# Patient Record
Sex: FEMALE | Race: WHITE | NOT HISPANIC OR LATINO | Employment: FULL TIME | URBAN - METROPOLITAN AREA
[De-identification: names, ages, dates, MRNs, and addresses within clinical notes are randomized per-mention and may not be internally consistent; named-entity substitution may affect disease eponyms.]

---

## 2017-01-06 ENCOUNTER — APPOINTMENT (OUTPATIENT)
Dept: PHYSICAL THERAPY | Facility: REHABILITATION | Age: 32
End: 2017-01-06
Payer: COMMERCIAL

## 2017-01-06 PROCEDURE — 97110 THERAPEUTIC EXERCISES: CPT

## 2017-01-20 ENCOUNTER — APPOINTMENT (OUTPATIENT)
Dept: PHYSICAL THERAPY | Facility: REHABILITATION | Age: 32
End: 2017-01-20
Payer: COMMERCIAL

## 2017-01-20 ENCOUNTER — GENERIC CONVERSION - ENCOUNTER (OUTPATIENT)
Dept: OTHER | Facility: OTHER | Age: 32
End: 2017-01-20

## 2017-02-03 ENCOUNTER — APPOINTMENT (OUTPATIENT)
Dept: PHYSICAL THERAPY | Facility: REHABILITATION | Age: 32
End: 2017-02-03
Payer: COMMERCIAL

## 2017-02-03 PROCEDURE — 97140 MANUAL THERAPY 1/> REGIONS: CPT

## 2017-02-03 PROCEDURE — 97110 THERAPEUTIC EXERCISES: CPT

## 2017-02-09 ENCOUNTER — APPOINTMENT (EMERGENCY)
Dept: RADIOLOGY | Facility: HOSPITAL | Age: 32
End: 2017-02-09
Payer: COMMERCIAL

## 2017-02-09 ENCOUNTER — HOSPITAL ENCOUNTER (EMERGENCY)
Facility: HOSPITAL | Age: 32
Discharge: HOME | End: 2017-02-09
Admitting: EMERGENCY MEDICINE
Payer: COMMERCIAL

## 2017-02-09 VITALS
RESPIRATION RATE: 16 BRPM | SYSTOLIC BLOOD PRESSURE: 114 MMHG | WEIGHT: 204 LBS | DIASTOLIC BLOOD PRESSURE: 72 MMHG | TEMPERATURE: 98 F | OXYGEN SATURATION: 100 % | HEART RATE: 68 BPM

## 2017-02-09 DIAGNOSIS — R07.81 RIB PAIN ON RIGHT SIDE: Primary | ICD-10-CM

## 2017-02-09 PROCEDURE — 71101 X-RAY EXAM UNILAT RIBS/CHEST: CPT

## 2017-02-09 PROCEDURE — 99283 EMERGENCY DEPT VISIT LOW MDM: CPT

## 2017-02-09 RX ORDER — NAPROXEN 500 MG/1
500 TABLET ORAL 2 TIMES DAILY WITH MEALS
Qty: 20 TABLET | Refills: 0 | Status: SHIPPED | OUTPATIENT
Start: 2017-02-09 | End: 2018-08-09 | Stop reason: ALTCHOICE

## 2017-02-09 RX ORDER — CLONAZEPAM 1 MG/1
1 TABLET ORAL DAILY
COMMUNITY
End: 2018-03-15 | Stop reason: SDUPTHER

## 2017-02-09 RX ORDER — GABAPENTIN 100 MG/1
100 CAPSULE ORAL 3 TIMES DAILY
COMMUNITY
End: 2017-04-16

## 2017-02-17 ENCOUNTER — APPOINTMENT (OUTPATIENT)
Dept: PHYSICAL THERAPY | Facility: REHABILITATION | Age: 32
End: 2017-02-17
Payer: COMMERCIAL

## 2017-03-30 ENCOUNTER — ALLSCRIPTS OFFICE VISIT (OUTPATIENT)
Dept: OTHER | Facility: OTHER | Age: 32
End: 2017-03-30

## 2017-03-30 DIAGNOSIS — F40.10 SOCIAL PHOBIA: ICD-10-CM

## 2017-03-30 DIAGNOSIS — E03.9 HYPOTHYROIDISM: ICD-10-CM

## 2017-04-03 ENCOUNTER — APPOINTMENT (OUTPATIENT)
Dept: LAB | Facility: HOSPITAL | Age: 32
End: 2017-04-03
Attending: PSYCHIATRY & NEUROLOGY
Payer: COMMERCIAL

## 2017-04-03 ENCOUNTER — TRANSCRIBE ORDERS (OUTPATIENT)
Dept: LAB | Facility: HOSPITAL | Age: 32
End: 2017-04-03

## 2017-04-03 DIAGNOSIS — F40.10 SOCIAL PHOBIA: ICD-10-CM

## 2017-04-03 DIAGNOSIS — E03.9 HYPOTHYROIDISM: ICD-10-CM

## 2017-04-03 LAB — TSH SERPL DL<=0.05 MIU/L-ACNC: 1.85 UIU/ML (ref 0.36–3.74)

## 2017-04-03 PROCEDURE — 84443 ASSAY THYROID STIM HORMONE: CPT

## 2017-04-03 PROCEDURE — 36415 COLL VENOUS BLD VENIPUNCTURE: CPT

## 2017-04-16 ENCOUNTER — HOSPITAL ENCOUNTER (EMERGENCY)
Facility: HOSPITAL | Age: 32
Discharge: HOME | End: 2017-04-16
Attending: EMERGENCY MEDICINE | Admitting: EMERGENCY MEDICINE
Payer: COMMERCIAL

## 2017-04-16 VITALS
TEMPERATURE: 97.9 F | HEART RATE: 61 BPM | OXYGEN SATURATION: 98 % | RESPIRATION RATE: 16 BRPM | SYSTOLIC BLOOD PRESSURE: 119 MMHG | DIASTOLIC BLOOD PRESSURE: 57 MMHG | WEIGHT: 204 LBS | HEIGHT: 67 IN | BODY MASS INDEX: 32.02 KG/M2

## 2017-04-16 DIAGNOSIS — S05.00XA CORNEAL ABRASION: Primary | ICD-10-CM

## 2017-04-16 PROCEDURE — 99282 EMERGENCY DEPT VISIT SF MDM: CPT

## 2017-04-16 RX ORDER — PROPARACAINE HYDROCHLORIDE 5 MG/ML
1 SOLUTION/ DROPS OPHTHALMIC ONCE
Status: COMPLETED | OUTPATIENT
Start: 2017-04-16 | End: 2017-04-16

## 2017-04-16 RX ORDER — FLUOXETINE 20 MG/1
60 TABLET, FILM COATED ORAL DAILY
COMMUNITY
End: 2018-01-30

## 2017-04-16 RX ORDER — OFLOXACIN 3 MG/ML
2 SOLUTION/ DROPS OPHTHALMIC 4 TIMES DAILY
Qty: 2.8 ML | Refills: 0 | Status: SHIPPED | OUTPATIENT
Start: 2017-04-16 | End: 2017-04-23

## 2017-04-16 RX ORDER — HYDROXYZINE HYDROCHLORIDE 25 MG/1
25 TABLET, FILM COATED ORAL 3 TIMES DAILY PRN
COMMUNITY
End: 2018-03-15 | Stop reason: SDUPTHER

## 2017-04-16 RX ADMIN — PROPARACAINE HYDROCHLORIDE 1 DROP: 5 SOLUTION/ DROPS OPHTHALMIC at 12:07

## 2017-04-16 RX ADMIN — FLUORESCEIN SODIUM 1 STRIP: 1 STRIP OPHTHALMIC at 12:07

## 2017-05-08 ENCOUNTER — ALLSCRIPTS OFFICE VISIT (OUTPATIENT)
Dept: OTHER | Facility: OTHER | Age: 32
End: 2017-05-08

## 2017-06-05 ENCOUNTER — ALLSCRIPTS OFFICE VISIT (OUTPATIENT)
Dept: OTHER | Facility: OTHER | Age: 32
End: 2017-06-05

## 2017-06-08 ENCOUNTER — ALLSCRIPTS OFFICE VISIT (OUTPATIENT)
Dept: OTHER | Facility: OTHER | Age: 32
End: 2017-06-08

## 2017-06-08 DIAGNOSIS — E55.9 VITAMIN D DEFICIENCY: ICD-10-CM

## 2017-06-08 DIAGNOSIS — Z13.6 ENCOUNTER FOR SCREENING FOR CARDIOVASCULAR DISORDERS: ICD-10-CM

## 2017-06-08 DIAGNOSIS — E03.9 HYPOTHYROIDISM: ICD-10-CM

## 2017-06-08 DIAGNOSIS — Z13.1 ENCOUNTER FOR SCREENING FOR DIABETES MELLITUS: ICD-10-CM

## 2017-06-12 ENCOUNTER — GENERIC CONVERSION - ENCOUNTER (OUTPATIENT)
Dept: OTHER | Facility: OTHER | Age: 32
End: 2017-06-12

## 2017-06-12 ENCOUNTER — APPOINTMENT (OUTPATIENT)
Dept: LAB | Facility: HOSPITAL | Age: 32
End: 2017-06-12
Payer: COMMERCIAL

## 2017-06-12 DIAGNOSIS — E03.9 HYPOTHYROIDISM: ICD-10-CM

## 2017-06-12 DIAGNOSIS — Z13.1 ENCOUNTER FOR SCREENING FOR DIABETES MELLITUS: ICD-10-CM

## 2017-06-12 DIAGNOSIS — Z13.6 ENCOUNTER FOR SCREENING FOR CARDIOVASCULAR DISORDERS: ICD-10-CM

## 2017-06-12 DIAGNOSIS — E55.9 VITAMIN D DEFICIENCY: ICD-10-CM

## 2017-06-12 LAB
25(OH)D3 SERPL-MCNC: 34.9 NG/ML (ref 30–100)
ALBUMIN SERPL BCP-MCNC: 3.8 G/DL (ref 3.5–5)
ALP SERPL-CCNC: 60 U/L (ref 46–116)
ALT SERPL W P-5'-P-CCNC: 24 U/L (ref 12–78)
ANION GAP SERPL CALCULATED.3IONS-SCNC: 5 MMOL/L (ref 4–13)
AST SERPL W P-5'-P-CCNC: 17 U/L (ref 5–45)
BASOPHILS # BLD AUTO: 0.01 THOUSANDS/ΜL (ref 0–0.1)
BASOPHILS NFR BLD AUTO: 0 % (ref 0–1)
BILIRUB SERPL-MCNC: 0.67 MG/DL (ref 0.2–1)
BUN SERPL-MCNC: 10 MG/DL (ref 5–25)
CALCIUM SERPL-MCNC: 8.6 MG/DL (ref 8.3–10.1)
CHLORIDE SERPL-SCNC: 102 MMOL/L (ref 100–108)
CHOLEST SERPL-MCNC: 159 MG/DL (ref 50–200)
CO2 SERPL-SCNC: 30 MMOL/L (ref 21–32)
CREAT SERPL-MCNC: 0.66 MG/DL (ref 0.6–1.3)
EOSINOPHIL # BLD AUTO: 0.16 THOUSAND/ΜL (ref 0–0.61)
EOSINOPHIL NFR BLD AUTO: 2 % (ref 0–6)
ERYTHROCYTE [DISTWIDTH] IN BLOOD BY AUTOMATED COUNT: 13.4 % (ref 11.6–15.1)
EST. AVERAGE GLUCOSE BLD GHB EST-MCNC: 85 MG/DL
GFR SERPL CREATININE-BSD FRML MDRD: >60 ML/MIN/1.73SQ M
GLUCOSE P FAST SERPL-MCNC: 88 MG/DL (ref 65–99)
HBA1C MFR BLD: 4.6 % (ref 4.2–6.3)
HCT VFR BLD AUTO: 41.8 % (ref 34.8–46.1)
HDLC SERPL-MCNC: 33 MG/DL (ref 40–60)
HGB BLD-MCNC: 14.1 G/DL (ref 11.5–15.4)
LDLC SERPL CALC-MCNC: 86 MG/DL (ref 0–100)
LYMPHOCYTES # BLD AUTO: 1.45 THOUSANDS/ΜL (ref 0.6–4.47)
LYMPHOCYTES NFR BLD AUTO: 18 % (ref 14–44)
MCH RBC QN AUTO: 30.5 PG (ref 26.8–34.3)
MCHC RBC AUTO-ENTMCNC: 33.7 G/DL (ref 31.4–37.4)
MCV RBC AUTO: 90 FL (ref 82–98)
MONOCYTES # BLD AUTO: 0.43 THOUSAND/ΜL (ref 0.17–1.22)
MONOCYTES NFR BLD AUTO: 5 % (ref 4–12)
NEUTROPHILS # BLD AUTO: 5.95 THOUSANDS/ΜL (ref 1.85–7.62)
NEUTS SEG NFR BLD AUTO: 75 % (ref 43–75)
NRBC BLD AUTO-RTO: 0 /100 WBCS
PLATELET # BLD AUTO: 217 THOUSANDS/UL (ref 149–390)
PMV BLD AUTO: 10 FL (ref 8.9–12.7)
POTASSIUM SERPL-SCNC: 4.4 MMOL/L (ref 3.5–5.3)
PROT SERPL-MCNC: 7.4 G/DL (ref 6.4–8.2)
RBC # BLD AUTO: 4.63 MILLION/UL (ref 3.81–5.12)
SODIUM SERPL-SCNC: 137 MMOL/L (ref 136–145)
T4 FREE SERPL-MCNC: 0.84 NG/DL (ref 0.76–1.46)
TRIGL SERPL-MCNC: 201 MG/DL
TSH SERPL DL<=0.05 MIU/L-ACNC: 2.79 UIU/ML (ref 0.36–3.74)
WBC # BLD AUTO: 8 THOUSAND/UL (ref 4.31–10.16)

## 2017-06-12 PROCEDURE — 84439 ASSAY OF FREE THYROXINE: CPT

## 2017-06-12 PROCEDURE — 80053 COMPREHEN METABOLIC PANEL: CPT

## 2017-06-12 PROCEDURE — 83036 HEMOGLOBIN GLYCOSYLATED A1C: CPT

## 2017-06-12 PROCEDURE — 82306 VITAMIN D 25 HYDROXY: CPT

## 2017-06-12 PROCEDURE — 85025 COMPLETE CBC W/AUTO DIFF WBC: CPT

## 2017-06-12 PROCEDURE — 36415 COLL VENOUS BLD VENIPUNCTURE: CPT

## 2017-06-12 PROCEDURE — 84443 ASSAY THYROID STIM HORMONE: CPT

## 2017-06-12 PROCEDURE — 80061 LIPID PANEL: CPT

## 2017-09-01 ENCOUNTER — ALLSCRIPTS OFFICE VISIT (OUTPATIENT)
Dept: OTHER | Facility: OTHER | Age: 32
End: 2017-09-01

## 2017-09-01 PROCEDURE — 87624 HPV HI-RISK TYP POOLED RSLT: CPT | Performed by: OBSTETRICS & GYNECOLOGY

## 2017-09-01 PROCEDURE — G0145 SCR C/V CYTO,THINLAYER,RESCR: HCPCS | Performed by: OBSTETRICS & GYNECOLOGY

## 2017-09-02 ENCOUNTER — LAB REQUISITION (OUTPATIENT)
Dept: LAB | Facility: HOSPITAL | Age: 32
End: 2017-09-02
Payer: COMMERCIAL

## 2017-09-02 DIAGNOSIS — Z01.419 ENCOUNTER FOR GYNECOLOGICAL EXAMINATION WITHOUT ABNORMAL FINDING: ICD-10-CM

## 2017-09-06 LAB — HPV RRNA GENITAL QL NAA+PROBE: NORMAL

## 2017-09-11 LAB
LAB AP GYN PRIMARY INTERPRETATION: NORMAL
LAB AP LMP: NORMAL
Lab: NORMAL

## 2017-09-12 ENCOUNTER — ALLSCRIPTS OFFICE VISIT (OUTPATIENT)
Dept: OTHER | Facility: OTHER | Age: 32
End: 2017-09-12

## 2017-09-20 ENCOUNTER — HOSPITAL ENCOUNTER (EMERGENCY)
Facility: HOSPITAL | Age: 32
Discharge: HOME | End: 2017-09-20
Payer: COMMERCIAL

## 2017-09-20 VITALS
TEMPERATURE: 97 F | SYSTOLIC BLOOD PRESSURE: 123 MMHG | OXYGEN SATURATION: 98 % | WEIGHT: 210 LBS | RESPIRATION RATE: 18 BRPM | BODY MASS INDEX: 33.39 KG/M2 | DIASTOLIC BLOOD PRESSURE: 77 MMHG | HEART RATE: 85 BPM

## 2017-09-20 DIAGNOSIS — S05.02XA CORNEAL ABRASION, LEFT, INITIAL ENCOUNTER: Primary | ICD-10-CM

## 2017-09-20 PROCEDURE — 99283 EMERGENCY DEPT VISIT LOW MDM: CPT

## 2017-09-20 RX ORDER — OFLOXACIN 3 MG/ML
SOLUTION/ DROPS OPHTHALMIC
Qty: 5 ML | Refills: 0 | Status: SHIPPED | OUTPATIENT
Start: 2017-09-20 | End: 2018-07-31 | Stop reason: ALTCHOICE

## 2017-09-20 RX ORDER — PROPARACAINE HYDROCHLORIDE 5 MG/ML
1 SOLUTION/ DROPS OPHTHALMIC ONCE
Status: COMPLETED | OUTPATIENT
Start: 2017-09-20 | End: 2017-09-20

## 2017-09-20 RX ADMIN — PROPARACAINE HYDROCHLORIDE 1 DROP: 5 SOLUTION/ DROPS OPHTHALMIC at 11:52

## 2017-09-20 RX ADMIN — FLUORESCEIN SODIUM 1 STRIP: 1 STRIP OPHTHALMIC at 11:52

## 2017-09-29 ENCOUNTER — ALLSCRIPTS OFFICE VISIT (OUTPATIENT)
Dept: OTHER | Facility: OTHER | Age: 32
End: 2017-09-29

## 2017-09-29 DIAGNOSIS — M54.41 LOW BACK PAIN WITH RIGHT-SIDED SCIATICA: ICD-10-CM

## 2017-09-29 LAB — HCG, QUALITATIVE (HISTORICAL): NEGATIVE

## 2017-10-06 ENCOUNTER — TRANSCRIBE ORDERS (OUTPATIENT)
Dept: ADMINISTRATIVE | Facility: HOSPITAL | Age: 32
End: 2017-10-06

## 2017-10-06 ENCOUNTER — APPOINTMENT (OUTPATIENT)
Dept: LAB | Facility: HOSPITAL | Age: 32
End: 2017-10-06
Payer: COMMERCIAL

## 2017-10-06 DIAGNOSIS — Z72.51 PROBLEMS RELATED TO HIGH-RISK SEXUAL BEHAVIOR: Primary | ICD-10-CM

## 2017-10-06 DIAGNOSIS — N91.2 AMENORRHEA: ICD-10-CM

## 2017-10-06 DIAGNOSIS — Z72.51 PROBLEMS RELATED TO HIGH-RISK SEXUAL BEHAVIOR: ICD-10-CM

## 2017-10-06 LAB
B-HCG SERPL-ACNC: <2 MIU/ML
FSH SERPL-ACNC: 7.3 MIU/ML
LH SERPL-ACNC: 32.9 MIU/ML
PROLACTIN SERPL-MCNC: 22.4 NG/ML
TSH SERPL DL<=0.05 MIU/L-ACNC: 3.28 UIU/ML (ref 0.36–3.74)

## 2017-10-06 PROCEDURE — 84702 CHORIONIC GONADOTROPIN TEST: CPT

## 2017-10-06 PROCEDURE — 87340 HEPATITIS B SURFACE AG IA: CPT

## 2017-10-06 PROCEDURE — 84443 ASSAY THYROID STIM HORMONE: CPT

## 2017-10-06 PROCEDURE — 83001 ASSAY OF GONADOTROPIN (FSH): CPT

## 2017-10-06 PROCEDURE — 87389 HIV-1 AG W/HIV-1&-2 AB AG IA: CPT

## 2017-10-06 PROCEDURE — 36415 COLL VENOUS BLD VENIPUNCTURE: CPT

## 2017-10-06 PROCEDURE — 84146 ASSAY OF PROLACTIN: CPT

## 2017-10-06 PROCEDURE — 83002 ASSAY OF GONADOTROPIN (LH): CPT

## 2017-10-07 LAB — HBV SURFACE AG SER QL: NORMAL

## 2017-10-09 LAB — HIV 1+2 AB+HIV1 P24 AG SERPL QL IA: NORMAL

## 2017-11-13 ENCOUNTER — ALLSCRIPTS OFFICE VISIT (OUTPATIENT)
Dept: OTHER | Facility: OTHER | Age: 32
End: 2017-11-13

## 2017-11-17 ENCOUNTER — LAB REQUISITION (OUTPATIENT)
Dept: LAB | Facility: HOSPITAL | Age: 32
End: 2017-11-17
Payer: COMMERCIAL

## 2017-11-17 ENCOUNTER — ALLSCRIPTS OFFICE VISIT (OUTPATIENT)
Dept: OTHER | Facility: OTHER | Age: 32
End: 2017-11-17

## 2017-11-17 DIAGNOSIS — N39.0 URINARY TRACT INFECTION: ICD-10-CM

## 2017-11-17 LAB
BILIRUB UR QL STRIP: NORMAL
CLARITY UR: NORMAL
COLOR UR: YELLOW
GLUCOSE (HISTORICAL): NORMAL
HGB UR QL STRIP.AUTO: NORMAL
KETONES UR STRIP-MCNC: NORMAL MG/DL
LEUKOCYTE ESTERASE UR QL STRIP: NORMAL
NITRITE UR QL STRIP: NORMAL
PH UR STRIP.AUTO: 7.5 [PH]
PROT UR STRIP-MCNC: NORMAL MG/DL
SP GR UR STRIP.AUTO: 1
UROBILINOGEN UR QL STRIP.AUTO: 0.2

## 2017-11-17 PROCEDURE — 87086 URINE CULTURE/COLONY COUNT: CPT | Performed by: FAMILY MEDICINE

## 2017-11-18 LAB — BACTERIA UR CULT: NORMAL

## 2017-11-18 NOTE — PROGRESS NOTES
Assessment    1  Acute UTI (599 0) (N39 0)    Plan  Acute UTI    · Ciprofloxacin HCl - 250 MG Oral Tablet; Take 1 every 12 hours  Unlinked    · (1) URINE CULTURE; Source:Urine, Clean Catch; Status:Active; Requestedfor:17Nov2017 10:22AM;    · Urine Dip Automated- POC; Status:Active - Perform Order; Requested for:44Uld897942:22AM;   Take medication as directed  Urine sent for culture  Hygiene tips were discussed  Chief Complaint    1  Urinary Frequency  urine frequency,pressure x 2 wks  History of Present Illness  HPI: Patient is a 80-year-old female complaining of urinary frequency, urgency, incomplete emptying, nocturia, and dysuria for approximately 2 weeks  Urinary Frequency: Heath Gonsalez presents with complaints of urinary frequency  Associated symptoms include urinary urgency,-- urinary incontinence,-- dysuria,-- nocturia-- and-- cloudy urine, but-- no fever,-- no chills,-- no suprapubic pain,-- no low back pain,-- no flank pain,-- no nausea,-- no diarrhea,-- no constipation,-- no abdominal distention,-- no peripheral edema,-- no menstrual change,-- no vaginal discharge,-- no vaginal itching,-- no weight loss-- and-- no anxiety  Review of Systems   Constitutional: feeling tired  ENT: no ear ache, no loss of hearing, no nosebleeds or nasal discharge, no sore throat or hoarseness  Cardiovascular: no complaints of slow or fast heart rate, no chest pain, no palpitations, no leg claudication or lower extremity edema  Respiratory: no complaints of shortness of breath, no wheezing, no dyspnea on exertion, no orthopnea or PND  Genitourinary: as noted in HPI  Musculoskeletal: no arthralgias-- and-- no myalgias  Integumentary: no rashes  Neurological: no complaints of headache, no confusion, no numbness or tingling, no dizziness or fainting  Active Problems  1  Acne (706 1) (L70 9)   2  Adult hypothyroidism (244 9) (E03 9)   3  Allergic rhinitis (477 9) (J30 9)   4   Arthralgia of multiple joints (719 49) (M25 50)   5  Depression with anxiety (300 4) (F41 8)   6  JG (generalized anxiety disorder) (300 02) (F41 1)   7  Headache, unspecified headache type (784 0) (R51)   8  Major depressive disorder, recurrent, severe w/o psychotic behavior (296 33) (F33 2)   9  Right-sided low back pain with right-sided sciatica (724 3) (M54 41)   10  Screening for cardiovascular condition (V81 2) (Z13 6)   11  Seborrhea (706 3) (L21 9)   12  Social phobia (300 23) (F40 10)   13  Vitamin D deficiency (268 9) (E55 9)   14  Weight gain (783 1) (R63 5)    Past Medical History    1  Acute serous otitis media, unspecified laterality   2  History of Acute URI (465 9) (J06 9)   3  History of Bilateral carpal tunnel syndrome (354 0) (G56 03)   4  History of Fatigue (780 79) (R53 83)   5  Denied: History of concussion   6  History of hypothyroidism (V12 29) (Z86 39)   7  History of low back pain (V13 59) (Z87 39)   8  History of Screening for diabetes mellitus (DM) (V77 1) (Z13 1)   9  History of Secondary amenorrhea (626 0) (N91 1)   10  Denied: History of Seizures   11  History of Surgical wound infection (998 59) (T81 4XXA)    Family History  Father    1  Family history of alcohol abuse (V61 41) (Z81 1)   2  Denied: Family history of suicide  Family History    3  Family history of Drug addiction   4  Family history of alcohol abuse (V61 41) (Z81 1)   5  Denied: Family history of bipolar disorder   6  Denied: Family history of paranoid schizophrenia   7  Denied: Family history of suicide   8  Family history of No Significant Family History    Social History   · Caffeine Use   · Denied: History of Drug use   · Denied: History of Drug Use   · Never A Smoker   · Social drinker (V49 89) (Z78 9)   · Uses Safety Equipment - Seatbelts  The social history was reviewed and updated today  The social history was reviewed and is unchanged  Surgical History    1  History of Tubal Ligation    Current Meds   1   Benzoyl Peroxide Wash 10 % External Liquid; 8 Jacquie Casas Labidi AFFECTED AREA WITH CLEANSER TWICE DAILY; Therapy: 69SYB2189 to (Last Rx:08Jun2017)  Requested for: 26GAF0549 Ordered   2  Butalbital-APAP-Caffeine -40 MG Oral Capsule; TAKE 1 CAPSULE EVERY 6 HOURS AS NEEDED FOR HEADACHES; Therapy: 83FLG3946 to (Evaluate:22Sep2017)  Requested for: 85Hrq0279; Last Rx:12Sep2017 Ordered   3  ClonazePAM 0 5 MG Oral Tablet; Take 1/2 tab (0 25mg) daily as needed for anxiety and take 2 tabs (1mg) nightly as needed  (Maximum 1 25mg per day); Therapy: 05UUR2562 to (Last Rx:13Nov2017) Ordered   4  FLUoxetine HCl - 60 MG Oral Tablet; 1 Tab daily; Therapy: 82Git1552 to (Last Rx:13Nov2017)  Requested for: 79EJW7428 Ordered   5  HydrOXYzine HCl - 50 MG Oral Tablet; TAKE 1-2 TABLET 4 times daily PRN anxiety; Therapy: 01Sep2017 to (Evaluate:52Tth4686)  Requested for: 91UME8470; Last Rx:13Nov2017 Ordered   6  Isometheptene-Dichloral-APAP -325 MG Oral Capsule; TAKE 2 CAPSULES AT ONSET OF HEADACHE THEN 1 CAPSULE EVERY HOUR UP TO 6 IN 24 HOURS; Therapy: 48AWU8446 to ([phone]); Last Rx:13Oct2017 Ordered   7  Meloxicam 15 MG Oral Tablet; Take 1 tablet daily; Therapy: 51Xag5531 to (Evaluate:28Mar2018)  Requested for: 01NMO1346; Last Rx:29Sep2017 Ordered   8  Topiramate 25 MG Oral Tablet; 1 tab nightly  After 1 week increase to 2 tabs nightly; Therapy: 35CCW0589 to (Last Rx:13Nov2017) Ordered   9  Vitamin D3 38691 UNIT Oral Capsule; TAKE 1 CAPSULE Weekly; Therapy: 74BWI9612 to (Last Rx:01Sep2017)  Requested for: 01Sep2017 Ordered    The medication list was reviewed and updated today  Allergies  1  Penicillins    Vitals   Recorded: 04HDY1795 10:10AM   Temperature 67 6 F   Systolic 970   Diastolic 78   Height 5 ft 6 in   Weight 217 lb    BMI Calculated 35 02   BSA Calculated 2 07       Physical Exam   Constitutional  General appearance: Abnormal   uncomfortable  Eyes  Conjunctiva and lids: No swelling, erythema or discharge     Pupils and irises: Equal, round and reactive to light  Ears, Nose, Mouth, and Throat  External inspection of ears and nose: Normal    Otoscopic examination: Tympanic membranes translucent with normal light reflex  Canals patent without erythema  Nasal mucosa, septum, and turbinates: Normal without edema or erythema  Oropharynx: Normal with no erythema, edema, exudate or lesions  Pulmonary  Respiratory effort: No increased work of breathing or signs of respiratory distress  Auscultation of lungs: Clear to auscultation  Cardiovascular  Palpation of heart: Normal PMI, no thrills  Auscultation of heart: Normal rate and rhythm, normal S1 and S2, without murmurs  Examination of extremities for edema and/or varicosities: Normal    Carotid pulses: Normal    Abdomen  Abdomen: Non-tender, no masses  Liver and spleen: No hepatomegaly or splenomegaly  Lymphatic  Palpation of lymph nodes in neck: No lymphadenopathy  Musculoskeletal  Gait and station: Normal    Digits and nails: Normal without clubbing or cyanosis  Inspection/palpation of joints, bones, and muscles: Abnormal  -- Tenderness of the right lumbar spine L 2-5, straight leg raising on the right  Decreased range of motion with side bending and rotation to the right  Skin  Skin and subcutaneous tissue: Normal without rashes or lesions     Psychiatric  Orientation to person, place, and time: Normal    Mood and affect: Normal          Signatures   Electronically signed by : Bernarda Salgado DO; Nov 17 2017 10:25AM EST                       (Author)

## 2017-11-19 ENCOUNTER — GENERIC CONVERSION - ENCOUNTER (OUTPATIENT)
Dept: OTHER | Facility: OTHER | Age: 32
End: 2017-11-19

## 2018-01-11 NOTE — PSYCH
Behavioral Health Outpatient Intake    Referred By: PT IS EMPLOYEE  Intake Questions: there are no developmental disabilities  the patient does not have a hearing impairment  the patient does not have an ICM or CTT  patient is not taking injectable psychiatric medications  Employment: The patient is employed full time at [employer]  Emergency Contact Information:   Emergency Contact: Beni Bonilla   Relationship to Patient: MARY JO   Phone Number: [phone]   Previous Psychiatric Treatment: She has previously been seen by a psychiatrist  Greg Wise 2017  She has previously been seen by a therapist  Greg Wise 2017   History: no  service  She has not had combat service  Insurance Subscriber: [employer]   Employer: [place] [employer]   Primary Insurance: [employer]   ID number: [id number]         Presenting Problem (in patient's words): DEPRESSION, ANXIETY  Substance Abuse: NONE  Previous Treatment: The patient has not been seen here in the past    A member of this patient's family has previously seen for therapy  Accepted as Patient   DR Demi Yoon 3/30/17 2PM     Primary Care Physician: DR Laine Mack   Electronically signed by : Elisha Ricardo, ; Jan 20 2017 11:35AM EST                       (Author)

## 2018-01-13 VITALS
SYSTOLIC BLOOD PRESSURE: 120 MMHG | DIASTOLIC BLOOD PRESSURE: 78 MMHG | BODY MASS INDEX: 33.97 KG/M2 | HEIGHT: 66 IN | WEIGHT: 211.38 LBS

## 2018-01-13 VITALS — WEIGHT: 220 LBS | BODY MASS INDEX: 34.98 KG/M2

## 2018-01-13 NOTE — RESULT NOTES
Discussion/Summary   Labwork was pretty good, vitamin D is much better  Cholesterol is okay except the good cholesterol is little low  She can get that up with regular exercise  Sugar, kidneys, liver, thyroid, blood count are all normal      Verified Results  (1) HEMOGLOBIN A1C 12Jun2017 11:20AM Joana Gagnon Order Number: [id number]     Test Name Result Flag Reference   HEMOGLOBIN A1C 4 6 %  4 2-6 3   EST  AVG  GLUCOSE 85 mg/dl       (1) LIPID PANEL, FASTING 12Jun2017 11:20AM Joana Gagnon Order Number: [id number]     Test Name Result Flag Reference   CHOLESTEROL 159 mg/dL     HDL,DIRECT 33 mg/dL L 40-60   Specimen collection should occur prior to Metamizole administration due to the potential for falsely depressed results  LDL CHOLESTEROL CALCULATED 86 mg/dL  0-100   Triglyceride:         Normal              <150 mg/dl       Borderline High    150-199 mg/dl       High               200-499 mg/dl       Very High          >499 mg/dl  Cholesterol:         Desirable        <200 mg/dl      Borderline High  200-239 mg/dl      High             >239 mg/dl  HDL Cholesterol:        High    >59 mg/dL      Low     <41 mg/dL  LDL CALCULATED:    This screening LDL is a calculated result  It does not have the accuracy of the Direct Measured LDL in the monitoring of patients with hyperlipidemia and/or statin therapy  Direct Measure LDL (OJZ172) must be ordered separately in these patients  TRIGLYCERIDES 201 mg/dL H <=150   Specimen collection should occur prior to N-Acetylcysteine or Metamizole administration due to the potential for falsely depressed results       (1) VITAMIN D 25-HYDROXY 12Jun2017 11:20AM Joana Gagnon Order Number: [id number]     Test Name Result Flag Reference   VIT D 25-HYDROX 34 9 ng/mL  30 0-100 0   This assay is a certified procedure of the CDC Vitamin D Standardization Certification Program (VDSCP)     Deficiency <20ng/ml   Insufficiency 20-30ng/ml   Sufficient  ng/ml     *Patients undergoing fluorescein dye angiography may retain small amounts of fluorescein in the body for 48-72 hours post procedure  Samples containing fluorescein can produce falsely elevated Vitamin D values  If the patient had this procedure, a specimen should be resubmitted post fluorescein clearance  (1) TSH 12Jun2017 11:20AM [employer] [facility] Order Number: [id number]     Test Name Result Flag Reference   TSH 2 786 uIU/mL  0 358-3 740   Patients undergoing fluorescein dye angiography may retain small amounts of fluorescein in the body for 48-72 hours post procedure  Samples containing fluorescein can produce falsely depressed TSH values  If the patient had this procedure,a specimen should be resubmitted post fluorescein clearance            The recommended reference ranges for TSH during pregnancy are as follows:  First trimester 0 1 to 2 5 uIU/mL  Second trimester  0 2 to 3 0 uIU/mL  Third trimester 0 3 to 3 0 uIU/m     (1) T4, FREE 12Jun2017 11:20AM [employer] [facility] Order Number: [id number]     Test Name Result Flag Reference   T4,FREE 0 84 ng/dL  0 76-1 46     (1) COMPREHENSIVE METABOLIC PANEL 32OPM4955 88:66MI [facility] Order Number: [id number]     Test Name Result Flag Reference   SODIUM 137 mmol/L  136-145   POTASSIUM 4 4 mmol/L  3 5-5 3   CHLORIDE 102 mmol/L  100-108   CARBON DIOXIDE 30 mmol/L  21-32   ANION GAP (CALC) 5 mmol/L  4-13   BLOOD UREA NITROGEN 10 mg/dL  5-25   CREATININE 0 66 mg/dL  0 60-1 30   Standardized to IDMS reference method   CALCIUM 8 6 mg/dL  8 3-10 1   BILI, TOTAL 0 67 mg/dL  0 20-1 00   ALK PHOSPHATAS 60 U/L     ALT (SGPT) 24 U/L  12-78   AST(SGOT) 17 U/L  5-45   ALBUMIN 3 8 g/dL  3 5-5 0   TOTAL PROTEIN 7 4 g/dL  6 4-8 2   eGFR Non-African American      >60 0 ml/min/1 73sq [facility] Disease Education Program recommendations are as follows:  GFR calculation is accurate only with a steady state creatinine  Chronic Kidney disease less than 60 ml/min/1 73 sq  meters  Kidney failure less than 15 ml/min/1 73 sq  meters  GLUCOSE FASTING 88 mg/dL  65-99     (1) CBC/PLT/DIFF 12Jun2017 11:20AM Raymond Halsted Order Number: [id number]     Test Name Result Flag Reference   WBC COUNT 8 00 Thousand/uL  4 31-10 16   RBC COUNT 4 63 Million/uL  3 81-5 12   HEMOGLOBIN 14 1 g/dL  11 5-15 4   HEMATOCRIT 41 8 %  34 8-46  1   MCV 90 fL  82-98   MCH 30 5 pg  26 8-34 3   MCHC 33 7 g/dL  31 4-37 4   RDW 13 4 %  11 6-15 1   MPV 10 0 fL  8 9-12 7   PLATELET COUNT 840 Thousands/uL  149-390   nRBC AUTOMATED 0 /100 WBCs     NEUTROPHILS RELATIVE PERCENT 75 %  43-75   LYMPHOCYTES RELATIVE PERCENT 18 %  14-44   MONOCYTES RELATIVE PERCENT 5 %  4-12   EOSINOPHILS RELATIVE PERCENT 2 %  0-6   BASOPHILS RELATIVE PERCENT 0 %  0-1   NEUTROPHILS ABSOLUTE COUNT 5 95 Thousands/? ??L  1 85-7 62   LYMPHOCYTES ABSOLUTE COUNT 1 45 Thousands/? ??L  0 60-4 47   MONOCYTES ABSOLUTE COUNT 0 43 Thousand/? ??L  0 17-1 22   EOSINOPHILS ABSOLUTE COUNT 0 16 Thousand/? ??L  0 00-0 61   BASOPHILS ABSOLUTE COUNT 0 01 Thousands/? ??L  0 00-0 10
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
## 2018-01-13 NOTE — PROGRESS NOTES
Assessment    1  Encounter for preventive health examination (V70 0) (Z00 00)   2  Vitamin D deficiency (268 9) (E55 9)   3  Arthralgia of multiple joints (719 49) (M25 50)   4  Adult hypothyroidism (244 9) (E03 9)   5  Acne (706 1) (L70 9)    Plan  Acne    · Benzoyl Peroxide Wash 10 % External Liquid; WASH AFFECTED AREA WITH  CLEANSER TWICE DAILY  Adult hypothyroidism    · (1) CBC/PLT/DIFF; Status:Active; Requested ESR:[id number];    · (1) COMPREHENSIVE METABOLIC PANEL; Status:Active; Requested QPJ:[id number];    · (1) T4, FREE; Status:Active; Requested YZM:[id number];    · (1) TSH; Status:Active; Requested VFU:[id number]; Health Maintenance    · Call [phone] if: You find a new or different kind of lump in your breast ;  Status:Complete;   Done: 87BDM3999 06:28PM   · Call [phone] if: You have any warning signs of skin cancer ; Status:Complete;    Done: 72FAI8300 06:28PM   · Call 911 if: You have symptoms of toxic shock ; Status:Complete;   Done: 47DMJ7738  06:28PM   · Always use a seat belt and shoulder strap when riding or driving a motor vehicle ;  Status:Complete;   Done: 21AIH9299 06:28PM   · Begin a limited exercise program ; Status:Complete;   Done: 88HBS6064 06:28PM   · Begin or continue regular aerobic exercise   Gradually work up to at least 3 sessions of 30  minutes of exercise a week ; Status:Complete;   Done: 49BQZ8712 06:28PM   · Brush your teeth 3 times a day and floss at least once a day ; Status:Complete;   Done:  84EMI1864 06:28PM   · Eat foods that are high in calcium ; Status:Complete;   Done: 95JVL8535 06:28PM   · Limit your use of alcohol to 2 drinks or cans of beer a day ; Status:Complete;   Done:  71AEW3205 06:28PM   · Some eating tips that can help you lose weight ; Status:Complete;   Done: 52HDK2391  06:28PM   · Stretch and warm up your muscles during the first 10 minutes , then cool down your  muscles for the last 10 minutes of exercise ; Status:Complete;   Done: 78SJY0521  06:28PM   · We encourage all of our patients to exercise regularly  30 minutes of exercise or physical  activity five or more days a week is recommended for children and adults ;  Status:Complete;   Done: 02BFZ9036 06:28PM   · We recommend routine visits to a dentist ; Status:Complete;   Done: 10CKQ0879 06:28PM   · We recommend that you bring your body mass index down to 26 ; Status:Complete;    Done: 46OAO1124 06:28PM   · We recommend you modify your diet to achieve and maintain a healthy weight  Being  underweight may increase your risk of developing health problems from vitamin and  mineral deficiencies  We recommend a balanced diet rich in fruits and vegetables  You  may also consider increasing your calorie intake by eating more frequently or adding  nuts, avocados, and low-fat cheese or milk to your meals  Please let us know  if you would like to learn more about your nutrition and calorie needs, and additional  options to help you achieve your weight goals ; Status:Complete;   Done: 07PCX9232  06:28PM  Screening for cardiovascular condition    · (1) LIPID PANEL, FASTING; Status:Active; Requested UUY:[id number];   Screening for diabetes mellitus (DM)    · (1) HEMOGLOBIN A1C; Status:Active; Requested IIN:[id number];   Vitamin D deficiency    · (1) VITAMIN D 25-HYDROXY; Status:Active; Requested RCX:[id number];     Staff will contact her once we have the lab results  She is to call his any problems  Otherwise I will see her in 6 months  Chief Complaint  wellness physical      History of Present Illness  HM, Adult Female: The patient is being seen for a health maintenance and She is 28years old evaluation  The last health maintenance visit was 2 year(s) ago  General Health: The patient's health since the last visit is described as good     Screening:      Review of Systems    Constitutional: recent 20 lb weight gain, but No fever, no chills, feels well, no tiredness, no recent weight gain or weight loss  Eyes: No complaints of eye pain, no red eyes, no eyesight problems, no discharge, no dry eyes, no itching of eyes  ENT: no complaints of earache, no loss of hearing, no nose bleeds, no nasal discharge, no sore throat, no hoarseness  Cardiovascular: No complaints of slow heart rate, no fast heart rate, no chest pain, no palpitations, no leg claudication, no lower extremity edema  Respiratory: No complaints of shortness of breath, no wheezing, no cough, no SOB on exertion, no orthopnea, no PND  Gastrointestinal: No complaints of abdominal pain, no constipation, no nausea or vomiting, no diarrhea, no bloody stools  Genitourinary: She has an appointment to see her gynecologist in the next several months , but No complaints of dysuria, no incontinence, no pelvic pain, no dysmenorrhea, no vaginal discharge or bleeding  Musculoskeletal: No complaints of arthralgias, no myalgias, no joint swelling or stiffness, no limb pain or swelling  Integumentary: Me on her chin and cheeks has gotten worse lately, rarely washes her face with soap and then only uses Dove  Neurological: No complaints of headache, no confusion, no convulsions, no numbness, no dizziness or fainting, no tingling, no limb weakness, no difficulty walking  Psychiatric: anxiety, depression and sees psychiatry  Endocrine: No complaints of proptosis, no hot flashes, no muscle weakness, no deepening of the voice, no feelings of weakness  Active Problems    1  Acne (706 1) (L70 9)   2  Adult hypothyroidism (244 9) (E03 9)   3  Allergic rhinitis (477 9) (J30 9)   4  Arthralgia of multiple joints (719 49) (M25 50)   5  Depression with anxiety (300 4) (F41 8)   6  JG (generalized anxiety disorder) (300 02) (F41 1)   7  Major depressive disorder, recurrent, severe w/o psychotic behavior (296 33) (F33 2)   8  Seborrhea (706 3) (L21 9)   9  Social phobia (300 23) (F40 10)   10  Vitamin D deficiency (268 9) (E55 9)   11   Weight gain (783  1) (R63 5)    Past Medical History    · Acute serous otitis media, unspecified laterality   · History of Acute URI (465 9) (J06 9)   · History of Bilateral carpal tunnel syndrome (354 0) (G56 03)   · History of Fatigue (780 79) (R53 83)   · Denied: History of concussion   · History of headache (V13 89) (P80 206)   · History of hypothyroidism (V12 29) (Z86 39)   · History of low back pain (V13 59) (Z87 39)   · Denied: History of Seizures   · History of Surgical wound infection (998 59) (T81 4XXA)    Surgical History    · History of Tubal Ligation    Family History  Father    · Family history of alcohol abuse (V61 41) (Z81 1)   · Denied: Family history of suicide  Family History    · Family history of Drug addiction   · Family history of alcohol abuse (V61 41) (Z81 1)   · Denied: Family history of bipolar disorder   · Denied: Family history of paranoid schizophrenia   · Denied: Family history of suicide   · Family history of No Significant Family History    Social History    · Caffeine Use   · Denied: History of Drug use   · Denied: History of Drug Use   · Never A Smoker   · Social drinker (V49 89) (Z78 9)   · Uses Safety Equipment - Seatbelts    Current Meds   1  ClonazePAM 0 5 MG Oral Tablet; Take 1/2 tab (0 25mg) daily as needed for anxiety and   take 2 tabs (1mg) nightly as needed  (Maximum 1 25mg per day); Therapy: 85IOL4456 to (Last Rx:05Jun2017) Ordered   2  FLUoxetine HCl - 40 MG Oral Capsule; 1 CAPSULE DAILY; Therapy: 89EXP5760 to (Last Rx:05Jun2017)  Requested for: 16JXL8256 Ordered   3  Vitamin D3 80331 UNIT Oral Capsule; TAKE 1 CAPSULE Weekly; Therapy: 92ZMQ1433 to (Last Rx:20Mar2017)  Requested for: 20Mar2017 Ordered    Allergies    1  Penicillins    Vitals   Recorded: 77WWB7861 67:07KO   Systolic 240   Diastolic 72   Height 5 ft 6 in   Weight 206 lb 6 oz   BMI Calculated 33 31   BSA Calculated 2 03     Physical Exam    Constitutional   General appearance: Abnormal   obese     Eyes   Conjunctiva and lids: No swelling, erythema or discharge  Pupils and irises: Equal, round, reactive to light  Ears, Nose, Mouth, and Throat   External inspection of ears and nose: Normal     Otoscopic examination: Tympanic membranes translucent with normal light reflex  Canals patent without erythema  Hearing: Normal     Nasal mucosa, septum, and turbinates: Normal without edema or erythema  Lips, teeth, and gums: Normal, good dentition  Oropharynx: Normal with no erythema, edema, exudate or lesions  Neck   Neck: Supple, symmetric, trachea midline, no masses  Thyroid: Normal, no thyromegaly  Pulmonary   Respiratory effort: No increased work of breathing or signs of respiratory distress  Percussion of chest: Normal     Palpation of chest: Normal     Auscultation of lungs: Clear to auscultation  Cardiovascular   Palpation of heart: Normal PMI, no thrills  Auscultation of heart: Normal rate and rhythm, normal S1 and S2, no murmurs  Carotid pulses: 2+ bilaterally  Abdominal aorta: Normal     Pedal pulses: 2+ bilaterally  Examination of extremities for edema and/or varicosities: Normal     Chest   Breasts: Normal, no dimpling or skin changes appreciated  Palpation of breasts and axillae: Normal, no masses palpated  Abdomen   Abdomen: Non-tender, no masses  Liver and spleen: No hepatomegaly or splenomegaly  Genitourinary   External genitalia and vagina: Normal, no lesions appreciated  Lymphatic   Palpation of lymph nodes in neck: No lymphadenopathy  Musculoskeletal   Gait and station: Normal     Digits and nails: Normal without clubbing or cyanosis  Joints, bones, and muscles: Normal     Range of motion: Normal     Stability: Normal     Muscle strength/tone: Normal     Skin   Skin and subcutaneous tissue: Normal without rashes or lesions  Moderate acne on the chin and cheeks with some whiteheads  Palpation of skin and subcutaneous tissue: Normal turgor      Neurologic Cranial nerves: Cranial nerves II-XII intact  Reflexes: 2+ and symmetric  Sensation: No sensory loss  Psychiatric   Judgment and insight: Normal     Orientation to person, place, and time: Normal     Recent and remote memory: Intact      Mood and affect: Normal        Future Appointments    Date/Time Provider Specialty Site   09/01/2017 11:30 AM Vance Lyon DO Psychiatry  [facility]     Signatures   Electronically signed by : Svetlana Cordova DO; Jun 8 2017  6:30PM EST                       (Author)

## 2018-01-14 VITALS
WEIGHT: 210 LBS | DIASTOLIC BLOOD PRESSURE: 72 MMHG | SYSTOLIC BLOOD PRESSURE: 110 MMHG | HEIGHT: 66 IN | BODY MASS INDEX: 33.75 KG/M2

## 2018-01-14 VITALS
SYSTOLIC BLOOD PRESSURE: 118 MMHG | HEIGHT: 66 IN | BODY MASS INDEX: 34.87 KG/M2 | TEMPERATURE: 98.4 F | WEIGHT: 217 LBS | DIASTOLIC BLOOD PRESSURE: 78 MMHG
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
## 2018-01-15 NOTE — RESULT NOTES
Verified Results  (1) TSH [id number] 09:18AM Frankie Calvert    Order Number: [id number]  TW Order Number: [id number] Order Number: [id number] Order Number: [id number]     Test Name Result Flag Reference   TSH 2 384 uIU/mL  0 358-3 740   The recommended reference ranges for TSH during pregnancy are as follows:  First trimester 0 1 to 2 5 uIU/mL  Second trimester  0 2 to 3 0 uIU/mL  Third trimester 0 3 to 3 0 uIU/m     (1) COMPREHENSIVE METABOLIC PANEL [id number] 49:56AS Ella Welsh Order Number: [id number]  TW Order Number: [id number] Order Number: [id number] Order Number: [id number]     Test Name Result Flag Reference   GLUCOSE,RANDM 84 mg/dL     If the patient is fasting, the ADA then defines impaired fasting glucose as > 100 mg/dL and diabetes as > or equal to 123 mg/dL  SODIUM 139 mmol/L  136-145   POTASSIUM 4 1 mmol/L  3 5-5 3   CHLORIDE 104 mmol/L  100-108   CARBON DIOXIDE 29 mmol/L  21-32   ANION GAP (CALC) 6 mmol/L  4-13   BLOOD UREA NITROGEN 13 mg/dL  5-25   CREATININE 0 80 mg/dL  0 60-1 30   Standardized to IDMS reference method   CALCIUM 8 6 mg/dL  8 3-10 1   BILI, TOTAL 0 72 mg/dL  0 20-1 00   ALK PHOSPHATAS 56 U/L     ALT (SGPT) 24 U/L  12-78   AST(SGOT) 22 U/L  5-45   ALBUMIN 3 9 g/dL  3 5-5 0   TOTAL PROTEIN 7 4 g/dL  6 4-8 2   eGFR Non-African American      >60 0 ml/min/1 73sq m   [facility] Disease Education Program recommendations are as follows:  GFR calculation is accurate only with a steady state creatinine  Chronic Kidney disease less than 60 ml/min/1 73 sq  meters  Kidney failure less than 15 ml/min/1 73 sq  meters  (1) CBC/PLT/DIFF [id number] 09:18AM [facility] Order Number: [id number]  TW Order Number: [id number]     Test Name Result Flag Reference   WBC COUNT 5 65 Thousand/uL  4 31-10 16   RBC COUNT 4 58 Million/uL  3 81-5 12   HEMOGLOBIN 14 1 g/dL  11 5-15 4   HEMATOCRIT 42 9 %  34 8-46  1   MCV 94 fL  82-98   MCH 30 8 pg  26 8-34 3 MCHC 32 9 g/dL  31 4-37 4   RDW 13 8 %  11 6-15 1   MPV 10 0 fL  8 9-12 7   PLATELET COUNT 600 Thousands/uL  149-390   nRBC AUTOMATED 0 /100 WBCs     NEUTROPHILS RELATIVE PERCENT 69 %  43-75   LYMPHOCYTES RELATIVE PERCENT 23 %  14-44   MONOCYTES RELATIVE PERCENT 5 %  4-12   EOSINOPHILS RELATIVE PERCENT 3 %  0-6   BASOPHILS RELATIVE PERCENT 0 %  0-1   NEUTROPHILS ABSOLUTE COUNT 3 95 Thousands/?L  1 85-7 62   LYMPHOCYTES ABSOLUTE COUNT 1 28 Thousands/?L  0 60-4 47   MONOCYTES ABSOLUTE COUNT 0 27 Thousand/?L  0 17-1 22   EOSINOPHILS ABSOLUTE COUNT 0 14 Thousand/?L  0 00-0 61   BASOPHILS ABSOLUTE COUNT 0 01 Thousands/?L  0 00-0 10     (1) LIPID PANEL, FASTING [id number] 09:18AM Theadore Kocher Order Number: [id number]  TW Order Number: [id number] Order Number: [id number] Order Number: [id number]     Test Name Result Flag Reference   CHOLESTEROL 171 mg/dL     HDL,DIRECT 44 mg/dL  40-60   Specimen collection should occur prior to Metamizole administration due to the potential for falsely depressed results  LDL CHOLESTEROL CALCULATED 98 mg/dL  0-100   Triglyceride:         Normal              <150 mg/dl       Borderline High    150-199 mg/dl       High               200-499 mg/dl       Very High          >499 mg/dl  Cholesterol:         Desirable        <200 mg/dl      Borderline High  200-239 mg/dl      High             >239 mg/dl  HDL Cholesterol:        High    >59 mg/dL      Low     <41 mg/dL  LDL CALCULATED:    This screening LDL is a calculated result  It does not have the accuracy of the Direct Measured LDL in the monitoring of patients with hyperlipidemia and/or statin therapy  Direct Measure LDL (DNL939) must be ordered separately in these patients  TRIGLYCERIDES 146 mg/dL  <=150   Specimen collection should occur prior to N-Acetylcysteine or Metamizole administration due to the potential for falsely depressed results       (1) T4, FREE [id number] 09:18AM Suzzette Castleman   TW Order Number: [id number]   Order Number: [id number]     Test Name Result Flag Reference   T4,FREE 0 93 ng/dL  0 76-1 46     (1) VITAMIN D 25-HYDROXY [id number] 09:18AM Arch Maw Order Number: [id number]   Order Number: [id number]     Test Name Result Flag Reference   VIT D 25-HYDROX 12 7 ng/mL L 30 0-100 0       Discussion/Summary   Lab work was all okay except for the vitamin D, which still quite low  Find out if she is taking a supplement  If she is, I will probably have to send in a prescription strength vitamin D, if not she should be taking at least 1000 units per day  Let me know

## 2018-01-15 NOTE — RESULT NOTES
Discussion/Summary   Urine culture did not show an infection  Let me know how she is doing    She can stop the antibiotic     Verified Results  (1) URINE CULTURE 99PFZ4938 10:34AM Cain Level Order Number: [id number]     Test Name Result Flag Reference   CLINICAL REPORT (Report)     Test:        Urine culture  Specimen Type:   Urine  Specimen Date:   11/17/2017 10:34 AM  Result Date:    11/18/2017 7:26 AM  Result Status:   Final result  Resulting Lab:   [place] [address]            Tel: [phone]      CULTURE                                       ------------------                                   <10,000 cfu/ml      *** Mixed Contaminants X2 ***
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
## 2018-01-16 NOTE — RESULT NOTES
Verified Results  (1) VITAMIN D 25-HYDROXY 01MVA7103 08:11AM Dean Palacios Order Number: [id number]     Test Name Result Flag Reference   VIT D 25-HYDROX 21 6 ng/mL L 30 0-100 0   This assay is a certified procedure of the CDC Vitamin D Standardization Certification Program (VDSCP)     Deficiency <20ng/ml   Insufficiency 20-30ng/ml   Sufficient  ng/ml     *Patients undergoing fluorescein dye angiography may retain small amounts of fluorescein in the body for 48-72 hours post procedure  Samples containing fluorescein can produce falsely elevated Vitamin D values  If the patient had this procedure, a specimen should be resubmitted post fluorescein clearance  (1) C-REACTIVE PROTEIN 12HAV7412 08:11AM Dean Palacios Order Number: [id number]     Test Name Result Flag Reference   C-REACT PROTEIN <3 0 mg/L  <3 0     (1) CBC/PLT/DIFF 14BYI0381 08:11AM Dean Palacios Order Number: [id number]     Test Name Result Flag Reference   WBC COUNT 6 65 Thousand/uL  4 31-10 16   RBC COUNT 4 61 Million/uL  3 81-5 12   HEMOGLOBIN 14 0 g/dL  11 5-15 4   HEMATOCRIT 42 3 %  34 8-46  1   MCV 92 fL  82-98   MCH 30 4 pg  26 8-34 3   MCHC 33 1 g/dL  31 4-37 4   RDW 13 4 %  11 6-15 1   MPV 10 2 fL  8 9-12 7   PLATELET COUNT 331 Thousands/uL  149-390   nRBC AUTOMATED 0 /100 WBCs     NEUTROPHILS RELATIVE PERCENT 69 %  43-75   LYMPHOCYTES RELATIVE PERCENT 22 %  14-44   MONOCYTES RELATIVE PERCENT 6 %  4-12   EOSINOPHILS RELATIVE PERCENT 3 %  0-6   BASOPHILS RELATIVE PERCENT 0 %  0-1   NEUTROPHILS ABSOLUTE COUNT 4 59 Thousands/?L  1 85-7 62   LYMPHOCYTES ABSOLUTE COUNT 1 47 Thousands/?L  0 60-4 47   MONOCYTES ABSOLUTE COUNT 0 39 Thousand/?L  0 17-1 22   EOSINOPHILS ABSOLUTE COUNT 0 18 Thousand/?L  0 00-0 61   BASOPHILS ABSOLUTE COUNT 0 01 Thousands/?L  0 00-0 10   - Patient Instructions: This bloodwork is non-fasting  Please drink two glasses of water morning of bloodwork      - Patient Instructions: This bloodwork is non-fasting  Please drink two glasses of water morning of bloodwork  (1) COMPREHENSIVE METABOLIC PANEL 23LHX4430 79:10EC Lolis Jocelyne Order Number: [id number]     Test Name Result Flag Reference   GLUCOSE,RANDM 89 mg/dL     If the patient is fasting, the ADA then defines impaired fasting glucose as > 100 mg/dL and diabetes as > or equal to 123 mg/dL  SODIUM 140 mmol/L  136-145   POTASSIUM 3 9 mmol/L  3 5-5 3   CHLORIDE 106 mmol/L  100-108   CARBON DIOXIDE 31 mmol/L  21-32   ANION GAP (CALC) 3 mmol/L L 4-13   BLOOD UREA NITROGEN 12 mg/dL  5-25   CREATININE 0 68 mg/dL  0 60-1 30   Standardized to IDMS reference method   CALCIUM 8 8 mg/dL  8 3-10 1   BILI, TOTAL 0 71 mg/dL  0 20-1 00   ALK PHOSPHATAS 53 U/L     ALT (SGPT) 26 U/L  12-78   AST(SGOT) 9 U/L  5-45   ALBUMIN 3 8 g/dL  3 5-5 0   TOTAL PROTEIN 7 4 g/dL  6 4-8 2   eGFR Non-African American      >60 0 ml/min/1 73sq m   [facility] Disease Education Program recommendations are as follows:  GFR calculation is accurate only with a steady state creatinine  Chronic Kidney disease less than 60 ml/min/1 73 sq  meters  Kidney failure less than 15 ml/min/1 73 sq  meters  (1) SED RATE 59RDC4496 08:11AM Lolis Casanova Order Number: [id number]     Test Name Result Flag Reference   SED RATE 7 mm/hour  0-20     (1) URIC ACID 40BTD1193 08:11AM Lolis Casanova Order Number: [id number]     Test Name Result Flag Reference   URIC ACID 4 4 mg/dL  2 0-6 8   Specimen collection should occur prior to Metamizole administration due to the potential for falsely depressed results  Discussion/Summary   Vitamin D is still low  Find out how much she is taking  Other lab work is okay
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
## 2018-01-22 VITALS
DIASTOLIC BLOOD PRESSURE: 71 MMHG | BODY MASS INDEX: 32.93 KG/M2 | WEIGHT: 204 LBS | SYSTOLIC BLOOD PRESSURE: 116 MMHG | HEART RATE: 69 BPM

## 2018-01-22 VITALS — BODY MASS INDEX: 33.07 KG/M2 | WEIGHT: 208 LBS

## 2018-01-22 VITALS — WEIGHT: 209 LBS | RESPIRATION RATE: 16 BRPM | BODY MASS INDEX: 33.23 KG/M2

## 2018-01-22 VITALS
SYSTOLIC BLOOD PRESSURE: 110 MMHG | WEIGHT: 206.38 LBS | BODY MASS INDEX: 33.17 KG/M2 | HEIGHT: 66 IN | DIASTOLIC BLOOD PRESSURE: 72 MMHG

## 2018-01-30 DIAGNOSIS — F33.2 MDD (MAJOR DEPRESSIVE DISORDER), RECURRENT SEVERE, WITHOUT PSYCHOSIS (HCC): Primary | ICD-10-CM

## 2018-01-30 RX ORDER — FLUOXETINE HYDROCHLORIDE 60 MG/1
60 TABLET, FILM COATED ORAL; ORAL DAILY
Qty: 30 TABLET | Refills: 1 | Status: SHIPPED | OUTPATIENT
Start: 2018-01-30 | End: 2018-03-15 | Stop reason: SDUPTHER

## 2018-03-13 NOTE — TELEPHONE ENCOUNTER
She notes she has not been on meds since ~February  Worse off meds  Doing ok when on meds  She can come in Th 33pm  OK without med Rx until we meet

## 2018-03-15 ENCOUNTER — OFFICE VISIT (OUTPATIENT)
Dept: PSYCHIATRY | Facility: CLINIC | Age: 33
End: 2018-03-15
Payer: COMMERCIAL

## 2018-03-15 VITALS — WEIGHT: 220 LBS | BODY MASS INDEX: 35.51 KG/M2

## 2018-03-15 DIAGNOSIS — F40.10 SOCIAL PHOBIA: ICD-10-CM

## 2018-03-15 DIAGNOSIS — F33.2 MDD (MAJOR DEPRESSIVE DISORDER), RECURRENT SEVERE, WITHOUT PSYCHOSIS (HCC): ICD-10-CM

## 2018-03-15 DIAGNOSIS — F33.2 MAJOR DEPRESSIVE DISORDER, RECURRENT, SEVERE W/O PSYCHOTIC BEHAVIOR (HCC): ICD-10-CM

## 2018-03-15 DIAGNOSIS — F41.1 GAD (GENERALIZED ANXIETY DISORDER): Primary | ICD-10-CM

## 2018-03-15 PROCEDURE — 99213 OFFICE O/P EST LOW 20 MIN: CPT | Performed by: PSYCHIATRY & NEUROLOGY

## 2018-03-15 RX ORDER — FLUOXETINE HYDROCHLORIDE 60 MG/1
1 TABLET, FILM COATED ORAL; ORAL DAILY
COMMUNITY
Start: 2017-09-01 | End: 2018-03-15

## 2018-03-15 RX ORDER — TOPIRAMATE 25 MG/1
TABLET ORAL
COMMUNITY
Start: 2017-11-13 | End: 2018-03-15

## 2018-03-15 RX ORDER — CLONAZEPAM 0.5 MG/1
TABLET ORAL
Qty: 60 TABLET | Refills: 2 | Status: SHIPPED | OUTPATIENT
Start: 2018-03-15 | End: 2018-04-17 | Stop reason: SDUPTHER

## 2018-03-15 RX ORDER — HYDROXYZINE 50 MG/1
50 TABLET, FILM COATED ORAL EVERY 6 HOURS PRN
Qty: 90 TABLET | Refills: 2 | Status: SHIPPED | OUTPATIENT
Start: 2018-03-15 | End: 2018-03-15 | Stop reason: SDUPTHER

## 2018-03-15 RX ORDER — CLONAZEPAM 0.5 MG/1
TABLET ORAL
COMMUNITY
Start: 2017-03-30 | End: 2018-03-15

## 2018-03-15 RX ORDER — HYDROXYZINE 50 MG/1
50-100 TABLET, FILM COATED ORAL EVERY 6 HOURS PRN
Qty: 180 TABLET | Refills: 1 | Status: SHIPPED | OUTPATIENT
Start: 2018-03-15 | End: 2018-04-17

## 2018-03-15 RX ORDER — FLUOXETINE 20 MG/1
TABLET, FILM COATED ORAL
Qty: 90 TABLET | Refills: 2 | Status: SHIPPED | OUTPATIENT
Start: 2018-03-15 | End: 2018-03-22

## 2018-03-15 RX ORDER — HYDROXYZINE 50 MG/1
TABLET, FILM COATED ORAL
COMMUNITY
Start: 2017-09-01 | End: 2018-03-15

## 2018-03-15 RX ORDER — BUTALBITAL, ACETAMINOPHEN AND CAFFEINE 50; 325; 40 MG/1; MG/1; MG/1
CAPSULE ORAL
COMMUNITY
Start: 2014-03-06 | End: 2018-08-09
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
## 2018-03-19 ENCOUNTER — TELEPHONE (OUTPATIENT)
Dept: BEHAVIORAL/MENTAL HEALTH CLINIC | Facility: CLINIC | Age: 33
End: 2018-03-19

## 2018-03-19 NOTE — TELEPHONE ENCOUNTER
Pt  called for pre authorization of  Fluoxetine 20 mg to be sent to [facility] pharmacy on [address] in [place] [facility] [facility] [place]

## 2018-03-19 NOTE — TELEPHONE ENCOUNTER
I spoke with pt and pharmacist  She has the following insurance:  [employer]; [place] [id number]; Evelyn Stevenson [id number]; [employer]; phone [phone]; [place] [id number]

## 2018-03-20 NOTE — TELEPHONE ENCOUNTER
Prior authorization request submitted via [address] [place], [place] [address] [facility]

## 2018-03-20 NOTE — TELEPHONE ENCOUNTER
Spoke to pharmacy tech, [employer] is requiring a prior auth, [employer] (primary) is covering

## 2018-03-22 DIAGNOSIS — F41.1 GAD (GENERALIZED ANXIETY DISORDER): ICD-10-CM

## 2018-03-22 DIAGNOSIS — F33.2 MAJOR DEPRESSIVE DISORDER, RECURRENT, SEVERE W/O PSYCHOTIC BEHAVIOR (HCC): Primary | ICD-10-CM

## 2018-03-22 DIAGNOSIS — F40.10 SOCIAL PHOBIA: ICD-10-CM

## 2018-03-22 RX ORDER — FLUOXETINE HYDROCHLORIDE 20 MG/1
CAPSULE ORAL
Qty: 90 CAPSULE | Refills: 3 | Status: SHIPPED | OUTPATIENT
Start: 2018-03-22 | End: 2018-04-17 | Stop reason: SDUPTHER

## 2018-03-22 NOTE — TELEPHONE ENCOUNTER
Patient made aware that new script for Prozac capsules was sent to [facility] by Dr Camille Sánchez

## 2018-03-22 NOTE — TELEPHONE ENCOUNTER
Prior Auth for fluoxetine HCL 20 mg tablet denied  The prior auth states covered alternatives on formulary such as fluoxetine capsules are covered  Sent message to Dr Naresh Stone for review
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
## 2018-04-17 ENCOUNTER — OFFICE VISIT (OUTPATIENT)
Dept: PSYCHIATRY | Facility: CLINIC | Age: 33
End: 2018-04-17
Payer: COMMERCIAL

## 2018-04-17 VITALS — WEIGHT: 225 LBS | BODY MASS INDEX: 36.32 KG/M2

## 2018-04-17 DIAGNOSIS — F40.10 SOCIAL PHOBIA: ICD-10-CM

## 2018-04-17 DIAGNOSIS — F41.1 GAD (GENERALIZED ANXIETY DISORDER): ICD-10-CM

## 2018-04-17 DIAGNOSIS — F33.2 MAJOR DEPRESSIVE DISORDER, RECURRENT, SEVERE W/O PSYCHOTIC BEHAVIOR (HCC): Primary | ICD-10-CM

## 2018-04-17 PROCEDURE — 99214 OFFICE O/P EST MOD 30 MIN: CPT | Performed by: PSYCHIATRY & NEUROLOGY

## 2018-04-17 RX ORDER — CLONAZEPAM 0.5 MG/1
TABLET ORAL
Qty: 60 TABLET | Refills: 3 | Status: SHIPPED | OUTPATIENT
Start: 2018-04-17 | End: 2018-07-31 | Stop reason: SDUPTHER

## 2018-04-17 RX ORDER — TOPIRAMATE 25 MG/1
TABLET ORAL
Refills: 3 | COMMUNITY
Start: 2018-04-08 | End: 2018-04-17 | Stop reason: SDUPTHER

## 2018-04-17 RX ORDER — FLUOXETINE HYDROCHLORIDE 20 MG/1
CAPSULE ORAL
Qty: 90 CAPSULE | Refills: 3 | Status: SHIPPED | OUTPATIENT
Start: 2018-04-17 | End: 2018-07-31 | Stop reason: SDUPTHER

## 2018-04-17 RX ORDER — TOPIRAMATE 25 MG/1
TABLET ORAL
Qty: 60 TABLET | Refills: 3 | Status: SHIPPED | OUTPATIENT
Start: 2018-04-17 | End: 2018-05-07 | Stop reason: SDUPTHER

## 2018-04-18 ENCOUNTER — TELEPHONE (OUTPATIENT)
Dept: PSYCHIATRY | Facility: CLINIC | Age: 33
End: 2018-04-18

## 2018-04-18 NOTE — TELEPHONE ENCOUNTER
Received a message from Dr Rohini Dunham to contact pt regarding GeneSight testing  LM and my number to return call

## 2018-05-07 DIAGNOSIS — F33.2 MAJOR DEPRESSIVE DISORDER, RECURRENT, SEVERE W/O PSYCHOTIC BEHAVIOR (HCC): ICD-10-CM

## 2018-05-07 DIAGNOSIS — F40.10 SOCIAL PHOBIA: ICD-10-CM

## 2018-05-07 DIAGNOSIS — F41.1 GAD (GENERALIZED ANXIETY DISORDER): ICD-10-CM

## 2018-05-07 RX ORDER — TOPIRAMATE 25 MG/1
TABLET ORAL
Qty: 60 TABLET | Refills: 3 | Status: SHIPPED | OUTPATIENT
Start: 2018-05-07 | End: 2018-07-31 | Stop reason: SDUPTHER

## 2018-05-23 ENCOUNTER — DOCUMENTATION (OUTPATIENT)
Dept: PSYCHIATRY | Facility: CLINIC | Age: 33
End: 2018-05-23

## 2018-07-05 ENCOUNTER — TELEPHONE (OUTPATIENT)
Dept: PSYCHIATRY | Facility: CLINIC | Age: 33
End: 2018-07-05

## 2018-07-05 NOTE — TELEPHONE ENCOUNTER
Patient contacted me asking about a letter for her dog as a therapy pet with her landlord  I will acommodated, but also reminded her of appropriate means for contacting me (not e-mail) in the future and she agreed  P: Therapy/emotional support pet letter

## 2018-07-05 NOTE — TELEPHONE ENCOUNTER
Patient called to say that she would come to office with signed Release to  letter  Can it be done today so she can have it in the morning for tomorrow   She can come in sometime after 3:30 pm

## 2018-07-20 ENCOUNTER — TRANSCRIBE ORDERS (OUTPATIENT)
Dept: LAB | Facility: HOSPITAL | Age: 33
End: 2018-07-20

## 2018-07-20 ENCOUNTER — APPOINTMENT (OUTPATIENT)
Dept: LAB | Facility: HOSPITAL | Age: 33
End: 2018-07-20

## 2018-07-20 DIAGNOSIS — Z00.8 HEALTH EXAMINATION IN POPULATION SURVEY: ICD-10-CM

## 2018-07-20 DIAGNOSIS — Z00.8 HEALTH EXAMINATION IN POPULATION SURVEY: Primary | ICD-10-CM

## 2018-07-20 LAB
CHOLEST SERPL-MCNC: 154 MG/DL (ref 50–200)
EST. AVERAGE GLUCOSE BLD GHB EST-MCNC: 82 MG/DL
HBA1C MFR BLD: 4.5 % (ref 4.2–6.3)
HDLC SERPL-MCNC: 29 MG/DL (ref 40–60)
LDLC SERPL CALC-MCNC: 53 MG/DL (ref 0–100)
NONHDLC SERPL-MCNC: 125 MG/DL
TRIGL SERPL-MCNC: 360 MG/DL

## 2018-07-20 PROCEDURE — 83036 HEMOGLOBIN GLYCOSYLATED A1C: CPT

## 2018-07-20 PROCEDURE — 80061 LIPID PANEL: CPT

## 2018-07-20 PROCEDURE — 36415 COLL VENOUS BLD VENIPUNCTURE: CPT

## 2018-07-31 ENCOUNTER — TELEPHONE (OUTPATIENT)
Dept: FAMILY MEDICINE CLINIC | Facility: CLINIC | Age: 33
End: 2018-07-31

## 2018-07-31 DIAGNOSIS — F33.2 MAJOR DEPRESSIVE DISORDER, RECURRENT, SEVERE W/O PSYCHOTIC BEHAVIOR (HCC): ICD-10-CM

## 2018-07-31 DIAGNOSIS — F41.1 GAD (GENERALIZED ANXIETY DISORDER): ICD-10-CM

## 2018-07-31 DIAGNOSIS — F40.10 SOCIAL PHOBIA: ICD-10-CM

## 2018-07-31 RX ORDER — FLUOXETINE HYDROCHLORIDE 20 MG/1
CAPSULE ORAL
Qty: 90 CAPSULE | Refills: 0 | Status: CANCELLED | OUTPATIENT
Start: 2018-07-31

## 2018-07-31 RX ORDER — CLONAZEPAM 0.5 MG/1
TABLET ORAL
Qty: 60 TABLET | Refills: 1 | Status: SHIPPED | OUTPATIENT
Start: 2018-07-31 | End: 2018-09-12 | Stop reason: SDUPTHER

## 2018-07-31 RX ORDER — TOPIRAMATE 25 MG/1
TABLET ORAL
Qty: 60 TABLET | Refills: 1 | Status: SHIPPED | OUTPATIENT
Start: 2018-07-31 | End: 2018-09-12 | Stop reason: SDUPTHER

## 2018-07-31 RX ORDER — CLONAZEPAM 0.5 MG/1
TABLET ORAL
Qty: 60 TABLET | Refills: 0 | Status: CANCELLED | OUTPATIENT
Start: 2018-07-31

## 2018-07-31 RX ORDER — TOPIRAMATE 25 MG/1
TABLET ORAL
Qty: 60 TABLET | Refills: 0 | Status: CANCELLED | OUTPATIENT
Start: 2018-07-31

## 2018-07-31 RX ORDER — FLUOXETINE HYDROCHLORIDE 20 MG/1
CAPSULE ORAL
Qty: 90 CAPSULE | Refills: 1 | Status: SHIPPED | OUTPATIENT
Start: 2018-07-31 | End: 2018-09-12

## 2018-08-01 ENCOUNTER — TELEPHONE (OUTPATIENT)
Dept: FAMILY MEDICINE CLINIC | Facility: CLINIC | Age: 33
End: 2018-08-01

## 2018-08-01 NOTE — TELEPHONE ENCOUNTER
She just had blood work done for her psychiatrist, really does not need anything else checked at this time

## 2018-08-01 NOTE — TELEPHONE ENCOUNTER
She does not have any chronic medications or medical conditions that will necessitate additional blood work at this time

## 2018-08-09 ENCOUNTER — HOSPITAL ENCOUNTER (EMERGENCY)
Facility: HOSPITAL | Age: 33
Discharge: HOME | End: 2018-08-09
Attending: EMERGENCY MEDICINE
Payer: COMMERCIAL

## 2018-08-09 ENCOUNTER — APPOINTMENT (EMERGENCY)
Dept: ULTRASOUND IMAGING | Facility: HOSPITAL | Age: 33
End: 2018-08-09
Payer: COMMERCIAL

## 2018-08-09 VITALS
OXYGEN SATURATION: 99 % | DIASTOLIC BLOOD PRESSURE: 55 MMHG | WEIGHT: 216 LBS | SYSTOLIC BLOOD PRESSURE: 109 MMHG | BODY MASS INDEX: 34.86 KG/M2 | RESPIRATION RATE: 16 BRPM | TEMPERATURE: 98 F | HEART RATE: 52 BPM

## 2018-08-09 DIAGNOSIS — K80.20 CHOLELITHIASIS: Primary | ICD-10-CM

## 2018-08-09 LAB
ALBUMIN SERPL BCP-MCNC: 3.7 G/DL (ref 3.5–5)
ALP SERPL-CCNC: 54 U/L (ref 46–116)
ALT SERPL W P-5'-P-CCNC: 26 U/L (ref 12–78)
ANION GAP SERPL CALCULATED.3IONS-SCNC: 6 MMOL/L (ref 4–13)
AST SERPL W P-5'-P-CCNC: 13 U/L (ref 5–45)
BASOPHILS # BLD AUTO: 0.02 THOUSANDS/ΜL (ref 0–0.1)
BASOPHILS NFR BLD AUTO: 0 % (ref 0–1)
BILIRUB SERPL-MCNC: 0.37 MG/DL (ref 0.2–1)
BILIRUB UR QL STRIP: NEGATIVE
BUN SERPL-MCNC: 12 MG/DL (ref 5–25)
CALCIUM SERPL-MCNC: 8.8 MG/DL (ref 8.3–10.1)
CHLORIDE SERPL-SCNC: 105 MMOL/L (ref 100–108)
CLARITY UR: NORMAL
CO2 SERPL-SCNC: 26 MMOL/L (ref 21–32)
COLOR UR: YELLOW
COLOR, POC: YELLOW
CREAT SERPL-MCNC: 0.85 MG/DL (ref 0.6–1.3)
EOSINOPHIL # BLD AUTO: 0.14 THOUSAND/ΜL (ref 0–0.61)
EOSINOPHIL NFR BLD AUTO: 2 % (ref 0–6)
ERYTHROCYTE [DISTWIDTH] IN BLOOD BY AUTOMATED COUNT: 13.4 % (ref 11.6–15.1)
EXT PREG TEST URINE: NEGATIVE
GFR SERPL CREATININE-BSD FRML MDRD: 90 ML/MIN/1.73SQ M
GLUCOSE SERPL-MCNC: 90 MG/DL (ref 65–140)
GLUCOSE UR STRIP-MCNC: NEGATIVE MG/DL
HCT VFR BLD AUTO: 38.2 % (ref 34.8–46.1)
HGB BLD-MCNC: 12.8 G/DL (ref 11.5–15.4)
HGB UR QL STRIP.AUTO: NEGATIVE
KETONES UR STRIP-MCNC: NEGATIVE MG/DL
LEUKOCYTE ESTERASE UR QL STRIP: NEGATIVE
LIPASE SERPL-CCNC: 151 U/L (ref 73–393)
LYMPHOCYTES # BLD AUTO: 2.34 THOUSANDS/ΜL (ref 0.6–4.47)
LYMPHOCYTES NFR BLD AUTO: 32 % (ref 14–44)
MCH RBC QN AUTO: 30.9 PG (ref 26.8–34.3)
MCHC RBC AUTO-ENTMCNC: 33.5 G/DL (ref 31.4–37.4)
MCV RBC AUTO: 92 FL (ref 82–98)
MONOCYTES # BLD AUTO: 0.43 THOUSAND/ΜL (ref 0.17–1.22)
MONOCYTES NFR BLD AUTO: 6 % (ref 4–12)
NEUTROPHILS # BLD AUTO: 4.5 THOUSANDS/ΜL (ref 1.85–7.62)
NEUTS SEG NFR BLD AUTO: 61 % (ref 43–75)
NITRITE UR QL STRIP: NEGATIVE
NRBC BLD AUTO-RTO: 0 /100 WBCS
PH UR STRIP.AUTO: 5.5 [PH] (ref 4.5–8)
PLATELET # BLD AUTO: 224 THOUSANDS/UL (ref 149–390)
PMV BLD AUTO: 10 FL (ref 8.9–12.7)
POTASSIUM SERPL-SCNC: 3.4 MMOL/L (ref 3.5–5.3)
PROT SERPL-MCNC: 7.1 G/DL (ref 6.4–8.2)
PROT UR STRIP-MCNC: NEGATIVE MG/DL
RBC # BLD AUTO: 4.14 MILLION/UL (ref 3.81–5.12)
SODIUM SERPL-SCNC: 137 MMOL/L (ref 136–145)
SP GR UR STRIP.AUTO: >=1.03 (ref 1–1.03)
UROBILINOGEN UR QL STRIP.AUTO: 0.2 E.U./DL
WBC # BLD AUTO: 7.43 THOUSAND/UL (ref 4.31–10.16)

## 2018-08-09 PROCEDURE — 85025 COMPLETE CBC W/AUTO DIFF WBC: CPT | Performed by: EMERGENCY MEDICINE

## 2018-08-09 PROCEDURE — 81003 URINALYSIS AUTO W/O SCOPE: CPT

## 2018-08-09 PROCEDURE — 96375 TX/PRO/DX INJ NEW DRUG ADDON: CPT

## 2018-08-09 PROCEDURE — 80053 COMPREHEN METABOLIC PANEL: CPT | Performed by: EMERGENCY MEDICINE

## 2018-08-09 PROCEDURE — 76705 ECHO EXAM OF ABDOMEN: CPT

## 2018-08-09 PROCEDURE — 81025 URINE PREGNANCY TEST: CPT | Performed by: EMERGENCY MEDICINE

## 2018-08-09 PROCEDURE — 99284 EMERGENCY DEPT VISIT MOD MDM: CPT

## 2018-08-09 PROCEDURE — 83690 ASSAY OF LIPASE: CPT | Performed by: EMERGENCY MEDICINE

## 2018-08-09 PROCEDURE — 36415 COLL VENOUS BLD VENIPUNCTURE: CPT | Performed by: EMERGENCY MEDICINE

## 2018-08-09 PROCEDURE — 96374 THER/PROPH/DIAG INJ IV PUSH: CPT

## 2018-08-09 RX ORDER — ONDANSETRON 2 MG/ML
4 INJECTION INTRAMUSCULAR; INTRAVENOUS ONCE
Status: COMPLETED | OUTPATIENT
Start: 2018-08-09 | End: 2018-08-09

## 2018-08-09 RX ORDER — KETOROLAC TROMETHAMINE 30 MG/ML
15 INJECTION, SOLUTION INTRAMUSCULAR; INTRAVENOUS ONCE
Status: COMPLETED | OUTPATIENT
Start: 2018-08-09 | End: 2018-08-09

## 2018-08-09 RX ADMIN — ONDANSETRON 4 MG: 2 INJECTION INTRAMUSCULAR; INTRAVENOUS at 21:13

## 2018-08-09 RX ADMIN — KETOROLAC TROMETHAMINE 15 MG: 30 INJECTION, SOLUTION INTRAMUSCULAR at 21:13

## 2018-08-10 NOTE — ED ATTENDING ATTESTATION
Irene Ashton MD, saw and evaluated the patient  I have discussed the patient with the resident/non-physician practitioner and agree with the resident's/non-physician practitioner's findings, Plan of Care, and MDM as documented in the resident's/non-physician practitioner's note, except where noted  All available labs and Radiology studies were reviewed  At this point I agree with the current assessment done in the Emergency Department  I have conducted an independent evaluation of this patient a history and physical is as follows:      49-year-old female presents for evaluation upper abdominal pain which radiates towards her right lower quadrant over the past 2-3 days  Pain started gradually, is constant, without modifying factors  Associated with nausea, diarrhea  No vomiting, blood in stool, urinary complaints, vaginal bleeding or discharge  Ten systems reviewed otherwise negative  On exam acute distress, lungs normal cardiac normal, abdomen tender palpation right upper quadrant without rebound or guarding, no CVA tenderness   Medical decision making;-upper abdominal pain,-will check abdominal labs, urine dip, urine pregnancy, ultrasound rule out gallbladder pathology, p r n  pain meds    Critical Care Time  CritCare Time    Procedures

## 2018-08-10 NOTE — ED PROVIDER NOTES
History  Chief Complaint   Patient presents with    Abdominal Pain     pt c/o generalized Abd  pain whicch radiated to RLQ for the past 24 hrs  pt states she had diarrhea yesterday and has had nausea but denies vomiting     This is a 27-year-old female with history of anxiety, depression, who presents with abdominal pain  The patient states that her pain started approximately 2-3 days ago, starting with epigastric pain, radiating to the right upper and right lower quadrants  She describes the pain as sharp, constant, and reports no aggravating or alleviating factors, and also describes no waxing waning of symptoms  She states the pain has steadily gotten worse since onset  It is associated with nausea, but the patient has not had any vomiting, or diarrhea  Patient has had no fevers, chills, chest pain, shortness of breath, urinary symptoms, hematochezia, hematemesis  She has no recent travel  She has no recent travel, recent antibiotic use, recent hospitalizations  She has no sick contacts  The patient has no history of abdominal surgeries in the past             Prior to Admission Medications   Prescriptions Last Dose Informant Patient Reported? Taking? FLUoxetine (PROzac) 20 mg capsule   No No   Sig: Take 20mg daily  After 2 weeks, increase to 40mg daily  After 2 more weeks, increase to 60mg daily  Patient taking differently: 60 mg Take 20mg daily  After 2 weeks, increase to 40mg daily  After 2 more weeks, increase to 60mg daily  clonazePAM (KlonoPIN) 0 5 mg tablet   No No   Sig: Take 1/2 to 1 tab twice daily as needed for anxiety   Patient taking differently: 1 25 mg Take 1/2 to 1 tab twice daily as needed for anxiety    topiramate (TOPAMAX) 25 mg tablet   No No   Sig: TAKE 1 TABLET NIGHTLY  AFTER 1 WEEK INCREASE TO 2 TABS NIGHTLY  Patient taking differently: 50 mg TAKE 1 TABLET NIGHTLY  AFTER 1 WEEK INCREASE TO 2 TABS NIGHTLY         Facility-Administered Medications: None       Past Medical History:   Diagnosis Date    Anxiety        Past Surgical History:   Procedure Laterality Date    TUBAL LIGATION         History reviewed  No pertinent family history  I have reviewed and agree with the history as documented  Social History   Substance Use Topics    Smoking status: Never Smoker    Smokeless tobacco: Never Used    Alcohol use No        Review of Systems   Constitutional: Negative for chills and fever  HENT: Negative for congestion and sinus pain  Eyes: Negative for photophobia and visual disturbance  Respiratory: Negative for cough and shortness of breath  Cardiovascular: Negative for chest pain and palpitations  Gastrointestinal: Positive for abdominal pain and nausea  Negative for diarrhea and vomiting  Endocrine: Negative for polyphagia and polyuria  Genitourinary: Negative for dysuria and flank pain  Musculoskeletal: Negative for neck pain and neck stiffness  Skin: Negative for pallor and rash  Neurological: Negative for light-headedness and headaches  Physical Exam  ED Triage Vitals [08/09/18 1944]   Temperature Pulse Respirations Blood Pressure SpO2   98 °F (36 7 °C) 76 18 126/58 98 %      Temp Source Heart Rate Source Patient Position - Orthostatic VS BP Location FiO2 (%)   Oral Monitor Sitting Right arm --      Pain Score       8           Orthostatic Vital Signs  Vitals:    08/09/18 1944 08/09/18 2206   BP: 126/58 109/55   Pulse: 76 (!) 52   Patient Position - Orthostatic VS: Sitting Lying       Physical Exam   Constitutional: She is oriented to person, place, and time  She appears well-developed  No distress  HENT:   Head: Normocephalic  Mouth/Throat: Oropharynx is clear and moist    Eyes: Pupils are equal, round, and reactive to light  No scleral icterus  Neck: Normal range of motion  No JVD present  Cardiovascular: Normal rate, regular rhythm and normal heart sounds  No murmur heard  Pulmonary/Chest: Effort normal  No respiratory distress  She has no wheezes  She has no rales  Abdominal:   Abdomen is soft  There is moderate tenderness to palpation in the right upper and right lower quadrants, with voluntary guarding  No rebound, rigidity, distention appreciated  Musculoskeletal: Normal range of motion  She exhibits no edema  Neurological: She is alert and oriented to person, place, and time  She exhibits normal muscle tone  Skin: Skin is warm and dry  No rash noted  No pallor  ED Medications  Medications   ketorolac (TORADOL) injection 15 mg (15 mg Intravenous Given 8/9/18 2113)   ondansetron (ZOFRAN) injection 4 mg (4 mg Intravenous Given 8/9/18 2113)       Diagnostic Studies  Results Reviewed     Procedure Component Value Units Date/Time    Comprehensive metabolic panel [[id number]]  (Abnormal) Collected:  08/09/18 2111    Lab Status:  Final result Specimen:  Blood from Arm, Right Updated:  08/09/18 2138     Sodium 137 mmol/L      Potassium 3 4 (L) mmol/L      Chloride 105 mmol/L      CO2 26 mmol/L      Anion Gap 6 mmol/L      BUN 12 mg/dL      Creatinine 0 85 mg/dL      Glucose 90 mg/dL      Calcium 8 8 mg/dL      AST 13 U/L      ALT 26 U/L      Alkaline Phosphatase 54 U/L      Total Protein 7 1 g/dL      Albumin 3 7 g/dL      Total Bilirubin 0 37 mg/dL      eGFR 90 ml/min/1 73sq m     Narrative:         National Kidney Disease Education Program recommendations are as follows:  GFR calculation is accurate only with a steady state creatinine  Chronic Kidney disease less than 60 ml/min/1 73 sq  meters  Kidney failure less than 15 ml/min/1 73 sq  meters      Lipase [[id number]]  (Normal) Collected:  08/09/18 2111    Lab Status:  Final result Specimen:  Blood from Arm, Right Updated:  08/09/18 2138     Lipase 151 u/L     CBC and differential [[id number]] Collected:  08/09/18 2111    Lab Status:  Final result Specimen:  Blood from Arm, Right Updated:  08/09/18 2124     WBC 7 43 Thousand/uL      RBC 4 14 Million/uL      Hemoglobin 12 8 g/dL Hematocrit 38 2 %      MCV 92 fL      MCH 30 9 pg      MCHC 33 5 g/dL      RDW 13 4 %      MPV 10 0 fL      Platelets 326 Thousands/uL      nRBC 0 /100 WBCs      Neutrophils Relative 61 %      Lymphocytes Relative 32 %      Monocytes Relative 6 %      Eosinophils Relative 2 %      Basophils Relative 0 %      Neutrophils Absolute 4 50 Thousands/µL      Lymphocytes Absolute 2 34 Thousands/µL      Monocytes Absolute 0 43 Thousand/µL      Eosinophils Absolute 0 14 Thousand/µL      Basophils Absolute 0 02 Thousands/µL     POCT pregnancy, urine [[id number]]  (Normal) Resulted:  08/09/18 1959    Lab Status:  Final result Updated:  08/09/18 1959     EXT PREG TEST UR (Ref: Negative) Negative    POCT urinalysis dipstick [[id number]]  (Normal) Resulted:  08/09/18 1959    Lab Status:  Final result Specimen:  Urine from Urine, Other Updated:  08/09/18 1959     Color, UA Yellow    ED Urine Macroscopic [[id number]] Collected:  08/09/18 2005    Lab Status:  Final result Specimen:  Urine Updated:  08/09/18 1958     Color, UA Yellow     Clarity, UA Slightly Cloudy     pH, UA 5 5     Leukocytes, UA Negative     Nitrite, UA Negative     Protein, UA Negative mg/dl      Glucose, UA Negative mg/dl      Ketones, UA Negative mg/dl      Urobilinogen, UA 0 2 E U /dl      Bilirubin, UA Negative     Blood, UA Negative     Specific Gravity, UA >=1 030    Narrative:       CLINITEK RESULT                 US right upper quadrant   Final Result by Irene Richardson DO (08/09 2228)      Cholelithiasis without sonographic evidence of acute cholecystitis  Workstation performed: [id number]               Procedures  Procedures      Phone Consults  ED Phone Contact    ED Course                               MDM  Number of Diagnoses or Management Options  Cholelithiasis:   Diagnosis management comments: This is a 28-year-old female, otherwise healthy, who presents with several days of epigastric and right-sided abdominal pain   She is currently hemodynamically stable, well-appearing, does not appear to be in acute distress  On exam, she has tenderness as noted above to the right upper, right lower abdomen, epigastrium, without signs of peritonitis  Initial lab work unremarkable, including normal LFTs and lipase  Bedside ultrasound appeared to show cholelithiasis, so formal ultrasound was ordered, which did show cholelithiasis, but without findings concerning for cholecystitis  The patient received IV Toradol and Zofran for pain and nausea respectively  On re-evaluation, the patient's physical exam is improved, and her pain is also improved  Unclear etiology of her abdominal pain at this time, however biliary colic versus gastroenteritis appear most likely  The patient will be discharged home  Strict return precautions were provided  I provided the patient with follow up information for the general surgery in the event that her abdominal pain does not improve  The patient endorses understanding of all this information  CritCare Time    Disposition  Final diagnoses:   Cholelithiasis     Time reflects when diagnosis was documented in both MDM as applicable and the Disposition within this note     Time User Action Codes Description Comment    8/9/2018 11:08 PM Deshaun Maharaj Add [K80 20] Cholelithiasis       ED Disposition     ED Disposition Condition Comment    Discharge  Matheus Yeager discharge to home/self care      Condition at discharge: Good        Follow-up Information     Follow up With Specialties Details Why [address]      La Wade MD General Surgery Call in 1 day  [address]  [phone]            Discharge Medication List as of 8/9/2018 11:11 PM      CONTINUE these medications which have NOT CHANGED    Details   clonazePAM (KlonoPIN) 0 5 mg tablet Take 1/2 to 1 tab twice daily as needed for anxiety, Normal      FLUoxetine (PROzac) 20 mg capsule Take 20mg daily  After 2 weeks, increase to 40mg daily  After 2 more weeks, increase to 60mg daily  , Normal      topiramate (TOPAMAX) 25 mg tablet TAKE 1 TABLET NIGHTLY  AFTER 1 WEEK INCREASE TO 2 TABS NIGHTLY , Normal           No discharge procedures on file  ED Provider  Attending physically available and evaluated Beti Barrientos I managed the patient along with the ED Attending      Electronically Signed by         Tyrell Hewitt MD  08/10/18 9509

## 2018-08-10 NOTE — DISCHARGE INSTRUCTIONS
Gallstones   WHAT YOU NEED TO KNOW:   What are gallstones? Gallstones, also called cholelithiasis, are hard substances that form in your gallbladder or bile duct  Your gallbladder and bile duct are located on the right side of your abdomen, near your liver  Your gallbladder stores bile, which helps break down the fat that you eat  Your gallbladder also helps remove certain chemicals from your body  What causes gallstones? Gallstones develop when your gallbladder does not empty correctly  Stones can form from different bile materials  The following may increase your risk:  · Obesity    · Pregnancy    · Having a family member with gallstones    · Diabetes or previous surgery of the intestines    · Rapid weight loss    · Certain medicines, such as estrogen, antibiotics, and cholesterol-lowering medicines  What are the signs and symptoms of gallstones? The most common symptom is severe, constant pain in the right upper abdomen  It is usually just below the ribcage  The pain may also be felt in the right shoulder and between the shoulder blades  You may also have any of the following:  · Nausea and vomiting    · Feeling bloated    · Pale bowel movements    · Dark urine  How are gallstones diagnosed? · Blood tests  may show signs of infection or inflammation  · An abdominal ultrasound  uses sound waves to show pictures of your abdomen on a monitor  · A liver and gallbladder scan  may also be called a HIDA scan  You are given a small amount of radioactive dye in your IV and pictures are taken by a scanner  Your healthcare provider looks at the pictures to see if your liver and gallbladder are working normally  · An ERCP  is also called an endoscopic retrograde cholangiopancreatography  This test is done during an endoscopy to find stones, tumors, or other problems  Dye is put into the endoscopy tube  The dye helps your pancreas and bile ducts show up better on x-rays   Tell the healthcare provider if you have ever had an allergic reaction to contrast dye  If you have stones, they may be removed during ERCP  · Oral cholecystography  is a test to look at your gallbladder and its ducts (passages)  You will take pills that have a special dye in them  Then x-rays are taken over time  The dye makes your gallbladder and its ducts show up on the x-rays  This may make it easier for your healthcare provider to see any stones or swelling in your gallbladder  Tell the healthcare provider if you have ever had an allergic reaction to contrast dye  It is also very important to tell your healthcare provider if there is any chance you could be pregnant  Your healthcare provider will tell you what you can and cannot eat before the test  It is important to follow your healthcare provider's instructions or the test may not work  How are gallstones treated? · Antinausea medicine  may be given to calm your stomach and to help prevent vomiting  · Prescription pain medicine  may be given  Ask your healthcare provider how to take this medicine safely  · A cholecystectomy  is surgery to remove your gallbladder  When should I contact my healthcare provider? · You have nausea and are vomiting  · Your urine is dark  · You have jalen-colored bowel movements  · You have questions or concerns about your condition or care  When should I seek immediate care or call 911? · You have a fever and chills  · Your eyes or skin turn yellow  · You have severe pain in your upper abdomen, just below the right ribcage  CARE AGREEMENT:   You have the right to help plan your care  Learn about your health condition and how it may be treated  Discuss treatment options with your caregivers to decide what care you want to receive  You always have the right to refuse treatment  The above information is an  only  It is not intended as medical advice for individual conditions or treatments   Talk to your doctor, nurse or pharmacist before following any medical regimen to see if it is safe and effective for you  © 2017 [employer] [employer] Information is for End User's use only and may not be sold, redistributed or otherwise used for commercial purposes  All illustrations and images included in CareNotes® are the copyrighted property of A D A M , Inc  or Omari Santo  Biliary Colic   WHAT YOU NEED TO KNOW:   What is biliary colic? Biliary colic is severe pain in your upper abdomen caused by a gallbladder problem  Your gallbladder stores bile, which helps break down the fats that you eat  What causes biliary colic? Biliary colic happens when something blocks the duct that moves bile out of the gallbladder  Any of the following can cause blockage:  · Gallstones    · Swelling of the gallbladder    · Narrow bile duct    · Injury    · Pancreatitis (inflammation of pancreas)    · Duodenitis (inflammation of small intestine)    · Spasms in the esophagus  What increases my risk for biliary colic? · Family history of biliary colic    · Certain medicines, such as birth control    · Older age    · Pregnancy    · Obesity    · Medical conditions, such as liver cirrhosis or hemolytic disease  What are the signs and symptoms of biliary colic? · Pain in the middle of your upper abdomen, just below your breast bone    · Pain that is worse on your right side, just below your ribs    · Pain in your back, just under your shoulder blade    · Nausea and vomiting    · Pain after you eat a meal  How is biliary colic diagnosed? · Blood tests  check for what is causing your biliary colic  · Urine tests  check gallbladder function  · An x-ray  of your abdomen may help determine what is causing your biliary colic  · An ultrasound  uses sound waves to show pictures on a monitor  An ultrasound may be done to show blockages or other causes of your pain      · A CT scan , or CAT scan, is a type of x-ray that uses a computer takes pictures of your abdomen  The pictures may show a blockage or other causes of your pain  You may be given a dye before the pictures are taken to help healthcare providers see the pictures better  Tell the healthcare provider if you have ever had an allergic reaction to contrast dye  · An MRI  is a scan that uses powerful magnets and a computer to take pictures of your biliary system  An MRI may show blockages or growths  You may be given dye to help the pictures show up better  Tell the healthcare provider if you have ever had an allergic reaction to contrast dye  Do not enter the MRI room with anything metal  Metal can cause serious injury  Tell the healthcare provider if you have any metal in or on your body  How is biliary colic treated? · Medicines  can help decrease pain and muscle spasms  You may also need medicine to calm your stomach and stop vomiting  · Procedures  may be needed to remove blockages or widen your bile duct  Ask your healthcare provider for more information about any procedures you may need  How can I manage my symptoms? · Avoid alcohol  Alcohol can damage your gallbladder and make your symptoms worse  · Maintain a healthy weight  Ask your healthcare provider how much you should weigh  Ask him to help you create a weight loss plan if you are overweight  · Eat a variety of healthy foods  Healthy foods include fruits, vegetables, whole-grain breads, low-fat dairy products, beans, lean meats, and fish  Foods that are high in fiber and low in fat and cholesterol may decrease your symptoms  Ask if you need to be on a special diet  · Exercise  Ask your healthcare provider about the best exercise plan for you  Exercise may help improve your symptoms  When should I contact my healthcare provider? · You have a fever  · Your pain gets worse, even after you take medicine  · You have nausea or are vomiting  · Your skin or eyes are yellow      · You have questions or concerns about your condition or care  When should I seek immediate care or call 911? · You have severe pain  · You feel like you are going to faint  · You are short of breath  CARE AGREEMENT:   You have the right to help plan your care  Learn about your health condition and how it may be treated  Discuss treatment options with your caregivers to decide what care you want to receive  You always have the right to refuse treatment  The above information is an  only  It is not intended as medical advice for individual conditions or treatments  Talk to your doctor, nurse or pharmacist before following any medical regimen to see if it is safe and effective for you  © 2017 [facility] [facility] Information is for End User's use only and may not be sold, redistributed or otherwise used for commercial purposes  All illustrations and images included in CareNotes® are the copyrighted property of A D A M , Inc  or Omari Santo

## 2018-08-21 ENCOUNTER — OFFICE VISIT (OUTPATIENT)
Dept: BEHAVIORAL/MENTAL HEALTH CLINIC | Facility: CLINIC | Age: 33
End: 2018-08-21
Payer: COMMERCIAL

## 2018-08-21 DIAGNOSIS — F41.1 GAD (GENERALIZED ANXIETY DISORDER): ICD-10-CM

## 2018-08-21 DIAGNOSIS — F33.2 MAJOR DEPRESSIVE DISORDER, RECURRENT, SEVERE W/O PSYCHOTIC BEHAVIOR (HCC): Primary | ICD-10-CM

## 2018-08-21 PROCEDURE — 90791 PSYCH DIAGNOSTIC EVALUATION: CPT | Performed by: SOCIAL WORKER

## 2018-08-21 NOTE — PSYCH
Assessment/Plan:      There are no diagnoses linked to this encounter  Subjective:      Patient ID: Tricai Hand is a 35 y o  female  HPI:     Pre-morbid level of function and History of Present Illness: Depressed and anxious, relationship and communication problem, health problems, mental and financial stress  Previous Psychiatric/psychological treatment/year: NA  Current Psychiatrist/Therapist: Dr Surinder Palma and/or Partial and Other Community Resources Used (CTT, ICM, VNA): NA      Problem Assessment:     SOCIAL/VOCATION:  Family Constellation (include parents, relationship with each and pertinent Psych/Medical History):     No family history on file  Mother: Aggie  Spouse: NA   Father: Bryce Graves: Lottie Allen, Nicho Chacho   Sibling: Brother from mother's side, but does not talk to him   Sibling: Father has children, pt has no contact with them   Children: NA   Other: Misael Pantoja relates best to nobody, "I keep everything to myself  I have problems communicating  she lives with boyfriend  she does not live alone  Domestic Violence: No past history of domestic violence and There is no history of child abuse    Additional Comments related to family/relationships/peer support: Parents were never   Relationship has not been going so good lately  Good relationship with children  Has 50/50 custody with father  Boyfriend has 4 children  With 2 of them he has 50/50 custody  Pt does not talk to father, barely talks to mother  School or Work History (strengths/limitations/needs): 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[employer]  Job can be stressful at times  Her highest grade level achieved was Finished prerequisites for nursing  Trying to get into [id number] history includes NA    Financial status includes stressor, has a lot of debt from prior relationship    LEISURE ASSESSMENT "I don't know how to relax  My mind keeps on going  My mind just wanders   I can't study because my mind wanders "  her primary language is [place]  Preferred language is [place]  Ethnic considerations are [place], [place], [place]  Religions affiliations and level of involvement [place]  Does not go to Baptism   Does spirituality help you cope? No    FUNCTIONAL STATUS: There has been a recent change in Lynette jones ability to do the following: NA    Level of Assistance Needed/By Whom?: Barrington Arias learns best by  Hands on    SUBSTANCE ABUSE ASSESSMENT: no substance abuse    Substance/Route/Age/Amount/Frequency/Last Use: NA    DETOX HISTORY: NA    Previous detox/rehab treatment: NA    HEALTH ASSESSMENT: has had decreased appetite for 5 days or more and no nausea    LEGAL: No Mental Health Advance Directive or Power of  on file and NA    Prenatal History: N/A    Delivery History: N/A    Developmental Milestones: N/A  Temperament as an infant was N/A  Temperament as a toddler was N/A  Temperament at school age was N/A  Temperament as a teenager was N/A  Risk Assessment:   The following ratings are based on my interview(s) with patient    Risk of Harm to Self:   Demographic risk factors include   Historical Risk Factors include chronic psychiatric problems and history of impulsive behaviors  Recent Specific Risk Factors include passive death wishes, feelings of guilt or self blame, recent losses NA, worries about finances or work, recent rejection/lack of support and diagnosis of depression   Additional Factors for a Child or Adolescent NA    Risk of Harm to Others:   Demographic Risk Factors include NA  Historical Risk Factors include NA  Recent Specific Risk Factors include NA    Access to Weapons:   Lynette jones has access to the following weapons: NA   The following steps have been taken to ensure weapons are properly secured: NA    Based on the above information, the client presents the following risk of harm to self or others:  low    The following interventions are recommended:   no intervention changes    Notes regarding this Risk Assessment: NA        Review Of Systems:     Mood Anxiety, Depression and Emotional Lability   Behavior Compulsive Behavior, Impulsive Behavior and "I have a bad attitude   I think my attitude is the way my mood is "   Thought Content Disturbing Thoughts, Feelings and Crying, "I don't know why I feel the way I feel>"   General Relationship Problems, Emotional Problems, Sleep Disturbances and Always sleeping, "I am always tired "   Personality Change in Personality and Character Deficiency   Other Psych Symptoms anhedonia, difficulty making decisions, poor self esteem, very self conscious, mind goes blank, tense, on edge, keyed up, depersonalizaiton, derealization, shaking, can't focus, can't absorb what she reads or hears, spaces out, hot, sweating,   Constitutional Feeling Poorly and Feeling Tired   ENT NA   Cardiovascular NA   Respiratory NA   Gastrointestinal Nausea   Genitourinary Irregular periods, has cyst on ovaries, hurts during intercourse   Musculoskeletal Muscle pain all the time   Integumentary "Chicken skin"   Neurological Headache and Tingling   Endocrine s/p thyroid problem         Mental status:  Appearance calm and cooperative , adequate hygiene and grooming, restless and fidgety and good eye contact    Mood depressed, irritable, anxious, apathetic, uncertain and angry   Affect affect was flat   Speech speech soft   Thought Processes disorganized   Hallucinations no hallucinations present    Thought Content no delusions   Abnormal Thoughts angry hostile feelings with no homicidal plan, no suicidal thoughts  and no homicidal thoughts    Orientation  oriented to person and oriented to place   Remote Memory short term memory impaired and long term memory impaired   Attention Span concentration impaired   Intellect Appears to be of [place] of Knowledge difficulty with vocabulary   Insight Limited insight   Judgement judgment was limited   Muscle Strength Muscle strength and tone were normal   Language no difficulty naming common objects, no difficulty repeating a phrase , no difficulty writing a sentence  and NA   Pain moderate to severe and Back injury   Pain Scale 8

## 2018-08-23 ENCOUNTER — TELEPHONE (OUTPATIENT)
Dept: PSYCHIATRY | Facility: CLINIC | Age: 33
End: 2018-08-23

## 2018-08-29 ENCOUNTER — OFFICE VISIT (OUTPATIENT)
Dept: SURGERY | Facility: MEDICAL CENTER | Age: 33
End: 2018-08-29
Payer: COMMERCIAL

## 2018-08-29 VITALS
HEIGHT: 66 IN | DIASTOLIC BLOOD PRESSURE: 70 MMHG | WEIGHT: 217 LBS | BODY MASS INDEX: 34.87 KG/M2 | SYSTOLIC BLOOD PRESSURE: 100 MMHG | TEMPERATURE: 99.2 F | RESPIRATION RATE: 16 BRPM | HEART RATE: 64 BPM

## 2018-08-29 DIAGNOSIS — K80.20 CALCULUS OF GALLBLADDER WITHOUT CHOLECYSTITIS WITHOUT OBSTRUCTION: Primary | ICD-10-CM

## 2018-08-29 DIAGNOSIS — K80.20 SYMPTOMATIC CHOLELITHIASIS: ICD-10-CM

## 2018-08-29 PROCEDURE — 99203 OFFICE O/P NEW LOW 30 MIN: CPT | Performed by: SURGERY

## 2018-08-29 RX ORDER — SODIUM CHLORIDE, SODIUM LACTATE, POTASSIUM CHLORIDE, CALCIUM CHLORIDE 600; 310; 30; 20 MG/100ML; MG/100ML; MG/100ML; MG/100ML
125 INJECTION, SOLUTION INTRAVENOUS CONTINUOUS
Status: CANCELLED | OUTPATIENT
Start: 2018-09-06

## 2018-08-29 NOTE — PROGRESS NOTES
Assessment/Plan:     symptomatic cholelithiasis   obesity   anxiety       Desmond Delgado is a 35 y  o female who is here for   ER follow-up after evaluation for upper abdominal pain with findings of cholelithiasis  Upon evaluation today patient has   Appears to have symptoms related to gallbladder disease and cholelithiasis  At this point we have recommended a low-fat diet  We will proceed with laparoscopic cholecystectomy in the near future  If patient develops severe symptoms prior to this scheduled procedure she will call the office or return to the emergency department  Plan:   Robotic assisted laparoscopic cholecystectomy to be done in the next few weeks  Procedure discussed in detail as well as possible risks and complications with the patient  She has given her consent written form  All questions were answered  Patient does understand that she will have postop restrictions including limited activity and lifting  She does not require pre-admission testing or preoperative clearance  Pre-admission testing  Labs are available from her recent ED evaluation  Obesity:   Recommended weight loss program    Anxiety:  Appears well controlled on current medication regimen      HPI:  Desmond Delgado is a 35 y  o female who was referred for evaluation of  Cholecystitis and right upper quadrant abdominal pain  Patient was recently seen in the emergency department for episode of severe right upper quadrant pain  Ultrasound revealed cholelithiasis at that time without findings of cholecystitis  However patient continues to have intermittent episodes of right upper quadrant pain  She admits that she has had this pain in the past  But it appears to be more frequent and worsening over time  The pain is associated with eating  The patient admits to eating pork ribs prior to her episode of abdominal pain prompting her emergency department evaluation      Patient admits to associated nausea with these pain episodes  She does not have vomiting  The patient has not noticed any change in her bowel movements  She denies any diarrhea,  constipation, bloody stools  She denies any recent weight changes  She denies any family history of gallbladder disease  Her only prior surgical history was a tubal ligation  She is not on any anticoagulation therapy  She  Is allergic to penicillin which caused a rash as a child  She denies any tobacco, alcohol, or illicit drug use  She does have a medical history of anxiety for which she takes multiple medications  She denies any other significant medical history  Discussed proceeding with laparoscopic cholecystectomy as symptoms are consistent with gallbladder disease  Procedure discussed in detail   As well as possible risks and complicationsand patient wishes to proceed  Imaging:RUQ U/S 8/9/18  IMPRESSION:     Cholelithiasis without sonographic evidence of acute cholecystitis  ROS:  General ROS: negative for - chills, fatigue, fever or night sweats, weight loss  Respiratory ROS: no cough, shortness of breath, or wheezing  Cardiovascular ROS: no chest pain or dyspnea on exertion  Genito-Urinary ROS: no dysuria, trouble voiding, or hematuria  Musculoskeletal ROS: negative for - gait disturbance, joint pain or muscle pain  Neurological ROS: no TIA or stroke symptoms  Gastrointestinal ROS: See HPI   Skin:  No rashes, lesions, open wounds    ALLERGIES  Penicillins    Current Outpatient Prescriptions:     clonazePAM (KlonoPIN) 0 5 mg tablet, Take 1/2 to 1 tab twice daily as needed for anxiety (Patient taking differently: 1 25 mg Take 1/2 to 1 tab twice daily as needed for anxiety ), Disp: 60 tablet, Rfl: 1    FLUoxetine (PROzac) 20 mg capsule, Take 20mg daily  After 2 weeks, increase to 40mg daily  After 2 more weeks, increase to 60mg daily  (Patient taking differently: 60 mg Take 20mg daily  After 2 weeks, increase to 40mg daily   After 2 more weeks, increase to 60mg daily  ), Disp: 90 capsule, Rfl: 1    topiramate (TOPAMAX) 25 mg tablet, TAKE 1 TABLET NIGHTLY  AFTER 1 WEEK INCREASE TO 2 TABS NIGHTLY  (Patient taking differently: 50 mg TAKE 1 TABLET NIGHTLY  AFTER 1 WEEK INCREASE TO 2 TABS NIGHTLY  ), Disp: 60 tablet, Rfl: 1  Past Medical History:   Diagnosis Date    Anxiety      Past Surgical History:   Procedure Laterality Date    TUBAL LIGATION       No family history on file  reports that she has never smoked  She has never used smokeless tobacco  She reports that she does not drink alcohol or use drugs  Exam:   Vitals:    08/29/18 1423   BP: 100/70   Pulse: 64   Resp: 16   Temp: 99 2 °F (37 3 °C)       PHYSICAL EXAM  General Appearance:    Alert, cooperative, no distress, obese   Head:    Normocephalic without obvious abnormality   Eyes:    Conjunctiva/corneas clear, EOM's intact        Neck:   Supple, no adenopathy, no JVD   Back:     Symmetric, no spinal or CVA tenderness   Lungs:     Clear to auscultation bilaterally, no wheezing or rhonchi   Heart:    Regular rate and rhythm, S1 and S2 normal, no murmur   Abdomen:     Obese, Active bowel soundss, soft, non-distended,  Mild tenderness over right upper quadrant, no rebound or guarding  No Pickard sign   Extremities:   Extremities normal  No clubbing, cyanosis or edema   Psych:   Normal Affect, AOx3  Neurologic:  Skin:   CNII-XII intact   Strength symmetric, speech intact    Warm, dry, intact, no visible rashes or lesions  VS reviewed         Physical Exam

## 2018-08-29 NOTE — PATIENT INSTRUCTIONS
Patient appears to have symptoms related to gallbladder disease and cholelithiasis  She has been scheduled for a robotic assisted laparoscopic cholecystectomy  This will be scheduled in the near future  Patient will call the office or present to the emergency department if she develops recurrent severesymptoms prior to this procedure

## 2018-09-04 NOTE — TELEPHONE ENCOUNTER
Follow up call made  Saadia Easton had received my messages, hadn't been able to return my call   Will be coming for testing today at 1:30 PM

## 2018-09-05 ENCOUNTER — ANESTHESIA EVENT (OUTPATIENT)
Dept: PERIOP | Facility: HOSPITAL | Age: 33
End: 2018-09-05
Payer: COMMERCIAL

## 2018-09-06 ENCOUNTER — HOSPITAL ENCOUNTER (OUTPATIENT)
Facility: HOSPITAL | Age: 33
Setting detail: OUTPATIENT SURGERY
Discharge: HOME | End: 2018-09-06
Attending: SURGERY | Admitting: SURGERY
Payer: COMMERCIAL

## 2018-09-06 ENCOUNTER — ANESTHESIA (OUTPATIENT)
Dept: PERIOP | Facility: HOSPITAL | Age: 33
End: 2018-09-06
Payer: COMMERCIAL

## 2018-09-06 VITALS
SYSTOLIC BLOOD PRESSURE: 97 MMHG | TEMPERATURE: 98.2 F | OXYGEN SATURATION: 96 % | HEART RATE: 65 BPM | DIASTOLIC BLOOD PRESSURE: 51 MMHG | RESPIRATION RATE: 16 BRPM

## 2018-09-06 DIAGNOSIS — K80.20 CALCULUS OF GALLBLADDER WITHOUT CHOLECYSTITIS WITHOUT OBSTRUCTION: ICD-10-CM

## 2018-09-06 DIAGNOSIS — F41.1 GAD (GENERALIZED ANXIETY DISORDER): Primary | ICD-10-CM

## 2018-09-06 LAB
EXT PREGNANCY TEST URINE: NEGATIVE
EXT PREGNANCY TEST URINE: NEGATIVE

## 2018-09-06 PROCEDURE — 81025 URINE PREGNANCY TEST: CPT | Performed by: ANESTHESIOLOGY

## 2018-09-06 PROCEDURE — 47562 LAPAROSCOPIC CHOLECYSTECTOMY: CPT | Performed by: SURGERY

## 2018-09-06 PROCEDURE — 88304 TISSUE EXAM BY PATHOLOGIST: CPT | Performed by: PATHOLOGY

## 2018-09-06 PROCEDURE — 47562 LAPAROSCOPIC CHOLECYSTECTOMY: CPT | Performed by: PHYSICIAN ASSISTANT

## 2018-09-06 RX ORDER — KETOROLAC TROMETHAMINE 30 MG/ML
INJECTION, SOLUTION INTRAMUSCULAR; INTRAVENOUS AS NEEDED
Status: DISCONTINUED | OUTPATIENT
Start: 2018-09-06 | End: 2018-09-06 | Stop reason: SURG

## 2018-09-06 RX ORDER — MORPHINE SULFATE 10 MG/ML
2 INJECTION, SOLUTION INTRAMUSCULAR; INTRAVENOUS
Status: DISCONTINUED | OUTPATIENT
Start: 2018-09-06 | End: 2018-09-06 | Stop reason: HOSPADM

## 2018-09-06 RX ORDER — GLYCOPYRROLATE 0.2 MG/ML
INJECTION INTRAMUSCULAR; INTRAVENOUS AS NEEDED
Status: DISCONTINUED | OUTPATIENT
Start: 2018-09-06 | End: 2018-09-06 | Stop reason: SURG

## 2018-09-06 RX ORDER — DEXAMETHASONE SODIUM PHOSPHATE 4 MG/ML
INJECTION, SOLUTION INTRA-ARTICULAR; INTRALESIONAL; INTRAMUSCULAR; INTRAVENOUS; SOFT TISSUE AS NEEDED
Status: DISCONTINUED | OUTPATIENT
Start: 2018-09-06 | End: 2018-09-06 | Stop reason: SURG

## 2018-09-06 RX ORDER — BUPIVACAINE HYDROCHLORIDE AND EPINEPHRINE 5; 5 MG/ML; UG/ML
INJECTION, SOLUTION EPIDURAL; INTRACAUDAL; PERINEURAL AS NEEDED
Status: DISCONTINUED | OUTPATIENT
Start: 2018-09-06 | End: 2018-09-06 | Stop reason: HOSPADM

## 2018-09-06 RX ORDER — ROCURONIUM BROMIDE 10 MG/ML
INJECTION, SOLUTION INTRAVENOUS AS NEEDED
Status: DISCONTINUED | OUTPATIENT
Start: 2018-09-06 | End: 2018-09-06 | Stop reason: SURG

## 2018-09-06 RX ORDER — FENTANYL CITRATE/PF 50 MCG/ML
50 SYRINGE (ML) INJECTION
Status: DISCONTINUED | OUTPATIENT
Start: 2018-09-06 | End: 2018-09-06 | Stop reason: HOSPADM

## 2018-09-06 RX ORDER — HYDROCODONE BITARTRATE AND ACETAMINOPHEN 5; 325 MG/1; MG/1
1-2 TABLET ORAL EVERY 6 HOURS PRN
Qty: 30 TABLET | Refills: 0 | Status: SHIPPED | OUTPATIENT
Start: 2018-09-06 | End: 2018-09-12

## 2018-09-06 RX ORDER — SODIUM CHLORIDE 9 MG/ML
125 INJECTION, SOLUTION INTRAVENOUS CONTINUOUS
Status: DISCONTINUED | OUTPATIENT
Start: 2018-09-06 | End: 2018-09-06 | Stop reason: HOSPADM

## 2018-09-06 RX ORDER — HYDROCODONE BITARTRATE AND ACETAMINOPHEN 5; 325 MG/1; MG/1
2 TABLET ORAL EVERY 6 HOURS PRN
Status: DISCONTINUED | OUTPATIENT
Start: 2018-09-06 | End: 2018-09-06 | Stop reason: HOSPADM

## 2018-09-06 RX ORDER — MEPERIDINE HYDROCHLORIDE 50 MG/ML
12.5 INJECTION INTRAMUSCULAR; INTRAVENOUS; SUBCUTANEOUS ONCE AS NEEDED
Status: DISCONTINUED | OUTPATIENT
Start: 2018-09-06 | End: 2018-09-06 | Stop reason: HOSPADM

## 2018-09-06 RX ORDER — HYDROCODONE BITARTRATE AND ACETAMINOPHEN 5; 325 MG/1; MG/1
1 TABLET ORAL EVERY 4 HOURS PRN
Status: DISCONTINUED | OUTPATIENT
Start: 2018-09-06 | End: 2018-09-06 | Stop reason: HOSPADM

## 2018-09-06 RX ORDER — FENTANYL CITRATE 50 UG/ML
INJECTION, SOLUTION INTRAMUSCULAR; INTRAVENOUS AS NEEDED
Status: DISCONTINUED | OUTPATIENT
Start: 2018-09-06 | End: 2018-09-06 | Stop reason: SURG

## 2018-09-06 RX ORDER — ONDANSETRON 2 MG/ML
4 INJECTION INTRAMUSCULAR; INTRAVENOUS ONCE AS NEEDED
Status: DISCONTINUED | OUTPATIENT
Start: 2018-09-06 | End: 2018-09-06 | Stop reason: HOSPADM

## 2018-09-06 RX ORDER — PROPOFOL 10 MG/ML
INJECTION, EMULSION INTRAVENOUS AS NEEDED
Status: DISCONTINUED | OUTPATIENT
Start: 2018-09-06 | End: 2018-09-06 | Stop reason: SURG

## 2018-09-06 RX ORDER — SODIUM CHLORIDE, SODIUM LACTATE, POTASSIUM CHLORIDE, CALCIUM CHLORIDE 600; 310; 30; 20 MG/100ML; MG/100ML; MG/100ML; MG/100ML
125 INJECTION, SOLUTION INTRAVENOUS CONTINUOUS
Status: DISCONTINUED | OUTPATIENT
Start: 2018-09-06 | End: 2018-09-06 | Stop reason: HOSPADM

## 2018-09-06 RX ORDER — EPHEDRINE SULFATE 50 MG/ML
INJECTION, SOLUTION INTRAVENOUS AS NEEDED
Status: DISCONTINUED | OUTPATIENT
Start: 2018-09-06 | End: 2018-09-06 | Stop reason: SURG

## 2018-09-06 RX ORDER — ONDANSETRON 4 MG/1
4 TABLET, FILM COATED ORAL EVERY 8 HOURS PRN
Qty: 10 TABLET | Refills: 1 | Status: SHIPPED | OUTPATIENT
Start: 2018-09-06 | End: 2018-11-29 | Stop reason: ALTCHOICE

## 2018-09-06 RX ORDER — MIDAZOLAM HYDROCHLORIDE 1 MG/ML
INJECTION INTRAMUSCULAR; INTRAVENOUS AS NEEDED
Status: DISCONTINUED | OUTPATIENT
Start: 2018-09-06 | End: 2018-09-06 | Stop reason: SURG

## 2018-09-06 RX ORDER — ONDANSETRON 2 MG/ML
INJECTION INTRAMUSCULAR; INTRAVENOUS AS NEEDED
Status: DISCONTINUED | OUTPATIENT
Start: 2018-09-06 | End: 2018-09-06 | Stop reason: SURG

## 2018-09-06 RX ADMIN — ROCURONIUM BROMIDE 50 MG: 10 INJECTION INTRAVENOUS at 13:26

## 2018-09-06 RX ADMIN — NEOSTIGMINE METHYLSULFATE 3 MG: 1 INJECTION, SOLUTION INTRAMUSCULAR; INTRAVENOUS; SUBCUTANEOUS at 14:50

## 2018-09-06 RX ADMIN — METRONIDAZOLE 500 MG: 500 INJECTION, SOLUTION INTRAVENOUS at 13:35

## 2018-09-06 RX ADMIN — SODIUM CHLORIDE: 0.9 INJECTION, SOLUTION INTRAVENOUS at 13:46

## 2018-09-06 RX ADMIN — FENTANYL CITRATE 50 MCG: 50 INJECTION INTRAMUSCULAR; INTRAVENOUS at 15:28

## 2018-09-06 RX ADMIN — GLYCOPYRROLATE 0.6 MG: 0.2 INJECTION, SOLUTION INTRAMUSCULAR; INTRAVENOUS at 14:50

## 2018-09-06 RX ADMIN — SODIUM CHLORIDE: 0.9 INJECTION, SOLUTION INTRAVENOUS at 15:03

## 2018-09-06 RX ADMIN — ROCURONIUM BROMIDE 15 MG: 10 INJECTION INTRAVENOUS at 14:11

## 2018-09-06 RX ADMIN — CEFAZOLIN SODIUM 2000 MG: 2 SOLUTION INTRAVENOUS at 13:30

## 2018-09-06 RX ADMIN — KETOROLAC TROMETHAMINE 30 MG: 30 INJECTION, SOLUTION INTRAMUSCULAR at 14:33

## 2018-09-06 RX ADMIN — FENTANYL CITRATE 50 MCG: 50 INJECTION INTRAMUSCULAR; INTRAVENOUS at 15:16

## 2018-09-06 RX ADMIN — ONDANSETRON HYDROCHLORIDE 4 MG: 2 INJECTION, SOLUTION INTRAVENOUS at 13:20

## 2018-09-06 RX ADMIN — GLYCOPYRROLATE 0.4 MG: 0.2 INJECTION, SOLUTION INTRAMUSCULAR; INTRAVENOUS at 13:59

## 2018-09-06 RX ADMIN — PROPOFOL 200 MG: 10 INJECTION, EMULSION INTRAVENOUS at 13:26

## 2018-09-06 RX ADMIN — DEXAMETHASONE SODIUM PHOSPHATE 4 MG: 4 INJECTION, SOLUTION INTRAMUSCULAR; INTRAVENOUS at 13:47

## 2018-09-06 RX ADMIN — FENTANYL CITRATE 50 MCG: 50 INJECTION, SOLUTION INTRAMUSCULAR; INTRAVENOUS at 14:10

## 2018-09-06 RX ADMIN — FENTANYL CITRATE 100 MCG: 50 INJECTION, SOLUTION INTRAMUSCULAR; INTRAVENOUS at 13:26

## 2018-09-06 RX ADMIN — LIDOCAINE HYDROCHLORIDE 80 MG: 20 INJECTION, SOLUTION INTRAVENOUS at 13:26

## 2018-09-06 RX ADMIN — SODIUM CHLORIDE 125 ML/HR: 0.9 INJECTION, SOLUTION INTRAVENOUS at 10:35

## 2018-09-06 RX ADMIN — MIDAZOLAM 2 MG: 1 INJECTION INTRAMUSCULAR; INTRAVENOUS at 13:20

## 2018-09-06 RX ADMIN — FENTANYL CITRATE 50 MCG: 50 INJECTION INTRAMUSCULAR; INTRAVENOUS at 15:36

## 2018-09-06 RX ADMIN — HYDROCODONE BITARTRATE AND ACETAMINOPHEN 1 TABLET: 5; 325 TABLET ORAL at 17:09

## 2018-09-06 RX ADMIN — FENTANYL CITRATE 50 MCG: 50 INJECTION, SOLUTION INTRAMUSCULAR; INTRAVENOUS at 14:12

## 2018-09-06 RX ADMIN — EPHEDRINE SULFATE 5 MG: 50 INJECTION, SOLUTION INTRAMUSCULAR; INTRAVENOUS; SUBCUTANEOUS at 13:45

## 2018-09-06 NOTE — ANESTHESIA PREPROCEDURE EVALUATION
Review of Systems/Medical History  Patient summary reviewed  Chart reviewed  No history of anesthetic complications     Cardiovascular  Negative cardio ROS    Pulmonary  Negative pulmonary ROS        GI/Hepatic  Negative GI/hepatic ROS          Negative  ROS Kidney stones,   Comment: Past hx of renal calculii     Endo/Other    Obesity    GYN  Negative gynecology ROS          Hematology  Negative hematology ROS      Musculoskeletal  Negative musculoskeletal ROS        Neurology  Negative neurology ROS      Psychology   Anxiety, Depression , depressed and being treated for depression,              Physical Exam    Airway    Mallampati score: II  TM Distance: >3 FB  Neck ROM: full     Dental   No notable dental hx     Cardiovascular  Comment: Negative ROS, Rhythm: regular, Rate: normal, Cardiovascular exam normal    Pulmonary  Pulmonary exam normal Breath sounds clear to auscultation,     Other Findings        Anesthesia Plan  ASA Score- 2     Anesthesia Type- general with ASA Monitors  Additional Monitors:   Airway Plan: ETT  Plan Factors-Patient not instructed to abstain from smoking on day of procedure  Patient did not smoke on day of surgery  Induction- intravenous  Postoperative Plan- Plan for postoperative opioid use  Informed Consent- Anesthetic plan and risks discussed with patient

## 2018-09-06 NOTE — H&P (VIEW-ONLY)
Assessment/Plan:     symptomatic cholelithiasis   obesity   anxiety       Roberto Campos is a 35 y  o female who is here for   ER follow-up after evaluation for upper abdominal pain with findings of cholelithiasis  Upon evaluation today patient has   Appears to have symptoms related to gallbladder disease and cholelithiasis  At this point we have recommended a low-fat diet  We will proceed with laparoscopic cholecystectomy in the near future  If patient develops severe symptoms prior to this scheduled procedure she will call the office or return to the emergency department  Plan:   Robotic assisted laparoscopic cholecystectomy to be done in the next few weeks  Procedure discussed in detail as well as possible risks and complications with the patient  She has given her consent written form  All questions were answered  Patient does understand that she will have postop restrictions including limited activity and lifting  She does not require pre-admission testing or preoperative clearance  Pre-admission testing  Labs are available from her recent ED evaluation  Obesity:   Recommended weight loss program    Anxiety:  Appears well controlled on current medication regimen      HPI:  Roberto Campos is a 35 y  o female who was referred for evaluation of  Cholecystitis and right upper quadrant abdominal pain  Patient was recently seen in the emergency department for episode of severe right upper quadrant pain  Ultrasound revealed cholelithiasis at that time without findings of cholecystitis  However patient continues to have intermittent episodes of right upper quadrant pain  She admits that she has had this pain in the past  But it appears to be more frequent and worsening over time  The pain is associated with eating  The patient admits to eating pork ribs prior to her episode of abdominal pain prompting her emergency department evaluation      Patient admits to associated nausea with these pain episodes  She does not have vomiting  The patient has not noticed any change in her bowel movements  She denies any diarrhea,  constipation, bloody stools  She denies any recent weight changes  She denies any family history of gallbladder disease  Her only prior surgical history was a tubal ligation  She is not on any anticoagulation therapy  She  Is allergic to penicillin which caused a rash as a child  She denies any tobacco, alcohol, or illicit drug use  She does have a medical history of anxiety for which she takes multiple medications  She denies any other significant medical history  Discussed proceeding with laparoscopic cholecystectomy as symptoms are consistent with gallbladder disease  Procedure discussed in detail   As well as possible risks and complicationsand patient wishes to proceed  Imaging:RUQ U/S 8/9/18  IMPRESSION:     Cholelithiasis without sonographic evidence of acute cholecystitis  ROS:  General ROS: negative for - chills, fatigue, fever or night sweats, weight loss  Respiratory ROS: no cough, shortness of breath, or wheezing  Cardiovascular ROS: no chest pain or dyspnea on exertion  Genito-Urinary ROS: no dysuria, trouble voiding, or hematuria  Musculoskeletal ROS: negative for - gait disturbance, joint pain or muscle pain  Neurological ROS: no TIA or stroke symptoms  Gastrointestinal ROS: See HPI   Skin:  No rashes, lesions, open wounds    ALLERGIES  Penicillins    Current Outpatient Prescriptions:     clonazePAM (KlonoPIN) 0 5 mg tablet, Take 1/2 to 1 tab twice daily as needed for anxiety (Patient taking differently: 1 25 mg Take 1/2 to 1 tab twice daily as needed for anxiety ), Disp: 60 tablet, Rfl: 1    FLUoxetine (PROzac) 20 mg capsule, Take 20mg daily  After 2 weeks, increase to 40mg daily  After 2 more weeks, increase to 60mg daily  (Patient taking differently: 60 mg Take 20mg daily  After 2 weeks, increase to 40mg daily   After 2 more weeks, increase to 60mg daily  ), Disp: 90 capsule, Rfl: 1    topiramate (TOPAMAX) 25 mg tablet, TAKE 1 TABLET NIGHTLY  AFTER 1 WEEK INCREASE TO 2 TABS NIGHTLY  (Patient taking differently: 50 mg TAKE 1 TABLET NIGHTLY  AFTER 1 WEEK INCREASE TO 2 TABS NIGHTLY  ), Disp: 60 tablet, Rfl: 1  Past Medical History:   Diagnosis Date    Anxiety      Past Surgical History:   Procedure Laterality Date    TUBAL LIGATION       No family history on file  reports that she has never smoked  She has never used smokeless tobacco  She reports that she does not drink alcohol or use drugs  Exam:   Vitals:    08/29/18 1423   BP: 100/70   Pulse: 64   Resp: 16   Temp: 99 2 °F (37 3 °C)       PHYSICAL EXAM  General Appearance:    Alert, cooperative, no distress, obese   Head:    Normocephalic without obvious abnormality   Eyes:    Conjunctiva/corneas clear, EOM's intact        Neck:   Supple, no adenopathy, no JVD   Back:     Symmetric, no spinal or CVA tenderness   Lungs:     Clear to auscultation bilaterally, no wheezing or rhonchi   Heart:    Regular rate and rhythm, S1 and S2 normal, no murmur   Abdomen:     Obese, Active bowel soundss, soft, non-distended,  Mild tenderness over right upper quadrant, no rebound or guarding  No Pickard sign   Extremities:   Extremities normal  No clubbing, cyanosis or edema   Psych:   Normal Affect, AOx3  Neurologic:  Skin:   CNII-XII intact   Strength symmetric, speech intact    Warm, dry, intact, no visible rashes or lesions  VS reviewed         Physical Exam

## 2018-09-06 NOTE — ANESTHESIA POSTPROCEDURE EVALUATION
Post-Op Assessment Note      CV Status:  Stable    Mental Status:  Alert and awake    Hydration Status:  Euvolemic    PONV Controlled:  Controlled    Airway Patency:  Patent  Airway: intubated    Post Op Vitals Reviewed: Yes          Staff: Anesthesiologist           /53 (09/06/18 1608)    Temp 98 2 °F (36 8 °C) (09/06/18 1558)    Pulse 62 (09/06/18 1608)   Resp 20 (09/06/18 1608)    SpO2 94 % (09/06/18 1608)

## 2018-09-06 NOTE — DISCHARGE INSTRUCTIONS
[facility] Surgical  Post-Operative Care Instructions  Dr La Wade MD, Nicolas Rand  [phone]    1  General: You will feel pulling sensations around the wound or funny aches and pains around the incisions  This is normal  Even minor surgery is a change in your body and this is your bodys way of reaction to it  If you have had abdominal surgery, it may help to support the incision with a small pillow or blanket for comfort when moving or coughing  2  Wound care:  Okay to shower  The glue will fall off over the next week or 2     3  Water: You may shower over the wound, unless there are drain tubes left in place  Do not bathe or use a pool or hot tub until cleared by the physician  4  Activity: You may go up and down stairs, walk as much as you are comfortable, but walk at least 3 times each day  If you have had abdominal surgery, do not lift anything heavier than 20 pounds for at least 4 weeks, unless cleared by the doctor  5  Diet: You may resume a regular diet  If you had a same-day surgery or overnight stay surgery, he may wish to eat lightly for a few days: soups, crackers, and sandwiches  You may resume a regular diet when ready  6  Medications: Resume all of your previous medications, unless told otherwise by the doctor  Avoid aspirin or ibuprofen (Advil, Motrin, etc ) products for 2-3 days after the date of surgery  You may, at that time, began to take them again  Tylenol is always fine, unless you are taking any narcotic pain medication containing Tylenol (such as Percocet, Darvocet, Vicodin, or anything containing acetaminophen)  Do not take Tylenol if you're taking these medications  You do not need to take the narcotic pain medications unless you are having significant pain and discomfort  7  Driving: You will need someone to drive you home on the day of surgery   Do not drive or make any important decisions while on narcotic pain medication or 24 hours and after anesthesia or sedation for surgery  Generally, you may drive when your off all narcotic pain medications  8  Upset Stomach: You may take Maalox, Tums, or similar items for an upset stomach  If your narcotic pain medication causes an upset stomach, do not take it on an empty stomach  Try taking it with at least some crackers or toast      9  Constipation: Patients often experienced constipation after surgery  You may take over-the-counter medication for this, such as Metamucil, Senokot, Dulcolax, milk of magnesia, etc  You may take a suppository unless you have had anorectal surgery such as a procedure on your hemorrhoids  If you experience significant nausea or vomiting after abdominal surgery, call the office before trying any of these medications  10  Call the office: If you are experiencing any of the following, fevers above 101 5°, significant nausea or vomiting, if the wound develops drainage and/or is excessive redness around the wound, or if you have significant diarrhea or other worsening symptoms  11  Pain: You may be given a prescription for pain  This will be given to the hospital, the day of surgery  12  Sexual Activity: You may resume sexual activity when you feel ready and comfortable and your incision is sealed and healed without apparent infection risk

## 2018-09-06 NOTE — OP NOTE
OPERATIVE REPORT  PATIENT NAME: Dwain aWyne    :  1985  MRN: [id number]  Pt Location: [facility] OR ROOM 06    SURGERY DATE: 2018    Surgeon(s) and Role:     * Toshia Begum MD - Primary     Ripjonathon Herrera PA-C - Assisting    Preop Diagnosis:  Calculus of gallbladder without cholecystitis without obstruction [K80 20]    Post-Op Diagnosis Codes:     * CCC (chronic calculous cholecystitis) [K80 10]    Procedure(s) (LRB):  CHOLECYSTECTOMY LAPAROSCOPIC W/ ROBOTICS (N/A)    Specimen(s):  ID Type Source Tests Collected by Time Destination   1 :  Tissue Gallbladder TISSUE EXAM Toshia Begum MD 2018 1432        Estimated Blood Loss:   Minimal    Drains:  [REMOVED] NG/OG/Enteral Tube Orogastric 18 Fr Center mouth (Removed)   Number of days: 0       Anesthesia Type:   General    Operative Indications:  Calculus of gallbladder without cholecystitis without obstruction [K80 20]      Operative Findings:  Chronic calculus cholecystitis    Complications:   None    Procedure and Technique:  The patient was seen again in the Holding Room  The risks, benefits, complications, treatment options, and expected outcomes were discussed with the patient  The possibilities of reaction to medication, pulmonary aspiration, perforation of viscus, bleeding, recurrent infection, finding a normal gallbladder, the need for additional procedures, failure to diagnose a condition, the possible need to convert to an open procedure, and creating a complication requiring transfusion or operation were discussed with the patient  The patient and/or family concurred with the proposed plan, giving informed consent  The site of surgery properly noted/marked  The patient was taken to Operating Room, identified as Dwain Wayne  and the procedure verified as Laparoscopic Cholecystectomy with possible Intraoperative Cholangiogram  A Time Out was held after prepping and draping in sterile fashion    The above information was confirmed  Prior to the induction of general anesthesia, antibiotic prophylaxis was administered  General endotracheal anesthesia was then administered and tolerated well  After the induction, the abdomen was prepped in the usual sterile fashion  The patient was positioned in the supine position, along with some reverse Trendelenburg  Local anesthetic agent was injected into the skin near the umbilicus and an incision made  The midline fascia was incised and the open technique was used to introduce a  port under direct vision  Pneumoperitoneum was then created with CO2 and was tolerated well without any adverse changes in the patient's vital signs  Additional trocars were introduced under direct vision  All skin incisions were infiltrated with a local anesthetic agent before making the incision and placing the trocars  The patient was placed in reverse Trendelenburg position  The two  8 mm robotic trocars were placed at least 10 cm from the camera port  A 5 mm system port was placed on the lateral right side  The gallbladder was identified, the fundus grasped and retracted cephalad  Adhesions were lysed bluntly and with the electrocautery where indicated, taking care not to injure any adjacent organs or viscus  The infundibulum was grasped and retracted laterally, exposing the peritoneum overlying the triangle of Calot  This was then dissected anteriorily and posteriorly and exposed in a blunt fashion or using cautery where appropriate  The cystic duct was clearly identified and  dissected circumferentially, as was the cystic artery, as the only two tubular structures leading into the gallbladder   The critical view of the [address] was identified  The posterior aspect of the gallbladder was lifted off the cystic plate, to insure that there were no posterior structres behind the gallbladder        Once this was all clearly identified, the cystic duct was then doubly ligated with clips on the patient's side and 1 on the gallbladder side  The cystic artery was then also clipped and transected  The gallbladder was dissected from the liver bed in retrograde fashion with the electrocautery  The robot was then undocked and a 5 mm camera was placed back in  The gallbladder was placed into an endocatch bag and secured  The liver bed was irrigated and inspected  Hemostasis was achieved with the electrocautery  Copious irrigation was utilized and was repeatedly aspirated until clear  The camera was then switched to the lateral port and directed back to the midline  The specimen was removed under direct visualization from the midline incision  The fascia was then closed using the renfac suture passer and a figure of eight 0 vicryl suture  Pneumoperitoneum was completely reduced after viewing removal of the trocars under direct vision  The wound was thoroughly irrigated  The skin was then closed with 4-0 monocryl and histacryl  Instrument, sponge, and needle counts were correct at closure and at the conclusion of the case  PA is medically necessary for the surgical safety of the case, including suturing, retracting, and hemostasis       I was present for the entire procedure and A qualified resident physician was not available    Patient Disposition:  PACU     SIGNATURE: Bob Alejandre MD  DATE: September 6, 2018  TIME: 2:45 PM

## 2018-09-10 ENCOUNTER — DOCUMENTATION (OUTPATIENT)
Dept: PSYCHIATRY | Facility: CLINIC | Age: 33
End: 2018-09-10

## 2018-09-10 NOTE — PROGRESS NOTES
[facility]  Process and financial obligation reviewed at that time  See signed consent  Ordering process completed and specimen for  today

## 2018-09-12 ENCOUNTER — OFFICE VISIT (OUTPATIENT)
Dept: PSYCHIATRY | Facility: CLINIC | Age: 33
End: 2018-09-12
Payer: COMMERCIAL

## 2018-09-12 ENCOUNTER — TELEPHONE (OUTPATIENT)
Dept: PSYCHIATRY | Facility: CLINIC | Age: 33
End: 2018-09-12

## 2018-09-12 ENCOUNTER — TELEPHONE (OUTPATIENT)
Dept: BEHAVIORAL/MENTAL HEALTH CLINIC | Facility: CLINIC | Age: 33
End: 2018-09-12

## 2018-09-12 DIAGNOSIS — F33.2 MAJOR DEPRESSIVE DISORDER, RECURRENT, SEVERE W/O PSYCHOTIC BEHAVIOR (HCC): ICD-10-CM

## 2018-09-12 DIAGNOSIS — R53.83 FATIGUE, UNSPECIFIED TYPE: ICD-10-CM

## 2018-09-12 DIAGNOSIS — F41.1 GAD (GENERALIZED ANXIETY DISORDER): ICD-10-CM

## 2018-09-12 DIAGNOSIS — E55.9 VITAMIN D DEFICIENCY: Primary | ICD-10-CM

## 2018-09-12 DIAGNOSIS — E03.9 HYPOTHYROIDISM, UNSPECIFIED TYPE: ICD-10-CM

## 2018-09-12 DIAGNOSIS — F40.10 SOCIAL PHOBIA: ICD-10-CM

## 2018-09-12 PROCEDURE — 99214 OFFICE O/P EST MOD 30 MIN: CPT | Performed by: PSYCHIATRY & NEUROLOGY

## 2018-09-12 RX ORDER — CLONAZEPAM 0.5 MG/1
TABLET ORAL
Qty: 60 TABLET | Refills: 2 | Status: SHIPPED | OUTPATIENT
Start: 2018-09-12 | End: 2018-12-03 | Stop reason: SDUPTHER

## 2018-09-12 RX ORDER — FLUOXETINE 20 MG/1
80 TABLET, FILM COATED ORAL DAILY
Qty: 120 TABLET | Refills: 2 | Status: SHIPPED | OUTPATIENT
Start: 2018-09-12 | End: 2018-09-25 | Stop reason: SDUPTHER

## 2018-09-12 RX ORDER — TOPIRAMATE 25 MG/1
TABLET ORAL
Qty: 120 TABLET | Refills: 2 | Status: SHIPPED | OUTPATIENT
Start: 2018-09-12 | End: 2018-12-03 | Stop reason: SDUPTHER

## 2018-09-12 RX ORDER — HYDROCODONE BITARTRATE AND ACETAMINOPHEN 5; 325 MG/1; MG/1
1-2 TABLET ORAL EVERY 6 HOURS PRN
COMMUNITY
End: 2018-09-19 | Stop reason: ALTCHOICE
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
## 2018-09-12 NOTE — TELEPHONE ENCOUNTER
In working with prior authorizations, I would expect insurance wants to cover a different dose, ie:  40mg, 2 tabs or 60mg and 20mg tabs to equal the total dose of 80mg  If you have a rationale for needing Prozac to be 20mg, 4 tabs, can submit a prior auth  I did verify with pharmacist - quantity is what is rejecting - will not cover more than 3 tabs

## 2018-09-12 NOTE — TELEPHONE ENCOUNTER
Patient called to say she was missing her Clonazepam 0 5 mg when she went to the pharmacy can you send it?     Should go to [facility] @ [address]

## 2018-09-12 NOTE — TELEPHONE ENCOUNTER
Labs printed  You can let her know we can mail them  As for medication, I will see if we can work with her insurance on the prozac   Rosie Sour will work on it

## 2018-09-12 NOTE — TELEPHONE ENCOUNTER
Dr Christelle Messina uped the mg for her Fluoxatine to 80mg and changed it from capsuls to tablet's  The Pharmacist said they will only pay for 3 tablet's not 4 tablet's won't pay for 80mg the way he wrote it  [employer] said she had him send it to [address] [place] [address] [address] [facility]    Has [facility]

## 2018-09-12 NOTE — TELEPHONE ENCOUNTER
So we may need to do an exception  She cannot swallow capsules, needs tablets for prozac  Would you follow up with pharmacy/insurance please?

## 2018-09-12 NOTE — TELEPHONE ENCOUNTER
Dr Lorrie Dale said you told her you wanted her to get lab work done and did not give her a paper for it  I looked and did not see any lab's to mail to her that you wrote up

## 2018-09-13 ENCOUNTER — DOCUMENTATION (OUTPATIENT)
Dept: PSYCHIATRY | Facility: CLINIC | Age: 33
End: 2018-09-13

## 2018-09-13 NOTE — PROGRESS NOTES
Prior authorization sent to [facility] via [employer] for request of (4) Fluoxetine  20 mg tablets for total daily dose of 80 mg  Will await response from insurance

## 2018-09-17 ENCOUNTER — TELEPHONE (OUTPATIENT)
Dept: PSYCHIATRY | Facility: CLINIC | Age: 33
End: 2018-09-17

## 2018-09-17 DIAGNOSIS — F33.2 MAJOR DEPRESSIVE DISORDER, RECURRENT, SEVERE W/O PSYCHOTIC BEHAVIOR (HCC): ICD-10-CM

## 2018-09-17 DIAGNOSIS — F40.10 SOCIAL PHOBIA: ICD-10-CM

## 2018-09-17 DIAGNOSIS — F41.1 GAD (GENERALIZED ANXIETY DISORDER): ICD-10-CM

## 2018-09-17 NOTE — TELEPHONE ENCOUNTER
I received a faxed denial from [facility], but it was denied due to [place] having alternate pharmacy benefits  I will call her to clarify insurance information

## 2018-09-17 NOTE — PROGRESS NOTES
Received faxed denial from [facility] - patient has alternate pharmacy benefits  See Telephone Encounter from 09/17/18 for submission to [employer] [facility]

## 2018-09-17 NOTE — TELEPHONE ENCOUNTER
Prior auth request for fluoxetine 20mg tabs, 4 tabs daily submitted to [employer] via [place]

## 2018-09-17 NOTE — TELEPHONE ENCOUNTER
The only active insurance I was able to see is [employer]  I spoke with Danika Alfaro  She has primary insurance with [address], [place] #[id number] ([place] [id number], [facility], phone for pharmacy help is #[phone]  Will submit to primary insurance

## 2018-09-17 NOTE — TELEPHONE ENCOUNTER
Patient called requesting a new prescription for Fluoxetine 80 mg  She said her insurance company denied previous prescription as written  She said she is willing to take the capsule form of this medication   New prescription to be sent to [facility] [address] [facility] [address] [facility]

## 2018-09-17 NOTE — TELEPHONE ENCOUNTER
I checked with front staff - the only insurance listed in EHR/Epic is [employer]  I reviewed same with Shadi Dagoberto and she will bring her cards to the office to be scanned into Epic

## 2018-09-18 NOTE — TELEPHONE ENCOUNTER
Received a letter of denial from [facility] for fluoxetine - scanned to [employer], dated 09/17/18  For Dr Elaine Hwang' review

## 2018-09-18 NOTE — TELEPHONE ENCOUNTER
So are we at a dead end? Unless we can get a 60mg tab + 20mg tab (but I know that 60mg typically has quite high copay)  If we have no other options, please let the patient know that we can only do 40mg x2 capsules  Let me know if she is ok going that route      ThanksRudy

## 2018-09-19 ENCOUNTER — OFFICE VISIT (OUTPATIENT)
Dept: SURGERY | Facility: MEDICAL CENTER | Age: 33
End: 2018-09-19

## 2018-09-19 VITALS — HEIGHT: 67 IN | BODY MASS INDEX: 33.27 KG/M2 | WEIGHT: 212 LBS | TEMPERATURE: 97.9 F

## 2018-09-19 DIAGNOSIS — Z90.49 STATUS POST LAPAROSCOPIC CHOLECYSTECTOMY: Primary | ICD-10-CM

## 2018-09-19 PROCEDURE — 99024 POSTOP FOLLOW-UP VISIT: CPT | Performed by: SURGERY

## 2018-09-19 NOTE — PROGRESS NOTES
Assessment/Plan: Rosie Conde is a 35year old female who presents today in post-operative state status post robotic laparoscopic cholecystectomy performed on 9/6/18  Physical exam revealed the incisions are healing well  Pathology showed chronic inflammation with cholelithiasis  Lifting restrictions of no greater than 20 pounds for 2 more weeks  She knows to call the office if any questions or concerns arise  No problem-specific Assessment & Plan notes found for this encounter  Diagnoses and all orders for this visit:    Status post laparoscopic cholecystectomy          Subjective:      Patient ID: Beti Barrientos is a 35 y o  female  Rosie Conde is a 35year old female who presents today in post-operative state status post robotic laparoscopic cholecystectomy performed on 9/6/18  She reports she is doing well, but is a little tender  She reports the pain medication caused her to be slightly constipated so she took a stool softener to alleviate the issues  The following portions of the patient's history were reviewed and updated as appropriate: allergies, current medications, past family history, past medical history, past social history, past surgical history and problem list     Review of Systems   Constitutional: Negative  HENT: Negative  Eyes: Negative  Respiratory: Negative  Cardiovascular: Negative  Gastrointestinal: Positive for constipation  Endocrine: Negative  Genitourinary: Negative  Musculoskeletal: Negative  Skin: Negative  Allergic/Immunologic: Negative  Neurological: Negative  Hematological: Negative  Psychiatric/Behavioral: Negative  All other systems reviewed and are negative  Objective:      Temp 97 9 °F (36 6 °C) (Tympanic)   Ht 5' 6 5" (1 689 m)   Wt 96 2 kg (212 lb)   BMI 33 71 kg/m²          Physical Exam   Abdominal:   Incisions are healing well           By signing my name below, I, James King, attest that this documentation has been prepared under the direction and in the presence of Myron Jerome MD  Electronically Signed: Rafiq Smith 9/19/2018  Rhett Mayer, personally performed the services described in this documentation  All medical record entries made by the scribe were at my direction and in my presence  I have reviewed the chart and discharge instructions (if applicable) and agree that the record reflects my personal performance and is accurate and complete  Myron Jerome MD  9/19/2018

## 2018-09-19 NOTE — LETTER
September 21, 1013     Patient: Janis Galindo   YOB: 1985   Date of Visit: 9/19/2018       To Whom it May Concern:    Peter Oakley is under my professional care  She was seen in my office on 9/19/2018  She may return to work on 10/7/18 with no restrictions       If you have any questions or concerns, please don't hesitate to call           Sincerely,          Mikie Sesay MD        CC: No Recipients

## 2018-09-19 NOTE — LETTER
September 21, 2018     Bernarda Salgado DO  [address] [facility] [address]    Patient: Ana Nath   YOB: 1985   Date of Visit: 9/19/2018       Dear Dr Barak Monte: Thank you for referring Elbert Wesley to me for evaluation  Below are my notes for this consultation  If you have questions, please do not hesitate to call me  I look forward to following your patient along with you           Sincerely,        Leeanna Khan MD        CC: No Recipients

## 2018-09-21 NOTE — TELEPHONE ENCOUNTER
I spoke with Vira Palm again after a message form Dr Chance Velez  [facility] notes fuoxetine tabs 20mg as generic nonprefered  Vira Munguialeticia would like to have 20mg tab, 3 daily  (60mg total) sent to [facility] for processing through insurance  She can take a printed prescription (or it can be called in) for 20mg cap daily to [facility] with the out of pocket cost listed as $4 00 (to make up a total dose of 80mg a day)  Tabs may still require a prior auth only because they're nonprefered, but I would expect that would be less likely to be denied

## 2018-09-24 RX ORDER — FLUOXETINE 20 MG/1
60 TABLET, FILM COATED ORAL DAILY
Qty: 90 TABLET | Refills: 1 | Status: SHIPPED | OUTPATIENT
Start: 2018-09-24 | End: 2018-09-25

## 2018-09-24 RX ORDER — FLUOXETINE 20 MG/1
20 TABLET, FILM COATED ORAL DAILY
Qty: 30 TABLET | Refills: 1 | Status: SHIPPED | OUTPATIENT
Start: 2018-09-24 | End: 2018-09-25

## 2018-09-24 NOTE — TELEPHONE ENCOUNTER
I sent e-script 60mg (20mg x3) to [facility], I am not in office  I put additional 20mg script in, but can you have another doc reorder/print it?     Thanks,  Azra Jose

## 2018-09-24 NOTE — TELEPHONE ENCOUNTER
True, but to afford the additional 20mg, she said she wanted the caps just for the extra 1 pill a day

## 2018-09-24 NOTE — TELEPHONE ENCOUNTER
I spoke with Jesus Manuel and reviewed Prescriptions for fluoxetine 20mg tab, 3 daily and additional 20mg for pay out of pocket  Jesus Manuel will pay for the additional dose (the cost for caps #30 is $4 00) and it was called to the pharmacist at [facility] on [address]

## 2018-09-25 RX ORDER — FLUOXETINE HYDROCHLORIDE 40 MG/1
80 CAPSULE ORAL DAILY
Qty: 60 CAPSULE | Refills: 1 | Status: SHIPPED | OUTPATIENT
Start: 2018-09-25 | End: 2018-12-01 | Stop reason: SDUPTHER

## 2018-09-25 NOTE — TELEPHONE ENCOUNTER
I spoke with Lynda Ramirez  Will call primary insurance to see if a tier exception is possible  If not, Lynda Ramirez would like to get the medication as caps

## 2018-09-25 NOTE — TELEPHONE ENCOUNTER
Edith Mcclain called today regarding the Fluoxetine  When she went to [facility] to  medication she siad it cost $96 00  She states she can not afford this and can she have the capsule form because they are cheaper

## 2018-09-25 NOTE — TELEPHONE ENCOUNTER
I spoke with a [facility] representative  No tier exception is available for this medication (non-preferred generic)  Please sned prescription for capsule  Thanks

## 2018-11-09 ENCOUNTER — DOCUMENTATION (OUTPATIENT)
Dept: PSYCHIATRY | Facility: CLINIC | Age: 33
End: 2018-11-09

## 2018-11-09 ENCOUNTER — OFFICE VISIT (OUTPATIENT)
Dept: BEHAVIORAL/MENTAL HEALTH CLINIC | Facility: CLINIC | Age: 33
End: 2018-11-09
Payer: COMMERCIAL

## 2018-11-09 DIAGNOSIS — F41.1 GAD (GENERALIZED ANXIETY DISORDER): ICD-10-CM

## 2018-11-09 DIAGNOSIS — F33.2 MAJOR DEPRESSIVE DISORDER, RECURRENT, SEVERE W/O PSYCHOTIC BEHAVIOR (HCC): Primary | ICD-10-CM

## 2018-11-09 PROCEDURE — 90834 PSYTX W PT 45 MINUTES: CPT | Performed by: SOCIAL WORKER

## 2018-11-09 NOTE — PROGRESS NOTES
Psychotherapy Provided: Individual Psychotherapy 45 minutes     Length of time in session: 45 minutes    Goals addressed in session: Goal 1, Goal 2 and Goal 3      Pain:      none    0    Current suicide risk : Low       Behavioral Health Treatment Plan [facility]: Diagnosis and Treatment Plan explained to Tess Mast relates understanding diagnosis and is agreeable to Treatment Plan  Yes       D: Kathleen Velásquez discussed the goals and objectives for her treatment plan  She stated that she doesn't know where to start and has a hard time talking about herself  She stated that her thoughts race and at times when she talks she jumps from one thing to another  She also stated that she did not put on the treatment plan what she should have put on because she did not address communication  A: Pt 's speech WNL but she appears to have, at times, loose association or racing thoughts  She also appears to have poor self esteem     P: Pt  Will work on all the objectives for all the goals       Intervention techniques, treatment planning, questioning, observation, affective listening and exploration    RTO: 11/30 at 9:00am

## 2018-11-09 NOTE — PSYCH
Phillip Hortensia Whyterajiv  [birth year]       Date of Initial Treatment Plan: 11/9/18   Date of Current Treatment Plan: 11/09/18    Treatment Plan Number 1      Strengths/Personal Resources for Self Care:  I love to work  I am a fun person  I can be outgoing when I am in a good mood  I like to learn  I am a good mom  Diagnosis:   1  Major depressive disorder, recurrent, severe w/o psychotic behavior ([facility] [address] )     2  JG (generalized anxiety disorder)         Area of Needs: 1  My confidence  2  My mood swings 3  To be more positive then negative    Long Term Goal 1: AI want to feel better about myself     Target Date: 2/19  Completion Date:          Short Term Objectives for Goal 1: Monica more positive Target date: 2/19 and Arnol Kitchen how to cope with things when I am going through an emotional time Target Date 2/19    Long Term Goal 2: Learn how to cope with mood swings    Target Date: 2/19  Completion Date: N/A    Short Term Objectives for Goal 2: AI will not sleep my problems away  Target date 2/19         Long Term Goal # 3: To have a clearer mind     Target Date: 2/19  Completion Date: N/A    Short Term Objectives for Goal 3: AI will learn relaxation  Target date 2/19 and BI will think differently  Target date 2/19    GOAL 1: Modality: Individual 4x per month   Completion Date N/A and The person(s) responsible for carrying out the plan is  Desire and Kartik    GOAL 2: Modality: Individual 4x per month   Completion Date N/A and The person(s) responsible for carrying out the plan is  Desire and Kartik     GOAL 3: Modality: Individual 4x per month   Completion Date N/A and The person(s) responsible for carrying out the plan is  [place] and [address]: Diagnosis and Treatment Plan explained to Oli Bridges relates understanding diagnosis and is agreeable to Treatment Plan         Client Comments : Please share your thoughts, feelings, need and/or experiences regarding your treatment plan: No Comments              Patient signature,  _______________________________11/9/18 at 15:37           Physician alba signature, Date, Time: ________________________________           Therapist signature: Jasper Jackson, EMELYN  11/9/18 at 15:37

## 2018-11-29 ENCOUNTER — HOSPITAL ENCOUNTER (EMERGENCY)
Facility: HOSPITAL | Age: 33
Discharge: HOME | End: 2018-11-29
Attending: EMERGENCY MEDICINE
Payer: COMMERCIAL

## 2018-11-29 ENCOUNTER — APPOINTMENT (EMERGENCY)
Dept: CT IMAGING | Facility: HOSPITAL | Age: 33
End: 2018-11-29
Payer: COMMERCIAL

## 2018-11-29 VITALS
RESPIRATION RATE: 20 BRPM | BODY MASS INDEX: 33.27 KG/M2 | OXYGEN SATURATION: 99 % | HEIGHT: 67 IN | DIASTOLIC BLOOD PRESSURE: 78 MMHG | TEMPERATURE: 98 F | WEIGHT: 212 LBS | HEART RATE: 70 BPM | SYSTOLIC BLOOD PRESSURE: 110 MMHG

## 2018-11-29 DIAGNOSIS — N23 URETERIC COLIC: Primary | ICD-10-CM

## 2018-11-29 DIAGNOSIS — N20.0 KIDNEY STONE: ICD-10-CM

## 2018-11-29 LAB
BACTERIA UR QL AUTO: ABNORMAL /HPF
BILIRUB UR QL STRIP: NEGATIVE
CLARITY UR: CLEAR
COLOR UR: YELLOW
EXT PREG TEST URINE: NEGATIVE
GLUCOSE UR STRIP-MCNC: NEGATIVE MG/DL
HGB UR QL STRIP.AUTO: ABNORMAL
KETONES UR STRIP-MCNC: ABNORMAL MG/DL
LEUKOCYTE ESTERASE UR QL STRIP: NEGATIVE
NITRITE UR QL STRIP: NEGATIVE
NON-SQ EPI CELLS URNS QL MICRO: ABNORMAL /HPF
PH UR STRIP.AUTO: 6 [PH] (ref 4.5–8)
PROT UR STRIP-MCNC: NEGATIVE MG/DL
RBC #/AREA URNS AUTO: ABNORMAL /HPF
SP GR UR STRIP.AUTO: 1.02 (ref 1–1.03)
UROBILINOGEN UR QL STRIP.AUTO: 1 E.U./DL
WBC #/AREA URNS AUTO: ABNORMAL /HPF

## 2018-11-29 PROCEDURE — 74176 CT ABD & PELVIS W/O CONTRAST: CPT

## 2018-11-29 PROCEDURE — 96372 THER/PROPH/DIAG INJ SC/IM: CPT

## 2018-11-29 PROCEDURE — 81001 URINALYSIS AUTO W/SCOPE: CPT

## 2018-11-29 PROCEDURE — 81025 URINE PREGNANCY TEST: CPT | Performed by: EMERGENCY MEDICINE

## 2018-11-29 PROCEDURE — 99284 EMERGENCY DEPT VISIT MOD MDM: CPT

## 2018-11-29 RX ORDER — TAMSULOSIN HYDROCHLORIDE 0.4 MG/1
0.4 CAPSULE ORAL
Qty: 5 CAPSULE | Refills: 0 | Status: SHIPPED | OUTPATIENT
Start: 2018-11-29 | End: 2018-12-03

## 2018-11-29 RX ORDER — ONDANSETRON 4 MG/1
4 TABLET, FILM COATED ORAL EVERY 6 HOURS
Qty: 7 TABLET | Refills: 0 | Status: SHIPPED | OUTPATIENT
Start: 2018-11-29 | End: 2018-12-03

## 2018-11-29 RX ORDER — KETOROLAC TROMETHAMINE 30 MG/ML
15 INJECTION, SOLUTION INTRAMUSCULAR; INTRAVENOUS ONCE
Status: COMPLETED | OUTPATIENT
Start: 2018-11-29 | End: 2018-11-29

## 2018-11-29 RX ORDER — ONDANSETRON 4 MG/1
4 TABLET, ORALLY DISINTEGRATING ORAL ONCE
Status: COMPLETED | OUTPATIENT
Start: 2018-11-29 | End: 2018-11-29

## 2018-11-29 RX ORDER — KETOROLAC TROMETHAMINE 10 MG/1
10 TABLET, FILM COATED ORAL EVERY 6 HOURS PRN
Qty: 20 TABLET | Refills: 0 | Status: SHIPPED | OUTPATIENT
Start: 2018-11-29 | End: 2018-12-03

## 2018-11-29 RX ADMIN — ONDANSETRON 4 MG: 4 TABLET, ORALLY DISINTEGRATING ORAL at 21:00

## 2018-11-29 RX ADMIN — KETOROLAC TROMETHAMINE 15 MG: 30 INJECTION, SOLUTION INTRAMUSCULAR at 21:00

## 2018-11-30 ENCOUNTER — DOCUMENTATION (OUTPATIENT)
Dept: PSYCHIATRY | Facility: CLINIC | Age: 33
End: 2018-11-30

## 2018-11-30 NOTE — ED PROVIDER NOTES
History  Chief Complaint   Patient presents with    Flank Pain     pt states for the last 2 weeks, her bladder feels full, she voids, and then her bladder still feels full, and today, pt started with severe right flank pain  History provided by:  Patient  Flank Pain   Pain location:  R flank  Pain quality: stabbing    Pain radiates to:  RLQ  Pain severity:  Moderate  Onset quality:  Gradual  Duration:  1 day  Timing:  Constant  Progression:  Worsening  Chronicity:  New  Context comment:  2 weeks or urinary urgency  Relieved by:  Nothing  Worsened by:  Nothing  Associated symptoms: nausea (when pain is severe)    Associated symptoms: no anorexia, no belching, no chest pain, no chills, no constipation, no cough, no diarrhea, no dysuria, no fatigue, no fever, no flatus, no hematuria, no melena, no shortness of breath, no vaginal bleeding, no vaginal discharge and no vomiting    Associated symptoms comment:  No saddle anesthesia, constipation, diarrhea, f/c,      Prior to Admission Medications   Prescriptions Last Dose Informant Patient Reported? Taking? FLUoxetine (PROzac) 40 MG capsule Unknown at Unknown time  No No   Sig: Take 2 capsules (80 mg total) by mouth daily   Levonorgest-Eth Estrad-Fe Bisg (BALCOLTRA) 0 1-20 MG-MCG(21) TABS   Yes No   Sig: Daily   clonazePAM (KlonoPIN) 0 5 mg tablet Unknown at Unknown time  No No   Sig: Take 1/2 to 1 tab twice daily as needed for anxiety   topiramate (TOPAMAX) 25 mg tablet Unknown at Unknown time  No No   Sig: TAKE 3 TABLETs NIGHTLY  AFTER 1 WEEK INCREASE TO 4 TABS NIGHTLY  Facility-Administered Medications: None       Past Medical History:   Diagnosis Date    Anxiety        Past Surgical History:   Procedure Laterality Date    RI LAP,CHOLECYSTECTOMY N/A 9/6/2018    Procedure: CHOLECYSTECTOMY LAPAROSCOPIC W/ ROBOTICS;  Surgeon: Shreya Noble MD;  Location: [facility];  Service: General    TUBAL LIGATION         History reviewed   No pertinent family history  I have reviewed and agree with the history as documented  Social History   Substance Use Topics    Smoking status: Never Smoker    Smokeless tobacco: Never Used    Alcohol use Yes      Comment: occasional         Review of Systems   Constitutional: Negative for appetite change, chills, diaphoresis, fatigue and fever  HENT: Negative for congestion, dental problem and rhinorrhea  Eyes: Negative for pain  Respiratory: Negative for cough, chest tightness and shortness of breath  Cardiovascular: Negative for chest pain and leg swelling  Gastrointestinal: Positive for nausea (when pain is severe)  Negative for abdominal pain, anorexia, blood in stool, constipation, diarrhea, flatus, melena and vomiting  Endocrine: Negative for polydipsia, polyphagia and polyuria  Genitourinary: Positive for flank pain and urgency  Negative for difficulty urinating, dyspareunia, dysuria, enuresis, frequency, hematuria, pelvic pain, vaginal bleeding and vaginal discharge  Musculoskeletal: Negative for arthralgias, back pain and myalgias  Skin: Negative for color change and rash  Neurological: Negative for dizziness, weakness, light-headedness and headaches  Psychiatric/Behavioral: Negative for hallucinations and suicidal ideas  Physical Exam  Physical Exam   Constitutional: She is oriented to person, place, and time  She appears well-developed and well-nourished  HENT:   Mouth/Throat: No oropharyngeal exudate  TMs normal bilaterally no pharyngeal erythema no rhinorrhea nontender palpation of sinuses, normal looking turbinates   Eyes: Conjunctivae and EOM are normal    Neck: Normal range of motion  Neck supple  No meningeal signs   Cardiovascular: Normal rate, regular rhythm, normal heart sounds and intact distal pulses  Pulmonary/Chest: Effort normal and breath sounds normal  No respiratory distress  She has no wheezes  She has no rales  She exhibits no tenderness  Abdominal: Soft  Bowel sounds are normal  She exhibits no distension and no mass  There is no tenderness  No hernia  Under palpation over thoracic and lumbar spines  Tenderness palpation over the right CVA and right paravertebral musculature in the lumbar region without hypertonicity, neurovascular intact bilateral lower extremities   Musculoskeletal: Normal range of motion  She exhibits no edema  Lymphadenopathy:     She has no cervical adenopathy  Neurological: She is alert and oriented to person, place, and time  No cranial nerve deficit  Skin: No rash noted  No erythema  No edema   Psychiatric: She has a normal mood and affect  Her behavior is normal    Nursing note and vitals reviewed        Vital Signs  ED Triage Vitals [11/29/18 2006]   Temperature Pulse Respirations Blood Pressure SpO2   98 °F (36 7 °C) 66 16 105/54 99 %      Temp Source Heart Rate Source Patient Position - Orthostatic VS BP Location FiO2 (%)   Oral Monitor Sitting Right arm --      Pain Score       Worst Possible Pain           Vitals:    11/29/18 2006 11/29/18 2221   BP: 105/54 110/78   Pulse: 66 70   Patient Position - Orthostatic VS: Sitting Sitting       Visual Acuity      ED Medications  Medications   ketorolac (TORADOL) injection 15 mg (15 mg Intramuscular Given 11/29/18 2100)   ondansetron (ZOFRAN-ODT) dispersible tablet 4 mg (4 mg Oral Given 11/29/18 2100)       Diagnostic Studies  Results Reviewed     Procedure Component Value Units Date/Time    Urine Microscopic [[id number]]  (Abnormal) Collected:  11/29/18 2036    Lab Status:  Final result Specimen:  Urine from Urine, Clean Catch Updated:  11/29/18 2126     RBC, UA 10-20 (A) /hpf      WBC, UA None Seen /hpf      Epithelial Cells Occasional /hpf      Bacteria, UA Occasional /hpf     POCT pregnancy, urine [[id number]]  (Normal) Resulted:  11/29/18 2028    Lab Status:  Final result Updated:  11/29/18 2028     EXT PREG TEST UR (Ref: Negative) negative    ED Urine Macroscopic [[id number]] (Abnormal) Collected:  11/29/18 2036    Lab Status:  Final result Specimen:  Urine Updated:  11/29/18 2022     Color, UA Yellow     Clarity, UA Clear     pH, UA 6 0     Leukocytes, UA Negative     Nitrite, UA Negative     Protein, UA Negative mg/dl      Glucose, UA Negative mg/dl      Ketones, UA Trace (A) mg/dl      Urobilinogen, UA 1 0 E U /dl      Bilirubin, UA Negative     Blood, UA Moderate (A)     Specific [place], UA 1 025    Narrative:       CLINITEK RESULT                 CT renal stone study abdomen pelvis without contrast   ED Interpretation by Claudia Walker MD (11/29 2215)   1   5 mm right-sided collecting system stone is either at the right UVJ or within the bladder   Right renal pelvis and ureter are slightly more prominent than the left, though there is no bryan hydronephrosis  2   Single residual punctate nephrolith demonstrated at the right mid kidney   No left-sided urolithiasis demonstrated  Final Result by Jm Zabala MD (11/29 2140)      1   5 mm right-sided collecting system stone is either at the right UVJ or within the bladder  Right renal pelvis and ureter are slightly more prominent than the left, though there is no bryan hydronephrosis  2   Single residual punctate nephrolith demonstrated at the right mid kidney  No left-sided urolithiasis demonstrated  Workstation performed: [id number]                    Procedures  Procedures       Phone Contacts  ED Phone Contact    ED Course  ED Course as of Nov 29 2229   Thu Nov 29, 2018 2215 Patient with ureteric colic secondary to renal stone  Will put patient on oral Toradol, Zofran, Flomax, Urology follow-up  Return precautions given and understood                                  MDM  Number of Diagnoses or Management Options  Diagnosis management comments: R flank pain and urinary complaints- will do urine dip, ct stone study    CritCare Time    Disposition  Final diagnoses:   Ureteric colic   Kidney stone Time reflects when diagnosis was documented in both MDM as applicable and the Disposition within this note     Time User Action Codes Description Comment    11/29/2018 10:20 PM Aydeeche Malone Add [L88] Ureteric colic     10/33/3928 24:18 PM AparnaImeldatobi Add [N20 0] Kidney stone       ED Disposition     ED Disposition Condition Comment    Discharge  Jl Yanez discharge to home/self care  Condition at discharge: Good        Follow-up Information     Follow up With Specialties Details Why [address] Urology [facility] Schedule an appointment as soon as possible for a visit in 2 days  [place] [phone]  701  [facility], [address] [place] [address], [phone]          Patient's Medications   Discharge Prescriptions    KETOROLAC (TORADOL) 10 MG TABLET    Take 1 tablet (10 mg total) by mouth every 6 (six) hours as needed for moderate pain       Start Date: 11/29/2018End Date: --       Order Dose: 10 mg       Quantity: 20 tablet    Refills: 0    ONDANSETRON (ZOFRAN) 4 MG TABLET    Take 1 tablet (4 mg total) by mouth every 6 (six) hours for 7 doses       Start Date: 11/29/2018End Date: 12/1/2018       Order Dose: 4 mg       Quantity: 7 tablet    Refills: 0    TAMSULOSIN (FLOMAX) 0 4 MG    Take 1 capsule (0 4 mg total) by mouth daily with dinner       Start Date: 11/29/2018End Date: --       Order Dose: 0 4 mg       Quantity: 5 capsule    Refills: 0     No discharge procedures on file      ED Provider  Electronically Signed by           Betsy Moore MD  11/29/18 4166

## 2018-11-30 NOTE — DISCHARGE INSTRUCTIONS
Kidney Stones   WHAT YOU NEED TO KNOW:   Kidney stones form in the urinary system when the water and waste in your urine are out of balance  When this happens, certain types of waste crystals separate from the urine  The crystals build up and form kidney stones  You may have 1 or more kidney stones  DISCHARGE INSTRUCTIONS:   Return to the emergency department if:   · You have vomiting that is not relieved by medicine  Contact your healthcare provider if:   · You have a fever  · You have trouble passing urine  · You see blood in your urine  · You have severe pain  · You have any questions or concerns about your condition or care  Medicines:   · NSAIDs , such as ibuprofen, help decrease swelling, pain, and fever  This medicine is available with or without a doctor's order  NSAIDs can cause stomach bleeding or kidney problems in certain people  If you take blood thinner medicine, always ask your healthcare provider if NSAIDs are safe for you  Always read the medicine label and follow directions  · Prescription medicine  may be given  Ask how to take this medicine safely  · Medicines  to balance your electrolytes may be needed  · Take your medicine as directed  Contact your healthcare provider if you think your medicine is not helping or if you have side effects  Tell him or her if you are allergic to any medicine  Keep a list of the medicines, vitamins, and herbs you take  Include the amounts, and when and why you take them  Bring the list or the pill bottles to follow-up visits  Carry your medicine list with you in case of an emergency  Follow up with your healthcare provider as directed: You may need to return for more tests  Write down your questions so you remember to ask them during your visits  Self-care:   · Drink plenty of liquids  Your healthcare provider may tell you to drink at least 8 to 12 (eight-ounce) cups of liquids each day   This helps flush out the kidney stones when you urinate  Water is the best liquid to drink  · Strain your urine every time you go to the bathroom  Urinate through a strainer or a piece of thin cloth to catch the stones  Take the stones to your healthcare provider so they can be sent to the lab for tests  This will help your healthcare providers plan the best treatment for you  · Eat a variety of healthy foods  Healthy foods include fruits, vegetables, whole-grain breads, low-fat dairy products, beans, and fish  You may need to limit how much sodium (salt) or protein you eat  Ask for information about the best foods for you  · Stay active  Your stones may pass more easily by if you stay active  Ask about the best activities for you  After you pass your kidney stones:  Once you have passed your kidney stones, your healthcare provider may  order a 24-hour urine test  Results from a 24-hour urine test will help your healthcare provider plan ways to prevent more stones from forming  If you are told to do a 24-hour test, your healthcare provider will give you more instructions  © 2017 [facility] Information is for End User's use only and may not be sold, redistributed or otherwise used for commercial purposes  All illustrations and images included in CareNotes® are the copyrighted property of A D A M , Inc  or [employer] Hans[employer]  The above information is an  only  It is not intended as medical advice for individual conditions or treatments  Talk to your doctor, nurse or pharmacist before following any medical regimen to see if it is safe and effective for you  Renal Colic   WHAT YOU NEED TO KNOW:   Renal colic is severe pain in your lower back or sides  The pain is usually on one side, but may be on both sides of your lower back  Renal colic may start quickly, come and go, and become worse over time  Renal colic is caused by a blockage in your urinary tract   The most common cause of a blockage is a kidney stone  Blood clots, ureter spasms, and dead tissue may also block your urinary tract  DISCHARGE INSTRUCTIONS:   Return to the emergency department if:   · You cannot stop vomiting  · You see new or increased bleeding when you urinate  · You are urinating less than usual, or not at all  · Your pain is not getting better even after treatment  Contact your healthcare provider if:   · You have fever  · You need to urinate more often than usual, or right away  · You see a stone in your urine strainer after you urinate  · You have questions or concerns about your condition or care  Medicines:   · Medicines  may help decrease pain and muscle spasms  You may also need medicine to calm your stomach and stop vomiting  · Take your medicine as directed  Contact your healthcare provider if you think your medicine is not helping or if you have side effects  Tell him of her if you are allergic to any medicine  Keep a list of the medicines, vitamins, and herbs you take  Include the amounts, and when and why you take them  Bring the list or the pill bottles to follow-up visits  Carry your medicine list with you in case of an emergency  Manage your symptoms:   · Drink liquids as directed  to help decrease pain and flush blockages from your urinary tract  Ask how much liquid to drink each day and which liquids are best for you  You may need to drink about 3 liters (12 glasses) of liquids each day  Half of your total daily liquids should be water  Limit coffee, tea, and soda to 2 cups daily  Your urine should be pale and clear  · Strain your urine every time you urinate  Urinate into a strainer (funnel with a fine mesh on the bottom) or glass jar to collect kidney stones  Give the kidney stones to your healthcare provider at your next visit  · Eat a variety of healthy foods    Healthy foods include fruits, vegetables, whole-grain breads, low-fat dairy products, beans, lean meats, and fish  You may need to increase the amount of citrus fruit you eat, such as oranges  Ask your healthcare provider how much salt, calcium, and protein you should eat  · Avoid activity in hot temperatures  Heat may cause you to become dehydrated and urinate less  Follow up with your healthcare provider as directed: You may need to return for tests to check if your blockage has cleared  Write down your questions so you remember to ask them during your visits  © 2017 [address] [facility] Information is for End User's use only and may not be sold, redistributed or otherwise used for commercial purposes  All illustrations and images included in CareNotes® are the copyrighted property of A D A M , Inc  or Omari Santo  The above information is an  only  It is not intended as medical advice for individual conditions or treatments  Talk to your doctor, nurse or pharmacist before following any medical regimen to see if it is safe and effective for you

## 2018-12-01 DIAGNOSIS — F41.1 GAD (GENERALIZED ANXIETY DISORDER): ICD-10-CM

## 2018-12-01 DIAGNOSIS — F40.10 SOCIAL PHOBIA: ICD-10-CM

## 2018-12-01 DIAGNOSIS — F33.2 MAJOR DEPRESSIVE DISORDER, RECURRENT, SEVERE W/O PSYCHOTIC BEHAVIOR (HCC): ICD-10-CM

## 2018-12-01 RX ORDER — FLUOXETINE HYDROCHLORIDE 40 MG/1
80 CAPSULE ORAL DAILY
Qty: 60 CAPSULE | Refills: 1 | Status: SHIPPED | OUTPATIENT
Start: 2018-12-01 | End: 2018-12-03 | Stop reason: SDUPTHER

## 2018-12-03 ENCOUNTER — APPOINTMENT (OUTPATIENT)
Dept: LAB | Facility: HOSPITAL | Age: 33
End: 2018-12-03
Attending: PSYCHIATRY & NEUROLOGY
Payer: COMMERCIAL

## 2018-12-03 ENCOUNTER — OFFICE VISIT (OUTPATIENT)
Dept: PSYCHIATRY | Facility: CLINIC | Age: 33
End: 2018-12-03
Payer: COMMERCIAL

## 2018-12-03 VITALS — BODY MASS INDEX: 34.02 KG/M2 | WEIGHT: 214 LBS

## 2018-12-03 DIAGNOSIS — F33.2 MAJOR DEPRESSIVE DISORDER, RECURRENT, SEVERE W/O PSYCHOTIC BEHAVIOR (HCC): ICD-10-CM

## 2018-12-03 DIAGNOSIS — E55.9 VITAMIN D DEFICIENCY: ICD-10-CM

## 2018-12-03 DIAGNOSIS — F33.2 MAJOR DEPRESSIVE DISORDER, RECURRENT, SEVERE W/O PSYCHOTIC BEHAVIOR (HCC): Primary | ICD-10-CM

## 2018-12-03 DIAGNOSIS — F41.1 GAD (GENERALIZED ANXIETY DISORDER): ICD-10-CM

## 2018-12-03 DIAGNOSIS — F40.10 SOCIAL PHOBIA: ICD-10-CM

## 2018-12-03 DIAGNOSIS — E03.9 HYPOTHYROIDISM, UNSPECIFIED TYPE: ICD-10-CM

## 2018-12-03 DIAGNOSIS — R53.83 FATIGUE, UNSPECIFIED TYPE: ICD-10-CM

## 2018-12-03 LAB
25(OH)D3 SERPL-MCNC: 11.8 NG/ML (ref 30–100)
T4 FREE SERPL-MCNC: 0.82 NG/DL (ref 0.76–1.46)
TSH SERPL DL<=0.05 MIU/L-ACNC: 3.96 UIU/ML (ref 0.36–3.74)

## 2018-12-03 PROCEDURE — 84443 ASSAY THYROID STIM HORMONE: CPT

## 2018-12-03 PROCEDURE — 82306 VITAMIN D 25 HYDROXY: CPT

## 2018-12-03 PROCEDURE — 36415 COLL VENOUS BLD VENIPUNCTURE: CPT

## 2018-12-03 PROCEDURE — 99214 OFFICE O/P EST MOD 30 MIN: CPT | Performed by: PSYCHIATRY & NEUROLOGY

## 2018-12-03 PROCEDURE — 84439 ASSAY OF FREE THYROXINE: CPT

## 2018-12-03 RX ORDER — TOPIRAMATE 100 MG/1
100 TABLET, FILM COATED ORAL
Qty: 30 TABLET | Refills: 1 | Status: SHIPPED | OUTPATIENT
Start: 2018-12-03 | End: 2019-01-22 | Stop reason: SDUPTHER

## 2018-12-03 RX ORDER — FLUOXETINE HYDROCHLORIDE 40 MG/1
80 CAPSULE ORAL DAILY
Qty: 60 CAPSULE | Refills: 1 | Status: SHIPPED | OUTPATIENT
Start: 2018-12-03 | End: 2019-02-05

## 2018-12-03 RX ORDER — CLONAZEPAM 0.5 MG/1
TABLET ORAL
Qty: 60 TABLET | Refills: 1 | Status: SHIPPED | OUTPATIENT
Start: 2018-12-03 | End: 2019-02-05 | Stop reason: SDUPTHER

## 2018-12-03 RX ORDER — NORTRIPTYLINE HYDROCHLORIDE 25 MG/1
CAPSULE ORAL
Qty: 60 CAPSULE | Refills: 2 | Status: SHIPPED | OUTPATIENT
Start: 2018-12-03 | End: 2019-02-05 | Stop reason: SDUPTHER
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
## 2018-12-04 ENCOUNTER — TELEPHONE (OUTPATIENT)
Dept: PSYCHIATRY | Facility: CLINIC | Age: 33
End: 2018-12-04

## 2018-12-04 DIAGNOSIS — E55.9 VITAMIN D DEFICIENCY: Primary | ICD-10-CM

## 2018-12-04 RX ORDER — ERGOCALCIFEROL 1.25 MG/1
50000 CAPSULE ORAL WEEKLY
Qty: 4 CAPSULE | Refills: 1 | Status: SHIPPED | OUTPATIENT
Start: 2018-12-04 | End: 2019-02-01 | Stop reason: SDUPTHER

## 2018-12-04 NOTE — TELEPHONE ENCOUNTER
Claudetta Morales asking if Dr Shweta Rashid could prescribe Vitamin D supplement through insurance and she can  at pharmacy  She stated that she has taken Vit  D in past but it was always by prescription

## 2018-12-04 NOTE — TELEPHONE ENCOUNTER
Called Desire and waiting for return call to discuss Vitamin D level lab results and intervention per Dr Karen Hines

## 2018-12-05 NOTE — TELEPHONE ENCOUNTER
Called Desire and left VM informing RX sent to pharmacy for Vitamin D and instructions if her insurance does not cover

## 2018-12-07 ENCOUNTER — TELEPHONE (OUTPATIENT)
Dept: PSYCHIATRY | Facility: CLINIC | Age: 33
End: 2018-12-07

## 2018-12-07 ENCOUNTER — OFFICE VISIT (OUTPATIENT)
Dept: BEHAVIORAL/MENTAL HEALTH CLINIC | Facility: CLINIC | Age: 33
End: 2018-12-07
Payer: COMMERCIAL

## 2018-12-07 DIAGNOSIS — F41.1 GAD (GENERALIZED ANXIETY DISORDER): ICD-10-CM

## 2018-12-07 DIAGNOSIS — F33.2 MAJOR DEPRESSIVE DISORDER, RECURRENT, SEVERE W/O PSYCHOTIC BEHAVIOR (HCC): Primary | ICD-10-CM

## 2018-12-07 DIAGNOSIS — F40.10 SOCIAL PHOBIA: ICD-10-CM

## 2018-12-07 PROCEDURE — 90834 PSYTX W PT 45 MINUTES: CPT | Performed by: SOCIAL WORKER

## 2018-12-07 NOTE — PSYCH
Psychotherapy Provided: Individual Psychotherapy 45 minutes     Length of time in session: 45 minutes    Goals addressed in session: Goal 1, Goal 2 and Goal 3      Pain:      none    0    Current suicide risk : Low         Behavioral Health Treatment Plan [facility]: Diagnosis and Treatment Plan explained to Chris Brown relates understanding diagnosis and is agreeable to Treatment Plan  Yes     D: Lynette jones spoke about things that she is carrying around with her and that she doesn't like to create drama  She shared that she does not want her mother interfering with her children and needs to set some boundaries with her  Lynetet jones was tearful at times as she indicated that she doesn't feel like her mother respects her boundaries as a mother and interferes with her children  It appears that the attention and love that her mother gives to her children is something that she herself didn't get  She also disclosed about her current relationship and how she accomodates her partner and her ex- because she doesn't want conflict  A: Pt appeared, angry, hurt, and frustrated  She was tearful and able to open up to things she has been holding onto  P: Lynette jones will think about setting boundaries with her mother  Intervention techniques: reflective listening, demonstration, observation, exploration and feedback       RTO: 1/21 at 3:00Pm

## 2019-01-16 ENCOUNTER — HOSPITAL ENCOUNTER (EMERGENCY)
Facility: HOSPITAL | Age: 34
Discharge: HOME | End: 2019-01-16
Attending: EMERGENCY MEDICINE | Admitting: EMERGENCY MEDICINE
Payer: COMMERCIAL

## 2019-01-16 VITALS
HEART RATE: 105 BPM | DIASTOLIC BLOOD PRESSURE: 82 MMHG | TEMPERATURE: 98 F | OXYGEN SATURATION: 100 % | BODY MASS INDEX: 33.64 KG/M2 | SYSTOLIC BLOOD PRESSURE: 140 MMHG | RESPIRATION RATE: 20 BRPM | WEIGHT: 211.6 LBS

## 2019-01-16 DIAGNOSIS — F33.2 MAJOR DEPRESSIVE DISORDER, RECURRENT, SEVERE W/O PSYCHOTIC BEHAVIOR (HCC): ICD-10-CM

## 2019-01-16 DIAGNOSIS — F32.A DEPRESSION: Primary | ICD-10-CM

## 2019-01-16 DIAGNOSIS — F41.1 GAD (GENERALIZED ANXIETY DISORDER): ICD-10-CM

## 2019-01-16 LAB
AMPHETAMINES SERPL QL SCN: NEGATIVE
BACTERIA UR QL AUTO: ABNORMAL /HPF
BARBITURATES UR QL: NEGATIVE
BENZODIAZ UR QL: NEGATIVE
BILIRUB UR QL STRIP: NEGATIVE
CLARITY UR: CLEAR
COCAINE UR QL: NEGATIVE
COLOR UR: YELLOW
ETHANOL EXG-MCNC: 0 MG/DL
EXT PREG TEST URINE: NEGATIVE
GLUCOSE UR STRIP-MCNC: NEGATIVE MG/DL
HGB UR QL STRIP.AUTO: NEGATIVE
KETONES UR STRIP-MCNC: NEGATIVE MG/DL
LEUKOCYTE ESTERASE UR QL STRIP: ABNORMAL
METHADONE UR QL: NEGATIVE
NITRITE UR QL STRIP: NEGATIVE
NON-SQ EPI CELLS URNS QL MICRO: ABNORMAL /HPF
OPIATES UR QL SCN: NEGATIVE
PCP UR QL: NEGATIVE
PH UR STRIP.AUTO: 5.5 [PH] (ref 4.5–8)
PROT UR STRIP-MCNC: NEGATIVE MG/DL
RBC #/AREA URNS AUTO: ABNORMAL /HPF
SP GR UR STRIP.AUTO: 1.01 (ref 1–1.03)
THC UR QL: NEGATIVE
UROBILINOGEN UR QL STRIP.AUTO: 0.2 E.U./DL
WBC #/AREA URNS AUTO: ABNORMAL /HPF

## 2019-01-16 PROCEDURE — 80307 DRUG TEST PRSMV CHEM ANLYZR: CPT | Performed by: EMERGENCY MEDICINE

## 2019-01-16 PROCEDURE — 82075 ASSAY OF BREATH ETHANOL: CPT | Performed by: EMERGENCY MEDICINE

## 2019-01-16 PROCEDURE — 81001 URINALYSIS AUTO W/SCOPE: CPT

## 2019-01-16 PROCEDURE — 81025 URINE PREGNANCY TEST: CPT | Performed by: EMERGENCY MEDICINE

## 2019-01-16 PROCEDURE — 99284 EMERGENCY DEPT VISIT MOD MDM: CPT

## 2019-01-16 NOTE — DISCHARGE INSTRUCTIONS
Depression   WHAT YOU NEED TO KNOW:   Depression is a medical condition that causes feelings of sadness or hopelessness that do not go away  Depression may cause you to lose interest in things you used to enjoy  These feelings may interfere with your daily life  DISCHARGE INSTRUCTIONS:   Call 911 for any of the following:   · You think about harming yourself or someone else  Contact your healthcare provider if:   · Your symptoms do not improve  · You cannot make it to your next appointment  · You have new symptoms  · You have questions or concerns about your condition or care  Medicines:   · Antidepressants  may be given to improve or balance your mood  You may need to take this medicine for several weeks before you begin to feel better  · Take your medicine as directed  Contact your healthcare provider if you think your medicine is not helping or if you have side effects  Tell him of her if you are allergic to any medicine  Keep a list of the medicines, vitamins, and herbs you take  Include the amounts, and when and why you take them  Bring the list or the pill bottles to follow-up visits  Carry your medicine list with you in case of an emergency  Therapy  may be used to treat your depression  A therapist will help you learn to cope with your thoughts and feelings  This can be done alone or in a group  It may also be done with family members or a significant other  Self-care:   · Get regular physical activity  Try to exercise for 30 minutes, 3 to 5 days a week  Work with your healthcare provider to develop an exercise plan that you enjoy  Physical activity may improve your symptoms  · Get enough sleep  Create a routine to help you relax before bed  You can listen to music, read, or do yoga  Try to go to bed and wake up at the same time every day  Sleep is important for emotional health  · Eat a variety of healthy foods from all of the food groups    A healthy meal plan is low in fat, salt, and added sugar  Ask your healthcare provider for more information about a meal plan that is right for you  · Do not drink alcohol or use drugs  Alcohol and drugs can make your symptoms worse  Follow up with your healthcare provider as directed: Your healthcare provider will monitor your progress at follow-up visits  He or she will also monitor your medicine if you take antidepressants  Your healthcare provider will ask if the medicine is helping  Tell him or her about any side effects or problems you may have with your medicine  The type or amount of medicine may need to be changed  Write down your questions so you remember to ask them during your visits  © 2017 [address] [employer] Information is for End User's use only and may not be sold, redistributed or otherwise used for commercial purposes  All illustrations and images included in CareNotes® are the copyrighted property of [employer]  or Omari Santo  The above information is an  only  It is not intended as medical advice for individual conditions or treatments  Talk to your doctor, nurse or pharmacist before following any medical regimen to see if it is safe and effective for you
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
## 2019-01-16 NOTE — ED PROVIDER NOTES
History  Chief Complaint   Patient presents with    Anxiety     Pt reports "high anxiety" states history of same "I'm gonna have a nervous breakdown today", superficial cuts to L inner wrist, dnies SI, but states "I don't wanna be here, but intentionally killing myself, no", denies HI/AH/VH  27-year-old female presenting to the emergency department for evaluation of anxiety and depression worsening over the last several days  She reports that 3 days ago she started cutting herself for the 1st time  She says she was doing this to Elly how he feels and , but not with any intent to kill herself  When asked about suicide she states I do not want to be here anymore, but I do not want to kill myself"  She reports that she has been sleeping more than normal, she has decreased appetite, and is experiencing anhedonia  She denies any confusion, difficulty concentrating, auditory or visual hallucinations suicidality, or homicidality  She reports that her symptoms are not as bad when she is work and focused on what she does at work but when she goes home she has been struggling  She says that anxiety and depression have recently worsened by arguments she has been having with her boyfriend  She is established with psychiatric care as an outpatient takes multiple antianxiety and antidepressant medications  She was recently started on nortriptyline has been tolerating it well  Her next appointment with her psychiatrist presented 5 days  She says today that she needs to take her therapy as step further  No history of suicide attempts or psychiatric hospitalization in the past   No history of lauryn  Unknown family history of psychiatric disorders she is not close with any of her family  No access to guns at home  Prior to Admission Medications   Prescriptions Last Dose Informant Patient Reported? Taking?    FLUoxetine (PROzac) 40 MG capsule   No Yes   Sig: Take 2 capsules (80 mg total) by mouth daily clonazePAM (KlonoPIN) 0 5 mg tablet   No Yes   Sig: Take 1/2 to 1 tab twice daily as needed for anxiety   ergocalciferol (VITAMIN D2) 50,000 units   No No   Sig: Take 1 capsule (50,000 Units total) by mouth once a week   nortriptyline (PAMELOR) 25 mg capsule   No Yes   Sig: Take 1 pill nightly  After 1 week may increase to 2 pills nightly  topiramate (TOPAMAX) 100 mg tablet   No Yes   Sig: Take 1 tablet (100 mg total) by mouth daily at bedtime      Facility-Administered Medications: None       Past Medical History:   Diagnosis Date    Anxiety     Psychiatric disorder     depression       Past Surgical History:   Procedure Laterality Date    NH LAP,CHOLECYSTECTOMY N/A 9/6/2018    Procedure: CHOLECYSTECTOMY LAPAROSCOPIC W/ ROBOTICS;  Surgeon: Momo Chacon MD;  Location: [facility];  Service: General    TUBAL LIGATION         History reviewed  No pertinent family history  I have reviewed and agree with the history as documented  Social History   Substance Use Topics    Smoking status: Never Smoker    Smokeless tobacco: Never Used    Alcohol use Yes      Comment: occasional         Review of Systems   Constitutional: Positive for activity change and appetite change  Psychiatric/Behavioral: Positive for dysphoric mood, self-injury and sleep disturbance  Negative for confusion, decreased concentration, hallucinations and suicidal ideas  The patient is nervous/anxious  The patient is not hyperactive  All other systems reviewed and are negative        Physical Exam  ED Triage Vitals [01/16/19 1559]   Temperature Pulse Respirations Blood Pressure SpO2   98 °F (36 7 °C) 105 20 140/82 100 %      Temp Source Heart Rate Source Patient Position - Orthostatic VS BP Location FiO2 (%)   Oral Monitor Sitting Right arm --      Pain Score       No Pain           Orthostatic Vital Signs  Vitals:    01/16/19 1559   BP: 140/82   Pulse: 105   Patient Position - Orthostatic VS: Sitting       Physical Exam Constitutional: She is oriented to person, place, and time  She appears well-developed and well-nourished  No distress  HENT:   Head: Normocephalic and atraumatic  Eyes: Pupils are equal, round, and reactive to light  EOM are normal    Neck: Normal range of motion  Neck supple  Cardiovascular: Normal rate, regular rhythm, normal heart sounds and intact distal pulses  Pulmonary/Chest: Effort normal  No respiratory distress  Abdominal: Soft  There is no tenderness  Musculoskeletal: Normal range of motion  She exhibits no edema  Neurological: She is alert and oriented to person, place, and time  No cranial nerve deficit  Coordination normal    Skin: Skin is warm and dry  Capillary refill takes less than 2 seconds  Psychiatric: Her speech is normal  Judgment normal  Her affect is blunt  She is slowed and withdrawn  She is not agitated, not aggressive and not actively hallucinating  Cognition and memory are normal  She exhibits a depressed mood  She expresses no homicidal ideation  She expresses no suicidal plans and no homicidal plans  She is attentive  Nursing note and vitals reviewed  ED Medications  Medications - No data to display    Diagnostic Studies  Results Reviewed     Procedure Component Value Units Date/Time    Urine Microscopic [[id number]]  (Abnormal) Collected:  01/16/19 1649    Lab Status:  Final result Specimen:  Urine Updated:  01/16/19 1735     RBC, UA None Seen /hpf      WBC, UA 2-4 (A) /hpf      Epithelial Cells Occasional /hpf      Bacteria, UA None Seen /hpf     Rapid drug screen, urine [[id number]]  (Normal) Collected:  01/16/19 1714    Lab Status:  Final result Specimen:  Urine Updated:  01/16/19 1729     Amph/Meth UR Negative     Barbiturate Ur Negative     Benzodiazepine Urine Negative     Cocaine Urine Negative     Methadone Urine Negative     Opiate Urine Negative     PCP Ur Negative     THC Urine Negative    Narrative:         FOR MEDICAL PURPOSES ONLY     IF CONFIRMATION NEEDED PLEASE CONTACT THE LAB WITHIN 5 DAYS  Drug Screen Cutoff Levels:  AMPHETAMINE/METHAMPHETAMINES  1000 ng/mL  BARBITURATES     200 ng/mL  BENZODIAZEPINES     200 ng/mL  COCAINE      300 ng/mL  METHADONE      300 ng/mL  OPIATES      300 ng/mL  PHENCYCLIDINE     25 ng/mL  THC       50 ng/mL    POCT pregnancy, urine [[id number]]  (Normal) Resulted:  01/16/19 1635    Lab Status:  Final result Updated:  01/16/19 1635     EXT PREG TEST UR (Ref: Negative) Negative    ED Urine Macroscopic [[id number]]  (Abnormal) Collected:  01/16/19 1649    Lab Status:  Final result Specimen:  Urine Updated:  01/16/19 1632     Color, UA Yellow     Clarity, UA Clear     pH, UA 5 5     Leukocytes, UA Small (A)     Nitrite, UA Negative     Protein, UA Negative mg/dl      Glucose, UA Negative mg/dl      Ketones, UA Negative mg/dl      Urobilinogen, UA 0 2 E U /dl      Bilirubin, UA Negative     Blood, UA Negative     Specific Gravity, UA 1 010    Narrative:       CLINITEK RESULT    POCT alcohol breath test [[id number]]  (Normal) Resulted:  01/16/19 1622    Lab Status:  Final result Updated:  01/16/19 1623     EXTBreath Alcohol 0 00                 No orders to display         Procedures  Procedures      Phone Consults  ED Phone Contact    ED Course  ED Course as of Jan 17 0139 Wed Jan 16, 2019   1630 Impression/plan:  Acute worsening of chronic anxiety and depression  Patient is receiving significant medical therapy already  She would likely benefit from intensive outpatient program verses inpatient psych  Will consult crisis for patient to discuss her options  No grounds for 302 at this time  1654 Patient seen in evaluated by the crisis worker  They discussed options for her and she told him she did not want inpatient psychiatry  She would prefer to do partial program which she understands she can set up through her psychiatrist's office  She plans to call him tomorrow morning  MDM  CritCare Time    Disposition  Final diagnoses:   Depression   Major depressive disorder, recurrent, severe w/o psychotic behavior ([facility] [address] )   JG (generalized anxiety disorder)     Time reflects when diagnosis was documented in both MDM as applicable and the Disposition within this note     Time User Action Codes Description Comment    1/16/2019  4:51 PM Baljinder Stafford [F32 9] Depression     1/16/2019  4:51 PM Abhi, [address] [F33 2] Major depressive disorder, recurrent, severe w/o psychotic behavior ([facility] [address] )     1/16/2019  4:51 PM Abhi, Alexander Mahajan Add [F41 1] JG (generalized anxiety disorder)       ED Disposition     ED Disposition Condition Comment    Discharge  Matthew Castaneda discharge to home/self care  Condition at discharge: Good        MD Documentation      Most Recent Value   Sending MD Dr Riley Reason up With Specialties Details Why Contact Info Additional Sharda 95 III, DO Psychiatry, [address] Call in 1 day  [address] [phone]       formerly [facility] Emergency Department Emergency Medicine  If symptoms worsen [address] [facility]  [phone] [place] ED, [address], [place], [address]          Discharge Medication List as of 1/16/2019  4:54 PM      CONTINUE these medications which have NOT CHANGED    Details   clonazePAM (KlonoPIN) 0 5 mg tablet Take 1/2 to 1 tab twice daily as needed for anxiety, Normal      FLUoxetine (PROzac) 40 MG capsule Take 2 capsules (80 mg total) by mouth daily, Starting Mon 12/3/2018, Normal      nortriptyline (PAMELOR) 25 mg capsule Take 1 pill nightly   After 1 week may increase to 2 pills nightly , Normal      topiramate (TOPAMAX) 100 mg tablet Take 1 tablet (100 mg total) by mouth daily at bedtime, Starting Mon 12/3/2018, Normal      ergocalciferol (VITAMIN D2) 50,000 units Take 1 capsule (50,000 Units total) by mouth once a week, Starting Tue 12/4/2018, Normal           No discharge procedures on file  ED Provider  Attending physically available and evaluated Kain Jerome I managed the patient along with the ED Attending      Electronically Signed by         Kirk Cool MD  01/17/19 2091

## 2019-01-16 NOTE — ED ATTENDING ATTESTATION
Dia Ortiz MD, saw and evaluated the patient  I have discussed the patient with the resident/non-physician practitioner and agree with the resident's/non-physician practitioner's findings, Plan of Care, and MDM as documented in the resident's/non-physician practitioner's note, except where noted  All available labs and Radiology studies were reviewed  At this point I agree with the current assessment done in the Emergency Department  I have conducted an independent evaluation of this patient a history and physical is as follows:  34 yo female with h/o anxiety/depression, c/o increasing anxiety, associated with cutting behaviors which she admits is distressing and a new behavior for her  She cites relationship issue as the main trigger  She has not previously required psychiatric admission  She denies SI/HI, although some suggestion of passive morbid thoughts  She would like to consider voluntary admission  VS notable for mild tachycardia  No focal findings on physical exam   Crisis consult, for placement based on availability          Critical Care Time  CritCare Time    Procedures

## 2019-01-18 ENCOUNTER — DOCUMENTATION (OUTPATIENT)
Dept: BEHAVIORAL/MENTAL HEALTH CLINIC | Facility: CLINIC | Age: 34
End: 2019-01-18

## 2019-01-18 ENCOUNTER — TELEPHONE (OUTPATIENT)
Dept: BEHAVIORAL/MENTAL HEALTH CLINIC | Facility: CLINIC | Age: 34
End: 2019-01-18

## 2019-01-18 NOTE — PROGRESS NOTES
Patient Calls     Gianna Sport routed conversation to You; Sparkle Lacey 25 minutes ago (94:67 AM)      Rice Jeans [phone]  [employer] 26 minutes ago (10:16 AM)      Gianna Sport 26 minutes ago (10:16 AM)         Patient could not get off work on 1/21 to make appointment         Documentation         RTO: Monday, January 28, 2019 at 4 pm

## 2019-01-22 DIAGNOSIS — F33.2 MAJOR DEPRESSIVE DISORDER, RECURRENT, SEVERE W/O PSYCHOTIC BEHAVIOR (HCC): ICD-10-CM

## 2019-01-22 DIAGNOSIS — F40.10 SOCIAL PHOBIA: ICD-10-CM

## 2019-01-22 DIAGNOSIS — F41.1 GAD (GENERALIZED ANXIETY DISORDER): ICD-10-CM

## 2019-01-22 RX ORDER — TOPIRAMATE 100 MG/1
100 TABLET, FILM COATED ORAL
Qty: 30 TABLET | Refills: 1 | Status: SHIPPED | OUTPATIENT
Start: 2019-01-22 | End: 2019-02-05

## 2019-01-25 ENCOUNTER — DOCUMENTATION (OUTPATIENT)
Dept: PSYCHIATRY | Facility: CLINIC | Age: 34
End: 2019-01-25

## 2019-01-28 ENCOUNTER — OFFICE VISIT (OUTPATIENT)
Dept: BEHAVIORAL/MENTAL HEALTH CLINIC | Facility: CLINIC | Age: 34
End: 2019-01-28
Payer: COMMERCIAL

## 2019-01-28 DIAGNOSIS — F40.10 SOCIAL PHOBIA: ICD-10-CM

## 2019-01-28 DIAGNOSIS — F33.2 MAJOR DEPRESSIVE DISORDER, RECURRENT, SEVERE W/O PSYCHOTIC BEHAVIOR (HCC): Primary | ICD-10-CM

## 2019-01-28 DIAGNOSIS — F41.1 GAD (GENERALIZED ANXIETY DISORDER): ICD-10-CM

## 2019-01-28 PROCEDURE — 90834 PSYTX W PT 45 MINUTES: CPT | Performed by: SOCIAL WORKER

## 2019-01-28 NOTE — PSYCH
Psychotherapy Provided: Individual Psychotherapy 45 minutes     Length of time in session: 45 minutes    Goals addressed in session: Goal 1, Goal 2 and Goal 3      Pain:      none    0    Current suicide risk : Low         Behavioral Health Treatment Plan [facility]: Diagnosis and Treatment Plan explained to Clovis Singer relates understanding diagnosis and is agreeable to Treatment Plan  Yes     D: Pt celebrated the holidays with her children and boyfriend and his son  Pt stated the holidays are not the same  She liked to decorate with her children for Xmas, Thanksgiving, and Halloween  However, they are older and are not interested in doing those things  She would also take her children to their sporting events  When she was growing up she was not allowed out of the apt  She spent the time in her room and had no friends  Pt likes to play with glitter because her mother did not let her play with glitter  She also does not want to be like her mother  When her children are not with her she spends time in bed  She wants give her children things, but she cannot always afford them  She feels bad when they feel bad  A: Pt appears to live the childhood she did not have via her children  She is trying not to model her mother's behavior  She questions if she does the right thing as a mother  She tends to feel guilty if her kids feel bad, but she does not let the feeling linger  P: Pt will work on recognizing behaviors she wants to change  She will do things she did not do as a child      Intervention techniques; questioning, conscious use of self, exploration, explanation, normalization, and encouragement    RTO: Monday, February 4, 2019 at 4 pm

## 2019-01-31 ENCOUNTER — DOCUMENTATION (OUTPATIENT)
Dept: PSYCHIATRY | Facility: CLINIC | Age: 34
End: 2019-01-31

## 2019-02-01 DIAGNOSIS — E55.9 VITAMIN D DEFICIENCY: ICD-10-CM

## 2019-02-01 RX ORDER — ERGOCALCIFEROL 1.25 MG/1
CAPSULE ORAL
Qty: 4 CAPSULE | Refills: 1 | Status: SHIPPED | OUTPATIENT
Start: 2019-02-01 | End: 2019-04-18 | Stop reason: SDUPTHER

## 2019-02-04 ENCOUNTER — OFFICE VISIT (OUTPATIENT)
Dept: BEHAVIORAL/MENTAL HEALTH CLINIC | Facility: CLINIC | Age: 34
End: 2019-02-04
Payer: COMMERCIAL

## 2019-02-04 DIAGNOSIS — F33.2 MAJOR DEPRESSIVE DISORDER, RECURRENT, SEVERE W/O PSYCHOTIC BEHAVIOR (HCC): Primary | ICD-10-CM

## 2019-02-04 DIAGNOSIS — F40.10 SOCIAL PHOBIA: ICD-10-CM

## 2019-02-04 DIAGNOSIS — F41.1 GAD (GENERALIZED ANXIETY DISORDER): ICD-10-CM

## 2019-02-04 PROCEDURE — 90834 PSYTX W PT 45 MINUTES: CPT | Performed by: SOCIAL WORKER
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
## 2019-02-05 ENCOUNTER — OFFICE VISIT (OUTPATIENT)
Dept: PSYCHIATRY | Facility: CLINIC | Age: 34
End: 2019-02-05
Payer: COMMERCIAL

## 2019-02-05 DIAGNOSIS — F41.1 GAD (GENERALIZED ANXIETY DISORDER): ICD-10-CM

## 2019-02-05 DIAGNOSIS — E03.9 HYPOTHYROIDISM, UNSPECIFIED TYPE: Primary | ICD-10-CM

## 2019-02-05 DIAGNOSIS — F33.2 MAJOR DEPRESSIVE DISORDER, RECURRENT, SEVERE W/O PSYCHOTIC BEHAVIOR (HCC): ICD-10-CM

## 2019-02-05 DIAGNOSIS — Z15.89 HETEROZYGOUS FOR MTHFR GENE MUTATION: ICD-10-CM

## 2019-02-05 DIAGNOSIS — F40.10 SOCIAL PHOBIA: ICD-10-CM

## 2019-02-05 DIAGNOSIS — Z79.899 HIGH RISK MEDICATION USE: ICD-10-CM

## 2019-02-05 PROCEDURE — 99214 OFFICE O/P EST MOD 30 MIN: CPT | Performed by: PSYCHIATRY & NEUROLOGY

## 2019-02-05 PROCEDURE — 90833 PSYTX W PT W E/M 30 MIN: CPT | Performed by: PSYCHIATRY & NEUROLOGY

## 2019-02-05 RX ORDER — UREA 10 %
800 LOTION (ML) TOPICAL DAILY
COMMUNITY
End: 2021-10-06

## 2019-02-05 RX ORDER — NORTRIPTYLINE HYDROCHLORIDE 50 MG/1
50 CAPSULE ORAL
Qty: 90 CAPSULE | Refills: 1 | Status: SHIPPED | OUTPATIENT
Start: 2019-02-05 | End: 2019-03-15 | Stop reason: SDUPTHER

## 2019-02-05 RX ORDER — CLONAZEPAM 0.5 MG/1
TABLET ORAL
Qty: 60 TABLET | Refills: 1 | Status: SHIPPED | OUTPATIENT
Start: 2019-02-05 | End: 2019-03-18 | Stop reason: SDUPTHER

## 2019-02-05 RX ORDER — FLUOXETINE HYDROCHLORIDE 20 MG/1
CAPSULE ORAL
Qty: 42 CAPSULE | Refills: 0 | Status: SHIPPED | OUTPATIENT
Start: 2019-02-05 | End: 2019-03-01 | Stop reason: ALTCHOICE
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
## 2019-02-06 ENCOUNTER — DOCUMENTATION (OUTPATIENT)
Dept: BEHAVIORAL/MENTAL HEALTH CLINIC | Facility: CLINIC | Age: 34
End: 2019-02-06

## 2019-02-06 NOTE — PROGRESS NOTES
Yep    ===View-only below this line===    ----- Message -----  From: Inez Perales DO  Sent: 2/5/2019   2:22 PM  To: Nini Verdugo,    Because you are retiring, she is feeling that she should establish with a new therapist here (staying with you until this occurs)  You ok with that?     Thanks,  Mena Awad

## 2019-02-07 ENCOUNTER — TELEPHONE (OUTPATIENT)
Dept: PSYCHIATRY | Facility: CLINIC | Age: 34
End: 2019-02-07

## 2019-02-08 ENCOUNTER — TELEPHONE (OUTPATIENT)
Dept: PSYCHIATRY | Facility: CLINIC | Age: 34
End: 2019-02-08

## 2019-02-08 NOTE — TELEPHONE ENCOUNTER
Received a faxed request from [facility] - prior auth needed for Trintellix  Request generated by pharmacist for [facility] completed and faxed

## 2019-02-12 NOTE — TELEPHONE ENCOUNTER
Received approval from [facility] for Trintellix  Reviewed with pharmacist  Attempted to review with Megha Razo, but her "mailbox is full " [facility] does send texts when a prescription is ready for

## 2019-02-19 ENCOUNTER — HOSPITAL ENCOUNTER (EMERGENCY)
Facility: HOSPITAL | Age: 34
Discharge: HOME | End: 2019-02-19
Attending: EMERGENCY MEDICINE
Payer: COMMERCIAL

## 2019-02-19 VITALS
OXYGEN SATURATION: 100 % | DIASTOLIC BLOOD PRESSURE: 89 MMHG | TEMPERATURE: 98 F | SYSTOLIC BLOOD PRESSURE: 131 MMHG | HEART RATE: 110 BPM | RESPIRATION RATE: 16 BRPM

## 2019-02-19 DIAGNOSIS — F32.A DEPRESSION: Primary | ICD-10-CM

## 2019-02-19 DIAGNOSIS — R45.1 AGITATION: ICD-10-CM

## 2019-02-19 PROCEDURE — 99283 EMERGENCY DEPT VISIT LOW MDM: CPT
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
## 2019-02-20 DIAGNOSIS — F41.1 GAD (GENERALIZED ANXIETY DISORDER): ICD-10-CM

## 2019-02-20 DIAGNOSIS — F33.2 MAJOR DEPRESSIVE DISORDER, RECURRENT, SEVERE W/O PSYCHOTIC BEHAVIOR (HCC): ICD-10-CM

## 2019-02-20 RX ORDER — NORTRIPTYLINE HYDROCHLORIDE 25 MG/1
CAPSULE ORAL
Qty: 60 CAPSULE | Refills: 2 | OUTPATIENT
Start: 2019-02-20

## 2019-02-25 ENCOUNTER — DOCUMENTATION (OUTPATIENT)
Dept: BEHAVIORAL/MENTAL HEALTH CLINIC | Facility: CLINIC | Age: 34
End: 2019-02-25

## 2019-02-25 NOTE — PROGRESS NOTES
Transferring pt   Received: 6 days ago   [address]; [place] [place]             This pt called to get a new therapist because of Kartik's residential  She is scheduled with Jeannie Osorio for March 14, she ask that all appts with Kartik be cancelled  Please cancel all Kartik appts

## 2019-02-25 NOTE — PROGRESS NOTES
cancellations   Received: 5 days ago   [id number]   Cc: Latosha Ascencio             All of the following appointments were cancelled due to [facility] seeing another therapist      4/1 at 1pm   4/8 at 4pm   4/16 at 3pm   4/23 at 5pm   4/30 at 1pm   5/6 at 3pm   5/13 at 4pm   5/21 at 4pm

## 2019-03-01 ENCOUNTER — OFFICE VISIT (OUTPATIENT)
Dept: PSYCHIATRY | Facility: CLINIC | Age: 34
End: 2019-03-01
Payer: COMMERCIAL

## 2019-03-01 ENCOUNTER — OFFICE VISIT (OUTPATIENT)
Dept: PSYCHOLOGY | Facility: CLINIC | Age: 34
End: 2019-03-01
Payer: COMMERCIAL

## 2019-03-01 VITALS
HEART RATE: 87 BPM | BODY MASS INDEX: 33.43 KG/M2 | RESPIRATION RATE: 20 BRPM | SYSTOLIC BLOOD PRESSURE: 113 MMHG | WEIGHT: 208 LBS | DIASTOLIC BLOOD PRESSURE: 77 MMHG | TEMPERATURE: 97.9 F | HEIGHT: 66 IN

## 2019-03-01 DIAGNOSIS — F33.2 MAJOR DEPRESSIVE DISORDER, RECURRENT, SEVERE W/O PSYCHOTIC BEHAVIOR (HCC): Primary | ICD-10-CM

## 2019-03-01 DIAGNOSIS — F41.1 GAD (GENERALIZED ANXIETY DISORDER): ICD-10-CM

## 2019-03-01 PROBLEM — E03.9 ADULT HYPOTHYROIDISM: Status: ACTIVE | Noted: 2017-03-30

## 2019-03-01 PROBLEM — M54.41 RIGHT-SIDED LOW BACK PAIN WITH RIGHT-SIDED SCIATICA: Status: ACTIVE | Noted: 2017-09-29

## 2019-03-01 PROCEDURE — G0176 OPPS/PHP;ACTIVITY THERAPY: HCPCS

## 2019-03-01 PROCEDURE — 90792 PSYCH DIAG EVAL W/MED SRVCS: CPT | Performed by: PSYCHIATRY & NEUROLOGY

## 2019-03-01 PROCEDURE — G0410 GRP PSYCH PARTIAL HOSP 45-50: HCPCS

## 2019-03-01 PROCEDURE — G0177 OPPS/PHP; TRAIN & EDUC SERV: HCPCS

## 2019-03-01 PROCEDURE — 90791 PSYCH DIAGNOSTIC EVALUATION: CPT

## 2019-03-01 NOTE — PSYCH
Subjective:     Patient ID: Rhonda Patel is a 29 y o  female  [facility] Clinical Progress Notes      Specialized Services Documentation  Therapist must complete separate progress note for each specific clinical activity in which the individual participated during the day  Group Psychotherapy     ([id number]) Rhonda Patel participated in group psychotherapy process today  Group began with this writer sharing the benefits of mindfulness  Clients engaged in check in, shared how they were feeling and a pleasant and productive activity they have planned for the weekend  Clients engaged in an open discussion on unhealthy coping mechanisms  Clients identified their own ways of unhealthy coping and explored feelings related to stress  Clients completed worksheets on ways to change coping  Desire shared feeling nervous and excited to learn and expressed desire to do something exciting  She remained quiet yet observant during group  Reece Kelsey made progress towards goals through group participation and is encouraged to continue progressing towards long term goals through program compliance and participation      Tx Plan Objective: 1 1 Therapist:  Cathy Bai LPC
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
## 2019-03-01 NOTE — PSYCH
Subjective:     Patient ID: Edith Gibson is a 29 y o  female  [facility] Clinical Progress Notes      Specialized Services Documentation  Therapist must complete separate progress note for each specific clinical activity in which the individual participated during the day  Allied Therapy  [id number]  Edith Gibson actively participated in [facility] group focused on DBT skill interpersonal effectiveness  Group discussed the differences between being assertive, passive, passive aggressive, and aggressive  Group explored the importance of being assertive for effective communication  Group discussed ways to balance getting what you want, maintaining a relationship, and maintaining self-respect  Dino Carmen actively engaged in drumming exercise to practice assertiveness  She shared that she tends to be very passive when she is depressed  She was told that coming to program itself was assertive  Initial positive progress towards goal noted  Continue AT to encourage self-awareness and daily practice of wellness tools    Tx Plan Objective: 1 1, 1 4, Therapist: Noris Palacio    Education Therapy   Time:  5016-8108  Previous goal met: First Treatment Day  Readiness to Learning: Receptive  Barriers to Learning: None  Learning Assessment  Time: 6369-2298  Education Completed: Illness, Medication and Wellness Tools  Teaching Method: Verbal and Demonstration  Shared Area of Learning: Yes   Goal Set: Spend time with boyfriend this weekend  Tx Plan Objective: 1 4, Therapist: Noris Palacio

## 2019-03-01 NOTE — PSYCH
Subjective:     Patient ID: Felix Britton is a 29 y o  female  [facility] Clinical Progress Notes      Specialized Services Documentation  Therapist must complete separate progress note for each specific clinical activity in which the individual participated during the day  Other 0624 Pre-certification: Spoke with Indiana Brown [phone] - Authorized 4 days from 3/1/19 through 3/06/19  #[id number]  Reviewer [facility] be reached at [facility]    PHQ-9 Depression Screening    PHQ-9:    Frequency of the following problems over the past two weeks:       Little interest or pleasure in doing things:  2 - more than half the days  Feeling down, depressed, or hopeless:  2 - more than half the days  Trouble falling or staying asleep, or sleeping too much:  2 - more than half the days  Feeling tired or having little energy:  1 - several days  Poor appetite or overeatin - more than half the days  Feeling bad about yourself - or that you are a failure or have let yourself or your family down:  3 - nearly every day  Trouble concentrating on things, such as reading the newspaper or watching television:  2 - more than half the days  Moving or speaking so slowly that other people could have noticed  Or the opposite - being so fidgety or restless that you have been moving around a lot more than usual:  0 - not at all  Thoughts that you would be better off dead, or of hurting yourself in some way:  1 - several days  PHQ-2 Score:  4  PHQ-9 Score:  15          Case Management Note    Megha fede [facility]    Current suicide risk : Low     [id number] Met with Felix Britton  Reviewed program and given on call number  she completed releases and OP providers/ PCP notified of admission and health care coordination form completed  Completed initial psycho-social evaluation and initial treatment goals discussed  Medications changes/added/denied?  No    Treatment session number: 1    Individual Case Management Visit provided today?  Yes     [facility] follow up physician's orders: see evaluation

## 2019-03-01 NOTE — PSYCH
Subjective:     Patient ID: Felix Britton is a 29 y o  female  [facility] Clinical Progress Notes      Specialized Services Documentation  Therapist must complete separate progress note for each specific clinical activity in which the individual participated during the day         Group Psychotherapy ([id number])     Felix Britton did not attend wellness group as she was being evaluated by the psychiatrist

## 2019-03-01 NOTE — PSYCH
Assessment/Plan:      Diagnoses and all orders for this visit:    Major depressive disorder, recurrent, severe w/o psychotic behavior ([address] )    JG (generalized anxiety disorder)          Subjective:     Patient ID: Sherrell Helm is a 29 y o  female  [facility] Treatment Plan   AREAS OF NEED: Depression as evidenced by irritability, sleeping to cope, racing thoughts, exhaustion, impulsivity, guilt, anger at self, isolation related to multiple life stressors building up over time (work, family, relationship, pain, financial)  Date Initiated: 03/01/19    Strengths:  Resilience, loves her children, clear vocational goals    LONG TERM GOAL:   Date Initiated: 03/01/19  1 0 I will identify 3 signs that my mood has improved and I am more productive in my day to day life  Target Date: 03/29/19  Completion Date:     SHORT TERM OBJECTIVES:     Date Initiated: 03/01/19  1 1 I will identify 3 mindfulness/distress tolerance skills I am practicing to slow down action urges and racing thoughts - sharing successes and challenges with skills I am learning  Revision Date:   Target Date: 03/12/19  Completion Date:     Date Initiated: 03/01/19  1 2 I will identify 3 things I need to do daily to support my wellness and self-care and begin to make time each day to do so  Revision Date:   Target Date: 03/12/19  Completion Date:     Date Initiated: 03/01/19  1 3 I will take medications as prescribed and share questions and concerns if arise  Revision Date:  Target Date: 03/12/19  Completion Date:      Date Initiated: 03/01/19  1 4 I will identify 3 ways my supports can assist in my recovery and agree to staff/support contact as indicated      Revision Date:  Target Date: 03/12/19  Completion Date:         7 DAY REVISION:    Date Initiated:  Revision Date:   Target Date:   Completion Date:      PSYCHIATRY:  Date Initiated: 03/01/19  Medication Management and Education       Revision Date:   The person(s) responsible for carrying out the plan is Buddy Ruiz MD    NURSING:   Date Initiated: 03/01/19  1 1,1 2,1 3,1 4 This RN will provide daily wellness group five days weekly to educate Levon Galvez on S/S of her diagnoses and medications used in treatment  Revision Date:  The person(s) responsible for carrying out the plan is Radha Baugh RN    PSYCHOLOGY:   Date Initiated:03/01/19       1 1, 1 2, 1 4 Provide psychotherapy group 5 times per week to allow opportunity for Levon Galvez  to explore stressors and ways of coping  Revision Date:   The person(s) responsible for carrying out the plan is Duglas Lerma MA, [facility]    ALLIED THERAPY:   Date Initiated: 03/01/19  3 6,1 0 Engage Levon Galvez in AT group 5 times daily to encourage development and use of wellness tools to decrease symptoms and promote recovery through meaningful activity  Revision Date:   The person(s) responsible for carrying out the plan is AFUA Del Valle     CASE MANAGEMENT:   Date Initiated: 03/01/19      1 0 This  will meet with Levon Galvez  3-4 times weekly to assess treatment progress, discharge planning, connection to community supports and UR as indicated  Revision Date:   The person(s) responsible for carrying out the plan is Delmi CAAL     TREATMENT REVIEW/COMMENTS:     DISCHARGE CRITERIA: Identify 3 signs of progress and complete relapse prevention plan  DISCHARGE PLAN: OP providers  Estimated Length of Stay: 10 treatment days      Diagnosis and Treatment Plan explained to Vera Xena relates understanding diagnosis and is agreeable to Treatment Plan       CLIENT COMMENTS / Please share your thoughts, feelings, need and/or experiences regarding your treatment plan: _____________________________________________________________________________________________________________________________________________________________________________________________________________________________________________________________________________________________________________________ Date/Time: ______________     Patient Signature: _________________________________     Date/Time: ______________      Signature: _________________________________     Date/Time: ______________

## 2019-03-04 ENCOUNTER — OFFICE VISIT (OUTPATIENT)
Dept: PSYCHOLOGY | Facility: CLINIC | Age: 34
End: 2019-03-04
Payer: COMMERCIAL

## 2019-03-04 DIAGNOSIS — F41.1 GAD (GENERALIZED ANXIETY DISORDER): ICD-10-CM

## 2019-03-04 DIAGNOSIS — F33.2 MAJOR DEPRESSIVE DISORDER, RECURRENT, SEVERE W/O PSYCHOTIC BEHAVIOR (HCC): Primary | ICD-10-CM

## 2019-03-04 PROCEDURE — G0176 OPPS/PHP;ACTIVITY THERAPY: HCPCS

## 2019-03-04 PROCEDURE — G0410 GRP PSYCH PARTIAL HOSP 45-50: HCPCS

## 2019-03-04 PROCEDURE — G0177 OPPS/PHP; TRAIN & EDUC SERV: HCPCS

## 2019-03-04 NOTE — PSYCH
Subjective:     Patient ID: Dianne Harrington is a 29 y o  female  [facility] Clinical Progress Notes      Specialized Services Documentation  Therapist must complete separate progress note for each specific clinical activity in which the individual participated during the day  Allied Therapy  [id number]  Dianne Harrington actively participated in [facility] group focused on DBT Mindfulness skill [facility]  Group engaged in relaxation and in listening exercise  Group discussed the differences between emotional mind and reasonable mind, noting that callahan mind was a healthy synthesis of the two  Group was given handouts on ways to practice wise mind meditations  Shelia Escobar actively engaged in task practicing wise mind and mindfulness meditation  She acknowledged the usefulness of wise mind and mindfulness  Initial positive progress towards goal noted  Continue AT to increase self-awareness and use of coping skills with daily practice    Tx Plan Objective: 1 1, 1 2, Therapist: Jarrett Martinez    Education Therapy   Time:  6390-4648  Previous goal met: Yes   Readiness to Learning: Receptive  Barriers to Learning: None  Learning Assessment  Time: 4753-4044  Education Completed: Illness and Wellness Tools  Teaching Method: Verbal and Demonstration  Shared Area of Learning: Yes   Goal Set: Clean up the house  Tx Plan Objective: 1 1, 1 2, Therapist: Jarrett Martinez

## 2019-03-04 NOTE — PSYCH
Subjective:     Patient ID: Bradley Gupta is a 29 y o  female  [facility] Clinical Progress Notes      Specialized Services Documentation  Therapist must complete separate progress note for each specific clinical activity in which the individual participated during the day  Group Psychotherapy     ([id number]) Bradley Gupta participated in group psychotherapy process today  Group began with this writer facilitating a mindfulness exercise to encourage relaxation  Clients engaged in check in, shared how they were feeling and shared a challenge or success they had over the weekend  Clients engaged in a short discussion on current struggles they are facing and utilized one another as a support  Clients discussed ways they cope with anxiety and facing fears  Clients transitioned into an activity focusing on problem solving in crisis situations  Clients worked with a peer to create a list of ways to handle a crisis in a rational and reasonable manner  Clients then shared with the group their responses  Clients successfully met the expectation of the activity and demonstrated flexibility and quick response time in stressful situations  Heron Jara shared feeling "blah" today and explained a disagreement she had over the weekend with her boyfriend  Heron Jara was interactive and engaged in group and worked proactively with her peer  She was open in discussion and overall productive  Heron Jara made progress towards goals through group participation and is encouraged to continue progressing towards long term goals through program compliance and participation      Tx Plan Objective: 1 2 Therapist:  Radhames Balbuena LPC

## 2019-03-04 NOTE — PSYCH
Subjective:     Patient ID: Emilie King is a 29 y o  female  [facility] Clinical Progress Notes      Specialized Services Documentation  Therapist must complete separate progress note for each specific clinical activity in which the individual participated during the day  Group Psychotherapy     [id number] Kayla Mckenzie actively shared in psychotherapy group focused on stages of change and problem solving  Ramydennys Mckenzie was attentive and took notes, however quiet  She engaged when prompted  She was able to identify a change she would like to make and group explored value of skill use to support that goal   Some beginning progress voiced toward goal  Continue psychotherapy groups to explore topics and connection to self to support growth and wellness  Tx Plan Objective: 1 1,1 2, Therapist:  Elijah CAAL      Case Management Note    Elijah CAAL    Current suicide risk : Low     1099-3275 Met with Emilie King  She was anxious over the weekend  She reported that she woke upset on Saturday and then "little things set me off"  She had an argument with her s/o, and instead of going out with him she refused  He then went out without her and instead of ruminating at home, she spent time with a friend  She said they did discuss it on Sunday which was a positive  Treatment plan copied and reviewed  She was concerned about length of stay and this was reviewed  Shared breathing and 4 second rule to slow down impulsive responses  Medications changes/added/denied? No    Treatment session number: 2    Individual Case Management Visit provided today?  Yes     [facility] follow up physician's orders: na

## 2019-03-05 ENCOUNTER — OFFICE VISIT (OUTPATIENT)
Dept: PSYCHOLOGY | Facility: CLINIC | Age: 34
End: 2019-03-05
Payer: COMMERCIAL

## 2019-03-05 DIAGNOSIS — F33.2 MAJOR DEPRESSIVE DISORDER, RECURRENT, SEVERE W/O PSYCHOTIC BEHAVIOR (HCC): Primary | ICD-10-CM

## 2019-03-05 DIAGNOSIS — F41.1 GAD (GENERALIZED ANXIETY DISORDER): ICD-10-CM

## 2019-03-05 PROCEDURE — G0177 OPPS/PHP; TRAIN & EDUC SERV: HCPCS

## 2019-03-05 PROCEDURE — G0176 OPPS/PHP;ACTIVITY THERAPY: HCPCS

## 2019-03-05 PROCEDURE — G0410 GRP PSYCH PARTIAL HOSP 45-50: HCPCS

## 2019-03-05 NOTE — PSYCH
Subjective:     Patient ID: Josesito Stephen is a 29 y o  female  [facility] Clinical Progress Notes      Specialized Services Documentation  Therapist must complete separate progress note for each specific clinical activity in which the individual participated during the day  Group Psychotherapy ([id number]) Anushka Mejia RN     Josesito Stephen attended wellness group today on understanding their mental disorder  Discussion began with : Most major mental illness can be divided into 2 major categories: Disorder of Mood/ Affect: EX Bipolar Disorder, Anxiety Disorders, Depressive disorders or Disorder of Thought/Perception: Ex : Schizophrenia, Schizoaffective disorder  The questions about her diagnosis were: The Symptoms That I Experience (or have experienced) are:   " mood swings, getting angry quickly, sleeping all day, lack of motivation "  The medications used to treat my disorder are:  " Trintellix  Klonopin "   My Mental Illness Is A Disorder of " mood" because my symptoms affect" how I  Feel"  The Name of My Mental Illness Is " Depression and Anxiety"  Questions I Have About Mental Illness Are: " none"   Mali Morris was able to identify the mental illness that diagnosed with and was able to know what signs and symptoms are associated with diagnosis and understand the medications used to treat her diagnosis  Mali Morris will continue to attend wellness group in order to achieve goals and objectives         Tx Plan Objective: 1 3

## 2019-03-05 NOTE — PSYCH
Subjective:     Patient ID: Dea Chiang is a 29 y o  female  [facility] Clinical Progress Notes      Specialized Services Documentation  Therapist must complete separate progress note for each specific clinical activity in which the individual participated during the day  Group Psychotherapy [id number] Reba Brown participated in group psychotherapy today and identified feeling ok and is looking to learn mindfulness  The group participated in a mindfulness activity and  discussed the process of decision making, goals and self care  Reba Brown participated in sharing decision making as a young parent and shared how she focused on caring for others and now needs to focus on herself  She made moderate progress on her goals  Continue to share and reinforce learning and continue psychotherapy   Tx Plan Objective: 1 1,1 2, Therapist:  Raghav Kelly, Intern  Felicita Coffey MT-BC

## 2019-03-05 NOTE — PSYCH
Subjective:     Patient ID: Victor Manuel Singh is a 29 y o  female  [facility] Clinical Progress Notes      Specialized Services Documentation  Therapist must complete separate progress note for each specific clinical activity in which the individual participated during the day  Case Management Note    Ernesta Hamman [facility]    Current suicide risk : Low     8888-5635 Met with Victor Manuel Singh  She identified that she is grateful for treatment opportunity "I am learning that I am not alone"  She did c/o acid reflux and stomach "fullness" feeling - advised to take medications with food (she has not been doing so)  She shared about work stressors and we discussed importance of time limits/schedule for self care when she returns to work and begins school  Ways to increase self-care even if short on time explored  She remains receptive  Medications changes/added/denied? No    Treatment session number: 3    Individual Case Management Visit provided today?  Yes     [facility] follow up physician's orders: na

## 2019-03-05 NOTE — PSYCH
Subjective:     Patient ID: Edith Gibson is a 29 y o  female  [facility] Clinical Progress Notes      Specialized Services Documentation  Therapist must complete separate progress note for each specific clinical activity in which the individual participated during the day  Allied Therapy  [id number]  Edith Gibson actively participated in [facility] group focused on DBT skill Interpersonal Effectiveness  She asked appropriate questions and demonstrated knowledge of the skill  Group discussed difficulties in communication and how to use [facility] skill for effective communication  Group discussed the three goals in an interaction: getting the objective, maintaining the relationship with the other person, and maintaining ones self-respect  Dino Morales engaged in exercise writing a DEAR MAN  She shared about the difficulties of communication and the potential benefits of using DEAR MAN  Positive progress towards goal noted  Continue AT to increase use of wellness strategies and daily practice    Tx Plan Objective: 1 1, 1 2, Therapist: Noris PalacioBC    Education Therapy   Time:  6720-1588  Previous goal met: Yes   Readiness to Learning: Receptive  Barriers to Learning: None  Learning Assessment  Time: 0180-3849  Education Completed: Illness and Wellness Tools  Teaching Method: Verbal and Demonstration  Shared Area of Learning: Yes   Goal Set: Get organized for school  Tx Plan Objective: 1 1, 1 2, Therapist: Noris PalacioBC

## 2019-03-06 ENCOUNTER — OFFICE VISIT (OUTPATIENT)
Dept: PSYCHOLOGY | Facility: CLINIC | Age: 34
End: 2019-03-06
Payer: COMMERCIAL

## 2019-03-06 DIAGNOSIS — F41.1 GAD (GENERALIZED ANXIETY DISORDER): ICD-10-CM

## 2019-03-06 DIAGNOSIS — F33.2 MAJOR DEPRESSIVE DISORDER, RECURRENT, SEVERE W/O PSYCHOTIC BEHAVIOR (HCC): Primary | ICD-10-CM

## 2019-03-06 PROCEDURE — G0176 OPPS/PHP;ACTIVITY THERAPY: HCPCS

## 2019-03-06 PROCEDURE — G0177 OPPS/PHP; TRAIN & EDUC SERV: HCPCS

## 2019-03-06 PROCEDURE — G0410 GRP PSYCH PARTIAL HOSP 45-50: HCPCS
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
## 2019-03-06 NOTE — PSYCH
Subjective:     Patient ID: Bud Cleveland is a 29 y o  female  [facility] Clinical Progress Notes      Specialized Services Documentation  Therapist must complete separate progress note for each specific clinical activity in which the individual participated during the day  Other Utilization Review: 26 Spoke with Moe Crandall at [employer] phone [phone],  6 days authorized from 3/7/19 to 3/14/19  Authorization number [id number]  [facility]aura Dozier [facility]     Case Management Note    Laura Dozier [facility]    Current suicide risk : Low     [address] Met with Bud Cleveland  She feels "drained and off" today  Discussed just taking one day at a time and one change at a time  Reviewed medication concern related to heartburn  She received education from program RN  She also is working on gathering the information for [employer] and short term disability which she admits is "overwhelming"  Offered encouragement and focus on tx objectives  Medications changes/added/denied? Yes - Trintellix increased to 10mg today per titration    Treatment session number: 4    Individual Case Management Visit provided today?  Yes     [facility] follow up physician's orders: na

## 2019-03-06 NOTE — PSYCH
Subjective:     Patient ID: Brian Ni is a 29 y o  female  [facility] Clinical Progress Notes      Specialized Services Documentation  Therapist must complete separate progress note for each specific clinical activity in which the individual participated during the day  Allied Therapy  [id number]  Brian Ni actively participated in [facility] group focused on DBT distress tolerance skill self-soothe  Group reviewed DBT skills STOP, TIP, and ACCEPTS  Group discussed soothing through the five senses to get through crises and was shown an example of a self-soothe kit, a collection of comforting items in a bag to use in times of distress  Group was encouraged to make a self-soothe kit and practice often  Lois Palumbo actively engaged in progressive muscle relaxation  She shared an item that she would put in her self-soothe kit  Moderate progress towards goal noted  Continue AT to increase use of coping skills and daily practice    Tx Plan Objective: 1 1, 1 2, Therapist: Everett Guzman    Education Therapy   Time:  8538-6308  Previous goal met: Yes   Readiness to Learning: Receptive  Barriers to Learning: None  Learning Assessment  Time: 8046-8098  Education Completed: Illness and Wellness Tools  Teaching Method: Verbal and Demonstration  Shared Area of Learning: Yes   Goal Set: Get some wash done  Tx Plan Objective: 1 1, 1 2, Therapist: Everett Guzman

## 2019-03-06 NOTE — PSYCH
Subjective:     Patient ID: Benjie Bolaños is a 29 y o  female  [facility] Clinical Progress Notes      Specialized Services Documentation  Therapist must complete separate progress note for each specific clinical activity in which the individual participated during the day  Group Psychotherapy     ([phone]) Benjie Bolaños participated in group psychotherapy process today  Group began with this writer facilitating a mindfulness exercise focusing on being present  Clients checked in, shared how they were feeling and discussed ways they are taking care of themselves  Clients engaged in a discussion on strengths and participated in an activity that outlined their strengths and how they use strengths  Damien Story shared feeling "blah" and not sure  She said coming to program is how she takes care of herself and treating herself better  Damien Story was quiet but observant during group  She made progress towards goals through group participation and is encouraged to continue progressing towards long term goals through program compliance and participation      Tx Plan Objective: 1 2 Therapist:  Osiel Clements LPC

## 2019-03-07 ENCOUNTER — OFFICE VISIT (OUTPATIENT)
Dept: PSYCHOLOGY | Facility: CLINIC | Age: 34
End: 2019-03-07
Payer: COMMERCIAL

## 2019-03-07 DIAGNOSIS — F33.2 MAJOR DEPRESSIVE DISORDER, RECURRENT, SEVERE W/O PSYCHOTIC BEHAVIOR (HCC): Primary | ICD-10-CM

## 2019-03-07 DIAGNOSIS — F41.1 GAD (GENERALIZED ANXIETY DISORDER): ICD-10-CM

## 2019-03-07 PROCEDURE — G0176 OPPS/PHP;ACTIVITY THERAPY: HCPCS

## 2019-03-07 PROCEDURE — G0410 GRP PSYCH PARTIAL HOSP 45-50: HCPCS

## 2019-03-07 PROCEDURE — G0177 OPPS/PHP; TRAIN & EDUC SERV: HCPCS

## 2019-03-07 NOTE — PSYCH
Subjective:     Patient ID: Michael Rossi is a 29 y o  female  [facility] Clinical Progress Notes      Specialized Services Documentation  Therapist must complete separate progress note for each specific clinical activity in which the individual participated during the day  Group Psychotherapy ([id number]) Willian Macdonald RN     Michael Rossi attended wellness group today on: Living with Bipolar: Educational Video entitled - Harvey Cisneros viewed  Discussion  started off with definition of Bipolar Disorder which can be referred  as  manic-depressive disorder    Video viewed  After  the video discussion  began with  : For people living with Bipolar , the view of life can seem dark and ominous or deceptively beautiful  Bipolar illness can twist and distort the mind of physically healthy individuals, to the point that their jobs their families and even lives can be lost   But with education, therapy and medication management, people can regain what they have lost and perhaps live in a world better than they have known  Information provided on all the resources available to treat this disease  Support groups like ANGELINA ([address] [place] [address] Avenue) is a great tool available for people diagnosed with this illness, families and friends  Vanessa Lovell was able to understand better that with proper diagnosis,  medication and education, this illness is manageable and treatable  Vanessa Lovell will continue to attend wellness groups in order to achieve goals and objectives          Tx Plan Objective: 1 1,1 2,1 4

## 2019-03-07 NOTE — PSYCH
Subjective:     Patient ID: Adonis Sommers is a 29 y o  female  [facility] Clinical Progress Notes      Specialized Services Documentation  Therapist must complete separate progress note for each specific clinical activity in which the individual participated during the day  Case Management Note    Mandy Briseno [facility]    Current suicide risk : Low     7664-5521 Met with Adonis Sommers  She identified that she felt confused today  "My boyfriend thinks the medication isn't working"  She can identify her need to allow the Trintellix to work since it is new and she was tapering off Prozac then started new medication  She shared that he has been almost "pushing her buttons" and we discussed how her irritability with him is probably justified versus medication not working  Identifying that medication is not intended to stop one from feeling  They appear to have patterns of communication difficulties and couples counseling has been encouraged in the past  She will ask him to come in for a family meeting on Monday at [facility] 3 skill  Reviewed UR outcome from yesterday  Medications changes/added/denied? No    Treatment session number: 5    Individual Case Management Visit provided today?  Yes     [facility] follow up physician's orders: na

## 2019-03-07 NOTE — PSYCH
Subjective:     Patient ID: Elsy Valdez is a 29 y o  female  [facility] Clinical Progress Notes      Specialized Services Documentation  Therapist must complete separate progress note for each specific clinical activity in which the individual participated during the day  Allied Therapy  2325-6812  Elsy Valdez actively participated in [facility] group focused on the Wellness and Relapse Prevention Plan and the key facets of the Recovery model  She actively engaged in keren discussion and task  Group explored wellness tools, triggers, and warning signs, as well as qualities about themselves when they are feeling well  Devon Kaba shared about the difficulty of following an action plan when she is in a state of crisis  She was encouraged to review her plan frequently and put it in an accessible place  She openly shared about her triggers and wellness tools  Positive progress noted towards goals  Continue AT to explore self-awareness and encourage positive coping with daily practice    Tx Plan Objective: 1 1, 1 2, Therapist: Laurie Link MT-BC    Education Therapy   Time:  7222-2320  Previous goal met: Yes   Readiness to Learning: Receptive  Barriers to Learning: None  Learning Assessment  Time: 7615-5952  Education Completed: Illness and Wellness Tools  Teaching Method: Verbal and Demonstration  Shared Area of Learning: Yes   Goal Set: Do something with kids  Tx Plan Objective: 1 1, 1 2, 1 4, Therapist: Laurie Link MT-BC

## 2019-03-07 NOTE — PSYCH
Subjective:     Patient ID: Renny Blanc is a 29 y o  female  [facility] Clinical Progress Notes      Specialized Services Documentation  Therapist must complete separate progress note for each specific clinical activity in which the individual participated during the day  Group Psychotherapy     ([id number]) Renny Blanc participated in group psychotherapy process today  Group began with this writer facilitating a mindfulness exercise focusing on taking 10 deep breaths to refocus  Clients checked in, shared how they were feeling and something that is challenging for them  Clients engaged in an open discussion on current stressors and worked on processing what is occurring for them currently  Clients discussed improvements they see in themselves  This writer then facilitated an open discussion on emotional acceptance and targeting flexibility in emotional response  Clients discussed emotions they avoid because they are uncomfortable  [facility] shared feeling better than the morning where she woke up stressed  She was observant during group and briefly participated in group discussion  [facility] made progress towards goals through group participation and is encouraged to continue progressing towards long term goals through program compliance and participation      Tx Plan Objective: 1 1 Therapist:  Sindi Price LPC

## 2019-03-08 ENCOUNTER — OFFICE VISIT (OUTPATIENT)
Dept: PSYCHOLOGY | Facility: CLINIC | Age: 34
End: 2019-03-08
Payer: COMMERCIAL

## 2019-03-08 DIAGNOSIS — F33.2 MAJOR DEPRESSIVE DISORDER, RECURRENT, SEVERE W/O PSYCHOTIC BEHAVIOR (HCC): Primary | ICD-10-CM

## 2019-03-08 DIAGNOSIS — F41.1 GAD (GENERALIZED ANXIETY DISORDER): ICD-10-CM

## 2019-03-08 PROCEDURE — G0176 OPPS/PHP;ACTIVITY THERAPY: HCPCS

## 2019-03-08 PROCEDURE — G0177 OPPS/PHP; TRAIN & EDUC SERV: HCPCS

## 2019-03-08 PROCEDURE — G0410 GRP PSYCH PARTIAL HOSP 45-50: HCPCS

## 2019-03-08 NOTE — PSYCH
Subjective:     Patient ID: Chanell Diaz is a 29 y o  female  [facility] Clinical Progress Notes      Specialized Services Documentation  Therapist must complete separate progress note for each specific clinical activity in which the individual participated during the day  Case Management Note    Patrice Rojas [facility]    Current suicide risk : Low     [id number] Met with Chanell Diaz  She identified weekend plans and supports reviewed  She shared work stressors and ways she is learning skills that can help her at work and home  She has plans with her children this weekend  She reports that she did get OTC Prilosec which is helping with the heartburn  Family meeting scheduled for Monday at 2pm     [address] [place] [address] with Kaitlyn Ann at [place] [phone] to confirm short term disability case #[id number]  Medications changes/added/denied? Yes     Treatment session number: 6    Individual Case Management Visit provided today?  Yes     [facility] follow up physician's orders: na

## 2019-03-08 NOTE — PSYCH
Subjective:     Patient ID: Jl Yanez is a 29 y o  female  [facility] Clinical Progress Notes      Specialized Services Documentation  Therapist must complete separate progress note for each specific clinical activity in which the individual participated during the day  Group Psychotherapy ([id number]) Marily Rodriguez RN    Jl Yanez attended- : weekly  wellness assessment group today  Reviewing weekend supports and plan as we approach the weekend  Medications reviewed with  no concerns or questions at this time  Compliance of medications reviewed and understood  The six (6) dimension of wellness discussed (Physical, Emotional, Cognitive, Vocational, Social and Spiritual)  The format for assessing wellness and measuring progress   reviewed   Questions asked :  (1) I intend to improve in this area by    (2) My first step will be   (3) I will share my plans with Christ Boyer ask for their support by saying    Nilson Boogie was able to say "  I need to learn to count my blessing more and I need to go out and socialize and enjoy my life "   Nilson Boogie is making  progress towards goals and objectives and will continue to attend wellness group          Tx Plan Objective: 1 1,1 2,1 3,1 4

## 2019-03-08 NOTE — PSYCH
Subjective:     Patient ID: Elsy Valdez is a 29 y o  female  [facility] Clinical Progress Notes      Specialized Services Documentation  Therapist must complete separate progress note for each specific clinical activity in which the individual participated during the day  Group Psychotherapy     ([id number]) Elsy Valdez participated in group psychotherapy process today  Group began with this writer facilitating a defusion of thought exercise based on emotional acceptance  Clients checked in, shared how they were feeling, and shared a productive and pleasant activity they are going to engage in over the weekend  Clients discussed wellness and coping mechanisms  Clients focused on an activity enhancing cognitive coping  Clients shared feedback on the activity and engaged in group discussion  Desire shared feeling anxious but good and spending time with her children this weekend  Devon Kaba engaged in supportive discussion with a peer and completed the activity  Malika Sanabria made progress towards goals through group participation and is encouraged to continue progressing towards long term goals through program compliance and participation       Tx Plan Objective: 1 1 Therapist:  Lyudmila Kaur LPC

## 2019-03-08 NOTE — PSYCH
Subjective:     Patient ID: Jeffery Dixon is a 29 y o  female  [facility] Clinical Progress Notes      Specialized Services Documentation  Therapist must complete separate progress note for each specific clinical activity in which the individual participated during the day  Allied Therapy  [id number]  Jeffery Dixon actively participated in [facility] group focused on DBT emotional regulation skill opposite action  Group reviewed the importance of naming and understanding emotions  Group discussed how to determine if emotions to situations were justified or unjustified, if acting on it was effective, and opposite actions that could be used to change the emotion  Mary Jim was encouraged to act opposite to emotions that would be a risk this weekend  She shared that she would practice opposite action this weekend  She demonstrated accurate understanding of the topic  Some positive progress towards goal noted  Continue AT to increase self awareness and encourage daily practice of coping strategies    Tx Plan Objective: 1 1, 1 2, Therapist: Lola WEINBERG,     Education Therapy   Time:  5047-1824  Previous goal met: Yes   Readiness to Learning: Receptive  Barriers to Learning: None  Learning Assessment  Time: 2401-4521  Education Completed: Illness and Wellness Tools  Teaching Method: Verbal and Demonstration  Shared Area of Learning: Yes   Goal Set: Do something fun with kids this weekend  Tx Plan Objective: 1 1, 1 2, 1 4, Therapist: Lola WEINBERG, Domenica Villarreal [facility]

## 2019-03-11 ENCOUNTER — OFFICE VISIT (OUTPATIENT)
Dept: PSYCHOLOGY | Facility: CLINIC | Age: 34
End: 2019-03-11
Payer: COMMERCIAL

## 2019-03-11 ENCOUNTER — OFFICE VISIT (OUTPATIENT)
Dept: FAMILY MEDICINE CLINIC | Facility: CLINIC | Age: 34
End: 2019-03-11
Payer: COMMERCIAL

## 2019-03-11 ENCOUNTER — TELEPHONE (OUTPATIENT)
Dept: FAMILY MEDICINE CLINIC | Facility: CLINIC | Age: 34
End: 2019-03-11

## 2019-03-11 VITALS
SYSTOLIC BLOOD PRESSURE: 118 MMHG | HEIGHT: 66 IN | BODY MASS INDEX: 34.72 KG/M2 | DIASTOLIC BLOOD PRESSURE: 78 MMHG | WEIGHT: 216 LBS

## 2019-03-11 DIAGNOSIS — Z01.84 IMMUNITY STATUS TESTING: ICD-10-CM

## 2019-03-11 DIAGNOSIS — Z23 NEED FOR VACCINATION: ICD-10-CM

## 2019-03-11 DIAGNOSIS — F41.1 GAD (GENERALIZED ANXIETY DISORDER): ICD-10-CM

## 2019-03-11 DIAGNOSIS — Z02.0 SCHOOL PHYSICAL EXAM: Primary | ICD-10-CM

## 2019-03-11 DIAGNOSIS — F33.2 MAJOR DEPRESSIVE DISORDER, RECURRENT, SEVERE W/O PSYCHOTIC BEHAVIOR (HCC): Primary | ICD-10-CM

## 2019-03-11 PROCEDURE — G0177 OPPS/PHP; TRAIN & EDUC SERV: HCPCS

## 2019-03-11 PROCEDURE — G0410 GRP PSYCH PARTIAL HOSP 45-50: HCPCS

## 2019-03-11 PROCEDURE — G0176 OPPS/PHP;ACTIVITY THERAPY: HCPCS

## 2019-03-11 PROCEDURE — 99395 PREV VISIT EST AGE 18-39: CPT | Performed by: FAMILY MEDICINE

## 2019-03-11 NOTE — PSYCH
Subjective:     Patient ID: Marlena Lantigua is a 29 y o  female  [facility] Clinical Progress Notes      Specialized Services Documentation  Therapist must complete separate progress note for each specific clinical activity in which the individual participated during the day  Group Psychotherapy ([id number]) Luis Miguel Zheng RN       Marlena Lantigua attended wellness group which focused on - Getting Things Done during times of illness- Discussion began with the true statement   Depression is Fed in the Bed    When you are depressed it is very easy to neglect daily, necessary tasks  In order to be more prepared  a plan must be established so that your you can still accomplish tasks and keep motivated to go on  With this plan you can cross off things as you get them done and push yourself to accomplish things that you are having a hard time accomplishing   (1) One daily task or activity I am able to do is  What enables or helps me do this  (2) One daily task I feel unable to do Strategies that could help me do this task are  Discussion on the 11 Minute Rule Reviewed and Understood: This rule include:  - If all else fails, then apply 11 minute rule of productivity  Decide today that you will allow yourself 11 minute chunks of time to begin the activity you have been procrastinating on  Simply set the alarm clock for 11 minutes and absorb yourself with that task or activity fully and completely without distracting your mind with other matters  When your alarm clock sounds, simply turn it off and walk away from the task  Take a short break of 11 minutes, and then return to the task for another 11 steps discussed to get motivated and keep motivated in times of illness  Makeda Hare was able to identify " I know how this depression can keep you down    I have not been working out like I used to and I know how exercising has reduced my anxiety in the past   I will set my timer for 11 minutes after program today and begin slowly back into my exercise routine "  Kathleen Velásquez will continue to attend wellness groups toward achieving goals and objectives             Tx Plan Objective: 1 1,1 2

## 2019-03-11 NOTE — PSYCH
Subjective:     Patient ID: Aleyda Sylvester is a 29 y o  female  [facility] Clinical Progress Notes      Specialized Services Documentation  Therapist must complete separate progress note for each specific clinical activity in which the individual participated during the day  Case Management Note    Blanca Posey [facility]    Current suicide risk : Low     5601-3053 Met with Aleyda Sylvester and her s/o Marco Antonio Colemaner  Both were able to identify that their communication is beginning to improve since starting PHP  Ways to support one another and aspects of a healthy relationship reviewed  She was able to share the impact of social media on her trust issues and he was able to express his concerns as well  They were encouraged to consider OP couples counseling  They were able to voice love for one another and their children  Medications changes/added/denied? No    Treatment session number: 7    Individual Case Management Visit provided today?  Yes     [facility] follow up physician's orders: na

## 2019-03-11 NOTE — PROGRESS NOTES
Assessment/Plan:    No problem-specific Assessment & Plan notes found for this encounter  Diagnoses and all orders for this visit:    School physical exam  -     Varicella Zoster Virus Ab (Immunity Scr),ACIF (S); Future  -     Measles/Mumps/Rubella Immunity; Future    Immunity status testing  -     Varicella Zoster Virus Ab (Immunity Scr),ACIF (S); Future  -     Measles/Mumps/Rubella Immunity; Future    Need for vaccination  -     TDAP VACCINE GREATER THAN OR EQUAL TO 8YO IM          Subjective:   Chief Complaint   Patient presents with    Physical Exam          Patient ID: Felix Britton is a 29 y o  female  Patient is a 71-year-old female presenting to the office for school physical   She is going on to a nursing program   She needs to be tested for tuberculosis, but they will be doing QuantiFERON testing on her when she gets drug testing done  Needs to confirm immunization, records from her child immunizations are not in her current records, therefore we will have to confirm immunity by blood testing  Recently had an emergency room visit for severe depression, she saw Psychiatry and her medications were adjusted  The following portions of the patient's history were reviewed and updated as appropriate: allergies, current medications, past family history, past medical history, past social history, past surgical history and problem list     Review of Systems   Constitutional: Positive for unexpected weight change (Not exercising that much )  Negative for activity change, chills, diaphoresis, fatigue and fever  HENT: Negative for congestion, ear pain, hearing loss, nosebleeds, postnasal drip, rhinorrhea, sinus pressure, sore throat and tinnitus  Eyes: Negative for photophobia, pain, discharge, redness and visual disturbance  Respiratory: Negative for cough, chest tightness, shortness of breath and wheezing  Cardiovascular: Negative for chest pain, palpitations and leg swelling  Denies PAZ   Gastrointestinal: Negative for abdominal pain, blood in stool, constipation, diarrhea, nausea and vomiting  Endocrine: Negative  Genitourinary: Negative for difficulty urinating, dysuria, frequency, hematuria and urgency  Denies dysmenorrhea, menstrual difficulties   Musculoskeletal: Negative for arthralgias, joint swelling and myalgias  Skin: Negative for rash and wound  Denies skin lesions, change in birthmarks   Allergic/Immunologic: Negative for environmental allergies, food allergies and immunocompromised state  Neurological: Negative for dizziness, tremors, seizures, syncope, weakness, numbness and headaches  Hematological: Negative  Psychiatric/Behavioral: Positive for decreased concentration and dysphoric mood  Negative for behavioral problems, confusion and sleep disturbance  The patient is nervous/anxious  Objective:      /78   Ht 5' 6" (1 676 m)   Wt 98 kg (216 lb)   LMP 02/18/2019   BMI 34 86 kg/m²          Physical Exam   Constitutional: She is oriented to person, place, and time  She appears well-developed and well-nourished  No distress  Over weight   HENT:   Head: Normocephalic and atraumatic  Right Ear: External ear normal    Left Ear: External ear normal    Nose: Nose normal    Mouth/Throat: Oropharynx is clear and moist    Eyes: Pupils are equal, round, and reactive to light  Conjunctivae and EOM are normal    Neck: Normal range of motion  Neck supple  No JVD present  No tracheal deviation present  No thyromegaly present  Cardiovascular: Normal rate, regular rhythm and normal heart sounds  No murmur heard  Pulmonary/Chest: Effort normal and breath sounds normal  She has no wheezes  She has no rales  Abdominal: Soft  Bowel sounds are normal  She exhibits no mass  There is no tenderness  There is no rebound and no guarding  Musculoskeletal: Normal range of motion  She exhibits no edema or tenderness     Lymphadenopathy:     She has no cervical adenopathy  Neurological: She is alert and oriented to person, place, and time  She has normal reflexes  No cranial nerve deficit  Skin: Skin is warm and dry  No lesion and no rash noted  Psychiatric: She has a normal mood and affect  Judgment normal      BMI Counseling: Body mass index is 34 86 kg/m²  Discussed the patient's BMI with her  The BMI is above average  BMI counseling and education was provided to the patient  Nutrition recommendations include reducing portion sizes, decreasing overall calorie intake, 3-5 servings of fruits/vegetables daily, reducing fast food intake, consuming healthier snacks, decreasing soda and/or juice intake, moderation in carbohydrate intake, increasing intake of lean protein and reducing intake of saturated fat and trans fat  Exercise recommendations include moderate aerobic physical activity for 150 minutes/week and exercising 3-5 times per week

## 2019-03-11 NOTE — PSYCH
Subjective:     Patient ID: Aidan Almonte is a 29 y o  female  [facility] Clinical Progress Notes      Specialized Services Documentation  Therapist must complete separate progress note for each specific clinical activity in which the individual participated during the day  Allied Therapy  [id number]  Aidan Almonte actively participated in [facility] group focused on DBT skill Mindfulness  Group discussed the WHAT skills of mindfulness, observing, describing, and participating in the present moment  Vince Clark engaged in relaxation exercise and mindful listening task  Group discussed ways to be fully present in day to day tasks to reduce focus on the stressors of life  Vince Clark shared that she would try to be mindful tonight  Some positive progress towards goal noted  Continue AT to increase self-awareness and encourage daily practice of wellness strategies    Tx Plan Objective: 1 1, 1 2, Therapist: Mireya Rm MT-BC    Education Therapy   Time:  4022-2071  Previous goal met: Yes   Readiness to Learning: Receptive  Barriers to Learning: None  Learning Assessment  Time: 5565-4708  Education Completed: Illness, Wellness Tools and Other Family Involvement  Teaching Method: Verbal and Demonstration  Shared Area of Learning: Yes   Goal Set: Start to gather things for school  Tx Plan Objective: 1 1, 1 2, Therapist: Mireya Rm MT-BC

## 2019-03-11 NOTE — PSYCH
Subjective:     Patient ID: Sherrell Helm is a 29 y o  female  [facility] Clinical Progress Notes      Specialized Services Documentation  Therapist must complete separate progress note for each specific clinical activity in which the individual participated during the day  Group Psychotherapy     ([id number]) Sherrell Helm participated in group psychotherapy process today  Group began with this writer facilitating a mindfulness exercise on breathing  Clients checked in, shared how they were feeling, and identified a challenge or success they had from the weekend  Clients engaged in an open discussion focusing on the prompt If you really knew me, you would know that  Few clients shared more surface details and others identified core challenges they have been facing which led to their admission to the program   Clients were responsive and reflective towards one another  Joseline Donaldson shared feeling anxious and over the weekend she spent time with her children and stayed calm through breathing techniques  Joseline Donaldson was observant and responsive to peers in group  Joseline Donaldson made progress towards goals through group participation and is encouraged to continue progressing towards long term goals through program compliance and participation      Tx Plan Objective: 1 1 Therapist:  Mariposa Kapoor, TE

## 2019-03-12 ENCOUNTER — DOCUMENTATION (OUTPATIENT)
Dept: PSYCHOLOGY | Facility: CLINIC | Age: 34
End: 2019-03-12

## 2019-03-12 ENCOUNTER — APPOINTMENT (OUTPATIENT)
Dept: LAB | Facility: HOSPITAL | Age: 34
End: 2019-03-12
Attending: PSYCHIATRY & NEUROLOGY
Payer: COMMERCIAL

## 2019-03-12 DIAGNOSIS — Z01.84 IMMUNITY STATUS TESTING: ICD-10-CM

## 2019-03-12 DIAGNOSIS — Z02.0 SCHOOL PHYSICAL EXAM: ICD-10-CM

## 2019-03-12 DIAGNOSIS — Z79.899 HIGH RISK MEDICATION USE: ICD-10-CM

## 2019-03-12 DIAGNOSIS — E03.9 HYPOTHYROIDISM, UNSPECIFIED TYPE: ICD-10-CM

## 2019-03-12 LAB
ANION GAP SERPL CALCULATED.3IONS-SCNC: 7 MMOL/L (ref 4–13)
BUN SERPL-MCNC: 9 MG/DL (ref 5–25)
CALCIUM SERPL-MCNC: 8.4 MG/DL (ref 8.3–10.1)
CHLORIDE SERPL-SCNC: 104 MMOL/L (ref 100–108)
CO2 SERPL-SCNC: 29 MMOL/L (ref 21–32)
CREAT SERPL-MCNC: 0.65 MG/DL (ref 0.6–1.3)
GFR SERPL CREATININE-BSD FRML MDRD: 116 ML/MIN/1.73SQ M
GLUCOSE P FAST SERPL-MCNC: 80 MG/DL (ref 65–99)
POTASSIUM SERPL-SCNC: 4 MMOL/L (ref 3.5–5.3)
RUBV IGG SERPL IA-ACNC: 46.7 IU/ML
SODIUM SERPL-SCNC: 140 MMOL/L (ref 136–145)
T3FREE SERPL-MCNC: 2.53 PG/ML (ref 2.3–4.2)
TSH SERPL DL<=0.05 MIU/L-ACNC: 2.98 UIU/ML (ref 0.36–3.74)

## 2019-03-12 PROCEDURE — 36415 COLL VENOUS BLD VENIPUNCTURE: CPT

## 2019-03-12 PROCEDURE — 86765 RUBEOLA ANTIBODY: CPT

## 2019-03-12 PROCEDURE — 90715 TDAP VACCINE 7 YRS/> IM: CPT | Performed by: FAMILY MEDICINE

## 2019-03-12 PROCEDURE — 86735 MUMPS ANTIBODY: CPT

## 2019-03-12 PROCEDURE — 80048 BASIC METABOLIC PNL TOTAL CA: CPT

## 2019-03-12 PROCEDURE — 86762 RUBELLA ANTIBODY: CPT

## 2019-03-12 PROCEDURE — 84481 FREE ASSAY (FT-3): CPT

## 2019-03-12 PROCEDURE — 86787 VARICELLA-ZOSTER ANTIBODY: CPT

## 2019-03-12 PROCEDURE — 90471 IMMUNIZATION ADMIN: CPT | Performed by: FAMILY MEDICINE

## 2019-03-12 PROCEDURE — 84443 ASSAY THYROID STIM HORMONE: CPT

## 2019-03-12 NOTE — PROGRESS NOTES
Subjective:     Patient ID: Dorinda Montenegro is a 29 y o  female  [facility] Clinical Progress Notes      Specialized Services Documentation  Therapist must complete separate progress note for each specific clinical activity in which the individual participated during the day  Case Management Note    Eddie COTTONBC    Current suicide risk : Low     3839 Dorinda Montenegro called out ill  Spoke with her - she hopes to be back tomorrow  Medications changes/added/denied? No    Treatment session number: na    Individual Case Management Visit provided today?  No    [facility] follow up physician's orders: na

## 2019-03-12 NOTE — PROGRESS NOTES
Assessment/Plan:       Diagnoses and all orders for this visit:     Major depressive disorder, recurrent, severe w/o psychotic behavior ([address] )     JG (generalized anxiety disorder)          Subjective:      Patient ID: Brian Ni is a 29 y o  female      [facility] Treatment Plan   AREAS OF NEED: Depression as evidenced by irritability, sleeping to cope, racing thoughts, exhaustion, impulsivity, guilt, anger at self, isolation related to multiple life stressors building up over time (work, family, relationship, pain, financial)  Date Initiated: 03/01/19     Strengths:   Resilience, loves her children, clear vocational goals     LONG TERM GOAL:   Date Initiated: 03/01/19  1 0 I will identify 3 signs that my mood has improved and I am more productive in my day to day life  Target Date: 03/29/19  Completion Date:      SHORT TERM OBJECTIVES:      Date Initiated: 03/01/19  1 1 I will identify 3 mindfulness/distress tolerance skills I am practicing to slow down action urges and racing thoughts - sharing successes and challenges with skills I am learning  Revision Date: 03/12/19  Target Date: 03/12/19  Completion Date:      Date Initiated: 03/01/19  1 2 I will identify 3 things I need to do daily to support my wellness and self-care and begin to make time each day to do so  Revision Date:   Target Date: 03/12/19  Completion Date: 03/12/19     Date Initiated: 03/01/19  1 3 I will take medications as prescribed and share questions and concerns if arise  Revision Date:03/12/19  Target Date: 03/12/19  Completion Date:       Date Initiated: 03/01/19  1 4 I will identify 3 ways my supports can assist in my recovery and agree to staff/support contact as indicated      Revision Date:  Target Date: 03/12/19  Completion Date:03/12/19          7 DAY REVISION:     Date Initiated:03/12/19  1 5 I will identify 3 skills/strategies I have learned in program that I can utilize at work, school, and home and share successes and challenges in doing so as I prepare for discharge  Revision Date:   Target Date: 03/27/19  Completion Date:        PSYCHIATRY:  Date Initiated: 03/01/19  Medication Management and Education       Revision Date: 03/12/19       Continue medication management  The person(s) responsible for carrying out the plan is Keith Gibson MD    NURSING:   Date Initiated: 03/01/19  1 1,1 2,1 3,1 4 This RN will provide daily wellness group five days weekly to educate Josesito Stephen on S/S of her diagnoses and medications used in treatment  Revision Date:03/12/19  1 1,1 2,1 3,1 4,1 5 Continue to encourage Josesito Stephen to participate in wellness groups daily to learn about symptoms, coping strategies and warning signs to promote relapse prevention  The person(s) responsible for carrying out the plan is Anushka Mejia RN    PSYCHOLOGY:   Date Initiated: 03/01/19     1 1, 1 2, 1 4 Provide psychotherapy group 5 times per week to allow opportunity for Josesito Stephen to explore stressors and ways of coping  Revision Date: 03/12/19  1 1,1 2,1 4,1 5 Continue to provide psychotherapy group daily to Josesito Stephen and encourage sharing of stressors, skills and positive change  The person(s) responsible for carrying out the plan is Marlen De Leon MA, LPC    ALLIED THERAPY:   Date Initiated: 03/01/19  5 4,7 9 Engage Josesito Stephen in AT group 5 times daily to encourage development and use of wellness tools to decrease symptoms and promote recovery through meaningful activity  Revision Date: 03/12/19  1 1,1 2,1 5 Continue to engage Josesito Stephen to participate in AT group to practice wellness tools within program and transfer to home sharing successes and barriers through healthy task involvement     The person(s) responsible for carrying out the plan is AFUA Duggan     CASE MANAGEMENT:   Date Initiated: 03/01/19      1 0 This  will meet with Josesito Stephen 3-4 times weekly to assess treatment progress, discharge planning, connection to community supports and UR as indicated  Revision Date: 03/12/19  1 0 Continue to meet with Mitch Long 3-4 times weekly to assess growth, work toward goals, continued treatment needs, dc planning and use of supports  The person(s) responsible for carrying out the plan is AFUA Colón        TREATMENT REVIEW/COMMENTS:      DISCHARGE CRITERIA: Identify 3 signs of progress and complete relapse prevention plan      DISCHARGE PLAN: OP providers  Estimated Length of Stay: 10 treatment days        Diagnosis and Treatment Plan explained to Nathaly Fallon relates understanding diagnosis and is agreeable to Treatment Plan       CLIENT COMMENTS / Please share your thoughts, feelings, need and/or experiences regarding your treatment plan: _____________________________________________________________________________________________________________________________________________________________________________________________________________________________________________________________________________________________________________________ Date/Time: ______________      Patient Signature: _________________________________      Date/Time: ______________       Signature: _________________________________      Date/Time: ______________

## 2019-03-13 ENCOUNTER — OFFICE VISIT (OUTPATIENT)
Dept: PSYCHOLOGY | Facility: CLINIC | Age: 34
End: 2019-03-13
Payer: COMMERCIAL

## 2019-03-13 DIAGNOSIS — F41.1 GAD (GENERALIZED ANXIETY DISORDER): ICD-10-CM

## 2019-03-13 DIAGNOSIS — F33.2 MAJOR DEPRESSIVE DISORDER, RECURRENT, SEVERE W/O PSYCHOTIC BEHAVIOR (HCC): Primary | ICD-10-CM

## 2019-03-13 LAB — VZV IGG SER IA-ACNC: NORMAL

## 2019-03-13 PROCEDURE — G0176 OPPS/PHP;ACTIVITY THERAPY: HCPCS

## 2019-03-13 PROCEDURE — G0410 GRP PSYCH PARTIAL HOSP 45-50: HCPCS

## 2019-03-13 PROCEDURE — G0177 OPPS/PHP; TRAIN & EDUC SERV: HCPCS

## 2019-03-13 NOTE — PSYCH
Subjective:     Patient ID: Lazarus Lauber is a 29 y o  female  In[facility]ations Clinical Progress Notes      Specialized Services Documentation  Therapist must complete separate progress note for each specific clinical activity in which the individual participated during the day  Case Management Note    Jeison Peterson [facility]    Current suicide risk : Low     [id number] Met with Lazarus Lauber  Reviewed updated treatment plan and follow up from meeting with s/o  She identified that she is beginning to feel calmer and last evening following an upsetting interaction "I went upstairs, wrote it out, and then went to bed because it was time - not to avoid"  Reinforced this progress  She continues to feel she needs the support of PHP/IOP to utilize skills more consistently as she feels fragile and overwhelmed with upcoming school and return to work  Medications changes/added/denied? No    Treatment session number: 8    Individual Case Management Visit provided today?  Yes     Innovations follow up physician's orders: na

## 2019-03-13 NOTE — PSYCH
Subjective:     Patient ID: Edith Gibson is a 29 y o  female  [facility]ations Clinical Progress Notes      Specialized Services Documentation  Therapist must complete separate progress note for each specific clinical activity in which the individual participated during the day  Group Psychotherapy  Antonia Lucero RN ([phone])      Edith Gibson attended wellness/education group today : Marissa  Different areas discussed were:  (1) Myth or fact about mental health  (2) Sign/symptoms   (3) Treatment options and medications used to treat different diagnosis  (4)  Random facts about metal health  (5) Supports groups for the different diagnosis  Dino Morales was able to actively participate in group discussion  after each category and will continue to attend education and wellness group to work towards achieving goals and objectives         Tx Plan Objective: 1 1,1 2,1 3,1 4

## 2019-03-13 NOTE — PSYCH
Subjective:     Patient ID: Mitch Long is a 29 y o  female  [facility] Clinical Progress Notes      Specialized Services Documentation  Therapist must complete separate progress note for each specific clinical activity in which the individual participated during the day  Group Psychotherapy     ([id number]) Mitch Long participated in group psychotherapy process today  Group began with this writer facilitating a mindfulness exercise on breathing techniques  Clients checked in, shared h9ow they were feeling and something that inspires them  Group began with clients sharing current struggles and engaging in processing emotions related to challenges  Clients then participated in narrative therapy exercise to encourage empathy for themselves and create space for their current process  Clients then shared their experience engaging in the exercise  Mazin Bradshaw shared feeling "okay" today and the sunshine inspires her  Mazin Bradshaw was quiet during group but was observed to be fully engaged in the activity  Mazin Bradshaw made progress towards goals through group participation and is encouraged to continue progressing towards long term goals through program compliance and participation      Tx Plan Objective: 1 1 Therapist:  Naomie Kim LPC

## 2019-03-13 NOTE — PSYCH
Subjective:     Patient ID: Levon Galvez is a 29 y o  female  In[facility]ations Clinical Progress Notes      Specialized Services Documentation  Therapist must complete separate progress note for each specific clinical activity in which the individual participated during the day  Allied Therapy  2982-1985  Levon Galvez actively participated in [facility] group focused on DBT skill Interpersonal Effectiveness  Group reviewed the three goals of an interaction: the objective, the relationship with the other person, and maintaining self-respect  Group discussed GIVE skill of maintaining good relationships in interactions and how to validate others  Group also discussed how to set healthy boundaries and the rights to privacy, respect, time, and space  [facility] shared that the GIVE skill is one she needs to work on  She shared her personal boundaries with the group  Positive progress towards goal noted  Continue AT to increase self-awareness and use of coping strategies with daily practice    Tx Plan Objective: 1 1, 1 2, Therapist: Delmi Vaughn    Education Therapy   Time:  2998-4386  Previous goal met: Yes   Readiness to Learning: Receptive  Barriers to Learning: None  Learning Assessment  Time: 6420-9776  Education Completed: Illness and Wellness Tools  Teaching Method: Verbal and Demonstration  Shared Area of Learning: Yes   Goal Set: Organize room  Tx Plan Objective: 1 1, 1 2, Therapist: Delmi Vaughn

## 2019-03-14 ENCOUNTER — OFFICE VISIT (OUTPATIENT)
Dept: PSYCHOLOGY | Facility: CLINIC | Age: 34
End: 2019-03-14
Payer: COMMERCIAL

## 2019-03-14 DIAGNOSIS — F33.2 MAJOR DEPRESSIVE DISORDER, RECURRENT, SEVERE W/O PSYCHOTIC BEHAVIOR (HCC): Primary | ICD-10-CM

## 2019-03-14 DIAGNOSIS — F41.1 GAD (GENERALIZED ANXIETY DISORDER): ICD-10-CM

## 2019-03-14 LAB
MEV IGG SER QL: NORMAL
MUV IGG SER QL: NORMAL

## 2019-03-14 PROCEDURE — G0410 GRP PSYCH PARTIAL HOSP 45-50: HCPCS

## 2019-03-14 PROCEDURE — G0177 OPPS/PHP; TRAIN & EDUC SERV: HCPCS

## 2019-03-14 PROCEDURE — H0035 MH PARTIAL HOSP TX UNDER 24H: HCPCS

## 2019-03-14 NOTE — PSYCH
Subjective:     Patient ID: Chanell Diaz is a 29 y o  female  [facility] Clinical Progress Notes      Specialized Services Documentation  Therapist must complete separate progress note for each specific clinical activity in which the individual participated during the day  Group Psychotherapy     ([phone]) Chanell Diaz participated in group psychotherapy process today  Group began with this writer facilitating a mindfulness exercise on building confidence  Clients checked in, shared how they were feeling and strength they had  Group discussion focused on universality and imparting information on current challenges  Clients shared in altruism which was helpful for one another and developed socializing techniques  Desire shared feeling anxiety and coming to program to better herself is a strength  She was quiet during group but non-verbally responsive  Narencarlos Panfilo made progress towards goals through group participation and is encouraged to continue progressing towards long term goals through program compliance and participation      Tx Plan Objective: 1 2 Therapist:  Esther Eli LPC

## 2019-03-14 NOTE — PSYCH
Subjective:     Patient ID: Brian Ni is a 29 y o  female  [facility]ations Clinical Progress Notes      Specialized Services Documentation  Therapist must complete separate progress note for each specific clinical activity in which the individual participated during the day  Allied Therapy  0162-1093  Brian Ni actively participated in [facility] group focused on managing anger  Group discussed ineffective and effective ways to manage anger  Group discussed the intensity, energy, and power associated with anger and ways to channel these positively  Group engaged in ekren analysis recognizing attitudes about anger  Group also discussed taking care of physical wellness to reduce vulnerability to emotions  Kaitlynshen Opp shared about ways to manage anger, noting that some skills are easier to use in different settings  She shared that the group was helpful and she intended to reflect on the ways she tends to react  Positive progress towards goal noted  Continue AT to encourage self-awareness and coping strategies with daily practice    Tx Plan Objective: 1 1, 1 2, 1 5, Therapist: Everett Guzman    Education Therapy   Time:  2695-2917  Previous goal met: No  Readiness to Learning: Receptive  Barriers to Learning: None  Learning Assessment  Time: 6026-8076  Education Completed: Illness and Wellness Tools  Teaching Method: Verbal and Demonstration  Shared Area of Learning: Yes   Goal Set: Learn how to react to things/reflect on it for ten minutes  Tx Plan Objective: 1 1, 1 2, 1 5, Therapist: Everett Guzman

## 2019-03-14 NOTE — PSYCH
Subjective:     Patient ID: Bud Cleveland is a 29 y o  female  [facility] Clinical Progress Notes      Specialized Services Documentation  Therapist must complete separate progress note for each specific clinical activity in which the individual participated during the day  Other   0902 Utilization Review:  Spoke with Moe Crandall at [facility] phone [phone],  IOP 12 days authorized from 3/15/19 to 4/10/19  Authorization number [id number]  LULA MicheleBC     Case Management Note    Laura COTTONBC    Current suicide risk : Low     7419-1404 Met with Bud Cleveland  She received STD paperwork from [address] [place] [address] - assisted per her request  Reviewed IOP step down and goals  She will start 3/18/19  Overall she continues to report improvement yet feels fragile in gains  Medications changes/added/denied? No    Treatment session number: 9    Individual Case Management Visit provided today?  Yes     Innovations follow up physician's orders: na

## 2019-03-14 NOTE — PSYCH
Subjective:     Patient ID: Lamar Rdz is a 29 y o  female  [facility]ations Clinical Progress Notes      Specialized Services Documentation  Therapist must complete separate progress note for each specific clinical activity in which the individual participated during the day  Group Psychotherapy ([id number]) Marianela Payne, TAMMY     Lamar Rdz attended wellness group today with focus on developing own personal Safety Plan in times of Relapse   Group discussion began with:  Safety Plan If/When Relapse in Mental Illness or Chemical Addiction Occur is a necessary tool     Your Safety plan should include signs and symptoms you should be familiar with ( AKA Your Red Flags)   (1) When I am in relapse I become   ( what symptoms you experiencing - Different for everyone  (2) My Safety word is   And (3) I will share this plan with   [place] Keep this plan readily available in case you are experiencing a crisis and/or a relapse and share it with anyone who can help you out if this is happening  Discussion on sharing your safety word when you feel your are headed for a crisis  By letting them know your crisis word they can get you the appropriate help needed   Chong Hernandez was able to develop this safety plan with all the parts in place and plans to share this plan with her boyfriend  Chong Hernandez did recognize " my greatest red flag is when I feel myself getting anxious for no apparent reason and then it spirals out of control  I need to control my anxiety with deep breathing because that has calmed me down in the past so I know it works "  Chong Shraddhaakil will continue to attend wellness groups to achieve goals and objectives towards mental wellness        Tx Plan Objective: 1 1,1 2,1 4,1 5

## 2019-03-15 ENCOUNTER — OFFICE VISIT (OUTPATIENT)
Dept: PSYCHIATRY | Facility: CLINIC | Age: 34
End: 2019-03-15
Payer: COMMERCIAL

## 2019-03-15 ENCOUNTER — DOCUMENTATION (OUTPATIENT)
Dept: PSYCHOLOGY | Facility: CLINIC | Age: 34
End: 2019-03-15

## 2019-03-15 VITALS — BODY MASS INDEX: 34.86 KG/M2 | WEIGHT: 216 LBS

## 2019-03-15 DIAGNOSIS — F40.10 SOCIAL PHOBIA: Primary | ICD-10-CM

## 2019-03-15 DIAGNOSIS — F33.2 MAJOR DEPRESSIVE DISORDER, RECURRENT, SEVERE W/O PSYCHOTIC BEHAVIOR (HCC): ICD-10-CM

## 2019-03-15 DIAGNOSIS — F41.1 GAD (GENERALIZED ANXIETY DISORDER): ICD-10-CM

## 2019-03-15 PROCEDURE — 99213 OFFICE O/P EST LOW 20 MIN: CPT | Performed by: PSYCHIATRY & NEUROLOGY

## 2019-03-15 PROCEDURE — 90833 PSYTX W PT W E/M 30 MIN: CPT | Performed by: PSYCHIATRY & NEUROLOGY

## 2019-03-15 RX ORDER — NORTRIPTYLINE HYDROCHLORIDE 25 MG/1
CAPSULE ORAL
Qty: 7 CAPSULE | Refills: 0 | Status: SHIPPED | OUTPATIENT
Start: 2019-03-15 | End: 2019-03-28 | Stop reason: SDUPTHER
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
## 2019-03-15 NOTE — PROGRESS NOTES
Subjective:     Patient ID: Lazaro Schumacher is a 29 y o  female  In[facility]ations Clinical Progress Notes      Specialized Services Documentation  Therapist must complete separate progress note for each specific clinical activity in which the individual participated during the day  Case Management Note    Arsh Khan [facility]    Current suicide risk : Low     2480 Excused due to IOP status  Medications changes/added/denied? No    Treatment session number: na    Individual Case Management Visit provided today?  No    Innovations follow up physician's orders: na

## 2019-03-18 ENCOUNTER — OFFICE VISIT (OUTPATIENT)
Dept: PSYCHOLOGY | Facility: CLINIC | Age: 34
End: 2019-03-18
Payer: COMMERCIAL

## 2019-03-18 DIAGNOSIS — F33.2 MAJOR DEPRESSIVE DISORDER, RECURRENT, SEVERE W/O PSYCHOTIC BEHAVIOR (HCC): ICD-10-CM

## 2019-03-18 DIAGNOSIS — F40.10 SOCIAL PHOBIA: ICD-10-CM

## 2019-03-18 DIAGNOSIS — F41.1 GAD (GENERALIZED ANXIETY DISORDER): ICD-10-CM

## 2019-03-18 DIAGNOSIS — F41.1 GAD (GENERALIZED ANXIETY DISORDER): Primary | ICD-10-CM

## 2019-03-18 PROCEDURE — S9480 INTENSIVE OUTPATIENT PSYCHIA: HCPCS

## 2019-03-18 RX ORDER — CLONAZEPAM 0.5 MG/1
TABLET ORAL
Qty: 60 TABLET | Refills: 1 | Status: SHIPPED | OUTPATIENT
Start: 2019-03-18 | End: 2019-05-16 | Stop reason: SDUPTHER

## 2019-03-18 NOTE — PSYCH
Subjective:     Patient ID: Sherrell Helm is a 29 y o  female  Innovations Clinical Progress Notes      Specialized Services Documentation  Therapist must complete separate progress note for each specific clinical activity in which the individual participated during the day  Case Management Note    Melia Chao [facility]    Current suicide risk : Low     1487-1343 Met with Sherrell Helm  She was tearful  "The weekend wasn't good"  She continues to have relationship issues, yet also was aware she did not use skills, became frustrated with herself, and isolated  She was able to voice how she could have used skills earlier in the weekend to stop the downward spiral  She expressed frustration related to "I am good on days I am here, but not when I am not"  Joseline Donaldson utilized crisis text over the weekend  Discussed this being the purpose of IOP  This writer followed up with a pharmacy concern with her OP provider  She also received a notice from  that her FMLA was approved, however an "acting manager" put her on the schedule today, so she was upset that she did not do something correctly (reinforced that she did manage herself in an appropriate way)  Medications changes/added/denied? Yes     Treatment session number: 10 (IOP 1)    Individual Case Management Visit provided today?  No    Innovations follow up physician's orders: see IOP order

## 2019-03-18 NOTE — PSYCH
Subjective:     Patient ID: Dianne Harrington is a 29 y o  female  In[facility]ations Clinical Progress Notes      Specialized Services Documentation  Therapist must complete separate progress note for each specific clinical activity in which the individual participated during the day  Group Psychotherapy ([id number]) Tonia Cook RN     Dianne Harrington attended wellness group today:  - discussion started with the slogan: -  Letting Gratitude Be You Attitude- Gratitude journal handed out   The Gratitude Journal has 4 areas: (1) Morning Gratitude (before you begin your day, list 10 things youre grateful for  (2) People I am grateful for (List 5 people who made your life a happier life today  These could be friends, family or strangers)  (3) What Im learning from my challenges (List 3 obstacles and what you are learning from them ) and (4) The best part of my day (Choose 1 moment of your day that made you happy and focus on it for 5 minutes before bed tonight)  Shelianorman Amadordeepak was able to list things and people in her life that she is grateful for and is making progress towards goals and objectives and will continue to attend wellness group to achieve long term goals and objectives         Tx Plan Objective: 1 1,1 2,1 4,1 5

## 2019-03-18 NOTE — PSYCH
Subjective:     Patient ID: Fifi Condon is a 29 y o  female  [facility] Clinical Progress Notes      Specialized Services Documentation  Therapist must complete separate progress note for each specific clinical activity in which the individual participated during the day  Allied Therapy  [id number]  Fifi Condon actively participated in [facility] group focused on DBT skill Mindfulness  Group began by acknowledging and expressing emotions about an incident which occurred in another session  Group focused on the HOW skills of mindfulness: non-judgmentally, one-mindfully, and effectively  Group reviewed the WHAT skills observe, describe, and participate, as well as [facility]  Group engaged in mindfulness exercise of focusing on a task  Group discussed how to use mindfulness to relieve stress about difficult situations  Callie Taylor shared that she would practice mindfulness tonight  Positive progress towards goal noted  Continue AT to increase self-awareness and use of wellness strategies with daily practice    Tx Plan Objective: 1 1, 1 2, 1 5, Therapist: Bridgett Novoa    Education Therapy   Time:  4842-7776  Previous goal met: Yes   Readiness to Learning: Receptive  Barriers to Learning: None  Learning Assessment  Time: 3446-8711  Education Completed: Illness, Aftercare and Wellness Tools  Teaching Method: Verbal and Demonstration  Shared Area of Learning: Yes   Goal Set: Go to [employer]  Tx Plan Objective: 1 1, 1 2, 1 5, Therapist: Bridgett Novoa

## 2019-03-18 NOTE — PSYCH
[facility] Clinical Progress Notes      Specialized Services Documentation  Therapist must complete separate progress note for each specific clinical activity in which the individual participated during the day         [facility] follow up physician's orders:   DATE 3/18/2019  TIME 10:52 AM   [facility] TODAY  Sasha Thrasher MD

## 2019-03-18 NOTE — PSYCH
Subjective:     Patient ID: Jeffery Dixon is a 29 y o  female  [facility] Clinical Progress Notes      Specialized Services Documentation  Therapist must complete separate progress note for each specific clinical activity in which the individual participated during the day  Group Psychotherapy      ([id number]) Jeffery Dixon participated in group psychotherapy process today  Group began with this writer facilitating a mindfulness exercise on being present and focusing  Clients checked in, shared how they were feeling, and shared how they have accomplished challenging things in their past   Clients engaged in discussion on debriefing from an incident earlier in the day  Clients engaged in discussion on stressors at work and the challenges of advocating for yourself at the workplace  Clients processed struggles together and encouraged one another through discussion and validation  Mary Jim shared feeling anxious and in the past she has coped negatively with sleeping on issues  She shared struggling with returning to work and 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

## 2019-03-19 ENCOUNTER — OFFICE VISIT (OUTPATIENT)
Dept: BEHAVIORAL/MENTAL HEALTH CLINIC | Facility: CLINIC | Age: 34
End: 2019-03-19
Payer: COMMERCIAL

## 2019-03-19 ENCOUNTER — DOCUMENTATION (OUTPATIENT)
Dept: PSYCHOLOGY | Facility: CLINIC | Age: 34
End: 2019-03-19

## 2019-03-19 DIAGNOSIS — F33.2 MAJOR DEPRESSIVE DISORDER, RECURRENT, SEVERE W/O PSYCHOTIC BEHAVIOR (HCC): ICD-10-CM

## 2019-03-19 DIAGNOSIS — F41.1 GAD (GENERALIZED ANXIETY DISORDER): Primary | ICD-10-CM

## 2019-03-19 PROCEDURE — 99213 OFFICE O/P EST LOW 20 MIN: CPT | Performed by: SOCIAL WORKER
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
## 2019-03-19 NOTE — PROGRESS NOTES
Subjective:     Patient ID: Michael Rossi is a 29 y o  female  [facility] Clinical Progress Notes      Specialized Services Documentation  Therapist must complete separate progress note for each specific clinical activity in which the individual participated during the day  Case Management Note    [facility]    Current suicide risk : Low     1008 Excused today due to IOP status  Medications changes/added/denied? No    Treatment session number: na    Individual Case Management Visit provided today?  No    [facility] follow up physician's orders: na

## 2019-03-19 NOTE — PSYCH
Sin العلي  2/96/1957       Date of Initial Treatment Plan: 3/19/19   Date of Current Treatment Plan: 03/19/19    Treatment Plan Number 1     Strengths/Personal Resources for Self Care: motivated, enjoys working, is able to get through the day even though it can be difficult  Diagnosis:   1  JG (generalized anxiety disorder)     2  Major depressive disorder, recurrent, severe w/o psychotic behavior ([facility] [address] )         Area of Needs: jealousy       Long Term Goal 1: AI would like to re-focus negative thoughts    Target Date: 9/1/19  Completion Date:          Short Term Objectives for Goal 1: A I will be able to learn 3 new skills to reduce and correct negative thoughts  and BI will be able to communicate how negative thoughts impact my overall sense of self worth at least 3 times during the week  Long Term Goal 2: Learn to let things go  Target Date: 10/1/19  Completion Date: N/A    Short Term Objectives for Goal 2: A I will learn to share my feelings appropriately to others, rather than hold them in, at least 3 times per week  and BI will be able to demonstrate improved self-esteem, and not allow the opinions of others to impact me negatively, at least 5 times per month  Long Term Goal # 3: N/A     Target Date: N/A  Completion Date: N/A    Short Term Objectives for Goal 3: N/A    GOAL 1: Modality: Individual 2x per month   Completion Date ongoing and Medication Management    GOAL 2: Modality: Individual 2x per month   Completion Date ongoing and Medication Management         Behavioral Health Treatment Plan [facility]: Diagnosis and Treatment Plan explained to Lucas Hoff relates understanding diagnosis and is agreeable to Treatment Plan         Client Comments : Please share your thoughts, feelings, need and/or experiences regarding your treatment plan: __________________________________________________________________    __________________________________________________________________    __________________________________________________________________    __________________________________________________________________    _______________________________________                Patient signature, Date Time: __________________________________________             Physician cosigner signature, Date, Time: ________________________________

## 2019-03-20 ENCOUNTER — OFFICE VISIT (OUTPATIENT)
Dept: PSYCHOLOGY | Facility: CLINIC | Age: 34
End: 2019-03-20
Payer: COMMERCIAL

## 2019-03-20 DIAGNOSIS — F33.2 MAJOR DEPRESSIVE DISORDER, RECURRENT, SEVERE W/O PSYCHOTIC BEHAVIOR (HCC): Primary | ICD-10-CM

## 2019-03-20 DIAGNOSIS — F41.1 GAD (GENERALIZED ANXIETY DISORDER): ICD-10-CM

## 2019-03-20 PROCEDURE — S9480 INTENSIVE OUTPATIENT PSYCHIA: HCPCS

## 2019-03-20 NOTE — PSYCH
Subjective:     Patient ID: Aleyda Sylvester is a 29 y o  female  [facility] Clinical Progress Notes      Specialized Services Documentation  Therapist must complete separate progress note for each specific clinical activity in which the individual participated during the day  Allied Therapy  [id number]  Aleyda Sylvester actively participated in [facility] group focused on DBT skill Interpersonal Effectiveness  Group focused on self-respect effectiveness with the FAST skill  Group reviewed DEAR MAN and GIVE and discussed how to balance self-respect with objectives and the relationship in interpersonal situations  Group discussed sticking to ones values and being true to oneself  Bibi Mains was given a list of personal values and checked off several values that were important to her  She participated in task designing a personal coat of arms based on values  She shared that family is a strong value that she is living and that self-love is a value she needs to work on  Positive progress towards goal noted  Continue AT to increase self-awareness and use of coping skills with daily practice    Tx Plan Objective: 1 1, 1 2, 1 5, Therapist: Blanca Garcia    Education Therapy   Time:  4622-7743  Previous goal met: Yes   Readiness to Learning: Receptive  Barriers to Learning: None  Learning Assessment  Time: 0903-8443  Education Completed: Illness and Wellness Tools  Teaching Method: Verbal and Demonstration  Shared Area of Learning: Yes   Goal Set: Go workout  Tx Plan Objective: 1 1, 1 2, 1 5, Therapist: Blanca Garcia

## 2019-03-20 NOTE — PSYCH
Subjective:     Patient ID: Marlena Lantigua is a 29 y o  female  [facility] Clinical Progress Notes      Specialized Services Documentation  Therapist must complete separate progress note for each specific clinical activity in which the individual participated during the day  Group Psychotherapy ([id number]) Luis Miguel Zheng RN     Marlena Lantigua attended wellness group today on: Living with Bipolar: Educational Video entitled - Abigail Waterman viewed  Discussion  started off with definition of Bipolar Disorder which can be referred  as  manic-depressive disorder    Video viewed  After  the video discussion  began with  : For people living with Bipolar , the view of life can seem dark and ominous or deceptively beautiful  Bipolar illness can twist and distort the mind of physically healthy individuals, to the point that their jobs their families and even lives can be lost   But with education, therapy and medication management, people can regain what they have lost and perhaps live in a world better than they have known  Information provided on all the resources available to treat this disease  Support groups like ANGELINA ([address] [place] [address] Avenue) is a great tool available for people diagnosed with this illness, families and friends  Makeda Hare was able to understand better that with proper diagnosis,  medication and education, this illness is manageable and treatable  Makeda Hare will continue to attend wellness groups in order to achieve goals and objectives          Tx Plan Objective: 1 1,1 2,1 3,1 4

## 2019-03-20 NOTE — PSYCH
Subjective:     Patient ID: Didi Meadows is a 29 y o  female  [facility] Clinical Progress Notes      Specialized Services Documentation  Therapist must complete separate progress note for each specific clinical activity in which the individual participated during the day  Case Management Note    [address] [facility]    Current suicide risk : Low     2905-7768 Met with Didi Meadows  She identified that she had a good first visit with new therapist and she also started going to a gym  "I need to work on getting myself emotionally and physically healthy"  She continues to struggle with communication with s/o  She is aware she reacts emotionally versus taking time to think - reinforced that at least she is growing aware of her patterns and can start to implement skills to think before allowing self to let a conversation ruin her mood/day  She was encouraged to follow up with short term disability (Dr Sterling Pavon submitted form)  Prescription issue resolved  IOP status and days reviewed  Medications changes/added/denied? No    Treatment session number: 11 (IOP 2)    Individual Case Management Visit provided today?  Yes     [facility] follow up physician's orders: na

## 2019-03-21 ENCOUNTER — DOCUMENTATION (OUTPATIENT)
Dept: PSYCHOLOGY | Facility: CLINIC | Age: 34
End: 2019-03-21

## 2019-03-21 NOTE — PROGRESS NOTES
Subjective:     Patient ID: Didi Meadows is a 29 y o  female  [facility] Clinical Progress Notes      Specialized Services Documentation  Therapist must complete separate progress note for each specific clinical activity in which the individual participated during the day  Case Management Note    [address] [facility]    Current suicide risk : Low     4692 Didi Meadows cancelled due to daughter spraining ankle and needing to attend an appointment related to this  She will come in for IOP day tomorrow  This writer spoke with Kathleen Velásquez to confirm  Medications changes/added/denied? No    Treatment session number: na    Individual Case Management Visit provided today?  No    [facility] follow up physician's orders: na

## 2019-03-22 ENCOUNTER — OFFICE VISIT (OUTPATIENT)
Dept: PSYCHOLOGY | Facility: CLINIC | Age: 34
End: 2019-03-22
Payer: COMMERCIAL

## 2019-03-22 DIAGNOSIS — F33.2 MAJOR DEPRESSIVE DISORDER, RECURRENT, SEVERE W/O PSYCHOTIC BEHAVIOR (HCC): Primary | ICD-10-CM

## 2019-03-22 DIAGNOSIS — F41.1 GAD (GENERALIZED ANXIETY DISORDER): ICD-10-CM

## 2019-03-22 PROCEDURE — S9480 INTENSIVE OUTPATIENT PSYCHIA: HCPCS

## 2019-03-22 NOTE — PSYCH
Subjective:     Patient ID: Didi Meadows is a 29 y o  female  [facility] Clinical Progress Notes      Specialized Services Documentation  Therapist must complete separate progress note for each specific clinical activity in which the individual participated during the day  Allied Therapy  [id number]  Didi Meadows actively participated in [facility] group focused on DBT emotional regulation skill Accumulating the One [place]  She was observed to engage in relaxation exercise  Group discussed DBT skill ABC PLEASE to reduce emotional vulnerability by accumulating positive events, building mastery, coping ahead, and taking care of physical needs  Group engaged in musical bingo to engage in pleasant events  Kathleen Velásquez was given a list of 225 pleasant events and checked off several that were enjoyable for her  She shared that she would engage in pleasant events this weekend by taking time to relax  Positive progress towards goal noted  Continue AT to explore coping skills and increase use with daily practice    Tx Plan Objective: 1 1, 1 2, 1 5, Therapist: Blank Casarez    Education Therapy   Time:  1785-3332  Previous goal met: Yes   Readiness to Learning: Receptive  Barriers to Learning: None  Learning Assessment  Time: 6369-5717  Education Completed: Illness and Wellness Tools  Teaching Method: Verbal and Demonstration  Shared Area of Learning: Yes   Goal Set: Try to work out  Tx Plan Objective: 1 1, 1 2, 1 5, Therapist: Blank Casarez

## 2019-03-22 NOTE — PSYCH
Subjective:     Patient ID: Lamar Rdz is a 29 y o  female  [facility] Clinical Progress Notes      Specialized Services Documentation  Therapist must complete separate progress note for each specific clinical activity in which the individual participated during the day  Group Psychotherapy     ([id number]) Lamar Rdz participated in group psychotherapy process today  Group began with this writer facilitating a breathing exercise  Clients checked in, shared how they were feeling and identified a pleasant and productive plan for the weekend  Clients engaged in an open discussion on current self-care activities and how they are taking care of themselves  Clients were given a handout on different recommendations for self-care and completed a self-care plan for themselves  Chong Hernandez shared feeling irritable and wanting to do something with her children this weekend  Chong Hernandez was actively involved in discussion, noting that she is trying to work at being patience regarding her exercise habits and establishing good habits to benefit in the end  She shared her self-care plan with the group  Chong Hernandez made progress towards goals through group participation and is encouraged to continue progressing towards long term goals through program compliance and participation      TX Plan Objective: 1 1 Therapist:  Leticia Leonard LPC

## 2019-03-22 NOTE — PSYCH
Subjective:     Patient ID: Lamar Rdz is a 29 y o  female  [facility] Clinical Progress Notes      Specialized Services Documentation  Therapist must complete separate progress note for each specific clinical activity in which the individual participated during the day  Group Psychotherapy ([id number]) Marianela Payne RN     Lamar Rdz attended- : weekly  wellness assessment group today  Reviewing weekend supports and plan as we approach the weekend  Medications reviewed with  no concerns or questions at this time  Compliance of medications reviewed and understood  The six (6) dimension of wellness discussed (Physical, Emotional, Cognitive, Vocational, Social and Spiritual)  The format for assessing wellness and measuring progress   reviewed   Questions asked :  (1) I intend to improve in this area by    (2) My first step will be   (3) I will share my plans with Corina Blood ask for their support by saying    Chong Hernandez did verbalize " I use the tools, education and resources given to me here and I apply it in everything that I do  I need to remember if I stop applying them I will relapse and this will not be good "   Chong Hernandez is making  progress towards goals and objectives and will continue to attend wellness group           Tx Plan Objective: 1 1,1 2,1 3,1 4,1 5

## 2019-03-22 NOTE — PSYCH
Subjective:     Patient ID: Emilie King is a 29 y o  female  [facility] Clinical Progress Notes      Specialized Services Documentation  Therapist must complete separate progress note for each specific clinical activity in which the individual participated during the day  Case Management Note    [facility]    Current suicide risk : Low     [id number] Met with Emilie King  She was brighter today  She identified that she has plans with her children this weekend  Weekend supports reviewed  With discontinuation of Nortriptilyne she has not had any issues  Continue IOP to monitor for increased stability of mood and overall gains  Medications changes/added/denied? No    Treatment session number: 12 (IOP3)  Individual Case Management Visit provided today?  Yes     [facility] follow up physician's orders: na

## 2019-03-25 ENCOUNTER — OFFICE VISIT (OUTPATIENT)
Dept: PSYCHOLOGY | Facility: CLINIC | Age: 34
End: 2019-03-25
Payer: COMMERCIAL

## 2019-03-25 DIAGNOSIS — F41.1 GAD (GENERALIZED ANXIETY DISORDER): ICD-10-CM

## 2019-03-25 DIAGNOSIS — F33.2 MAJOR DEPRESSIVE DISORDER, RECURRENT, SEVERE W/O PSYCHOTIC BEHAVIOR (HCC): Primary | ICD-10-CM

## 2019-03-25 PROCEDURE — S9480 INTENSIVE OUTPATIENT PSYCHIA: HCPCS

## 2019-03-25 NOTE — PSYCH
Subjective:     Patient ID: Sherrell Helm is a 29 y o  female  [facility] Clinical Progress Notes      Specialized Services Documentation  Therapist must complete separate progress note for each specific clinical activity in which the individual participated during the day  Group Psychotherapy     ([id number]) Sherrell Helm participated in group psychotherapy process today  Group began with this writer facilitating a guided meditation  Clients checked in, shared how they were feeling and identified a challenge or a success they had from the weekend  Clients engaged in an open discussion on current problems affecting their well-being and coping with stress  Clients then transitioned into a discussion on change and the barriers to changing  Clients shared their change statements and discussed their motivation for change as well as successive approximations to assist with the change process  Joseline Donaldson shared feeling thankful today and spent the weekend righting her wrongs  She shared insight to her current struggles and discussed how her mindset is the catalyst for her change, as she has to work on herself and her happiness  Joseline Donaldson made progress towards goals through group participation and is encouraged to continue progressing towards long term goals through program compliance and participation       Tx Plan Objective: 1 1 Therapist:  Mariposa Kapoor [facility]

## 2019-03-25 NOTE — PSYCH
Subjective:     Patient ID: Dea Chiang is a 29 y o  female  [facility] Clinical Progress Notes      Specialized Services Documentation  Therapist must complete separate progress note for each specific clinical activity in which the individual participated during the day  Allied Therapy  [id number]  Dea Chiang actively participated in [facility] group focused on DBT skill Mindfulness  Group discussed the definition of mindfulness, as well as goals of mindfulness and ways to practice it  Group engaged in relaxation exercise  Group discussed how being present in the moment can help reduce stress and improve focus  Group reviewed mantras and how to use them to refocus in the moment  Tristonshey Moyiglesia shared her positive affirmation with the group and shared that she would focus on a task mindfully tonight  Some positive progress towards goal noted  Continue AT to increase self-awareness and encourage daily use of coping strategies    Tx Plan Objective: 1 1, 1 2, 1 5, Therapist: Felicita Castanon    Education Therapy   Time:  2843-2613  Previous goal met: No  Readiness to Learning: Receptive  Barriers to Learning: None  Learning Assessment  Time: 9067-0404  Education Completed: Illness and Wellness Tools  Teaching Method: Verbal and Demonstration  Shared Area of Learning: Yes   Goal Set: Journal  Tx Plan Objective: 1 1, 1 2, 1 5, Therapist: Felicita Castanon

## 2019-03-25 NOTE — PSYCH
Subjective:     Patient ID: Reva Márquez is a 29 y o  female  [facility] Clinical Progress Notes      Specialized Services Documentation  Therapist must complete separate progress note for each specific clinical activity in which the individual participated during the day  Group Psychotherapy ([id number]) Tatiana Salamanca RN    STRESS WELLNESS GROUP   Reva Márquez attended wellness group today  - Are You Under Stress? -  Discussion started with how  do you know if you are stressed out? The question asked was - I know I am under stress when I am felling or acting ---------------  When I see these symptoms, I will ----------- (and be specific)  I will do this alternative activity for ----- minutes until I am back in control of my behavior  Recognizing stress is ongoing, a normal part of life is the first step in dealing with it   Turn stress into a positive force and lets make life more interesting! Handout given on : Different tips that can be used to handle stress effectively  Work off stress (physical activity to blow off steam)  (1) Talk it off  (2) Learn to accept what cannot be changed  (3) Avoid self medicating  (4) Get enough rest and sleep  (5) Balance work and recreation  (6) Get outside of yourself- sometimes when you are distressed, you might concentrate on your own troubles and situation to the exclusion of others  When this happens, try to get your mind off of yourself by helping others  The extra bonus of this technique is that it helps make friends  (7) Take one thing at a time- its defeating to tackle everything at once  Instead, prioritize your tasks and set aside time to work on the most urgent  (8) Give in once in a while- If you find out that the major stress of is other people, try giving in instead of fighting and insisting you are always right  You may find that others may begin to give in too  (9) Stay active instead of sitting alone and being frustrated    Make yourself available, become more involved in community groups and charitable organizations  Tamica Moyer was very vocal in group today  She identified "  I need to learn to give in once in a while and not feel the need to win every chapa  I also need to learn that I do not need to make everything in my life a war or a chpaa "     Tamica Moyer is making progress towards wellness and will continue to attend groups         Tx Plan Objective: 1 1,1 2,1 5

## 2019-03-25 NOTE — PSYCH
Subjective:     Patient ID: Aidan Almonte is a 29 y o  female  [facility] Clinical Progress Notes      Specialized Services Documentation  Therapist must complete separate progress note for each specific clinical activity in which the individual participated during the day  Case Management Note    Mireya Shah [facility]    Current suicide risk : Low     9825-2740 Met with Aidan Almonte  She continues to struggle with negative thoughts - encouraged use of affirmations and mantras to begin to challenge (given resources to practice with)  Reviewed goals for the week and strategies to increase motivation  Barriers to discharge remain limited support, and inconsistent motivation  Medications changes/added/denied? No    Treatment session number: 13 (IOP 4)    Individual Case Management Visit provided today?  Yes     [facility] follow up physician's orders: na

## 2019-03-26 ENCOUNTER — DOCUMENTATION (OUTPATIENT)
Dept: PSYCHOLOGY | Facility: CLINIC | Age: 34
End: 2019-03-26

## 2019-03-26 NOTE — PROGRESS NOTES
Subjective:     Patient ID: Emilie King is a 29 y o  female  [facility] Clinical Progress Notes      Specialized Services Documentation  Therapist must complete separate progress note for each specific clinical activity in which the individual participated during the day  Case Management Note    [facility]    Current suicide risk : Low     1005 Excused due to IOP status  Medications changes/added/denied? No    Treatment session number: na    Individual Case Management Visit provided today?  No    [facility] follow up physician's orders: na

## 2019-03-27 ENCOUNTER — OFFICE VISIT (OUTPATIENT)
Dept: PSYCHOLOGY | Facility: CLINIC | Age: 34
End: 2019-03-27
Payer: COMMERCIAL

## 2019-03-27 ENCOUNTER — DOCUMENTATION (OUTPATIENT)
Dept: BEHAVIORAL/MENTAL HEALTH CLINIC | Facility: CLINIC | Age: 34
End: 2019-03-27

## 2019-03-27 DIAGNOSIS — F33.2 MAJOR DEPRESSIVE DISORDER, RECURRENT, SEVERE W/O PSYCHOTIC BEHAVIOR (HCC): Primary | ICD-10-CM

## 2019-03-27 DIAGNOSIS — F41.1 GAD (GENERALIZED ANXIETY DISORDER): ICD-10-CM

## 2019-03-27 PROCEDURE — S9480 INTENSIVE OUTPATIENT PSYCHIA: HCPCS

## 2019-03-27 NOTE — PSYCH
Subjective:     Patient ID: Dorinda Montenegro is a 29 y o  female  [facility] Clinical Progress Notes      Specialized Services Documentation  Therapist must complete separate progress note for each specific clinical activity in which the individual participated during the day  Allied Therapy  2383-2011  Dorinda Montenegro actively participated in [facility] group focused on DBT skill Emotional Regulation  Group explored the goals of emotional regulation as well as the importance of having emotions  Enricocarolina Nuñez engaged in keren analysis comparing the avoidance of emotions versus acting impulsively on them  Group engaged in songwriting task describing triggers and feelings associated with emotions  Claudetta Morales acknowledged the importance and the difficulty of understanding and naming emotions  She described feelings and triggers associated with the emotion "frustrated " Positive progress towards goal noted  Continue AT to increase self-awareness and use of coping skills with daily practice    Tx Plan Objective: 1 1, 1 2, Therapist: Eddie Garner    Education Therapy   Time:  0587-6300  Previous goal met: Yes   Readiness to Learning: Receptive  Barriers to Learning: None  Learning Assessment  Time: 5760-4472  Education Completed: Illness and Wellness Tools  Teaching Method: Verbal and Demonstration  Shared Area of Learning: Yes   Goal Set: Talk to boyfriend  Tx Plan Objective: 1 1, 1 2, 1 4, 1 5, Therapist: Eddie GarnerBC

## 2019-03-27 NOTE — PSYCH
Subjective:     Patient ID: Elsy Valdez is a 29 y o  female  [facility] Clinical Progress Notes      Specialized Services Documentation  Therapist must complete separate progress note for each specific clinical activity in which the individual participated during the day  Case Management Note    Laurie Patterson [facility]    Current suicide risk : Low     [id number] Elsy Valdez was increasing upset today  She identified that she is having more relationship issues and feels it is her fault  She continues to ruminate and over-think about Brendan's fidelity  He is distancing himself despite her asking for reassurance  Given some relationship handouts and support  She is identifying ways she can work her thinking patterns  Treatment plan updated to reflect this  Family meeting scheduled for next week  She also is sleeping more poorly, has increased anhedonia - will request physician check in with her as Nortriptyline recently discontinued  Medications changes/added/denied? No    Treatment session number: 14 (IOP 5)    Individual Case Management Visit provided today?  Yes     [facility]ations follow up physician's orders: na

## 2019-03-27 NOTE — PROGRESS NOTES
Assessment/Plan:      There are no diagnoses linked to this encounter  Subjective:     Patient ID: Sherrell Helm is a 29 y o  female  Outpatient Discharge Summary:   Admission Date: 9/16/19  Joseline Donaldson was referred by unknown  Discharge Date: 2/26/19    Discharge Diagnosis:    MDD, recurrent, severe w/o psychotic features (F33 2), JG (F41 1), and Social Phobia (F40 10)    Treating Physician: Dr To Bach  Treatment Complications: NA  Presenting Problem: Depressed and anxious, relationship and communication problem, health problems, mental and financial stress  Course of treatment includes:    medication management and individual therapy   Treatment Progress: fair  Criteria for Discharge: Therapist is retring  Aftercare recommendations include F/u with Dr To Bach for meds, f/u with Isaias Huffman LCSW for therapy  Discharge Medications include:  Current Outpatient Medications:     clonazePAM (KlonoPIN) 0 5 mg tablet, Take 1/2 to 1 tab twice daily as needed for anxiety, Disp: 60 tablet, Rfl: 1    ergocalciferol (VITAMIN D2) 50,000 units, TAKE ONE CAPSULE BY MOUTH ONE TIME PER WEEK, Disp: 4 capsule, Rfl: 1    folic acid (FOLVITE) 577 MCG tablet, Take 400 mcg by mouth daily, Disp: , Rfl:     nortriptyline (PAMELOR) 25 mg capsule, After 3-7 days, may stop, Disp: 7 capsule, Rfl: 0    vortioxetine (TRINTELLIX) 10 MG tablet, Once off of prozac, start Trintellix 5mg daily x1wk, then 10mg daily  , Disp: 30 tablet, Rfl: 1    Prognosis: fair

## 2019-03-27 NOTE — PSYCH
Subjective:     Patient ID: Matthew Castaneda is a 29 y o  female  [facility] Clinical Progress Notes      Specialized Services Documentation  Therapist must complete separate progress note for each specific clinical activity in which the individual participated during the day  Group Psychotherapy     ([id number]) Matthew Castaneda participated in group psychotherapy process today  Group began with this writer facilitating a mindfulness exercise on acceptance  Clients checked in, shared how they were feeling and something that was on their minds  The check-in exercise stemmed into the discussions for the group  Each member had an opportunity to share their current struggles  Clients engaged in group cohesiveness and expressed themselves without fear of judgement or repercussions  Tad Ee shared feeling anxious today and struggling with her relationship  And needing to fix herself in order to fix her relationship  Tad Ee asked not to speak any further regarding the issue and instead supported peers  Tad Ee made progress towards goals through group participation and is encouraged to continue progressing towards long term goals through program compliance and participation      Tx Plan Objective: 1 1 Therapist:  Abby Philippe LPC

## 2019-03-27 NOTE — PSYCH
Assessment/Plan:       Diagnoses and all orders for this visit:     Major depressive disorder, recurrent, severe w/o psychotic behavior ([address] )     JG (generalized anxiety disorder)          Subjective:      Patient ID: Desire العلي is a 29 y  o  female      [facility] Treatment Plan   AREAS OF NEED: Depression as evidenced by irritability, sleeping to cope, racing thoughts, exhaustion, impulsivity, guilt, anger at self, isolation related to multiple life stressors building up over time (work, family, relationship, pain, financial)      Date Initiated: 03/01/19     Strengths:   Resilience, loves her children, clear vocational goals     LONG TERM GOAL:   Date Initiated: 03/01/19  1 0 I will identify 3 signs that my mood has improved and I am more productive in my day to day life    Target Date: 03/29/19  Completion Date:      SHORT TERM OBJECTIVES:      Date Initiated: 03/01/19  1 1 I will identify 3 mindfulness/distress tolerance skills I am practicing to slow down action urges and racing thoughts - sharing successes and challenges with skills I am learning    Revision Date: 03/12/19  Target Date: 03/12/19  Completion Date:      Date Initiated: 03/01/19  1 2 I will identify 3 things I need to do daily to support my wellness and self-care and begin to make time each day to do so    Revision Date:   Target Date: 03/12/19  Completion Date: 03/12/19     Date Initiated: 03/01/19  1 3 I will take medications as prescribed and share questions and concerns if arise     Revision Date:03/12/19  Target Date: 03/12/19  Completion Date:       Date Initiated: 03/01/19  1 4 I will identify 3 ways my supports can assist in my recovery and agree to staff/support contact as indicated     Revision Date:  Target Date: 03/12/19  Completion Date:03/12/19          7 DAY REVISION:     Date Initiated:03/12/19  1 5 I will identify 3 skills/strategies I have learned in program that I can utilize at work, school, and home and share successes and challenges in doing so as I prepare for discharge  Revision Date: 03/27/19  Target Date: 03/27/19  Completion Date:     Date Initiated: 03/27/19  1 6 I will read relationship handouts and not assume or accuse s/o daily (and when I feel like I am getting stuck I will review my positive affirmation cards)  Revision Date:   Target Date: 04/10/19  Completion Date:       PSYCHIATRY:  Date Initiated: 03/01/19  Medication Management and Education       Revision Date: 03/12/19; 03/27/19       Continue medication management  The person(s) responsible for carrying out the plan is Alisa Wagner MD     NURSING:   Date Initiated: 03/01/19  1 1,1 2,1 3,1 4 This RN will provide daily wellness group five days weekly to educate Reva Márquez on S/S of her diagnoses and medications used in treatment  Revision Date:03/12/19; 03/27/19  1 1,1 2,1 3,1 4,1 5, 1 6 Continue to encourage Divyaa Yulisa to participate in wellness groups daily to learn about symptoms, coping strategies and warning signs to promote relapse prevention  The person(s) responsible for carrying out the plan is Tatiana Salamanca RN     PSYCHOLOGY:   Date Initiated: 03/01/19     1 1, 1 2, 1 4 Provide psychotherapy group 5 times per week to allow opportunity for Reatha Hunting to explore stressors and ways of coping  Revision Date: 03/12/19; 03/27/19  1 1,1 2,1 4,1 5, 1 6 Continue to provide psychotherapy group daily to Reatha Hunting and encourage sharing of stressors, skills and positive change  The person(s) responsible for carrying out the plan is Masha Wade MA, LPC     ALLIED THERAPY:   Date Initiated: 03/01/19  4 7,7 0 Engage Reatha Hunting in AT group 5 times daily to encourage development and use of wellness tools to decrease symptoms and promote recovery through meaningful activity    Revision Date: 03/12/19; 03/27/19  1 1,1 2,1 5, 1 6 Continue to engage Reatha Hunting to participate in AT group to practice wellness tools within program and transfer to home sharing successes and barriers through healthy task involvement  The person(s) responsible for carrying out the plan is AFUA Rodriguez      CASE MANAGEMENT:   Date Initiated: 03/01/19      1 0 This  will meet with Lazaro Schumacher 3-4 times weekly to assess treatment progress, discharge planning, connection to community supports and UR as indicated  Revision Date: 03/12/19; 03/27/19  1 0 Continue to meet with Lazaro Schumacher 3-4 times weekly to assess growth, work toward goals, continued treatment needs, dc planning and use of supports    The person(s) responsible for carrying out the plan is AFUA Rodriguez        TREATMENT REVIEW/COMMENTS:      DISCHARGE CRITERIA: Identify 3 signs of progress and complete relapse prevention plan     DISCHARGE PLAN: OP providers  Estimated Length of Stay: 10 treatment days        Diagnosis and Treatment Plan explained to Desire Phipps relates understanding diagnosis and is agreeable to Treatment Plan       CLIENT COMMENTS / Please share your thoughts, feelings, need and/or experiences regarding your treatment plan: _____________________________________________________________________________________________________________________________________________________________________________________________________________________________________________________________________________________________________________________ Date/Time: ______________      Patient Signature: _________________________________      Date/Time: ______________       Signature: _________________________________      Date/Time: ______________

## 2019-03-27 NOTE — PSYCH
Subjective:     Patient ID: Lazarus Lauber is a 29 y o  female  [facility] Clinical Progress Notes      Specialized Services Documentation  Therapist must complete separate progress note for each specific clinical activity in which the individual participated during the day  Group Psychotherapy ([id number]) Domi Monteiro RN     Lazarus Lauber attended wellness group today - Sleep Hygiene Protocol - Discussion began :   When You Cannot Sleep, what can be done instead of ruminating  Sleep is necessary for mental, emotional and physical wellness  Developing a routine that works for you is imperative and will differ from individual to individual but certain strategies to manage anxiety during the day and early evening can help reduce the inability to get a peaceful sleep  Hand out given : Ways to increase the likelihood of restful sleep:  (1) Develop and follow a consistent sleep schedule even on weekends  (2) Do not use your bed in the day time  (3) When prepared to sleep, turn off the light and keep the room quiet and the temperature comfortable and relatively cool  (4) Give yourself half an hour to at most an hour to fall sleep  If this does not work evaluate whether you are calm, anxious or ruminating  (5) Do not catastrophize  If You Are Calm But Wide Awake:  (6) Get out of bed, go to another room and read a book  (7) Try a light snack (e g  an apple)  (8) If you are anxious/ ruminating: use meditating, reassure yourself  In order to maintain restful sleep, emotional load has to be eliminated and our brain has to be in a resting mode  Fay Sofia was able to see what  she can do to try to develop a better sleep hygiene   Fay Sofia recognized and stated "  I stay on my phone looking at social media but I did not know that was over stimulating my brain so from tonight I will not be on my phone when I am in my bed  " Fay Sofia is making progress towards wellness and will continue to attend wellness groups in order to achieve goals and objectives       Tx Plan Objective:1 1,1 2,1 5

## 2019-03-28 ENCOUNTER — OFFICE VISIT (OUTPATIENT)
Dept: PSYCHOLOGY | Facility: CLINIC | Age: 34
End: 2019-03-28
Payer: COMMERCIAL

## 2019-03-28 ENCOUNTER — OFFICE VISIT (OUTPATIENT)
Dept: PSYCHIATRY | Facility: CLINIC | Age: 34
End: 2019-03-28
Payer: COMMERCIAL

## 2019-03-28 DIAGNOSIS — F33.2 MAJOR DEPRESSIVE DISORDER, RECURRENT, SEVERE W/O PSYCHOTIC BEHAVIOR (HCC): Primary | ICD-10-CM

## 2019-03-28 DIAGNOSIS — F40.10 SOCIAL PHOBIA: ICD-10-CM

## 2019-03-28 DIAGNOSIS — F41.1 GAD (GENERALIZED ANXIETY DISORDER): ICD-10-CM

## 2019-03-28 PROCEDURE — S9480 INTENSIVE OUTPATIENT PSYCHIA: HCPCS

## 2019-03-28 PROCEDURE — 99213 OFFICE O/P EST LOW 20 MIN: CPT | Performed by: PSYCHIATRY & NEUROLOGY

## 2019-03-28 RX ORDER — NORTRIPTYLINE HYDROCHLORIDE 25 MG/1
CAPSULE ORAL
Qty: 30 CAPSULE | Refills: 0 | Status: SHIPPED | OUTPATIENT
Start: 2019-03-28 | End: 2019-04-16 | Stop reason: SDUPTHER

## 2019-03-28 NOTE — PSYCH
Subjective:     Patient ID: Bud Cleveland is a 29 y o  female  [facility] Clinical Progress Notes      Specialized Services Documentation  Therapist must complete separate progress note for each specific clinical activity in which the individual participated during the day  Case Management Note    Laura Dozier [facility]    Current suicide risk : Low     [id number] Met with Bud Cleveland  She felt calmer after meeting with Dr Amanda Razo and the sessions today  She was anhedonic initally "I just feel blah, like I'm just existing", but was feeling more relieved at the end of the treatment day  Weekend plans and supports reviewed  Medications changes/added/denied? No    Treatment session number: 15 (IOP 6)    Individual Case Management Visit provided today?  Yes     [facility]ations follow up physician's orders: see note

## 2019-03-28 NOTE — PSYCH
Treatment Recommendations- Risks Benefits     Immediate Medical/Psychiatric/Psychotherapy Treatments and Any Precautions:     Restart  Nortriptyline 25 MG PO  HS for 1 week and increased to 50 MG PO HS   Continue other medications as ordered  Risks, Benefits And Possible Side Effects Of Medications:  Risks, benefits, and possible side effects of medications explained to patient and patient verbalizes understanding    Controlled Medication Discussion: Discussed with patient the risks of sedation, respiratory depression, impairment of ability to drive and potential for abuse and addiction related to treatment with benzodiazepine medications  The patient understands risk of treatment with benzodiazepine medications, agrees to not drive if feels impaired and agrees to take medications as prescribed  and The patient has been filling controlled prescriptions on time as prescribed to [facility]  program           Assessment/Plan:       Diagnoses and all orders for this visit:    Major depressive disorder, recurrent, severe w/o psychotic behavior (HCC)  -     nortriptyline (PAMELOR) 25 mg capsule; Start 1 caps po HS FOR 1 WEEK AND INCREASED TO 2 CAPSULES    Social phobia    JG (generalized anxiety disorder)  -     nortriptyline (PAMELOR) 25 mg capsule; Start 1 caps po HS FOR 1 WEEK AND INCREASED TO 2 CAPSULES          Subjective:     Patient ID: Lazaro Schumacher is a 29 y o  female with depression and anxiety who has been in the program for sometime, continue to feels depressed despite she takes her medications as ordered , but started to feels more depressed after the Nortriptyline was discontinue  She denies any new stressors       HPI ROS Appetite Changes and Sleep: normal appetite, decreased energy, no weight change and decrease in number of sleep hours 6    Review Of Systems:     Mood Depression   Behavior Normal    Thought Content Normal   General Emotional Problems and Decreased Functioning   Personality Normal   Other Psych Symptoms Normal   Constitutional Negative   ENT Negative   Cardiovascular Negative   Respiratory Negative   Gastrointestinal Negative   Genitourinary Negative   Musculoskeletal Negative   Integumentary Negative   Neurological Negative   Endocrine Normal    Other Symptoms Normal              Laboratory Results: No results found for this or any previous visit  Substance Abuse History:  Social History     Substance and Sexual Activity   Drug Use No       Family Psychiatric History:   Family History   Problem Relation Age of Onset    No Known Problems Mother     Alcohol abuse Father     Drug abuse Family        The following portions of the patient's history were reviewed and updated as appropriate:   She  has a past medical history of Anxiety, Bilateral carpal tunnel syndrome, Hypothyroidism, Kidney stones, and Psychiatric disorder  She   Patient Active Problem List    Diagnosis Date Noted    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 ([place] [address] ) 02/05/2019    Right-sided low back pain with right-sided sciatica 09/29/2017    JG (generalized anxiety disorder) 03/30/2017    Major depressive disorder, recurrent, severe w/o psychotic behavior ([place] [address] ) 03/30/2017    Social phobia 03/30/2017    Adult hypothyroidism 03/30/2017    Arthralgia of multiple joints 11/17/2016    Vitamin D deficiency 04/18/2016    Acne 01/06/2015    Allergic rhinitis 06/11/2013     She  has a past surgical history that includes Tubal ligation; pr lap,cholecystectomy (N/A, 9/6/2018); and Cholecystectomy  Her family history includes Alcohol abuse in her father; Drug abuse in her family; No Known Problems in her mother  She  reports that she has never smoked  She has never used smokeless tobacco  She reports that she drinks alcohol  She reports that she does not use drugs    Current Outpatient Medications   Medication Sig Dispense Refill    clonazePAM (KlonoPIN) 0 5 mg tablet Take 1/2 to 1 tab twice daily as needed for anxiety 60 tablet 1    ergocalciferol (VITAMIN D2) 50,000 units TAKE ONE CAPSULE BY MOUTH ONE TIME PER WEEK 4 capsule 1    folic acid (FOLVITE) 966 MCG tablet Take 400 mcg by mouth daily      nortriptyline (PAMELOR) 25 mg capsule Start 1 caps po HS FOR 1 WEEK AND INCREASED TO 2 CAPSULES 30 capsule 0    vortioxetine (TRINTELLIX) 10 MG tablet Once off of prozac, start Trintellix 5mg daily x1wk, then 10mg daily  30 tablet 1     No current facility-administered medications for this visit  She is allergic to penicillins         Objective:     Physical Exam        Mental status:  Appearance calm and cooperative , adequate hygiene and grooming and good eye contact    Mood depressed   Affect affect was constricted   Speech a normal rate   Thought Processes coherent/organized and normal thought processes   Hallucinations no hallucinations present    Thought Content no delusions   Abnormal Thoughts no suicidal thoughts  and no homicidal thoughts    Orientation  oriented to person and place and time   Recent & Remote Memory short term memory intact and long term memory intact   Attention & Concentration Span concentration impaired   Intellect Appears to be of Average Intelligence   Fund of Knowledge displays adequate knowledge of current events, adequate fund of knowledge regarding past history and adequate fund of knowledge regarding vocabulary    Insight Insight intact   Judgement judgment was intact   Muscle Strength Muscle strength and tone were normal and Normal gait    Language no difficulty naming common objects, no difficulty repeating a phrase  and no difficulty writing a sentence    Fund of Knowledge displays adequate knowledge of current events, adequate fund of knowledge regarding past history and adequate fund of knowledge regarding vocabulary    Pain none   Pain Scale 0     Berny Martinez MD

## 2019-03-28 NOTE — PSYCH
Subjective:     Patient ID: Jl Yanez is a 29 y o  female  [facility] Clinical Progress Notes      Specialized Services Documentation  Therapist must complete separate progress note for each specific clinical activity in which the individual participated during the day  Group Psychotherapy     ([id number]) Jl Yanez participated in group psychotherapy process today  Group began with this writer facilitating a mindfulness exercise  Clients checked in, shared how they were feeling and identified something that is holding them back  Clients shared their struggles and challenges, engaged in group cohesiveness and expressed themselves without fear of judgement or repercussions  Clients engaged in a discussion on self-reflection and awareness to develop a deeper understanding of their wants and needs  Desire shared feeling mixed emotions and expressing herself holds her back  She was responsive and supportive towards a peer, and she then discussed her current emotional challenges with overcoming her lack of a relationship with her biological father  Nilson Boogie made progress towards goals through group participation and is encouraged to continue progressing towards long term goals through program compliance and participation      TX Plan Objective: 1 1 Therapist:  Adeel Yang LPC

## 2019-03-28 NOTE — PSYCH
Subjective:     Patient ID: Beny Jansen is a 29 y o  female  [facility] Clinical Progress Notes      Specialized Services Documentation  Therapist must complete separate progress note for each specific clinical activity in which the individual participated during the day  Allied Therapy  [id number]  Beny Jansen actively participated in [facility] group focused on DBT skill interpersonal effectiveness  Group discussed the differences between being assertive, passive, passive aggressive, and aggressive  Group explored the importance of being assertive for effective communication  Group discussed ways to balance getting what you want, maintaining a relationship, and maintaining self-respect  [address] [facility] [address] actively engaged in drumming exercise to practice assertiveness  She shared about the importance of knowing which communication style she is in and knowing that she wants to be more assertive  She was reminded to use the STOP skill to stop in the moment to be more assertive  She shared about her own growth in communication  Positive progress towards goal noted  Continue AT to encourage self-awareness and daily practice of wellness tools    Tx Plan Objective: 1 1, 1 2, 1 5, Therapist: Roxanna KennedyBC    Education Therapy   Time:  4154-6521  Previous goal met: No  Readiness to Learning: Receptive  Barriers to Learning: None  Learning Assessment  Time: 5499-2833  Education Completed: Illness, Medication and Wellness Tools  Teaching Method: Verbal and Demonstration  Shared Area of Learning: Yes   Goal Set: Do homework  Tx Plan Objective: 1 1, 1 2, 1 5, Therapist: Roxanna Kennedy MT-BC

## 2019-03-28 NOTE — PSYCH
Subjective:     Patient ID: Victor Manuel Singh is a 29 y o  female  [facility] Clinical Progress Notes      Specialized Services Documentation  Therapist must complete separate progress note for each specific clinical activity in which the individual participated during the day  Group Psychotherapy ([id number]) Sylvia Yee RN       Victor Manuel Singh attended wellness group today on:  Power of Positive Thinking : What is Positive Thinking? Discussion began with: The Power of Positive thinking is in the daily practice of positive thinking  Simply trying to think positive when things go wrong is ineffective and often wont help  Instead, trying to apply positive thinking in desperate attempt to change a negative situation after it has happened will likely backfire and lead to more frustration  The real power of positive thinking comes from its daily repetition  You need to practice and apply positive thinking everyday in order to reap the rewards of the power of positive thinking  This means that you have to change the way you see things, change your words and regularly change what you think  Positive thinking requires that you eliminate negative thinking and this has to be done on a daily basis  Everyday we have thousands and thousands of thoughts  Most of them are meaningless and will have little impact on you  But amid all of those thoughts you have a number of negative thoughts and those are the thoughts that work against you and dont allow you to succeed  Your negative thoughts are tied to negative beliefs and need to change  Thoughts are powerful, they influence your mood, your decisions and even your neurobiology and this can take a toll on your mental health  What steps will you take to start to change negative into positive thinking? Murel Runner stated " I live my life as all or nothing rule and I know this is bad but I still do it    I need to understand this is a negative thinking trap and be more diligent to stop it from ruing my happiness "   Callie Taylor is making progress towards goals and objective and will continue to attend wellness groups to achieve this      Tx Plan Objective: 1 1,1 2,1 5

## 2019-04-01 ENCOUNTER — OFFICE VISIT (OUTPATIENT)
Dept: PSYCHOLOGY | Facility: CLINIC | Age: 34
End: 2019-04-01
Payer: COMMERCIAL

## 2019-04-01 DIAGNOSIS — F33.2 MAJOR DEPRESSIVE DISORDER, RECURRENT, SEVERE W/O PSYCHOTIC BEHAVIOR (HCC): Primary | ICD-10-CM

## 2019-04-01 DIAGNOSIS — F41.1 GAD (GENERALIZED ANXIETY DISORDER): ICD-10-CM

## 2019-04-01 PROCEDURE — S9480 INTENSIVE OUTPATIENT PSYCHIA: HCPCS

## 2019-04-02 ENCOUNTER — DOCUMENTATION (OUTPATIENT)
Dept: PSYCHOLOGY | Facility: CLINIC | Age: 34
End: 2019-04-02

## 2019-04-03 ENCOUNTER — OFFICE VISIT (OUTPATIENT)
Dept: PSYCHOLOGY | Facility: CLINIC | Age: 34
End: 2019-04-03
Payer: COMMERCIAL

## 2019-04-03 DIAGNOSIS — F33.2 MAJOR DEPRESSIVE DISORDER, RECURRENT, SEVERE W/O PSYCHOTIC BEHAVIOR (HCC): Primary | ICD-10-CM

## 2019-04-03 DIAGNOSIS — F41.1 GAD (GENERALIZED ANXIETY DISORDER): ICD-10-CM

## 2019-04-03 PROCEDURE — S9480 INTENSIVE OUTPATIENT PSYCHIA: HCPCS

## 2019-04-05 ENCOUNTER — DOCUMENTATION (OUTPATIENT)
Dept: PSYCHOLOGY | Facility: CLINIC | Age: 34
End: 2019-04-05

## 2019-04-08 ENCOUNTER — OFFICE VISIT (OUTPATIENT)
Dept: PSYCHOLOGY | Facility: CLINIC | Age: 34
End: 2019-04-08
Payer: COMMERCIAL

## 2019-04-08 DIAGNOSIS — F41.1 GAD (GENERALIZED ANXIETY DISORDER): ICD-10-CM

## 2019-04-08 DIAGNOSIS — F33.2 MAJOR DEPRESSIVE DISORDER, RECURRENT, SEVERE W/O PSYCHOTIC BEHAVIOR (HCC): Primary | ICD-10-CM

## 2019-04-08 PROCEDURE — S9480 INTENSIVE OUTPATIENT PSYCHIA: HCPCS

## 2019-04-09 ENCOUNTER — OFFICE VISIT (OUTPATIENT)
Dept: PSYCHOLOGY | Facility: CLINIC | Age: 34
End: 2019-04-09
Payer: COMMERCIAL

## 2019-04-09 DIAGNOSIS — F41.1 GAD (GENERALIZED ANXIETY DISORDER): ICD-10-CM

## 2019-04-09 DIAGNOSIS — F33.2 MAJOR DEPRESSIVE DISORDER, RECURRENT, SEVERE W/O PSYCHOTIC BEHAVIOR (HCC): Primary | ICD-10-CM

## 2019-04-09 PROCEDURE — S9480 INTENSIVE OUTPATIENT PSYCHIA: HCPCS

## 2019-04-10 ENCOUNTER — OFFICE VISIT (OUTPATIENT)
Dept: PSYCHOLOGY | Facility: CLINIC | Age: 34
End: 2019-04-10
Payer: COMMERCIAL

## 2019-04-10 DIAGNOSIS — F33.2 MAJOR DEPRESSIVE DISORDER, RECURRENT, SEVERE W/O PSYCHOTIC BEHAVIOR (HCC): Primary | ICD-10-CM

## 2019-04-10 DIAGNOSIS — F41.1 GAD (GENERALIZED ANXIETY DISORDER): ICD-10-CM

## 2019-04-10 PROCEDURE — S9480 INTENSIVE OUTPATIENT PSYCHIA: HCPCS

## 2019-04-11 ENCOUNTER — DOCUMENTATION (OUTPATIENT)
Dept: PSYCHOLOGY | Facility: CLINIC | Age: 34
End: 2019-04-11

## 2019-04-15 ENCOUNTER — DOCUMENTATION (OUTPATIENT)
Dept: PSYCHOLOGY | Facility: CLINIC | Age: 34
End: 2019-04-15

## 2019-04-16 ENCOUNTER — DOCUMENTATION (OUTPATIENT)
Dept: PSYCHOLOGY | Facility: CLINIC | Age: 34
End: 2019-04-16

## 2019-04-16 ENCOUNTER — DOCUMENTATION (OUTPATIENT)
Dept: PSYCHIATRY | Facility: CLINIC | Age: 34
End: 2019-04-16

## 2019-04-16 ENCOUNTER — LAB REQUISITION (OUTPATIENT)
Dept: LAB | Facility: HOSPITAL | Age: 34
End: 2019-04-16
Payer: COMMERCIAL

## 2019-04-16 ENCOUNTER — OFFICE VISIT (OUTPATIENT)
Dept: PSYCHIATRY | Facility: CLINIC | Age: 34
End: 2019-04-16
Payer: COMMERCIAL

## 2019-04-16 VITALS
RESPIRATION RATE: 16 BRPM | BODY MASS INDEX: 31.58 KG/M2 | HEIGHT: 67 IN | DIASTOLIC BLOOD PRESSURE: 75 MMHG | WEIGHT: 201.2 LBS | SYSTOLIC BLOOD PRESSURE: 126 MMHG | HEART RATE: 72 BPM

## 2019-04-16 DIAGNOSIS — F40.10 SOCIAL PHOBIA: ICD-10-CM

## 2019-04-16 DIAGNOSIS — F41.1 GAD (GENERALIZED ANXIETY DISORDER): Primary | ICD-10-CM

## 2019-04-16 DIAGNOSIS — R30.0 DYSURIA: ICD-10-CM

## 2019-04-16 DIAGNOSIS — Z72.51 HIGH RISK HETEROSEXUAL BEHAVIOR: ICD-10-CM

## 2019-04-16 DIAGNOSIS — F33.2 MAJOR DEPRESSIVE DISORDER, RECURRENT, SEVERE W/O PSYCHOTIC BEHAVIOR (HCC): ICD-10-CM

## 2019-04-16 PROCEDURE — 87186 SC STD MICRODIL/AGAR DIL: CPT | Performed by: OBSTETRICS & GYNECOLOGY

## 2019-04-16 PROCEDURE — 99214 OFFICE O/P EST MOD 30 MIN: CPT | Performed by: NURSE PRACTITIONER

## 2019-04-16 PROCEDURE — 96127 BRIEF EMOTIONAL/BEHAV ASSMT: CPT | Performed by: NURSE PRACTITIONER

## 2019-04-16 PROCEDURE — 87591 N.GONORRHOEAE DNA AMP PROB: CPT | Performed by: OBSTETRICS & GYNECOLOGY

## 2019-04-16 PROCEDURE — 87491 CHLMYD TRACH DNA AMP PROBE: CPT | Performed by: OBSTETRICS & GYNECOLOGY

## 2019-04-16 PROCEDURE — 87077 CULTURE AEROBIC IDENTIFY: CPT | Performed by: OBSTETRICS & GYNECOLOGY

## 2019-04-16 PROCEDURE — 87086 URINE CULTURE/COLONY COUNT: CPT | Performed by: OBSTETRICS & GYNECOLOGY

## 2019-04-16 RX ORDER — NORTRIPTYLINE HYDROCHLORIDE 50 MG/1
CAPSULE ORAL
Qty: 30 CAPSULE | Refills: 1 | Status: SHIPPED | OUTPATIENT
Start: 2019-04-16 | End: 2019-09-10

## 2019-04-17 ENCOUNTER — DOCUMENTATION (OUTPATIENT)
Dept: PSYCHOLOGY | Facility: CLINIC | Age: 34
End: 2019-04-17

## 2019-04-17 LAB
C TRACH DNA SPEC QL NAA+PROBE: NEGATIVE
N GONORRHOEA DNA SPEC QL NAA+PROBE: NEGATIVE

## 2019-04-18 ENCOUNTER — DOCUMENTATION (OUTPATIENT)
Dept: PSYCHOLOGY | Facility: CLINIC | Age: 34
End: 2019-04-18

## 2019-04-18 ENCOUNTER — OFFICE VISIT (OUTPATIENT)
Dept: PSYCHOLOGY | Facility: CLINIC | Age: 34
End: 2019-04-18

## 2019-04-18 DIAGNOSIS — F41.1 GAD (GENERALIZED ANXIETY DISORDER): ICD-10-CM

## 2019-04-18 DIAGNOSIS — F33.2 MAJOR DEPRESSIVE DISORDER, RECURRENT, SEVERE W/O PSYCHOTIC BEHAVIOR (HCC): Primary | ICD-10-CM

## 2019-04-18 DIAGNOSIS — E55.9 VITAMIN D DEFICIENCY: ICD-10-CM

## 2019-04-18 LAB — BACTERIA UR CULT: ABNORMAL

## 2019-04-18 RX ORDER — ERGOCALCIFEROL 1.25 MG/1
CAPSULE ORAL
Qty: 4 CAPSULE | Refills: 1 | Status: SHIPPED | OUTPATIENT
Start: 2019-04-18 | End: 2019-09-10

## 2019-04-19 ENCOUNTER — DOCUMENTATION (OUTPATIENT)
Dept: PSYCHOLOGY | Facility: CLINIC | Age: 34
End: 2019-04-19

## 2019-04-22 ENCOUNTER — APPOINTMENT (OUTPATIENT)
Dept: LAB | Age: 34
End: 2019-04-22
Attending: PREVENTIVE MEDICINE

## 2019-04-22 ENCOUNTER — TRANSCRIBE ORDERS (OUTPATIENT)
Dept: ADMINISTRATIVE | Age: 34
End: 2019-04-22

## 2019-04-22 ENCOUNTER — OFFICE VISIT (OUTPATIENT)
Dept: PSYCHOLOGY | Facility: CLINIC | Age: 34
End: 2019-04-22
Payer: COMMERCIAL

## 2019-04-22 DIAGNOSIS — F41.1 GAD (GENERALIZED ANXIETY DISORDER): ICD-10-CM

## 2019-04-22 DIAGNOSIS — F33.2 MAJOR DEPRESSIVE DISORDER, RECURRENT, SEVERE W/O PSYCHOTIC BEHAVIOR (HCC): Primary | ICD-10-CM

## 2019-04-22 DIAGNOSIS — Z02.0 SCHOOL PHYSICAL EXAM: ICD-10-CM

## 2019-04-22 DIAGNOSIS — Z02.0 SCHOOL PHYSICAL EXAM: Primary | ICD-10-CM

## 2019-04-22 PROCEDURE — S9480 INTENSIVE OUTPATIENT PSYCHIA: HCPCS

## 2019-04-22 PROCEDURE — 36415 COLL VENOUS BLD VENIPUNCTURE: CPT

## 2019-04-23 ENCOUNTER — DOCUMENTATION (OUTPATIENT)
Dept: PSYCHOLOGY | Facility: CLINIC | Age: 34
End: 2019-04-23

## 2019-04-24 ENCOUNTER — APPOINTMENT (OUTPATIENT)
Dept: LAB | Facility: MEDICAL CENTER | Age: 34
End: 2019-04-24

## 2019-04-24 ENCOUNTER — OFFICE VISIT (OUTPATIENT)
Dept: PSYCHOLOGY | Facility: CLINIC | Age: 34
End: 2019-04-24
Payer: COMMERCIAL

## 2019-04-24 DIAGNOSIS — F41.1 GAD (GENERALIZED ANXIETY DISORDER): ICD-10-CM

## 2019-04-24 DIAGNOSIS — F33.2 MAJOR DEPRESSIVE DISORDER, RECURRENT, SEVERE W/O PSYCHOTIC BEHAVIOR (HCC): Primary | ICD-10-CM

## 2019-04-24 PROCEDURE — 86480 TB TEST CELL IMMUN MEASURE: CPT | Performed by: PREVENTIVE MEDICINE

## 2019-04-24 PROCEDURE — S9480 INTENSIVE OUTPATIENT PSYCHIA: HCPCS

## 2019-04-24 PROCEDURE — 36415 COLL VENOUS BLD VENIPUNCTURE: CPT | Performed by: PREVENTIVE MEDICINE

## 2019-04-25 ENCOUNTER — DOCUMENTATION (OUTPATIENT)
Dept: PSYCHOLOGY | Facility: CLINIC | Age: 34
End: 2019-04-25

## 2019-04-26 ENCOUNTER — OFFICE VISIT (OUTPATIENT)
Dept: PSYCHOLOGY | Facility: CLINIC | Age: 34
End: 2019-04-26
Payer: COMMERCIAL

## 2019-04-26 DIAGNOSIS — F41.1 GENERALIZED ANXIETY DISORDER: Primary | ICD-10-CM

## 2019-04-26 LAB
GAMMA INTERFERON BACKGROUND BLD IA-ACNC: 0.02 IU/ML
M TB IFN-G BLD-IMP: NEGATIVE
M TB IFN-G CD4+ BCKGRND COR BLD-ACNC: 0 IU/ML
M TB IFN-G CD4+ BCKGRND COR BLD-ACNC: 0 IU/ML
MITOGEN IGNF BCKGRD COR BLD-ACNC: >10 IU/ML

## 2019-04-26 PROCEDURE — S9480 INTENSIVE OUTPATIENT PSYCHIA: HCPCS

## 2019-05-05 ENCOUNTER — HOSPITAL ENCOUNTER (EMERGENCY)
Facility: HOSPITAL | Age: 34
Discharge: HOME | End: 2019-05-05
Attending: EMERGENCY MEDICINE | Admitting: EMERGENCY MEDICINE
Payer: COMMERCIAL

## 2019-05-05 VITALS
RESPIRATION RATE: 20 BRPM | SYSTOLIC BLOOD PRESSURE: 118 MMHG | TEMPERATURE: 97.3 F | DIASTOLIC BLOOD PRESSURE: 60 MMHG | HEART RATE: 90 BPM | WEIGHT: 197.31 LBS | OXYGEN SATURATION: 98 % | BODY MASS INDEX: 31.37 KG/M2

## 2019-05-05 DIAGNOSIS — J20.9 ACUTE BRONCHITIS: Primary | ICD-10-CM

## 2019-05-05 PROCEDURE — 99283 EMERGENCY DEPT VISIT LOW MDM: CPT

## 2019-05-05 PROCEDURE — 99284 EMERGENCY DEPT VISIT MOD MDM: CPT | Performed by: EMERGENCY MEDICINE

## 2019-05-05 RX ORDER — ALBUTEROL SULFATE 90 UG/1
2 AEROSOL, METERED RESPIRATORY (INHALATION) EVERY 4 HOURS PRN
Qty: 1 INHALER | Refills: 0 | Status: SHIPPED | OUTPATIENT
Start: 2019-05-05 | End: 2019-09-25

## 2019-05-05 RX ORDER — PROMETHAZINE HYDROCHLORIDE AND CODEINE PHOSPHATE 6.25; 1 MG/5ML; MG/5ML
5 SYRUP ORAL EVERY 6 HOURS PRN
Qty: 120 ML | Refills: 0 | Status: SHIPPED | OUTPATIENT
Start: 2019-05-05 | End: 2019-05-15

## 2019-05-05 RX ORDER — FLUTICASONE PROPIONATE 50 MCG
1 SPRAY, SUSPENSION (ML) NASAL DAILY
Qty: 16 G | Refills: 0 | Status: SHIPPED | OUTPATIENT
Start: 2019-05-05 | End: 2019-09-10

## 2019-05-05 RX ADMIN — DEXAMETHASONE SODIUM PHOSPHATE 10 MG: 10 INJECTION, SOLUTION INTRAMUSCULAR; INTRAVENOUS at 21:38

## 2019-05-06 ENCOUNTER — TELEPHONE (OUTPATIENT)
Dept: PSYCHIATRY | Facility: CLINIC | Age: 34
End: 2019-05-06

## 2019-05-07 ENCOUNTER — OFFICE VISIT (OUTPATIENT)
Dept: BEHAVIORAL/MENTAL HEALTH CLINIC | Facility: CLINIC | Age: 34
End: 2019-05-07
Payer: COMMERCIAL

## 2019-05-07 DIAGNOSIS — F41.1 GAD (GENERALIZED ANXIETY DISORDER): ICD-10-CM

## 2019-05-07 DIAGNOSIS — F33.2 MAJOR DEPRESSIVE DISORDER, RECURRENT, SEVERE W/O PSYCHOTIC BEHAVIOR (HCC): Primary | ICD-10-CM

## 2019-05-07 PROCEDURE — 90834 PSYTX W PT 45 MINUTES: CPT | Performed by: SOCIAL WORKER

## 2019-05-16 ENCOUNTER — TELEPHONE (OUTPATIENT)
Dept: PSYCHIATRY | Facility: CLINIC | Age: 34
End: 2019-05-16

## 2019-05-16 ENCOUNTER — DOCUMENTATION (OUTPATIENT)
Dept: PSYCHIATRY | Facility: CLINIC | Age: 34
End: 2019-05-16

## 2019-05-16 DIAGNOSIS — F41.1 GAD (GENERALIZED ANXIETY DISORDER): ICD-10-CM

## 2019-05-16 DIAGNOSIS — F33.2 MAJOR DEPRESSIVE DISORDER, RECURRENT, SEVERE W/O PSYCHOTIC BEHAVIOR (HCC): ICD-10-CM

## 2019-05-16 DIAGNOSIS — F40.10 SOCIAL PHOBIA: ICD-10-CM

## 2019-05-16 RX ORDER — CLONAZEPAM 0.5 MG/1
TABLET ORAL
Qty: 60 TABLET | Refills: 2 | Status: SHIPPED | OUTPATIENT
Start: 2019-05-16 | End: 2019-06-20 | Stop reason: SDUPTHER

## 2019-05-28 ENCOUNTER — TELEPHONE (OUTPATIENT)
Dept: FAMILY MEDICINE CLINIC | Facility: CLINIC | Age: 34
End: 2019-05-28

## 2019-05-28 DIAGNOSIS — L70.0 ACNE VULGARIS: Primary | ICD-10-CM

## 2019-05-28 RX ORDER — CLINDAMYCIN PHOSPHATE 10 MG/G
GEL TOPICAL 2 TIMES DAILY
Qty: 30 G | Refills: 0 | Status: SHIPPED | OUTPATIENT
Start: 2019-05-28 | End: 2019-07-18 | Stop reason: ALTCHOICE

## 2019-05-31 ENCOUNTER — TELEPHONE (OUTPATIENT)
Dept: PSYCHIATRY | Facility: CLINIC | Age: 34
End: 2019-05-31

## 2019-05-31 DIAGNOSIS — F33.2 MAJOR DEPRESSIVE DISORDER, RECURRENT, SEVERE W/O PSYCHOTIC BEHAVIOR (HCC): ICD-10-CM

## 2019-05-31 DIAGNOSIS — F40.10 SOCIAL PHOBIA: ICD-10-CM

## 2019-05-31 DIAGNOSIS — F41.1 GAD (GENERALIZED ANXIETY DISORDER): ICD-10-CM

## 2019-06-20 ENCOUNTER — DOCUMENTATION (OUTPATIENT)
Dept: PSYCHIATRY | Facility: CLINIC | Age: 34
End: 2019-06-20

## 2019-06-20 DIAGNOSIS — F40.10 SOCIAL PHOBIA: ICD-10-CM

## 2019-06-20 DIAGNOSIS — F33.2 MAJOR DEPRESSIVE DISORDER, RECURRENT, SEVERE W/O PSYCHOTIC BEHAVIOR (HCC): ICD-10-CM

## 2019-06-20 DIAGNOSIS — F41.1 GAD (GENERALIZED ANXIETY DISORDER): ICD-10-CM

## 2019-06-20 RX ORDER — CLONAZEPAM 0.5 MG/1
TABLET ORAL
Qty: 60 TABLET | Refills: 2 | Status: SHIPPED | OUTPATIENT
Start: 2019-06-20 | End: 2019-12-23 | Stop reason: SDUPTHER

## 2019-07-08 ENCOUNTER — OFFICE VISIT (OUTPATIENT)
Dept: PSYCHIATRY | Facility: CLINIC | Age: 34
End: 2019-07-08
Payer: COMMERCIAL

## 2019-07-08 VITALS
WEIGHT: 208.6 LBS | DIASTOLIC BLOOD PRESSURE: 66 MMHG | HEIGHT: 67 IN | SYSTOLIC BLOOD PRESSURE: 116 MMHG | BODY MASS INDEX: 32.74 KG/M2 | RESPIRATION RATE: 16 BRPM | HEART RATE: 68 BPM

## 2019-07-08 DIAGNOSIS — F33.2 MAJOR DEPRESSIVE DISORDER, RECURRENT, SEVERE W/O PSYCHOTIC BEHAVIOR (HCC): ICD-10-CM

## 2019-07-08 DIAGNOSIS — F40.10 SOCIAL PHOBIA: ICD-10-CM

## 2019-07-08 DIAGNOSIS — F41.1 GAD (GENERALIZED ANXIETY DISORDER): ICD-10-CM

## 2019-07-08 PROCEDURE — 99214 OFFICE O/P EST MOD 30 MIN: CPT | Performed by: NURSE PRACTITIONER

## 2019-07-08 PROCEDURE — 96127 BRIEF EMOTIONAL/BEHAV ASSMT: CPT | Performed by: NURSE PRACTITIONER

## 2019-07-08 NOTE — PSYCH
TREATMENT PLAN (Medication Management Only)        [facility]    Name/Date of Birth/MRN:  Barkley Cranker 29 y o  1985 MRN: [id number]  Date of Treatment Plan: July 8, 2019  Diagnosis/Diagnoses:   1  Major depressive disorder, recurrent, severe w/o psychotic behavior ([facility] [address] )    2  JG (generalized anxiety disorder)    3  Social phobia      Strengths/Personal Resources for Self-Care: "staying in school", ability to listen, ability to reason  Area/Areas of need (in own words): anxiety symptoms, mood swings  1  Long Term Goal: decrease anxiety and maintain mood stability  Target Date: 3 months - 10/8/2019  Person/Persons responsible for completion of goal: Desire  2  Short Term Objective (s) - How will we reach this goal?:   A  Provider new recommended medication/dosage changes and/or continue medication(s): continue current medications as prescribed (Trintellix and Klonopin)  B   sleep minimum 5 hours straight per day  C   N/A  Target Date: 3 months - 10/8/2019  Person/Persons Responsible for Completion of Goal: Desire   Progress Towards Goals: continuing treatment  Treatment Modality: medication management every 4 weeks  Review due 90 to 120 days from date of this plan: 3 months - 10/8/2019  Expected length of service: ongoing treatment  My Physician/PA/NP and I have developed this plan together and I agree to work on the goals and objectives  I understand the treatment goals that were developed for my treatment    Signature:       Date and time:  Signature of parent/guardian if under age of 15 years: Date and time:  Signature of provider:      Date and time:  Signature of Supervising Physician:    Date and time: 7/8/2019      Sharita Meehan
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
## 2019-07-12 ENCOUNTER — DOCUMENTATION (OUTPATIENT)
Dept: BEHAVIORAL/MENTAL HEALTH CLINIC | Facility: CLINIC | Age: 34
End: 2019-07-12

## 2019-07-12 DIAGNOSIS — F40.10 SOCIAL PHOBIA: ICD-10-CM

## 2019-07-12 DIAGNOSIS — F41.1 GAD (GENERALIZED ANXIETY DISORDER): ICD-10-CM

## 2019-07-12 DIAGNOSIS — F33.2 MAJOR DEPRESSIVE DISORDER, RECURRENT, SEVERE W/O PSYCHOTIC BEHAVIOR (HCC): Primary | ICD-10-CM

## 2019-07-12 NOTE — PROGRESS NOTES
Assessment/Plan: Individual Therapy/Psychiatry  Diagnoses and all orders for this visit:    Major depressive disorder, recurrent, severe w/o psychotic behavior ([address] )    Social phobia    JG (generalized anxiety disorder)          Subjective:     Patient ID: Dolly Haynes is a 29 y o  female  Outpatient Discharge Summary:   Admission Date: 2/23/80  85 Shaq Fisher was referred by [facility]  Discharge Date: 7/12/19    Discharge Diagnosis:    1  Major depressive disorder, recurrent, severe w/o psychotic behavior ([address] )     2  Social phobia     3  JG (generalized anxiety disorder)         Treating Physician: Justin Rodriguez  Treatment Complications: Client has returned to school full time, and is also working a full time job  Attendance is difficult for her  As she is able and it remains necessary, client should return for continued support and treatment  Presenting Problem: Anxiety and depression  History of suicide attempt  Course of treatment includes:    individual therapy   Treatment Progress: fair  Criteria for Discharge: Difficulty attending treatment due to scheduling conflicts  Aftercare recommendations include Continue to attend Psychiatric medication management and return to individual therapy as her schedule allows  Discharge Medications include:  Current Outpatient Medications:     albuterol (PROVENTIL HFA,VENTOLIN HFA) 90 mcg/act inhaler, Inhale 2 puffs every 4 (four) hours as needed for wheezing (or cough) (Patient not taking: Reported on 7/8/2019), Disp: 1 Inhaler, Rfl: 0    Benzoyl Peroxide 10 % LIQD, Wash affected area twice daily  , Disp: 187 g, Rfl: 5    clindamycin (CLINDAGEL) 1 % gel, Apply topically 2 (two) times a day, Disp: 30 g, Rfl: 0    clonazePAM (KlonoPIN) 0 5 mg tablet, Take 1/2 to 1 tab twice daily as needed for anxiety, Disp: 60 tablet, Rfl: 2    ergocalciferol (VITAMIN D2) 50,000 units, TAKE ONE CAPSULE BY MOUTH ONE TIME PER WEEK (Patient not taking: Reported on 7/8/2019), Disp: 4 capsule, Rfl: 1    fluticasone (FLONASE) 50 mcg/act nasal spray, 1 spray into each nostril daily (Patient not taking: Reported on 7/8/2019), Disp: 16 g, Rfl: 0    folic acid (FOLVITE) 395 MCG tablet, Take 400 mcg by mouth daily, Disp: , Rfl:     nortriptyline (PAMELOR) 50 mg capsule, Take 1 cap daily at bedtime (Patient not taking: Reported on 7/8/2019), Disp: 30 capsule, Rfl: 1    Vortioxetine HBr (TRINTELLIX) 20 MG tablet, Take 1 tablet (20 mg total) by mouth daily for 90 days, Disp: 90 tablet, Rfl: 0    Prognosis: fair

## 2019-07-18 ENCOUNTER — OFFICE VISIT (OUTPATIENT)
Dept: DERMATOLOGY | Facility: CLINIC | Age: 34
End: 2019-07-18
Payer: COMMERCIAL

## 2019-07-18 ENCOUNTER — APPOINTMENT (OUTPATIENT)
Dept: LAB | Facility: CLINIC | Age: 34
End: 2019-07-18
Payer: COMMERCIAL

## 2019-07-18 VITALS — WEIGHT: 206 LBS | BODY MASS INDEX: 32.33 KG/M2 | TEMPERATURE: 97.7 F | HEIGHT: 67 IN

## 2019-07-18 DIAGNOSIS — L70.0 ACNE VULGARIS: ICD-10-CM

## 2019-07-18 DIAGNOSIS — L85.8 KERATOSIS PILARIS: ICD-10-CM

## 2019-07-18 DIAGNOSIS — L70.0 ACNE VULGARIS: Primary | ICD-10-CM

## 2019-07-18 LAB
ANION GAP SERPL CALCULATED.3IONS-SCNC: 3 MMOL/L (ref 4–13)
BUN SERPL-MCNC: 11 MG/DL (ref 5–25)
CHLORIDE SERPL-SCNC: 104 MMOL/L (ref 100–108)
CO2 SERPL-SCNC: 28 MMOL/L (ref 21–32)
CREAT SERPL-MCNC: 0.67 MG/DL (ref 0.6–1.3)
GFR SERPL CREATININE-BSD FRML MDRD: 115 ML/MIN/1.73SQ M
POTASSIUM SERPL-SCNC: 4.1 MMOL/L (ref 3.5–5.3)
SODIUM SERPL-SCNC: 135 MMOL/L (ref 136–145)

## 2019-07-18 PROCEDURE — 82565 ASSAY OF CREATININE: CPT

## 2019-07-18 PROCEDURE — 82157 ASSAY OF ANDROSTENEDIONE: CPT

## 2019-07-18 PROCEDURE — 84402 ASSAY OF FREE TESTOSTERONE: CPT

## 2019-07-18 PROCEDURE — 80051 ELECTROLYTE PANEL: CPT

## 2019-07-18 PROCEDURE — 84403 ASSAY OF TOTAL TESTOSTERONE: CPT

## 2019-07-18 PROCEDURE — 82627 DEHYDROEPIANDROSTERONE: CPT

## 2019-07-18 PROCEDURE — 99204 OFFICE O/P NEW MOD 45 MIN: CPT | Performed by: DERMATOLOGY

## 2019-07-18 PROCEDURE — 36415 COLL VENOUS BLD VENIPUNCTURE: CPT

## 2019-07-18 PROCEDURE — 84520 ASSAY OF UREA NITROGEN: CPT

## 2019-07-18 RX ORDER — SPIRONOLACTONE 50 MG/1
TABLET, FILM COATED ORAL
Qty: 30 TABLET | Refills: 3 | Status: SHIPPED | OUTPATIENT
Start: 2019-07-18 | End: 2019-09-25

## 2019-07-18 RX ORDER — ERYTHROMYCIN AND BENZOYL PEROXIDE 30; 50 MG/G; MG/G
GEL TOPICAL
Qty: 23.6 G | Refills: 3 | Status: SHIPPED | OUTPATIENT
Start: 2019-07-18 | End: 2019-09-10

## 2019-07-18 NOTE — PROGRESS NOTES
[address] [facility] Note     Patient Name: Omaira Chin  Encounter Date: 07/18/2019    Today's Chief Concerns:  Mary Concern #1: Acne      Past Medical History:  Have you ever had or currently have any of the following medical conditions or treatments? · HIV/AIDS: No  · Hepatitis B: No  · Hepatitis C: No   · Diabetes: No  · Tuberculosis: No  · Biologic Therapy/Chemotherapy: No  · Organ or Bone Marrow Transplantation: No  · Radiation Treatment: No  · Cancer (If Yes, which types)- No      Have you ever had any of the following skin conditions? · Melanoma? (If Yes, please provide more detail)- No  · Basal Cell Carcinoma: No  · Squamous Cell Carcinoma: No  · Sebaceous Cell Carcinoma: No  · Merkel Cell Carcinoma: No  · Angiosarcoma: No  · Blistering Sunburns: No  · Eczema: No  · Psoriasis: No    Social History:    What is your current Smoking Status? Never smoker     What is/was your primary occupation? [employer]    What are your hobbies/past-times? n/a    Family history:  Do any of your "first degree relatives" (parent, brother, sister, or child) have any of the following conditions? · Melanoma? (If Yes, which relatives?) No  · Eczema: No  · Asthma: No  · Hay Fever/Seasonal Allergies: Yes  · Psoriasis: No  · Arthritis: No  · Thyroid Problems: No  · Lupus/Connective Tissue Disease: No  · Diabetes: No  · Stroke: No  · Blood Clots: No  · IBD/Crohn's/Ulcerative Colitis: No  · Vitiligo: No  · Scarring/Keloids: No  · Severe Acne: No  · Pancreatic Cancer: No  · Other known Skin Condition? If Yes, what condition and which relatives? No    Current Medications:    Current Outpatient Medications:     albuterol (PROVENTIL HFA,VENTOLIN HFA) 90 mcg/act inhaler, Inhale 2 puffs every 4 (four) hours as needed for wheezing (or cough), Disp: 1 Inhaler, Rfl: 0    Benzoyl Peroxide 10 % LIQD, Wash affected area twice daily  , Disp: 187 g, Rfl: 5    clindamycin (CLINDAGEL) 1 % gel, Apply topically 2 (two) times a day, Disp: 30 g, Rfl: 0    clonazePAM (KlonoPIN) 0 5 mg tablet, Take 1/2 to 1 tab twice daily as needed for anxiety, Disp: 60 tablet, Rfl: 2    ergocalciferol (VITAMIN D2) 50,000 units, TAKE ONE CAPSULE BY MOUTH ONE TIME PER WEEK, Disp: 4 capsule, Rfl: 1    fluticasone (FLONASE) 50 mcg/act nasal spray, 1 spray into each nostril daily, Disp: 16 g, Rfl: 0    folic acid (FOLVITE) 949 MCG tablet, Take 400 mcg by mouth daily, Disp: , Rfl:     nortriptyline (PAMELOR) 50 mg capsule, Take 1 cap daily at bedtime, Disp: 30 capsule, Rfl: 1    Vortioxetine HBr (TRINTELLIX) 20 MG tablet, Take 1 tablet (20 mg total) by mouth daily for 90 days, Disp: 90 tablet, Rfl: 0    Specific Alerts:    Are you pregnant or planning to become pregnant? No    Are you currently or planning to be nursing or breast feeding? No    Allergies   Allergen Reactions    Penicillins Hives     At young age       May we call your Preferred Phone number to discuss your specific medical information? Yes    May we leave a detailed message that includes your specific medical information? Yes    Have you traveled outside of the [facility] in the past 3 months? No    Do you currently have a pacemaker or defibrillator? No    Do you have any artificial heart valves, joints, plates, screws, rods, stents, pins, etc? No   - If Yes, were any placed within the last 2 years? Do you require any medications prior to a surgical procedure? No   - If Yes, for which procedure? n/a3   - If Yes, what medications to you require? n/a    Are you taking any medications that cause you to bleed more easily ("blood thinners") No    Have you ever experienced a rapid heartbeat with epinephrine? No    Have you ever been treated with "gold" (gold sodium thiomalate) therapy? No    [facility] can help with wrinkles, "laugh lines," facial volume loss, "double chin," "love handles," age spots, and more   Are you interested in learning today about some of the skin enhancement procedures that we offer? (If Yes, please provide more detail) No    Review of Systems:  Have you recently had or currently have any of the following? · Fever or chills: No  · Night Sweats: No  · Headaches: No  · Weight Gain: No  · Weight Loss: No  · Blurry Vision: No  · Nausea: No  · Vomiting: No  · Diarrhea: No  · Blood in Stool: No  · Abdominal Pain: No  · Itchy Skin: Yes  · Painful Joints: No  · Swollen Joints: No  · Muscle Pain: No  · Irregular Mole: No  · Sun Burn: No  · Dry Skin: Yes  · Skin Color Changes: No  · Scar or Keloid: No  · Cold Sores/Fever Blisters: No  · Bacterial Infections/MRSA: No  · Anxiety: Yes, treated   · Depression: Yes, treated   · Suicidal or Homicidal Thoughts: No      PHYSICAL EXAM:      Was a chaperone (Derm Clinical Assistant) present for the entirety of the Physical Exam? Yes    Did the Dermatology Team specifically ask and  the patient on the importance of a Full Skin Exam to be sure that nothing is missed clinically?  Yes    Did the patient request or accept a Full Skin Exam?  No    Did the patient specifically refuse to have the areas "under-the-bra" examined by the Dermatologist? No    Did the patient specifically refuse to have the areas "under-the-underwear" examined by the Dermatologist? No      CONSTITUTIONAL:   Vitals:    07/18/19 0903   Temp: 97 7 °F (36 5 °C)   TempSrc: Tympanic   Weight: 93 4 kg (206 lb)   Height: 5' 7 2" (1 707 m)     PSYCH: Normal mood and affect  EYES: Normal conjunctiva  ENT: Normal lips and oral mucosa  CARDIOVASCULAR: No edema  RESPIRATORY: Normal respirations  HEME/LYMPH/IMMUNO:  No regional lymphadenopathy except as noted below in ASSESSMENT AND PLAN BY DIAGNOSIS    FULL ORGAN SYSTEM SKIN EXAM (SKIN)  Hair, Scalp, Ears, Face Normal except as noted below in Assessment   Neck, Cervical Chain Nodes Normal except as noted below in Assessment   Right Arm/Hand/Fingers Normal except as noted below in Assessment   Left Arm/Hand/Fingers Normal except as noted below in Assessment   Chest/Breasts/Axillae Viewed areas Normal except as noted below in Assessment   Abdomen, Umbilicus    Back/Spine    Groin/Genitalia/Buttocks    Right Leg, Foot, Toes    Left Leg, Foot, Toes         ASSESSMENT AND PLAN BY DIAGNOSIS:    History of Present Condition:     Duration:  How long has this been an issue for you?    o  Years   Location Affected:  Where on the body is this affecting you?    o  Face, Chest and back    Quality:  Is there any bleeding, pain, itch, burning/irritation, or redness associated with the skin lesion? o  Redness Scarring   Severity:  Describe any bleeding, pain, itch, burning/irritation, or redness on a scale of 1 to 10 (with 10 being the worst)  o  5   Timing:  Does this condition seem to be there pretty constantly or do you notice it more at specific times throughout the day?    o  Constantly   Context:  Have you ever noticed that this condition seems to be associated with specific activities you do?    o  Denies   Modifying Factors:    o Anything that seems to make the condition worse?    -  Denies  o What have you tried to do to make the condition better? -  Denies   Associated Signs and Symptoms:  Does this skin lesion seem to be associated with any of the following:  o  DERM ASSOCIATED SIGNS AND SYMPTOMS: Redness     1  ACNE VULGARIS ("COMMON ACNE")    Physical Exam:   Psychiatric/Mood:   Anatomic Location Affected:   Face chest and Back   Morphological Description:  o Open/Closed Comedones:  - Few ("Mild")  o Inflammatory Papules/Pustules:  - Few ("Mild")  o Nodules:  - No evidence ("Clear")  o Scarring:  - Few ("Mild")  o Excoriations:  - No evidence ("Clear")  o Local Skin Redness/Erythema:  - Few ("Mild")  o Local Skin Dryness/Scaling:  - No evidence ("Clear")  o Local Skin Dyspigmentation:  - Few ("Mild")   Pertinent Positives:   Pertinent Negatives: No lymphadenopathhy    Additional History of Present Condition:  Located on the face chest and back  Present for years  Reports picking, Unaware of any aggrevating factors  Currently on Clindamycin Gel and BPOW wash  Denies any improvement     Assessment and Plan:   I discussed that that she has moderate acne associated with mild hirsutism and hair shedding  We will screen for significant androgenic abnormality  She is status post tubal ligation and not anticipating further  Pregnancy  I noted risk benefit issue with spironolactone  Monitoring of potasium recommended  Will Initiate spironolactone with followup electrolytes    We reviewed the causes of acne, the kinds of acne, and the expected clinical    course   We discussed treatment options ranging from over-the-counter products, topical retinoids, antibiotics, BP, hormonal therapies (OCPs/spironolactone), and isotretinoin (Accutane)   We reviewed specific over-the-counter interventions and medications  Recommended typical hygiene measures including water-based facial products, washing regularly with mild cleanser, and refraining from picking and popping any pimples   Recommended non-comedogenic sunscreen use daily   Expectations of therapy discussed  Side effects, risks and benefits of medications discussed   A comprehensive handout on Acne was provided   The phone number to call in case of questions or concerns (and instructions to stop medications in such a scenario) was provided     After lengthy discussion of etiology and treatment options, we decided to implement the following personalized treatment plan:    Based on a thorough discussion of this condition and the management approach to it (including a comprehensive discussion of the known risks, side effects and potential benefits of treatment), the patient (family) agrees to implement the following specific plan:    --------------------------------------------------------------------------------------  YOUR PERSONALIZED ACNE ACTION PLAN    MORNING ROUTINE    1) SKIN HYGEINE:  In the shower, wash your face, chest and back gently with Cetaphil moisturizing cleanser or Dove Fragrance-free bar  Do not use a luffa or washcloth as these tend to be too irritating to acne-prone skin  2) ANTIMICROBIAL BENZOYL PEROXIDE:   Neutrogena Clear Pore (Benzoyl Peroxide 3% wash): In the shower, apply this medication to your face, chest and back  Leave this wash on your skin for about 5 minutes and then rinse it off completely while in the shower  If you do not rinse it off completely, then it will bleach your towels or clothing  This medication is now available without prescription (over-the-counter) in most drug stores or at [facility] for about $7 a bottle  3) ANTIBIOTICS:       Topical Benzamycin 1% gel after morning face wash   Start Spironolactone 50 mg once daily in the morning       EVENING ROUTINE    1) SKIN HYGEINE:  In the shower, wash your face, chest and back gently with Cetaphil moisturizing cleanser or Dove Fragrance-free bar  Do not use a lufa or washcloth as these tend to be too irritating to acne-prone skin  2) TOPICAL RETINOID:  At 1 hour before bedtime (after washing your face and allowing the skin to completely dry), spread only a single pea-sized amount of this medication evenly over your entire face (avoiding your eyes or mouth):     Tretinoin 0 025% cream 1 hour before bedtime    REMEMBER:  Always take your acne pills with lots of water! A pill stuck in your throat can cause significant burning and irritation  Drink a full glass of water to ensure the pill gets into your stomach  Avoid popping a pill right before bed, and stay upright for at least 1 hour after taking a pill  ACNE:  WHAT ZIT ALL ABOUT? WHY DO I HAVE ACNE/PIMPLES? Your skin is made of layers  To keep the skin from becoming dry and cracked, the skin needs oil   The oil is made in little wells in the deeper layers in the skin   People with acne have glands that make more oil and are more easily plugged, causing the glands to swell  Hormones, bacteria and your inherited tendency to have acne all play a role  The medical term for pimples is acne or acne vulgaris (vulgaris means common)  Most people get some acne  Acne does not come from being dirty  Instead, it is an expected consequence of changes that occur during normal growth and development  Hormones, bacteria, and your family's tendency to have acne may all play a role  Whiteheads or blackheads are openings of the glands (glands are the oil factories) onto the surface of the skin  Blackheads are not caused by dirt blocking the pores; instead, they result from the oxidation reaction of oil and skin in the pores with the air (like a rust reaction)  WHAT ABOUT STRESS? Stress does not cause acne but it can make it worse  Make sure you get enough sleep and daily exercise! WHAT ABOUT FOODS/DIET? Try to eat a balanced, healthy diet  Some people feel that certain foods worsen their acne  While there aren't many studies available on this question, severe dietary changes are unlikely to help your acne and may be harmful to the health of your skin  If you find that a certain food seems to aggravate your acne, you may consider avoiding that food  Discuss this with your physician! WHAT CAUSES MY ACNE? There are four contributors to acne--the body's natural oil (sebum), clogged pores, bacteria (with the scientific name Propionibacterium acnes, or P  acnes, for short), and the body's reaction to the bacteria living in the clogged pores (which causes inflammation)  Here's what happens:     Sebum is produced in the normal oil-making glands in the deeper layers of the skin and reaches the surface through the skin's pores   An increase in certain hormones occurs around the time of puberty, and these hormones trigger the oil glands to produce increased amounts of sebum   Pores with excess oil tend to become clogged more easily   At the same time, P  acnes--one of the many types of bacteria that normally live on everyone's skin--thrives in the excess oil and causes a skin reaction (inflammation)   If a pore is clogged close to the surface, there is little inflammation  However, this results in the formation of whiteheads (closed comedones) or blackheads (open comedones) at the surface of the skin   A plug that extends to, or forms a little deeper in the pore, or one that enlarges or ruptures may cause more inflammation  The result is red bumps (papules) and pus-filled pimples (pustules)   If plugging happens in the deepest skin layer, the inflammation may be even more severe, resulting in the formation of nodules or cysts  When these types of acne heal, they may leave behind discolored areas or true scars  SKIN HYGIENE:  HOW SHOULD I [facility] MY SKIN? Acne does not come from being dirty, however, washing your face is part of taking good care of your skin and will help keep your face clear  Good skin hygiene is, therefore, critical to support any acne treatment plan  Here are several specific suggestions for practicing good skin hygiene and keeping your skin looking its best:     You should wash acne-prone skin TWICE A DAY: Once in the morning and once in the evening  This does include any showers you take that day, so do not overdo it!  Do not scrub the skin with a washcloth or loofah as these can irritate and inflame your acne  Acne does not come from dirt, so it is not necessary to scrub the skin clean  In fact, scrubbing may lead to dryness and irritation that makes the acne even worse and harder for patients to tolerate acne medications   Use a gentle facial moisturizing cleanser (Cetaphil Moisturizing Cleanser or Dove Fragrance-Free bar)    Avoid using soaps like [address], [place] Spring, Lever, or soft/liquid soaps as these products will dry your skin   Do not use any over-the-counter acne washes without your doctor's specific instruction to do so  These products often contain salicylic acid or benzoyl peroxide  These ingredients can be helpful in clearing oil from the skin and reducing bacteria, but they may also be drying and can add to irritation   Do not use exfoliating products with microbeads or brushes as these can cause irritation to the skin   Facials and other treatments to remove, squeeze, or clean out pores are not recommended  Manipulating the skin in this way can make acne worse and can lead to severe infections and/or scarring  It also increases the likelihood that the skin will not be able to tolerate acne medications   Try not to pop pimples or pick at your acne as this can delay healing and may result in scarring or skin color changes (dark spots) that are often more noticeable than the acne itself  Picking/popping acne can also cause a serious skin infection   Wash or change your pillow case once to twice a week, especially if you use products in your hair   Wash the skin as soon as possible after playing sports or other activities that cause a lot of sweating  Also, pay attention to how your sports equipment (shoulder pads, helmet strap, etc ) might be making your acne worse   When you use makeup, moisturizer, or sunscreen make sure that these products are labeled non-comedogenic, or won't clog pores, or won't cause acne         SHOULD I TREAT MY ACNE? There are a number of other skin conditions that can look like acne  If there is any question about the diagnosis, then the person should be evaluated by a board certified pediatric and adolescent dermatologist   A physician should examine any child with acne who is between the ages of 3and 9years of age, as acne in this mid-childhood age group is not normal and may signal an underlying problem     If a preadolescent (9to 6years of age) or adolescent (15to 25years of age) has mild acne and the condition is not bothersome to the individual, proper and regular skin care (what your doctor may call skin hygiene) may be all that is needed at this point  Many people do, however, need specific acne medications to help their skin look and feel its best  Your doctor will tell you if you are one of these people  If so, you may be advised to use an over-the-counter or prescription medication that is applied to the skin (a topical medication) or if the addition of an oral medication (a medication taken by [employer]) is needed  The good news is that the medications work well when used properly! Some specific factors that may influence the choice of acne therapy include:     Severity  The number and type of skin lesions (papules or comedones) and the degree of inflammation (mild, moderate or severe)   Scarring  Scarring is most common when acne is severe, but it can happen even in children with mild acne   Impact  If a child is experiencing emotional complications because of the acne or is experiencing negative comments from other children   Cost of the acne medications  An acne expert can help to keep out of pocket costs to a minimum by utilizing the correct medications and the least expensive options   The patient's skin type (oily versus dry or combination skin, for example)   Potential side effects of the medication   The ease or overall complexity of the treatment plan or medication  WHAT ACNE TREATMENTS ARE AVAILABLE? Medications for acne try to stop the formation of new pimples by reducing or removing the oil, bacteria, and other things (like dead skin cells) that clog the pores  They can also decrease the inflammation or irritation response of the skin to bacteria  It may take from 6 to 8 weeks (about 2 months!) before you see any improvement and know if the medication is effective   It takes the layers of skin this long to regenerate  Remember, these medications do not cure the condition--the acne improves because of the medication  Therefore, treatment must be continued in order to prevent the return of acne lesions  There are many types of acne treatments  Some are applied to the skin (topical medications) and some are taken by mouth (oral medications)  In most cases of mild acne, the doctor will start with a topical medication  There are many different topical medications that are helpful for acne  If acne is more severe and it does not respond adequately to a topical medication, or if it covers large body surface areas such as the back and/or chest, oral antibiotics such as Doxycycline or Minocycline and/or oral hormone therapy such as Oral Contraceptive Pills or Spironolactone may be prescribed  In the most severe cases, isotretinoin (Accutane) may be used  In general, it is usually best to start with acne medications that are least likely to cause side effects but are at the same time capable of addressing the specific causes for the acne  Some patients have a good result with just one medication, but many will need to use a combination of treatments: two or more different topical agents or an oral medication plus a topical medication  Another treatment used for acne may include corticosteroid injections, which are used to help relieve pain, decrease the size, and encourage the healing of large, inflamed acne nodules  Also, dermatologists sometimes perform acne surgery, using a fine needle, a pointed blade, or an instrument known as a comedone extractor to mechanically clean out clogged pores  One must always weigh the risk for inducing a scar with the potential benefits of any procedure  Prior treatment with topical retinoids can loosen whiteheads and blackheads and make it easier to physically remove such lesions       Heat-based devices, and light and laser therapy are being studied to see whether there is any role for such treatments in mild to moderate acne  At this time, there is not enough evidence to make general recommendations about their use  TOPICAL ACNE MEDICATIONS    WHAT KIND OF TOPICALS ARE THERE?  Benzoyl peroxide (BP) helps to fight inflammation and is anti-microbial (kills bacteria, viruses, and other microorganisms) and is believed to help prevent resistance of bacteria to topical antibiotics  A benzoyl peroxide wash may be recommended for use on large areas such as the chest and/or back  Mild irritation and dryness are common when first using benzoyl peroxide-containing products  Be careful because benzoyl peroxide can bleach towels and clothing!  Retinoids (such as adapalene, tretinoin, or tazarotene) unplug the oil glands by helping peel away the layers of skin and other things plugging the opening of the glands  Mild irritation and dryness are common when first using these products  Facial waxing and other skin procedures can lead to excessive irritation and should be avoided during retinoid therapy   Antibiotics fight bacteria and help decrease inflammation  Topical antibiotics commonly used in acne include clindamycin, erythromycin, and combination agents (such as clindamycin/benzoyl peroxide or erythromycin/benzoyl peroxide)  Mild irritation and dryness are common when first using these products  Typically, topical antibiotics should not be used alone as treatment for acne   Other topical agents include salicylic acid, azelaic acid, dapsone, and sulfacetamide  Mild irritation and dryness can also occur when first using these products  USING YOUR TOPICAL TREATMENTS LIKE A PRO   Apply topical medications only to clean, dry skin  Topical medications may lead to significant dryness of the affected areas  To minimize this, wait 15-20 minutes after washing before applying your topical medication   These medications work deep in the skin to prevent new breakouts   Spot treatment of individual pimples does not do much  When applying topical medications to the face, use the 5-dot method  Start by placing a small pea-sized amount of the medication on your finger  Then, place dots in each of five locations of your face: Mid-forehead, each cheek, nose, and chin  Next, rub the medication into the entire area of skin - not just on individual pimples! Try to avoid the delicate skin around your eyes and corners of your mouth   The medications are not magic! They take weeks if not months to work  Be patient and use your medicine on a daily basis or as directed for six weeks before asking if your skin looks better  Try not to miss more than one or two days each week when using your medications   If you are starting a new medication, then try using it every other night or even every third night   Gradually work up to Vinny & Shauna a day    This will give your skin time to adjust    The same medications often come in various forms or formulations: Creams, ointments, lotions, gels, microspheres, or foams  Use the formulation that has been recommended and don't switch to other forms unless instructed  Some forms (such as alcohol based gels) may be more drying and less tolerable for certain skin types   Sometimes individual medications are not as effective as a combination of two or more agents  The doctor may need to try several medications or combinations before finding the one that is best for that patient   Moisturizer, sunscreen, and make-up may be used in conjunction with topical acne medications  In general, acne medications are applied first so they may directly contact the skin  Ask your physician to review specific application instructions!  It is especially important to always use sunscreen when using a topical retinoid or oral antibiotic  These drugs can make your skin more sensitive to the sun  In general, sunscreen gets applied AFTER any acne medications     Don't stop using your acne medications just because your acne got better  Remember, the acne is better because of the medication, and prevention is the suero to treatment  ORAL ACNE MEDICATIONS    ORAL ANTIBIOTICS  Antibiotics include tetracycline-class medicines (which include the most commonly used oral antibiotics for acne, minocycline, and doxycycline), erythromycin, trimethoprim-sulfamethoxazole, and occasionally cephalexin or azithromycin  These drugs may decrease bacteria and inflammation, and they are most effective for moderate-to-severe inflammatory acne  A product containing benzoyl peroxide should be used along with these antibiotics to help decrease the possibility of microbial resistance  Always take your acne pills with lots of water! A pill stuck in your throat can cause significant burning and irritation  Drink a full glass of water to ensure the pill gets into your stomach  Avoid popping a pill right before bed, and stay upright for at least 1 hour after taking a pill  DOXYCYCLINE   This medication is usually taken ONCE or TWICE per day, as instructed by your physician  NOTE: Always take this medication with lots of water! A pill stuck in the throat can cause significant burning and irritation  Avoid popping a pill right before bed & stay upright for at least one hour after taking a pill  WARNING: Doxycycline increases your sensitivity to the sun, so practice excellent sun protection! If you notice any of the following, stop using the medication and notify your health care provider: headaches; blurred vision; dizziness; sun sensitivity; heartburn-stomach pain; irritation of the esophagus; darkening of scars, gums, or teeth (more often with minocycline); nail changes; yellowing of the eyes or skin (indicating possible liver disease); joint pains-and flu-like symptoms  Taking oral antibiotics with food may help with symptoms of upset stomach     COMMON SIDE EFFECTS: Headaches; dizziness; sun sensitivity; irritation of the throat; discoloration of scars, gums, or teeth; nail changes  MINOCYCLINE   This medication is usually taken ONCE or TWICE per day, as instructed by your physician  NOTE: Always take this medication with lots of water! A pill stuck in the throat can cause significant burning and irritation  Avoid popping a pill right before bed & stay upright for at least one hour after taking a pill  WARNING: Though less likely than doxycycline, minocycline may increase your sensitivity to the sun, so practice excellent sun protection! If you notice any of the following, stop using the medication and notify your health care provider: headaches; blurred vision; dizziness; sun sensitivity; heartburn-stomach pain; irritation of the throat; darkening of scars, gums, or teeth; nail changes; yellowing of the eyes or skin (indicating possible liver disease); joint pains-and flu-like symptoms  Taking oral antibiotics with food may help with symptoms of upset stomach  COMMON SIDE EFFECTS: Headaches; dizziness; sun sensitivity; irritation of the throat; discoloration of scars, gums, or teeth (often with minocycline); nail changes  Minocycline can rarely cause liver disease, joint pains, severe skin rashes, and flu-like symptoms  If you should notice yellowing of the eyes or skin, or any of the above, notify your doctor and stop using the medication immediately  HORMONAL THERAPY  Hormonal treatment is used only in females and usually consists of oral contraceptives (birth control pills)  Spironolactone is also sometimes used  ORAL CONTRACEPTIVE PILLS   This medication is also known as the Birth Control Pill    We use it for hormonal regulation of acne  Take this medication as directed on the medication packet  NOTE: Try to find a regular time in your day to take the pill so that you don't forget  The best time is about half an hour after a meal or snack, or at bedtime   If you do forget to take your daily pill at the regular time, take one as soon as you remember and take the next at your regular scheduled time  WARNING: Do not take this medication until discussing it with your physician if you smoke, are pregnant (or trying to become pregnant or could be pregnant), have a personal history of breast cancer, have any artificial hardware or implants, have a condition called Factor 5 Leiden deficiency, have a family history of clotting problems, regularly have migraine headaches (especially with aura or due to flashing lights), or have any vaginal bleeding other than that associated with your menstrual cycle  ORAL ISOTRETINOIN (used to be called the brand name Rhae Copiah)  Isotretinoin, a derivative of vitamin A, is a powerful drug with several significant potential side effects  It is reserved for acne which is severe or when other medications have not worked well enough  It used to be sold under the brand name Accutane but now several versions exist       HAVING PROBLEMS WITH ANY OF YOUR TREATMENTS? You should not be able to see any of the medicines on your face  If you can see a white film on your skin after you apply the medication, there is too much medicine in that area and you need to apply a thinner coat and make sure it is spread evenly on your face  If your skin gets too dry, you can apply a light (non-comedogenic) moisturizer on top of your medicine or you may switch to using the medicine every other day instead of every day  If your skin is still too irritated, you may need to switch to a milder medication  If your skin is red and very itchy, you may be allergic to the medication and you should stop using it  COMMON POSSIBLE SIDE EFFECTS OF MEDICATIONS     Retinoids - dryness, redness, increased sun sensitivity   Benzoyl peroxide - drying, redness, bleaching of clothes, towels and sheets, allergy     Doxycycline - headaches; dizziness; irritation of the throat; nail changes; discoloration of teeth   Sun sensitivity - even if you have dark skin, this medicine can make you burn more easily  Make sure you protect yourself from the sun, either by avoiding being outside between 11 AM and 3 PM, wearing and reapplying sunscreen/sunblock, or wearing sun protective clothing   Nausea/vomiting - if you experience nausea with this medication, take it with food   Minocycline - headaches; dizziness; vision problems,  irritation of the throat; discoloration of scars, gums, or teeth  Can rarely cause liver disease, joint pains, and flu-like symptoms   If you should notice yellowing of the skin or any of the above, notify your doctor and stop using the medication   Birth Control Pills - nausea; headaches; breast tenderness; feeling bloated; mood changes   Spotting between periods may occur for the first three weeks of the medication, but this is not serious  It may last for two or three cycles  Please call us if the bleeding is heavier than a light flow or lasts for more than a few days  WHEN AND WHERE TO CALL WITH CONCERNS  We are here to help! If you experience any unusual symptoms, then stop taking or using the medication and call our office at [phone])  It is better to be safe than to be sorry! 2   KERATOSIS PILARIS    Physical Exam:   Anatomic Location Affected:  Bilateral arms    Morphological Description:  7-8MF shane-follicular pinkish-red papules on bilateral arms     Assessment and Plan:  Based on a thorough discussion of this condition and the management approach to it (including a comprehensive discussion of the known risks, side effects and potential benefits of treatment), the patient (family) agrees to implement the following specific plan:  Use moisturizer like Eucerin,Cerave or Aveeno Cream 3 times a day for the dry skin           Keratosis pilaris is a very common condition that appears as tiny bumps on the skin that may look like goosebumps or small pimples  These bumps are composed of small plugs of dead skin cells and are most commonly found over the upper arms and thighs  Children may also find bumps on their cheeks  Keratosis pilaris is harmless and affects up to half of normal children and up to three quarters of children who have ichthyosis vulgaris (a dry skin condition due to filaggrin gene mutations)  It is also more common in children with atopic eczema  Common symptoms of keratosis pilaris begin before age 3 or during the teenage years   Bumps over the outer aspect of upper arms and thighs symmetrically that feel like sandpaper   Potentially over buttocks, sides of cheeks, forearms, and upper back   Scaly, skin colored or red spots in keratosis pilaris rubra   Non-painful, but potential to be itchy     Keratosis pilaris is caused by abnormal growth of skin cells lining the upper portion of the hair follicles  Instead of naturally sloughing off, scaly skin will pile up and fill the follicle  There is a strong association with genetics, meaning that the condition has a high chance of being inherited if one or both parents are affected  It can also occur as a side effect of cancer therapies such as vemurafenib  Treating dry skin often helps as dry skin can cause the bumps to be more noticeable  Many people notice that the bumps disappear in the summer months when there is more moisture in the air  Sometimes, keratosis pilaris fades as one grows older, but puberty and hormonal therapy can cause flare-ups   If itch, dryness, or appearance bother you, treatment options include:   Using non-soap cleansers to minimize dryness of the skin    Exfoliation in the shower using a pumice stone or exfoliating sponge   Ammonium lactate cream or lotion (12%)    A moisturizing cream or ointment applied at least 2-3x a day and after bathing   o Creams containing urea or lactic acid are especially helpful    Other medicines that remove dead skin cells can also be effective   o Alpha hydroxyl acid  o Glycolic acid  o Retinoids (adapalene, retinol, tazarotene, trentinoin)  o Salicylic acid   Pulse dye laser treatments or intense pulsed light can reduce redness temporarily, but not roughness    Laser assisted hair removal       Scribe Attestation    I,:   Frank Jovel MA am acting as a scribe while in the presence of the attending physician :        I,:   Ren Mendiola MD personally performed the services described in this documentation    as scribed in my presence :

## 2019-07-18 NOTE — PATIENT INSTRUCTIONS
YOUR PERSONALIZED ACNE ACTION PLAN    MORNING ROUTINE    1) SKIN HYGEINE:  In the shower, wash your face, chest and back gently with Cetaphil moisturizing cleanser or Dove Fragrance-free bar  Do not use a luffa or washcloth as these tend to be too irritating to acne-prone skin  2) ANTIMICROBIAL BENZOYL PEROXIDE:   Neutrogena Clear Pore (Benzoyl Peroxide 3% wash): In the shower, apply this medication to your face, chest and back  Leave this wash on your skin for about 5 minutes and then rinse it off completely while in the shower  If you do not rinse it off completely, then it will bleach your towels or clothing  This medication is now available without prescription (over-the-counter) in most drug stores or at [employer] for about $7 a bottle  3) ANTIBIOTICS:       Topical Benzamycin 1% gel after morning face wash   Start Spironolactone 50 mg once daily in the morning       EVENING ROUTINE    1) SKIN HYGEINE:  In the shower, wash your face, chest and back gently with Cetaphil moisturizing cleanser or Dove Fragrance-free bar  Do not use a lufa or washcloth as these tend to be too irritating to acne-prone skin  2) TOPICAL RETINOID:  At 1 hour before bedtime (after washing your face and allowing the skin to completely dry), spread only a single pea-sized amount of this medication evenly over your entire face (avoiding your eyes or mouth):     Tretinoin 0 025% cream 1 hour before bedtime    REMEMBER:  Always take your acne pills with lots of water! A pill stuck in your throat can cause significant burning and irritation  Drink a full glass of water to ensure the pill gets into your stomach  Avoid popping a pill right before bed, and stay upright for at least 1 hour after taking a pill  ACNE:  WHAT ZIT ALL ABOUT? WHY DO I HAVE ACNE/PIMPLES? Your skin is made of layers  To keep the skin from becoming dry and cracked, the skin needs oil   The oil is made in little wells in the deeper layers in the skin  People with acne have glands that make more oil and are more easily plugged, causing the glands to swell  Hormones, bacteria and your inherited tendency to have acne all play a role  The medical term for pimples is acne or acne vulgaris (vulgaris means common)  Most people get some acne  Acne does not come from being dirty  Instead, it is an expected consequence of changes that occur during normal growth and development  Hormones, bacteria, and your family's tendency to have acne may all play a role  Whiteheads or blackheads are openings of the glands (glands are the oil factories) onto the surface of the skin  Blackheads are not caused by dirt blocking the pores; instead, they result from the oxidation reaction of oil and skin in the pores with the air (like a rust reaction)  WHAT ABOUT STRESS? Stress does not cause acne but it can make it worse  Make sure you get enough sleep and daily exercise! WHAT ABOUT FOODS/DIET? Try to eat a balanced, healthy diet  Some people feel that certain foods worsen their acne  While there aren't many studies available on this question, severe dietary changes are unlikely to help your acne and may be harmful to the health of your skin  If you find that a certain food seems to aggravate your acne, you may consider avoiding that food  Discuss this with your physician! WHAT CAUSES MY ACNE? There are four contributors to acne--the body's natural oil (sebum), clogged pores, bacteria (with the scientific name Propionibacterium acnes, or P  acnes, for short), and the body's reaction to the bacteria living in the clogged pores (which causes inflammation)  Here's what happens:     Sebum is produced in the normal oil-making glands in the deeper layers of the skin and reaches the surface through the skin's pores   An increase in certain hormones occurs around the time of puberty, and these hormones trigger the oil glands to produce increased amounts of sebum   Pores with excess oil tend to become clogged more easily   At the same time, P  acnes--one of the many types of bacteria that normally live on everyone's skin--thrives in the excess oil and causes a skin reaction (inflammation)   If a pore is clogged close to the surface, there is little inflammation  However, this results in the formation of whiteheads (closed comedones) or blackheads (open comedones) at the surface of the skin   A plug that extends to, or forms a little deeper in the pore, or one that enlarges or ruptures may cause more inflammation  The result is red bumps (papules) and pus-filled pimples (pustules)   If plugging happens in the deepest skin layer, the inflammation may be even more severe, resulting in the formation of nodules or cysts  When these types of acne heal, they may leave behind discolored areas or true scars  SKIN HYGIENE:  HOW SHOULD I [address] MY SKIN? Acne does not come from being dirty, however, washing your face is part of taking good care of your skin and will help keep your face clear  Good skin hygiene is, therefore, critical to support any acne treatment plan  Here are several specific suggestions for practicing good skin hygiene and keeping your skin looking its best:     You should wash acne-prone skin TWICE A DAY: Once in the morning and once in the evening  This does include any showers you take that day, so do not overdo it!  Do not scrub the skin with a washcloth or loofah as these can irritate and inflame your acne  Acne does not come from dirt, so it is not necessary to scrub the skin clean  In fact, scrubbing may lead to dryness and irritation that makes the acne even worse and harder for patients to tolerate acne medications   Use a gentle facial moisturizing cleanser (Cetaphil Moisturizing Cleanser or Dove Fragrance-Free bar)    Avoid using soaps like Jaylon [address], [place] Spring, Lever, or soft/liquid soaps as these products will dry your skin   Do not use any over-the-counter acne washes without your doctor's specific instruction to do so  These products often contain salicylic acid or benzoyl peroxide  These ingredients can be helpful in clearing oil from the skin and reducing bacteria, but they may also be drying and can add to irritation   Do not use exfoliating products with microbeads or brushes as these can cause irritation to the skin   Facials and other treatments to remove, squeeze, or clean out pores are not recommended  Manipulating the skin in this way can make acne worse and can lead to severe infections and/or scarring  It also increases the likelihood that the skin will not be able to tolerate acne medications   Try not to pop pimples or pick at your acne as this can delay healing and may result in scarring or skin color changes (dark spots) that are often more noticeable than the acne itself  Picking/popping acne can also cause a serious skin infection   Wash or change your pillow case once to twice a week, especially if you use products in your hair   Wash the skin as soon as possible after playing sports or other activities that cause a lot of sweating  Also, pay attention to how your sports equipment (shoulder pads, helmet strap, etc ) might be making your acne worse   When you use makeup, moisturizer, or sunscreen make sure that these products are labeled non-comedogenic, or won't clog pores, or won't cause acne         SHOULD I TREAT MY ACNE? There are a number of other skin conditions that can look like acne  If there is any question about the diagnosis, then the person should be evaluated by a board certified pediatric and adolescent dermatologist   A physician should examine any child with acne who is between the ages of 3and 9years of age, as acne in this mid-childhood age group is not normal and may signal an underlying problem     If a preadolescent (7 to 6years of age) or adolescent (15to 25years of age) has mild acne and the condition is not bothersome to the individual, proper and regular skin care (what your doctor may call skin hygiene) may be all that is needed at this point  Many people do, however, need specific acne medications to help their skin look and feel its best  Your doctor will tell you if you are one of these people  If so, you may be advised to use an over-the-counter or prescription medication that is applied to the skin (a topical medication) or if the addition of an oral medication (a medication taken by [employer]) is needed  The good news is that the medications work well when used properly! Some specific factors that may influence the choice of acne therapy include:     Severity  The number and type of skin lesions (papules or comedones) and the degree of inflammation (mild, moderate or severe)   Scarring  Scarring is most common when acne is severe, but it can happen even in children with mild acne   Impact  If a child is experiencing emotional complications because of the acne or is experiencing negative comments from other children   Cost of the acne medications  An acne expert can help to keep out of pocket costs to a minimum by utilizing the correct medications and the least expensive options   The patient's skin type (oily versus dry or combination skin, for example)   Potential side effects of the medication   The ease or overall complexity of the treatment plan or medication  WHAT ACNE TREATMENTS ARE AVAILABLE? Medications for acne try to stop the formation of new pimples by reducing or removing the oil, bacteria, and other things (like dead skin cells) that clog the pores  They can also decrease the inflammation or irritation response of the skin to bacteria  It may take from 6 to 8 weeks (about 2 months!) before you see any improvement and know if the medication is effective   It takes the layers of skin this long to regenerate  Remember, these medications do not cure the condition--the acne improves because of the medication  Therefore, treatment must be continued in order to prevent the return of acne lesions  There are many types of acne treatments  Some are applied to the skin (topical medications) and some are taken by mouth (oral medications)  In most cases of mild acne, the doctor will start with a topical medication  There are many different topical medications that are helpful for acne  If acne is more severe and it does not respond adequately to a topical medication, or if it covers large body surface areas such as the back and/or chest, oral antibiotics such as Doxycycline or Minocycline and/or oral hormone therapy such as Oral Contraceptive Pills or Spironolactone may be prescribed  In the most severe cases, isotretinoin (Accutane) may be used  In general, it is usually best to start with acne medications that are least likely to cause side effects but are at the same time capable of addressing the specific causes for the acne  Some patients have a good result with just one medication, but many will need to use a combination of treatments: two or more different topical agents or an oral medication plus a topical medication  Another treatment used for acne may include corticosteroid injections, which are used to help relieve pain, decrease the size, and encourage the healing of large, inflamed acne nodules  Also, dermatologists sometimes perform acne surgery, using a fine needle, a pointed blade, or an instrument known as a comedone extractor to mechanically clean out clogged pores  One must always weigh the risk for inducing a scar with the potential benefits of any procedure  Prior treatment with topical retinoids can loosen whiteheads and blackheads and make it easier to physically remove such lesions       Heat-based devices, and light and laser therapy are being studied to see whether there is any role for such treatments in mild to moderate acne  At this time, there is not enough evidence to make general recommendations about their use  TOPICAL ACNE MEDICATIONS    WHAT KIND OF TOPICALS ARE THERE?  Benzoyl peroxide (BP) helps to fight inflammation and is anti-microbial (kills bacteria, viruses, and other microorganisms) and is believed to help prevent resistance of bacteria to topical antibiotics  A benzoyl peroxide wash may be recommended for use on large areas such as the chest and/or back  Mild irritation and dryness are common when first using benzoyl peroxide-containing products  Be careful because benzoyl peroxide can bleach towels and clothing!  Retinoids (such as adapalene, tretinoin, or tazarotene) unplug the oil glands by helping peel away the layers of skin and other things plugging the opening of the glands  Mild irritation and dryness are common when first using these products  Facial waxing and other skin procedures can lead to excessive irritation and should be avoided during retinoid therapy   Antibiotics fight bacteria and help decrease inflammation  Topical antibiotics commonly used in acne include clindamycin, erythromycin, and combination agents (such as clindamycin/benzoyl peroxide or erythromycin/benzoyl peroxide)  Mild irritation and dryness are common when first using these products  Typically, topical antibiotics should not be used alone as treatment for acne   Other topical agents include salicylic acid, azelaic acid, dapsone, and sulfacetamide  Mild irritation and dryness can also occur when first using these products  USING YOUR TOPICAL TREATMENTS LIKE A PRO   Apply topical medications only to clean, dry skin  Topical medications may lead to significant dryness of the affected areas  To minimize this, wait 15-20 minutes after washing before applying your topical medication      These medications work deep in the skin to prevent new breakouts  Spot treatment of individual pimples does not do much  When applying topical medications to the face, use the 5-dot method  Start by placing a small pea-sized amount of the medication on your finger  Then, place dots in each of five locations of your face: Mid-forehead, each cheek, nose, and chin  Next, rub the medication into the entire area of skin - not just on individual pimples! Try to avoid the delicate skin around your eyes and corners of your mouth   The medications are not magic! They take weeks if not months to work  Be patient and use your medicine on a daily basis or as directed for six weeks before asking if your skin looks better  Try not to miss more than one or two days each week when using your medications   If you are starting a new medication, then try using it every other night or even every third night   Gradually work up to Vinny & Shauna a day    This will give your skin time to adjust    The same medications often come in various forms or formulations: Creams, ointments, lotions, gels, microspheres, or foams  Use the formulation that has been recommended and don't switch to other forms unless instructed  Some forms (such as alcohol based gels) may be more drying and less tolerable for certain skin types   Sometimes individual medications are not as effective as a combination of two or more agents  The doctor may need to try several medications or combinations before finding the one that is best for that patient   Moisturizer, sunscreen, and make-up may be used in conjunction with topical acne medications  In general, acne medications are applied first so they may directly contact the skin  Ask your physician to review specific application instructions!  It is especially important to always use sunscreen when using a topical retinoid or oral antibiotic  These drugs can make your skin more sensitive to the sun   In general, sunscreen gets applied AFTER any acne medications   Don't stop using your acne medications just because your acne got better  Remember, the acne is better because of the medication, and prevention is the suero to treatment  ORAL ACNE MEDICATIONS    ORAL ANTIBIOTICS  Antibiotics include tetracycline-class medicines (which include the most commonly used oral antibiotics for acne, minocycline, and doxycycline), erythromycin, trimethoprim-sulfamethoxazole, and occasionally cephalexin or azithromycin  These drugs may decrease bacteria and inflammation, and they are most effective for moderate-to-severe inflammatory acne  A product containing benzoyl peroxide should be used along with these antibiotics to help decrease the possibility of microbial resistance  Always take your acne pills with lots of water! A pill stuck in your throat can cause significant burning and irritation  Drink a full glass of water to ensure the pill gets into your stomach  Avoid popping a pill right before bed, and stay upright for at least 1 hour after taking a pill  DOXYCYCLINE   This medication is usually taken ONCE or TWICE per day, as instructed by your physician  NOTE: Always take this medication with lots of water! A pill stuck in the throat can cause significant burning and irritation  Avoid popping a pill right before bed & stay upright for at least one hour after taking a pill  WARNING: Doxycycline increases your sensitivity to the sun, so practice excellent sun protection! If you notice any of the following, stop using the medication and notify your health care provider: headaches; blurred vision; dizziness; sun sensitivity; heartburn-stomach pain; irritation of the esophagus; darkening of scars, gums, or teeth (more often with minocycline); nail changes; yellowing of the eyes or skin (indicating possible liver disease); joint pains-and flu-like symptoms  Taking oral antibiotics with food may help with symptoms of upset stomach     COMMON SIDE EFFECTS: Headaches; dizziness; sun sensitivity; irritation of the throat; discoloration of scars, gums, or teeth; nail changes  MINOCYCLINE   This medication is usually taken ONCE or TWICE per day, as instructed by your physician  NOTE: Always take this medication with lots of water! A pill stuck in the throat can cause significant burning and irritation  Avoid popping a pill right before bed & stay upright for at least one hour after taking a pill  WARNING: Though less likely than doxycycline, minocycline may increase your sensitivity to the sun, so practice excellent sun protection! If you notice any of the following, stop using the medication and notify your health care provider: headaches; blurred vision; dizziness; sun sensitivity; heartburn-stomach pain; irritation of the throat; darkening of scars, gums, or teeth; nail changes; yellowing of the eyes or skin (indicating possible liver disease); joint pains-and flu-like symptoms  Taking oral antibiotics with food may help with symptoms of upset stomach  COMMON SIDE EFFECTS: Headaches; dizziness; sun sensitivity; irritation of the throat; discoloration of scars, gums, or teeth (often with minocycline); nail changes  Minocycline can rarely cause liver disease, joint pains, severe skin rashes, and flu-like symptoms  If you should notice yellowing of the eyes or skin, or any of the above, notify your doctor and stop using the medication immediately  HORMONAL THERAPY  Hormonal treatment is used only in females and usually consists of oral contraceptives (birth control pills)  Spironolactone is also sometimes used  ORAL CONTRACEPTIVE PILLS   This medication is also known as the Birth Control Pill    We use it for hormonal regulation of acne  Take this medication as directed on the medication packet  NOTE: Try to find a regular time in your day to take the pill so that you don't forget   The best time is about half an hour after a meal or snack, or at bedtime  If you do forget to take your daily pill at the regular time, take one as soon as you remember and take the next at your regular scheduled time  WARNING: Do not take this medication until discussing it with your physician if you smoke, are pregnant (or trying to become pregnant or could be pregnant), have a personal history of breast cancer, have any artificial hardware or implants, have a condition called Factor 5 Leiden deficiency, have a family history of clotting problems, regularly have migraine headaches (especially with aura or due to flashing lights), or have any vaginal bleeding other than that associated with your menstrual cycle  ORAL ISOTRETINOIN (used to be called the brand name Lalit Mateus)  Isotretinoin, a derivative of vitamin A, is a powerful drug with several significant potential side effects  It is reserved for acne which is severe or when other medications have not worked well enough  It used to be sold under the brand name Accutane but now several versions exist       HAVING PROBLEMS WITH ANY OF YOUR TREATMENTS? You should not be able to see any of the medicines on your face  If you can see a white film on your skin after you apply the medication, there is too much medicine in that area and you need to apply a thinner coat and make sure it is spread evenly on your face  If your skin gets too dry, you can apply a light (non-comedogenic) moisturizer on top of your medicine or you may switch to using the medicine every other day instead of every day  If your skin is still too irritated, you may need to switch to a milder medication  If your skin is red and very itchy, you may be allergic to the medication and you should stop using it  COMMON POSSIBLE SIDE EFFECTS OF MEDICATIONS     Retinoids - dryness, redness, increased sun sensitivity   Benzoyl peroxide - drying, redness, bleaching of clothes, towels and sheets, allergy     Doxycycline - headaches; dizziness; irritation of the throat; nail changes; discoloration of teeth   Sun sensitivity - even if you have dark skin, this medicine can make you burn more easily  Make sure you protect yourself from the sun, either by avoiding being outside between 11 AM and 3 PM, wearing and reapplying sunscreen/sunblock, or wearing sun protective clothing   Nausea/vomiting - if you experience nausea with this medication, take it with food   Minocycline - headaches; dizziness; vision problems,  irritation of the throat; discoloration of scars, gums, or teeth  Can rarely cause liver disease, joint pains, and flu-like symptoms   If you should notice yellowing of the skin or any of the above, notify your doctor and stop using the medication   Birth Control Pills - nausea; headaches; breast tenderness; feeling bloated; mood changes   Spotting between periods may occur for the first three weeks of the medication, but this is not serious  It may last for two or three cycles  Please call us if the bleeding is heavier than a light flow or lasts for more than a few days  WHEN AND WHERE TO CALL WITH CONCERNS  We are here to help! If you experience any unusual symptoms, then stop taking or using the medication and call our office at [phone] ([phone])  It is better to be safe than to be sorry! 2  Keratosis pilaris     Is a very common condition that appears as tiny bumps on the skin that may look like goosebumps or small pimples  These bumps are composed of small plugs of dead skin cells and are most commonly found over the upper arms and thighs  Children may also find bumps on their cheeks  Keratosis pilaris is harmless and affects up to half of normal children and up to three quarters of children who have ichthyosis vulgaris (a dry skin condition due to filaggrin gene mutations)  It is also more common in children with atopic eczema       Common symptoms of keratosis pilaris begin before age 3 or during the teenage years   Bumps over the outer aspect of upper arms and thighs symmetrically that feel like sandpaper   Potentially over buttocks, sides of cheeks, forearms, and upper back   Scaly, skin colored or red spots in keratosis pilaris rubra   Non-painful, but potential to be itchy     Keratosis pilaris is caused by abnormal growth of skin cells lining the upper portion of the hair follicles  Instead of naturally sloughing off, scaly skin will pile up and fill the follicle  There is a strong association with genetics, meaning that the condition has a high chance of being inherited if one or both parents are affected  It can also occur as a side effect of cancer therapies such as vemurafenib  Treating dry skin often helps as dry skin can cause the bumps to be more noticeable  Many people notice that the bumps disappear in the summer months when there is more moisture in the air  Sometimes, keratosis pilaris fades as one grows older, but puberty and hormonal therapy can cause flare-ups   If itch, dryness, or appearance bother you, treatment options include:   Using non-soap cleansers to minimize dryness of the skin    Exfoliation in the shower using a pumice stone or exfoliating sponge   Ammonium lactate cream or lotion (12%)    A moisturizing cream or ointment applied at least 2-3x a day and after bathing   o Creams containing urea or lactic acid are especially helpful    Other medicines that remove dead skin cells can also be effective   o Alpha hydroxyl acid  o Glycolic acid  o Retinoids (adapalene, retinol, tazarotene, trentinoin)  o Salicylic acid   Pulse dye laser treatments or intense pulsed light can reduce redness temporarily, but not roughness    Laser assisted hair removal   Use moisturizer like Eucerin,Cerave or Aveeno Cream 3 times a day for the dry skin     

## 2019-07-19 LAB
DHEA-S SERPL-MCNC: 230.3 UG/DL (ref 84.8–378)
TESTOST FREE SERPL-MCNC: 2.6 PG/ML (ref 0–4.2)
TESTOST SERPL-MCNC: 25 NG/DL (ref 8–48)

## 2019-07-20 LAB — ANDROST SERPL-MCNC: 100 NG/DL (ref 41–262)

## 2019-07-22 ENCOUNTER — TELEPHONE (OUTPATIENT)
Dept: DERMATOLOGY | Facility: CLINIC | Age: 34
End: 2019-07-22

## 2019-07-22 DIAGNOSIS — E87.1 HYPONATREMIA SYNDROME: Primary | ICD-10-CM

## 2019-07-22 DIAGNOSIS — L73.0 ACNE SCAR: ICD-10-CM

## 2019-07-22 DIAGNOSIS — L70.0 ACNE VULGARIS: ICD-10-CM

## 2019-07-22 RX ORDER — ADAPALENE 0.1 G/100G
CREAM TOPICAL
Qty: 45 G | Refills: 0 | Status: SHIPPED | OUTPATIENT
Start: 2019-07-22 | End: 2020-01-30

## 2019-07-22 NOTE — TELEPHONE ENCOUNTER
----- Message from Omaira Renner MD sent at 7/19/2019  2:45 PM EDT -----  No answer  Advise testosterone and dheas normal   No  Serious hormone abnormality  Recommend proceed with treatment as planned

## 2019-07-22 NOTE — TELEPHONE ENCOUNTER
Telephone Call to pt  Spoke to pt  Pt aware of lab results and to continue current regimen  Pt  Mention Retin A was not covered  What other topical will you recommend that pt   Will not have to pay out of pocket, also stated that her sodium was low and what you recommend for that as well

## 2019-07-23 NOTE — TELEPHONE ENCOUNTER
Please call and verify with note on epic  CC me that we spoke to her  Advise he that her sodium was only one point below normal  at 135  BUT The klonopin (clonazapam) can be associated with low sodium  In addition the mild diuretic action of spironolactone could rarely make sodium lower  As a precaution I would like to check electrolytes earlier at 2 weeks  Mild low sodium levels do respond to restricting excessive amounts of fluids  I did put in an order for this labs in 2 weeks    I also ordered differin cream

## 2019-07-23 NOTE — TELEPHONE ENCOUNTER
Called patient left message with dr Knutson recommendation and should call our office with any further questions

## 2019-08-09 ENCOUNTER — DOCUMENTATION (OUTPATIENT)
Dept: PSYCHIATRY | Facility: CLINIC | Age: 34
End: 2019-08-09

## 2019-08-16 ENCOUNTER — TELEPHONE (OUTPATIENT)
Dept: DERMATOLOGY | Facility: CLINIC | Age: 34
End: 2019-08-16

## 2019-08-19 ENCOUNTER — LAB (OUTPATIENT)
Dept: LAB | Facility: HOSPITAL | Age: 34
End: 2019-08-19
Attending: DERMATOLOGY
Payer: COMMERCIAL

## 2019-08-19 ENCOUNTER — DOCUMENTATION (OUTPATIENT)
Dept: PSYCHIATRY | Facility: CLINIC | Age: 34
End: 2019-08-19

## 2019-08-19 DIAGNOSIS — E87.1 HYPONATREMIA SYNDROME: ICD-10-CM

## 2019-08-19 LAB
ANION GAP SERPL CALCULATED.3IONS-SCNC: 3 MMOL/L (ref 4–13)
CHLORIDE SERPL-SCNC: 104 MMOL/L (ref 100–108)
CO2 SERPL-SCNC: 29 MMOL/L (ref 21–32)
POTASSIUM SERPL-SCNC: 3.8 MMOL/L (ref 3.5–5.3)
SODIUM SERPL-SCNC: 136 MMOL/L (ref 136–145)

## 2019-08-19 PROCEDURE — 80051 ELECTROLYTE PANEL: CPT

## 2019-08-19 PROCEDURE — 36415 COLL VENOUS BLD VENIPUNCTURE: CPT

## 2019-08-20 ENCOUNTER — TELEPHONE (OUTPATIENT)
Dept: DERMATOLOGY | Facility: CLINIC | Age: 34
End: 2019-08-20

## 2019-08-20 NOTE — TELEPHONE ENCOUNTER
----- Message from Kindra Fountain MD sent at 8/19/2019 10:14 PM EDT -----  Advise electroytes ok  Sodium actually went up slightly to 136  Continue present treatment

## 2019-09-10 ENCOUNTER — OFFICE VISIT (OUTPATIENT)
Dept: FAMILY MEDICINE CLINIC | Facility: CLINIC | Age: 34
End: 2019-09-10
Payer: COMMERCIAL

## 2019-09-10 VITALS
SYSTOLIC BLOOD PRESSURE: 118 MMHG | DIASTOLIC BLOOD PRESSURE: 68 MMHG | WEIGHT: 212 LBS | BODY MASS INDEX: 33.27 KG/M2 | HEIGHT: 67 IN

## 2019-09-10 DIAGNOSIS — L65.9 HAIR LOSS: ICD-10-CM

## 2019-09-10 DIAGNOSIS — M79.10 MYALGIA: Primary | ICD-10-CM

## 2019-09-10 PROCEDURE — 99214 OFFICE O/P EST MOD 30 MIN: CPT | Performed by: FAMILY MEDICINE

## 2019-09-10 RX ORDER — PREDNISONE 10 MG/1
TABLET ORAL
Qty: 30 TABLET | Refills: 0 | Status: SHIPPED | OUTPATIENT
Start: 2019-09-10 | End: 2019-09-20

## 2019-09-10 NOTE — PROGRESS NOTES
Assessment/Plan:         Diagnoses and all orders for this visit:    Myalgia  -     Comprehensive metabolic panel; Future  -     CBC and differential; Future  -     MAYUR Screen w/ Reflex to Titer/Pattern; Future  -     RF Screen w/ Reflex to Titer; Future  -     C-reactive protein; Future  -     Lyme Antibody Profile with reflex to WB; Future  -     TSH, 3rd generation; Future  -     predniSONE 10 mg tablet; 5 x 2 days, 4 x 2 days, 3 x 2 days,2 x 2 days, 1 x 2 days  -     CK (with reflex to MB); Future    Hair loss          Subjective:   Chief Complaint   Patient presents with    Generalized Body Aches    Alopecia          Patient ID: Tricia Hand is a 29 y o  female  Patient is a 79-year-old female presenting to the office complaining of 5 diffuse myalgia for about a week  States that she has had intermittent pain throughout the days mostly in the large muscle groups  Sometimes it is into her joints  Motrin and Tylenol are not helping  She has recently started 2 new medications approximately 2 months ago, 1 is spironolactone for her acne, the other is Trintellix  The following portions of the patient's history were reviewed and updated as appropriate: allergies, current medications, past family history, past medical history, past social history, past surgical history and problem list     Review of Systems   Constitutional: Positive for activity change, fatigue and unexpected weight change  Negative for chills, diaphoresis and fever  HENT: Negative for congestion, ear pain, hearing loss, nosebleeds, postnasal drip, rhinorrhea, sinus pressure, sore throat and tinnitus  Eyes: Negative for photophobia, pain, discharge, redness and visual disturbance  Respiratory: Negative for cough, chest tightness, shortness of breath and wheezing  Cardiovascular: Negative for chest pain, palpitations and leg swelling          Denies PAZ   Gastrointestinal: Negative for abdominal pain, blood in stool, constipation, diarrhea, nausea and vomiting  Endocrine: Negative  Genitourinary: Negative for difficulty urinating, dysuria, frequency, hematuria and urgency  Denies dysmenorrhea, menstrual difficulties   Musculoskeletal: Positive for back pain (  Chronic, sees pain management ) and myalgias  Negative for arthralgias, gait problem and joint swelling  Skin: Negative for rash and wound  Denies skin lesions, change in birthmarks   Allergic/Immunologic: Negative for environmental allergies, food allergies and immunocompromised state  Neurological: Negative for dizziness, tremors, seizures, syncope, weakness, numbness and headaches  Hematological: Negative  Psychiatric/Behavioral: Negative for behavioral problems, confusion, decreased concentration, dysphoric mood ( improved on new medications ) and sleep disturbance  The patient is not nervous/anxious  Objective:      /68   Ht 5' 6 5" (1 689 m)   Wt 96 2 kg (212 lb)   BMI 33 71 kg/m²          Physical Exam   Constitutional: She is oriented to person, place, and time  She appears well-developed and well-nourished  No distress  Obese   HENT:   Head: Normocephalic and atraumatic  Right Ear: External ear normal    Left Ear: External ear normal    Nose: Nose normal    Mouth/Throat: Oropharynx is clear and moist    Eyes: Pupils are equal, round, and reactive to light  Conjunctivae and EOM are normal    Neck: Normal range of motion  Neck supple  No JVD present  No tracheal deviation present  No thyromegaly present  Cardiovascular: Normal rate, regular rhythm and normal heart sounds  No murmur heard  Pulmonary/Chest: Effort normal and breath sounds normal  She has no wheezes  She has no rales  Abdominal: Soft  Bowel sounds are normal  She exhibits no mass  There is no tenderness  There is no rebound and no guarding  Musculoskeletal: Normal range of motion  She exhibits no edema or tenderness     Lymphadenopathy: She has no cervical adenopathy  Neurological: She is alert and oriented to person, place, and time  She has normal reflexes  No cranial nerve deficit  Skin: Skin is warm and dry  No lesion and no rash noted  Psychiatric: She has a normal mood and affect  Judgment normal    Nursing note and vitals reviewed

## 2019-09-12 ENCOUNTER — LAB (OUTPATIENT)
Dept: LAB | Facility: HOSPITAL | Age: 34
End: 2019-09-12
Attending: PSYCHIATRY & NEUROLOGY
Payer: COMMERCIAL

## 2019-09-12 DIAGNOSIS — M79.10 MYALGIA: ICD-10-CM

## 2019-09-12 DIAGNOSIS — E03.9 HYPOTHYROIDISM, UNSPECIFIED TYPE: ICD-10-CM

## 2019-09-12 DIAGNOSIS — L70.0 ACNE VULGARIS: ICD-10-CM

## 2019-09-12 LAB
ALBUMIN SERPL BCP-MCNC: 3.9 G/DL (ref 3.5–5)
ALP SERPL-CCNC: 70 U/L (ref 46–116)
ALT SERPL W P-5'-P-CCNC: 132 U/L (ref 12–78)
ANION GAP SERPL CALCULATED.3IONS-SCNC: 4 MMOL/L (ref 4–13)
AST SERPL W P-5'-P-CCNC: 59 U/L (ref 5–45)
BASOPHILS # BLD MANUAL: 0.05 THOUSAND/UL (ref 0–0.1)
BASOPHILS NFR MAR MANUAL: 1 % (ref 0–1)
BILIRUB SERPL-MCNC: 1.33 MG/DL (ref 0.2–1)
BUN SERPL-MCNC: 16 MG/DL (ref 5–25)
CALCIUM SERPL-MCNC: 8.7 MG/DL (ref 8.3–10.1)
CHLORIDE SERPL-SCNC: 105 MMOL/L (ref 100–108)
CK SERPL-CCNC: 63 U/L (ref 26–192)
CO2 SERPL-SCNC: 26 MMOL/L (ref 21–32)
CREAT SERPL-MCNC: 0.73 MG/DL (ref 0.6–1.3)
CRP SERPL QL: <3 MG/L
EOSINOPHIL # BLD MANUAL: 0.05 THOUSAND/UL (ref 0–0.4)
EOSINOPHIL NFR BLD MANUAL: 1 % (ref 0–6)
ERYTHROCYTE [DISTWIDTH] IN BLOOD BY AUTOMATED COUNT: 14.3 % (ref 11.6–15.1)
GFR SERPL CREATININE-BSD FRML MDRD: 108 ML/MIN/1.73SQ M
GLUCOSE P FAST SERPL-MCNC: 89 MG/DL (ref 65–99)
HCT VFR BLD AUTO: 39.5 % (ref 34.8–46.1)
HGB BLD-MCNC: 13 G/DL (ref 11.5–15.4)
LYMPHOCYTES # BLD AUTO: 1.12 THOUSAND/UL (ref 0.6–4.47)
LYMPHOCYTES # BLD AUTO: 21 % (ref 14–44)
MCH RBC QN AUTO: 30.7 PG (ref 26.8–34.3)
MCHC RBC AUTO-ENTMCNC: 32.9 G/DL (ref 31.4–37.4)
MCV RBC AUTO: 93 FL (ref 82–98)
MONOCYTES # BLD AUTO: 0.53 THOUSAND/UL (ref 0–1.22)
MONOCYTES NFR BLD: 10 % (ref 4–12)
NEUTROPHILS # BLD MANUAL: 3.52 THOUSAND/UL (ref 1.85–7.62)
NEUTS BAND NFR BLD MANUAL: 2 % (ref 0–8)
NEUTS SEG NFR BLD AUTO: 64 % (ref 43–75)
NRBC BLD AUTO-RTO: 0 /100 WBCS
PLATELET # BLD AUTO: 146 THOUSANDS/UL (ref 149–390)
PLATELET BLD QL SMEAR: ABNORMAL
PMV BLD AUTO: 9.3 FL (ref 8.9–12.7)
POTASSIUM SERPL-SCNC: 4 MMOL/L (ref 3.5–5.3)
PROT SERPL-MCNC: 7.8 G/DL (ref 6.4–8.2)
RBC # BLD AUTO: 4.23 MILLION/UL (ref 3.81–5.12)
RHEUMATOID FACT SER QL LA: NEGATIVE
SODIUM SERPL-SCNC: 135 MMOL/L (ref 136–145)
T3FREE SERPL-MCNC: 2.52 PG/ML (ref 2.3–4.2)
T4 FREE SERPL-MCNC: 0.81 NG/DL (ref 0.76–1.46)
TSH SERPL DL<=0.05 MIU/L-ACNC: 4.97 UIU/ML (ref 0.36–3.74)
VARIANT LYMPHS # BLD AUTO: 1 %
WBC # BLD AUTO: 5.34 THOUSAND/UL (ref 4.31–10.16)

## 2019-09-12 PROCEDURE — 82550 ASSAY OF CK (CPK): CPT

## 2019-09-12 PROCEDURE — 80053 COMPREHEN METABOLIC PANEL: CPT

## 2019-09-12 PROCEDURE — 86618 LYME DISEASE ANTIBODY: CPT

## 2019-09-12 PROCEDURE — 86038 ANTINUCLEAR ANTIBODIES: CPT

## 2019-09-12 PROCEDURE — 86140 C-REACTIVE PROTEIN: CPT

## 2019-09-12 PROCEDURE — 86039 ANTINUCLEAR ANTIBODIES (ANA): CPT

## 2019-09-12 PROCEDURE — 86430 RHEUMATOID FACTOR TEST QUAL: CPT

## 2019-09-12 PROCEDURE — 84443 ASSAY THYROID STIM HORMONE: CPT

## 2019-09-12 PROCEDURE — 36415 COLL VENOUS BLD VENIPUNCTURE: CPT

## 2019-09-12 PROCEDURE — 85027 COMPLETE CBC AUTOMATED: CPT

## 2019-09-12 PROCEDURE — 85007 BL SMEAR W/DIFF WBC COUNT: CPT

## 2019-09-12 PROCEDURE — 84439 ASSAY OF FREE THYROXINE: CPT

## 2019-09-12 PROCEDURE — 84481 FREE ASSAY (FT-3): CPT

## 2019-09-13 ENCOUNTER — TELEPHONE (OUTPATIENT)
Dept: FAMILY MEDICINE CLINIC | Facility: CLINIC | Age: 34
End: 2019-09-13

## 2019-09-13 DIAGNOSIS — R76.8 ANA POSITIVE: ICD-10-CM

## 2019-09-13 DIAGNOSIS — E03.9 ACQUIRED HYPOTHYROIDISM: Primary | ICD-10-CM

## 2019-09-13 DIAGNOSIS — F33.2 MAJOR DEPRESSIVE DISORDER, RECURRENT, SEVERE W/O PSYCHOTIC BEHAVIOR (HCC): ICD-10-CM

## 2019-09-13 DIAGNOSIS — M25.50 ARTHRALGIA OF MULTIPLE JOINTS: ICD-10-CM

## 2019-09-13 DIAGNOSIS — F41.1 GAD (GENERALIZED ANXIETY DISORDER): ICD-10-CM

## 2019-09-13 LAB
ANA HOMOGEN SER QL IF: NORMAL
ANA HOMOGEN TITR SER: NORMAL {TITER}
B BURGDOR IGG+IGM SER-ACNC: <0.91 ISR (ref 0–0.9)
RYE IGE QN: POSITIVE

## 2019-09-13 RX ORDER — NORTRIPTYLINE HYDROCHLORIDE 50 MG/1
50 CAPSULE ORAL
Qty: 90 CAPSULE | Refills: 1 | OUTPATIENT
Start: 2019-09-13

## 2019-09-13 RX ORDER — LEVOTHYROXINE SODIUM 0.03 MG/1
25 TABLET ORAL
Qty: 30 TABLET | Refills: 5 | Status: SHIPPED | OUTPATIENT
Start: 2019-09-13 | End: 2020-03-18

## 2019-09-13 NOTE — TELEPHONE ENCOUNTER
----- Message from Alexandria Schmidt DO sent at 9/13/2019  3:22 PM EDT -----  Several things were abnormal on her blood test   First of all her thyroid is under active and she should start medication for that  Secondly her may have a form of inflammatory joint disease and I would recommend that she see a rheumatologist   That referral is in the system  Her Lyme testing was negative, she will need additional blood work in 2 months for her thyroid  Lastly her vitamin-D is quite low she needs to making sure she is taking at least 2000 units per day

## 2019-09-13 NOTE — RESULT ENCOUNTER NOTE
Labs noted with MAYUR specked 1;180, mild increase LFT, arthralgia and fatigue  I noted that spironolactone is not ususually associated with drug induced LE    Nevertherless I asked her to hold spirolactone (androgen suppression for acne) until evaluation is completed

## 2019-09-17 ENCOUNTER — CONSULT (OUTPATIENT)
Dept: RHEUMATOLOGY | Facility: CLINIC | Age: 34
End: 2019-09-17
Payer: COMMERCIAL

## 2019-09-17 ENCOUNTER — APPOINTMENT (OUTPATIENT)
Dept: LAB | Facility: MEDICAL CENTER | Age: 34
End: 2019-09-17
Payer: COMMERCIAL

## 2019-09-17 VITALS — HEIGHT: 67 IN | WEIGHT: 212 LBS | BODY MASS INDEX: 33.27 KG/M2

## 2019-09-17 DIAGNOSIS — R76.8 ANA POSITIVE: Primary | ICD-10-CM

## 2019-09-17 DIAGNOSIS — E55.9 VITAMIN D DEFICIENCY: ICD-10-CM

## 2019-09-17 DIAGNOSIS — R76.8 POSITIVE ANA (ANTINUCLEAR ANTIBODY): Primary | ICD-10-CM

## 2019-09-17 DIAGNOSIS — M25.50 ARTHRALGIA OF MULTIPLE JOINTS: ICD-10-CM

## 2019-09-17 DIAGNOSIS — M54.31 SCIATICA OF RIGHT SIDE: ICD-10-CM

## 2019-09-17 LAB
25(OH)D3 SERPL-MCNC: 15.8 NG/ML (ref 30–100)
ALBUMIN SERPL BCP-MCNC: 3.7 G/DL (ref 3.5–5)
ALP SERPL-CCNC: 96 U/L (ref 46–116)
ALT SERPL W P-5'-P-CCNC: 172 U/L (ref 12–78)
AST SERPL W P-5'-P-CCNC: 88 U/L (ref 5–45)
BACTERIA UR QL AUTO: ABNORMAL /HPF
BILIRUB DIRECT SERPL-MCNC: 0.33 MG/DL (ref 0–0.2)
BILIRUB SERPL-MCNC: 1.22 MG/DL (ref 0.2–1)
BILIRUB UR QL STRIP: NEGATIVE
C3 SERPL-MCNC: 130 MG/DL (ref 90–180)
C4 SERPL-MCNC: 38 MG/DL (ref 10–40)
CLARITY UR: CLEAR
COLOR UR: ABNORMAL
CREAT UR-MCNC: 119 MG/DL
ERYTHROCYTE [SEDIMENTATION RATE] IN BLOOD: 13 MM/HOUR (ref 0–20)
GLUCOSE UR STRIP-MCNC: NEGATIVE MG/DL
HAV IGM SER QL: NORMAL
HBV CORE AB SER QL: NORMAL
HBV CORE IGM SER QL: NORMAL
HBV CORE IGM SER QL: NORMAL
HBV SURFACE AG SER QL: NORMAL
HBV SURFACE AG SER QL: NORMAL
HCV AB SER QL: NORMAL
HCV AB SER QL: NORMAL
HGB UR QL STRIP.AUTO: NEGATIVE
KETONES UR STRIP-MCNC: NEGATIVE MG/DL
LEUKOCYTE ESTERASE UR QL STRIP: ABNORMAL
NITRITE UR QL STRIP: NEGATIVE
NON-SQ EPI CELLS URNS QL MICRO: ABNORMAL /HPF
PH UR STRIP.AUTO: 6 [PH]
PROT SERPL-MCNC: 7.6 G/DL (ref 6.4–8.2)
PROT UR STRIP-MCNC: NEGATIVE MG/DL
PROT UR-MCNC: 12 MG/DL
PROT/CREAT UR: 0.1 MG/G{CREAT} (ref 0–0.1)
RBC #/AREA URNS AUTO: ABNORMAL /HPF
SP GR UR STRIP.AUTO: 1.02 (ref 1–1.03)
UROBILINOGEN UR QL STRIP.AUTO: 0.2 E.U./DL
WBC #/AREA URNS AUTO: ABNORMAL /HPF

## 2019-09-17 PROCEDURE — 84156 ASSAY OF PROTEIN URINE: CPT | Performed by: INTERNAL MEDICINE

## 2019-09-17 PROCEDURE — 85732 THROMBOPLASTIN TIME PARTIAL: CPT | Performed by: INTERNAL MEDICINE

## 2019-09-17 PROCEDURE — 86705 HEP B CORE ANTIBODY IGM: CPT | Performed by: INTERNAL MEDICINE

## 2019-09-17 PROCEDURE — 87340 HEPATITIS B SURFACE AG IA: CPT | Performed by: INTERNAL MEDICINE

## 2019-09-17 PROCEDURE — 86147 CARDIOLIPIN ANTIBODY EA IG: CPT

## 2019-09-17 PROCEDURE — 85670 THROMBIN TIME PLASMA: CPT | Performed by: INTERNAL MEDICINE

## 2019-09-17 PROCEDURE — 86160 COMPLEMENT ANTIGEN: CPT | Performed by: INTERNAL MEDICINE

## 2019-09-17 PROCEDURE — 36415 COLL VENOUS BLD VENIPUNCTURE: CPT | Performed by: INTERNAL MEDICINE

## 2019-09-17 PROCEDURE — 86225 DNA ANTIBODY NATIVE: CPT | Performed by: INTERNAL MEDICINE

## 2019-09-17 PROCEDURE — 99244 OFF/OP CNSLTJ NEW/EST MOD 40: CPT | Performed by: INTERNAL MEDICINE

## 2019-09-17 PROCEDURE — 80074 ACUTE HEPATITIS PANEL: CPT

## 2019-09-17 PROCEDURE — 86803 HEPATITIS C AB TEST: CPT | Performed by: INTERNAL MEDICINE

## 2019-09-17 PROCEDURE — 85705 THROMBOPLASTIN INHIBITION: CPT | Performed by: INTERNAL MEDICINE

## 2019-09-17 PROCEDURE — 85613 RUSSELL VIPER VENOM DILUTED: CPT | Performed by: INTERNAL MEDICINE

## 2019-09-17 PROCEDURE — 80076 HEPATIC FUNCTION PANEL: CPT | Performed by: INTERNAL MEDICINE

## 2019-09-17 PROCEDURE — 86146 BETA-2 GLYCOPROTEIN ANTIBODY: CPT | Performed by: INTERNAL MEDICINE

## 2019-09-17 PROCEDURE — 82306 VITAMIN D 25 HYDROXY: CPT | Performed by: INTERNAL MEDICINE

## 2019-09-17 PROCEDURE — 86235 NUCLEAR ANTIGEN ANTIBODY: CPT | Performed by: INTERNAL MEDICINE

## 2019-09-17 PROCEDURE — 86200 CCP ANTIBODY: CPT | Performed by: INTERNAL MEDICINE

## 2019-09-17 PROCEDURE — 85652 RBC SED RATE AUTOMATED: CPT | Performed by: INTERNAL MEDICINE

## 2019-09-17 PROCEDURE — 82570 ASSAY OF URINE CREATININE: CPT | Performed by: INTERNAL MEDICINE

## 2019-09-17 PROCEDURE — 81001 URINALYSIS AUTO W/SCOPE: CPT | Performed by: INTERNAL MEDICINE

## 2019-09-17 PROCEDURE — 86704 HEP B CORE ANTIBODY TOTAL: CPT | Performed by: INTERNAL MEDICINE

## 2019-09-17 RX ORDER — ERGOCALCIFEROL 1.25 MG/1
50000 CAPSULE ORAL WEEKLY
Qty: 12 CAPSULE | Refills: 0 | Status: SHIPPED | OUTPATIENT
Start: 2019-09-17 | End: 2020-01-30

## 2019-09-17 RX ORDER — MELOXICAM 7.5 MG/1
7.5 TABLET ORAL DAILY
Qty: 60 TABLET | Refills: 3 | Status: SHIPPED | OUTPATIENT
Start: 2019-09-17 | End: 2019-10-18

## 2019-09-17 RX ORDER — GABAPENTIN 100 MG/1
100 CAPSULE ORAL
Qty: 30 CAPSULE | Refills: 3 | Status: SHIPPED | OUTPATIENT
Start: 2019-09-17 | End: 2019-10-18 | Stop reason: ALTCHOICE

## 2019-09-17 NOTE — PATIENT INSTRUCTIONS
Do bloodwork and urine tests  Do abdomen ultrasound  Use hydrocortisone as needed  Take meloxicam one to two times a day  Take gabapentin at bedtime    Return to clinic in 4 weeks    Autoimmune Disease   AMBULATORY CARE:   An autoimmune disease  causes your body's immune system to attack healthy cells in your body by mistake  This causes inflammation in the affected areas  The disease may affect any part of your body, including skin, organs, blood, your digestive system, and connective tissues  There are many autoimmune diseases, such as lupus, celiac disease, and diabetes  Common signs and symptoms:  Signs and symptoms depend on the type of autoimmune disease and the body systems affected  Symptoms may be mild or severe, and may come and go  You may have many of the following if you have an autoimmune disease that affects more than one body system:  · Red, warm, painful, swollen area or joints    · Joint pain, stiffness, or reduced range of motion    · Tiredness, weakness, or muscle pain    · Fever    · Weight gain or loss, or no appetite     · Diarrhea, stomach cramps, or bloating    · Hair loss    · Rash or changes in skin color    · Red, inflamed eyes  Seek care immediately if:   · You have severe pain or swelling  · You have a fever along with stiffness and pain  · You have blood in your urine, bowel movement, or vomit  · You have severe abdominal pain  · You are confused or feel dizzy or faint  Contact your healthcare provider if:   · You have new or worsening symptoms  · You are urinating less than usual     · You are bleeding from your nose or gums  · You bruise easily  · You have questions or concerns about your condition or care  Treatment  will depend on the type of autoimmune disease you have  You may need any of the following:  · Medicines  may be given to replace thyroid hormones, insulin, or other hormones your body is not producing   Medicines may also reduce your immune system's ability to attack healthy cells or decrease inflammation in muscles or joints  You may need to use topical creams or lotions to control a rash or other symptoms that affect your skin  · Prescription pain medicine  may be given  Ask your healthcare provider how to take this medicine safely  · NSAIDs , such as ibuprofen, help decrease swelling, pain, and fever  This medicine is available with or without a doctor's order  NSAIDs can cause stomach bleeding or kidney problems in certain people  If you take blood thinner medicine, always ask your healthcare provider if NSAIDs are safe for you  Always read the medicine label and follow directions  · Acetaminophen  reduces pain and fever  This medicine is available without a doctor's order  Ask how much to take and how often to take it  Follow directions  Acetaminophen can cause liver damage if not taken correctly  · Steroids  help reduce swelling and relieve pain  · A blood transfusion  may be needed if your blood is affected  Manage an autoimmune disease:   · Rest as needed  Talk to your healthcare provider if you are having trouble sleeping because of pain or other symptoms  Rest your joints if they are stiff or painful  Your healthcare provider may suggest support devices such as crutches or splints to help your joints rest      · Eat a variety of healthy foods  Healthy foods include fruits, vegetables, lean meats, fish, and low-fat dairy products  Work with your healthcare provider or dietitian to create healthy meal plans  You may need to stop eating certain foods if your digestive system is affected by your autoimmune disease  · Go to physical or occupational therapy as directed  A physical therapist can help you create an exercise plan  Exercise may help increase your energy  Exercise can also help keep stiff joints flexible and increase range of motion   An occupational therapist can help you learn to do your daily activities when you have pain or swelling during a flare  · Do not smoke  Nicotine can damage blood vessels and make it more difficult to manage your symptoms  Ask your healthcare provider for information if you currently smoke and need help to quit  E-cigarettes or smokeless tobacco still contain nicotine  Talk to your healthcare provider before you use these products  · Talk to your healthcare provider about pregnancy  If you are a woman and want to get pregnant, talk to your healthcare provider  You or your baby might be at risk for complications  You may need to wait until your disease is controlled or your medications are finished before you get pregnant  You may also have trouble getting pregnant because of your disease  Your healthcare provider may be able to suggest ways to improve your ability to become pregnant  · Manage stress  Stress may slow healing and lead to illness  Learn ways to control stress, such as relaxation, deep breathing, or listening to music  Manage flares:  A flare means something triggered your symptoms  Stress, cold weather, and sunlight are examples of triggers  Your healthcare provider can help you create a management plan that includes what to do if you have a flare  Treat flares quickly to help prevent serious illness  · Apply ice or heat as directed  Ice helps reduce pain and swelling, and may help prevent tissue damage  Use a cold compress, or put crushed ice in a bag  Cover it with a towel and apply to the painful area for 15 to 20 minutes every hour, or as directed  Heat helps reduce pain and muscle spasms  Apply a warm compress to the area for 20 minutes every 2 hours, or as directed  · Elevate the area above the level of your heart  Elevation can help reduce swelling and pain, especially in your joints  Elevate the area as often as possible  Follow up with your healthcare provider as directed: You may need ongoing tests or treatment   Write down your questions so you remember to ask them during your visits  © 2017 [employer] Information is for End User's use only and may not be sold, redistributed or otherwise used for commercial purposes  All illustrations and images included in CareNotes® are the copyrighted property of [employer]  or [employer]  The above information is an  only  It is not intended as medical advice for individual conditions or treatments  Talk to your doctor, nurse or pharmacist before following any medical regimen to see if it is safe and effective for you

## 2019-09-17 NOTE — PROGRESS NOTES
Assessment and Plan: Josue Machuca is a 29 y o  female who presents as a Rheumatology consult referred by her PCP Power Pineda DO for evaluation of possible autoimmune disease given positive MAYUR  Patient's clinical signs and symptoms are at least consistent with an undifferentiated connective tissue disease, possibly early lupus given her history of a malar rash and arthralgias  Have ordered lupus activity labs and Sjogren's antibodies; the anti-double-stranded DNA and anti-SSA/SSB are still pending  Have ordered also an antiphospholipid panel given presence of livedoid-appearing rash on her arms, though this has come back unremarkable  Ordering an abdomen ultrasound to work-up patient's transaminitis  Prescribed hydrocortisone to apply to her keratosis on her upper arms as needed  For her arthralgias, have prescribed meloxicam 7 5 mg 1-2 times a day, and for her shooting pain/neuropathic pain, have prescribed gabapentin 100 mg p o  Q h s   Also ordered a vitamin-D level given that low vitamin D can contribute to myalgias/arthralgias; this returned significantly low, so have prescribed ergocalciferol 50,000 units p o  Weekly , which may help her fatigue  Will re-evaluate how patient is doing in 4 weeks, and at that time may consider starting her on hydroxychloroquine for her likely underlying connective tissue disease       Plan:  Diagnoses and all orders for this visit:    MAYUR positive  -     Ambulatory referral to Rheumatology  -     Sedimentation rate, automated  -     Vitamin D 25 hydroxy  -     Acute Hepatitis Panel (Refl)  -     Chronic Hepatitis Panel  -     Hepatic function panel  -     Centromere Antibody  -     Sjogren's Antibodies  -     Beta-2 glycoprotein antibodies  -     Anticardiolipin Ab, IgG/M, Qn  -     Lupus anticoagulant  -     Urinalysis with microscopic  -     Protein / creatinine ratio, urine  -     C3 complement  -     C4 complement  -     Anti-DNA antibody, double-stranded  - Cyclic citrul peptide antibody, IgG  -     hydrocortisone 2 5 % ointment; Apply topically 2 (two) times a day  -     meloxicam (MOBIC) 7 5 mg tablet; Take 1 tablet (7 5 mg total) by mouth daily Take 1-2 tabs daily, with food  -     US abdomen complete; Future    Arthralgia of multiple joints  -     Ambulatory referral to Rheumatology  -     Sedimentation rate, automated  -     Vitamin D 25 hydroxy  -     Acute Hepatitis Panel (Refl)  -     Chronic Hepatitis Panel  -     Hepatic function panel  -     Centromere Antibody  -     Sjogren's Antibodies  -     Beta-2 glycoprotein antibodies  -     Anticardiolipin Ab, IgG/M, Qn  -     Lupus anticoagulant  -     Urinalysis with microscopic  -     Protein / creatinine ratio, urine  -     C3 complement  -     C4 complement  -     Anti-DNA antibody, double-stranded  -     Cyclic citrul peptide antibody, IgG  -     hydrocortisone 2 5 % ointment; Apply topically 2 (two) times a day  -     meloxicam (MOBIC) 7 5 mg tablet; Take 1 tablet (7 5 mg total) by mouth daily Take 1-2 tabs daily, with food  -     US abdomen complete; Future    Sciatica of right side  -     gabapentin (NEURONTIN) 100 mg capsule; Take 1 capsule (100 mg total) by mouth daily at bedtime    Vitamin D deficiency  -     ergocalciferol (VITAMIN D2) 50,000 units; Take 1 capsule (50,000 Units total) by mouth once a week    Follow-up plan: Return to clinic in 4 weeks      Chief Complaint  Chief Complaint   Patient presents with    Joint Pain     Lower Back, All Over    Joint Swelling     Feet, Knees, Legs    Numbness     Fingers, Toes    Rash     Butterfly Rash on Face, Arms    Itching     All Over    Fatigue    Weakness - Generalized    Headache     BLAIRE Juan is a 29 y o   female who presents as a Rheumatology consult referred by Monette Mohs, DO for evaluation of possible autoimmune disease given positive MAYUR  Patient states that she was recently diagnosed with hypothyroidism   She also had hypothyroidism when pregnant with her daughter 11 years ago  She admits to having a butterfly rash for several years now, worse in the sun, also feels like she has sand in her eyes, but denies dry mouth  Also has been having itchiness everywhere for a few days now  Has been diagnosed with keratosis on upper arms  She has gained weight lately  She admits to hair thinning but no hair loss in patches  She denies any blood clot or miscarriage history; sometimes gets a sharp chest pain, no dyspnea, has had indigestion/reflux symptoms, no Taynaud's symptoms  She admits that she has been having joint issues for some time now but mores in the past month  Her knees hurt going up and down stairs and her feet hurt; she admits to diffuse body pain, and morning stiffness for a few minutes  She also has had constant low back pain since a fall several years ago, and has history of kidney stones  She also admits to shooting pain everywhere  Review of Systems  Review of Systems   Constitutional: Positive for chills and fatigue  Negative for fever and unexpected weight change  HENT: Negative for mouth sores and trouble swallowing  Eyes: Positive for pain  Negative for visual disturbance  Dry eyes   Respiratory: Negative for cough and shortness of breath  Cardiovascular: Positive for chest pain and leg swelling  Gastrointestinal: Positive for nausea  Negative for abdominal pain, blood in stool, constipation and diarrhea  Indigestion/heartburn   Endocrine: Positive for cold intolerance and heat intolerance  Genitourinary: Positive for frequency  Negative for hematuria  Musculoskeletal: Positive for arthralgias, back pain, joint swelling, myalgias and neck pain  Skin: Positive for rash  Persistent itching   Allergic/Immunologic: Positive for environmental allergies  Neurological: Positive for weakness, numbness and headaches  Hematological: Negative for adenopathy     Psychiatric/Behavioral: Positive for dysphoric mood  Negative for sleep disturbance         Allergies  Allergies   Allergen Reactions    Penicillins Hives     At young age       Home Medications    Current Outpatient Medications:     adapalene (DIFFERIN) 0 1 % cream, Apply topically daily at bedtime, Disp: 45 g, Rfl: 0    clonazePAM (KlonoPIN) 0 5 mg tablet, Take 1/2 to 1 tab twice daily as needed for anxiety, Disp: 60 tablet, Rfl: 2    folic acid (FOLVITE) 443 MCG tablet, Take 400 mcg by mouth daily, Disp: , Rfl:     levothyroxine 25 mcg tablet, Take 1 tablet (25 mcg total) by mouth daily in the early morning, Disp: 30 tablet, Rfl: 5    Vortioxetine HBr (TRINTELLIX) 20 MG tablet, Take 1 tablet (20 mg total) by mouth daily for 90 days, Disp: 90 tablet, Rfl: 0    ergocalciferol (VITAMIN D2) 50,000 units, Take 1 capsule (50,000 Units total) by mouth once a week, Disp: 12 capsule, Rfl: 0    gabapentin (NEURONTIN) 100 mg capsule, Take 1 capsule (100 mg total) by mouth daily at bedtime, Disp: 30 capsule, Rfl: 3    hydrocortisone 2 5 % ointment, Apply topically 2 (two) times a day, Disp: 20 g, Rfl: 3    meloxicam (MOBIC) 7 5 mg tablet, Take 1 tablet (7 5 mg total) by mouth daily Take 1-2 tabs daily, with food, Disp: 60 tablet, Rfl: 3    nitrofurantoin (MACROBID) 100 mg capsule, Take 1 capsule (100 mg total) by mouth 2 (two) times a day for 5 days, Disp: 10 capsule, Rfl: 0    sulfamethoxazole-trimethoprim (BACTRIM DS) 800-160 mg per tablet, Take 1 tablet by mouth every 12 (twelve) hours for 10 days, Disp: 20 tablet, Rfl: 0    Past Medical History  Past Medical History:   Diagnosis Date    Anxiety     Bilateral carpal tunnel syndrome     last assessed: 11/17/2016    Depression     Hypothyroidism     Kidney stones     Psychiatric disorder     depression       Past Surgical History   Past Surgical History:   Procedure Laterality Date    CHOLECYSTECTOMY      DE LAP,CHOLECYSTECTOMY N/A 9/6/2018    Procedure: CHOLECYSTECTOMY LAPAROSCOPIC W/ ROBOTICS;  Surgeon: La Wade MD;  Location: [facility];  Service: General    TUBAL LIGATION         Family History    Family History   Problem Relation Age of Onset    No Known Problems Mother     Alcohol abuse Father     Drug abuse Family      No known family history of autoimmune or inflammatory diseases  Social History  Occupation: PCA at [employer]  Social History     Substance and Sexual Activity   Alcohol Use Yes    Frequency: Monthly or less    Drinks per session: 1 or 2    Binge frequency: Never    Comment: rarely     Social History     Substance and Sexual Activity   Drug Use No     Social History     Tobacco Use   Smoking Status Never Smoker   Smokeless Tobacco Never Used   drinks alcohol occasionally    Objective:  Vitals:    09/17/19 1517   Weight: 96 2 kg (212 lb)   Height: 5' 6 5" (1 689 m)       Physical Exam   Constitutional: She appears well-developed and well-nourished  No distress  HENT:   Head: Normocephalic and atraumatic  Mouth/Throat: Oropharynx is clear and moist and mucous membranes are normal    Eyes: Conjunctivae and EOM are normal  No scleral icterus  Neck: Neck supple  No spinous process tenderness and no muscular tenderness present  No thyromegaly present  Cardiovascular: Normal rate, regular rhythm, S1 normal and S2 normal  Exam reveals no friction rub  No murmur heard  Pulmonary/Chest: Effort normal and breath sounds normal  No respiratory distress  She has no wheezes  She has no rhonchi  She has no rales  Abdominal: Soft  She exhibits no distension  There is no hepatosplenomegaly  There is no tenderness  Musculoskeletal: She exhibits tenderness  Bilateral ankle swelling, bilateral knee tenderness   Lymphadenopathy:     She has no cervical adenopathy  Neurological: She is alert  She has normal strength  No sensory deficit  Skin: Skin is warm and dry  Rash noted  Nails show no clubbing     Livedoid rash on arms Psychiatric: She has a normal mood and affect  Reviewed labs and imaging  Imaging:   Abdomen Ultrasound 9/20/19  IMPRESSION:  Splenomegaly    Prior cholecystectomy  No evidence of biliary ductal dilatation      Labs:   Appointment on 09/17/2019   Component Date Value Ref Range Status    Hepatitis B Surface Ag 09/17/2019 Non-reactive  Non-reactive, NonReactive - Confirmed Final    Hep A IgM 09/17/2019 Non-reactive  Non-reactive, Equivocal-Suggest Recollect Final    Hepatitis C Ab 09/17/2019 Non-reactive  Non-reactive Final    Hep B C IgM 09/17/2019 Non-reactive  Non-reactive Final    Anticardiolipin IgA 09/17/2019 <9  0 - 11 APL U/mL Final                              Negative:              <12                            Indeterminate:     12 - 20                            Low-Med Positive: >20 - 80                            High Positive:         >80    Anticardiolipin IgG 09/17/2019 <9  0 - 14 GPL U/mL Final                              Negative:              <15                            Indeterminate:     15 - 20                            Low-Med Positive: >20 - 80                            High Positive:         >80    Anticardiolipin IgM 09/17/2019 13* 0 - 12 MPL U/mL Final                              Negative:              <13                            Indeterminate:     13 - 20                            Low-Med Positive: >20 - 80                            High Positive:         >80   Consult on 09/17/2019   Component Date Value Ref Range Status    Sed Rate 09/17/2019 13  0 - 20 mm/hour Final    Vit D, 25-Hydroxy 09/17/2019 15 8* 30 0 - 100 0 ng/mL Final    Hepatitis B Surface Ag 09/17/2019 Non-reactive  Non-reactive, NonReactive - Confirmed Final    Hepatitis C Ab 09/17/2019 Non-reactive  Non-reactive Final    Hep B C IgM 09/17/2019 Non-reactive  Non-reactive Final    Hep B Core Total Ab 09/17/2019 Non-reactive  Non-reactive Final    Total Bilirubin 09/17/2019 1 22* 0 20 - 1 00 mg/dL Final    Bilirubin, Direct 09/17/2019 0 33* 0 00 - 0 20 mg/dL Final    Alkaline Phosphatase 09/17/2019 96  46 - 116 U/L Final    AST 09/17/2019 88* 5 - 45 U/L Final      Specimen collection should occur prior to Sulfasalazine and/or Sulfapyridine administration due to the potential for falsely depressed results   ALT 09/17/2019 172* 12 - 78 U/L Final      Specimen collection should occur prior to Sulfasalazine and/or Sulfapyridine administration due to the potential for falsely depressed results   Total Protein 09/17/2019 7 6  6 4 - 8 2 g/dL Final    Albumin 09/17/2019 3 7  3 5 - 5 0 g/dL Final    Anti-Centromere B Antibodies 09/17/2019 <0 2  0 0 - 0 9 AI Final    Beta-2 Glyco 1 IgG 09/17/2019 <9  0 - 20 GPI IgG units Final    The reference interval reflects a 3SD or 99th percentile interval,  which is thought to represent a potentially clinically significant  result in accordance with the International Consensus Statement on  the classification criteria for definitive antiphospholipid syndrome  (APS)  J Thromb Haem 2006;4:295-306   Beta-2 Glyco 1 IgA 09/17/2019 <9  0 - 25 GPI IgA units Final    The reference interval reflects a 3SD or 99th percentile interval,  which is thought to represent a potentially clinically significant  result in accordance with the International Consensus Statement on  the classification criteria for definitive antiphospholipid syndrome  (APS)  J Thromb Haem 2006;4:295-306   Beta-2 Glyco 1 IgM 09/17/2019 <9  0 - 32 GPI IgM units Final    The reference interval reflects a 3SD or 99th percentile interval,  which is thought to represent a potentially clinically significant  result in accordance with the International Consensus Statement on  the classification criteria for definitive antiphospholipid syndrome  (APS)  J Thromb Haem 2006;4:295-306      PTT Lupus Anticoagulant 09/17/2019 38 1  0 0 - 51 9 sec Final    Dilute Viper Venom Time 09/17/2019 40 2  0 0 - 47 0 sec Final    DILUTE PROTHROMBIN TIME(DPT) 09/17/2019 37 2  0 0 - 55 0 sec Final    THROMBIN TIME (DRVW) 09/17/2019 17 8  0 0 - 23 0 sec Final    DPT CONFIRM RATIO 09/17/2019 1 04  0 00 - 1 40 Ratio Final    LUPUS REFLEX INTERPRETATION 09/17/2019 Comment:   Final    No lupus anticoagulant was detected      Clarity, UA 09/17/2019 Clear   Final    Color, UA 09/17/2019 Dk Yellow   Final    Specific Gravity, UA 09/17/2019 1 016  1 003 - 1 030 Final    pH, UA 09/17/2019 6 0  4 5, 5 0, 5 5, 6 0, 6 5, 7 0, 7 5, 8 0 Final    Glucose, UA 09/17/2019 Negative  Negative mg/dl Final    Ketones, UA 09/17/2019 Negative  Negative mg/dl Final    Blood, UA 09/17/2019 Negative  Negative Final    Protein, UA 09/17/2019 Negative  Negative mg/dl Final    Nitrite, UA 09/17/2019 Negative  Negative Final    Bilirubin, UA 09/17/2019 Negative  Negative Final    Urobilinogen, UA 09/17/2019 0 2  0 2, 1 0 E U /dl E U /dl Final    Leukocytes, UA 09/17/2019 Moderate* Negative Final    WBC, UA 09/17/2019 4-10* None Seen, 0-5, 5-55, 5-65 /hpf Final    RBC, UA 09/17/2019 2-4* None Seen, 0-5 /hpf Final    Bacteria, UA 09/17/2019 Occasional  None Seen, Occasional /hpf Final    Epithelial Cells 09/17/2019 Occasional  None Seen, Occasional /hpf Final    Creatinine, Ur 09/17/2019 119 0  mg/dL Final    Protein Urine Random 09/17/2019 12  mg/dL Final    Prot/Creat Ratio, Ur 09/17/2019 0 10  0 00 - 0 10 Final    C3 Complement 09/17/2019 130 0  90 0 - 180 0 mg/dL Final    C4, COMPLEMENT 09/17/2019 38 0  10 0 - 40 0 mg/dL Final    Cyclic Citrullin Peptide Ab 09/17/2019 8  0 - 19 units Final                              Negative               <20                            Weak positive      20 - 39                            Moderate positive  40 - 59                            Strong positive        >59   Lab on 09/12/2019   Component Date Value Ref Range Status    Free T4 09/12/2019 0 81  0 76 - 1 46 ng/dL Final      Specimen collection should occur prior to Sulfasalazine administration due to the potential for falsely elevated results   T3, Free 09/12/2019 2 52  2 30 - 4 20 pg/mL Final    Sodium 09/12/2019 135* 136 - 145 mmol/L Final    Potassium 09/12/2019 4 0  3 5 - 5 3 mmol/L Final    Chloride 09/12/2019 105  100 - 108 mmol/L Final    CO2 09/12/2019 26  21 - 32 mmol/L Final    ANION GAP 09/12/2019 4  4 - 13 mmol/L Final    BUN 09/12/2019 16  5 - 25 mg/dL Final    Creatinine 09/12/2019 0 73  0 60 - 1 30 mg/dL Final    Standardized to IDMS reference method    Glucose, Fasting 09/12/2019 89  65 - 99 mg/dL Final      Specimen collection should occur prior to Sulfasalazine administration due to the potential for falsely depressed results  Specimen collection should occur prior to Sulfapyridine administration due to the potential for falsely elevated results   Calcium 09/12/2019 8 7  8 3 - 10 1 mg/dL Final    AST 09/12/2019 59* 5 - 45 U/L Final      Specimen collection should occur prior to Sulfasalazine administration due to the potential for falsely depressed results   ALT 09/12/2019 132* 12 - 78 U/L Final      Specimen collection should occur prior to Sulfasalazine and/or Sulfapyridine administration due to the potential for falsely depressed results       Alkaline Phosphatase 09/12/2019 70  46 - 116 U/L Final    Total Protein 09/12/2019 7 8  6 4 - 8 2 g/dL Final    Albumin 09/12/2019 3 9  3 5 - 5 0 g/dL Final    Total Bilirubin 09/12/2019 1 33* 0 20 - 1 00 mg/dL Final    eGFR 09/12/2019 108  ml/min/1 73sq m Final    WBC 09/12/2019 5 34  4 31 - 10 16 Thousand/uL Final    RBC 09/12/2019 4 23  3 81 - 5 12 Million/uL Final    Hemoglobin 09/12/2019 13 0  11 5 - 15 4 g/dL Final    Hematocrit 09/12/2019 39 5  34 8 - 46 1 % Final    MCV 09/12/2019 93  82 - 98 fL Final    MCH 09/12/2019 30 7  26 8 - 34 3 pg Final    MCHC 09/12/2019 32 9  31 4 - 37 4 g/dL Final    RDW 09/12/2019 14 3  11 6 - 15 1 % Final    MPV 09/12/2019 9 3  8 9 - 12 7 fL Final    Platelets 26/41/0416 146* 149 - 390 Thousands/uL Final    nRBC 09/12/2019 0  /100 WBCs Final    MAYUR 09/12/2019 Positive* Negative Final    Rheumatoid Factor 09/12/2019 Negative  Negative Final    CRP 09/12/2019 <3 0  <3 0 mg/L Final    Lyme IgG/IgM Ab 09/12/2019 <0 91  0 00 - 0 90 ISR Final                                    Negative         <0 91                                  Equivocal  0 91 - 1 09                                  Positive         >1 09    TSH 3RD GENERATON 09/12/2019 4 970* 0 358 - 3 740 uIU/mL Final      The recommended reference ranges for TSH during pregnancy are as follows:   First trimester 0 1 to 2 5 uIU/mL   Second trimester  0 2 to 3 0 uIU/mL   Third trimester 0 3 to 3 0 uIU/m    Note: Normal ranges may not apply to patients who are transgender, non-binary, or whose legal sex, sex at birth, and gender identity differ  Using supplements with high doses of biotin 20 to more than 300 times greater than the adequate daily intake for adults of 30 mcg/day as established by the [employer], can cause falsely depress results      Total CK 09/12/2019 63  26 - 192 U/L Final    Segmented % 09/12/2019 64  43 - 75 % Final    Bands % 09/12/2019 2  0 - 8 % Final    Lymphocytes % 09/12/2019 21  14 - 44 % Final    Monocytes % 09/12/2019 10  4 - 12 % Final    Eosinophils, % 09/12/2019 1  0 - 6 % Final    Basophils % 09/12/2019 1  0 - 1 % Final    Atypical Lymphocytes % 09/12/2019 1* <=0 % Final    Absolute Neutrophils 09/12/2019 3 52  1 85 - 7 62 Thousand/uL Final    Lymphocytes Absolute 09/12/2019 1 12  0 60 - 4 47 Thousand/uL Final    Monocytes Absolute 09/12/2019 0 53  0 00 - 1 22 Thousand/uL Final    Eosinophils Absolute 09/12/2019 0 05  0 00 - 0 40 Thousand/uL Final    Basophils Absolute 09/12/2019 0 05  0 00 - 0 10 Thousand/uL Final    Platelet Estimate 89/46/8747 Decreased* Adequate Final    MAYUR Titer 1 09/12/2019 Titer of 160 Final    MAYUR Pattern 1 09/12/2019 Speckled pattern   Final   Lab on 08/19/2019   Component Date Value Ref Range Status    Sodium 08/19/2019 136  136 - 145 mmol/L Final    Potassium 08/19/2019 3 8  3 5 - 5 3 mmol/L Final    Chloride 08/19/2019 104  100 - 108 mmol/L Final    CO2 08/19/2019 29  21 - 32 mmol/L Final    ANION GAP 08/19/2019 3* 4 - 13 mmol/L Final   Appointment on 07/18/2019   Component Date Value Ref Range Status    DHEA-SO4 07/18/2019 230 3  84 8 - 378 0 ug/dL Final    Androstenedione 07/18/2019 100  41 - 262 ng/dL Final    This test was developed and its performance characteristics  determined by [facility]  It has not been cleared or approved  by the Food and Drug Administration   Sodium 07/18/2019 135* 136 - 145 mmol/L Final    Potassium 07/18/2019 4 1  3 5 - 5 3 mmol/L Final    Chloride 07/18/2019 104  100 - 108 mmol/L Final    CO2 07/18/2019 28  21 - 32 mmol/L Final    ANION GAP 07/18/2019 3* 4 - 13 mmol/L Final    BUN 07/18/2019 11  5 - 25 mg/dL Final    Creatinine 07/18/2019 0 67  0 60 - 1 30 mg/dL Final    Standardized to IDMS reference method    eGFR 07/18/2019 115  ml/min/1 73sq m Final    Testosterone, Free 07/18/2019 2 6  0 0 - 4 2 pg/mL Final    TESTOSTERONE TOTAL 07/18/2019 25  8 - 48 ng/dL Final   Transcribe Orders on 04/22/2019   Component Date Value Ref Range Status    QFT Nil 04/24/2019 0 02  0 - 8 0 IU/ml Final    QFT TB1-NIL 04/24/2019 0 00  IU/ml Final    QFT TB2-NIL 04/24/2019 0 00  IU/ml Final    QFT Mitogen-NIL 04/24/2019 >10 00  IU/ml Final    QFT Final Interpretation 04/24/2019 Negative  Negative Final    No Interferon-gamma response to M  tuberculosis antigens detected  Infection with M  tuberculosis is unlikely  A single negative result does not exclude infection with M  tuberculosis   In patients at high risk for M  tuberculosis infection, a second test should be considered in accordance with the 2017 ATS/IDSA/CDC Clinical Practice Guidelines for Diagnosis of Tuberculosis in Adults and Children  False negative results can be a result of incorrect blood sample collection or handling of the specimen affecting lymphocyte function  Lab Requisition on 04/16/2019   Component Date Value Ref Range Status    Urine Culture 04/16/2019 >100,000 cfu/ml Escherichia coli*  Final    N gonorrhoeae, DNA Probe 04/16/2019 Negative  Negative Final    Chlamydia trachomatis, DNA Probe 04/16/2019 Negative  Negative Final   Appointment on 03/12/2019   Component Date Value Ref Range Status    TSH 3RD GENERATON 03/12/2019 2 983  0 358 - 3 740 uIU/mL Final      The recommended reference ranges for TSH during pregnancy are as follows:   First trimester 0 1 to 2 5 uIU/mL   Second trimester  0 2 to 3 0 uIU/mL   Third trimester 0 3 to 3 0 uIU/m    Note: Normal ranges may not apply to patients who are transgender, non-binary, or whose legal sex, sex at birth, and gender identity differ  Using supplements with high doses of biotin 20 to more than 300 times greater than the adequate daily intake for adults of 30 mcg/day as established by the [employer], can cause falsely depress results   T3, Free 03/12/2019 2 53  2 30 - 4 20 pg/mL Final    Sodium 03/12/2019 140  136 - 145 mmol/L Final    Potassium 03/12/2019 4 0  3 5 - 5 3 mmol/L Final    Chloride 03/12/2019 104  100 - 108 mmol/L Final    CO2 03/12/2019 29  21 - 32 mmol/L Final    ANION GAP 03/12/2019 7  4 - 13 mmol/L Final    BUN 03/12/2019 9  5 - 25 mg/dL Final    Creatinine 03/12/2019 0 65  0 60 - 1 30 mg/dL Final    Standardized to IDMS reference method    Glucose, Fasting 03/12/2019 80  65 - 99 mg/dL Final      Specimen collection should occur prior to Sulfasalazine administration due to the potential for falsely depressed results  Specimen collection should occur prior to Sulfapyridine administration due to the potential for falsely elevated results      Calcium 03/12/2019 8 4  8 3 - 10 1 mg/dL Final    eGFR 03/12/2019 116  ml/min/1 73sq m Final    Rubella IgG Quant 03/12/2019 46 7  >9 9 IU/mL Final    Mumps IgG 03/12/2019 IMMUNE  IMMUNE Final    IMMUNE - Presumed immune to mumps infection      Rubeola IgG 03/12/2019 IMMUNE  IMMUNE Final    Varicella IgG 03/12/2019 IMMUNE  IMMUNE Final   Admission on 01/16/2019, Discharged on 01/16/2019   Component Date Value Ref Range Status    EXTBreath Alcohol 01/16/2019 0 00   Final    Amph/Meth UR 01/16/2019 Negative  Negative Final    Barbiturate Ur 01/16/2019 Negative  Negative Final    Benzodiazepine Urine 01/16/2019 Negative  Negative Final    Cocaine Urine 01/16/2019 Negative  Negative Final    Methadone Urine 01/16/2019 Negative  Negative Final    Opiate Urine 01/16/2019 Negative  Negative Final    PCP Ur 01/16/2019 Negative  Negative Final    THC Urine 01/16/2019 Negative  Negative Final    EXT PREG TEST UR (Ref: Negative) 01/16/2019 Negative   Final    Color, UA 01/16/2019 Yellow   Final    Clarity, UA 01/16/2019 Clear   Final    pH, UA 01/16/2019 5 5  4 5 - 8 0 Final    Leukocytes, UA 01/16/2019 Small* Negative Final    Nitrite, UA 01/16/2019 Negative  Negative Final    Protein, UA 01/16/2019 Negative  Negative mg/dl Final    Glucose, UA 01/16/2019 Negative  Negative mg/dl Final    Ketones, UA 01/16/2019 Negative  Negative mg/dl Final    Urobilinogen, UA 01/16/2019 0 2  0 2, 1 0 E U /dl E U /dl Final    Bilirubin, UA 01/16/2019 Negative  Negative Final    Blood, UA 01/16/2019 Negative  Negative Final    Specific Gravity, UA 01/16/2019 1 010  1 003 - 1 030 Final    RBC, UA 01/16/2019 None Seen  None Seen, 0-5 /hpf Final    WBC, UA 01/16/2019 2-4* None Seen, 0-5, 5-55, 5-65 /hpf Final    Epithelial Cells 01/16/2019 Occasional  None Seen, Occasional /hpf Final    Bacteria, UA 01/16/2019 None Seen  None Seen, Occasional /hpf Final   Appointment on 12/03/2018   Component Date Value Ref Range Status    TSH 3RD GENERATON 12/03/2018 3 960* 0 358 - 3 740 uIU/mL Final    Using supplements with high doses of biotin 20 to more than 300 times greater than the adequate daily intake for adults of 30 mcg/day as established by the [employer], can cause falsely depress results   Vit D, 25-Hydroxy 12/03/2018 11 8* 30 0 - 100 0 ng/mL Final    Free T4 12/03/2018 0 82  0 76 - 1 46 ng/dL Final      Specimen collection should occur prior to Sulfasalazine administration due to the potential for falsely elevated results  There may be more visits with results that are not included

## 2019-09-18 LAB
CARDIOLIPIN IGA SER IA-ACNC: <9 APL U/ML (ref 0–11)
CARDIOLIPIN IGG SER IA-ACNC: <9 GPL U/ML (ref 0–14)
CARDIOLIPIN IGM SER IA-ACNC: 13 MPL U/ML (ref 0–12)
CENTROMERE B AB SER-ACNC: <0.2 AI (ref 0–0.9)

## 2019-09-19 LAB
APTT SCREEN TO CONFIRM RATIO: 1.04 RATIO (ref 0–1.4)
B2 GLYCOPROT1 IGA SER-ACNC: <9 GPI IGA UNITS (ref 0–25)
B2 GLYCOPROT1 IGG SER-ACNC: <9 GPI IGG UNITS (ref 0–20)
B2 GLYCOPROT1 IGM SER-ACNC: <9 GPI IGM UNITS (ref 0–32)
CCP IGA+IGG SERPL IA-ACNC: 8 UNITS (ref 0–19)
CONFIRM APTT/NORMAL: 37.2 SEC (ref 0–55)
LA PPP-IMP: NORMAL
SCREEN APTT: 38.1 SEC (ref 0–51.9)
SCREEN DRVVT: 40.2 SEC (ref 0–47)
THROMBIN TIME: 17.8 SEC (ref 0–23)

## 2019-09-20 ENCOUNTER — OFFICE VISIT (OUTPATIENT)
Dept: FAMILY MEDICINE CLINIC | Facility: CLINIC | Age: 34
End: 2019-09-20
Payer: COMMERCIAL

## 2019-09-20 ENCOUNTER — HOSPITAL ENCOUNTER (OUTPATIENT)
Dept: ULTRASOUND IMAGING | Facility: HOSPITAL | Age: 34
Discharge: HOME | End: 2019-09-20
Attending: INTERNAL MEDICINE
Payer: COMMERCIAL

## 2019-09-20 VITALS
BODY MASS INDEX: 33.9 KG/M2 | WEIGHT: 216 LBS | HEIGHT: 67 IN | SYSTOLIC BLOOD PRESSURE: 118 MMHG | TEMPERATURE: 98.1 F | DIASTOLIC BLOOD PRESSURE: 72 MMHG

## 2019-09-20 DIAGNOSIS — N30.90 CYSTITIS: Primary | ICD-10-CM

## 2019-09-20 DIAGNOSIS — M25.50 ARTHRALGIA OF MULTIPLE JOINTS: ICD-10-CM

## 2019-09-20 DIAGNOSIS — R76.8 ANA POSITIVE: ICD-10-CM

## 2019-09-20 PROBLEM — Z30.431 SURVEILLANCE OF PREVIOUSLY PRESCRIBED INTRAUTERINE CONTRACEPTIVE DEVICE: Status: ACTIVE | Noted: 2019-09-20

## 2019-09-20 PROBLEM — Z30.430 ENCOUNTER FOR INSERTION OF INTRAUTERINE CONTRACEPTIVE DEVICE (IUD): Status: ACTIVE | Noted: 2019-09-20

## 2019-09-20 LAB
SL AMB  POCT GLUCOSE, UA: NEGATIVE
SL AMB LEUKOCYTE ESTERASE,UA: 1
SL AMB POCT BILIRUBIN,UA: NEGATIVE
SL AMB POCT BLOOD,UA: NEGATIVE
SL AMB POCT CLARITY,UA: CLEAR
SL AMB POCT COLOR,UA: YELLOW
SL AMB POCT KETONES,UA: NEGATIVE
SL AMB POCT NITRITE,UA: NEGATIVE
SL AMB POCT PH,UA: 5
SL AMB POCT SPECIFIC GRAVITY,UA: 1.01
SL AMB POCT URINE PROTEIN: NEGATIVE
SL AMB POCT UROBILINOGEN: 0.2

## 2019-09-20 PROCEDURE — 81002 URINALYSIS NONAUTO W/O SCOPE: CPT | Performed by: FAMILY MEDICINE

## 2019-09-20 PROCEDURE — 99213 OFFICE O/P EST LOW 20 MIN: CPT | Performed by: FAMILY MEDICINE

## 2019-09-20 PROCEDURE — 87086 URINE CULTURE/COLONY COUNT: CPT | Performed by: FAMILY MEDICINE

## 2019-09-20 PROCEDURE — 76700 US EXAM ABDOM COMPLETE: CPT

## 2019-09-20 RX ORDER — SULFAMETHOXAZOLE AND TRIMETHOPRIM 800; 160 MG/1; MG/1
1 TABLET ORAL EVERY 12 HOURS SCHEDULED
Qty: 20 TABLET | Refills: 0 | Status: SHIPPED | OUTPATIENT
Start: 2019-09-20 | End: 2019-09-30

## 2019-09-20 NOTE — PROGRESS NOTES
Assessment/Plan:         Diagnoses and all orders for this visit:    Cystitis  -     Urine culture  -     sulfamethoxazole-trimethoprim (BACTRIM DS) 800-160 mg per tablet; Take 1 tablet by mouth every 12 (twelve) hours for 10 days  -     POCT urine dip    Other orders  -     levonorgestrel (MIRENA, 52 MG,) 20 MCG/24HR IUD; Lot # [id number]   exp 08/11          Subjective:   Chief Complaint   Patient presents with    Back Pain    Urinary Frequency                Patient ID: Sarbjit Cid is a 29 y o  female  Patient is a 55-year-old female presenting to the office complaining of urinary frequency, discolored urine, strong smelling urine, and increased frequency for the last several days  The following portions of the patient's history were reviewed and updated as appropriate: allergies, current medications, past family history, past medical history, past social history, past surgical history and problem list     Review of Systems   Constitutional: Positive for activity change and fatigue  Negative for chills, fever and unexpected weight change  HENT: Negative for congestion, postnasal drip, rhinorrhea and sore throat  Respiratory: Negative for cough and shortness of breath  Cardiovascular: Negative for leg swelling  Gastrointestinal: Negative for abdominal pain, diarrhea, nausea and vomiting  Endocrine: Negative for cold intolerance and heat intolerance  Genitourinary: Positive for dysuria, frequency and urgency  Negative for pelvic pain  Musculoskeletal: Positive for arthralgias and myalgias  Skin: Negative for rash  Allergic/Immunologic: Negative for immunocompromised state  Neurological: Negative  Psychiatric/Behavioral: Negative for agitation  The patient is nervous/anxious  Objective:      /72   Temp 98 1 °F (36 7 °C)   Ht 5' 6 5" (1 689 m)   Wt 98 kg (216 lb)   BMI 34 34 kg/m²          Physical Exam   Constitutional: She is oriented to person, place, and time  She appears well-developed and well-nourished  No distress  Over weight   HENT:   Head: Normocephalic and atraumatic  Right Ear: External ear normal    Left Ear: External ear normal    Nose: Nose normal    Mouth/Throat: Oropharynx is clear and moist    Eyes: Pupils are equal, round, and reactive to light  Conjunctivae and EOM are normal    Neck: Normal range of motion  Neck supple  No JVD present  No tracheal deviation present  No thyromegaly present  Cardiovascular: Normal rate, regular rhythm and normal heart sounds  No murmur heard  Pulmonary/Chest: Effort normal and breath sounds normal  She has no wheezes  She has no rales  Abdominal: Soft  Bowel sounds are normal  She exhibits no mass  There is no tenderness  There is no rebound and no guarding  Musculoskeletal: Normal range of motion  She exhibits no edema or tenderness  Lymphadenopathy:     She has no cervical adenopathy  Neurological: She is alert and oriented to person, place, and time  She has normal reflexes  No cranial nerve deficit  Skin: Skin is warm and dry  No lesion and no rash noted  Psychiatric: Judgment normal  Her mood appears anxious  Nursing note and vitals reviewed

## 2019-09-21 ENCOUNTER — HOSPITAL ENCOUNTER (EMERGENCY)
Facility: HOSPITAL | Age: 34
Discharge: HOME | End: 2019-09-22
Admitting: EMERGENCY MEDICINE
Payer: COMMERCIAL

## 2019-09-21 ENCOUNTER — APPOINTMENT (EMERGENCY)
Dept: RADIOLOGY | Facility: HOSPITAL | Age: 34
End: 2019-09-21
Payer: COMMERCIAL

## 2019-09-21 DIAGNOSIS — R10.12 LUQ PAIN: Primary | ICD-10-CM

## 2019-09-21 DIAGNOSIS — F41.9 ANXIETY: ICD-10-CM

## 2019-09-21 DIAGNOSIS — R16.1 SPLENOMEGALY: ICD-10-CM

## 2019-09-21 DIAGNOSIS — L29.9 ITCHING: ICD-10-CM

## 2019-09-21 LAB
ALBUMIN SERPL BCP-MCNC: 3.3 G/DL (ref 3.5–5)
ALP SERPL-CCNC: 99 U/L (ref 46–116)
ALT SERPL W P-5'-P-CCNC: 167 U/L (ref 12–78)
ANION GAP SERPL CALCULATED.3IONS-SCNC: 7 MMOL/L (ref 4–13)
ANISOCYTOSIS BLD QL SMEAR: PRESENT
AST SERPL W P-5'-P-CCNC: 81 U/L (ref 5–45)
BACTERIA UR CULT: NORMAL
BACTERIA UR QL AUTO: ABNORMAL /HPF
BASOPHILS # BLD MANUAL: 0 THOUSAND/UL (ref 0–0.1)
BASOPHILS NFR MAR MANUAL: 0 % (ref 0–1)
BILIRUB SERPL-MCNC: 1.29 MG/DL (ref 0.2–1)
BILIRUB UR QL STRIP: NEGATIVE
BUN SERPL-MCNC: 12 MG/DL (ref 5–25)
CALCIUM SERPL-MCNC: 8.8 MG/DL (ref 8.3–10.1)
CHLORIDE SERPL-SCNC: 103 MMOL/L (ref 100–108)
CLARITY UR: CLEAR
CO2 SERPL-SCNC: 30 MMOL/L (ref 21–32)
COLOR UR: YELLOW
CREAT SERPL-MCNC: 0.86 MG/DL (ref 0.6–1.3)
EOSINOPHIL # BLD MANUAL: 0.06 THOUSAND/UL (ref 0–0.4)
EOSINOPHIL NFR BLD MANUAL: 1 % (ref 0–6)
ERYTHROCYTE [DISTWIDTH] IN BLOOD BY AUTOMATED COUNT: 16.5 % (ref 11.6–15.1)
ERYTHROCYTE [SEDIMENTATION RATE] IN BLOOD: 22 MM/HOUR (ref 0–20)
GFR SERPL CREATININE-BSD FRML MDRD: 88 ML/MIN/1.73SQ M
GLUCOSE SERPL-MCNC: 97 MG/DL (ref 65–140)
GLUCOSE UR STRIP-MCNC: NEGATIVE MG/DL
HCT VFR BLD AUTO: 30.2 % (ref 34.8–46.1)
HGB BLD-MCNC: 9.7 G/DL (ref 11.5–15.4)
HGB UR QL STRIP.AUTO: ABNORMAL
KETONES UR STRIP-MCNC: NEGATIVE MG/DL
LACTATE SERPL-SCNC: 1 MMOL/L (ref 0.5–2)
LEUKOCYTE ESTERASE UR QL STRIP: ABNORMAL
LG PLATELETS BLD QL SMEAR: PRESENT
LYMPHOCYTES # BLD AUTO: 1.84 THOUSAND/UL (ref 0.6–4.47)
LYMPHOCYTES # BLD AUTO: 30 % (ref 14–44)
MCH RBC QN AUTO: 31 PG (ref 26.8–34.3)
MCHC RBC AUTO-ENTMCNC: 32.1 G/DL (ref 31.4–37.4)
MCV RBC AUTO: 97 FL (ref 82–98)
MONOCYTES # BLD AUTO: 0.37 THOUSAND/UL (ref 0–1.22)
MONOCYTES NFR BLD: 6 % (ref 4–12)
NEUTROPHILS # BLD MANUAL: 3.3 THOUSAND/UL (ref 1.85–7.62)
NEUTS BAND NFR BLD MANUAL: 7 % (ref 0–8)
NEUTS SEG NFR BLD AUTO: 47 % (ref 43–75)
NITRITE UR QL STRIP: NEGATIVE
NON-SQ EPI CELLS URNS QL MICRO: ABNORMAL /HPF
NRBC BLD AUTO-RTO: 0 /100 WBCS
PH UR STRIP.AUTO: 7 [PH]
PLATELET # BLD AUTO: 142 THOUSANDS/UL (ref 149–390)
PLATELET BLD QL SMEAR: ADEQUATE
PMV BLD AUTO: 9.8 FL (ref 8.9–12.7)
POTASSIUM SERPL-SCNC: 3.7 MMOL/L (ref 3.5–5.3)
PROT SERPL-MCNC: 7.3 G/DL (ref 6.4–8.2)
PROT UR STRIP-MCNC: NEGATIVE MG/DL
RBC # BLD AUTO: 3.13 MILLION/UL (ref 3.81–5.12)
RBC #/AREA URNS AUTO: ABNORMAL /HPF
SODIUM SERPL-SCNC: 140 MMOL/L (ref 136–145)
SP GR UR STRIP.AUTO: 1.01 (ref 1–1.03)
TOTAL CELLS COUNTED SPEC: 100
UROBILINOGEN UR QL STRIP.AUTO: 4 E.U./DL
VARIANT LYMPHS # BLD AUTO: 9 %
WBC # BLD AUTO: 6.12 THOUSAND/UL (ref 4.31–10.16)
WBC #/AREA URNS AUTO: ABNORMAL /HPF

## 2019-09-21 PROCEDURE — 71046 X-RAY EXAM CHEST 2 VIEWS: CPT

## 2019-09-21 PROCEDURE — 99284 EMERGENCY DEPT VISIT MOD MDM: CPT

## 2019-09-21 PROCEDURE — 96374 THER/PROPH/DIAG INJ IV PUSH: CPT

## 2019-09-21 PROCEDURE — 81001 URINALYSIS AUTO W/SCOPE: CPT | Performed by: PHYSICIAN ASSISTANT

## 2019-09-21 PROCEDURE — 85652 RBC SED RATE AUTOMATED: CPT | Performed by: PHYSICIAN ASSISTANT

## 2019-09-21 PROCEDURE — 87040 BLOOD CULTURE FOR BACTERIA: CPT | Performed by: PHYSICIAN ASSISTANT

## 2019-09-21 PROCEDURE — 96361 HYDRATE IV INFUSION ADD-ON: CPT

## 2019-09-21 PROCEDURE — 85027 COMPLETE CBC AUTOMATED: CPT | Performed by: PHYSICIAN ASSISTANT

## 2019-09-21 PROCEDURE — 99285 EMERGENCY DEPT VISIT HI MDM: CPT | Performed by: PHYSICIAN ASSISTANT

## 2019-09-21 PROCEDURE — 80053 COMPREHEN METABOLIC PANEL: CPT | Performed by: PHYSICIAN ASSISTANT

## 2019-09-21 PROCEDURE — 85007 BL SMEAR W/DIFF WBC COUNT: CPT | Performed by: PHYSICIAN ASSISTANT

## 2019-09-21 PROCEDURE — 36415 COLL VENOUS BLD VENIPUNCTURE: CPT | Performed by: PHYSICIAN ASSISTANT

## 2019-09-21 PROCEDURE — 83605 ASSAY OF LACTIC ACID: CPT | Performed by: PHYSICIAN ASSISTANT

## 2019-09-21 RX ORDER — ACETAMINOPHEN 325 MG/1
975 TABLET ORAL ONCE
Status: COMPLETED | OUTPATIENT
Start: 2019-09-21 | End: 2019-09-21

## 2019-09-21 RX ORDER — LORAZEPAM 2 MG/ML
1 INJECTION INTRAMUSCULAR ONCE
Status: COMPLETED | OUTPATIENT
Start: 2019-09-21 | End: 2019-09-21

## 2019-09-21 RX ADMIN — SODIUM CHLORIDE 1000 ML: 0.9 INJECTION, SOLUTION INTRAVENOUS at 19:47

## 2019-09-21 RX ADMIN — LORAZEPAM 1 MG: 2 INJECTION INTRAMUSCULAR; INTRAVENOUS at 22:33

## 2019-09-21 RX ADMIN — ACETAMINOPHEN 975 MG: 325 TABLET ORAL at 19:41

## 2019-09-21 NOTE — ED NOTES
Patient ambulatory to room 10 from waiting room with steady gait at this time       Adán Ruelas, TAMMY  09/21/19 6937

## 2019-09-21 NOTE — ED PROVIDER NOTES
History  Chief Complaint   Patient presents with    Fever - 9 weeks to 74 years     patient reports fevers since yesterday  reports constant HA's   reports body aches  reports LUQ and left rib pain  reports recently diagnosed with lupus  History provided by:  Patient and spouse   used: No    Abdominal Pain   Pain location:  LUQ  Pain quality: fullness and sharp    Pain radiates to:  RUQ and epigastric region  Pain severity:  Moderate  Onset quality:  Gradual  Duration:  1 day  Timing:  Constant  Progression:  Unchanged  Chronicity:  New  Context: not diet changes, not eating and not suspicious food intake    Relieved by:  Nothing  Worsened by:  Nothing  Ineffective treatments:  OTC medications  Associated symptoms: chills, fatigue, fever, hematuria, nausea and vaginal bleeding (Started menses today)    Associated symptoms: no belching, no chest pain, no constipation, no cough, no diarrhea, no dysuria, no shortness of breath, no sore throat, no vaginal discharge and no vomiting    Risk factors: obesity    Risk factors: no aspirin use and not pregnant        Prior to Admission Medications   Prescriptions Last Dose Informant Patient Reported? Taking?    Vortioxetine HBr (TRINTELLIX) 20 MG tablet   No No   Sig: Take 1 tablet (20 mg total) by mouth daily for 90 days   adapalene (DIFFERIN) 0 1 % cream   No No   Sig: Apply topically daily at bedtime   albuterol (PROVENTIL HFA,VENTOLIN HFA) 90 mcg/act inhaler   No No   Sig: Inhale 2 puffs every 4 (four) hours as needed for wheezing (or cough)   clonazePAM (KlonoPIN) 0 5 mg tablet   No No   Sig: Take 1/2 to 1 tab twice daily as needed for anxiety   ergocalciferol (VITAMIN D2) 50,000 units   No No   Sig: Take 1 capsule (50,000 Units total) by mouth once a week   folic acid (FOLVITE) 460 MCG tablet   Yes No   Sig: Take 400 mcg by mouth daily   gabapentin (NEURONTIN) 100 mg capsule   No No   Sig: Take 1 capsule (100 mg total) by mouth daily at bedtime   hydrocortisone 2 5 % ointment   No No   Sig: Apply topically 2 (two) times a day   levonorgestrel (MIRENA, 52 MG,) 20 MCG/24HR IUD   Yes No   Sig: Lot # [id number]   exp 08/11   levothyroxine 25 mcg tablet   No No   Sig: Take 1 tablet (25 mcg total) by mouth daily in the early morning   meloxicam (MOBIC) 7 5 mg tablet   No No   Sig: Take 1 tablet (7 5 mg total) by mouth daily Take 1-2 tabs daily, with food   spironolactone (ALDACTONE) 50 mg tablet   No No   Sig: Take one tab once daily in the morning   sulfamethoxazole-trimethoprim (BACTRIM DS) 800-160 mg per tablet   No No   Sig: Take 1 tablet by mouth every 12 (twelve) hours for 10 days      Facility-Administered Medications: None       Past Medical History:   Diagnosis Date    Anxiety     Bilateral carpal tunnel syndrome     last assessed: 11/17/2016    Depression     Hypothyroidism     Kidney stones     Psychiatric disorder     depression       Past Surgical History:   Procedure Laterality Date    CHOLECYSTECTOMY      OK LAP,CHOLECYSTECTOMY N/A 9/6/2018    Procedure: CHOLECYSTECTOMY LAPAROSCOPIC W/ ROBOTICS;  Surgeon: Jeannie Bauer MD;  [facility];  Service: General    TUBAL LIGATION         Family History   Problem Relation Age of Onset    No Known Problems Mother     Alcohol abuse Father     Drug abuse Family      I have reviewed and agree with the history as documented  Social History     Tobacco Use    Smoking status: Never Smoker    Smokeless tobacco: Never Used   Substance Use Topics    Alcohol use: Yes     Frequency: Monthly or less     Drinks per session: 1 or 2     Binge frequency: Never     Comment: rarely    Drug use: No        Review of Systems   Constitutional: Positive for chills, fatigue and fever  HENT: Negative for congestion, ear pain, facial swelling and sore throat  Eyes: Negative for pain  Respiratory: Negative for cough and shortness of breath      Cardiovascular: Negative for chest pain and leg swelling  Gastrointestinal: Positive for abdominal pain and nausea  Negative for constipation, diarrhea and vomiting  Endocrine: Negative for polydipsia  Genitourinary: Positive for hematuria and vaginal bleeding (Started menses today)  Negative for dysuria, flank pain, urgency and vaginal discharge  Musculoskeletal: Positive for arthralgias  Skin: Negative for pallor and rash  Itching   Allergic/Immunologic: Negative for food allergies  Neurological: Negative for dizziness and light-headedness  Hematological: Negative for adenopathy  Psychiatric/Behavioral: The patient is nervous/anxious  Physical Exam  Physical Exam   Constitutional: She is oriented to person, place, and time  She appears well-developed and well-nourished  She appears distressed (Patient exhibits emotional distress)  HENT:   Head: Normocephalic and atraumatic  Right Ear: External ear normal    Left Ear: External ear normal    Mouth/Throat: Oropharynx is clear and moist    Eyes: Pupils are equal, round, and reactive to light  Conjunctivae and EOM are normal    Neck: Normal range of motion  Neck supple  Cardiovascular: Normal rate, regular rhythm and normal heart sounds  Pulmonary/Chest: Effort normal and breath sounds normal  No stridor  No respiratory distress  She has no wheezes  She has no rales  Abdominal: Soft  Bowel sounds are normal  She exhibits no mass  There is no hepatosplenomegaly or splenomegaly  There is tenderness (LUQ, epigastric, RUQ, LLQ)  There is no rebound and no guarding  Musculoskeletal: Normal range of motion  Neurological: She is alert and oriented to person, place, and time  No cranial nerve deficit  Skin: Skin is warm and dry  Capillary refill takes less than 2 seconds     Psychiatric: Her behavior is normal  Judgment normal    Anxious         Vital Signs  ED Triage Vitals   Temperature Pulse Respirations Blood Pressure SpO2   09/21/19 1922 09/21/19 1922 09/21/19 1922 09/21/19 1922 09/21/19 1922   99 9 °F (37 7 °C) (!) 111 16 123/82 97 %      Temp Source Heart Rate Source Patient Position - Orthostatic VS BP Location FiO2 (%)   09/21/19 1922 09/21/19 2112 09/21/19 1922 09/21/19 1922 --   Oral Monitor Sitting Right arm       Pain Score       09/21/19 2112       6           Vitals:    09/21/19 1922 09/21/19 2112 09/21/19 2248 09/22/19 0202   BP: 123/82 137/55 110/56 99/55   Pulse: (!) 111 87 85 85   Patient Position - Orthostatic VS: Sitting Sitting Lying Lying         Visual Acuity      ED Medications  Medications   sodium chloride 0 9 % bolus 1,000 mL (0 mL Intravenous Stopped 9/21/19 2232)   acetaminophen (TYLENOL) tablet 975 mg (975 mg Oral Given 9/21/19 1941)   LORazepam (ATIVAN) 2 mg/mL injection 1 mg (1 mg Intravenous Given 9/21/19 2233)   clonazePAM (KlonoPIN) tablet 0 5 mg (0 5 mg Oral Given 9/22/19 0227)   hydrOXYzine HCL (ATARAX) tablet 25 mg (25 mg Oral Given 9/22/19 0227)       Diagnostic Studies  Results Reviewed     Procedure Component Value Units Date/Time    Blood culture #1 [[id number]] Collected:  09/21/19 1954    Lab Status:  Preliminary result Specimen:  Blood from Arm, Left Updated:  09/22/19 2201     Blood Culture No Growth at 24 hrs  Blood culture #2 [[id number]] Collected:  09/21/19 1947    Lab Status:  Preliminary result Specimen:  Blood from Arm, Right Updated:  09/22/19 2201     Blood Culture No Growth at 24 hrs      Sedimentation rate, automated [[id number]]  (Abnormal) Collected:  09/21/19 1947    Lab Status:  Final result Specimen:  Blood from Arm, Right Updated:  09/21/19 2103     Sed Rate 22 mm/hour     CBC and differential [[id number]]  (Abnormal) Collected:  09/21/19 1947    Lab Status:  Final result Specimen:  Blood from Arm, Right Updated:  09/21/19 2041     WBC 6 12 Thousand/uL      RBC 3 13 Million/uL      Hemoglobin 9 7 g/dL      Hematocrit 30 2 %      MCV 97 fL      MCH 31 0 pg      MCHC 32 1 g/dL      RDW 16 5 %      MPV 9 8 fL      Platelets 142 Thousands/uL      nRBC 0 /100 WBCs     Narrative: This is an appended report  These results have been appended to a previously verified report  Urine Microscopic [[id number]]  (Abnormal) Collected:  09/21/19 2015    Lab Status:  Final result Specimen:  Urine, Clean Catch Updated:  09/21/19 2035     RBC, UA 10-20 /hpf      WBC, UA 2-4 /hpf      Epithelial Cells Occasional /hpf      Bacteria, UA Occasional /hpf     UA w Reflex to Microscopic [[id number]]  (Abnormal) Collected:  09/21/19 2015    Lab Status:  Final result Specimen:  Urine, Clean Catch Updated:  09/21/19 2023     Color, UA Yellow     Clarity, UA Clear     Specific Gravity, UA 1 010     pH, UA 7 0     Leukocytes, UA Small     Nitrite, UA Negative     Protein, UA Negative mg/dl      Glucose, UA Negative mg/dl      Ketones, UA Negative mg/dl      Urobilinogen, UA 4 0 E U /dl      Bilirubin, UA Negative     Blood, UA Large    Lactic acid, plasma [[id number]]  (Normal) Collected:  09/21/19 1947    Lab Status:  Final result Specimen:  Blood from Arm, Right Updated:  09/21/19 2015     LACTIC ACID 1 0 mmol/L     Narrative:       Result may be elevated if tourniquet was used during collection      Comprehensive metabolic panel [[id number]]  (Abnormal) Collected:  09/21/19 1947    Lab Status:  Final result Specimen:  Blood from Arm, Right Updated:  09/21/19 2013     Sodium 140 mmol/L      Potassium 3 7 mmol/L      Chloride 103 mmol/L      CO2 30 mmol/L      ANION GAP 7 mmol/L      BUN 12 mg/dL      Creatinine 0 86 mg/dL      Glucose 97 mg/dL      Calcium 8 8 mg/dL      AST 81 U/L       U/L      Alkaline Phosphatase 99 U/L      Total Protein 7 3 g/dL      Albumin 3 3 g/dL      Total Bilirubin 1 29 mg/dL      eGFR 88 ml/min/1 73sq m     Narrative:       [place] guidelines for Chronic Kidney Disease (CKD):     Stage 1 with normal or high GFR (GFR > 90 mL/min/1 73 square meters)    Stage 2 Mild CKD (GFR = 60-89 mL/min/1 73 square meters)    Stage 3A Moderate CKD (GFR = 45-59 mL/min/1 73 square meters)    Stage 3B Moderate CKD (GFR = 30-44 mL/min/1 73 square meters)    Stage 4 Severe CKD (GFR = 15-29 mL/min/1 73 square meters)    Stage 5 End Stage CKD (GFR <15 mL/min/1 73 square meters)  Note: GFR calculation is accurate only with a steady state creatinine                 XR chest 2 views   ED Interpretation by Chu Hillman PA-C (09/22 0037)   No acute process appreciated  Final Result by Ousmane Griffin MD (09/22 2195)      No acute cardiopulmonary disease  Workstation performed: [id number]                    Procedures  Procedures       ED Course                               MDM  Number of Diagnoses or Management Options  Anxiety:   Itching:   LUQ pain:   Splenomegaly:   Diagnosis management comments: Patient is a 29year old female with past medical history of Lupus diagnosed this week, hypothyroidism, anxiety, kidney stones, and cholecystectomy presenting to the emergency department with complaint of LUQ pain and fever x1 day  States the pain started yesterday after having a RUQ ultrasound due to elevated LFTs  Fever started yesterday, Tmax of 100 9 today at 6:30 pm  She has been taking Tylenol without relief of pain or fever  Notes hematuria that has been ongoing for the past week but no dysuria or urinary frequency  Patient also complaining of diffuse body itching x1 week worse at night, has been using Benadryl and hydrocortisone cream without relief  Patient recently diagnosed with Lupus this week and was seen by rheumatology on Tuesday  Patient seen by PCP yesterday for hematuria and was treated for a UTI with Bactrim  Abdominal exam benign, pain most likely secondary to trauma from [address] yesterday  Will check CBC, CMP  Will recheck UA to check for infection  Patient currently afebrile with temperature of 98 7 degrees after receiving Tylenol    Patient most recent ultrasound reviewed by Radiology demonstrating splenomegaly destiny  At this time will treat conservatively have patient follow up with her known primary who has been evaluating her as well as to follow-up with rheumatology as discussed by her primary  Patient does admit that she does have some improvement of her symptoms of her anxiety  Patient felt stable for discharge to home  Patient educated on persistent or worsening signs symptoms and to either return to the emergency department follow-up with her primary care or rheumatologist   Patient is understanding agreement was discharged in ambulatory improved condition  Amount and/or Complexity of Data Reviewed  Clinical lab tests: ordered and reviewed  Tests in the radiology section of CPT®: reviewed        Disposition  Final diagnoses:   LUQ pain   Splenomegaly   Anxiety   Itching     Time reflects when diagnosis was documented in both MDM as applicable and the Disposition within this note     Time User Action Codes Description Comment    9/22/2019  2:53 AM Stephen Wolff Add [R10 12] LUQ pain     9/22/2019  2:53 AM [place] Wolff Add [R16 1] Splenomegaly     9/22/2019  2:53 AM Stephen Wolff Add [F41 9] Anxiety     9/22/2019  2:53 AM [place] Wolff Add [L29 9] Itching       ED Disposition     ED Disposition Condition Date/Time Comment    Discharge Stable Sun Sep 22, 0488  0:67 AM Dawood Tom discharge to home/self care              Follow-up Information    None         Discharge Medication List as of 9/22/2019  2:55 AM      CONTINUE these medications which have NOT CHANGED    Details   adapalene (DIFFERIN) 0 1 % cream Apply topically daily at bedtime, Starting Mon 7/22/2019, Normal      albuterol (PROVENTIL HFA,VENTOLIN HFA) 90 mcg/act inhaler Inhale 2 puffs every 4 (four) hours as needed for wheezing (or cough), Starting Sun 5/5/2019, Print      clonazePAM (KlonoPIN) 0 5 mg tablet Take 1/2 to 1 tab twice daily as needed for anxiety, Normal      ergocalciferol (VITAMIN D2) 50,000 units Take 1 capsule (50,000 Units total) by mouth once a week, Starting Tue 2/962/96/5990, Normal      folic acid (FOLVITE) 819 MCG tablet Take 400 mcg by mouth daily, Historical Med      gabapentin (NEURONTIN) 100 mg capsule Take 1 capsule (100 mg total) by mouth daily at bedtime, Starting Tue 9/17/2019, Normal      hydrocortisone 2 5 % ointment Apply topically 2 (two) times a day, Starting Tue 9/17/2019, Normal      levonorgestrel (MIRENA, 52 MG,) 20 MCG/24HR IUD Lot # [id number]   exp 08/11, Historical Med      levothyroxine 25 mcg tablet Take 1 tablet (25 mcg total) by mouth daily in the early morning, Starting Fri 9/13/2019, Normal      meloxicam (MOBIC) 7 5 mg tablet Take 1 tablet (7 5 mg total) by mouth daily Take 1-2 tabs daily, with food, Starting Tue 9/17/2019, Normal      spironolactone (ALDACTONE) 50 mg tablet Take one tab once daily in the morning, Normal      sulfamethoxazole-trimethoprim (BACTRIM DS) 800-160 mg per tablet Take 1 tablet by mouth every 12 (twelve) hours for 10 days, Starting Fri 9/20/2019, Until Mon 9/30/2019, Normal      Vortioxetine HBr (TRINTELLIX) 20 MG tablet Take 1 tablet (20 mg total) by mouth daily for 90 days, Starting Mon 7/8/2019, Until Sun 10/6/2019, Normal           No discharge procedures on file      ED Provider  Electronically Signed by           Swetha Hong PA-C  09/23/19 1038

## 2019-09-22 VITALS
RESPIRATION RATE: 16 BRPM | WEIGHT: 214.73 LBS | HEART RATE: 85 BPM | OXYGEN SATURATION: 100 % | BODY MASS INDEX: 34.14 KG/M2 | DIASTOLIC BLOOD PRESSURE: 55 MMHG | TEMPERATURE: 98.7 F | SYSTOLIC BLOOD PRESSURE: 99 MMHG

## 2019-09-22 RX ORDER — HYDROXYZINE HYDROCHLORIDE 25 MG/1
25 TABLET, FILM COATED ORAL ONCE
Status: COMPLETED | OUTPATIENT
Start: 2019-09-22 | End: 2019-09-22

## 2019-09-22 RX ORDER — CLONAZEPAM 0.5 MG/1
0.5 TABLET ORAL ONCE
Status: COMPLETED | OUTPATIENT
Start: 2019-09-22 | End: 2019-09-22

## 2019-09-22 RX ADMIN — CLONAZEPAM 0.5 MG: 0.5 TABLET ORAL at 02:27

## 2019-09-22 RX ADMIN — HYDROXYZINE HYDROCHLORIDE 25 MG: 25 TABLET ORAL at 02:27

## 2019-09-22 NOTE — ED NOTES
Patient states that she has a ride home before administration of Ativan; patient also c/o of itchiness, ED provider made aware       Jackelyn Gracia RN  09/21/19 [id number]

## 2019-09-22 NOTE — ED NOTES
Pt requesting anxiety medication at this time  Provider notified       Sindi Petersen  09/21/19 5067

## 2019-09-22 NOTE — ED NOTES
Patient ambulatory to the bathroom with steady gait at this time       Rocío Santos RN  09/21/19 2014

## 2019-09-22 NOTE — DISCHARGE INSTRUCTIONS
Please follow-up with your primary care as soon as possible  Please follow-up with your referral to Rheumatology

## 2019-09-23 ENCOUNTER — APPOINTMENT (EMERGENCY)
Dept: RADIOLOGY | Facility: HOSPITAL | Age: 34
End: 2019-09-23
Payer: COMMERCIAL

## 2019-09-23 ENCOUNTER — HOSPITAL ENCOUNTER (EMERGENCY)
Facility: HOSPITAL | Age: 34
Discharge: HOME | End: 2019-09-24
Attending: EMERGENCY MEDICINE | Admitting: EMERGENCY MEDICINE
Payer: COMMERCIAL

## 2019-09-23 DIAGNOSIS — R51.9 HEADACHE: ICD-10-CM

## 2019-09-23 DIAGNOSIS — R10.13 EPIGASTRIC PAIN: ICD-10-CM

## 2019-09-23 DIAGNOSIS — N39.0 URINARY TRACT INFECTION: Primary | ICD-10-CM

## 2019-09-23 DIAGNOSIS — R11.0 NAUSEA: ICD-10-CM

## 2019-09-23 DIAGNOSIS — R50.9 FEVER: ICD-10-CM

## 2019-09-23 LAB
ALBUMIN SERPL BCP-MCNC: 3.7 G/DL (ref 3.5–5)
ALP SERPL-CCNC: 104 U/L (ref 46–116)
ALT SERPL W P-5'-P-CCNC: 193 U/L (ref 12–78)
ANION GAP SERPL CALCULATED.3IONS-SCNC: 9 MMOL/L (ref 4–13)
ANISOCYTOSIS BLD QL SMEAR: PRESENT
AST SERPL W P-5'-P-CCNC: 101 U/L (ref 5–45)
BASOPHILS # BLD MANUAL: 0 THOUSAND/UL (ref 0–0.1)
BASOPHILS NFR MAR MANUAL: 0 % (ref 0–1)
BILIRUB SERPL-MCNC: 1.14 MG/DL (ref 0.2–1)
BILIRUB UR QL STRIP: ABNORMAL
BUN SERPL-MCNC: 9 MG/DL (ref 5–25)
CALCIUM SERPL-MCNC: 9 MG/DL (ref 8.3–10.1)
CHLORIDE SERPL-SCNC: 108 MMOL/L (ref 100–108)
CLARITY UR: CLEAR
CO2 SERPL-SCNC: 23 MMOL/L (ref 21–32)
COLOR UR: ABNORMAL
COLOR, POC: NORMAL
CREAT SERPL-MCNC: 0.8 MG/DL (ref 0.6–1.3)
EOSINOPHIL # BLD MANUAL: 0.12 THOUSAND/UL (ref 0–0.4)
EOSINOPHIL NFR BLD MANUAL: 2 % (ref 0–6)
ERYTHROCYTE [DISTWIDTH] IN BLOOD BY AUTOMATED COUNT: 17.1 % (ref 11.6–15.1)
EXT PREG TEST URINE: NEGATIVE
EXT. CONTROL ED NAV: NORMAL
GFR SERPL CREATININE-BSD FRML MDRD: 96 ML/MIN/1.73SQ M
GLUCOSE SERPL-MCNC: 93 MG/DL (ref 65–140)
GLUCOSE UR STRIP-MCNC: NEGATIVE MG/DL
HCT VFR BLD AUTO: 33.1 % (ref 34.8–46.1)
HGB BLD-MCNC: 10.3 G/DL (ref 11.5–15.4)
HGB UR QL STRIP.AUTO: NEGATIVE
KETONES UR STRIP-MCNC: ABNORMAL MG/DL
LEUKOCYTE ESTERASE UR QL STRIP: ABNORMAL
LIPASE SERPL-CCNC: 137 U/L (ref 73–393)
LYMPHOCYTES # BLD AUTO: 1.96 THOUSAND/UL (ref 0.6–4.47)
LYMPHOCYTES # BLD AUTO: 32 % (ref 14–44)
MCH RBC QN AUTO: 30.2 PG (ref 26.8–34.3)
MCHC RBC AUTO-ENTMCNC: 31.1 G/DL (ref 31.4–37.4)
MCV RBC AUTO: 97 FL (ref 82–98)
METAMYELOCYTES NFR BLD MANUAL: 2 % (ref 0–1)
MONOCYTES # BLD AUTO: 0.24 THOUSAND/UL (ref 0–1.22)
MONOCYTES NFR BLD: 4 % (ref 4–12)
MYELOCYTES NFR BLD MANUAL: 2 % (ref 0–1)
NEUTROPHILS # BLD MANUAL: 3.18 THOUSAND/UL (ref 1.85–7.62)
NEUTS BAND NFR BLD MANUAL: 7 % (ref 0–8)
NEUTS SEG NFR BLD AUTO: 45 % (ref 43–75)
NITRITE UR QL STRIP: NEGATIVE
NRBC BLD AUTO-RTO: 0 /100 WBCS
PH UR STRIP.AUTO: 5.5 [PH] (ref 4.5–8)
PLATELET # BLD AUTO: 163 THOUSANDS/UL (ref 149–390)
PLATELET BLD QL SMEAR: ADEQUATE
PMV BLD AUTO: 9.8 FL (ref 8.9–12.7)
POTASSIUM SERPL-SCNC: 4.2 MMOL/L (ref 3.5–5.3)
PROT SERPL-MCNC: 7.9 G/DL (ref 6.4–8.2)
PROT UR STRIP-MCNC: ABNORMAL MG/DL
RBC # BLD AUTO: 3.41 MILLION/UL (ref 3.81–5.12)
RBC MORPH BLD: PRESENT
SODIUM SERPL-SCNC: 140 MMOL/L (ref 136–145)
SP GR UR STRIP.AUTO: >=1.03 (ref 1–1.03)
TROPONIN I SERPL-MCNC: <0.02 NG/ML
UROBILINOGEN UR QL STRIP.AUTO: 0.2 E.U./DL
VARIANT LYMPHS # BLD AUTO: 6 %
WBC # BLD AUTO: 6.12 THOUSAND/UL (ref 4.31–10.16)

## 2019-09-23 PROCEDURE — 96361 HYDRATE IV INFUSION ADD-ON: CPT

## 2019-09-23 PROCEDURE — 93005 ELECTROCARDIOGRAM TRACING: CPT

## 2019-09-23 PROCEDURE — 99285 EMERGENCY DEPT VISIT HI MDM: CPT | Performed by: EMERGENCY MEDICINE

## 2019-09-23 PROCEDURE — 83690 ASSAY OF LIPASE: CPT | Performed by: EMERGENCY MEDICINE

## 2019-09-23 PROCEDURE — 81001 URINALYSIS AUTO W/SCOPE: CPT

## 2019-09-23 PROCEDURE — 85027 COMPLETE CBC AUTOMATED: CPT | Performed by: EMERGENCY MEDICINE

## 2019-09-23 PROCEDURE — 84484 ASSAY OF TROPONIN QUANT: CPT | Performed by: EMERGENCY MEDICINE

## 2019-09-23 PROCEDURE — 81025 URINE PREGNANCY TEST: CPT | Performed by: EMERGENCY MEDICINE

## 2019-09-23 PROCEDURE — 80053 COMPREHEN METABOLIC PANEL: CPT | Performed by: EMERGENCY MEDICINE

## 2019-09-23 PROCEDURE — 99285 EMERGENCY DEPT VISIT HI MDM: CPT

## 2019-09-23 PROCEDURE — 36415 COLL VENOUS BLD VENIPUNCTURE: CPT

## 2019-09-23 PROCEDURE — 96374 THER/PROPH/DIAG INJ IV PUSH: CPT

## 2019-09-23 PROCEDURE — 96375 TX/PRO/DX INJ NEW DRUG ADDON: CPT

## 2019-09-23 PROCEDURE — 85007 BL SMEAR W/DIFF WBC COUNT: CPT | Performed by: EMERGENCY MEDICINE

## 2019-09-23 RX ORDER — KETOROLAC TROMETHAMINE 30 MG/ML
15 INJECTION, SOLUTION INTRAMUSCULAR; INTRAVENOUS ONCE
Status: COMPLETED | OUTPATIENT
Start: 2019-09-23 | End: 2019-09-23

## 2019-09-23 RX ORDER — METOCLOPRAMIDE HYDROCHLORIDE 5 MG/ML
10 INJECTION INTRAMUSCULAR; INTRAVENOUS ONCE
Status: COMPLETED | OUTPATIENT
Start: 2019-09-23 | End: 2019-09-23

## 2019-09-23 RX ADMIN — SODIUM CHLORIDE 1000 ML: 0.9 INJECTION, SOLUTION INTRAVENOUS at 21:44

## 2019-09-23 RX ADMIN — KETOROLAC TROMETHAMINE 15 MG: 30 INJECTION, SOLUTION INTRAMUSCULAR at 21:44

## 2019-09-23 RX ADMIN — METOCLOPRAMIDE 10 MG: 5 INJECTION, SOLUTION INTRAMUSCULAR; INTRAVENOUS at 21:44

## 2019-09-24 ENCOUNTER — APPOINTMENT (EMERGENCY)
Dept: RADIOLOGY | Facility: HOSPITAL | Age: 34
End: 2019-09-24
Payer: COMMERCIAL

## 2019-09-24 VITALS
DIASTOLIC BLOOD PRESSURE: 54 MMHG | HEART RATE: 72 BPM | BODY MASS INDEX: 34.55 KG/M2 | TEMPERATURE: 99.6 F | RESPIRATION RATE: 14 BRPM | SYSTOLIC BLOOD PRESSURE: 110 MMHG | WEIGHT: 215 LBS | HEIGHT: 66 IN | OXYGEN SATURATION: 95 %

## 2019-09-24 LAB
ATRIAL RATE: 86 BPM
BACTERIA UR QL AUTO: ABNORMAL /HPF
MUCOUS THREADS UR QL AUTO: ABNORMAL
NON-SQ EPI CELLS URNS QL MICRO: ABNORMAL /HPF
P AXIS: 19 DEGREES
PR INTERVAL: 166 MS
QRS AXIS: 32 DEGREES
QRSD INTERVAL: 72 MS
QT INTERVAL: 340 MS
QTC INTERVAL: 406 MS
RBC #/AREA URNS AUTO: ABNORMAL /HPF
T WAVE AXIS: 23 DEGREES
VENTRICULAR RATE: 86 BPM
WBC #/AREA URNS AUTO: ABNORMAL /HPF

## 2019-09-24 PROCEDURE — 96361 HYDRATE IV INFUSION ADD-ON: CPT

## 2019-09-24 PROCEDURE — 71046 X-RAY EXAM CHEST 2 VIEWS: CPT

## 2019-09-24 PROCEDURE — 74177 CT ABD & PELVIS W/CONTRAST: CPT

## 2019-09-24 PROCEDURE — 93010 ELECTROCARDIOGRAM REPORT: CPT | Performed by: INTERNAL MEDICINE

## 2019-09-24 RX ORDER — NITROFURANTOIN 25; 75 MG/1; MG/1
100 CAPSULE ORAL ONCE
Status: COMPLETED | OUTPATIENT
Start: 2019-09-24 | End: 2019-09-24

## 2019-09-24 RX ORDER — NITROFURANTOIN 25; 75 MG/1; MG/1
100 CAPSULE ORAL 2 TIMES DAILY
Qty: 10 CAPSULE | Refills: 0 | Status: SHIPPED | OUTPATIENT
Start: 2019-09-24 | End: 2019-09-29

## 2019-09-24 RX ORDER — ACETAMINOPHEN 325 MG/1
975 TABLET ORAL ONCE
Status: COMPLETED | OUTPATIENT
Start: 2019-09-24 | End: 2019-09-24

## 2019-09-24 RX ADMIN — IOHEXOL 100 ML: 350 INJECTION, SOLUTION INTRAVENOUS at 00:24

## 2019-09-24 RX ADMIN — NITROFURANTOIN (MONOHYDRATE/MACROCRYSTALS) 100 MG: 75; 25 CAPSULE ORAL at 01:56

## 2019-09-24 RX ADMIN — ACETAMINOPHEN 975 MG: 325 TABLET ORAL at 01:39

## 2019-09-24 NOTE — DISCHARGE INSTRUCTIONS
Take antibiotics as prescribed  Tylenol for fever as needed  Follow up with your primary care physician if symptoms do not improve  Return to the emergency department for fever unrelieved by tylenol, persistent vomiting, severe worsening of pain, any other new or concerning symptoms

## 2019-09-24 NOTE — ED PROVIDER NOTES
History  Chief Complaint   Patient presents with    Dizziness     Pt reoprts fevers, dizzy, gen body aches since last Friday  Pt says pain the same and hasn't gotten better since Friday  Pt reports taking tylenol/motrin for fevers     HPI  54-year-old female presents to the ED with fever, body aches, lightheadedness, headache, upper abdominal pain  Patient reports that fever started in the evening 3 days ago and it was greater than 101  Two days ago she began to have upper abdominal pain that has been intermittent  She cannot identify any aggravating or alleviating factors  She has never had pain like this before  States some associated nausea but denies vomiting  No chest pain, shortness breath, cough, neck pain, neck stiffness  No dysuria, hematuria, flank pain  Patient also reports frontal headache with associated lightheadedness since yesterday  No dizziness, visual changes, numbness, weakness  States she has been having similar headaches intermittently for several months  No relief with 1000 mg Tylenol at 1800 this evening  Patient was evaluated in the ED 2 days ago for her symptoms and had blood work done at that time with no acute abnormalities and was discharged  Patient also reports that she is currently being worked up for lupus by her rheumatologist and had an abdominal ultrasound 3 days ago that showed an enlarged liver and enlarged spleen  History of cholecystectomy, no other abdominal surgeries  Prior to Admission Medications   Prescriptions Last Dose Informant Patient Reported? Taking?    Vortioxetine HBr (TRINTELLIX) 20 MG tablet   No No   Sig: Take 1 tablet (20 mg total) by mouth daily for 90 days   adapalene (DIFFERIN) 0 1 % cream   No No   Sig: Apply topically daily at bedtime   albuterol (PROVENTIL HFA,VENTOLIN HFA) 90 mcg/act inhaler   No No   Sig: Inhale 2 puffs every 4 (four) hours as needed for wheezing (or cough)   clonazePAM (KlonoPIN) 0 5 mg tablet   No No   Sig: Take 1/2 to 1 tab twice daily as needed for anxiety   ergocalciferol (VITAMIN D2) 50,000 units   No No   Sig: Take 1 capsule (50,000 Units total) by mouth once a week   folic acid (FOLVITE) 353 MCG tablet   Yes No   Sig: Take 400 mcg by mouth daily   gabapentin (NEURONTIN) 100 mg capsule   No No   Sig: Take 1 capsule (100 mg total) by mouth daily at bedtime   hydrocortisone 2 5 % ointment   No No   Sig: Apply topically 2 (two) times a day   levonorgestrel (MIRENA, 52 MG,) 20 MCG/24HR IUD   Yes No   Sig: Lot # [id number]   exp 08/11   levothyroxine 25 mcg tablet   No No   Sig: Take 1 tablet (25 mcg total) by mouth daily in the early morning   meloxicam (MOBIC) 7 5 mg tablet   No No   Sig: Take 1 tablet (7 5 mg total) by mouth daily Take 1-2 tabs daily, with food   spironolactone (ALDACTONE) 50 mg tablet   No No   Sig: Take one tab once daily in the morning   sulfamethoxazole-trimethoprim (BACTRIM DS) 800-160 mg per tablet   No No   Sig: Take 1 tablet by mouth every 12 (twelve) hours for 10 days      Facility-Administered Medications: None       Past Medical History:   Diagnosis Date    Anxiety     Bilateral carpal tunnel syndrome     last assessed: 11/17/2016    Depression     Hypothyroidism     Kidney stones     Psychiatric disorder     depression       Past Surgical History:   Procedure Laterality Date    CHOLECYSTECTOMY      MN LAP,CHOLECYSTECTOMY N/A 9/6/2018    Procedure: CHOLECYSTECTOMY LAPAROSCOPIC W/ ROBOTICS;  Surgeon: Henry Alicea MD;  Location: [facility] OR;  Service: General    TUBAL LIGATION         Family History   Problem Relation Age of Onset    No Known Problems Mother     Alcohol abuse Father     Drug abuse Family      I have reviewed and agree with the history as documented      Social History     Tobacco Use    Smoking status: Never Smoker    Smokeless tobacco: Never Used   Substance Use Topics    Alcohol use: Yes     Frequency: Monthly or less     Drinks per session: 1 or 2     Binge frequency: Never     Comment: rarely    Drug use: No        Review of Systems   Constitutional: Positive for chills and fever  HENT: Negative for congestion, rhinorrhea and sore throat  Eyes: Negative for visual disturbance  Respiratory: Negative for chest tightness and shortness of breath  Cardiovascular: Negative for chest pain  Gastrointestinal: Positive for abdominal pain  Negative for diarrhea, nausea and vomiting  Genitourinary: Negative for dysuria and hematuria  Musculoskeletal: Positive for arthralgias and myalgias  Skin: Negative for rash  Neurological: Positive for light-headedness and headaches  Negative for dizziness, syncope, weakness and numbness  All other systems reviewed and are negative  Physical Exam  ED Triage Vitals   Temperature Pulse Respirations Blood Pressure SpO2   09/23/19 2034 09/23/19 2032 09/23/19 2032 09/23/19 2032 09/23/19 2032   99 6 °F (37 6 °C) 105 19 116/92 98 %      Temp Source Heart Rate Source Patient Position - Orthostatic VS BP Location FiO2 (%)   09/23/19 2034 09/23/19 2032 09/23/19 2032 09/23/19 2032 --   Oral Monitor Sitting Left arm       Pain Score       09/23/19 2032       9             Orthostatic Vital Signs  Vitals:    09/23/19 2200 09/24/19 0001 09/24/19 0015 09/24/19 0130   BP: 118/68 110/54     Pulse: 96 83 82 72   Patient Position - Orthostatic VS: Lying Lying         Physical Exam   Constitutional: She is oriented to person, place, and time  She appears well-developed and well-nourished  No distress  HENT:   Head: Normocephalic and atraumatic  Right Ear: External ear normal    Left Ear: External ear normal    Nose: Nose normal    Eyes: Pupils are equal, round, and reactive to light  Conjunctivae and EOM are normal    Neck: Normal range of motion  Neck supple  Cardiovascular: Normal rate, regular rhythm, normal heart sounds and intact distal pulses  Exam reveals no gallop and no friction rub  No murmur heard    Pulmonary/Chest: Effort normal and breath sounds normal  No respiratory distress  She has no wheezes  She has no rhonchi  She has no rales  Abdominal: Soft  Bowel sounds are normal  She exhibits no distension and no mass  There is tenderness in the epigastric area  There is no rebound and no guarding  Musculoskeletal: Normal range of motion  Lymphadenopathy:     She has no cervical adenopathy  Neurological: She is alert and oriented to person, place, and time  She has normal strength  No cranial nerve deficit or sensory deficit  Skin: Skin is warm and dry  Petechiae (bilateral lower extremities) noted  She is not diaphoretic  Psychiatric: She has a normal mood and affect  Nursing note and vitals reviewed        ED Medications  Medications   nitrofurantoin (MACROBID) extended-release capsule 100 mg (has no administration in time range)   sodium chloride 0 9 % bolus 1,000 mL (0 mL Intravenous Stopped 9/24/19 0141)   ketorolac (TORADOL) injection 15 mg (15 mg Intravenous Given 9/23/19 2144)   metoclopramide (REGLAN) injection 10 mg (10 mg Intravenous Given 9/23/19 2144)   iohexol (OMNIPAQUE) 350 MG/ML injection (MULTI-DOSE) 100 mL (100 mL Intravenous Given 9/24/19 0024)   acetaminophen (TYLENOL) tablet 975 mg (975 mg Oral Given 9/24/19 0139)       Diagnostic Studies  Results Reviewed     Procedure Component Value Units Date/Time    Urine Microscopic [[id number]]  (Abnormal) Collected:  09/23/19 2357    Lab Status:  Final result Specimen:  Urine, Clean Catch Updated:  09/24/19 0029     RBC, UA 4-10 /hpf      WBC, UA 2-4 /hpf      Epithelial Cells Occasional /hpf      Bacteria, UA Moderate /hpf      MUCUS THREADS Innumerable    POCT pregnancy, urine [[id number]]  (Normal) Resulted:  09/23/19 2356    Lab Status:  Edited Result - FINAL Updated:  09/24/19 0001     EXT PREG TEST UR (Ref: Negative) negative     Control valid    POCT urinalysis dipstick [[id number]]  (Normal) Resulted:  09/23/19 2356    Lab Status:  Final result Updated: 09/23/19 2356     Color, UA see chart    ED Urine Macroscopic [[id number]]  (Abnormal) Collected:  09/23/19 2357    Lab Status:  Final result Specimen:  Urine Updated:  09/23/19 2355     Color, UA Deja     Clarity, UA Clear     pH, UA 5 5     Leukocytes, UA Small     Nitrite, UA Negative     Protein, UA 30 (1+) mg/dl      Glucose, UA Negative mg/dl      Ketones, UA Trace mg/dl      Urobilinogen, UA 0 2 E U /dl      Bilirubin, UA Interference- unable to analyze     Blood, UA Negative     Specific Gravity, UA >=1 030    Narrative:       CLINITEK RESULT    Lipase [[id number]]  (Normal) Collected:  09/23/19 2100    Lab Status:  Final result Specimen:  Blood from Arm, Right Updated:  09/23/19 2255     Lipase 137 u/L     Troponin I [[id number]]  (Normal) Collected:  09/23/19 2100    Lab Status:  Final result Specimen:  Blood from Arm, Right Updated:  09/23/19 2140     Troponin I <0 02 ng/mL     CBC and differential [[id number]]  (Abnormal) Collected:  09/23/19 2100    Lab Status:  Final result Specimen:  Blood from Arm, Right Updated:  09/23/19 2135     WBC 6 12 Thousand/uL      RBC 3 41 Million/uL      Hemoglobin 10 3 g/dL      Hematocrit 33 1 %      MCV 97 fL      MCH 30 2 pg      MCHC 31 1 g/dL      RDW 17 1 %      MPV 9 8 fL      Platelets 100 Thousands/uL      nRBC 0 /100 WBCs     Narrative:        manual diff performed within 3 days  This is an appended report  These results have been appended to a previously verified report      Comprehensive metabolic panel [[id number]]  (Abnormal) Collected:  09/23/19 2100    Lab Status:  Final result Specimen:  Blood from Arm, Right Updated:  09/23/19 2130     Sodium 140 mmol/L      Potassium 4 2 mmol/L      Chloride 108 mmol/L      CO2 23 mmol/L      ANION GAP 9 mmol/L      BUN 9 mg/dL      Creatinine 0 80 mg/dL      Glucose 93 mg/dL      Calcium 9 0 mg/dL       U/L       U/L      Alkaline Phosphatase 104 U/L      Total Protein 7 9 g/dL      Albumin 3 7 g/dL Total Bilirubin 1 14 mg/dL      eGFR 96 ml/min/1 73sq m     Narrative:       [place] guidelines for Chronic Kidney Disease (CKD):     Stage 1 with normal or high GFR (GFR > 90 mL/min/1 73 square meters)    Stage 2 Mild CKD (GFR = 60-89 mL/min/1 73 square meters)    Stage 3A Moderate CKD (GFR = 45-59 mL/min/1 73 square meters)    Stage 3B Moderate CKD (GFR = 30-44 mL/min/1 73 square meters)    Stage 4 Severe CKD (GFR = 15-29 mL/min/1 73 square meters)    Stage 5 End Stage CKD (GFR <15 mL/min/1 73 square meters)  Note: GFR calculation is accurate only with a steady state creatinine                 XR chest 2 views   ED Interpretation by Patsy Rothman MD (09/24 0059)   No consolidation, effusion, or ptx      CT abdomen pelvis with contrast   Final Result by Ruchi Green DO (09/24 0136)      Splenomegaly, nonspecific  Partially distended bladder  Moderate circumferential bladder wall thickening probably exaggerated by underdistention  Superimposed cystitis could be considered in the appropriate clinical setting  Correlation with the patient's symptoms, laboratory    values, and urinalysis recommended  Other findings as above        Workstation performed: [id number]               Procedures  ECG 12 Lead Documentation Only  Date/Time: 9/24/2019 12:15 AM  Performed by: Patsy Rothman MD  Authorized by: Patsy Rothman MD     ECG reviewed by me, the ED Provider: yes    Patient location:  ED  Previous ECG:     Previous ECG:  Unavailable  Interpretation:     Interpretation: non-specific    Rate:     ECG rate:  86    ECG rate assessment: normal    Rhythm:     Rhythm: sinus rhythm    Ectopy:     Ectopy: none    QRS:     QRS axis:  Normal    QRS intervals:  Normal  Conduction:     Conduction: normal    ST segments:     ST segments:  Non-specific    Elevation:  V2  T waves:     T waves: flattening      Flattening:  V1, III and V3            ED Course  ED Course as of Sep Raghu 55 Sep 24, 2019   0142 Bladder wall thickening   CT abdomen pelvis with contrast   0144 Leukocytes, UA(!): Small   0144 Bacteria, UA(!): Moderate   0144 Will treat for UTI and discharge  MDM  Number of Diagnoses or Management Options  Epigastric pain:   Fever:   Headache:   Nausea:   Urinary tract infection:   Fever, body aches, abdominal pain, nausea, and headache  This is her second ED visit for these symptoms  Will get CBC, CMP, Lipase, UA, Upreg, CT abdomen to evaluate for infection, electrolyte abnormality, pancreatitis, hepatitis, obstruction, diverticulitis, other intraabdominal abnormality  Will treat with zofran, toradol, IV fluids  If work up negative likely viral syndrome and will discharge with PCP follow up and return precautions  Disposition  Final diagnoses:   Fever   Epigastric pain   Nausea   Headache   Urinary tract infection     Time reflects when diagnosis was documented in both MDM as applicable and the Disposition within this note     Time User Action Codes Description Comment    9/24/2019  1:45 AM Ace Hammed Add [R50 9] Fever     9/24/2019  1:45 AM Ace Hammed Add [R10 13] Epigastric pain     9/24/2019  1:45 AM Ace Hammed Add [R11 0] Nausea     9/24/2019  1:45 AM Ace Hammed Add [R51] Headache     9/24/2019  1:45 AM Ace Hammed Add [N39 0] Urinary tract infection     9/24/2019  1:45 AM Ace Hammed Modify [R50 9] Fever     9/24/2019  1:45 AM Ace Hammed Modify [N39 0] Urinary tract infection       ED Disposition     ED Disposition Condition Date/Time Comment    Discharge Stable Tue Sep 24, 2518  9:91 AM Annika Allen discharge to home/self care              Follow-up Information     Follow up With Specialties Details Why Contact Andrew Sifuentes DO Family Medicine Schedule an appointment as soon as possible for a visit  As needed, If symptoms worsen [address]  [address] [place] [id number]  [phone]            Patient's Medications   Discharge Prescriptions    NITROFURANTOIN (MACROBID) 100 MG CAPSULE    Take 1 capsule (100 mg total) by mouth 2 (two) times a day for 5 days       Start Date: 9/24/2019 End Date: 9/29/2019       Order Dose: 100 mg       Quantity: 10 capsule    Refills: 0     No discharge procedures on file  ED Provider  Attending physically available and evaluated Silvio Salazar I managed the patient along with the ED Attending      Electronically Signed by         Scar Baron MD  09/24/19 9612

## 2019-09-24 NOTE — ED ATTENDING ATTESTATION
9/23/2019  IHeike MD, saw and evaluated the patient  I have discussed the patient with the resident/non-physician practitioner and agree with the resident's/non-physician practitioner's findings, Plan of Care, and MDM as documented in the resident's/non-physician practitioner's note, except where noted  All available labs and Radiology studies were reviewed  I was present for key portions of any procedure(s) performed by the resident/non-physician practitioner and I was immediately available to provide assistance  At this point I agree with the current assessment done in the Emergency Department  I have conducted an independent evaluation of this patient a history and physical is as follows:  Patient with body aches, fevers and chills  Patient states symptoms have been present since last Friday  Patient reports that she has a headache, which is similar to her previous headaches, and also complains of upper abdominal pain  She was seen over the weekend and had labs done which showed elevated liver enzymes, but otherwise no abnormalities  She did not undergo imaging at that time  Patient continues to feel poorly overall  Her appetite is poor, but she is not vomiting  She does not have change in her bowel habits, and has no urinary symptoms  Patient is currently being evaluated for lupus  She states that she has malodor rash, and joint aching  She has had some positive blood work, is currently undergoing full evaluation by rheumatologist   The patient's review of systems otherwise negative in 12 systems reviewed  On exam the patient feels warm  She is slightly tachycardic  She has normal blood pressure  She is not pale  Her pupils are round reactive to light  Her sclerae are anicteric  Oropharynx is clear with moist mucous membranes  Neck is supple and nontender with no adenopathy  Her heart is regular without murmurs, rubs, or gallops    Her lungs are clear bilaterally with good air movement  Her abdomen is soft with some epigastric and bilateral upper quadrant tenderness with no rebound or guarding  There are no appreciable masses  The patient has no lower abdominal tenderness  The patient has some scattered petechiae on her ankles, but not higher  She does not have any other rashes  She is neurologically intact with normal back exam   She has no swollen or erythematous joints  Impression:  Likely viral illness, but patient does also have some abdominal pain, and may have underlying autoimmune disease    Will plan to check labs, CT abdomen  ED Course         Critical Care Time  Procedures

## 2019-09-26 LAB
BACTERIA BLD CULT: NORMAL
BACTERIA BLD CULT: NORMAL

## 2019-10-07 ENCOUNTER — SOCIAL WORK (OUTPATIENT)
Dept: BEHAVIORAL/MENTAL HEALTH CLINIC | Facility: CLINIC | Age: 34
End: 2019-10-07
Payer: COMMERCIAL

## 2019-10-07 DIAGNOSIS — F41.1 GAD (GENERALIZED ANXIETY DISORDER): ICD-10-CM

## 2019-10-07 DIAGNOSIS — F33.2 MAJOR DEPRESSIVE DISORDER, RECURRENT, SEVERE W/O PSYCHOTIC BEHAVIOR (HCC): Primary | ICD-10-CM

## 2019-10-07 DIAGNOSIS — F40.10 SOCIAL PHOBIA: ICD-10-CM

## 2019-10-07 PROCEDURE — 90834 PSYTX W PT 45 MINUTES: CPT | Performed by: SOCIAL WORKER

## 2019-10-07 NOTE — PSYCH
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  Client and therapist discussed how she could change these events and improve life satisfaction  Client and therapist discussed her treatment plan  Therapist maintained the same goals and objectives, as the client was unable to work on goals due to lack of treatment involvement  Client expressed persistent feelings of depression and a lack of desire to take medication, as they did not make her feel much better  TMS was explored  Therapist placed a referral to Dr Zuhair Chua, as well as a referral for a child therapist for her daughter who is expressing symptoms of depression  Behavioral Health Treatment Plan [facility]: Diagnosis and Treatment Plan explained to Joshua Nathan relates understanding diagnosis and is agreeable to Treatment Plan   Yes

## 2019-10-07 NOTE — BH TREATMENT PLAN
Jory Michel  8/24/0206       Date of Initial Treatment Plan: 3/19/19   Date of Current Treatment Plan: 10/07/19    Treatment Plan Number 2     Strengths/Personal Resources for Self Care: good mother, loving, friendly, caring    Diagnosis:   1  Major depressive disorder, recurrent, severe w/o psychotic behavior ([address] )     2  JG (generalized anxiety disorder)     3  Social phobia         Area of Needs: support    Long Term Goal 1: AImprove focus and attention on necessary activities       Target Date: 1/15/20  Completion Date:          Short Term Objectives for Goal 1: AI will be able to identify 3 distractions which cause me to struggle with loss of focus on my goals  and BI will be able to learn to redirect myself to my goals at least 75% of hte time       Long Term Goal 2: Sustain reduced symptoms of depression       Target Date: 1/15/20  Completion Date:       Short Term Objectives for Goal 2: AI will continue to openly share my thoughts and feelings during all scheduled therapy sessions  and BI will continue to comply with all recommendations made by the psychiatirst during all scheduled sessions  GOAL 1: Modality: Individual 2x per month   Completion Date ongoing and Medication Management    GOAL 2: Modality: Individual 2x per month   Completion Date ongoing and Medication Management       Behavioral Health Treatment Plan [facility]: Diagnosis and Treatment Plan explained to Mesha Russell relates understanding diagnosis and is agreeable to Treatment Plan         Client Comments : Please share your thoughts, feelings, need and/or experiences regarding your treatment plan: na

## 2019-10-10 ENCOUNTER — TELEPHONE (OUTPATIENT)
Dept: PSYCHIATRY | Facility: CLINIC | Age: 34
End: 2019-10-10

## 2019-10-10 NOTE — TELEPHONE ENCOUNTER
"Nothing works for me"  Stopped her medication  Trintellix 20mg, she felt even more depressed  History of Seizures: no  History of Head injury-LOC-Concussion: no    Medical Review Of Systems:    Direct questions to [employer] today:    Have you ever had an adverse reaction to [address]? no    Have you ever had a seizure? no    Have you ever had a stroke? no    Have you ever had a head injury or neurosurgery? no    Do you have metal in your head (outside of non-ferromagnetic dental work) such as shrapnel, surgical clips, or fragments from metalwork? no    Do you have any implanted devices such as cardiac pacemakers, medical pumps, or intracardiac lines? no    Do you suffer from frequent or severe headaches? No; occasional, seems stress related    Do you have any other brain related condition? no    Have you had any illness that caused brain injury or damage? no    Are you taking any medications Other than what we have discussed and is documented in the chart we have reviewed? yes stopped gabapentin, trintellix, klonopin    For women of childbearing age: Are you sexually active, and if so are you not using reliable birth control? Tubes Tied    Does anyone in your family have epilepsy? no    She says she can commit to 5d/wk, 6wk +3wk taper      Years of therapy, typically 1x/wk CBT, supportive therapy      PHQ-9:   1-3  2-3  3-3  4-3  5-2  6-3  7-3  8-1  9-0  Total: 21

## 2019-10-17 ENCOUNTER — OFFICE VISIT (OUTPATIENT)
Dept: FAMILY MEDICINE CLINIC | Facility: CLINIC | Age: 34
End: 2019-10-17
Payer: COMMERCIAL

## 2019-10-17 VITALS
HEIGHT: 66 IN | BODY MASS INDEX: 34.55 KG/M2 | WEIGHT: 215 LBS | SYSTOLIC BLOOD PRESSURE: 118 MMHG | DIASTOLIC BLOOD PRESSURE: 78 MMHG

## 2019-10-17 DIAGNOSIS — M54.41 CHRONIC RIGHT-SIDED LOW BACK PAIN WITH RIGHT-SIDED SCIATICA: ICD-10-CM

## 2019-10-17 DIAGNOSIS — E55.9 VITAMIN D DEFICIENCY: ICD-10-CM

## 2019-10-17 DIAGNOSIS — R16.1 SPLENOMEGALY: ICD-10-CM

## 2019-10-17 DIAGNOSIS — M25.50 ARTHRALGIA OF MULTIPLE JOINTS: Primary | ICD-10-CM

## 2019-10-17 DIAGNOSIS — K29.90 GASTRITIS AND DUODENITIS: ICD-10-CM

## 2019-10-17 DIAGNOSIS — E03.9 ADULT HYPOTHYROIDISM: ICD-10-CM

## 2019-10-17 DIAGNOSIS — G89.29 CHRONIC RIGHT-SIDED LOW BACK PAIN WITH RIGHT-SIDED SCIATICA: ICD-10-CM

## 2019-10-17 DIAGNOSIS — R74.01 ELEVATED TRANSAMINASE LEVEL: ICD-10-CM

## 2019-10-17 PROCEDURE — 99214 OFFICE O/P EST MOD 30 MIN: CPT | Performed by: FAMILY MEDICINE

## 2019-10-17 RX ORDER — FAMOTIDINE 40 MG/1
TABLET, FILM COATED ORAL
Qty: 90 TABLET | Refills: 1 | Status: SHIPPED | OUTPATIENT
Start: 2019-10-17 | End: 2020-03-01

## 2019-10-17 NOTE — PROGRESS NOTES
Assessment/Plan:    Arthralgia of multiple joints    Seeing Rheumatology in the a m     Will defer any further therapeutic options until she is seen  Diagnoses and all orders for this visit:    Arthralgia of multiple joints    Chronic right-sided low back pain with right-sided sciatica    Vitamin D deficiency    Adult hypothyroidism    Splenomegaly  -     US abdomen limited; Future    Elevated transaminase level  -     CBC and differential  -     EBV acute panel; Future  -     CMV IgG/IgM Antibodies; Future  -     Comprehensive metabolic panel    Gastritis and duodenitis  -     famotidine (PEPCID) 40 MG tablet; 1 at bedtime  Subjective:   Chief Complaint   Patient presents with    Follow-up     myalgia          Patient ID: Chao Gutierrez is a 29 y o  female  Patient is a 69-year-old female presenting to the office for follow-up on her joint pain and abdominal pain  She has been to the emergency room a couple of times, and has seen Rheumatology since I saw her last   Her liver enzymes were mildly elevated and ultrasound of the abdomen showed that her spleen was mildly enlarged  She only took gabapentin once, it made her lightheaded and fatigued  She does not think the meloxicam is helping with her joint discomfort  Intermittently, she is having upper abdominal pain from the midline into the right upper quadrant  She describes it as a burning sensation  She has already had her gallbladder removed  Ultrasound of the liver failed to reveal any mass or fatty liver disease  CT scan of the abdomen was also benign  The following portions of the patient's history were reviewed and updated as appropriate: allergies, current medications, past family history, past medical history, past social history, past surgical history and problem list     Review of Systems   Constitutional: Positive for activity change and fatigue  Negative for chills, diaphoresis, fever and unexpected weight change  HENT: Negative for congestion, ear pain, hearing loss, nosebleeds, postnasal drip, rhinorrhea, sinus pressure, sore throat and tinnitus  Eyes: Negative for photophobia, pain, discharge, redness and visual disturbance  Respiratory: Negative for cough, chest tightness, shortness of breath and wheezing  Cardiovascular: Negative for chest pain, palpitations and leg swelling  Denies PAZ   Gastrointestinal: Positive for abdominal pain  Negative for blood in stool, constipation, diarrhea, nausea and vomiting  Endocrine: Negative  Genitourinary: Negative for difficulty urinating, dysuria, frequency, hematuria and urgency  Denies dysmenorrhea, menstrual difficulties   Musculoskeletal: Positive for arthralgias, back pain (  Radiating down her right leg  This is chronic and has been present off and on since a fall approximately 6 years ago ), joint swelling and myalgias  Skin: Negative for rash and wound  Denies skin lesions, change in birthmarks   Allergic/Immunologic: Negative for environmental allergies, food allergies and immunocompromised state  Neurological: Negative for dizziness, tremors, seizures, syncope, weakness, numbness and headaches  Hematological: Negative  Psychiatric/Behavioral: Negative for behavioral problems, confusion, decreased concentration and sleep disturbance  The patient is not nervous/anxious  Objective:      /78   Ht 5' 6" (1 676 m)   Wt 97 5 kg (215 lb)   BMI 34 70 kg/m²          Physical Exam   Constitutional: She is oriented to person, place, and time  She appears well-developed and well-nourished  No distress  HENT:   Head: Normocephalic and atraumatic  Right Ear: External ear normal    Left Ear: External ear normal    Nose: Nose normal    Mouth/Throat: Oropharynx is clear and moist    Eyes: Pupils are equal, round, and reactive to light  Conjunctivae and EOM are normal    Neck: Normal range of motion  Neck supple   No JVD present  No tracheal deviation present  No thyromegaly present  Cardiovascular: Normal rate, regular rhythm and normal heart sounds  No murmur heard  Pulmonary/Chest: Effort normal and breath sounds normal  She has no wheezes  She has no rales  Abdominal: Soft  Bowel sounds are normal  She exhibits no shifting dullness, no pulsatile liver, no pulsatile midline mass and no mass  There is splenomegaly  There is tenderness in the right upper quadrant and epigastric area  There is no rebound and no guarding  Musculoskeletal: She exhibits no edema  Lumbar back: She exhibits decreased range of motion and tenderness  Bilateral generalized knee tenderness, bilateral   Ankle tenderness   Lymphadenopathy:     She has no cervical adenopathy  Neurological: She is alert and oriented to person, place, and time  She has normal reflexes  No cranial nerve deficit  Skin: Skin is warm and dry  No lesion and no rash noted  Psychiatric: She has a normal mood and affect  Judgment normal    Nursing note and vitals reviewed

## 2019-10-18 ENCOUNTER — OFFICE VISIT (OUTPATIENT)
Dept: RHEUMATOLOGY | Facility: CLINIC | Age: 34
End: 2019-10-18
Payer: COMMERCIAL

## 2019-10-18 VITALS
BODY MASS INDEX: 33.75 KG/M2 | SYSTOLIC BLOOD PRESSURE: 98 MMHG | DIASTOLIC BLOOD PRESSURE: 58 MMHG | WEIGHT: 210 LBS | HEART RATE: 72 BPM | HEIGHT: 66 IN

## 2019-10-18 DIAGNOSIS — Z79.899 HIGH RISK MEDICATION USE: ICD-10-CM

## 2019-10-18 DIAGNOSIS — R74.01 TRANSAMINITIS: ICD-10-CM

## 2019-10-18 DIAGNOSIS — M32.9 LUPUS (HCC): Primary | ICD-10-CM

## 2019-10-18 DIAGNOSIS — M25.50 ARTHRALGIA OF MULTIPLE JOINTS: ICD-10-CM

## 2019-10-18 DIAGNOSIS — R76.8 ANA POSITIVE: ICD-10-CM

## 2019-10-18 PROCEDURE — 99215 OFFICE O/P EST HI 40 MIN: CPT | Performed by: INTERNAL MEDICINE

## 2019-10-18 RX ORDER — HYDROXYCHLOROQUINE SULFATE 200 MG/1
200 TABLET, FILM COATED ORAL 2 TIMES DAILY
Qty: 60 TABLET | Refills: 3 | Status: SHIPPED | OUTPATIENT
Start: 2019-10-18 | End: 2020-01-14 | Stop reason: SDUPTHER

## 2019-10-18 RX ORDER — DICLOFENAC SODIUM 75 MG/1
75 TABLET, DELAYED RELEASE ORAL 2 TIMES DAILY
Qty: 60 TABLET | Refills: 3 | Status: SHIPPED | OUTPATIENT
Start: 2019-10-18 | End: 2020-01-14

## 2019-10-18 NOTE — PROGRESS NOTES
Assessment and Plan: Ash Thomas is a 29 y o  female who presents today for follow-up of likely early SLE (malar rash, arthralgias, dry eyes, hair thinning, equivocal anti-dsDNA, indeterminate anticardiolipin IgM, +MAYUR at 1:160 speckled pattern)  Patient continues to have arthralgias  Have stopped meloxicam since it wasn't helping patient, and started her on diclofenac 75mg po bid instead to help with her arthralgias  For long-term management of her lupus, have initiated weight-based hydroxychloroquine 200gm po bid  Advised patient to get regular eye exams while on this medication to monitor for plaquenil-related retinal toxicity  Since patient has chronic low back pain and right SI joint tenderness, have ordered a HLA-B27 and SI joint x-rays to evaluate for a concurrent seronegative spondyloarthropathy such as ankylosing spondylitis  Have also ordered a Hepatitis B core Ab to do along with PCP's labs to further work-up her transaminitis  Plan:  Diagnoses and all orders for this visit:    Lupus ([address] )  -     hydroxychloroquine (PLAQUENIL) 200 mg tablet; Take 1 tablet (200 mg total) by mouth 2 (two) times a day    Arthralgia of multiple joints  -     diclofenac (VOLTAREN) 75 mg EC tablet; Take 1 tablet (75 mg total) by mouth 2 (two) times a day  -     XR sacroiliac joints < 3 views; Future  -     HLA-B27 antigen    MAYUR positive    Transaminitis  -     Hepatitis B core antibody, total    High risk medication use - Benefits and risks of hydroxychloroquine, including but not limited to retinal toxicity, corneal deposits, gastrointestinal side effects, and headaches were discussed with the patient  The need for a regular eye exam to monitor for ocular toxicity was also explained to the patient       Follow-up plan: Return to clinic in 3 months       Rheumatic Disease Summary  Ash Thomas is a 29 y o  female who originally presented on 9/17/19 as a Rheumatology consult referred by her PCP Cj High DO for evaluation of possible autoimmune disease given positive MAYUR of 1:160 in a speckled pattern  Patient's clinical signs and symptoms were consistent with early lupus given her history of a malar rash and arthralgias  She also had dry eyes, but no dry mouth, and admitted to hair thinning  She denied blood clot or miscarriage history, or Raynaud's symptoms  Admitted to reflux symptoms  Lupus activity labs were ordered, which returned unremarkable; anti-dsDNA returned equivocal at 6 and anti-SSA/SSB were negative  She also had a livedoid-appearing rash on her arms, so antiphospholipid antibody panel was ordered, with anticardiolipin IgM returning indeterminate at 13  Her Vit  D level was low, so she was prescribed ergocalciferol 50,000 units po weekly  Abdomen ultrasound was ordered to work-up her transaminitis, but it returned significant only for splenomegaly  For her arthralgias, had prescribed meloxicam 7 5 mg 1-2 times a day, and for her shooting/neuropathic pain, had prescribed gabapentin 100 mg p o  Q h s  Chief Complaint  Follow-up    HPI  Lawton Blizzard is a 29 y o   female who presents today for follow-up of likely early SLE (malar rash, arthralgias, dry eyes, hair thinning, equivocal anti-dsDNA, indeterminate anticardiolipin IgM, +MAYUR at 1:160 speckled pattern)  She does not feel much better than her last clinic visit, but the keratosis she had on her upper arms resolved with hydrocortisone ointment that was prescribed last visit  She complains of right lower back pain that lasts all day long  She admits to her maternal grandmother having psoriasis  She complains of pain in her knees, ankles, and feet today  Her pain has not improved with meloxicam 15mg po daily, and she did not like the way gabapentin made her feel, so stopped taking it      The following portions of the patient's history were reviewed and updated as appropriate: allergies, current medications, past family history, past medical history, past social history, past surgical history and problem list     Review of Systems:   Review of Systems   Constitutional: Positive for chills and fatigue  Negative for fever and unexpected weight change  HENT: Negative for mouth sores and trouble swallowing  Eyes: Negative for pain and visual disturbance  Dry eyes   Respiratory: Negative for cough and shortness of breath  Cardiovascular: Negative for chest pain and leg swelling  Gastrointestinal: Positive for abdominal pain and nausea  Negative for blood in stool, constipation and diarrhea  Indigestion/heartburn   Genitourinary: Positive for frequency  Negative for hematuria  Musculoskeletal: Positive for arthralgias, back pain, joint swelling and myalgias  Skin: Negative for rash  Allergic/Immunologic: Positive for environmental allergies  Neurological: Positive for weakness, numbness and headaches  Hematological: Negative for adenopathy  Psychiatric/Behavioral: Positive for dysphoric mood  Negative for sleep disturbance  Home Medications:    Current Outpatient Medications:     adapalene (DIFFERIN) 0 1 % cream, Apply topically daily at bedtime, Disp: 45 g, Rfl: 0    clonazePAM (KlonoPIN) 0 5 mg tablet, Take 1/2 to 1 tab twice daily as needed for anxiety, Disp: 60 tablet, Rfl: 2    ergocalciferol (VITAMIN D2) 50,000 units, Take 1 capsule (50,000 Units total) by mouth once a week, Disp: 12 capsule, Rfl: 0    famotidine (PEPCID) 40 MG tablet, 1 at bedtime  , Disp: 90 tablet, Rfl: 1    folic acid (FOLVITE) 001 MCG tablet, Take 400 mcg by mouth daily, Disp: , Rfl:     levothyroxine 25 mcg tablet, Take 1 tablet (25 mcg total) by mouth daily in the early morning, Disp: 30 tablet, Rfl: 5    diclofenac (VOLTAREN) 75 mg EC tablet, Take 1 tablet (75 mg total) by mouth 2 (two) times a day, Disp: 60 tablet, Rfl: 3    hydrocortisone 2 5 % ointment, Apply topically 2 (two) times a day (Patient not taking: Reported on 10/18/2019), Disp: 20 g, Rfl: 3    hydroxychloroquine (PLAQUENIL) 200 mg tablet, Take 1 tablet (200 mg total) by mouth 2 (two) times a day, Disp: 60 tablet, Rfl: 3    Objective:    Vitals:    10/18/19 1410   BP: 98/58   Pulse: 72   Weight: 95 3 kg (210 lb)   Height: 5' 6" (1 676 m)       Physical Exam   Constitutional: She appears well-developed and well-nourished  She is cooperative  No distress  HENT:   Head: Normocephalic and atraumatic  Mouth/Throat: Oropharynx is clear and moist and mucous membranes are normal    Eyes: Conjunctivae and EOM are normal  No scleral icterus  Neck: Neck supple  No spinous process tenderness and no muscular tenderness present  No thyromegaly present  Cardiovascular: Normal rate, regular rhythm, S1 normal and S2 normal  Exam reveals no friction rub  No murmur heard  Pulmonary/Chest: Breath sounds normal  No respiratory distress  She has no wheezes  She has no rhonchi  She has no rales  Musculoskeletal: She exhibits tenderness  R wrist tenderness, R SI joint tenderness, bilateral ankle tenderness   Lymphadenopathy:     She has no cervical adenopathy  Neurological: She is alert  No sensory deficit  Skin: Skin is warm and dry  Rash noted  Nails show no clubbing  Livedoid-appearing rash on arms   Psychiatric: She has a normal mood and affect  Reviewed labs and imaging  Imaging:   Procedure: Xr Chest 2 Views    Result Date: 9/24/2019  Narrative: CHEST INDICATION:   Chest Pain  COMPARISON:  9/21/2019 EXAM PERFORMED/VIEWS:  XR CHEST PA & LATERAL FINDINGS: Cardiomediastinal silhouette appears unremarkable  The lungs are clear  No pneumothorax or pleural effusion  Osseous structures appear within normal limits for patient age  Impression: No acute cardiopulmonary disease  Workstation performed: [id number]     Procedure: Xr Chest 2 Views    Result Date: 9/22/2019  Narrative: CHEST INDICATION:   fever  COMPARISON:  2/9/2017   EXAM PERFORMED/VIEWS:  XR CHEST PA & LATERAL FINDINGS: Cardiomediastinal silhouette appears unremarkable  The lungs are clear  No pneumothorax or pleural effusion  Osseous structures appear within normal limits for patient age  Impression: No acute cardiopulmonary disease  Workstation performed: [id number]     Procedure: Us Abdomen Complete    Result Date: 9/22/2019  Narrative: ABDOMEN ULTRASOUND, COMPLETE INDICATION:   M25 50: Pain in unspecified joint R76 8: Other specified abnormal immunological findings in serum  Elevated liver function tests, prior cholecystectomy September 6, 2018  COMPARISON: August 9, 2018  TECHNIQUE:   Real-time ultrasound of the abdomen was performed with a curvilinear transducer with both volumetric sweeps and still imaging techniques  FINDINGS: PANCREAS:  Visualized portions of the pancreas are within normal limits  AORTA AND IVC:  Visualized portions are normal for patient age  LIVER: Size:  Within normal range  The liver measures 16 cm in the midclavicular line  Contour:  Surface contour is smooth  Parenchyma:  Echogenicity and echotexture are within normal limits  No evidence of suspicious mass  Limited imaging of the main portal vein shows it to be patent and hepatopetal  BILIARY: Patient has undergone prior cholecystectomy  No intrahepatic biliary dilatation  CBD measures 3 mm  No choledocholithiasis  KIDNEY: Right kidney measures 11 6 x 4 6 cm  Within normal limits  Left kidney measures 10 2 x 3 9 cm  Within normal limits  SPLEEN: Measures 15 6 x 6 6 x 14 4 cm  No focal splenic lesion demonstrated  ASCITES:  None  Impression: Splenomegaly  Prior cholecystectomy  No evidence of biliary ductal dilatation  Workstation performed: [id number]     Procedure: Ct Abdomen Pelvis With Contrast    Result Date: 9/24/2019  Narrative: CT ABDOMEN AND PELVIS WITH IV CONTRAST INDICATION:   Abdominal pain, unspecified   COMPARISON:  CT abdomen and pelvis dated 11/29/2018, abdominal ultrasound dated 9/20/2019 TECHNIQUE:  CT examination of the abdomen and pelvis was performed  Axial, sagittal, and coronal 2D reformatted images were created from the source data and submitted for interpretation  Radiation dose length product (DLP) for this visit:  557 29 mGy-cm   This examination, like all CT scans performed in the [facility], was performed utilizing techniques to minimize radiation dose exposure, including the use of iterative  reconstruction and automated exposure control  IV Contrast:  100 mL of iohexol (OMNIPAQUE) Enteric Contrast:  Enteric contrast was not administered  FINDINGS: ABDOMEN LOWER CHEST:  No clinically significant abnormality identified in the visualized lower chest  LIVER/BILIARY TREE:  Unremarkable  GALLBLADDER:  Gallbladder is surgically absent  SPLEEN:  Mildly enlarged  Splenic volume estimated at 800 mL  Otherwise grossly unremarkable  PANCREAS:  Unremarkable  ADRENAL GLANDS:  Unremarkable  KIDNEYS/URETERS:  Unremarkable  No hydronephrosis  STOMACH AND BOWEL:  Grossly unremarkable APPENDIX:  No findings to suggest appendicitis  ABDOMINOPELVIC CAVITY:  No ascites or free intraperitoneal air  No lymphadenopathy  VESSELS:  Unremarkable for patient's age  PELVIS REPRODUCTIVE ORGANS:  Unremarkable for patient's age  URINARY BLADDER:  Partially distended  Moderate circumferential bladder wall thickening probably exaggerated by underdistention  ABDOMINAL WALL/INGUINAL REGIONS:  Unremarkable  OSSEOUS STRUCTURES:  No acute fracture or destructive osseous lesion  Impression: Splenomegaly, nonspecific  Partially distended bladder  Moderate circumferential bladder wall thickening probably exaggerated by underdistention  Superimposed cystitis could be considered in the appropriate clinical setting  Correlation with the patient's symptoms, laboratory values, and urinalysis recommended  Other findings as above   Workstation performed: [id number]        Labs:   Admission on 09/23/2019, Discharged on 09/24/2019 Component Date Value Ref Range Status    WBC 09/23/2019 6 12  4 31 - 10 16 Thousand/uL Final    RBC 09/23/2019 3 41* 3 81 - 5 12 Million/uL Final    Hemoglobin 09/23/2019 10 3* 11 5 - 15 4 g/dL Final    Hematocrit 09/23/2019 33 1* 34 8 - 46 1 % Final    MCV 09/23/2019 97  82 - 98 fL Final    MCH 09/23/2019 30 2  26 8 - 34 3 pg Final    MCHC 09/23/2019 31 1* 31 4 - 37 4 g/dL Final    RDW 09/23/2019 17 1* 11 6 - 15 1 % Final    MPV 09/23/2019 9 8  8 9 - 12 7 fL Final    Platelets 75/15/8444 163  149 - 390 Thousands/uL Final    nRBC 09/23/2019 0  /100 WBCs Final    This is an appended report  These results have been appended to a previously preliminary verified report   Sodium 09/23/2019 140  136 - 145 mmol/L Final    Potassium 09/23/2019 4 2  3 5 - 5 3 mmol/L Final    Chloride 09/23/2019 108  100 - 108 mmol/L Final    CO2 09/23/2019 23  21 - 32 mmol/L Final    ANION GAP 09/23/2019 9  4 - 13 mmol/L Final    BUN 09/23/2019 9  5 - 25 mg/dL Final    Creatinine 09/23/2019 0 80  0 60 - 1 30 mg/dL Final    Standardized to IDMS reference method    Glucose 09/23/2019 93  65 - 140 mg/dL Final      If the patient is fasting, the ADA then defines impaired fasting glucose as > 100 mg/dL and diabetes as > or equal to 123 mg/dL  Specimen collection should occur prior to Sulfasalazine administration due to the potential for falsely depressed results  Specimen collection should occur prior to Sulfapyridine administration due to the potential for falsely elevated results   Calcium 09/23/2019 9 0  8 3 - 10 1 mg/dL Final    AST 09/23/2019 101* 5 - 45 U/L Final      Specimen collection should occur prior to Sulfasalazine administration due to the potential for falsely depressed results   ALT 09/23/2019 193* 12 - 78 U/L Final      Specimen collection should occur prior to Sulfasalazine and/or Sulfapyridine administration due to the potential for falsely depressed results       Alkaline Phosphatase 09/23/2019 104  46 - 116 U/L Final    Total Protein 09/23/2019 7 9  6 4 - 8 2 g/dL Final    Albumin 09/23/2019 3 7  3 5 - 5 0 g/dL Final    Total Bilirubin 09/23/2019 1 14* 0 20 - 1 00 mg/dL Final    eGFR 09/23/2019 96  ml/min/1 73sq m Final    Troponin I 09/23/2019 <0 02  <=0 04 ng/mL Final      [employer] Chemistry analyzer 99% cutoff is > 0 04 ng/mL in network labs     o cTnI 99% cutoff is useful only when applied to patients in the clinical setting of myocardial ischemia   o cTnI 99% cutoff should be interpreted in the context of clinical history, ECG findings and possibly cardiac imaging to establish correct diagnosis  o cTnI 99% cutoff may be suggestive but clearly not indicative of a coronary event without the clinical setting of myocardial ischemia        Lipase 09/23/2019 137  73 - 393 u/L Final    Color, UA 09/23/2019 see chart   Final    EXT PREG TEST UR (Ref: Negative) 09/23/2019 negative   Final    Control 09/23/2019 valid   Final    Segmented % 09/23/2019 45  43 - 75 % Final    Bands % 09/23/2019 7  0 - 8 % Final    Lymphocytes % 09/23/2019 32  14 - 44 % Final    Monocytes % 09/23/2019 4  4 - 12 % Final    Eosinophils, % 09/23/2019 2  0 - 6 % Final    Basophils % 09/23/2019 0  0 - 1 % Final    Metamyelocytes% 09/23/2019 2* 0 - 1 % Final    Myelocytes % 09/23/2019 2* 0 - 1 % Final    Atypical Lymphocytes % 09/23/2019 6* <=0 % Final    Absolute Neutrophils 09/23/2019 3 18  1 85 - 7 62 Thousand/uL Final    Lymphocytes Absolute 09/23/2019 1 96  0 60 - 4 47 Thousand/uL Final    Monocytes Absolute 09/23/2019 0 24  0 00 - 1 22 Thousand/uL Final    Eosinophils Absolute 09/23/2019 0 12  0 00 - 0 40 Thousand/uL Final    Basophils Absolute 09/23/2019 0 00  0 00 - 0 10 Thousand/uL Final    RBC Morphology 09/23/2019 Present   Final    Anisocytosis 09/23/2019 Present   Final    Platelet Estimate 13/74/8834 Adequate  Adequate Final    Color, UA 09/23/2019 Deja   Final    Clarity, UA 09/23/2019 Clear   Final    pH, UA 09/23/2019 5 5  4 5 - 8 0 Final    Leukocytes, UA 09/23/2019 Small* Negative Final    Nitrite, UA 09/23/2019 Negative  Negative Final    Protein, UA 09/23/2019 30 (1+)* Negative mg/dl Final    Glucose, UA 09/23/2019 Negative  Negative mg/dl Final    Ketones, UA 09/23/2019 Trace* Negative mg/dl Final    Urobilinogen, UA 09/23/2019 0 2  0 2, 1 0 E U /dl E U /dl Final    Bilirubin, UA 09/23/2019 Interference- unable to analyze* Negative Final    The dipstick result may be falsely positive due to interfering substances  We recommend reliance upon serum bilirubin, liver & kidney function tests to guide patient care if clinically indicated      Blood, UA 09/23/2019 Negative  Negative Final    Specific Gravity, UA 09/23/2019 >=1 030  1 003 - 1 030 Final    RBC, UA 09/23/2019 4-10* None Seen, 0-5 /hpf Final    WBC, UA 09/23/2019 2-4* None Seen, 0-5, 5-55, 5-65 /hpf Final    Epithelial Cells 09/23/2019 Occasional  None Seen, Occasional /hpf Final    Bacteria, UA 09/23/2019 Moderate* None Seen, Occasional /hpf Final    MUCUS THREADS 09/23/2019 Innumerable* None Seen Final    Ventricular Rate 09/23/2019 86  BPM Final    Atrial Rate 09/23/2019 86  BPM Final    FL Interval 09/23/2019 166  ms Final    QRSD Interval 09/23/2019 72  ms Final    QT Interval 09/23/2019 340  ms Final    QTC Interval 09/23/2019 406  ms Final    P Axis 09/23/2019 19  degrees Final    QRS Axis 09/23/2019 32  degrees Final    T Wave [place] 09/23/2019 23  degrees Final   Admission on 09/21/2019, Discharged on 09/22/2019   Component Date Value Ref Range Status    WBC 09/21/2019 6 12  4 31 - 10 16 Thousand/uL Final    RBC 09/21/2019 3 13* 3 81 - 5 12 Million/uL Final    Hemoglobin 09/21/2019 9 7* 11 5 - 15 4 g/dL Final    Hematocrit 09/21/2019 30 2* 34 8 - 46 1 % Final    MCV 09/21/2019 97  82 - 98 fL Final    MCH 09/21/2019 31 0  26 8 - 34 3 pg Final    MCHC 09/21/2019 32 1  31 4 - 37 4 g/dL Final    RDW 09/21/2019 16 5* 11 6 - 15 1 % Final    MPV 09/21/2019 9 8  8 9 - 12 7 fL Final    Platelets 51/18/9362 142* 149 - 390 Thousands/uL Final    nRBC 09/21/2019 0  /100 WBCs Final    This is an appended report  These results have been appended to a previously preliminary verified report   Sed Rate 09/21/2019 22* 0 - 20 mm/hour Final    Sodium 09/21/2019 140  136 - 145 mmol/L Final    Potassium 09/21/2019 3 7  3 5 - 5 3 mmol/L Final    Chloride 09/21/2019 103  100 - 108 mmol/L Final    CO2 09/21/2019 30  21 - 32 mmol/L Final    ANION GAP 09/21/2019 7  4 - 13 mmol/L Final    BUN 09/21/2019 12  5 - 25 mg/dL Final    Creatinine 09/21/2019 0 86  0 60 - 1 30 mg/dL Final    Standardized to IDMS reference method    Glucose 09/21/2019 97  65 - 140 mg/dL Final    Specimen Lipemic; Results May be Affected  If the patient is fasting, the ADA then defines impaired fasting glucose as > 100 mg/dL and diabetes as > or equal to 123 mg/dL  Specimen collection should occur prior to Sulfasalazine administration due to the potential for falsely depressed results  Specimen collection should occur prior to Sulfapyridine administration due to the potential for falsely elevated results   Calcium 09/21/2019 8 8  8 3 - 10 1 mg/dL Final    AST 09/21/2019 81* 5 - 45 U/L Final      Specimen collection should occur prior to Sulfasalazine administration due to the potential for falsely depressed results   ALT 09/21/2019 167* 12 - 78 U/L Final      Specimen collection should occur prior to Sulfasalazine administration due to the potential for falsely depressed results       Alkaline Phosphatase 09/21/2019 99  46 - 116 U/L Final    Total Protein 09/21/2019 7 3  6 4 - 8 2 g/dL Final    Albumin 09/21/2019 3 3* 3 5 - 5 0 g/dL Final    Total Bilirubin 09/21/2019 1 29* 0 20 - 1 00 mg/dL Final    eGFR 09/21/2019 88  ml/min/1 73sq m Final    Color, UA 09/21/2019 Yellow   Final    Clarity, UA 09/21/2019 Clear   Final  Specific Gravity, UA 09/21/2019 1 010  1 003 - 1 030 Final    pH, UA 09/21/2019 7 0  4 5, 5 0, 5 5, 6 0, 6 5, 7 0, 7 5, 8 0 Final    Leukocytes, UA 09/21/2019 Small* Negative Final    Nitrite, UA 09/21/2019 Negative  Negative Final    Protein, UA 09/21/2019 Negative  Negative mg/dl Final    Glucose, UA 09/21/2019 Negative  Negative mg/dl Final    Ketones, UA 09/21/2019 Negative  Negative mg/dl Final    Urobilinogen, UA 09/21/2019 4 0* 0 2, 1 0 E U /dl E U /dl Final    Bilirubin, UA 09/21/2019 Negative  Negative Final    Blood, UA 09/21/2019 Large* Negative Final    Blood Culture 09/21/2019 No Growth After 5 Days  Final    Blood Culture 09/21/2019 No Growth After 5 Days     Final    LACTIC ACID 09/21/2019 1 0  0 5 - 2 0 mmol/L Final    RBC, UA 09/21/2019 10-20* None Seen, 0-5 /hpf Final    WBC, UA 09/21/2019 2-4* None Seen, 0-5, 5-55, 5-65 /hpf Final    Epithelial Cells 09/21/2019 Occasional  None Seen, Occasional /hpf Final    Bacteria, UA 09/21/2019 Occasional  None Seen, Occasional /hpf Final    Segmented % 09/21/2019 47  43 - 75 % Final    Bands % 09/21/2019 7  0 - 8 % Final    Lymphocytes % 09/21/2019 30  14 - 44 % Final    Monocytes % 09/21/2019 6  4 - 12 % Final    Eosinophils, % 09/21/2019 1  0 - 6 % Final    Basophils % 09/21/2019 0  0 - 1 % Final    Atypical Lymphocytes % 09/21/2019 9* <=0 % Final    Absolute Neutrophils 09/21/2019 3 30  1 85 - 7 62 Thousand/uL Final    Lymphocytes Absolute 09/21/2019 1 84  0 60 - 4 47 Thousand/uL Final    Monocytes Absolute 09/21/2019 0 37  0 00 - 1 22 Thousand/uL Final    Eosinophils Absolute 09/21/2019 0 06  0 00 - 0 40 Thousand/uL Final    Basophils Absolute 09/21/2019 0 00  0 00 - 0 10 Thousand/uL Final    Total Counted 09/21/2019 100   Final    Anisocytosis 09/21/2019 Present   Final    Platelet Estimate 70/59/3906 Adequate  Adequate Final    Large Platelet 92/81/4083 Present   Final   Office Visit on 09/20/2019   Component Date Value Ref Range Status    Urine Culture 09/20/2019 No Growth <1000 cfu/mL   Final    LEUKOCYTE ESTERASE,UA 09/20/2019 1   Final    NITRITE,UA 09/20/2019 negative   Final    SL AMB POCT UROBILINOGEN 09/20/2019 0 2   Final    POCT URINE PROTEIN 09/20/2019 negative   Final     PH,UA 09/20/2019 5 0   Final    BLOOD,UA 09/20/2019 negative   Final    SPECIFIC GRAVITY,UA 09/20/2019 1 010   Final    KETONES,UA 09/20/2019 negative   Final    BILIRUBIN,UA 09/20/2019 negative   Final    GLUCOSE, UA 09/20/2019 negative   Final     COLOR,UA 09/20/2019 yellow   Final    CLARITY,UA 09/20/2019 clear   Final   Appointment on 09/17/2019   Component Date Value Ref Range Status    Hepatitis B Surface Ag 09/17/2019 Non-reactive  Non-reactive, NonReactive - Confirmed Final    Hep A IgM 09/17/2019 Non-reactive  Non-reactive, Equivocal-Suggest Recollect Final    Hepatitis C Ab 09/17/2019 Non-reactive  Non-reactive Final    Hep B C IgM 09/17/2019 Non-reactive  Non-reactive Final    Anticardiolipin IgA 09/17/2019 <9  0 - 11 APL U/mL Final                              Negative:              <12                            Indeterminate:     12 - 20                            Low-Med Positive: >20 - 80                            High Positive:         >80    Anticardiolipin IgG 09/17/2019 <9  0 - 14 GPL U/mL Final                              Negative:              <15                            Indeterminate:     15 - 20                            Low-Med Positive: >20 - 80                            High Positive:         >80    Anticardiolipin IgM 09/17/2019 13* 0 - 12 MPL U/mL Final                              Negative:              <13                            Indeterminate:     13 - 20                            Low-Med Positive: >20 - 80                            High Positive:         >80   Consult on 09/17/2019   Component Date Value Ref Range Status    Sed Rate 09/17/2019 13  0 - 20 mm/hour Final    Vit D, 25-Hydroxy 09/17/2019 15 8* 30 0 - 100 0 ng/mL Final    Hepatitis B Surface Ag 09/17/2019 Non-reactive  Non-reactive, NonReactive - Confirmed Final    Hepatitis C Ab 09/17/2019 Non-reactive  Non-reactive Final    Hep B C IgM 09/17/2019 Non-reactive  Non-reactive Final    Hep B Core Total Ab 09/17/2019 Non-reactive  Non-reactive Final    Total Bilirubin 09/17/2019 1 22* 0 20 - 1 00 mg/dL Final    Bilirubin, Direct 09/17/2019 0 33* 0 00 - 0 20 mg/dL Final    Alkaline Phosphatase 09/17/2019 96  46 - 116 U/L Final    AST 09/17/2019 88* 5 - 45 U/L Final      Specimen collection should occur prior to Sulfasalazine and/or Sulfapyridine administration due to the potential for falsely depressed results   ALT 09/17/2019 172* 12 - 78 U/L Final      Specimen collection should occur prior to Sulfasalazine and/or Sulfapyridine administration due to the potential for falsely depressed results   Total Protein 09/17/2019 7 6  6 4 - 8 2 g/dL Final    Albumin 09/17/2019 3 7  3 5 - 5 0 g/dL Final    Anti-Centromere B Antibodies 09/17/2019 <0 2  0 0 - 0 9 AI Final    SS-A (RO) Ab 09/17/2019    Final    See written report from [address] [place] [address],  [address] IgG 09/17/2019 <9  0 - 20 GPI IgG units Final    The reference interval reflects a 3SD or 99th percentile interval,  which is thought to represent a potentially clinically significant  result in accordance with the International Consensus Statement on  the classification criteria for definitive antiphospholipid syndrome  (APS)  J Thromb Haem 2006;4:295-306   Beta-2 Glyco 1 IgA 09/17/2019 <9  0 - 25 GPI IgA units Final    The reference interval reflects a 3SD or 99th percentile interval,  which is thought to represent a potentially clinically significant  result in accordance with the International Consensus Statement on  the classification criteria for definitive antiphospholipid syndrome  (APS)  J Thromb Haem 2006;4:295-306      Beta-2 Glyco 1 IgM 09/17/2019 <9  0 - 32 GPI IgM units Final    The reference interval reflects a 3SD or 99th percentile interval,  which is thought to represent a potentially clinically significant  result in accordance with the International Consensus Statement on  the classification criteria for definitive antiphospholipid syndrome  (APS)  J Thromb Haem 2006;4:295-306   PTT Lupus Anticoagulant 09/17/2019 38 1  0 0 - 51 9 sec Final    Dilute Viper Venom Time 09/17/2019 40 2  0 0 - 47 0 sec Final    DILUTE PROTHROMBIN TIME(DPT) 09/17/2019 37 2  0 0 - 55 0 sec Final    THROMBIN TIME (DRVW) 09/17/2019 17 8  0 0 - 23 0 sec Final    DPT CONFIRM RATIO 09/17/2019 1 04  0 00 - 1 40 Ratio Final    LUPUS REFLEX INTERPRETATION 09/17/2019 Comment:   Final    No lupus anticoagulant was detected      Clarity, UA 09/17/2019 Clear   Final    Color, UA 09/17/2019 Dk Yellow   Final    Specific Gravity, UA 09/17/2019 1 016  1 003 - 1 030 Final    pH, UA 09/17/2019 6 0  4 5, 5 0, 5 5, 6 0, 6 5, 7 0, 7 5, 8 0 Final    Glucose, UA 09/17/2019 Negative  Negative mg/dl Final    Ketones, UA 09/17/2019 Negative  Negative mg/dl Final    Blood, UA 09/17/2019 Negative  Negative Final    Protein, UA 09/17/2019 Negative  Negative mg/dl Final    Nitrite, UA 09/17/2019 Negative  Negative Final    Bilirubin, UA 09/17/2019 Negative  Negative Final    Urobilinogen, UA 09/17/2019 0 2  0 2, 1 0 E U /dl E U /dl Final    Leukocytes, UA 09/17/2019 Moderate* Negative Final    WBC, UA 09/17/2019 4-10* None Seen, 0-5, 5-55, 5-65 /hpf Final    RBC, UA 09/17/2019 2-4* None Seen, 0-5 /hpf Final    Bacteria, UA 09/17/2019 Occasional  None Seen, Occasional /hpf Final    Epithelial Cells 09/17/2019 Occasional  None Seen, Occasional /hpf Final    Creatinine, Ur 09/17/2019 119 0  mg/dL Final    Protein Urine Random 09/17/2019 12  mg/dL Final    Prot/Creat Ratio, Ur 09/17/2019 0 10  0 00 - 0 10 Final    C3 Complement 09/17/2019 130 0  90 0 - 180 0 mg/dL Final    C4, COMPLEMENT 09/17/2019 38 0  10 0 - 40 0 mg/dL Final    ds DNA Ab 09/17/2019    Final    See written report    Cyclic Citrullin Peptide Ab 09/17/2019 8  0 - 19 units Final                              Negative               <20                            Weak positive      20 - 39                            Moderate positive  40 - 59                            Strong positive        >59   Lab on 09/12/2019   Component Date Value Ref Range Status    Free T4 09/12/2019 0 81  0 76 - 1 46 ng/dL Final      Specimen collection should occur prior to Sulfasalazine administration due to the potential for falsely elevated results   T3, Free 09/12/2019 2 52  2 30 - 4 20 pg/mL Final    Sodium 09/12/2019 135* 136 - 145 mmol/L Final    Potassium 09/12/2019 4 0  3 5 - 5 3 mmol/L Final    Chloride 09/12/2019 105  100 - 108 mmol/L Final    CO2 09/12/2019 26  21 - 32 mmol/L Final    ANION GAP 09/12/2019 4  4 - 13 mmol/L Final    BUN 09/12/2019 16  5 - 25 mg/dL Final    Creatinine 09/12/2019 0 73  0 60 - 1 30 mg/dL Final    Standardized to IDMS reference method    Glucose, Fasting 09/12/2019 89  65 - 99 mg/dL Final      Specimen collection should occur prior to Sulfasalazine administration due to the potential for falsely depressed results  Specimen collection should occur prior to Sulfapyridine administration due to the potential for falsely elevated results   Calcium 09/12/2019 8 7  8 3 - 10 1 mg/dL Final    AST 09/12/2019 59* 5 - 45 U/L Final      Specimen collection should occur prior to Sulfasalazine administration due to the potential for falsely depressed results   ALT 09/12/2019 132* 12 - 78 U/L Final      Specimen collection should occur prior to Sulfasalazine and/or Sulfapyridine administration due to the potential for falsely depressed results       Alkaline Phosphatase 09/12/2019 70  46 - 116 U/L Final    Total Protein 09/12/2019 7 8  6 4 - 8 2 g/dL Final    Albumin 09/12/2019 3 9  3 5 - 5 0 g/dL Final    Total Bilirubin 09/12/2019 1 33* 0 20 - 1 00 mg/dL Final    eGFR 09/12/2019 108  ml/min/1 73sq m Final    WBC 09/12/2019 5 34  4 31 - 10 16 Thousand/uL Final    RBC 09/12/2019 4 23  3 81 - 5 12 Million/uL Final    Hemoglobin 09/12/2019 13 0  11 5 - 15 4 g/dL Final    Hematocrit 09/12/2019 39 5  34 8 - 46 1 % Final    MCV 09/12/2019 93  82 - 98 fL Final    MCH 09/12/2019 30 7  26 8 - 34 3 pg Final    MCHC 09/12/2019 32 9  31 4 - 37 4 g/dL Final    RDW 09/12/2019 14 3  11 6 - 15 1 % Final    MPV 09/12/2019 9 3  8 9 - 12 7 fL Final    Platelets 13/61/6358 146* 149 - 390 Thousands/uL Final    nRBC 09/12/2019 0  /100 WBCs Final    MAYUR 09/12/2019 Positive* Negative Final    Rheumatoid Factor 09/12/2019 Negative  Negative Final    CRP 09/12/2019 <3 0  <3 0 mg/L Final    Lyme IgG/IgM Ab 09/12/2019 <0 91  0 00 - 0 90 ISR Final                                    Negative         <0 91                                  Equivocal  0 91 - 1 09                                  Positive         >1 09    TSH 3RD GENERATON 09/12/2019 4 970* 0 358 - 3 740 uIU/mL Final      The recommended reference ranges for TSH during pregnancy are as follows:   First trimester 0 1 to 2 5 uIU/mL   Second trimester  0 2 to 3 0 uIU/mL   Third trimester 0 3 to 3 0 uIU/m    Note: Normal ranges may not apply to patients who are transgender, non-binary, or whose legal sex, sex at birth, and gender identity differ  Using supplements with high doses of biotin 20 to more than 300 times greater than the adequate daily intake for adults of 30 mcg/day as established by the [place] [employer], can cause falsely depress results      Total CK 09/12/2019 63  26 - 192 U/L Final    Segmented % 09/12/2019 64  43 - 75 % Final    Bands % 09/12/2019 2  0 - 8 % Final    Lymphocytes % 09/12/2019 21  14 - 44 % Final    Monocytes % 09/12/2019 10  4 - 12 % Final    Eosinophils, % 09/12/2019 1  0 - 6 % Final    Basophils % 09/12/2019 1  0 - 1 % Final    Atypical Lymphocytes % 09/12/2019 1* <=0 % Final    Absolute Neutrophils 09/12/2019 3 52  1 85 - 7 62 Thousand/uL Final    Lymphocytes Absolute 09/12/2019 1 12  0 60 - 4 47 Thousand/uL Final    Monocytes Absolute 09/12/2019 0 53  0 00 - 1 22 Thousand/uL Final    Eosinophils Absolute 09/12/2019 0 05  0 00 - 0 40 Thousand/uL Final    Basophils Absolute 09/12/2019 0 05  0 00 - 0 10 Thousand/uL Final    Platelet Estimate 90/68/2817 Decreased* Adequate Final    MAYUR Titer 1 09/12/2019 Titer of 160   Final    MAYUR Pattern 1 09/12/2019 Speckled pattern   Final   Lab on 08/19/2019   Component Date Value Ref Range Status    Sodium 08/19/2019 136  136 - 145 mmol/L Final    Potassium 08/19/2019 3 8  3 5 - 5 3 mmol/L Final    Chloride 08/19/2019 104  100 - 108 mmol/L Final    CO2 08/19/2019 29  21 - 32 mmol/L Final    ANION GAP 08/19/2019 3* 4 - 13 mmol/L Final   Appointment on 07/18/2019   Component Date Value Ref Range Status    DHEA-SO4 07/18/2019 230 3  84 8 - 378 0 ug/dL Final    Androstenedione 07/18/2019 100  41 - 262 ng/dL Final    This test was developed and its performance characteristics  determined by [facility]  It has not been cleared or approved  by the Food and Drug Administration      Sodium 07/18/2019 135* 136 - 145 mmol/L Final    Potassium 07/18/2019 4 1  3 5 - 5 3 mmol/L Final    Chloride 07/18/2019 104  100 - 108 mmol/L Final    CO2 07/18/2019 28  21 - 32 mmol/L Final    ANION GAP 07/18/2019 3* 4 - 13 mmol/L Final    BUN 07/18/2019 11  5 - 25 mg/dL Final    Creatinine 07/18/2019 0 67  0 60 - 1 30 mg/dL Final    Standardized to IDMS reference method    eGFR 07/18/2019 115  ml/min/1 73sq m Final    Testosterone, Free 07/18/2019 2 6  0 0 - 4 2 pg/mL Final    TESTOSTERONE TOTAL 07/18/2019 25  8 - 48 ng/dL Final   Transcribe Orders on 04/22/2019   Component Date Value Ref Range Status    QFT Nil 04/24/2019 0 02  0 - 8 0 IU/ml Final    QFT TB1-NIL 04/24/2019 0 00  IU/ml Final  QFT TB2-NIL 04/24/2019 0 00  IU/ml Final    QFT Mitogen-NIL 04/24/2019 >10 00  IU/ml Final    QFT Final Interpretation 04/24/2019 Negative  Negative Final    No Interferon-gamma response to M  tuberculosis antigens detected  Infection with M  tuberculosis is unlikely  A single negative result does not exclude infection with M  tuberculosis  In patients at high risk for M  tuberculosis infection, a second test should be considered in accordance with the 2017 ATS/IDSA/CDC Clinical Practice Guidelines for Diagnosis of Tuberculosis in Adults and Children  False negative results can be a result of incorrect blood sample collection or handling of the specimen affecting lymphocyte function  Lab Requisition on 04/16/2019   Component Date Value Ref Range Status    Urine Culture 04/16/2019 >100,000 cfu/ml Escherichia coli*  Final    N gonorrhoeae, DNA Probe 04/16/2019 Negative  Negative Final    Chlamydia trachomatis, DNA Probe 04/16/2019 Negative  Negative Final   There may be more visits with results that are not included

## 2019-10-18 NOTE — PATIENT INSTRUCTIONS
Do bloodwork  Do back x-rays  Start hydroxychloroquine 200mg twice a day  Get regular eye exams  Stop meloxicam, take diclofenac twice a day    Return to clinic in 3 months    Lupus Erythematosus   WHAT YOU NEED TO KNOW:   What is lupus? Lupus is an autoimmune inflammatory disease  This means that your immune system starts to attack your body instead of harmful germs  It is also called systemic lupus erythematosus  Lupus is a lifelong disease that affects all parts of your body  Lupus has active and quiet periods  The active periods, also called flares, are when you have symptoms  The quiet periods, or remission, are when you have few or no symptoms  A remission period may last months or years, or you may not have remission periods at all  What increases my risk for lupus? The cause of lupus is unknown  You are at increased risk if you are female, take hormones, or have a family member with lupus  Certain medicines, such as hydralazine and minocycline, can increase your risk for lupus  Ask your healthcare provider for more information about what increases your risk for lupus  What are the signs and symptoms of lupus? · Fever over 100°F (38°C)    · Tiredness, weight loss, or headache    · Rash shaped like butterfly wings    · Sensitivity to sunlight    · Hair loss    · Mouth or nose sores    · Painful joints    · Chest pain or cough when you take a deep breath    · Abdominal pain, nausea, or vomiting  How is lupus diagnosed? · Blood tests:  Your blood will be tested for infection, inflammation, or anemia (low red blood cells)  · Urine tests:  Your urine will be tested for protein or blood  · X-rays: This is a picture of your joints or chest to check for infection or extra fluid  · Biopsy:  Tissue may be taken from your skin, muscle, or kidney to check for the cause of your lupus  How is lupus treated? There is no cure for lupus   Lupus may be triggered by stress, ultraviolet light, or an infection, such as a cold  It can also be triggered by cigarette smoke or foods you eat  The following will help control your symptoms:  · Antimalarial medicine: This is used to relieve your joint and skin symptoms of lupus, such as rash and joint pain  · Steroids: These decrease inflammation  They may be given as a pill, IV, or ointment  · NSAIDs:  These decrease swelling, pain, and fever  NSAIDs are available without a doctor's order  Ask your healthcare provider which medicine is right for you  Ask how much to take and when to take it  Take as directed  NSAIDs can cause stomach bleeding and kidney problems if not taken correctly  · Immunosuppressive medicine: This is used to slow down your immune system  This will help your immune system not attack your body  · Cytotoxic medicine: This is used to decrease inflammation in muscles or joints  It also slows down your immune system  What are the risks of lupus? · You may be so tired that you cannot work at times  Your risk for a serious infection is increased  You may develop vision loss  You may become depressed or anxious  Your fingers may turn pale or blue when they are cold  This is called Reynaud syndrome  You may become forgetful or have trouble concentrating  You may develop headaches, vision problems, or have a seizure  · You may develop kidney disease or kidney failure  You may have high blood pressure or narrowing of your arteries  This can lead to heart disease or heart failure  You may have bleeding problems, such as anemia  You may get a blood clot in your leg  The clot may travel to your heart or brain and cause life-threatening problems, such as a heart attack or stroke  How can I manage my symptoms? · Rest:  Rest when you feel it is needed  Slowly start to do more each day  Return to your daily activities as directed  · Protect your skin from UV light:  Sunlight can make your lupus symptoms worse   Avoid the sun between 10 am and 4 pm, when the rays are strongest  Apply sunscreen with a SPF of 30 or more every 2 hours when you are outside  Do this even on cloudy days  Wear pants and long sleeves to cover your body  A hat with a wide brim can protect your face, head, and neck  · Heat:  Heat helps decrease joint pain or swelling  Apply heat on the painful joint for 20 to 30 minutes every 2 hours for as many days as directed  · Ice:  Ice helps decrease swelling and pain  Ice may also help prevent tissue damage  Use an ice pack, or put crushed ice in a plastic bag  Cover it with a towel and place it on the painful area for 15 to 20 minutes every hour or as directed  · Avoid others who are sick:  You are at increased risk of a severe infection  · Treat flares quickly: This will help prevent serious illness  How can I help prevent a lupus flare? · Eat healthy foods:  Healthy foods include fruits, vegetables, whole-grain breads, low-fat dairy products, beans, lean meats, and fish  Ask if you need to be on a special diet  · Exercise: This will help decrease your symptoms and prevent depression  Ask your healthcare provider about the best exercise plan for you  · Maintain a healthy weight:  Ask your healthcare provider how much you should weigh  Ask him to help you create a weight loss plan if you are overweight  · Do not smoke: If you smoke, it is never too late to quit  Ask for information about how to stop smoking if you need help  · Manage your stress:  Stress may slow healing and lead to illness  Learn ways to control stress, such as relaxation, deep breathing, and music  Talk to someone about things that upset you  Where can I find support and more information? · [facility] [address] , [place] [address] , [address]  Phone: Gavin Bedoya  Phone: [phone]- [phone]  Web Address: [employer]  org  · [facility]  [address] , [address]  Phone: [phone]  Phone: [phone]  Web Address: [employer] com   [employer] go[employer]  When should I contact my healthcare provider? · You have a flare of your lupus symptoms  · You have a fever or headache  · You feel like you are starting to get sick  · You start to urinate less than usual     · You are bleeding from your nose or gums  · You bruise easily  · You have questions or concerns about your condition or care  When should I seek immediate care or call 911? · You have blood in your urine, bowel movement, or vomit  · You have severe abdominal pain  · You are confused or feel dizzy or faint  · You have numbness or weakness of your face or limbs, or have trouble seeing or speaking  · You have a seizure  · You have new, sudden vision changes  · You have trouble breathing  · You have chest pain, pressure, or discomfort that may spread to your arms, jaw, or back  · Your leg feels warm, tender, and painful  It may look swollen and red  · You suddenly feel lightheaded and short of breath  · You have chest pain when you take a deep breath or cough  · You cough up blood  CARE AGREEMENT:   You have the right to help plan your care  Learn about your health condition and how it may be treated  Discuss treatment options with your caregivers to decide what care you want to receive  You always have the right to refuse treatment  The above information is an  only  It is not intended as medical advice for individual conditions or treatments  Talk to your doctor, nurse or pharmacist before following any medical regimen to see if it is safe and effective for you  © 2017 [employer] Information is for End User's use only and may not be sold, redistributed or otherwise used for commercial purposes   All illustrations and images included in [place] [address] are the copyrighted property of [employer]  or Omari Santo

## 2019-10-21 ENCOUNTER — TELEPHONE (OUTPATIENT)
Dept: RHEUMATOLOGY | Facility: CLINIC | Age: 34
End: 2019-10-21

## 2019-10-23 ENCOUNTER — TELEPHONE (OUTPATIENT)
Dept: OBGYN CLINIC | Facility: HOSPITAL | Age: 34
End: 2019-10-23

## 2019-10-23 ENCOUNTER — OFFICE VISIT (OUTPATIENT)
Dept: PSYCHIATRY | Facility: CLINIC | Age: 34
End: 2019-10-23
Payer: COMMERCIAL

## 2019-10-23 VITALS
HEART RATE: 83 BPM | SYSTOLIC BLOOD PRESSURE: 111 MMHG | BODY MASS INDEX: 33.92 KG/M2 | WEIGHT: 216.1 LBS | RESPIRATION RATE: 16 BRPM | DIASTOLIC BLOOD PRESSURE: 74 MMHG | HEIGHT: 67 IN

## 2019-10-23 DIAGNOSIS — F33.2 MAJOR DEPRESSIVE DISORDER, RECURRENT, SEVERE W/O PSYCHOTIC BEHAVIOR (HCC): Primary | ICD-10-CM

## 2019-10-23 DIAGNOSIS — E55.9 VITAMIN D DEFICIENCY: ICD-10-CM

## 2019-10-23 DIAGNOSIS — F40.10 SOCIAL PHOBIA: ICD-10-CM

## 2019-10-23 DIAGNOSIS — F41.1 GAD (GENERALIZED ANXIETY DISORDER): ICD-10-CM

## 2019-10-23 PROCEDURE — 99214 OFFICE O/P EST MOD 30 MIN: CPT | Performed by: NURSE PRACTITIONER

## 2019-10-23 PROCEDURE — 90833 PSYTX W PT W E/M 30 MIN: CPT | Performed by: NURSE PRACTITIONER
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
## 2019-10-23 NOTE — TELEPHONE ENCOUNTER
Please let patient know that there should not be an interaction between trintellix and diclofenac; also the diclofenac is enteric-coated, so it is even less likely to upset the GI tract and cause bleeding

## 2019-10-23 NOTE — TELEPHONE ENCOUNTER
Patient is calling to let us know that she was put back on trintellix today and she was told that if she continues to take that and diclofenac together it will cause bleeding for her  She is wondering if you could call her and assist her with what to do

## 2019-10-24 NOTE — TELEPHONE ENCOUNTER
I attempted to reach out to the patient  Unfortunately, her voice mail box is full and I was unable to leave a voicemail

## 2019-11-09 ENCOUNTER — APPOINTMENT (OUTPATIENT)
Dept: LAB | Facility: HOSPITAL | Age: 34
End: 2019-11-09
Payer: COMMERCIAL

## 2019-11-09 ENCOUNTER — HOSPITAL ENCOUNTER (OUTPATIENT)
Dept: RADIOLOGY | Facility: HOSPITAL | Age: 34
Discharge: HOME | End: 2019-11-09
Attending: INTERNAL MEDICINE
Payer: COMMERCIAL

## 2019-11-09 DIAGNOSIS — M25.50 ARTHRALGIA OF MULTIPLE JOINTS: ICD-10-CM

## 2019-11-09 DIAGNOSIS — E03.9 ACQUIRED HYPOTHYROIDISM: ICD-10-CM

## 2019-11-09 DIAGNOSIS — R74.01 ELEVATED TRANSAMINASE LEVEL: ICD-10-CM

## 2019-11-09 LAB
ALBUMIN SERPL BCP-MCNC: 4.1 G/DL (ref 3.5–5)
ALP SERPL-CCNC: 50 U/L (ref 46–116)
ALT SERPL W P-5'-P-CCNC: 32 U/L (ref 12–78)
ANION GAP SERPL CALCULATED.3IONS-SCNC: 7 MMOL/L (ref 4–13)
AST SERPL W P-5'-P-CCNC: 19 U/L (ref 5–45)
BASOPHILS # BLD AUTO: 0.02 THOUSANDS/ΜL (ref 0–0.1)
BASOPHILS NFR BLD AUTO: 1 % (ref 0–1)
BILIRUB SERPL-MCNC: 0.78 MG/DL (ref 0.2–1)
BUN SERPL-MCNC: 9 MG/DL (ref 5–25)
CALCIUM SERPL-MCNC: 8.8 MG/DL (ref 8.3–10.1)
CHLORIDE SERPL-SCNC: 104 MMOL/L (ref 100–108)
CO2 SERPL-SCNC: 29 MMOL/L (ref 21–32)
CREAT SERPL-MCNC: 0.67 MG/DL (ref 0.6–1.3)
EOSINOPHIL # BLD AUTO: 0.1 THOUSAND/ΜL (ref 0–0.61)
EOSINOPHIL NFR BLD AUTO: 2 % (ref 0–6)
ERYTHROCYTE [DISTWIDTH] IN BLOOD BY AUTOMATED COUNT: 13.5 % (ref 11.6–15.1)
GFR SERPL CREATININE-BSD FRML MDRD: 115 ML/MIN/1.73SQ M
GLUCOSE P FAST SERPL-MCNC: 86 MG/DL (ref 65–99)
HBV CORE AB SER QL: NORMAL
HCT VFR BLD AUTO: 41.1 % (ref 34.8–46.1)
HGB BLD-MCNC: 13.3 G/DL (ref 11.5–15.4)
IMM GRANULOCYTES # BLD AUTO: 0.01 THOUSAND/UL (ref 0–0.2)
IMM GRANULOCYTES NFR BLD AUTO: 0 % (ref 0–2)
LYMPHOCYTES # BLD AUTO: 1.62 THOUSANDS/ΜL (ref 0.6–4.47)
LYMPHOCYTES NFR BLD AUTO: 37 % (ref 14–44)
MCH RBC QN AUTO: 30.7 PG (ref 26.8–34.3)
MCHC RBC AUTO-ENTMCNC: 32.4 G/DL (ref 31.4–37.4)
MCV RBC AUTO: 95 FL (ref 82–98)
MONOCYTES # BLD AUTO: 0.33 THOUSAND/ΜL (ref 0.17–1.22)
MONOCYTES NFR BLD AUTO: 8 % (ref 4–12)
NEUTROPHILS # BLD AUTO: 2.34 THOUSANDS/ΜL (ref 1.85–7.62)
NEUTS SEG NFR BLD AUTO: 52 % (ref 43–75)
NRBC BLD AUTO-RTO: 0 /100 WBCS
PLATELET # BLD AUTO: 224 THOUSANDS/UL (ref 149–390)
PMV BLD AUTO: 9.6 FL (ref 8.9–12.7)
POTASSIUM SERPL-SCNC: 4.1 MMOL/L (ref 3.5–5.3)
PROT SERPL-MCNC: 7.5 G/DL (ref 6.4–8.2)
RBC # BLD AUTO: 4.33 MILLION/UL (ref 3.81–5.12)
SODIUM SERPL-SCNC: 140 MMOL/L (ref 136–145)
T4 SERPL-MCNC: 8.1 UG/DL (ref 4.7–13.3)
TSH SERPL DL<=0.05 MIU/L-ACNC: 2.58 UIU/ML (ref 0.36–3.74)
WBC # BLD AUTO: 4.42 THOUSAND/UL (ref 4.31–10.16)

## 2019-11-09 PROCEDURE — 86664 EPSTEIN-BARR NUCLEAR ANTIGEN: CPT

## 2019-11-09 PROCEDURE — 80053 COMPREHEN METABOLIC PANEL: CPT | Performed by: FAMILY MEDICINE

## 2019-11-09 PROCEDURE — 36415 COLL VENOUS BLD VENIPUNCTURE: CPT | Performed by: FAMILY MEDICINE

## 2019-11-09 PROCEDURE — 85025 COMPLETE CBC W/AUTO DIFF WBC: CPT | Performed by: FAMILY MEDICINE

## 2019-11-09 PROCEDURE — 86665 EPSTEIN-BARR CAPSID VCA: CPT

## 2019-11-09 PROCEDURE — 86704 HEP B CORE ANTIBODY TOTAL: CPT | Performed by: INTERNAL MEDICINE

## 2019-11-09 PROCEDURE — 84436 ASSAY OF TOTAL THYROXINE: CPT

## 2019-11-09 PROCEDURE — 86644 CMV ANTIBODY: CPT

## 2019-11-09 PROCEDURE — 86663 EPSTEIN-BARR ANTIBODY: CPT

## 2019-11-09 PROCEDURE — 84443 ASSAY THYROID STIM HORMONE: CPT

## 2019-11-09 PROCEDURE — 81374 HLA I TYPING 1 ANTIGEN LR: CPT | Performed by: INTERNAL MEDICINE

## 2019-11-09 PROCEDURE — 86645 CMV ANTIBODY IGM: CPT

## 2019-11-09 PROCEDURE — 72200 X-RAY EXAM SI JOINTS: CPT

## 2019-11-11 LAB
CMV IGG SERPL IA-ACNC: 5.4 U/ML (ref 0–0.59)
CMV IGM SERPL IA-ACNC: 104 AU/ML (ref 0–29.9)
EBV EA IGG SER-ACNC: 40.3 U/ML (ref 0–8.9)
EBV NA IGG SER IA-ACNC: 497 U/ML (ref 0–17.9)
EBV PATRN SPEC IB-IMP: ABNORMAL
EBV VCA IGG SER IA-ACNC: >600 U/ML (ref 0–17.9)
EBV VCA IGM SER IA-ACNC: <36 U/ML (ref 0–35.9)

## 2019-11-14 LAB — HLA-B27 QL NAA+PROBE: NEGATIVE

## 2019-11-16 ENCOUNTER — HOSPITAL ENCOUNTER (OUTPATIENT)
Dept: ULTRASOUND IMAGING | Facility: HOSPITAL | Age: 34
Discharge: HOME | End: 2019-11-16
Payer: COMMERCIAL

## 2019-11-16 DIAGNOSIS — R16.1 SPLENOMEGALY: ICD-10-CM

## 2019-11-16 PROCEDURE — 76705 ECHO EXAM OF ABDOMEN: CPT

## 2019-12-03 ENCOUNTER — OFFICE VISIT (OUTPATIENT)
Dept: PSYCHIATRY | Facility: CLINIC | Age: 34
End: 2019-12-03
Payer: COMMERCIAL

## 2019-12-03 VITALS
HEIGHT: 67 IN | BODY MASS INDEX: 31.83 KG/M2 | HEART RATE: 79 BPM | WEIGHT: 202.8 LBS | RESPIRATION RATE: 16 BRPM | DIASTOLIC BLOOD PRESSURE: 78 MMHG | SYSTOLIC BLOOD PRESSURE: 113 MMHG

## 2019-12-03 DIAGNOSIS — F33.2 MAJOR DEPRESSIVE DISORDER, RECURRENT, SEVERE W/O PSYCHOTIC BEHAVIOR (HCC): Primary | ICD-10-CM

## 2019-12-03 DIAGNOSIS — Z30.430 ENCOUNTER FOR INSERTION OF INTRAUTERINE CONTRACEPTIVE DEVICE (IUD): ICD-10-CM

## 2019-12-03 DIAGNOSIS — E55.9 VITAMIN D DEFICIENCY: ICD-10-CM

## 2019-12-03 DIAGNOSIS — F41.1 GAD (GENERALIZED ANXIETY DISORDER): ICD-10-CM

## 2019-12-03 DIAGNOSIS — F40.10 SOCIAL PHOBIA: ICD-10-CM

## 2019-12-03 PROCEDURE — 99214 OFFICE O/P EST MOD 30 MIN: CPT | Performed by: NURSE PRACTITIONER

## 2019-12-03 PROCEDURE — 90833 PSYTX W PT W E/M 30 MIN: CPT | Performed by: NURSE PRACTITIONER

## 2019-12-03 RX ORDER — VORTIOXETINE 10 MG/1
10 TABLET, FILM COATED ORAL DAILY
Qty: 30 TABLET | Refills: 0 | Status: SHIPPED | OUTPATIENT
Start: 2019-12-03 | End: 2020-01-16 | Stop reason: SDUPTHER

## 2019-12-03 RX ORDER — VORTIOXETINE 10 MG/1
TABLET, FILM COATED ORAL
Refills: 1 | COMMUNITY
Start: 2019-11-25 | End: 2019-12-03 | Stop reason: SDUPTHER
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
## 2019-12-18 ENCOUNTER — SOCIAL WORK (OUTPATIENT)
Dept: BEHAVIORAL/MENTAL HEALTH CLINIC | Facility: CLINIC | Age: 34
End: 2019-12-18
Payer: COMMERCIAL

## 2019-12-18 DIAGNOSIS — F41.1 GAD (GENERALIZED ANXIETY DISORDER): Primary | ICD-10-CM

## 2019-12-18 DIAGNOSIS — F40.10 SOCIAL PHOBIA: ICD-10-CM

## 2019-12-18 DIAGNOSIS — F33.2 MAJOR DEPRESSIVE DISORDER, RECURRENT, SEVERE W/O PSYCHOTIC BEHAVIOR (HCC): ICD-10-CM

## 2019-12-18 PROCEDURE — 90834 PSYTX W PT 45 MINUTES: CPT | Performed by: SOCIAL WORKER

## 2019-12-18 NOTE — PSYCH
Psychotherapy Provided: Individual Psychotherapy 45 minutes     Length of time in session: 3:10 pm to 3:55 pm, follow up in 2 week    Goals addressed in session: Goal 1 and Goal 2     Pain:      moderate    6    Current suicide risk : Low     Therapist met with Jose Del Rosario  Therapist and Jose Del Rosario discussed her relationship  She is able to identify that she is not satisfied with this relationship, but is also struggling with how to improve her life status  She is becoming aware of her life status, and feels that she is making improvements in her ability to be stable with herself  She is continuing to focus on improving herself and making herself a priority  Behavioral Health Treatment Plan [facility]: Diagnosis and Treatment Plan explained to Soledadbelinda Gambino relates understanding diagnosis and is agreeable to Treatment Plan   Yes

## 2019-12-22 DIAGNOSIS — F40.10 SOCIAL PHOBIA: ICD-10-CM

## 2019-12-22 DIAGNOSIS — F41.1 GAD (GENERALIZED ANXIETY DISORDER): ICD-10-CM

## 2019-12-22 RX ORDER — CLONAZEPAM 0.5 MG/1
TABLET ORAL
Qty: 60 TABLET | Refills: 0 | OUTPATIENT
Start: 2019-12-22

## 2019-12-23 RX ORDER — CLONAZEPAM 0.5 MG/1
TABLET ORAL
Qty: 60 TABLET | Refills: 0 | Status: SHIPPED | OUTPATIENT
Start: 2019-12-23 | End: 2020-01-16 | Stop reason: SDUPTHER

## 2019-12-23 NOTE — TELEPHONE ENCOUNTER
Auto refill request for Desire's Clonazepam 0 5 mg tabs was sent by pharmacy to Dr Bianca Schmid in error, instead of to her provider Derrick Dumas, as Dr Bianca Schmid was last provider renewing in Megan's absence  For Megan's review

## 2019-12-24 NOTE — TELEPHONE ENCOUNTER
Patient should have 1 refill left on Clonazepam 0 5 mg refill, called [facility] pharmacy and spoke with the pharmacist   Since original prescription was wrote in  all controlled substances have a 6 month expiration so her last refill is no longer valid as it  3 days ago  A 30 day supply of clonazepam quantity 60 with no refills was sent to the [facility] pharmacy 0 5 mg take half to 1 tablet twice daily as needed for anxiety

## 2019-12-31 ENCOUNTER — SOCIAL WORK (OUTPATIENT)
Dept: BEHAVIORAL/MENTAL HEALTH CLINIC | Facility: CLINIC | Age: 34
End: 2019-12-31
Payer: COMMERCIAL

## 2019-12-31 DIAGNOSIS — F41.1 GAD (GENERALIZED ANXIETY DISORDER): Primary | ICD-10-CM

## 2019-12-31 DIAGNOSIS — F40.10 SOCIAL PHOBIA: ICD-10-CM

## 2019-12-31 DIAGNOSIS — F33.2 MAJOR DEPRESSIVE DISORDER, RECURRENT, SEVERE W/O PSYCHOTIC BEHAVIOR (HCC): ICD-10-CM

## 2019-12-31 PROCEDURE — 90834 PSYTX W PT 45 MINUTES: CPT | Performed by: SOCIAL WORKER

## 2020-01-02 PROCEDURE — 87491 CHLMYD TRACH DNA AMP PROBE: CPT | Performed by: OBSTETRICS & GYNECOLOGY

## 2020-01-02 PROCEDURE — 87591 N.GONORRHOEAE DNA AMP PROB: CPT | Performed by: OBSTETRICS & GYNECOLOGY

## 2020-01-02 NOTE — PSYCH
Psychotherapy Provided: Individual Psychotherapy 45 minutes     Length of time in session: 3:15 pm to 4:00 pm, follow up in 2 week    Goals addressed in session: Goal 1 and Goal 2     Pain:      moderate    5    Current suicide risk : Low     Therapist met with Eual Side  She discussed her relationship with her ex-boyfriend  She continues to feel poorly about this relationship, as he continues to identify her errors in treatment to him  Therapist and Eual Side discussed her ability to set and maintain boundaries  She also discussed her desires to return to school, and how she continues to experience feelings related to failing from her previous school  She appeared healthy and bright during this session  She will continue it implement boundaries as they are necessary for her success  Behavioral Health Treatment Plan [facility]: Diagnosis and Treatment Plan explained to Mesha Russell relates understanding diagnosis and is agreeable to Treatment Plan   Yes

## 2020-01-03 ENCOUNTER — LAB REQUISITION (OUTPATIENT)
Dept: LAB | Facility: HOSPITAL | Age: 35
End: 2020-01-03
Payer: COMMERCIAL

## 2020-01-03 DIAGNOSIS — Z72.51 HIGH RISK HETEROSEXUAL BEHAVIOR: ICD-10-CM

## 2020-01-04 LAB
C TRACH DNA SPEC QL NAA+PROBE: NEGATIVE
N GONORRHOEA DNA SPEC QL NAA+PROBE: NEGATIVE

## 2020-01-08 ENCOUNTER — SOCIAL WORK (OUTPATIENT)
Dept: BEHAVIORAL/MENTAL HEALTH CLINIC | Facility: CLINIC | Age: 35
End: 2020-01-08
Payer: COMMERCIAL

## 2020-01-08 DIAGNOSIS — F40.10 SOCIAL PHOBIA: ICD-10-CM

## 2020-01-08 DIAGNOSIS — F41.1 GAD (GENERALIZED ANXIETY DISORDER): Primary | ICD-10-CM

## 2020-01-08 DIAGNOSIS — F33.2 MAJOR DEPRESSIVE DISORDER, RECURRENT, SEVERE W/O PSYCHOTIC BEHAVIOR (HCC): ICD-10-CM

## 2020-01-08 PROCEDURE — 90834 PSYTX W PT 45 MINUTES: CPT | Performed by: SOCIAL WORKER

## 2020-01-08 NOTE — PSYCH
Psychotherapy Provided: Individual Psychotherapy 50 minutes     Length of time in session: 3:10 pm to 4:05 pm, follow up in 2 week    Goals addressed in session: Goal 1 and Goal 2     Pain:      moderate to severe    6    Current suicide risk : Low     Therapist met with Zak Martinez  She shared that she continues to have difficulty with her boyfriend  She noted that he continues to feel jealous of her interactions and is fearful that she will cheat on him  Therapist and Zak Martinez discussed how these accusations make her feel, and how she desires more supportive relationship partner  She also expressed that she wants to go back to school, but has little motivation  She is tired and in pain a lot of days  Therapist and client explored her ability to focus on herself  Therapist encouraged more supportive interactions with others  She is attempting to travel to visit a friend  Therapist discussed barriers to this task, and how she could overcome them  She will continue to work on making herself a priority for others  Behavioral Health Treatment Plan [facility]: Diagnosis and Treatment Plan explained to Anthony Chand relates understanding diagnosis and is agreeable to Treatment Plan   Yes

## 2020-01-14 ENCOUNTER — OFFICE VISIT (OUTPATIENT)
Dept: RHEUMATOLOGY | Facility: CLINIC | Age: 35
End: 2020-01-14
Payer: COMMERCIAL

## 2020-01-14 VITALS — BODY MASS INDEX: 32.14 KG/M2 | HEART RATE: 74 BPM | HEIGHT: 66 IN | RESPIRATION RATE: 18 BRPM | WEIGHT: 200 LBS

## 2020-01-14 DIAGNOSIS — Z79.899 HIGH RISK MEDICATION USE: ICD-10-CM

## 2020-01-14 DIAGNOSIS — M32.9 LUPUS (HCC): Primary | ICD-10-CM

## 2020-01-14 DIAGNOSIS — M25.50 ARTHRALGIA OF MULTIPLE JOINTS: ICD-10-CM

## 2020-01-14 DIAGNOSIS — M62.830 BACK SPASM: ICD-10-CM

## 2020-01-14 PROCEDURE — 99215 OFFICE O/P EST HI 40 MIN: CPT | Performed by: INTERNAL MEDICINE

## 2020-01-14 PROCEDURE — 1036F TOBACCO NON-USER: CPT | Performed by: INTERNAL MEDICINE

## 2020-01-14 RX ORDER — METHOCARBAMOL 500 MG/1
500 TABLET, FILM COATED ORAL 2 TIMES DAILY
Qty: 60 TABLET | Refills: 3 | Status: SHIPPED | OUTPATIENT
Start: 2020-01-14 | End: 2020-11-23

## 2020-01-14 RX ORDER — CELECOXIB 100 MG/1
100 CAPSULE ORAL 2 TIMES DAILY
Qty: 60 CAPSULE | Refills: 3 | Status: SHIPPED | OUTPATIENT
Start: 2020-01-14 | End: 2020-04-20 | Stop reason: SDUPTHER

## 2020-01-14 RX ORDER — HYDROXYCHLOROQUINE SULFATE 200 MG/1
200 TABLET, FILM COATED ORAL 2 TIMES DAILY
Qty: 60 TABLET | Refills: 3 | Status: SHIPPED | OUTPATIENT
Start: 2020-01-14 | End: 2020-03-01

## 2020-01-14 NOTE — PATIENT INSTRUCTIONS
Do labs  Continue hydroxychloroquine twice a day, do eye exam  Stop diclofenac, start celecoxib twice a day  Can take methocarbamol twice a day as needed for back pain  Physical therapy referral made    Return to clinic in 3 months    Lupus Erythematosus   WHAT YOU NEED TO KNOW:   What is lupus? Lupus is an autoimmune inflammatory disease  This means that your immune system starts to attack your body instead of harmful germs  It is also called systemic lupus erythematosus  Lupus is a lifelong disease that affects all parts of your body  Lupus has active and quiet periods  The active periods, also called flares, are when you have symptoms  The quiet periods, or remission, are when you have few or no symptoms  A remission period may last months or years, or you may not have remission periods at all  What increases my risk for lupus? The cause of lupus is unknown  You are at increased risk if you are female, take hormones, or have a family member with lupus  Certain medicines, such as hydralazine and minocycline, can increase your risk for lupus  Ask your healthcare provider for more information about what increases your risk for lupus  What are the signs and symptoms of lupus? · Fever over 100°F (38°C)    · Tiredness, weight loss, or headache    · Rash shaped like butterfly wings    · Sensitivity to sunlight    · Hair loss    · Mouth or nose sores    · Painful joints    · Chest pain or cough when you take a deep breath    · Abdominal pain, nausea, or vomiting  How is lupus diagnosed? · Blood tests:  Your blood will be tested for infection, inflammation, or anemia (low red blood cells)  · Urine tests:  Your urine will be tested for protein or blood  · X-rays: This is a picture of your joints or chest to check for infection or extra fluid  · Biopsy:  Tissue may be taken from your skin, muscle, or kidney to check for the cause of your lupus  How is lupus treated? There is no cure for lupus   Lupus may be triggered by stress, ultraviolet light, or an infection, such as a cold  It can also be triggered by cigarette smoke or foods you eat  The following will help control your symptoms:  · Antimalarial medicine: This is used to relieve your joint and skin symptoms of lupus, such as rash and joint pain  · Steroids: These decrease inflammation  They may be given as a pill, IV, or ointment  · NSAIDs:  These decrease swelling, pain, and fever  NSAIDs are available without a doctor's order  Ask your healthcare provider which medicine is right for you  Ask how much to take and when to take it  Take as directed  NSAIDs can cause stomach bleeding and kidney problems if not taken correctly  · Immunosuppressive medicine: This is used to slow down your immune system  This will help your immune system not attack your body  · Cytotoxic medicine: This is used to decrease inflammation in muscles or joints  It also slows down your immune system  What are the risks of lupus? · You may be so tired that you cannot work at times  Your risk for a serious infection is increased  You may develop vision loss  You may become depressed or anxious  Your fingers may turn pale or blue when they are cold  This is called Reynaud syndrome  You may become forgetful or have trouble concentrating  You may develop headaches, vision problems, or have a seizure  · You may develop kidney disease or kidney failure  You may have high blood pressure or narrowing of your arteries  This can lead to heart disease or heart failure  You may have bleeding problems, such as anemia  You may get a blood clot in your leg  The clot may travel to your heart or brain and cause life-threatening problems, such as a heart attack or stroke  How can I manage my symptoms? · Rest:  Rest when you feel it is needed  Slowly start to do more each day  Return to your daily activities as directed       · Protect your skin from UV light:  Sunlight can make your lupus symptoms worse  Avoid the sun between 10 am and 4 pm, when the rays are strongest  Apply sunscreen with a SPF of 30 or more every 2 hours when you are outside  Do this even on cloudy days  Wear pants and long sleeves to cover your body  A hat with a wide brim can protect your face, head, and neck  · Heat:  Heat helps decrease joint pain or swelling  Apply heat on the painful joint for 20 to 30 minutes every 2 hours for as many days as directed  · Ice:  Ice helps decrease swelling and pain  Ice may also help prevent tissue damage  Use an ice pack, or put crushed ice in a plastic bag  Cover it with a towel and place it on the painful area for 15 to 20 minutes every hour or as directed  · Avoid others who are sick:  You are at increased risk of a severe infection  · Treat flares quickly: This will help prevent serious illness  How can I help prevent a lupus flare? · Eat healthy foods:  Healthy foods include fruits, vegetables, whole-grain breads, low-fat dairy products, beans, lean meats, and fish  Ask if you need to be on a special diet  · Exercise: This will help decrease your symptoms and prevent depression  Ask your healthcare provider about the best exercise plan for you  · Maintain a healthy weight:  Ask your healthcare provider how much you should weigh  Ask him to help you create a weight loss plan if you are overweight  · Do not smoke: If you smoke, it is never too late to quit  Ask for information about how to stop smoking if you need help  · Manage your stress:  Stress may slow healing and lead to illness  Learn ways to control stress, such as relaxation, deep breathing, and music  Talk to someone about things that upset you  Where can I find support and more information? · [facility] [address] , [place] [address] , [address]  Phone: Gavin Bedoya  Phone: [phone]  Web Address: [place] [address] [facility] and [address] [place] [id number]  Phone: [phone]  [address]  Phone: [phone]  Web Address: [employer] [facility] gov  When should I contact my healthcare provider? · You have a flare of your lupus symptoms  · You have a fever or headache  · You feel like you are starting to get sick  · You start to urinate less than usual     · You are bleeding from your nose or gums  · You bruise easily  · You have questions or concerns about your condition or care  When should I seek immediate care or call 911? · You have blood in your urine, bowel movement, or vomit  · You have severe abdominal pain  · You are confused or feel dizzy or faint  · You have numbness or weakness of your face or limbs, or have trouble seeing or speaking  · You have a seizure  · You have new, sudden vision changes  · You have trouble breathing  · You have chest pain, pressure, or discomfort that may spread to your arms, jaw, or back  · Your leg feels warm, tender, and painful  It may look swollen and red  · You suddenly feel lightheaded and short of breath  · You have chest pain when you take a deep breath or cough  · You cough up blood  CARE AGREEMENT:   You have the right to help plan your care  Learn about your health condition and how it may be treated  Discuss treatment options with your caregivers to decide what care you want to receive  You always have the right to refuse treatment  The above information is an  only  It is not intended as medical advice for individual conditions or treatments  Talk to your doctor, nurse or pharmacist before following any medical regimen to see if it is safe and effective for you    © 2017 [employer] Information is for End User's use only and may not be sold, redistributed or otherwise used for commercial purposes  All illustrations and images included in CareNotes® are the copyrighted property of [employer]  or Omari Santo

## 2020-01-14 NOTE — PROGRESS NOTES
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 09/23/2019 negative   Final    Control 09/23/2019 valid   Final    Segmented % 09/23/2019 45  43 - 75 % Final    Bands % 09/23/2019 7  0 - 8 % Final    Lymphocytes % 09/23/2019 32  14 - 44 % Final    Monocytes % 09/23/2019 4  4 - 12 % Final    Eosinophils, % 09/23/2019 2  0 - 6 % Final    Basophils % 09/23/2019 0  0 - 1 % Final    Metamyelocytes% 09/23/2019 2* 0 - 1 % Final    Myelocytes % 09/23/2019 2* 0 - 1 % Final    Atypical Lymphocytes % 09/23/2019 6* <=0 % Final    Absolute Neutrophils 09/23/2019 3 18  1 85 - 7 62 Thousand/uL Final    Lymphocytes Absolute 09/23/2019 1 96  0 60 - 4 47 Thousand/uL Final    Monocytes Absolute 09/23/2019 0 24  0 00 - 1 22 Thousand/uL Final    Eosinophils Absolute 09/23/2019 0 12  0 00 - 0 40 Thousand/uL Final    Basophils Absolute 09/23/2019 0 00  0 00 - 0 10 Thousand/uL Final    RBC Morphology 09/23/2019 Present   Final    Anisocytosis 09/23/2019 Present   Final    Platelet Estimate 84/32/2193 Adequate  Adequate Final    Color, UA 09/23/2019 Deja   Final    Clarity, UA 09/23/2019 Clear   Final    pH, UA 09/23/2019 5 5  4 5 - 8 0 Final    Leukocytes, UA 09/23/2019 Small* Negative Final    Nitrite, UA 09/23/2019 Negative  Negative Final    Protein, UA 09/23/2019 30 (1+)* Negative mg/dl Final    Glucose, UA 09/23/2019 Negative  Negative mg/dl Final    Ketones, UA 09/23/2019 Trace* Negative mg/dl Final    Urobilinogen, UA 09/23/2019 0 2  0 2, 1 0 E U /dl E U /dl Final    Bilirubin, UA 09/23/2019 Interference- unable to analyze* Negative Final    The dipstick result may be falsely positive due to interfering substances  We recommend reliance upon serum bilirubin, liver & kidney function tests to guide patient care if clinically indicated      Blood, UA 09/23/2019 Negative  Negative Final    Specific Gravity, UA 09/23/2019 >=1 030  1 003 - 1 030 Final    RBC, UA 09/23/2019 4-10* None Seen, 0-5 /hpf Final    WBC, UA 09/23/2019 2-4* None Seen, 0-5, 5-55, 5-65 /hpf Final    Epithelial Cells 09/23/2019 Occasional  None Seen, Occasional /hpf Final    Bacteria, UA 09/23/2019 Moderate* None Seen, Occasional /hpf Final    MUCUS THREADS 09/23/2019 Innumerable* None Seen Final    Ventricular Rate 09/23/2019 86  BPM Final    Atrial Rate 09/23/2019 86  BPM Final    ME Interval 09/23/2019 166  ms Final    QRSD Interval 09/23/2019 72  ms Final    QT Interval 09/23/2019 340  ms Final    QTC Interval 09/23/2019 406  ms Final    P Axis 09/23/2019 19  degrees Final    QRS Axis 09/23/2019 32  degrees Final    T [place] 09/23/2019 23  degrees Final   Admission on 09/21/2019, Discharged on 09/22/2019   Component Date Value Ref Range Status    WBC 09/21/2019 6 12  4 31 - 10 16 Thousand/uL Final    RBC 09/21/2019 3 13* 3 81 - 5 12 Million/uL Final    Hemoglobin 09/21/2019 9 7* 11 5 - 15 4 g/dL Final    Hematocrit 09/21/2019 30 2* 34 8 - 46 1 % Final    MCV 09/21/2019 97  82 - 98 fL Final    MCH 09/21/2019 31 0  26 8 - 34 3 pg Final    MCHC 09/21/2019 32 1  31 4 - 37 4 g/dL Final    RDW 09/21/2019 16 5* 11 6 - 15 1 % Final    MPV 09/21/2019 9 8  8 9 - 12 7 fL Final    Platelets 23/79/7049 142* 149 - 390 Thousands/uL Final    nRBC 09/21/2019 0 /100 WBCs Final    This is an appended report  These results have been appended to a previously preliminary verified report   Sed Rate 09/21/2019 22* 0 - 20 mm/hour Final    Sodium 09/21/2019 140  136 - 145 mmol/L Final    Potassium 09/21/2019 3 7  3 5 - 5 3 mmol/L Final    Chloride 09/21/2019 103  100 - 108 mmol/L Final    CO2 09/21/2019 30  21 - 32 mmol/L Final    ANION GAP 09/21/2019 7  4 - 13 mmol/L Final    BUN 09/21/2019 12  5 - 25 mg/dL Final    Creatinine 09/21/2019 0 86  0 60 - 1 30 mg/dL Final    Standardized to IDMS reference method    Glucose 09/21/2019 97  65 - 140 mg/dL Final    Specimen Lipemic; Results May be Affected  If the patient is fasting, the ADA then defines impaired fasting glucose as > 100 mg/dL and diabetes as > or equal to 123 mg/dL  Specimen collection should occur prior to Sulfasalazine administration due to the potential for falsely depressed results  Specimen collection should occur prior to Sulfapyridine administration due to the potential for falsely elevated results   Calcium 09/21/2019 8 8  8 3 - 10 1 mg/dL Final    AST 09/21/2019 81* 5 - 45 U/L Final      Specimen collection should occur prior to Sulfasalazine administration due to the potential for falsely depressed results   ALT 09/21/2019 167* 12 - 78 U/L Final      Specimen collection should occur prior to Sulfasalazine administration due to the potential for falsely depressed results       Alkaline Phosphatase 09/21/2019 99  46 - 116 U/L Final    Total Protein 09/21/2019 7 3  6 4 - 8 2 g/dL Final    Albumin 09/21/2019 3 3* 3 5 - 5 0 g/dL Final    Total Bilirubin 09/21/2019 1 29* 0 20 - 1 00 mg/dL Final    eGFR 09/21/2019 88  ml/min/1 73sq m Final    Color, UA 09/21/2019 Yellow   Final    Clarity, UA 09/21/2019 Clear   Final    Specific [place], UA 09/21/2019 1 010  1 003 - 1 030 Final    pH, UA 09/21/2019 7 0  4 5, 5 0, 5 5, 6 0, 6 5, 7 0, 7 5, 8 0 Final    Leukocytes, UA 09/21/2019 Small* Negative Final    Nitrite, UA 09/21/2019 Negative  Negative Final    Protein, UA 09/21/2019 Negative  Negative mg/dl Final    Glucose, UA 09/21/2019 Negative  Negative mg/dl Final    Ketones, UA 09/21/2019 Negative  Negative mg/dl Final    Urobilinogen, UA 09/21/2019 4 0* 0 2, 1 0 E U /dl E U /dl Final    Bilirubin, UA 09/21/2019 Negative  Negative Final    Blood, UA 09/21/2019 Large* Negative Final    Blood Culture 09/21/2019 No Growth After 5 Days  Final    Blood Culture 09/21/2019 No Growth After 5 Days     Final    LACTIC ACID 09/21/2019 1 0  0 5 - 2 0 mmol/L Final    RBC, UA 09/21/2019 10-20* None Seen, 0-5 /hpf Final    WBC, UA 09/21/2019 2-4* None Seen, 0-5, 5-55, 5-65 /hpf Final    Epithelial Cells 09/21/2019 Occasional  None Seen, Occasional /hpf Final    Bacteria, UA 09/21/2019 Occasional  None Seen, Occasional /hpf Final    Segmented % 09/21/2019 47  43 - 75 % Final    Bands % 09/21/2019 7  0 - 8 % Final    Lymphocytes % 09/21/2019 30  14 - 44 % Final    Monocytes % 09/21/2019 6  4 - 12 % Final    Eosinophils, % 09/21/2019 1  0 - 6 % Final    Basophils % 09/21/2019 0  0 - 1 % Final    Atypical Lymphocytes % 09/21/2019 9* <=0 % Final    Absolute Neutrophils 09/21/2019 3 30  1 85 - 7 62 Thousand/uL Final    Lymphocytes Absolute 09/21/2019 1 84  0 60 - 4 47 Thousand/uL Final    Monocytes Absolute 09/21/2019 0 37  0 00 - 1 22 Thousand/uL Final    Eosinophils Absolute 09/21/2019 0 06  0 00 - 0 40 Thousand/uL Final    Basophils Absolute 09/21/2019 0 00  0 00 - 0 10 Thousand/uL Final    Total Counted 09/21/2019 100   Final    Anisocytosis 09/21/2019 Present   Final    Platelet Estimate 13/18/9728 Adequate  Adequate Final    Large Platelet 44/24/2847 Present   Final   Office Visit on 09/20/2019   Component Date Value Ref Range Status    Urine Culture 09/20/2019 No Growth <1000 cfu/mL   Final    LEUKOCYTE ESTERASE,UA 09/20/2019 1   Final    NITRITE,UA 09/20/2019 negative   Final  SL AMB POCT UROBILINOGEN 09/20/2019 0 2   Final    POCT URINE PROTEIN 09/20/2019 negative   Final     PH,UA 09/20/2019 5 0   Final    BLOOD,UA 09/20/2019 negative   Final    SPECIFIC GRAVITY,UA 09/20/2019 1 010   Final    KETONES,UA 09/20/2019 negative   Final    BILIRUBIN,UA 09/20/2019 negative   Final    GLUCOSE, UA 09/20/2019 negative   Final     COLOR,UA 09/20/2019 yellow   Final    CLARITY,UA 09/20/2019 clear   Final   Appointment on 09/17/2019   Component Date Value Ref Range Status    Hepatitis B Surface Ag 09/17/2019 Non-reactive  Non-reactive, NonReactive - Confirmed Final    Hep A IgM 09/17/2019 Non-reactive  Non-reactive, Equivocal-Suggest Recollect Final    Hepatitis C Ab 09/17/2019 Non-reactive  Non-reactive Final    Hep B C IgM 09/17/2019 Non-reactive  Non-reactive Final    Anticardiolipin IgA 09/17/2019 <9  0 - 11 APL U/mL Final                              Negative:              <12                            Indeterminate:     12 - 20                            Low-Med Positive: >20 - 80                            High Positive:         >80    Anticardiolipin IgG 09/17/2019 <9  0 - 14 GPL U/mL Final                              Negative:              <15                            Indeterminate:     15 - 20                            Low-Med Positive: >20 - 80                            High Positive:         >80    Anticardiolipin IgM 09/17/2019 13* 0 - 12 MPL U/mL Final                              Negative:              <13                            Indeterminate:     13 - 20                            Low-Med Positive: >20 - 80                            High Positive:         >80   Consult on 09/17/2019   Component Date Value Ref Range Status    Sed Rate 09/17/2019 13  0 - 20 mm/hour Final    Vit D, 25-Hydroxy 09/17/2019 15 8* 30 0 - 100 0 ng/mL Final    Hepatitis B Surface Ag 09/17/2019 Non-reactive  Non-reactive, NonReactive - Confirmed Final    Hepatitis C Ab 09/17/2019 Non-reactive  Non-reactive Final    Hep B C IgM 09/17/2019 Non-reactive  Non-reactive Final    Hep B Core Total Ab 09/17/2019 Non-reactive  Non-reactive Final    Total Bilirubin 09/17/2019 1 22* 0 20 - 1 00 mg/dL Final    Bilirubin, Direct 09/17/2019 0 33* 0 00 - 0 20 mg/dL Final    Alkaline Phosphatase 09/17/2019 96  46 - 116 U/L Final    AST 09/17/2019 88* 5 - 45 U/L Final      Specimen collection should occur prior to Sulfasalazine and/or Sulfapyridine administration due to the potential for falsely depressed results   ALT 09/17/2019 172* 12 - 78 U/L Final      Specimen collection should occur prior to Sulfasalazine and/or Sulfapyridine administration due to the potential for falsely depressed results   Total Protein 09/17/2019 7 6  6 4 - 8 2 g/dL Final    Albumin 09/17/2019 3 7  3 5 - 5 0 g/dL Final    Anti-Centromere B Antibodies 09/17/2019 <0 2  0 0 - 0 9 AI Final    SS-A (RO) Ab 09/17/2019    Final    See written report from [address] [place] [address],  [address] IgG 09/17/2019 <9  0 - 20 GPI IgG units Final    The reference interval reflects a 3SD or 99th percentile interval,  which is thought to represent a potentially clinically significant  result in accordance with the International Consensus Statement on  the classification criteria for definitive antiphospholipid syndrome  (APS)  J Thromb Haem 2006;4:295-306   Beta-2 Glyco 1 IgA 09/17/2019 <9  0 - 25 GPI IgA units Final    The reference interval reflects a 3SD or 99th percentile interval,  which is thought to represent a potentially clinically significant  result in accordance with the International Consensus Statement on  the classification criteria for definitive antiphospholipid syndrome  (APS)  J Thromb Haem 2006;4:295-306      Beta-2 Glyco 1 IgM 09/17/2019 <9  0 - 32 GPI IgM units Final    The reference interval reflects a 3SD or 99th percentile interval,  which is thought to represent a potentially clinically significant  result in accordance with the International Consensus Statement on  the classification criteria for definitive antiphospholipid syndrome  (APS)  J Thromb Haem 2006;4:295-306   PTT Lupus Anticoagulant 09/17/2019 38 1  0 0 - 51 9 sec Final    Dilute Viper Venom Time 09/17/2019 40 2  0 0 - 47 0 sec Final    DILUTE PROTHROMBIN TIME(DPT) 09/17/2019 37 2  0 0 - 55 0 sec Final    THROMBIN TIME (DRVW) 09/17/2019 17 8  0 0 - 23 0 sec Final    DPT CONFIRM RATIO 09/17/2019 1 04  0 00 - 1 40 Ratio Final    LUPUS REFLEX INTERPRETATION 09/17/2019 Comment:   Final    No lupus anticoagulant was detected      Clarity, UA 09/17/2019 Clear   Final    Color, UA 09/17/2019 Dk Yellow   Final    Specific Gravity, UA 09/17/2019 1 016  1 003 - 1 030 Final    pH, UA 09/17/2019 6 0  4 5, 5 0, 5 5, 6 0, 6 5, 7 0, 7 5, 8 0 Final    Glucose, UA 09/17/2019 Negative  Negative mg/dl Final    Ketones, UA 09/17/2019 Negative  Negative mg/dl Final    Blood, UA 09/17/2019 Negative  Negative Final    Protein, UA 09/17/2019 Negative  Negative mg/dl Final    Nitrite, UA 09/17/2019 Negative  Negative Final    Bilirubin, UA 09/17/2019 Negative  Negative Final    Urobilinogen, UA 09/17/2019 0 2  0 2, 1 0 E U /dl E U /dl Final    Leukocytes, UA 09/17/2019 Moderate* Negative Final    WBC, UA 09/17/2019 4-10* None Seen, 0-5, 5-55, 5-65 /hpf Final    RBC, UA 09/17/2019 2-4* None Seen, 0-5 /hpf Final    Bacteria, UA 09/17/2019 Occasional  None Seen, Occasional /hpf Final    Epithelial Cells 09/17/2019 Occasional  None Seen, Occasional /hpf Final    Creatinine, Ur 09/17/2019 119 0  mg/dL Final    Protein Urine Random 09/17/2019 12  mg/dL Final    Prot/Creat Ratio, Ur 09/17/2019 0 10  0 00 - 0 10 Final    C3 Complement 09/17/2019 130 0  90 0 - 180 0 mg/dL Final    C4, COMPLEMENT 09/17/2019 38 0  10 0 - 40 0 mg/dL Final    ds DNA Ab 09/17/2019    Final    See written report    Cyclic Citrullin Peptide Ab 09/17/2019 8  0 - 19 units Final                              Negative               <20                            Weak positive      20 - 39                            Moderate positive  40 - 59                            Strong positive        >59   There may be more visits with results that are not included

## 2020-01-15 ENCOUNTER — APPOINTMENT (OUTPATIENT)
Dept: LAB | Facility: HOSPITAL | Age: 35
End: 2020-01-15
Attending: INTERNAL MEDICINE
Payer: COMMERCIAL

## 2020-01-15 ENCOUNTER — SOCIAL WORK (OUTPATIENT)
Dept: BEHAVIORAL/MENTAL HEALTH CLINIC | Facility: CLINIC | Age: 35
End: 2020-01-15
Payer: COMMERCIAL

## 2020-01-15 DIAGNOSIS — F40.10 SOCIAL PHOBIA: ICD-10-CM

## 2020-01-15 DIAGNOSIS — F33.2 MAJOR DEPRESSIVE DISORDER, RECURRENT, SEVERE W/O PSYCHOTIC BEHAVIOR (HCC): ICD-10-CM

## 2020-01-15 DIAGNOSIS — F41.1 GAD (GENERALIZED ANXIETY DISORDER): Primary | ICD-10-CM

## 2020-01-15 LAB
25(OH)D3 SERPL-MCNC: 35.7 NG/ML (ref 30–100)
ALBUMIN SERPL BCP-MCNC: 4.1 G/DL (ref 3.5–5)
ALP SERPL-CCNC: 38 U/L (ref 46–116)
ALT SERPL W P-5'-P-CCNC: 34 U/L (ref 12–78)
ANION GAP SERPL CALCULATED.3IONS-SCNC: 9 MMOL/L (ref 4–13)
AST SERPL W P-5'-P-CCNC: 18 U/L (ref 5–45)
BACTERIA UR QL AUTO: ABNORMAL /HPF
BASOPHILS # BLD AUTO: 0.02 THOUSANDS/ΜL (ref 0–0.1)
BASOPHILS NFR BLD AUTO: 0 % (ref 0–1)
BILIRUB SERPL-MCNC: 1.21 MG/DL (ref 0.2–1)
BILIRUB UR QL STRIP: ABNORMAL
BUN SERPL-MCNC: 15 MG/DL (ref 5–25)
C3 SERPL-MCNC: 93.4 MG/DL (ref 90–180)
C4 SERPL-MCNC: 24 MG/DL (ref 10–40)
CALCIUM SERPL-MCNC: 8.8 MG/DL (ref 8.3–10.1)
CHLORIDE SERPL-SCNC: 105 MMOL/L (ref 100–108)
CLARITY UR: ABNORMAL
CO2 SERPL-SCNC: 26 MMOL/L (ref 21–32)
COLOR UR: ABNORMAL
CREAT SERPL-MCNC: 0.69 MG/DL (ref 0.6–1.3)
CREAT UR-MCNC: 342.7 MG/DL
CRP SERPL QL: <3 MG/L
EOSINOPHIL # BLD AUTO: 0.07 THOUSAND/ΜL (ref 0–0.61)
EOSINOPHIL NFR BLD AUTO: 1 % (ref 0–6)
ERYTHROCYTE [DISTWIDTH] IN BLOOD BY AUTOMATED COUNT: 14.5 % (ref 11.6–15.1)
ERYTHROCYTE [SEDIMENTATION RATE] IN BLOOD: 2 MM/HOUR (ref 0–20)
GFR SERPL CREATININE-BSD FRML MDRD: 114 ML/MIN/1.73SQ M
GLUCOSE P FAST SERPL-MCNC: 92 MG/DL (ref 65–99)
GLUCOSE UR STRIP-MCNC: NEGATIVE MG/DL
HCT VFR BLD AUTO: 39.2 % (ref 34.8–46.1)
HGB BLD-MCNC: 12.8 G/DL (ref 11.5–15.4)
HGB UR QL STRIP.AUTO: NEGATIVE
IMM GRANULOCYTES # BLD AUTO: 0.02 THOUSAND/UL (ref 0–0.2)
IMM GRANULOCYTES NFR BLD AUTO: 0 % (ref 0–2)
KETONES UR STRIP-MCNC: NEGATIVE MG/DL
LEUKOCYTE ESTERASE UR QL STRIP: ABNORMAL
LYMPHOCYTES # BLD AUTO: 1.61 THOUSANDS/ΜL (ref 0.6–4.47)
LYMPHOCYTES NFR BLD AUTO: 30 % (ref 14–44)
MCH RBC QN AUTO: 31.1 PG (ref 26.8–34.3)
MCHC RBC AUTO-ENTMCNC: 32.7 G/DL (ref 31.4–37.4)
MCV RBC AUTO: 95 FL (ref 82–98)
MONOCYTES # BLD AUTO: 0.32 THOUSAND/ΜL (ref 0.17–1.22)
MONOCYTES NFR BLD AUTO: 6 % (ref 4–12)
MUCOUS THREADS UR QL AUTO: ABNORMAL
NEUTROPHILS # BLD AUTO: 3.29 THOUSANDS/ΜL (ref 1.85–7.62)
NEUTS SEG NFR BLD AUTO: 63 % (ref 43–75)
NITRITE UR QL STRIP: NEGATIVE
NON-SQ EPI CELLS URNS QL MICRO: ABNORMAL /HPF
NRBC BLD AUTO-RTO: 0 /100 WBCS
PH UR STRIP.AUTO: 5.5 [PH]
PLATELET # BLD AUTO: 221 THOUSANDS/UL (ref 149–390)
PMV BLD AUTO: 9.5 FL (ref 8.9–12.7)
POTASSIUM SERPL-SCNC: 4.1 MMOL/L (ref 3.5–5.3)
PROT SERPL-MCNC: 7.6 G/DL (ref 6.4–8.2)
PROT UR STRIP-MCNC: ABNORMAL MG/DL
PROT UR-MCNC: 48 MG/DL
PROT/CREAT UR: 0.14 MG/G{CREAT} (ref 0–0.1)
RBC # BLD AUTO: 4.12 MILLION/UL (ref 3.81–5.12)
RBC #/AREA URNS AUTO: ABNORMAL /HPF
SODIUM SERPL-SCNC: 140 MMOL/L (ref 136–145)
SP GR UR STRIP.AUTO: >=1.03 (ref 1–1.03)
UROBILINOGEN UR QL STRIP.AUTO: 0.2 E.U./DL
WBC # BLD AUTO: 5.33 THOUSAND/UL (ref 4.31–10.16)
WBC #/AREA URNS AUTO: ABNORMAL /HPF

## 2020-01-15 PROCEDURE — 85652 RBC SED RATE AUTOMATED: CPT | Performed by: INTERNAL MEDICINE

## 2020-01-15 PROCEDURE — 82570 ASSAY OF URINE CREATININE: CPT | Performed by: INTERNAL MEDICINE

## 2020-01-15 PROCEDURE — 84156 ASSAY OF PROTEIN URINE: CPT | Performed by: INTERNAL MEDICINE

## 2020-01-15 PROCEDURE — 86225 DNA ANTIBODY NATIVE: CPT | Performed by: INTERNAL MEDICINE

## 2020-01-15 PROCEDURE — 86140 C-REACTIVE PROTEIN: CPT | Performed by: INTERNAL MEDICINE

## 2020-01-15 PROCEDURE — 90834 PSYTX W PT 45 MINUTES: CPT | Performed by: SOCIAL WORKER

## 2020-01-15 PROCEDURE — 80053 COMPREHEN METABOLIC PANEL: CPT | Performed by: INTERNAL MEDICINE

## 2020-01-15 PROCEDURE — 81001 URINALYSIS AUTO W/SCOPE: CPT | Performed by: INTERNAL MEDICINE

## 2020-01-15 PROCEDURE — 86147 CARDIOLIPIN ANTIBODY EA IG: CPT | Performed by: INTERNAL MEDICINE

## 2020-01-15 PROCEDURE — 36415 COLL VENOUS BLD VENIPUNCTURE: CPT | Performed by: INTERNAL MEDICINE

## 2020-01-15 PROCEDURE — 85025 COMPLETE CBC W/AUTO DIFF WBC: CPT | Performed by: INTERNAL MEDICINE

## 2020-01-15 PROCEDURE — 82306 VITAMIN D 25 HYDROXY: CPT | Performed by: INTERNAL MEDICINE

## 2020-01-15 PROCEDURE — 86160 COMPLEMENT ANTIGEN: CPT | Performed by: INTERNAL MEDICINE

## 2020-01-15 NOTE — PSYCH
Psychotherapy Provided: Individual Psychotherapy 48 minutes     Length of time in session: 3:10 pm to 3:58 pm, follow up in 2 week    Goals addressed in session: Goal 1 and Goal 2     Pain:      moderate    5    Current suicide risk : Low     Therapist met with Isidoro Wood  She continues to demonstrate improvements in her ability to be independent  She identified that she would like to focus more on how to focus and be clear with thoughts  Therapist assisted with journaling and using "to do lists"  She agreed to try this  She also shared stressors related to her biological brother and wanting to meet him, however, he does not want to meet her  She will continue to explore these thoughts, as well as completing necessary paper work for school admission  Behavioral Health Treatment Plan [facility]: Diagnosis and Treatment Plan explained to Maya Phillips relates understanding diagnosis and is agreeable to Treatment Plan   Yes

## 2020-01-16 ENCOUNTER — OFFICE VISIT (OUTPATIENT)
Dept: PSYCHIATRY | Facility: CLINIC | Age: 35
End: 2020-01-16
Payer: COMMERCIAL

## 2020-01-16 VITALS
DIASTOLIC BLOOD PRESSURE: 67 MMHG | WEIGHT: 204.7 LBS | SYSTOLIC BLOOD PRESSURE: 102 MMHG | HEIGHT: 65 IN | HEART RATE: 59 BPM | BODY MASS INDEX: 34.1 KG/M2 | RESPIRATION RATE: 16 BRPM

## 2020-01-16 DIAGNOSIS — F33.2 MAJOR DEPRESSIVE DISORDER, RECURRENT, SEVERE W/O PSYCHOTIC BEHAVIOR (HCC): Primary | ICD-10-CM

## 2020-01-16 DIAGNOSIS — F40.10 SOCIAL PHOBIA: ICD-10-CM

## 2020-01-16 DIAGNOSIS — F41.1 GAD (GENERALIZED ANXIETY DISORDER): ICD-10-CM

## 2020-01-16 DIAGNOSIS — F51.13 HYPERSOMNIA DUE TO MENTAL DISORDER: ICD-10-CM

## 2020-01-16 LAB
CARDIOLIPIN IGA SER IA-ACNC: <9 APL U/ML (ref 0–11)
CARDIOLIPIN IGG SER IA-ACNC: <9 GPL U/ML (ref 0–14)
CARDIOLIPIN IGM SER IA-ACNC: <9 MPL U/ML (ref 0–12)
DSDNA AB SER-ACNC: 8 IU/ML (ref 0–9)

## 2020-01-16 PROCEDURE — 90833 PSYTX W PT W E/M 30 MIN: CPT | Performed by: NURSE PRACTITIONER

## 2020-01-16 PROCEDURE — 99214 OFFICE O/P EST MOD 30 MIN: CPT | Performed by: NURSE PRACTITIONER

## 2020-01-16 RX ORDER — CLONAZEPAM 0.5 MG/1
TABLET ORAL
Qty: 60 TABLET | Refills: 0 | Status: SHIPPED | OUTPATIENT
Start: 2020-01-16 | End: 2020-03-03 | Stop reason: SDUPTHER

## 2020-01-16 RX ORDER — MODAFINIL 100 MG/1
100 TABLET ORAL DAILY
Qty: 30 TABLET | Refills: 0 | Status: SHIPPED | OUTPATIENT
Start: 2020-01-16 | End: 2020-02-06 | Stop reason: SDUPTHER

## 2020-01-16 RX ORDER — VORTIOXETINE 10 MG/1
10 TABLET, FILM COATED ORAL DAILY
Qty: 30 TABLET | Refills: 0 | Status: SHIPPED | OUTPATIENT
Start: 2020-01-16 | End: 2020-03-03 | Stop reason: SDUPTHER
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
## 2020-01-19 ENCOUNTER — TELEPHONE (OUTPATIENT)
Dept: PSYCHIATRY | Facility: CLINIC | Age: 35
End: 2020-01-19

## 2020-01-19 NOTE — TELEPHONE ENCOUNTER
Received a faxed notification that a prior authorization for modafinil was generated via [employer]

## 2020-01-23 ENCOUNTER — TELEPHONE (OUTPATIENT)
Dept: PSYCHIATRY | Facility: CLINIC | Age: 35
End: 2020-01-23

## 2020-01-23 ENCOUNTER — TELEPHONE (OUTPATIENT)
Dept: BEHAVIORAL/MENTAL HEALTH CLINIC | Facility: CLINIC | Age: 35
End: 2020-01-23

## 2020-01-23 NOTE — TELEPHONE ENCOUNTER
Sumit Miller called to see if the medication was approved yet   I told her we would let her know when it was ok to   modafinil (PROVIGIL)

## 2020-01-23 NOTE — TELEPHONE ENCOUNTER
Called Desire and MARY that P A has been sent and we are still waiting to hear back from them  Left number for a return call with questions as well as informing her that we would call her when we hear back from them

## 2020-01-23 NOTE — TELEPHONE ENCOUNTER
Representative from [facility] called to report that prior authorizations for Modafinil has been denied  She will be sending over fax with explanation shortly

## 2020-01-24 ENCOUNTER — TELEPHONE (OUTPATIENT)
Dept: PSYCHIATRY | Facility: CLINIC | Age: 35
End: 2020-01-24

## 2020-01-24 NOTE — TELEPHONE ENCOUNTER
Prior auth for Modafinil has been denied per phone call from [facility] phone call  Representative from [employer] had left message that she will be sending over a fax with explanation shortly (this was on 1/23/20) No explanation has been received as yet (1/24/20 )     Nursing called Jose Del Rosario to inform of prior auth denial and there was no answer  VM was left with Nursing phone # to return call  For Megan's review

## 2020-01-24 NOTE — TELEPHONE ENCOUNTER
Eladio Ryan returned Nursing call and stated she also received a call from [facility] her that Modafinil has been denied and asking for a return call  She stated that she feels very uncomfortable calling them back as she "   doesn't know what to tell them  I thought they would get all their information from you "   Eladio Ryan stated that she does need her medication as she is "   falling asleep all day and I really need this medication " Eladio Ryan was informed that Petra June has not sent explanation as to why Modafinil was denied  This information, when received, will be forwarded to [place] for her review  Eladio Ryan stated that she will return call to Petra June and will call Nursing to let [place] know why Petra June will not approve her Modafinil       For Megan's review and decision to possibly proceed with appeal of prior auth denial

## 2020-01-28 NOTE — TELEPHONE ENCOUNTER
Received denial letter via mail  It notes no diagnosis of Obstructive sleep apnea, Narcolepsy, Shift work sleep disorder or Multiple sclerosis  Denial letter scanned to Media in EHR for Anamika Miguel's review  She can get the medication with [facility] and pay as little as $15 95 out of pocket at [facility]  Can call and review with [facility] after review  Reviewed denial with pharmacist at [facility]

## 2020-01-29 ENCOUNTER — TELEPHONE (OUTPATIENT)
Dept: PSYCHIATRY | Facility: CLINIC | Age: 35
End: 2020-01-29

## 2020-01-29 NOTE — TELEPHONE ENCOUNTER
Alma Amanda called and said she spoke to someone that called her about her prescription that she's been trying to get and about her getting an rx card  Can someone give her a call back about this please  She said she been waiting 2 weeks to get her medication

## 2020-01-29 NOTE — TELEPHONE ENCOUNTER
Called Desire to discuss [facility] for her modafinil  Informed her that the medication is only about $21 00 at [facility]  Emma Fink will call me back a little later as she was at the hospital at the moment  Will discuss further when she calls me back

## 2020-01-30 NOTE — TELEPHONE ENCOUNTER
Called Desire back to further discuss [facility] [address]  Eladio Bullion would like the Modafinil sent to [facility]  She will pay the cost through [facility]  Will refer to Amanda Nolan to have script sent to [facility]  Eladio Bullion will call [facility] later to confirm receipt

## 2020-01-30 NOTE — PRE-PROCEDURE INSTRUCTIONS
Pre-Surgery Instructions:   Medication Instructions    celecoxib (CeleBREX) 100 mg capsule Patient was instructed to contact Physician for medication instruction   clonazePAM (KlonoPIN) 0 5 mg tablet Patient was instructed by Physician and understands   famotidine (PEPCID) 40 MG tablet Patient was instructed by Physician and understands   folic acid (FOLVITE) 196 MCG tablet Patient was instructed by Physician and understands   hydrocortisone 2 5 % ointment Patient was instructed by Physician and understands   hydroxychloroquine (PLAQUENIL) 200 mg tablet Patient was instructed by Physician and understands   levothyroxine 25 mcg tablet Patient was instructed by Physician and understands   methocarbamol (ROBAXIN) 500 mg tablet Patient was instructed by Physician and understands   TRINTELLIX 10 MG tablet Patient was instructed by Physician and understands  Pt instructed to take trintellix, levothyroxine, plaquenil with a small sip of water the morning of surgery and klonopin if needed  [facility] preop instructions reviewed with pt  Pt will get dial antibacterial soap

## 2020-01-31 ENCOUNTER — ANESTHESIA EVENT (OUTPATIENT)
Dept: PERIOP | Facility: HOSPITAL | Age: 35
End: 2020-01-31
Payer: COMMERCIAL

## 2020-02-03 ENCOUNTER — HOSPITAL ENCOUNTER (OUTPATIENT)
Facility: HOSPITAL | Age: 35
Setting detail: OUTPATIENT SURGERY
Discharge: HOME | End: 2020-02-03
Attending: OBSTETRICS & GYNECOLOGY | Admitting: OBSTETRICS & GYNECOLOGY
Payer: COMMERCIAL

## 2020-02-03 ENCOUNTER — ANESTHESIA (OUTPATIENT)
Dept: PERIOP | Facility: HOSPITAL | Age: 35
End: 2020-02-03
Payer: COMMERCIAL

## 2020-02-03 VITALS
WEIGHT: 210 LBS | BODY MASS INDEX: 33.75 KG/M2 | TEMPERATURE: 97.9 F | SYSTOLIC BLOOD PRESSURE: 102 MMHG | RESPIRATION RATE: 20 BRPM | HEART RATE: 82 BPM | DIASTOLIC BLOOD PRESSURE: 51 MMHG | OXYGEN SATURATION: 100 % | HEIGHT: 66 IN

## 2020-02-03 DIAGNOSIS — G89.18 POST-OP PAIN: Primary | ICD-10-CM

## 2020-02-03 PROBLEM — N36.41 HYPERMOBILITY OF URETHRA: Status: ACTIVE | Noted: 2020-02-03

## 2020-02-03 PROBLEM — N81.6 RECTOCELE: Status: ACTIVE | Noted: 2020-02-03

## 2020-02-03 PROBLEM — Z96.0 HISTORY OF PUBOVAGINAL SLING: Status: ACTIVE | Noted: 2020-02-03

## 2020-02-03 PROBLEM — N39.3 STRESS INCONTINENCE (FEMALE) (MALE): Status: ACTIVE | Noted: 2020-02-03

## 2020-02-03 LAB
EXT PREGNANCY TEST URINE: NEGATIVE
EXT. CONTROL: NORMAL

## 2020-02-03 PROCEDURE — 51798 US URINE CAPACITY MEASURE: CPT | Performed by: OBSTETRICS & GYNECOLOGY

## 2020-02-03 PROCEDURE — 81025 URINE PREGNANCY TEST: CPT | Performed by: ANESTHESIOLOGY

## 2020-02-03 PROCEDURE — C1771 REP DEV, URINARY, W/SLING: HCPCS | Performed by: OBSTETRICS & GYNECOLOGY

## 2020-02-03 DEVICE — SINGLE INCISION SLING SYSTEM
Type: IMPLANTABLE DEVICE | Site: VAGINA | Status: FUNCTIONAL
Brand: ALTIS

## 2020-02-03 RX ORDER — SODIUM CHLORIDE 9 MG/ML
125 INJECTION, SOLUTION INTRAVENOUS CONTINUOUS
Status: DISCONTINUED | OUTPATIENT
Start: 2020-02-03 | End: 2020-02-03 | Stop reason: HOSPADM

## 2020-02-03 RX ORDER — ONDANSETRON 2 MG/ML
INJECTION INTRAMUSCULAR; INTRAVENOUS AS NEEDED
Status: DISCONTINUED | OUTPATIENT
Start: 2020-02-03 | End: 2020-02-03 | Stop reason: SURG

## 2020-02-03 RX ORDER — IBUPROFEN 600 MG/1
600 TABLET ORAL EVERY 6 HOURS PRN
Qty: 30 TABLET | Refills: 0 | Status: SHIPPED | OUTPATIENT
Start: 2020-02-03 | End: 2020-11-10 | Stop reason: ALTCHOICE

## 2020-02-03 RX ORDER — ACETAMINOPHEN 325 MG/1
650 TABLET ORAL EVERY 6 HOURS PRN
Status: DISCONTINUED | OUTPATIENT
Start: 2020-02-03 | End: 2020-02-03 | Stop reason: HOSPADM

## 2020-02-03 RX ORDER — OXYCODONE HYDROCHLORIDE 5 MG/1
5 TABLET ORAL EVERY 4 HOURS PRN
Status: DISCONTINUED | OUTPATIENT
Start: 2020-02-03 | End: 2020-02-03 | Stop reason: HOSPADM

## 2020-02-03 RX ORDER — CEFAZOLIN SODIUM 2 G/50ML
2000 SOLUTION INTRAVENOUS ONCE
Status: COMPLETED | OUTPATIENT
Start: 2020-02-03 | End: 2020-02-03

## 2020-02-03 RX ORDER — IBUPROFEN 600 MG/1
600 TABLET ORAL EVERY 6 HOURS PRN
Status: DISCONTINUED | OUTPATIENT
Start: 2020-02-03 | End: 2020-02-03 | Stop reason: HOSPADM

## 2020-02-03 RX ORDER — EPHEDRINE SULFATE 50 MG/ML
INJECTION INTRAVENOUS AS NEEDED
Status: DISCONTINUED | OUTPATIENT
Start: 2020-02-03 | End: 2020-02-03 | Stop reason: SURG

## 2020-02-03 RX ORDER — FENTANYL CITRATE/PF 50 MCG/ML
50 SYRINGE (ML) INJECTION
Status: DISCONTINUED | OUTPATIENT
Start: 2020-02-03 | End: 2020-02-03 | Stop reason: HOSPADM

## 2020-02-03 RX ORDER — CLINDAMYCIN PHOSPHATE 900 MG/50ML
900 INJECTION INTRAVENOUS
Status: DISCONTINUED | OUTPATIENT
Start: 2020-02-03 | End: 2020-02-03

## 2020-02-03 RX ORDER — IBUPROFEN 600 MG/1
600 TABLET ORAL EVERY 6 HOURS PRN
Qty: 30 TABLET | Refills: 0
Start: 2020-02-03 | End: 2020-02-03 | Stop reason: SDUPTHER

## 2020-02-03 RX ORDER — DEXAMETHASONE SODIUM PHOSPHATE 4 MG/ML
INJECTION, SOLUTION INTRA-ARTICULAR; INTRALESIONAL; INTRAMUSCULAR; INTRAVENOUS; SOFT TISSUE AS NEEDED
Status: DISCONTINUED | OUTPATIENT
Start: 2020-02-03 | End: 2020-02-03 | Stop reason: SURG

## 2020-02-03 RX ORDER — FENTANYL CITRATE 50 UG/ML
INJECTION, SOLUTION INTRAMUSCULAR; INTRAVENOUS AS NEEDED
Status: DISCONTINUED | OUTPATIENT
Start: 2020-02-03 | End: 2020-02-03 | Stop reason: SURG

## 2020-02-03 RX ORDER — MIDAZOLAM HYDROCHLORIDE 2 MG/2ML
INJECTION, SOLUTION INTRAMUSCULAR; INTRAVENOUS AS NEEDED
Status: DISCONTINUED | OUTPATIENT
Start: 2020-02-03 | End: 2020-02-03 | Stop reason: SURG

## 2020-02-03 RX ORDER — KETOROLAC TROMETHAMINE 30 MG/ML
INJECTION, SOLUTION INTRAMUSCULAR; INTRAVENOUS AS NEEDED
Status: DISCONTINUED | OUTPATIENT
Start: 2020-02-03 | End: 2020-02-03 | Stop reason: SURG

## 2020-02-03 RX ORDER — PROPOFOL 10 MG/ML
INJECTION, EMULSION INTRAVENOUS CONTINUOUS PRN
Status: DISCONTINUED | OUTPATIENT
Start: 2020-02-03 | End: 2020-02-03 | Stop reason: SURG

## 2020-02-03 RX ORDER — GLYCOPYRROLATE 0.2 MG/ML
INJECTION INTRAMUSCULAR; INTRAVENOUS AS NEEDED
Status: DISCONTINUED | OUTPATIENT
Start: 2020-02-03 | End: 2020-02-03 | Stop reason: SURG

## 2020-02-03 RX ORDER — PROPOFOL 10 MG/ML
INJECTION, EMULSION INTRAVENOUS AS NEEDED
Status: DISCONTINUED | OUTPATIENT
Start: 2020-02-03 | End: 2020-02-03 | Stop reason: SURG

## 2020-02-03 RX ORDER — ONDANSETRON 2 MG/ML
4 INJECTION INTRAMUSCULAR; INTRAVENOUS ONCE AS NEEDED
Status: DISCONTINUED | OUTPATIENT
Start: 2020-02-03 | End: 2020-02-03 | Stop reason: HOSPADM

## 2020-02-03 RX ORDER — MAGNESIUM HYDROXIDE 1200 MG/15ML
LIQUID ORAL AS NEEDED
Status: DISCONTINUED | OUTPATIENT
Start: 2020-02-03 | End: 2020-02-03 | Stop reason: HOSPADM

## 2020-02-03 RX ORDER — ACETAMINOPHEN 325 MG/1
650 TABLET ORAL EVERY 6 HOURS PRN
Qty: 30 TABLET | Refills: 0
Start: 2020-02-03 | End: 2022-04-21 | Stop reason: HOSPADM

## 2020-02-03 RX ADMIN — FENTANYL CITRATE 25 MCG: 50 INJECTION, SOLUTION INTRAMUSCULAR; INTRAVENOUS at 07:39

## 2020-02-03 RX ADMIN — FENTANYL CITRATE 25 MCG: 50 INJECTION, SOLUTION INTRAMUSCULAR; INTRAVENOUS at 08:43

## 2020-02-03 RX ADMIN — EPHEDRINE SULFATE 10 MG: 50 INJECTION, SOLUTION INTRAVENOUS at 08:09

## 2020-02-03 RX ADMIN — FENTANYL CITRATE 25 MCG: 50 INJECTION, SOLUTION INTRAMUSCULAR; INTRAVENOUS at 07:53

## 2020-02-03 RX ADMIN — GLYCOPYRROLATE 0.2 MG: 0.2 INJECTION INTRAMUSCULAR; INTRAVENOUS at 07:32

## 2020-02-03 RX ADMIN — DEXAMETHASONE SODIUM PHOSPHATE 4 MG: 4 INJECTION, SOLUTION INTRAMUSCULAR; INTRAVENOUS at 07:42

## 2020-02-03 RX ADMIN — OXYCODONE HYDROCHLORIDE 5 MG: 5 TABLET ORAL at 10:01

## 2020-02-03 RX ADMIN — KETOROLAC TROMETHAMINE 15 MG: 30 INJECTION, SOLUTION INTRAMUSCULAR at 08:42

## 2020-02-03 RX ADMIN — PROPOFOL 30 MG: 10 INJECTION, EMULSION INTRAVENOUS at 08:19

## 2020-02-03 RX ADMIN — IBUPROFEN 600 MG: 600 TABLET ORAL at 10:01

## 2020-02-03 RX ADMIN — MIDAZOLAM 2 MG: 1 INJECTION INTRAMUSCULAR; INTRAVENOUS at 07:32

## 2020-02-03 RX ADMIN — FENTANYL CITRATE 25 MCG: 50 INJECTION, SOLUTION INTRAMUSCULAR; INTRAVENOUS at 08:19

## 2020-02-03 RX ADMIN — ONDANSETRON 4 MG: 2 INJECTION INTRAMUSCULAR; INTRAVENOUS at 07:38

## 2020-02-03 RX ADMIN — LIDOCAINE HYDROCHLORIDE 40 MG: 20 INJECTION, SOLUTION INTRAVENOUS at 07:40

## 2020-02-03 RX ADMIN — FENTANYL CITRATE 50 MCG: 50 INJECTION, SOLUTION INTRAMUSCULAR; INTRAVENOUS at 09:04

## 2020-02-03 RX ADMIN — PROPOFOL 50 MG: 10 INJECTION, EMULSION INTRAVENOUS at 07:40

## 2020-02-03 RX ADMIN — PROPOFOL 100 MCG/KG/MIN: 10 INJECTION, EMULSION INTRAVENOUS at 07:40

## 2020-02-03 RX ADMIN — SODIUM CHLORIDE 125 ML/HR: 0.9 INJECTION, SOLUTION INTRAVENOUS at 05:40

## 2020-02-03 RX ADMIN — CEFAZOLIN SODIUM 2000 MG: 2 SOLUTION INTRAVENOUS at 07:35

## 2020-02-03 NOTE — OP NOTE
OPERATIVE REPORT  PATIENT NAME: Ivana Sexton    :  1985  MRN: [id number]  Pt Location: [facility] OR ROOM 04    SURGERY DATE: 2/3/2020    Surgeon(s) and Role: * Mya Macias MD - Primary     * Masoud Braswell MD - Assisting    Preop Diagnosis:  Rectocele [N81 6]  Stress incontinence (female) (male) [N39 3]  Hypermobility of urethra [N36 41]    Post-Op Diagnosis Codes:     * Rectocele [N81 6]     * Stress incontinence (female) (male) [N39 3]     * Hypermobility of urethra [N36 41]    Procedure(s) (LRB):  POSTERIOR COLPORRHAPHY (N/A)  PUBOVAGINAL SLING (N/A)  CYSTOSCOPY (N/A)    Specimen(s):  * No specimens in log *    Estimated Blood Loss:   5 mL    Drains:  * No LDAs found *    Anesthesia Type:   IV Sedation with Anesthesia    Operative Indications:  Rectocele [N81 6]  Stress incontinence (female) (male) [N39 3]  Hypermobility of urethra [N36 41]    Operative Findings:  Normal appearing external genitalia  Rectocele  Cystoscopy: bilateral ureteral orifices appreciated  No mesh material or bladder injury noted    Complications:   None    Procedure and Technique:  The patient was properly identified in the holding area by the operating room staff and attending physician  She was taken to the operating room where IV sedation was instituted without complications  She was placed in the dorsal lithotomy position with her legs in [address] [place] [address] with care taken to avoid excessive flexion or extension of her lower extremities  She was prepped and draped in the standard sterile fashion  At this time, the patient was receiving prophylactic antibiotics  A time-out was taken, and the patient was again properly identified by the operating room staff and attending physician  At this time, a Hua catheter was placed with sterile conditions  The mid urethral zone was identified in reference to the Hua catheter and urethral meatus   Local anesthetic solution was injected into the anterior vaginal wall at the mid urethral level for hydrodissection and vasoconstriction  Next, 10 mL was injected at the midline  An additional 10 mL were injected to the left and right of midline directing the infiltration laterally towards the cephalad aspect of the inferior pubic ramus bilaterally  Care was taken to ensure that the sulcus was flattened and free of infiltration to minimize chance for buttonholing or tapping too close to the anterolateral sulcus  After completion of hydrodissection, 2 Allis clamps were used to grasp the anterior vaginal wall for traction  A 1 5 cm incision was made to the midline  Two Allis clamps were then placed on each cut edge of the incision for stabilization  Tenotomy scissors were used to create a small vaginal tunnel with sharp and blunt dissection above the anterior vaginal wall directed laterally towards the cephalad aspect of the inferior pubic ramus bilaterally  Dissection was carried out to the edge of the bone itself but without dissection into the obturator internus muscle  Once the dissection was complete, the sling was placed  The Altis system was used  An index finger was placed in the vagina for guidance  The Altis trocar was placed into the pre-dissected tract  The handle was held horizontally in a slight upward angle to avoid buttonholing of the sulcus  Cephalad drift was used to allow passage around the inferior pubic ramus  A thumb was placed on the heel of the introducer to allow a push/pivot maneuver to place a fixed anchor into the obturator internus muscle membrane complex on the patient's left side  Proper handle deviation confirmed proper anchor placement, as well as a gentle tugging on the sling  Once the introducer was removed, it also confirmed proper anchor placement  The exact same sequence of steps was repeated on the patient's right side with the adjustable anchor  Once it was complete, the introducer was removed and gentle tugging again confirmed proper anchor placement   The Hua catheter was then removed and a diagnostic cystoscopy was performed by instilling 300 mL of fluid into the bladder  Both ureters were effluxing normally and there were no lacerations in the lower urinary tract  The patient was then awaken from anesthesia to perform a cough test  The tensioning suture was used to adjust the tape until there was minimal to no leakage with coughing and then with Crede  The patient was then re-sedated  After appropriate tensioning, the tensioning suture was cut  The vaginal incision was closed with 2-0 Vicryl suture in a running locked stitch  Attention was then turned to the posterior compartment where 2 Allis clamps were placed in the posterior fourchette over the mucocutaneous border to reduce the markedly relaxed vaginal outlet  Dilute Marcaine solution was injected into the perineum  A felicia-shaped incision was made extending from the vaginal mucosa onto the perineum  The overlying skin was removed en bloc  We plicated the muscularis layer with a 2-0 Vicryl in a side-side fashion  We then began closure of the posterior colporrhaphy with a 2-0 Vicryl suture in a running locked stitch  We reapproximated the perineal body with a 0-Vicryl interrupted suture  We closed the remainder of the colporrhaphy to the level of the hymen  We closed the perineal skin with 2-0 Vicryl in a subcuticular fashion  Good hemostasis was noted  At the conclusion of the procedure, all needle, sponge, and instrument counts were noted to be correct x2  Patient tolerated the procedure well and was transferred to PACU in stable condition prior to discharge with follow up in 1-2 weeks  Dr Claire Lozano was present and participated in all key portions of the case      Patient Disposition:  PACU  and hemodynamically stable    SIGNATURE: Michele Victoria MD  DATE: February 3, 2020  TIME: 8:49 AM

## 2020-02-03 NOTE — ANESTHESIA PREPROCEDURE EVALUATION
Review of Systems/Medical History  Patient summary reviewed  Chart reviewed  No history of anesthetic complications     Cardiovascular  Negative cardio ROS    Pulmonary  Negative pulmonary ROS        GI/Hepatic    GERD well controlled,        Negative  ROS Kidney stones,   Comment: Past hx of renal calculii     Endo/Other  History of thyroid disease , hypothyroidism,   Obesity    GYN  Negative gynecology ROS          Hematology  Negative hematology ROS      Musculoskeletal  Negative musculoskeletal ROS        Neurology    Headaches,    Psychology   Anxiety, Depression , depressed and being treated for depression,              Physical Exam    Airway    Mallampati score: II  TM Distance: <3 FB  Neck ROM: full     Dental   No notable dental hx     Cardiovascular  Comment: Negative ROS, Rhythm: regular, Rate: normal, Cardiovascular exam normal    Pulmonary  Pulmonary exam normal Breath sounds clear to auscultation,     Other Findings        Anesthesia Plan  ASA Score- 2     Anesthesia Type- IV sedation with anesthesia with ASA Monitors  Additional Monitors:   Airway Plan:         Plan Factors-Patient not instructed to abstain from smoking on day of procedure  Patient did not smoke on day of surgery  Induction- intravenous  Postoperative Plan-     Informed Consent- Anesthetic plan and risks discussed with patient

## 2020-02-03 NOTE — INTERVAL H&P NOTE
H&P reviewed  After examining the patient I find no changes in the patients condition since the H&P had been written      Vitals:    02/03/20 0527   BP: 111/56   Pulse: 69   Resp: 18   Temp: (!) 97 1 °F (36 2 °C)   SpO2: 98%

## 2020-02-03 NOTE — DISCHARGE INSTRUCTIONS
Bladder Sling Procedure   WHAT YOU NEED TO KNOW:   A bladder sling procedure is surgery to treat urinary incontinence in women  The sling acts as a hammock to keep your urethra in place and hold it closed when your bladder is full  You may have vaginal bleeding or discharge for up to a week after your surgery  Use sanitary pads  Do not use tampons  You may have some pelvic discomfort or trouble urinating  DISCHARGE INSTRUCTIONS:   Call 911 for any of the following:   · You have sudden trouble breathing  Seek care immediately if:   · Your bleeding gets worse  · You have yellow or foul smelling discharge from your vagina  · You cannot urinate, or you are urinating less than what is normal for you  · You feel confused  Contact your healthcare provider if:   · You have a fever  · You do not feel like you are able to empty your bladder completely when you urinate  · You feel the need to urinate very suddenly  · You have burning or stinging when you urinate  · You have blood in your urine  · Your skin is itchy, swollen, or you have a rash  · You have questions or concerns about your condition or care  Medicines:   · Prescription pain medicine  may be given  Ask your how to take this medicine safely  · Take your medicine as directed  Contact your healthcare provider if you think your medicine is not helping or if you have side effects  Tell him or her if you are allergic to any medicine  Keep a list of the medicines, vitamins, and herbs you take  Include the amounts, and when and why you take them  Bring the list or the pill bottles to follow-up visits  Carry your medicine list with you in case of an emergency  Self-catheterization:  You may need to put a catheter into your bladder after you urinate to empty any remaining urine  A catheter is a small rubber tube used to drain urine  Healthcare providers will teach you how to put the catheter in safely   This may be needed until you are completely emptying your bladder  Hua catheter: You may have a Hua catheter for a short period of time  The Hua is a tube put into your bladder to drain urine into a bag  Keep the bag below your waist  This will prevent urine from flowing back into your bladder and causing an infection or other problems  Also, keep the tube free of kinks so the urine will drain properly  Do not pull on the catheter  This can cause pain and bleeding, and may cause the catheter to come out  Activity:  Do not lift heavy objects for 6 weeks after your procedure  Do not have intercourse for 4 to 6 weeks  Do not use a tampon for 4 weeks  Ask your healthcare provider when you can return to work or your usual activities  Do pelvic muscle exercises: These are also called Kegel exercises  These exercises help strengthen your pelvic muscles and help prevent urine leakage  Tighten the muscles of your pelvis and hold them tight for 5 seconds, then relax for 5 seconds  Gradually work up to tightening them for 10 seconds and relaxing for 10 seconds  Do this 3 times each day  Keep a record:  Keep a record of when you urinate and if you leak any urine  Write down what you were doing when you leaked urine, such as coughing or sneezing  Bring the log to your follow-up visits  Prevent constipation:  Drink liquids as directed  You may need to drink more water than usual to soften your bowel movements  Eat a variety of healthy foods, especially fruit and foods high in fiber  You may need to use an over-the-counter bowel movement softener  Follow up with your healthcare provider as directed: You may need a test to check how much urine remains in your bladder after you urinate  This will help show how the sling is working  Write down your questions so you remember to ask them during your visits    © 2017 [employer] Information is for End User's use only and may not be sold, redistributed or otherwise used for commercial purposes  All illustrations and images included in CareNotes® are the copyrighted property of A D A M , Inc  or Omari Santo  The above information is an  only  It is not intended as medical advice for individual conditions or treatments  Talk to your doctor, nurse or pharmacist before following any medical regimen to see if it is safe and effective for you  Posterior Vaginal Repair   WHAT YOU NEED TO KNOW:   A posterior vaginal repair is surgery to fix a rectocele or vaginal hernia  DISCHARGE INSTRUCTIONS:   Medicines:   · Pain medicine  will help take away or decrease pain  Do not wait until the pain is severe before you take your medicine  · NSAIDs , such as ibuprofen, help decrease swelling, pain, and fever  This medicine is available with or without a doctor's order  NSAIDs can cause stomach bleeding or kidney problems in certain people  If you take blood thinner medicine, always ask your healthcare provider if NSAIDs are safe for you  Always read the medicine label and follow directions  · Bowel movement softeners  make it easier for you to have a bowel movement  You may need this medicine to treat or prevent constipation  · Take your medicine as directed  Contact your healthcare provider if you think your medicine is not helping or if you have side effects  Tell him or her if you are allergic to any medicine  Keep a list of the medicines, vitamins, and herbs you take  Include the amounts, and when and why you take them  Bring the list or the pill bottles to follow-up visits  Carry your medicine list with you in case of an emergency  Follow up with your gynecologist in 2 weeks: You will need to return to have your incision checked  Write down your questions so you remember to ask them during your visits  Self-care:   · Do not have sex  until your healthcare provider says it is okay  · Do not put anything in your vagina  for 6 weeks after the surgery  This allows time for the wound to heal      · Do not lift more than 10 pounds  for at least 6 weeks  Heavy lifting puts pressure on the surgery area and slows healing  · Avoid heavy exercise  the first few weeks after the surgery  You may try light activity, such as short walks, 3 to 4 weeks after the surgery  · Try not to cough or strain to have a bowel movement  This may cause damage to the surgery area  Ask your healthcare provider about ways to make bowel movements easier so you do not have to strain  · Eat healthy foods and drink liquids as directed  This will help prevent constipation  Healthy foods include fruits, vegetables, whole-grain breads, low-fat dairy products, beans, lean meats, and fish  Contact your healthcare provider or gynecologist if:   · You have vaginal pain that does not go away, even after you take pain medicine  · You have pus or a foul-smelling discharge from your vagina  · You have pain during sex  · You have a fever or chills  · Your wound is red, swollen, or draining pus  · You have questions or concerns about your condition or care  Seek care immediately or call 911 if:   · You soak a sanitary pad with blood every hour for 4 hours  · You feel something is bulging out into your vagina or rectum and not going back in     · You cannot urinate  © 2017 [address] Information is for End User's use only and may not be sold, redistributed or otherwise used for commercial purposes  All illustrations and images included in CareNotes® are the copyrighted property of A D A M , Inc  or Omari Santo  The above information is an  only  It is not intended as medical advice for individual conditions or treatments  Talk to your doctor, nurse or pharmacist before following any medical regimen to see if it is safe and effective for you

## 2020-02-04 NOTE — TELEPHONE ENCOUNTER
Ishaan Petty called again to inquire about the Modafinil being sent to [facility] so that she can pick it up using [address]  Will refer to Sharifa Nair for review

## 2020-02-06 ENCOUNTER — TELEPHONE (OUTPATIENT)
Dept: PSYCHIATRY | Facility: CLINIC | Age: 35
End: 2020-02-06

## 2020-02-06 ENCOUNTER — SOCIAL WORK (OUTPATIENT)
Dept: BEHAVIORAL/MENTAL HEALTH CLINIC | Facility: CLINIC | Age: 35
End: 2020-02-06
Payer: COMMERCIAL

## 2020-02-06 DIAGNOSIS — F33.2 MAJOR DEPRESSIVE DISORDER, RECURRENT, SEVERE W/O PSYCHOTIC BEHAVIOR (HCC): ICD-10-CM

## 2020-02-06 DIAGNOSIS — F51.13 HYPERSOMNIA DUE TO MENTAL DISORDER: ICD-10-CM

## 2020-02-06 DIAGNOSIS — F40.10 SOCIAL PHOBIA: ICD-10-CM

## 2020-02-06 DIAGNOSIS — F41.1 GAD (GENERALIZED ANXIETY DISORDER): Primary | ICD-10-CM

## 2020-02-06 PROCEDURE — 90834 PSYTX W PT 45 MINUTES: CPT | Performed by: SOCIAL WORKER

## 2020-02-06 RX ORDER — MODAFINIL 100 MG/1
100 TABLET ORAL DAILY
Qty: 30 TABLET | Refills: 0 | Status: SHIPPED | OUTPATIENT
Start: 2020-02-06 | End: 2020-02-07 | Stop reason: SDUPTHER

## 2020-02-06 NOTE — PSYCH
Psychotherapy Provided: Individual Psychotherapy 50 minutes     Length of time in session: 3:05 pm to 3:55 pm, follow up in 1 week    Goals addressed in session: Goal 1 and Goal 2     Pain:      moderate to severe    6    Current suicide risk : Low     Therapist met with Lobito Alayna  She shared that she had told her boyfriend that she wanted to go visit her friend in [place]  She noted that he had become very upset with her and gave her an ultimatum  She discussed how this made her feel  Therapist discussed a supportive relationship and the importance of feeling supported by her boyfriend  She also shared how she felt that she had provided support to him when he was wrong to her, and how this hurts her  She discussed wanting to be strong and grow  Therapist encouraged her to continue to explore these feelings and express them as she was able  She agreed to try this  Behavioral Health Treatment Plan [facility]: Diagnosis and Treatment Plan explained to Krish Lyle relates understanding diagnosis and is agreeable to Treatment Plan   Yes

## 2020-02-07 ENCOUNTER — TELEPHONE (OUTPATIENT)
Dept: PSYCHIATRY | Facility: CLINIC | Age: 35
End: 2020-02-07

## 2020-02-07 DIAGNOSIS — F51.13 HYPERSOMNIA DUE TO MENTAL DISORDER: ICD-10-CM

## 2020-02-07 DIAGNOSIS — F33.2 MAJOR DEPRESSIVE DISORDER, RECURRENT, SEVERE W/O PSYCHOTIC BEHAVIOR (HCC): ICD-10-CM

## 2020-02-07 RX ORDER — MODAFINIL 100 MG/1
100 TABLET ORAL DAILY
Qty: 30 TABLET | Refills: 0 | Status: SHIPPED | OUTPATIENT
Start: 2020-02-07 | End: 2020-03-03 | Stop reason: SDUPTHER

## 2020-02-07 NOTE — TELEPHONE ENCOUNTER
Modafinil 100mg / needs script sent to another pharmacy - too expensive at [facility]     / please send  [address] [facility]

## 2020-02-07 NOTE — TELEPHONE ENCOUNTER
Amber Farooq requesting that refill for Modafinil 100 mg be sent to [facility] at [address] as she will pay with [facility] card  Provider Ammy Chao Had sent to [facility] but Amber Farooq stated it is too expensive there  RX at [facility] would need to be cancelled  For Dr Sushant Gutierrez review in Megan's absence

## 2020-02-07 NOTE — TELEPHONE ENCOUNTER
Modafinil sent into [facility] pharmacy and [facility] notified

## 2020-02-20 ENCOUNTER — CLINICAL SUPPORT (OUTPATIENT)
Dept: FAMILY MEDICINE CLINIC | Facility: CLINIC | Age: 35
End: 2020-02-20
Payer: COMMERCIAL

## 2020-02-20 ENCOUNTER — SOCIAL WORK (OUTPATIENT)
Dept: BEHAVIORAL/MENTAL HEALTH CLINIC | Facility: CLINIC | Age: 35
End: 2020-02-20
Payer: COMMERCIAL

## 2020-02-20 DIAGNOSIS — F41.1 GAD (GENERALIZED ANXIETY DISORDER): Primary | ICD-10-CM

## 2020-02-20 DIAGNOSIS — F40.10 SOCIAL PHOBIA: ICD-10-CM

## 2020-02-20 DIAGNOSIS — Z23 NEED FOR HPV VACCINATION: Primary | ICD-10-CM

## 2020-02-20 DIAGNOSIS — F33.2 MAJOR DEPRESSIVE DISORDER, RECURRENT, SEVERE W/O PSYCHOTIC BEHAVIOR (HCC): ICD-10-CM

## 2020-02-20 PROCEDURE — 90834 PSYTX W PT 45 MINUTES: CPT | Performed by: SOCIAL WORKER

## 2020-02-20 PROCEDURE — 90651 9VHPV VACCINE 2/3 DOSE IM: CPT

## 2020-02-20 PROCEDURE — 90471 IMMUNIZATION ADMIN: CPT

## 2020-02-20 NOTE — BH TREATMENT PLAN
Chao Gutierrez  [birth year]       Date of Initial Treatment Plan: 3/19/19   Date of Current Treatment Plan: 02/20/20    Treatment Plan Number 3     Strengths/Personal Resources for Self Care: good mother, loving, friendly, caring    Diagnosis:   1  JG (generalized anxiety disorder)     2  Major depressive disorder, recurrent, severe w/o psychotic behavior ([facility] [address] )     3  Social phobia         Area of Needs: self-esteem, support        Long Term Goal 1: AImprove focus and attention on necessary activities       Target Date: 8/15/20  Completion Date: na         Short Term Objectives for Goal 1: AI will be able to overcome at least 2 obstacles and sustain my focus on at least 3 goals  and BI will be able to learn to redirect myself to my goals at least 75% of the time       Long Term Goal 2: Sustain reduced symptoms of depression       Target Date: 8/15/20  Completion Date:  na     Short Term Objectives for Goal 2: AI will focus on improving my happiness at least 3 times a week, but engaging in activities that I feel improve my mood  and BI will continue to comply with all recommendations made by the psychiatirst during all scheduled sessions       GOAL 1: Modality: Individual 2x per month   Completion Date ongoing, Medication Management and The person(s) responsible for carrying out the plan is  Gómez Martines    GOAL 2: Modality: Individual 2x per month   Completion Date ongoing, Medication Management and The person(s) responsible for carrying out the plan is  Sully Springer, [address] [place] [address]: Diagnosis and Treatment Plan explained to Jesica Balderas relates understanding diagnosis and is agreeable to Treatment Plan         Client Comments : Please share your thoughts, feelings, need and/or experiences regarding your treatment plan: na

## 2020-02-20 NOTE — PSYCH
Psychotherapy Provided: Individual Psychotherapy 50 minutes     Length of time in session: 2:05 pm to 2:55 pm, follow up in 2 week    Goals addressed in session: Goal 1 and Goal 2     Pain:      moderate to severe    7    Current suicide risk : Low     Therapist met with Adelina Loboprachi Winter shared that her boyfriend was diagnosed with cancer  She discussed her frustrations with his compliance with all medical follow ups, and how this could possibly have been avoided  Therapist and Adelina Winter discussed how this impacts their relationship  She shared that she was no longer going to go on her trip, as she was worried that something would happen to him while she was away  Therapist and Adelina Winter explored her ability to engage in communication with him about these feelings and concerns  She continues to struggle with open communicating with him, and he continues to demonstrate anxiety that she was looking to be with someone else  Therapist and Adelina Winter completed a treatment plan update and discussed how she could focus on her own positives and put negativity outside of her focus  Behavioral Health Treatment Plan [facility]: Diagnosis and Treatment Plan explained to Stefan Hernandez relates understanding diagnosis and is agreeable to Treatment Plan   Yes

## 2020-02-23 PROBLEM — M32.9 LUPUS (HCC): Status: ACTIVE | Noted: 2020-02-23

## 2020-02-23 PROBLEM — Z79.899 HIGH RISK MEDICATION USE: Status: ACTIVE | Noted: 2020-02-23

## 2020-02-23 PROBLEM — IMO0002 LUPUS: Status: ACTIVE | Noted: 2020-02-23

## 2020-02-23 PROBLEM — M62.830 BACK SPASM: Status: ACTIVE | Noted: 2020-02-23

## 2020-02-29 DIAGNOSIS — M32.9 LUPUS (HCC): ICD-10-CM

## 2020-02-29 DIAGNOSIS — K29.90 GASTRITIS AND DUODENITIS: ICD-10-CM

## 2020-03-01 DIAGNOSIS — M25.50 ARTHRALGIA OF MULTIPLE JOINTS: ICD-10-CM

## 2020-03-01 RX ORDER — HYDROXYCHLOROQUINE SULFATE 200 MG/1
TABLET, FILM COATED ORAL
Qty: 60 TABLET | Refills: 3 | Status: SHIPPED | OUTPATIENT
Start: 2020-03-01 | End: 2020-04-20 | Stop reason: SDUPTHER

## 2020-03-01 RX ORDER — DICLOFENAC SODIUM 75 MG/1
TABLET, DELAYED RELEASE ORAL
Qty: 60 TABLET | Refills: 3 | OUTPATIENT
Start: 2020-03-01

## 2020-03-01 RX ORDER — FAMOTIDINE 40 MG/1
40 TABLET, FILM COATED ORAL AS NEEDED
Qty: 90 TABLET | Refills: 1 | Status: SHIPPED | OUTPATIENT
Start: 2020-03-01 | End: 2021-01-06 | Stop reason: ALTCHOICE

## 2020-03-03 ENCOUNTER — OFFICE VISIT (OUTPATIENT)
Dept: PSYCHIATRY | Facility: CLINIC | Age: 35
End: 2020-03-03
Payer: COMMERCIAL

## 2020-03-03 VITALS
BODY MASS INDEX: 33.19 KG/M2 | HEART RATE: 57 BPM | DIASTOLIC BLOOD PRESSURE: 70 MMHG | WEIGHT: 206.5 LBS | SYSTOLIC BLOOD PRESSURE: 104 MMHG | HEIGHT: 66 IN | RESPIRATION RATE: 16 BRPM

## 2020-03-03 DIAGNOSIS — F33.2 MAJOR DEPRESSIVE DISORDER, RECURRENT, SEVERE W/O PSYCHOTIC BEHAVIOR (HCC): ICD-10-CM

## 2020-03-03 DIAGNOSIS — E55.9 VITAMIN D DEFICIENCY: ICD-10-CM

## 2020-03-03 DIAGNOSIS — Z96.0 HISTORY OF PUBOVAGINAL SLING: ICD-10-CM

## 2020-03-03 DIAGNOSIS — E03.9 ADULT HYPOTHYROIDISM: ICD-10-CM

## 2020-03-03 DIAGNOSIS — F40.10 SOCIAL PHOBIA: ICD-10-CM

## 2020-03-03 DIAGNOSIS — Z79.899 HIGH RISK MEDICATION USE: ICD-10-CM

## 2020-03-03 DIAGNOSIS — F51.13 HYPERSOMNIA DUE TO MENTAL DISORDER: ICD-10-CM

## 2020-03-03 DIAGNOSIS — M32.9 LUPUS (HCC): ICD-10-CM

## 2020-03-03 DIAGNOSIS — Z15.89 HETEROZYGOUS FOR MTHFR GENE MUTATION: ICD-10-CM

## 2020-03-03 DIAGNOSIS — F41.1 GAD (GENERALIZED ANXIETY DISORDER): Primary | ICD-10-CM

## 2020-03-03 PROCEDURE — 99214 OFFICE O/P EST MOD 30 MIN: CPT | Performed by: NURSE PRACTITIONER

## 2020-03-03 PROCEDURE — 3008F BODY MASS INDEX DOCD: CPT | Performed by: NURSE PRACTITIONER

## 2020-03-03 PROCEDURE — 90833 PSYTX W PT W E/M 30 MIN: CPT | Performed by: NURSE PRACTITIONER

## 2020-03-03 PROCEDURE — 1036F TOBACCO NON-USER: CPT | Performed by: NURSE PRACTITIONER

## 2020-03-03 RX ORDER — MODAFINIL 100 MG/1
100 TABLET ORAL DAILY
Qty: 30 TABLET | Refills: 0 | Status: SHIPPED | OUTPATIENT
Start: 2020-03-03 | End: 2020-04-13 | Stop reason: SDUPTHER

## 2020-03-03 RX ORDER — CLONAZEPAM 0.5 MG/1
TABLET ORAL
Qty: 60 TABLET | Refills: 0 | Status: SHIPPED | OUTPATIENT
Start: 2020-03-03 | End: 2020-06-04 | Stop reason: SDUPTHER

## 2020-03-03 RX ORDER — VORTIOXETINE 10 MG/1
10 TABLET, FILM COATED ORAL DAILY
Qty: 30 TABLET | Refills: 2 | Status: SHIPPED | OUTPATIENT
Start: 2020-03-03 | End: 2020-06-04 | Stop reason: SDUPTHER
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
## 2020-03-11 ENCOUNTER — SOCIAL WORK (OUTPATIENT)
Dept: BEHAVIORAL/MENTAL HEALTH CLINIC | Facility: CLINIC | Age: 35
End: 2020-03-11
Payer: COMMERCIAL

## 2020-03-11 DIAGNOSIS — F41.1 GAD (GENERALIZED ANXIETY DISORDER): Primary | ICD-10-CM

## 2020-03-11 DIAGNOSIS — F33.2 MAJOR DEPRESSIVE DISORDER, RECURRENT, SEVERE W/O PSYCHOTIC BEHAVIOR (HCC): ICD-10-CM

## 2020-03-11 PROCEDURE — 90834 PSYTX W PT 45 MINUTES: CPT | Performed by: SOCIAL WORKER

## 2020-03-11 NOTE — PSYCH
Psychotherapy Provided: Individual Psychotherapy 55 minutes     Length of time in session: 3:00 pm to 3:55 pm, follow up in 2 week    Goals addressed in session: Goal 1 and Goal 2     Pain:      moderate to severe    5    Current suicide risk : Low     Therapist met with Chidi Hawkins  She shared that her boyfriend was diagnosed with Cancer  She shared that he had not followed physician directions following last diagnosis and treatment and now was increased difficulty of procedure following this diagnosis  Therapist an Chidi Hawkins discussed how this intercepts their relationship, and how she feels about their interactions  She also shared that she does not feel good about herself  She discussed lack of feeling awareness, but awareness that she is in charge of making changes within her environment  Chidi Hawkins will continue to follow directives and encouragement made during treatment in order to improve mood and focus on mindfulness  Behavioral Health Treatment Plan [facility]: Diagnosis and Treatment Plan explained to Santino Monaco relates understanding diagnosis and is agreeable to Treatment Plan   Yes

## 2020-03-12 ENCOUNTER — TELEPHONE (OUTPATIENT)
Dept: PSYCHIATRY | Facility: CLINIC | Age: 35
End: 2020-03-12

## 2020-03-12 NOTE — TELEPHONE ENCOUNTER
[facility] made aware of prior authorization for one month for Modafinil  Nursing also spoke to patient and informed her of the one month approval  Patient reports that she has been using good rx but the price varies and the last time the cost was $200 00 at [employer]  She verbalized that she feels like she needs to stay on this medication so "she does not go back to feeling the way she did in the past"  For Joanna Marrero to review

## 2020-03-12 NOTE — TELEPHONE ENCOUNTER
Juventino Mccurdy from [facility] called to say the Prior Author for the Modafinil(Provigil) was approved for a 1 month supply  Any questions call [phone]  She will be faxing over the Approval letter

## 2020-03-12 NOTE — TELEPHONE ENCOUNTER
Dr Jocelyne Chan from [facility] called @ [phone]  She said the request for the Provigil came up for review due to the notes on the patient that it was not really working  She is going to approve it for this month but if it is ordered again it will come up for a review call  Any questions call her

## 2020-03-12 NOTE — TELEPHONE ENCOUNTER
Medications sent to [facility] on [facility] and Qing Zaidi was going to use the [facility] at that location    It looks like the [facility] is still [address] and best choice

## 2020-03-12 NOTE — TELEPHONE ENCOUNTER
Nursing had received a prior authorization notice from pharmacy  Nursing did initiate a prior auth request for Modafanil on navinet and then faxed request along with office notes to [facility]  Patient called and made aware the prior auth was sent  She informed nurse that she has been utilizing the good [facility] card to obtain this medication   Will await outcome of prior auth request

## 2020-03-18 ENCOUNTER — TELEPHONE (OUTPATIENT)
Dept: PSYCHIATRY | Facility: CLINIC | Age: 35
End: 2020-03-18

## 2020-03-18 DIAGNOSIS — E03.9 ACQUIRED HYPOTHYROIDISM: ICD-10-CM

## 2020-03-18 RX ORDER — LEVOTHYROXINE SODIUM 0.03 MG/1
25 TABLET ORAL
Qty: 30 TABLET | Refills: 2 | Status: SHIPPED | OUTPATIENT
Start: 2020-03-18 | End: 2020-05-13

## 2020-03-18 NOTE — TELEPHONE ENCOUNTER
I contacted patient after I discovered that insurance may cover [facility], but that it would require a $25 copay per session  She says she is actually doing better, feels stable enough, does not feel TMS needed presently but that if things change in future she will keep in touch

## 2020-03-19 ENCOUNTER — TELEPHONE (OUTPATIENT)
Dept: PSYCHIATRY | Facility: CLINIC | Age: 35
End: 2020-03-19

## 2020-03-19 NOTE — TELEPHONE ENCOUNTER
Nursing resent a prior authorization to [facility] for Modafinil with last office note and telephone note from 3/12/2020 reflecting patient wanting to continue on this medication  Brittany previously only approved one month of this medication   Will await the outcome of prior auth request

## 2020-03-19 NOTE — TELEPHONE ENCOUNTER
Nursing resubmitted prior authorization request with telephone note encounter 3/12/2020 and last office note  Roberto approved Modafanil effective today and no stop date on the approval letter  Patient made aware and she stated that new scripts for this medication moving forward can be sent to [facility]

## 2020-03-19 NOTE — TELEPHONE ENCOUNTER
Nano Mcginnis from [facility] [phone]   Prior Lacie Sparks was aproved for the Modafinil

## 2020-03-23 NOTE — TELEPHONE ENCOUNTER
Thank you for the feedback  I will continue to monitor and work with Isidoro Wood  If anything changes I will inform you

## 2020-04-13 DIAGNOSIS — F33.2 MAJOR DEPRESSIVE DISORDER, RECURRENT, SEVERE W/O PSYCHOTIC BEHAVIOR (HCC): ICD-10-CM

## 2020-04-13 DIAGNOSIS — F51.13 HYPERSOMNIA DUE TO MENTAL DISORDER: ICD-10-CM

## 2020-04-13 RX ORDER — MODAFINIL 100 MG/1
100 TABLET ORAL DAILY
Qty: 30 TABLET | Refills: 0 | Status: SHIPPED | OUTPATIENT
Start: 2020-04-13 | End: 2020-06-04 | Stop reason: SDUPTHER

## 2020-04-15 ENCOUNTER — TELEMEDICINE (OUTPATIENT)
Dept: BEHAVIORAL/MENTAL HEALTH CLINIC | Facility: CLINIC | Age: 35
End: 2020-04-15
Payer: COMMERCIAL

## 2020-04-15 DIAGNOSIS — F40.10 SOCIAL PHOBIA: ICD-10-CM

## 2020-04-15 DIAGNOSIS — F33.2 MAJOR DEPRESSIVE DISORDER, RECURRENT, SEVERE W/O PSYCHOTIC BEHAVIOR (HCC): ICD-10-CM

## 2020-04-15 DIAGNOSIS — F41.1 GAD (GENERALIZED ANXIETY DISORDER): Primary | ICD-10-CM

## 2020-04-15 PROCEDURE — 90837 PSYTX W PT 60 MINUTES: CPT | Performed by: SOCIAL WORKER

## 2020-04-20 ENCOUNTER — TELEMEDICINE (OUTPATIENT)
Dept: RHEUMATOLOGY | Facility: CLINIC | Age: 35
End: 2020-04-20
Payer: COMMERCIAL

## 2020-04-20 DIAGNOSIS — R76.8 ANA POSITIVE: ICD-10-CM

## 2020-04-20 DIAGNOSIS — M62.830 BACK SPASM: ICD-10-CM

## 2020-04-20 DIAGNOSIS — M32.9 LUPUS (HCC): Primary | ICD-10-CM

## 2020-04-20 DIAGNOSIS — M25.50 ARTHRALGIA OF MULTIPLE JOINTS: ICD-10-CM

## 2020-04-20 DIAGNOSIS — Z79.899 HIGH RISK MEDICATION USE: ICD-10-CM

## 2020-04-20 PROCEDURE — 99442 PR PHYS/QHP TELEPHONE EVALUATION 11-20 MIN: CPT | Performed by: INTERNAL MEDICINE

## 2020-04-20 RX ORDER — CELECOXIB 200 MG/1
200 CAPSULE ORAL 2 TIMES DAILY
Qty: 60 CAPSULE | Refills: 3 | Status: SHIPPED | OUTPATIENT
Start: 2020-04-20 | End: 2021-05-24 | Stop reason: SDUPTHER

## 2020-04-20 RX ORDER — HYDROXYCHLOROQUINE SULFATE 200 MG/1
200 TABLET, FILM COATED ORAL 2 TIMES DAILY
Qty: 60 TABLET | Refills: 3 | Status: SHIPPED | OUTPATIENT
Start: 2020-04-20 | End: 2021-05-24 | Stop reason: SDUPTHER

## 2020-04-20 NOTE — PROGRESS NOTES
Virtual Brief Visit    Assessment/Plan: Sj Skaggs is a 28 y o  female who presents for follow-up of her likely SLE (malar rash, arthralgias, dry eyes, hair thinning, equivocal anti-dsDNA, indeterminate anticardiolipin IgM, +MAYUR at 1:160 speckled pattern)  Patient's arthralgia symptoms have been prominent, some days are better than others  Also has had episodes of hives on arms  Increased her celecoxib to 200mg po bid  She is to continue hydroxychloroquine 200mg twice a day; reminded her to get eye exam  Prescribed hydrocortisone ointment as needed for itchiness/rash  Increased her methocarbamol frequency to 500mg po bid as needed for her back spasms  Recommended patient to make a physical therapy appointment  Problem List Items Addressed This Visit        Other    Arthralgia of multiple joints    Relevant Medications    hydrocortisone 2 5 % ointment    High risk medication use    Lupus (HCC) - Primary    Relevant Medications    celecoxib (CeleBREX) 200 mg capsule    AMYUR positive    Relevant Medications    hydrocortisone 2 5 % ointment    RESOLVED: Back spasm      High risk medication use - Benefits and risks of hydroxychloroquine, including but not limited to retinal toxicity, corneal deposits, gastrointestinal side effects, and headaches were previously discussed with the patient  Patient is aware of the need for a regular eye exam to monitor for ocular toxicity while on this medication  Return to clinic on 7/28/20 at 3pm      Reason for visit is follow-up of SLE    Encounter provider Lupe Fine MD    Provider located at [address]  [address] [facility] [address] [address] [facility] [address] [phone]    Recent Visits  No visits were found meeting these conditions  Showing recent visits within past 7 days and meeting all other requirements     Future Appointments  No visits were found meeting these conditions     Showing future appointments within next 150 days and meeting all other requirements      After connecting through telephone, the patient was identified by name and date of birth  Hu Simpson was informed that this is a telemedicine visit and that the visit is being conducted through telephone  My office door was closed  No one else was in the room  She acknowledged consent and understanding of privacy and security of the video platform  The patient has agreed to participate and understands they can discontinue the visit at any time  Patient is aware this is a billable service  Rheumatic Disease Summary  Adalberto Frias a 28 y  o  female who originally presented on 9/17/19 as a Rheumatology consult referred by her Mary Alice Guerrero DO for evaluation of possible autoimmune disease given positive MAYUR of 1:160 in a speckled pattern   Patient's clinical signs and symptoms were consistent with early lupus given her history of a malar rash and arthralgias  She also had dry eyes, but no dry mouth, and admitted to hair thinning  She denied blood clot or miscarriage history, or Raynaud's symptoms  Admitted to reflux symptoms  Lupus activity labs were ordered, which returned unremarkable; anti-dsDNA returned equivocal at 6 and anti-SSA/SSB were negative  She also had a livedoid-appearing rash on her arms, so antiphospholipid antibody panel was ordered, with anticardiolipin IgM returning indeterminate at 13  Her Vit  D level was low, so she was prescribed ergocalciferol 50,000 units po weekly   Abdomen ultrasound was ordered to work-up her transaminitis, but it returned significant only for splenomegaly  For her arthralgias, had prescribed meloxicam 7 5 mg 1-2 times a day, and for her shooting/neuropathic pain, had prescribed gabapentin 100 mg p o  Q h s      She presented on 10/18/19 for follow-up of likely early SLE (malar rash, arthralgias, dry eyes, hair thinning, equivocal anti-dsDNA, indeterminate anticardiolipin IgM, +MAYUR at 1:160 speckled pattern)  Patient continued to have arthralgias  Had stopped meloxicam since it wasn't helping patient, and started her on diclofenac 75mg po bid instead to help with her arthralgias  For long-term management of her lupus, had initiated weight-based hydroxychloroquine 200mg po bid  Advised patient to get regular eye exams while on this medication to monitor for plaquenil-related retinal toxicity  Since patient has chronic low back pain and right SI joint tenderness, had ordered a HLA-B27 and SI joint x-rays to evaluate for a concurrent seronegative spondyloarthropathy such as ankylosing spondylitis, but these returned unremarkable  Had also ordered a Hepatitis B core Ab to do along with PCP's labs to further work-up her transaminitis, which returned negative      She presented for follow-up on 1/14/20, at which time her main symptoms were arthralgia and fatigue  Lupus activity labs ordered; repeat anticardiolipin Ab ordered to rule-out antiphospholipid syndrome since test was previously indeterminate, patient already is in a hypercoagulable state from being heterozygous for the MTHFR gene mutation  She was to continue hydroxychloroquine 200mg po bid  Stopped oral diclofenac since patient was not finding benefit from it for her joint pain symptoms; instead prescribed celecoxib 100mg po bid  Also prescribed methocarbamol 500mg po bid prn for back muscle spasm  Physical therapy referral also made for low back pain  Subjective/HPI  Sidra Dan is a 28 y o  female who presents for follow-up of her likely SLE (malar rash, arthralgias, dry eyes, hair thinning, equivocal anti-dsDNA, indeterminate anticardiolipin IgM, +MAYUR at 1:160 speckled pattern)  She admits today to being in a lot of pain, some days are better than other, but she is always in pain  She admits to having episodes of hives on her arms twice so far  Pain is the worst in her feet and knees  Her last eye exam was a year ago   Of note, she had a pelvic sling placed in 2/2020      Past Medical History:   Diagnosis Date    Anxiety     Back pain     Bilateral carpal tunnel syndrome     last assessed: 11/17/2016    Bladder prolapse, female, acquired     Contact lens overwear of both eyes     Depression     GERD (gastroesophageal reflux disease)     Headache     Hypothyroidism     hypothyroidism    Kidney stones     Lupus (systemic lupus erythematosus) (HCC)     PONV (postoperative nausea and vomiting)     Psychiatric disorder     depression    Stress incontinence     Wears glasses        Past Surgical History:   Procedure Laterality Date    CHOLECYSTECTOMY      OK CYSTOURETHROSCOPY N/A 2/3/2020    Procedure: CYSTOSCOPY;  Surgeon: Yandel Graf MD;  [facility]: [facility];  Service: UroGynecology           OK LAP,CHOLECYSTECTOMY N/A 9/6/2018    Procedure: CHOLECYSTECTOMY LAPAROSCOPIC W/ ROBOTICS;  Surgeon: Pedro Torres MD;  [facility];  Service: General    OK POST COLPORRHAPHY,RECTUM/VAGINA N/A 2/3/2020    Procedure: POSTERIOR COLPORRHAPHY;  Surgeon: Yandel Graf MD;  [facility];  Service: UroGynecology           OK SLING OPER STRES INCONTINENCE N/A 2/3/2020    Procedure: PUBOVAGINAL SLING;  Surgeon: Yandel Graf MD;  [facility];  Service: UroGynecology           TUBAL LIGATION         Current Outpatient Medications   Medication Sig Dispense Refill    acetaminophen (TYLENOL) 325 mg tablet Take 2 tablets (650 mg total) by mouth every 6 (six) hours as needed for mild pain, moderate pain, headaches or fever 30 tablet 0    celecoxib (CeleBREX) 200 mg capsule Take 1 capsule (200 mg total) by mouth 2 (two) times a day 60 capsule 3    cholecalciferol (VITAMIN D3) 400 units tablet Take 400 Units by mouth daily      clonazePAM (KlonoPIN) 0 5 mg tablet Take 1/2 to 1 tab twice daily as needed for anxiety 60 tablet 0    Cranberry 1000 MG CAPS Take by mouth      famotidine (PEPCID) 40 MG tablet Take 1 tablet (40 mg total) by mouth as needed for heartburn 1 at bedtime  90 tablet 1    folic acid (FOLVITE) 938 MCG tablet Take 800 mcg by mouth daily       hydrocortisone 2 5 % ointment Apply topically 2 (two) times a day 20 g 3    hydroxychloroquine (PLAQUENIL) 200 mg tablet Take 1 tablet (200 mg total) by mouth 2 (two) times a day 60 tablet 3    ibuprofen (MOTRIN) 600 mg tablet Take 1 tablet (600 mg total) by mouth every 6 (six) hours as needed (cramping) 30 tablet 0    ipratropium (ATROVENT) 0 06 % nasal spray 2 sprays into each nostril 4 (four) times a day 15 mL 5    levothyroxine 25 mcg tablet TAKE 1 TABLET (25 MCG TOTAL) BY MOUTH DAILY IN THE EARLY MORNING 90 tablet 0    methocarbamol (ROBAXIN) 500 mg tablet Take 1 tablet (500 mg total) by mouth 2 (two) times a day 60 tablet 3    modafinil (PROVIGIL) 100 mg tablet Take 1 tablet (100 mg total) by mouth daily 30 tablet 0    Vortioxetine HBr (Trintellix) 20 MG tablet Take 1 tablet (20 mg total) by mouth daily 30 tablet 2     No current facility-administered medications for this visit  Allergies   Allergen Reactions    Penicillins Hives     At young age       Review of Systems   Constitutional: Negative for fatigue and unexpected weight change  HENT: Negative for mouth sores  Respiratory: Negative for cough and shortness of breath  Gastrointestinal: Negative for constipation and diarrhea  Musculoskeletal: Positive for arthralgias, back pain and myalgias  Negative for joint swelling  Skin: Positive for rash  Negative for color change  Neurological: Negative for weakness  Psychiatric/Behavioral: Negative for sleep disturbance         I spent 20 minutes directly with the patient during this visit

## 2020-04-20 NOTE — PATIENT INSTRUCTIONS
Increased celecoxib to 200mg twice a day  Continue hydroxychloroquine 200mg twice a day; get eye exam  Use hydrocortisone ointment as needed for itchiness/rash  Can take methocarbamol muscle relaxer for back spasms twice a day as needed  Recommend making physical therapy appointment    Return to clinic on 7/28th at 3pm    Lupus Erythematosus   WHAT YOU NEED TO KNOW:   What is lupus? Lupus is an autoimmune inflammatory disease  This means that your immune system starts to attack your body instead of harmful germs  It is also called systemic lupus erythematosus  Lupus is a lifelong disease that affects all parts of your body  Lupus has active and quiet periods  The active periods, also called flares, are when you have symptoms  The quiet periods, or remission, are when you have few or no symptoms  A remission period may last months or years, or you may not have remission periods at all  What increases my risk for lupus? The cause of lupus is unknown  You are at increased risk if you are female, take hormones, or have a family member with lupus  Certain medicines, such as hydralazine and minocycline, can increase your risk for lupus  Ask your healthcare provider for more information about what increases your risk for lupus  What are the signs and symptoms of lupus? · Fever over 100°F (38°C)    · Tiredness, weight loss, or headache    · Rash shaped like butterfly wings    · Sensitivity to sunlight    · Hair loss    · Mouth or nose sores    · Painful joints    · Chest pain or cough when you take a deep breath    · Abdominal pain, nausea, or vomiting  How is lupus diagnosed? · Blood tests:  Your blood will be tested for infection, inflammation, or anemia (low red blood cells)  · Urine tests:  Your urine will be tested for protein or blood  · X-rays: This is a picture of your joints or chest to check for infection or extra fluid      · Biopsy:  Tissue may be taken from your skin, muscle, or kidney to check for the cause of your lupus  How is lupus treated? There is no cure for lupus  Lupus may be triggered by stress, ultraviolet light, or an infection, such as a cold  It can also be triggered by cigarette smoke or foods you eat  The following will help control your symptoms:  · Antimalarial medicine: This is used to relieve your joint and skin symptoms of lupus, such as rash and joint pain  · Steroids: These decrease inflammation  They may be given as a pill, IV, or ointment  · NSAIDs:  These decrease swelling, pain, and fever  NSAIDs are available without a doctor's order  Ask your healthcare provider which medicine is right for you  Ask how much to take and when to take it  Take as directed  NSAIDs can cause stomach bleeding and kidney problems if not taken correctly  · Immunosuppressive medicine: This is used to slow down your immune system  This will help your immune system not attack your body  · Cytotoxic medicine: This is used to decrease inflammation in muscles or joints  It also slows down your immune system  What are the risks of lupus? · You may be so tired that you cannot work at times  Your risk for a serious infection is increased  You may develop vision loss  You may become depressed or anxious  Your fingers may turn pale or blue when they are cold  This is called Reynaud syndrome  You may become forgetful or have trouble concentrating  You may develop headaches, vision problems, or have a seizure  · You may develop kidney disease or kidney failure  You may have high blood pressure or narrowing of your arteries  This can lead to heart disease or heart failure  You may have bleeding problems, such as anemia  You may get a blood clot in your leg  The clot may travel to your heart or brain and cause life-threatening problems, such as a heart attack or stroke  How can I manage my symptoms? · Rest:  Rest when you feel it is needed  Slowly start to do more each day   Return to your daily activities as directed  · Protect your skin from UV light:  Sunlight can make your lupus symptoms worse  Avoid the sun between 10 am and 4 pm, when the rays are strongest  Apply sunscreen with a SPF of 30 or more every 2 hours when you are outside  Do this even on cloudy days  Wear pants and long sleeves to cover your body  A hat with a wide brim can protect your face, head, and neck  · Heat:  Heat helps decrease joint pain or swelling  Apply heat on the painful joint for 20 to 30 minutes every 2 hours for as many days as directed  · Ice:  Ice helps decrease swelling and pain  Ice may also help prevent tissue damage  Use an ice pack, or put crushed ice in a plastic bag  Cover it with a towel and place it on the painful area for 15 to 20 minutes every hour or as directed  · Avoid others who are sick:  You are at increased risk of a severe infection  · Treat flares quickly: This will help prevent serious illness  How can I help prevent a lupus flare? · Eat healthy foods:  Healthy foods include fruits, vegetables, whole-grain breads, low-fat dairy products, beans, lean meats, and fish  Ask if you need to be on a special diet  · Exercise: This will help decrease your symptoms and prevent depression  Ask your healthcare provider about the best exercise plan for you  · Maintain a healthy weight:  Ask your healthcare provider how much you should weigh  Ask him to help you create a weight loss plan if you are overweight  · Do not smoke: If you smoke, it is never too late to quit  Ask for information about how to stop smoking if you need help  · Manage your stress:  Stress may slow healing and lead to illness  Learn ways to control stress, such as relaxation, deep breathing, and music  Talk to someone about things that upset you  Where can I find support and more information?    · Lup[facility] of [address] , [address] , [address]  Phone: 1- 125 - 182-8476  Phone: 6- 302 - 357-0534  Web Address: [employer]  org  · [employer] [facility] [employer] [facility] [employer] and [address] , [address]  Phone: 1- 637 - 410-7454  Phone: 4- 164 - 375-8420  Web Address: [employer] [facility] gov  When should I contact my healthcare provider? · You have a flare of your lupus symptoms  · You have a fever or headache  · You feel like you are starting to get sick  · You start to urinate less than usual     · You are bleeding from your nose or gums  · You bruise easily  · You have questions or concerns about your condition or care  When should I seek immediate care or call 911? · You have blood in your urine, bowel movement, or vomit  · You have severe abdominal pain  · You are confused or feel dizzy or faint  · You have numbness or weakness of your face or limbs, or have trouble seeing or speaking  · You have a seizure  · You have new, sudden vision changes  · You have trouble breathing  · You have chest pain, pressure, or discomfort that may spread to your arms, jaw, or back  · Your leg feels warm, tender, and painful  It may look swollen and red  · You suddenly feel lightheaded and short of breath  · You have chest pain when you take a deep breath or cough  · You cough up blood  CARE AGREEMENT:   You have the right to help plan your care  Learn about your health condition and how it may be treated  Discuss treatment options with your caregivers to decide what care you want to receive  You always have the right to refuse treatment  The above information is an  only  It is not intended as medical advice for individual conditions or treatments  Talk to your doctor, nurse or pharmacist before following any medical regimen to see if it is safe and effective for you    © 2017 [employer] Information is for End User's use only and may not be sold, redistributed or otherwise used for commercial purposes  All illustrations and images included in CareNotes® are the copyrighted property of A D A M , Inc  or Omari Santo

## 2020-04-22 ENCOUNTER — TELEMEDICINE (OUTPATIENT)
Dept: BEHAVIORAL/MENTAL HEALTH CLINIC | Facility: CLINIC | Age: 35
End: 2020-04-22
Payer: COMMERCIAL

## 2020-04-22 DIAGNOSIS — F40.10 SOCIAL PHOBIA: ICD-10-CM

## 2020-04-22 DIAGNOSIS — F33.2 MAJOR DEPRESSIVE DISORDER, RECURRENT, SEVERE W/O PSYCHOTIC BEHAVIOR (HCC): ICD-10-CM

## 2020-04-22 DIAGNOSIS — F41.1 GAD (GENERALIZED ANXIETY DISORDER): Primary | ICD-10-CM

## 2020-04-22 PROCEDURE — 90834 PSYTX W PT 45 MINUTES: CPT | Performed by: SOCIAL WORKER

## 2020-04-23 ENCOUNTER — OFFICE VISIT (OUTPATIENT)
Dept: URGENT CARE | Age: 35
End: 2020-04-23
Payer: COMMERCIAL

## 2020-04-23 VITALS
OXYGEN SATURATION: 100 % | HEART RATE: 64 BPM | HEIGHT: 66 IN | TEMPERATURE: 98 F | DIASTOLIC BLOOD PRESSURE: 58 MMHG | WEIGHT: 209.4 LBS | RESPIRATION RATE: 18 BRPM | BODY MASS INDEX: 33.65 KG/M2 | SYSTOLIC BLOOD PRESSURE: 124 MMHG

## 2020-04-23 DIAGNOSIS — N39.0 URINARY TRACT INFECTION WITHOUT HEMATURIA, SITE UNSPECIFIED: Primary | ICD-10-CM

## 2020-04-23 DIAGNOSIS — R30.0 BURNING WITH URINATION: ICD-10-CM

## 2020-04-23 LAB
SL AMB  POCT GLUCOSE, UA: NEGATIVE
SL AMB LEUKOCYTE ESTERASE,UA: ABNORMAL
SL AMB POCT BILIRUBIN,UA: NEGATIVE
SL AMB POCT BLOOD,UA: ABNORMAL
SL AMB POCT CLARITY,UA: ABNORMAL
SL AMB POCT COLOR,UA: YELLOW
SL AMB POCT KETONES,UA: NEGATIVE
SL AMB POCT NITRITE,UA: NEGATIVE
SL AMB POCT PH,UA: 7
SL AMB POCT SPECIFIC GRAVITY,UA: 1.01
SL AMB POCT URINE HCG: NORMAL
SL AMB POCT URINE PROTEIN: NEGATIVE
SL AMB POCT UROBILINOGEN: 1

## 2020-04-23 PROCEDURE — 87086 URINE CULTURE/COLONY COUNT: CPT | Performed by: PHYSICIAN ASSISTANT

## 2020-04-23 PROCEDURE — 81002 URINALYSIS NONAUTO W/O SCOPE: CPT | Performed by: PHYSICIAN ASSISTANT

## 2020-04-23 PROCEDURE — G0382 LEV 3 HOSP TYPE B ED VISIT: HCPCS | Performed by: PHYSICIAN ASSISTANT

## 2020-04-23 PROCEDURE — 81025 URINE PREGNANCY TEST: CPT | Performed by: PHYSICIAN ASSISTANT

## 2020-04-23 RX ORDER — NITROFURANTOIN 25; 75 MG/1; MG/1
100 CAPSULE ORAL 2 TIMES DAILY
Qty: 10 CAPSULE | Refills: 0 | Status: SHIPPED | OUTPATIENT
Start: 2020-04-23 | End: 2020-04-28

## 2020-04-24 ENCOUNTER — TELEPHONE (OUTPATIENT)
Dept: URGENT CARE | Facility: MEDICAL CENTER | Age: 35
End: 2020-04-24

## 2020-04-24 LAB — BACTERIA UR CULT: NORMAL

## 2020-04-27 ENCOUNTER — TELEPHONE (OUTPATIENT)
Dept: URGENT CARE | Age: 35
End: 2020-04-27

## 2020-04-29 ENCOUNTER — TELEMEDICINE (OUTPATIENT)
Dept: BEHAVIORAL/MENTAL HEALTH CLINIC | Facility: CLINIC | Age: 35
End: 2020-04-29
Payer: COMMERCIAL

## 2020-04-29 DIAGNOSIS — F33.2 MAJOR DEPRESSIVE DISORDER, RECURRENT, SEVERE W/O PSYCHOTIC BEHAVIOR (HCC): ICD-10-CM

## 2020-04-29 DIAGNOSIS — F41.1 GAD (GENERALIZED ANXIETY DISORDER): Primary | ICD-10-CM

## 2020-04-29 PROCEDURE — 90837 PSYTX W PT 60 MINUTES: CPT | Performed by: SOCIAL WORKER

## 2020-05-07 ENCOUNTER — CLINICAL SUPPORT (OUTPATIENT)
Dept: FAMILY MEDICINE CLINIC | Facility: CLINIC | Age: 35
End: 2020-05-07
Payer: COMMERCIAL

## 2020-05-07 DIAGNOSIS — Z23 NEED FOR VACCINATION: Primary | ICD-10-CM

## 2020-05-07 PROCEDURE — 90471 IMMUNIZATION ADMIN: CPT

## 2020-05-07 PROCEDURE — 90651 9VHPV VACCINE 2/3 DOSE IM: CPT

## 2020-05-13 ENCOUNTER — TELEMEDICINE (OUTPATIENT)
Dept: BEHAVIORAL/MENTAL HEALTH CLINIC | Facility: CLINIC | Age: 35
End: 2020-05-13
Payer: COMMERCIAL

## 2020-05-13 DIAGNOSIS — E03.9 ACQUIRED HYPOTHYROIDISM: ICD-10-CM

## 2020-05-13 DIAGNOSIS — F33.2 MAJOR DEPRESSIVE DISORDER, RECURRENT, SEVERE W/O PSYCHOTIC BEHAVIOR (HCC): ICD-10-CM

## 2020-05-13 DIAGNOSIS — F41.1 GAD (GENERALIZED ANXIETY DISORDER): Primary | ICD-10-CM

## 2020-05-13 PROCEDURE — 90837 PSYTX W PT 60 MINUTES: CPT | Performed by: SOCIAL WORKER

## 2020-05-13 RX ORDER — LEVOTHYROXINE SODIUM 0.03 MG/1
25 TABLET ORAL
Qty: 30 TABLET | Refills: 2 | Status: SHIPPED | OUTPATIENT
Start: 2020-05-13 | End: 2020-08-25

## 2020-05-21 ENCOUNTER — OFFICE VISIT (OUTPATIENT)
Dept: FAMILY MEDICINE CLINIC | Facility: CLINIC | Age: 35
End: 2020-05-21
Payer: COMMERCIAL

## 2020-05-21 VITALS
HEIGHT: 66 IN | BODY MASS INDEX: 34.23 KG/M2 | DIASTOLIC BLOOD PRESSURE: 72 MMHG | WEIGHT: 213 LBS | SYSTOLIC BLOOD PRESSURE: 110 MMHG | TEMPERATURE: 98.6 F

## 2020-05-21 DIAGNOSIS — K29.50 CHRONIC GASTRITIS WITHOUT BLEEDING, UNSPECIFIED GASTRITIS TYPE: ICD-10-CM

## 2020-05-21 DIAGNOSIS — F41.1 GAD (GENERALIZED ANXIETY DISORDER): ICD-10-CM

## 2020-05-21 DIAGNOSIS — R73.9 HYPERGLYCEMIA: ICD-10-CM

## 2020-05-21 DIAGNOSIS — E03.9 ADULT HYPOTHYROIDISM: ICD-10-CM

## 2020-05-21 DIAGNOSIS — M32.9 LUPUS (HCC): Primary | ICD-10-CM

## 2020-05-21 DIAGNOSIS — E55.9 VITAMIN D DEFICIENCY: ICD-10-CM

## 2020-05-21 PROBLEM — Z30.430 ENCOUNTER FOR INSERTION OF INTRAUTERINE CONTRACEPTIVE DEVICE (IUD): Status: RESOLVED | Noted: 2019-09-20 | Resolved: 2020-05-21

## 2020-05-21 PROBLEM — Z30.431 SURVEILLANCE OF PREVIOUSLY PRESCRIBED INTRAUTERINE CONTRACEPTIVE DEVICE: Status: RESOLVED | Noted: 2019-09-20 | Resolved: 2020-05-21

## 2020-05-21 PROBLEM — M62.830 BACK SPASM: Status: RESOLVED | Noted: 2020-02-23 | Resolved: 2020-05-21

## 2020-05-21 PROCEDURE — 3008F BODY MASS INDEX DOCD: CPT | Performed by: FAMILY MEDICINE

## 2020-05-21 PROCEDURE — 99214 OFFICE O/P EST MOD 30 MIN: CPT | Performed by: FAMILY MEDICINE

## 2020-05-21 PROCEDURE — 1036F TOBACCO NON-USER: CPT | Performed by: FAMILY MEDICINE

## 2020-05-22 ENCOUNTER — APPOINTMENT (OUTPATIENT)
Dept: LAB | Facility: HOSPITAL | Age: 35
End: 2020-05-22
Payer: COMMERCIAL

## 2020-05-22 DIAGNOSIS — M32.9 LUPUS (HCC): ICD-10-CM

## 2020-05-22 DIAGNOSIS — R73.9 HYPERGLYCEMIA: ICD-10-CM

## 2020-05-22 DIAGNOSIS — E03.9 ADULT HYPOTHYROIDISM: ICD-10-CM

## 2020-05-22 LAB
ALBUMIN SERPL BCP-MCNC: 3.9 G/DL (ref 3.5–5)
ALP SERPL-CCNC: 41 U/L (ref 46–116)
ALT SERPL W P-5'-P-CCNC: 37 U/L (ref 12–78)
ANION GAP SERPL CALCULATED.3IONS-SCNC: 8 MMOL/L (ref 4–13)
AST SERPL W P-5'-P-CCNC: 28 U/L (ref 5–45)
BASOPHILS # BLD AUTO: 0.02 THOUSANDS/ΜL (ref 0–0.1)
BASOPHILS NFR BLD AUTO: 0 % (ref 0–1)
BILIRUB SERPL-MCNC: 0.59 MG/DL (ref 0.2–1)
BUN SERPL-MCNC: 14 MG/DL (ref 5–25)
CALCIUM SERPL-MCNC: 8.4 MG/DL (ref 8.3–10.1)
CHLORIDE SERPL-SCNC: 106 MMOL/L (ref 100–108)
CHOLEST SERPL-MCNC: 126 MG/DL (ref 50–200)
CO2 SERPL-SCNC: 26 MMOL/L (ref 21–32)
CREAT SERPL-MCNC: 0.67 MG/DL (ref 0.6–1.3)
EOSINOPHIL # BLD AUTO: 0.1 THOUSAND/ΜL (ref 0–0.61)
EOSINOPHIL NFR BLD AUTO: 2 % (ref 0–6)
ERYTHROCYTE [DISTWIDTH] IN BLOOD BY AUTOMATED COUNT: 13.3 % (ref 11.6–15.1)
EST. AVERAGE GLUCOSE BLD GHB EST-MCNC: 80 MG/DL
GFR SERPL CREATININE-BSD FRML MDRD: 114 ML/MIN/1.73SQ M
GLUCOSE P FAST SERPL-MCNC: 88 MG/DL (ref 65–99)
HBA1C MFR BLD: 4.4 %
HCT VFR BLD AUTO: 39.7 % (ref 34.8–46.1)
HDLC SERPL-MCNC: 34 MG/DL
HGB BLD-MCNC: 12.8 G/DL (ref 11.5–15.4)
IMM GRANULOCYTES # BLD AUTO: 0.02 THOUSAND/UL (ref 0–0.2)
IMM GRANULOCYTES NFR BLD AUTO: 0 % (ref 0–2)
LDLC SERPL CALC-MCNC: 77 MG/DL (ref 0–100)
LYMPHOCYTES # BLD AUTO: 1.88 THOUSANDS/ΜL (ref 0.6–4.47)
LYMPHOCYTES NFR BLD AUTO: 29 % (ref 14–44)
MCH RBC QN AUTO: 31 PG (ref 26.8–34.3)
MCHC RBC AUTO-ENTMCNC: 32.2 G/DL (ref 31.4–37.4)
MCV RBC AUTO: 96 FL (ref 82–98)
MONOCYTES # BLD AUTO: 0.4 THOUSAND/ΜL (ref 0.17–1.22)
MONOCYTES NFR BLD AUTO: 6 % (ref 4–12)
NEUTROPHILS # BLD AUTO: 3.98 THOUSANDS/ΜL (ref 1.85–7.62)
NEUTS SEG NFR BLD AUTO: 63 % (ref 43–75)
NONHDLC SERPL-MCNC: 92 MG/DL
NRBC BLD AUTO-RTO: 0 /100 WBCS
PLATELET # BLD AUTO: 218 THOUSANDS/UL (ref 149–390)
PMV BLD AUTO: 10 FL (ref 8.9–12.7)
POTASSIUM SERPL-SCNC: 4.2 MMOL/L (ref 3.5–5.3)
PROT SERPL-MCNC: 7.4 G/DL (ref 6.4–8.2)
RBC # BLD AUTO: 4.13 MILLION/UL (ref 3.81–5.12)
SODIUM SERPL-SCNC: 140 MMOL/L (ref 136–145)
T4 SERPL-MCNC: 7.5 UG/DL (ref 4.7–13.3)
TRIGL SERPL-MCNC: 77 MG/DL
TSH SERPL DL<=0.05 MIU/L-ACNC: 2.4 UIU/ML (ref 0.36–3.74)
WBC # BLD AUTO: 6.4 THOUSAND/UL (ref 4.31–10.16)

## 2020-05-22 PROCEDURE — 84443 ASSAY THYROID STIM HORMONE: CPT

## 2020-05-22 PROCEDURE — 83036 HEMOGLOBIN GLYCOSYLATED A1C: CPT

## 2020-05-22 PROCEDURE — 36415 COLL VENOUS BLD VENIPUNCTURE: CPT

## 2020-05-22 PROCEDURE — 84436 ASSAY OF TOTAL THYROXINE: CPT

## 2020-05-22 PROCEDURE — 85025 COMPLETE CBC W/AUTO DIFF WBC: CPT

## 2020-05-22 PROCEDURE — 80061 LIPID PANEL: CPT

## 2020-05-22 PROCEDURE — 80053 COMPREHEN METABOLIC PANEL: CPT

## 2020-05-27 ENCOUNTER — TELEPHONE (OUTPATIENT)
Dept: PSYCHIATRY | Facility: CLINIC | Age: 35
End: 2020-05-27

## 2020-06-04 ENCOUNTER — OFFICE VISIT (OUTPATIENT)
Dept: PSYCHIATRY | Facility: CLINIC | Age: 35
End: 2020-06-04
Payer: COMMERCIAL

## 2020-06-04 ENCOUNTER — TELEPHONE (OUTPATIENT)
Dept: FAMILY MEDICINE CLINIC | Facility: CLINIC | Age: 35
End: 2020-06-04

## 2020-06-04 DIAGNOSIS — F40.10 SOCIAL PHOBIA: ICD-10-CM

## 2020-06-04 DIAGNOSIS — M79.641 PAIN OF RIGHT HAND: Primary | ICD-10-CM

## 2020-06-04 DIAGNOSIS — F41.1 GAD (GENERALIZED ANXIETY DISORDER): ICD-10-CM

## 2020-06-04 DIAGNOSIS — F51.13 HYPERSOMNIA DUE TO MENTAL DISORDER: ICD-10-CM

## 2020-06-04 DIAGNOSIS — F33.2 MAJOR DEPRESSIVE DISORDER, RECURRENT, SEVERE W/O PSYCHOTIC BEHAVIOR (HCC): Primary | ICD-10-CM

## 2020-06-04 DIAGNOSIS — M25.531 RIGHT WRIST PAIN: ICD-10-CM

## 2020-06-04 PROCEDURE — 99214 OFFICE O/P EST MOD 30 MIN: CPT | Performed by: NURSE PRACTITIONER

## 2020-06-04 PROCEDURE — 1036F TOBACCO NON-USER: CPT | Performed by: NURSE PRACTITIONER

## 2020-06-04 RX ORDER — MODAFINIL 100 MG/1
100 TABLET ORAL DAILY
Qty: 30 TABLET | Refills: 0 | Status: SHIPPED | OUTPATIENT
Start: 2020-06-04 | End: 2020-07-27 | Stop reason: SDUPTHER

## 2020-06-04 RX ORDER — CLONAZEPAM 0.5 MG/1
TABLET ORAL
Qty: 60 TABLET | Refills: 0 | Status: SHIPPED | OUTPATIENT
Start: 2020-06-04 | End: 2020-07-28 | Stop reason: SDUPTHER

## 2020-06-04 RX ORDER — VORTIOXETINE 10 MG/1
20 TABLET, FILM COATED ORAL DAILY
Qty: 60 TABLET | Refills: 2 | Status: SHIPPED | OUTPATIENT
Start: 2020-06-04 | End: 2020-06-05

## 2020-06-05 ENCOUNTER — TELEPHONE (OUTPATIENT)
Dept: FAMILY MEDICINE CLINIC | Facility: CLINIC | Age: 35
End: 2020-06-05

## 2020-06-05 DIAGNOSIS — M25.50 ARTHRALGIA OF MULTIPLE JOINTS: ICD-10-CM

## 2020-06-05 DIAGNOSIS — M32.9 LUPUS (HCC): Primary | ICD-10-CM

## 2020-06-23 ENCOUNTER — TELEPHONE (OUTPATIENT)
Dept: OBGYN CLINIC | Facility: MEDICAL CENTER | Age: 35
End: 2020-06-23

## 2020-06-24 ENCOUNTER — OFFICE VISIT (OUTPATIENT)
Dept: OBGYN CLINIC | Facility: MEDICAL CENTER | Age: 35
End: 2020-06-24
Payer: COMMERCIAL

## 2020-06-24 VITALS
WEIGHT: 214 LBS | HEIGHT: 66 IN | SYSTOLIC BLOOD PRESSURE: 113 MMHG | DIASTOLIC BLOOD PRESSURE: 70 MMHG | HEART RATE: 63 BPM | BODY MASS INDEX: 34.39 KG/M2 | TEMPERATURE: 97.8 F

## 2020-06-24 DIAGNOSIS — M25.531 RIGHT WRIST PAIN: ICD-10-CM

## 2020-06-24 DIAGNOSIS — G56.21 CUBITAL TUNNEL SYNDROME, RIGHT: ICD-10-CM

## 2020-06-24 DIAGNOSIS — M79.641 PAIN OF RIGHT HAND: ICD-10-CM

## 2020-06-24 DIAGNOSIS — G56.01 CARPAL TUNNEL SYNDROME ON RIGHT: Primary | ICD-10-CM

## 2020-06-24 PROCEDURE — 99244 OFF/OP CNSLTJ NEW/EST MOD 40: CPT | Performed by: ORTHOPAEDIC SURGERY

## 2020-06-24 RX ORDER — OMEGA-3S/DHA/EPA/FISH OIL/D3 300MG-1000
400 CAPSULE ORAL DAILY
COMMUNITY
End: 2021-10-06

## 2020-07-07 ENCOUNTER — APPOINTMENT (OUTPATIENT)
Dept: LAB | Facility: HOSPITAL | Age: 35
End: 2020-07-07
Attending: INTERNAL MEDICINE
Payer: COMMERCIAL

## 2020-07-07 ENCOUNTER — TRANSCRIBE ORDERS (OUTPATIENT)
Dept: ADMINISTRATIVE | Facility: HOSPITAL | Age: 35
End: 2020-07-07

## 2020-07-07 DIAGNOSIS — E03.9 MYXEDEMA HEART DISEASE: ICD-10-CM

## 2020-07-07 DIAGNOSIS — M32.9 SYSTEMIC LUPUS ERYTHEMATOSUS, UNSPECIFIED SLE TYPE, UNSPECIFIED ORGAN INVOLVEMENT STATUS (HCC): Primary | ICD-10-CM

## 2020-07-07 DIAGNOSIS — M32.9 SYSTEMIC LUPUS ERYTHEMATOSUS, UNSPECIFIED SLE TYPE, UNSPECIFIED ORGAN INVOLVEMENT STATUS (HCC): ICD-10-CM

## 2020-07-07 DIAGNOSIS — I51.9 MYXEDEMA HEART DISEASE: ICD-10-CM

## 2020-07-07 DIAGNOSIS — L95.0 LIVEDOID VASCULITIS: ICD-10-CM

## 2020-07-07 LAB
ALBUMIN SERPL BCP-MCNC: 3.8 G/DL (ref 3.5–5)
ALP SERPL-CCNC: 41 U/L (ref 46–116)
ALT SERPL W P-5'-P-CCNC: 31 U/L (ref 12–78)
AST SERPL W P-5'-P-CCNC: 18 U/L (ref 5–45)
BACTERIA UR QL AUTO: ABNORMAL /HPF
BILIRUB DIRECT SERPL-MCNC: 0.12 MG/DL (ref 0–0.2)
BILIRUB SERPL-MCNC: 0.52 MG/DL (ref 0.2–1)
BILIRUB UR QL STRIP: NEGATIVE
C3 SERPL-MCNC: 89.9 MG/DL (ref 90–180)
C4 SERPL-MCNC: 22 MG/DL (ref 10–40)
CK SERPL-CCNC: 75 U/L (ref 26–192)
CLARITY UR: ABNORMAL
COLOR UR: YELLOW
CREAT SERPL-MCNC: 0.77 MG/DL (ref 0.6–1.3)
CREAT UR-MCNC: 109 MG/DL
GFR SERPL CREATININE-BSD FRML MDRD: 100 ML/MIN/1.73SQ M
GLUCOSE UR STRIP-MCNC: NEGATIVE MG/DL
HGB UR QL STRIP.AUTO: NEGATIVE
KETONES UR STRIP-MCNC: NEGATIVE MG/DL
LEUKOCYTE ESTERASE UR QL STRIP: NEGATIVE
NITRITE UR QL STRIP: NEGATIVE
NON-SQ EPI CELLS URNS QL MICRO: ABNORMAL /HPF
PH UR STRIP.AUTO: 7 [PH]
PROT SERPL-MCNC: 7.1 G/DL (ref 6.4–8.2)
PROT UR STRIP-MCNC: NEGATIVE MG/DL
PROT UR-MCNC: 16 MG/DL
PROT/CREAT UR: 0.15 MG/G{CREAT} (ref 0–0.1)
RBC #/AREA URNS AUTO: ABNORMAL /HPF
SP GR UR STRIP.AUTO: 1.02 (ref 1–1.03)
UROBILINOGEN UR QL STRIP.AUTO: 0.2 E.U./DL
WBC #/AREA URNS AUTO: ABNORMAL /HPF

## 2020-07-07 PROCEDURE — 36415 COLL VENOUS BLD VENIPUNCTURE: CPT

## 2020-07-07 PROCEDURE — 84165 PROTEIN E-PHORESIS SERUM: CPT

## 2020-07-07 PROCEDURE — 86160 COMPLEMENT ANTIGEN: CPT

## 2020-07-07 PROCEDURE — 82550 ASSAY OF CK (CPK): CPT

## 2020-07-07 PROCEDURE — 82565 ASSAY OF CREATININE: CPT

## 2020-07-07 PROCEDURE — 86157 COLD AGGLUTININ TITER: CPT

## 2020-07-07 PROCEDURE — 82570 ASSAY OF URINE CREATININE: CPT | Performed by: INTERNAL MEDICINE

## 2020-07-07 PROCEDURE — 86162 COMPLEMENT TOTAL (CH50): CPT

## 2020-07-07 PROCEDURE — 86235 NUCLEAR ANTIGEN ANTIBODY: CPT

## 2020-07-07 PROCEDURE — 80076 HEPATIC FUNCTION PANEL: CPT

## 2020-07-07 PROCEDURE — 86039 ANTINUCLEAR ANTIBODIES (ANA): CPT

## 2020-07-07 PROCEDURE — 86255 FLUORESCENT ANTIBODY SCREEN: CPT

## 2020-07-07 PROCEDURE — 86800 THYROGLOBULIN ANTIBODY: CPT

## 2020-07-07 PROCEDURE — 86225 DNA ANTIBODY NATIVE: CPT

## 2020-07-07 PROCEDURE — 84156 ASSAY OF PROTEIN URINE: CPT | Performed by: INTERNAL MEDICINE

## 2020-07-07 PROCEDURE — 86038 ANTINUCLEAR ANTIBODIES: CPT

## 2020-07-07 PROCEDURE — 86376 MICROSOMAL ANTIBODY EACH: CPT

## 2020-07-07 PROCEDURE — 81001 URINALYSIS AUTO W/SCOPE: CPT | Performed by: INTERNAL MEDICINE

## 2020-07-07 PROCEDURE — 84165 PROTEIN E-PHORESIS SERUM: CPT | Performed by: PATHOLOGY

## 2020-07-08 LAB
ALBUMIN SERPL ELPH-MCNC: 4.39 G/DL (ref 3.5–5)
ALBUMIN SERPL ELPH-MCNC: 62.7 % (ref 52–65)
ALPHA1 GLOB SERPL ELPH-MCNC: 0.25 G/DL (ref 0.1–0.4)
ALPHA1 GLOB SERPL ELPH-MCNC: 3.5 % (ref 2.5–5)
ALPHA2 GLOB SERPL ELPH-MCNC: 0.35 G/DL (ref 0.4–1.2)
ALPHA2 GLOB SERPL ELPH-MCNC: 5 % (ref 7–13)
ANA HOMOGEN SER QL IF: NORMAL
ANA HOMOGEN TITR SER: NORMAL {TITER}
BETA GLOB ABNORMAL SERPL ELPH-MCNC: 0.41 G/DL (ref 0.4–0.8)
BETA1 GLOB SERPL ELPH-MCNC: 5.8 % (ref 5–13)
BETA2 GLOB SERPL ELPH-MCNC: 4.6 % (ref 2–8)
BETA2+GAMMA GLOB SERPL ELPH-MCNC: 0.32 G/DL (ref 0.2–0.5)
CA TITR SERPL AGGL: NEGATIVE {TITER}
CH50 SERPL-ACNC: 40 U/ML
DSDNA AB SER-ACNC: 8 IU/ML (ref 0–9)
ENA RNP AB SER-ACNC: <0.2 AI (ref 0–0.9)
ENA SM AB SER-ACNC: <0.2 AI (ref 0–0.9)
ENA SS-A AB SER-ACNC: <0.2 AI (ref 0–0.9)
ENA SS-B AB SER-ACNC: <0.2 AI (ref 0–0.9)
GAMMA GLOB ABNORMAL SERPL ELPH-MCNC: 1.29 G/DL (ref 0.5–1.6)
GAMMA GLOB SERPL ELPH-MCNC: 18.4 % (ref 12–22)
IGG/ALB SER: 1.68 {RATIO} (ref 1.1–1.8)
PROT PATTERN SERPL ELPH-IMP: ABNORMAL
PROT SERPL-MCNC: 7 G/DL (ref 6.4–8.2)
RYE IGE QN: POSITIVE
THYROGLOB AB SERPL-ACNC: <1 IU/ML (ref 0–0.9)
THYROPEROXIDASE AB SERPL-ACNC: <9 IU/ML (ref 0–34)

## 2020-07-09 LAB
C-ANCA TITR SER IF: NORMAL TITER
MYELOPEROXIDASE AB SER IA-ACNC: <9 U/ML (ref 0–9)
P-ANCA ATYPICAL TITR SER IF: NORMAL TITER
P-ANCA TITR SER IF: NORMAL TITER
PROTEINASE3 AB SER IA-ACNC: <3.5 U/ML (ref 0–3.5)

## 2020-07-23 ENCOUNTER — SOCIAL WORK (OUTPATIENT)
Dept: BEHAVIORAL/MENTAL HEALTH CLINIC | Facility: CLINIC | Age: 35
End: 2020-07-23
Payer: COMMERCIAL

## 2020-07-23 DIAGNOSIS — F41.1 GAD (GENERALIZED ANXIETY DISORDER): Primary | ICD-10-CM

## 2020-07-23 DIAGNOSIS — F33.2 MAJOR DEPRESSIVE DISORDER, RECURRENT, SEVERE W/O PSYCHOTIC BEHAVIOR (HCC): ICD-10-CM

## 2020-07-23 PROCEDURE — 90834 PSYTX W PT 45 MINUTES: CPT | Performed by: SOCIAL WORKER

## 2020-07-24 NOTE — PSYCH
Psychotherapy Provided: Individual Psychotherapy 55 minutes     Length of time in session: 6:00 pm to 6:55 pm, follow up in 2 week    Goals addressed in session: Goal 1 and Goal 2     Pain:      moderate to severe    8    Current suicide risk : Low     Therapist met with Devon Kaba  She shared that she was feeling well, except that she had recently learned that her boyfriend was again talking to another woman  She discussed feelings of hurt and sadness related to these interactions  She also shared a lack of interaction between them  She discussed that she had been caring for him during his cancer treatment and feels that he is going to lie to her in order to make her remain in the relationship  She discussed wanting to leave him, but also recently moved in with him and she does not want to change for her kids  Therapist discussed her options and staying true to her own needs and wants  She appeared more capable to stand up for herself and feels that this is not her fault  Therapy should increase to support Desire at this time  Behavioral Health Treatment Plan [facility]: Diagnosis and Treatment Plan explained to Obi Lianres relates understanding diagnosis and is agreeable to Treatment Plan   Yes

## 2020-07-27 ENCOUNTER — TELEPHONE (OUTPATIENT)
Dept: PSYCHIATRY | Facility: CLINIC | Age: 35
End: 2020-07-27

## 2020-07-27 DIAGNOSIS — F51.13 HYPERSOMNIA DUE TO MENTAL DISORDER: ICD-10-CM

## 2020-07-27 DIAGNOSIS — F33.2 MAJOR DEPRESSIVE DISORDER, RECURRENT, SEVERE W/O PSYCHOTIC BEHAVIOR (HCC): ICD-10-CM

## 2020-07-27 RX ORDER — MODAFINIL 100 MG/1
100 TABLET ORAL DAILY
Qty: 30 TABLET | Refills: 0 | Status: SHIPPED | OUTPATIENT
Start: 2020-07-27 | End: 2020-09-08 | Stop reason: SDUPTHER

## 2020-07-27 NOTE — TELEPHONE ENCOUNTER
Received fax from [address] [place] [address] Stephanie Lovett requesting refill of Modafinil 100 mg tablet #30 to take daily  Will [facility]  Next appointment 9/8/2020

## 2020-07-27 NOTE — PROGRESS NOTES
Will provide 30-day refill covering for [facility] PDMP system checked- filling scripts appropriately

## 2020-07-28 DIAGNOSIS — F41.1 GAD (GENERALIZED ANXIETY DISORDER): ICD-10-CM

## 2020-07-28 DIAGNOSIS — F40.10 SOCIAL PHOBIA: ICD-10-CM

## 2020-07-28 RX ORDER — CLONAZEPAM 0.5 MG/1
TABLET ORAL
Qty: 60 TABLET | Refills: 0 | Status: SHIPPED | OUTPATIENT
Start: 2020-07-28 | End: 2020-09-08 | Stop reason: SDUPTHER

## 2020-07-28 NOTE — PROGRESS NOTES
Will provide refill of Klonopin covering for [facility]  [place] system checked- filling scripts appropriately

## 2020-07-31 ENCOUNTER — TELEPHONE (OUTPATIENT)
Dept: OBGYN CLINIC | Facility: MEDICAL CENTER | Age: 35
End: 2020-07-31

## 2020-08-13 ENCOUNTER — OFFICE VISIT (OUTPATIENT)
Dept: OBGYN CLINIC | Facility: MEDICAL CENTER | Age: 35
End: 2020-08-13
Payer: COMMERCIAL

## 2020-08-13 VITALS
WEIGHT: 211 LBS | HEART RATE: 65 BPM | SYSTOLIC BLOOD PRESSURE: 103 MMHG | HEIGHT: 65 IN | DIASTOLIC BLOOD PRESSURE: 67 MMHG | TEMPERATURE: 98.1 F | BODY MASS INDEX: 35.16 KG/M2

## 2020-08-13 DIAGNOSIS — G56.01 CARPAL TUNNEL SYNDROME ON RIGHT: Primary | ICD-10-CM

## 2020-08-13 DIAGNOSIS — G56.21 CUBITAL TUNNEL SYNDROME, RIGHT: ICD-10-CM

## 2020-08-13 PROCEDURE — 1036F TOBACCO NON-USER: CPT | Performed by: ORTHOPAEDIC SURGERY

## 2020-08-13 PROCEDURE — 3008F BODY MASS INDEX DOCD: CPT | Performed by: ORTHOPAEDIC SURGERY

## 2020-08-13 PROCEDURE — 99213 OFFICE O/P EST LOW 20 MIN: CPT | Performed by: ORTHOPAEDIC SURGERY

## 2020-08-13 NOTE — PROGRESS NOTES
Chief Complaint     Right hand numbness      History of Present Illness     Cr Cueva is a 28 y o  female presents for continued evaluation and treatment of right carpal tunnel and cubital tunnel syndromes  At her last visit, she was recommended night splinting for both the elbow and the wrist   Patient states she has been compliant with night splinting but has had very little relief from this  She states that she continues and numbness and tingling primarily in the thumb long and ring fingers  The pain does wake her up from sleep at night  She denies any clumsiness or weakness in the hand  No catching, locking, clicking or popping            Past Medical History:   Diagnosis Date    Anxiety     Back pain     Bilateral carpal tunnel syndrome     last assessed: 11/17/2016    Bladder prolapse, female, acquired     Contact lens overwear of both eyes     Depression     GERD (gastroesophageal reflux disease)     Headache     Hypothyroidism     hypothyroidism    Kidney stones     Lupus (systemic lupus erythematosus) (HCC)     PONV (postoperative nausea and vomiting)     Psychiatric disorder     depression    Stress incontinence     Wears glasses        Past Surgical History:   Procedure Laterality Date    CHOLECYSTECTOMY      AK CYSTOURETHROSCOPY N/A 2/3/2020    Procedure: CYSTOSCOPY;  Surgeon: Arvind Bautista MD;  Location: [facility];  Service: UroGynecology           AK LAP,CHOLECYSTECTOMY N/A 9/6/2018    Procedure: Lazarus Simone W/ ROBOTICS;  Surgeon: Stefania Reed MD;  Location[facility];  Service: General    AK POST COLPORRHAPHY,RECTUM/VAGINA N/A 2/3/2020    Procedure: POSTERIOR COLPORRHAPHY;  Surgeon: Arvind Bautista MD;  Location[facility];  Service: UroGynecology           AK SLING OPER STRES INCONTINENCE N/A 2/3/2020    Procedure: PUBOVAGINAL SLING;  Surgeon: Arvind Bautista MD;  Location: [facility];  Service: UroGynecology           TUBAL LIGATION Allergies   Allergen Reactions    Penicillins Hives     At young age       Current Outpatient Medications on File Prior to Visit   Medication Sig Dispense Refill    acetaminophen (TYLENOL) 325 mg tablet Take 2 tablets (650 mg total) by mouth every 6 (six) hours as needed for mild pain, moderate pain, headaches or fever 30 tablet 0    celecoxib (CeleBREX) 200 mg capsule Take 1 capsule (200 mg total) by mouth 2 (two) times a day 60 capsule 3    clonazePAM (KlonoPIN) 0 5 mg tablet Take 1/2 to 1 tab twice daily as needed for anxiety 60 tablet 0    Cranberry 1000 MG CAPS Take by mouth      hydroxychloroquine (PLAQUENIL) 200 mg tablet Take 1 tablet (200 mg total) by mouth 2 (two) times a day 60 tablet 3    levothyroxine 25 mcg tablet TAKE 1 TABLET (25 MCG TOTAL) BY MOUTH DAILY IN THE EARLY MORNING 30 tablet 2    modafinil (PROVIGIL) 100 mg tablet Take 1 tablet (100 mg total) by mouth daily 30 tablet 0    cholecalciferol (VITAMIN D3) 400 units tablet Take 400 Units by mouth daily      famotidine (PEPCID) 40 MG tablet Take 1 tablet (40 mg total) by mouth as needed for heartburn 1 at bedtime  (Patient not taking: Reported on 5/21/2020) 90 tablet 1    folic acid (FOLVITE) 715 MCG tablet Take 800 mcg by mouth daily       hydrocortisone 2 5 % ointment Apply topically 2 (two) times a day (Patient not taking: Reported on 6/4/2020) 20 g 3    ibuprofen (MOTRIN) 600 mg tablet Take 1 tablet (600 mg total) by mouth every 6 (six) hours as needed (cramping) (Patient not taking: Reported on 5/21/2020) 30 tablet 0    methocarbamol (ROBAXIN) 500 mg tablet Take 1 tablet (500 mg total) by mouth 2 (two) times a day (Patient not taking: Reported on 8/13/2020) 60 tablet 3    Vortioxetine HBr (Trintellix) 20 MG tablet Take 1 tablet (20 mg total) by mouth daily 30 tablet 2     No current facility-administered medications on file prior to visit          Social History     Tobacco Use    Smoking status: Never Smoker    Smokeless tobacco: Never Used   Substance Use Topics    Alcohol use: Yes     Frequency: Monthly or less     Drinks per session: 1 or 2     Binge frequency: Never     Comment: rarely    Drug use: No       Family History   Problem Relation Age of Onset    No Known Problems Mother     Alcohol abuse Father     Drug abuse Family     Psoriasis Maternal Grandmother        Review of Systems     As stated in the HPI  All other systems were reviewed and are negative  Physical Exam     /67   Pulse 65   Temp 98 1 °F (36 7 °C)   Ht 5' 5" (1 651 m)   Wt 95 7 kg (211 lb)   BMI 35 11 kg/m²     GENERAL: This is a well-developed, well-nourished, age-appropriate patient in no acute distress  The patient is alert and oriented x3  Pleasant and cooperative  Eyes: Anicteric sclerae  Extraocular movements appear intact  HENT: Nares are patent with no drainage  Lungs: There is equal chest rise on inspection  Breathing is non-labored with no audible wheezing  Cardiovascular: No cyanosis  No upper extremity lymphadema  Skin: Skin is warm to touch  No obvious skin lesions or rashes other than described below  Neurologic: No ataxia  Psychiatric: Mood and affect are appropriate      Right upper extremity  · Skin intact  · Full range of motion about shoulder and elbow  · Positive Tinel's over the cubital tunnel  · Positive Tinel's over carpal tunnel  · Positive flexion compression test at the elbow  · Positive Durkan compression test  · No wasting of the thenar or hypothenar or intrinsic  · Nerve is very mobile with near full dislocate  · 5/5 motor FDI, APB, FDS, FDP2&5  · SILT m/r/u  · Brisk capillary refill    Data Review     Results Reviewed     None             Imaging:  No radiographic images obtained at this visit    Assessment and Plan      Diagnoses and all orders for this visit:    Carpal tunnel syndrome on right    Cubital tunnel syndrome, right           51-year-old female with right cubital and carpal tunnel syndromes    We discussed the etiology and natural course of cubital tunnel syndrome at the elbow  This is related to compression of the ulnar nerve at or around the medial epicondyle or FCU fascia  Compression typically results in numbness to the hand, pain, and occasionally weakness  We discussed that there are nonoperative and operative modalities for treatment and at the majority of patients benefit from non operative modalities  Given that her symptoms have been refractory to night splinting, she would qualify for operative intervention to address her cubital tunnel syndrome  As she does not exhibit any symptoms of permanent nerve injury, she can take time to consider when operative intervention would be best for her  We welcome her back to the clinic at any time to discuss surgery  We did discuss that given her nerve subluxation that she would likely be a candidate for transposition with either submuscular subcutaneous positioning of the nerve as well as the prolonged course of recovery after    We discussed the etiology and natural course of carpal tunnel syndrome  We discussed that carpal tunnel syndrome is related to increased pressure on the nerve in the carpal canal at the level of the wrist   Increasing pressure can be a result of wrist flexion or extension or changes to the contents of the carpal tunnel  We discussed that progression of this condition from mild to severe can result in numbness and tingling as well as dysfunction of the hand and even atrophy and weakness to the thumb musculature  Given that her symptoms have been refractory to night splinting, she would qualify for operative intervention to addressed her carpal tunnel syndrome   Surgical release is performed with a mini open approach and while it can be performed with local only or local and sedation, there are risks to the procedure that include bleeding, infection, damage to surrounding neurovascular structures, weakness, pillar pain, and persistence of symptoms  I also discussed with the patient that if carpal tunnel persists in both wrists that surgical intervention can be performed simultaneously and that recovery is expedited  As she does not exhibit any symptoms of permanent nerve injury, she can take time to consider when operative intervention would be best for her  We welcome her back to the clinic at any time to discuss surgery  Follow Up:  P r n      To Do Next Visit:  Reexamination and discuss surgical options    PROCEDURES PERFORMED:  Procedures  No Procedures performed today

## 2020-08-25 DIAGNOSIS — E03.9 ACQUIRED HYPOTHYROIDISM: ICD-10-CM

## 2020-08-25 RX ORDER — LEVOTHYROXINE SODIUM 0.03 MG/1
25 TABLET ORAL
Qty: 90 TABLET | Refills: 0 | Status: SHIPPED | OUTPATIENT
Start: 2020-08-25 | End: 2020-12-03 | Stop reason: SDUPTHER

## 2020-08-26 ENCOUNTER — TRANSCRIBE ORDERS (OUTPATIENT)
Dept: ADMINISTRATIVE | Facility: HOSPITAL | Age: 35
End: 2020-08-26

## 2020-08-26 ENCOUNTER — APPOINTMENT (OUTPATIENT)
Dept: LAB | Facility: HOSPITAL | Age: 35
End: 2020-08-26
Attending: INTERNAL MEDICINE
Payer: COMMERCIAL

## 2020-08-26 DIAGNOSIS — M32.9 SYSTEMIC LUPUS ERYTHEMATOSUS, UNSPECIFIED SLE TYPE, UNSPECIFIED ORGAN INVOLVEMENT STATUS (HCC): Primary | ICD-10-CM

## 2020-08-26 DIAGNOSIS — M32.9 SYSTEMIC LUPUS ERYTHEMATOSUS, UNSPECIFIED SLE TYPE, UNSPECIFIED ORGAN INVOLVEMENT STATUS (HCC): ICD-10-CM

## 2020-08-26 LAB
C3 SERPL-MCNC: 100 MG/DL (ref 90–180)
C4 SERPL-MCNC: 27 MG/DL (ref 10–40)
ERYTHROCYTE [DISTWIDTH] IN BLOOD BY AUTOMATED COUNT: 13.2 % (ref 11.6–15.1)
HCT VFR BLD AUTO: 41.6 % (ref 34.8–46.1)
HGB BLD-MCNC: 13.6 G/DL (ref 11.5–15.4)
MCH RBC QN AUTO: 31.3 PG (ref 26.8–34.3)
MCHC RBC AUTO-ENTMCNC: 32.7 G/DL (ref 31.4–37.4)
MCV RBC AUTO: 96 FL (ref 82–98)
PLATELET # BLD AUTO: 213 THOUSANDS/UL (ref 149–390)
PMV BLD AUTO: 9.3 FL (ref 8.9–12.7)
RBC # BLD AUTO: 4.35 MILLION/UL (ref 3.81–5.12)
WBC # BLD AUTO: 5.3 THOUSAND/UL (ref 4.31–10.16)

## 2020-08-26 PROCEDURE — 85732 THROMBOPLASTIN TIME PARTIAL: CPT

## 2020-08-26 PROCEDURE — 86160 COMPLEMENT ANTIGEN: CPT

## 2020-08-26 PROCEDURE — 85613 RUSSELL VIPER VENOM DILUTED: CPT

## 2020-08-26 PROCEDURE — 86162 COMPLEMENT TOTAL (CH50): CPT

## 2020-08-26 PROCEDURE — 86146 BETA-2 GLYCOPROTEIN ANTIBODY: CPT

## 2020-08-26 PROCEDURE — 85670 THROMBIN TIME PLASMA: CPT

## 2020-08-26 PROCEDURE — 85027 COMPLETE CBC AUTOMATED: CPT

## 2020-08-26 PROCEDURE — 86235 NUCLEAR ANTIGEN ANTIBODY: CPT

## 2020-08-26 PROCEDURE — 36415 COLL VENOUS BLD VENIPUNCTURE: CPT

## 2020-08-26 PROCEDURE — 86376 MICROSOMAL ANTIBODY EACH: CPT

## 2020-08-26 PROCEDURE — 85705 THROMBOPLASTIN INHIBITION: CPT

## 2020-08-27 ENCOUNTER — OFFICE VISIT (OUTPATIENT)
Dept: FAMILY MEDICINE CLINIC | Facility: CLINIC | Age: 35
End: 2020-08-27
Payer: COMMERCIAL

## 2020-08-27 VITALS
HEIGHT: 65 IN | TEMPERATURE: 97.4 F | BODY MASS INDEX: 35.32 KG/M2 | WEIGHT: 212 LBS | SYSTOLIC BLOOD PRESSURE: 110 MMHG | DIASTOLIC BLOOD PRESSURE: 60 MMHG

## 2020-08-27 DIAGNOSIS — M32.9 LUPUS (HCC): ICD-10-CM

## 2020-08-27 DIAGNOSIS — J30.1 SEASONAL ALLERGIC RHINITIS DUE TO POLLEN: ICD-10-CM

## 2020-08-27 DIAGNOSIS — E03.9 ADULT HYPOTHYROIDISM: Primary | ICD-10-CM

## 2020-08-27 DIAGNOSIS — R73.9 HYPERGLYCEMIA: ICD-10-CM

## 2020-08-27 DIAGNOSIS — E66.01 CLASS 2 SEVERE OBESITY DUE TO EXCESS CALORIES WITH SERIOUS COMORBIDITY AND BODY MASS INDEX (BMI) OF 35.0 TO 35.9 IN ADULT (HCC): ICD-10-CM

## 2020-08-27 PROBLEM — E66.812 CLASS 2 SEVERE OBESITY DUE TO EXCESS CALORIES WITH SERIOUS COMORBIDITY AND BODY MASS INDEX (BMI) OF 35.0 TO 35.9 IN ADULT (HCC): Status: ACTIVE | Noted: 2020-08-27

## 2020-08-27 LAB
CENTROMERE B AB SER-ACNC: <0.2 AI (ref 0–0.9)
CH50 SERPL-ACNC: 55 U/ML
ENA SCL70 AB SER-ACNC: <0.2 AI (ref 0–0.9)
THYROPEROXIDASE AB SERPL-ACNC: <9 IU/ML (ref 0–34)

## 2020-08-27 PROCEDURE — 3008F BODY MASS INDEX DOCD: CPT | Performed by: FAMILY MEDICINE

## 2020-08-27 PROCEDURE — 1036F TOBACCO NON-USER: CPT | Performed by: FAMILY MEDICINE

## 2020-08-27 PROCEDURE — 99214 OFFICE O/P EST MOD 30 MIN: CPT | Performed by: FAMILY MEDICINE

## 2020-08-27 RX ORDER — IPRATROPIUM BROMIDE 42 UG/1
2 SPRAY, METERED NASAL 4 TIMES DAILY
Qty: 15 ML | Refills: 5 | Status: SHIPPED | OUTPATIENT
Start: 2020-08-27 | End: 2020-12-03

## 2020-08-27 NOTE — ASSESSMENT & PLAN NOTE
Has tried multiple diet and exercise programs in her life time and has never been able to maintain her weight for a sustained period of time  Would be interested in seeking medical care

## 2020-08-27 NOTE — PROGRESS NOTES
Assessment/Plan:    Adult hypothyroidism    Continue levothyroxine at 25 mcg daily  Will be due   For lab work in November  Lupus [facility])    Follow-up with rheumatology as scheduled  Major depressive disorder, recurrent, severe w/o psychotic behavior ([address] )    Follow-up with psychiatry as scheduled  Continue current medications  Class 2 severe obesity due to excess calories with serious comorbidity and body mass index (BMI) of 35 0 to 35 9 in [facility])   Has tried multiple diet and exercise programs in her life time and has never been able to maintain her weight for a sustained period of time  Would be interested in seeking medical care  Diagnoses and all orders for this visit:    Adult hypothyroidism  -     Lipid panel; Future  -     TSH, 3rd generation; Future  -     T4; Future    Seasonal allergic rhinitis due to pollen  -     ipratropium (ATROVENT) 0 06 % nasal spray; 2 sprays into each nostril 4 (four) times a day    Lupus (HCC)    Hyperglycemia  -     Hemoglobin A1C; Future  -     Comprehensive metabolic panel; Future  -     CBC and differential; Future  -     Lipid panel; Future    Class 2 severe obesity due to excess calories with serious comorbidity and body mass index (BMI) of 35 0 to 35 9 in [facility])  -     Ambulatory referral to Weight Management; Future          Subjective:   Chief Complaint   Patient presents with    Follow-up    Hypothyroidism          Patient ID: Leonardo Knutson is a 28 y o  female  She is here for follow-up on her hypothyroidism, dyslipidemia, vitamin-D deficiency, and up  Date me on her psychiatric care and rheumatologic care  She recently got a 2nd opinion on her connective tissue issues  Additional lab work has been ordered and she is to continue her hydrochloric why 9 and Celebrex for now  She seems to be doing well from a psychiatric standpoint, she has follow-up scheduled is taking appropriate medications at this time    She is somewhat frustrated with her inability to lose weight or keep weight off  She has tried a variety of diets and exercise programs over her lifetime and has never been able to maintain any kind of reasonable weight loss  Should be interested in possibly seeing someone about it  She is not interested in surgery, would more like to follow a medical program       The following portions of the patient's history were reviewed and updated as appropriate: allergies, current medications, past family history, past medical history, past social history, past surgical history and problem list     Review of Systems      Objective:      /60   Temp (!) 97 4 °F (36 3 °C)   Ht 5' 5" (1 651 m)   Wt 96 2 kg (212 lb)   BMI 35 28 kg/m²          Physical Exam  Vitals signs and nursing note reviewed  Constitutional:       General: She is not in acute distress  Appearance: She is well-developed  She is obese  HENT:      Head: Normocephalic and atraumatic  Right Ear: External ear normal       Left Ear: External ear normal       Nose: Nose normal    Eyes:      Conjunctiva/sclera: Conjunctivae normal       Pupils: Pupils are equal, round, and reactive to light  Neck:      Musculoskeletal: Normal range of motion and neck supple  Thyroid: No thyromegaly  Vascular: No JVD  Trachea: No tracheal deviation  Cardiovascular:      Rate and Rhythm: Normal rate and regular rhythm  Heart sounds: Normal heart sounds  No murmur  Pulmonary:      Effort: Pulmonary effort is normal       Breath sounds: Normal breath sounds  No wheezing or rales  Abdominal:      General: Bowel sounds are normal       Palpations: Abdomen is soft  There is no mass  Tenderness: There is no abdominal tenderness  There is no guarding or rebound  Musculoskeletal: Normal range of motion  General: No tenderness  Lymphadenopathy:      Cervical: No cervical adenopathy  Skin:     General: Skin is warm and dry        Findings: No lesion or rash    Neurological:      Mental Status: She is alert and oriented to person, place, and time  Cranial Nerves: No cranial nerve deficit  Deep Tendon Reflexes: Reflexes are normal and symmetric  Psychiatric:         Judgment: Judgment normal        BMI Counseling: Body mass index is 35 28 kg/m²  The BMI is above normal  Nutrition recommendations include moderation in carbohydrate intake, increasing intake of lean protein and reducing intake of saturated and trans fat  Exercise recommendations include exercising 3-5 times per week  Patient referred to weight management due to patient being overweight

## 2020-08-28 ENCOUNTER — TELEPHONE (OUTPATIENT)
Dept: BARIATRICS | Facility: CLINIC | Age: 35
End: 2020-08-28

## 2020-08-28 LAB
B2 GLYCOPROT1 IGA SER-ACNC: <9 GPI IGA UNITS (ref 0–25)
B2 GLYCOPROT1 IGG SER-ACNC: <9 GPI IGG UNITS (ref 0–20)
B2 GLYCOPROT1 IGM SER-ACNC: <9 GPI IGM UNITS (ref 0–32)

## 2020-08-29 LAB
APTT SCREEN TO CONFIRM RATIO: 0.96 RATIO (ref 0–1.4)
CONFIRM APTT/NORMAL: 38.6 SEC (ref 0–55)
LA PPP-IMP: NORMAL
SCREEN APTT: 36.3 SEC (ref 0–51.9)
SCREEN DRVVT: 37.1 SEC (ref 0–47)
THROMBIN TIME: 19.6 SEC (ref 0–23)

## 2020-09-06 PROBLEM — R76.8 ANA POSITIVE: Status: ACTIVE | Noted: 2020-09-06

## 2020-09-08 ENCOUNTER — OFFICE VISIT (OUTPATIENT)
Dept: PSYCHIATRY | Facility: CLINIC | Age: 35
End: 2020-09-08
Payer: COMMERCIAL

## 2020-09-08 VITALS — RESPIRATION RATE: 16 BRPM | HEIGHT: 66 IN | BODY MASS INDEX: 35.44 KG/M2 | WEIGHT: 220.5 LBS

## 2020-09-08 DIAGNOSIS — F41.1 GAD (GENERALIZED ANXIETY DISORDER): ICD-10-CM

## 2020-09-08 DIAGNOSIS — F40.10 SOCIAL PHOBIA: ICD-10-CM

## 2020-09-08 DIAGNOSIS — F51.13 HYPERSOMNIA DUE TO MENTAL DISORDER: ICD-10-CM

## 2020-09-08 DIAGNOSIS — F33.2 MAJOR DEPRESSIVE DISORDER, RECURRENT, SEVERE W/O PSYCHOTIC BEHAVIOR (HCC): ICD-10-CM

## 2020-09-08 PROCEDURE — 90833 PSYTX W PT W E/M 30 MIN: CPT | Performed by: NURSE PRACTITIONER

## 2020-09-08 PROCEDURE — 99214 OFFICE O/P EST MOD 30 MIN: CPT | Performed by: NURSE PRACTITIONER

## 2020-09-08 RX ORDER — MODAFINIL 100 MG/1
100 TABLET ORAL DAILY
Qty: 30 TABLET | Refills: 2 | Status: SHIPPED | OUTPATIENT
Start: 2020-09-08 | End: 2020-12-10 | Stop reason: SDUPTHER

## 2020-09-08 RX ORDER — CLONAZEPAM 0.5 MG/1
TABLET ORAL
Qty: 60 TABLET | Refills: 2 | Status: SHIPPED | OUTPATIENT
Start: 2020-09-08 | End: 2020-12-10 | Stop reason: SDUPTHER
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
## 2020-09-10 ENCOUNTER — SOCIAL WORK (OUTPATIENT)
Dept: BEHAVIORAL/MENTAL HEALTH CLINIC | Facility: CLINIC | Age: 35
End: 2020-09-10
Payer: COMMERCIAL

## 2020-09-10 DIAGNOSIS — F33.2 MAJOR DEPRESSIVE DISORDER, RECURRENT, SEVERE W/O PSYCHOTIC BEHAVIOR (HCC): ICD-10-CM

## 2020-09-10 DIAGNOSIS — F41.1 GAD (GENERALIZED ANXIETY DISORDER): Primary | ICD-10-CM

## 2020-09-10 DIAGNOSIS — F40.10 SOCIAL PHOBIA: ICD-10-CM

## 2020-09-10 PROCEDURE — 90834 PSYTX W PT 45 MINUTES: CPT | Performed by: SOCIAL WORKER

## 2020-09-11 NOTE — PSYCH
Psychotherapy Provided: Individual Psychotherapy 45 minutes     Length of time in session: 4:45 pm to 5:30 pm, follow up in 2 week    Goals addressed in session: Goal 1 and Goal 2     Pain:      moderate to severe    6    Current suicide risk : Low     Therapist me with Kerman Bamberger  She shared that she was struggling with her weight and self-esteem  Therapist and Kerman Bamberger explored her anxiety and her options  She discussed weight loss surgery and dieting  She explored struggles of dieting and its impact on her mood  She identified little motivation and often fails diets when beginning  Therapist and Kerman Bamberger discussed her feelings associated with her health and worries that she will be uncomfortable for the rest of her life  Therapist encouraged her to speak with a dietician as she seems to have difficulty with understanding how to create meals, and ends up eating the same meal until she is tired of it and then gives up  She agreed to try meeting with this professional  Treatment plan created following this session  Behavioral Health Treatment Plan [facility]: Diagnosis and Treatment Plan explained to Simeon Lundborg relates understanding diagnosis and is agreeable to Treatment Plan   Yes

## 2020-09-11 NOTE — BH TREATMENT PLAN
Sidra Dec  [birth year]       Date of Initial Treatment Plan: 3/19/19   Date of Current Treatment Plan: 09/11/20    Treatment Plan Number 4     Strengths/Personal Resources for Self Care: kind, loving, friendly, intelligent    Diagnosis:   1  JG (generalized anxiety disorder)     2  Major depressive disorder, recurrent, severe w/o psychotic behavior ([facility] [address] )     3  Social phobia         Area of Needs: motivation and support       Long Term Goal 1: AImprove focus and attention on necessary activities that improve her life satisfaction       Target Date: 3/10/21  Completion Date: na         Short Term Objectives for Goal 1: AI will be able to ask for support from professionals in order to improve my focus and attention to my health needs by at least 50%  and BI will be able to learn to redirect myself to my goals at least 75% of the time       Long Term Goal 2: Sustain reduced symptoms of depression       Target Date: 3/10/21  Completion Date:  na     Short Term Objectives for Goal 2: AI will focus on improving my happiness at least 3 times a week, by following guidelines that I implement for myself by at least 80%  and BI will continue to comply with all recommendations made by the psychiatirst during all scheduled sessions       GOAL 1: Modality: Individual 2x per month   Completion Date ongoing, Medication Management and The person(s) responsible for carrying out the plan is  Martha Westbrook    GOAL 2: Modality: Individual 2x per month   Completion Date ongoing, Medication Management and The person(s) responsible for carrying out the plan is  Nettie Holloway [address] [place] [address] Road: Diagnosis and Treatment Plan explained to Sully Agosto relates understanding diagnosis and is agreeable to Treatment Plan         Client Comments : Please share your thoughts, feelings, need and/or experiences regarding your treatment plan: Therapist completed this treatment plan following the therapy session, during which goals were discussed  Lois Palumbo is in agreement with the therapist signing as proxy, as she was unable to sign consent due to social distancing guidelines

## 2020-09-14 NOTE — PROGRESS NOTES
Assessment/Plan:  Class II Obesity  -Discussed options of [facility]-Intensive Lifestyle Intervention Program, Very Low Calorie Diet-VLCD and Conservative Program and the role of weight loss medications   -Initial weight loss goal of 5-10% weight loss for improved health  -Has interest in bariatric surgery, but jin snot meet criteria  She will see sleep medicine for eval for sleep apnea  -Avoid phentermine: on modofinil  -Caution Topamax: kidney stones (multiple)  -Screening labs: reviewed/UTD  -Patient is interested in pursuing Conservative Program   -Calorie goals, sample menu, portion size guidelines, and food logging reviewed with the patient    -She is interested in weight loss medication  Can consider Saxenda    Chronic gastritis without bleeding  -plans to discuss with PCP as her sx have been worse  -taking famotidine    Follow up in approximately 3-6 weeks with Non-Surgical Physician/Advanced Practitioner  Goals:  Food log (ie ) www myfitnesspal com,sparkpeople  com,loseit com,calorieking  com,etc  baritastic  No sugary beverages  At least 64oz of water daily  Increase physical activity by 10 minutes daily  Gradually increase physical activity to a goal of 5 days per week for 30 minutes of MODERATE intensity PLUS 2 days per week of FULL BODY resistance training  5-10 servings of fruits and vegetables per day and 25-35 grams of dietary fiber per day, gradually increasing  4741-4110 calories    If choosing starch pick whole grain/complex carbs and keep 1/2 cup: brown rice, quinoa, whole wheat pasta, sweet potato, chickpea noodles, red lentil noodles  Consider Saxenda    Diagnoses and all orders for this visit:    Class II obesity  -     Ambulatory referral to Weight Management  -     Ambulatory referral to Sleep Medicine; Future    At risk for sleep apnea  -     Ambulatory referral to Sleep Medicine;  Future    Chronic gastritis without bleeding, unspecified gastritis type    Major depressive disorder, recurrent, severe w/o psychotic behavior ([facility] [address] )    Body mass index 35 0-35 9, adult        Subjective:   Chief Complaint   Patient presents with    Consult     mwm consult     Patient ID: Elvia Medina  is a 28 y o  female with excess weight/obesity here to pursue weight management  Past Medical History:   Diagnosis Date    Anxiety     Back pain     Bilateral carpal tunnel syndrome     last assessed: 2016    Bladder prolapse, female, acquired     Contact lens overwear of both eyes     Depression     GERD (gastroesophageal reflux disease)     Headache     Hypothyroidism     hypothyroidism    Kidney stones     Lupus (systemic lupus erythematosus) (HCC)     PONV (postoperative nausea and vomiting)     Psychiatric disorder     depression    Stress incontinence     Wears glasses      HPI:  Obesity/Excess Weight:  Severity: Severe  Onset:  16 years    Modifiers: Diet and Exercise, Commercial Weight Loss Programs-ie  Weight Watchers, Wilmer Hides, Nutrisystem, etc  and Atkins, Intermittent fasting, keto - loses and regains +  Contributing factors: Pregnancy and hypothyroidism  Associated symptoms: comorbid conditions, fatigue and increased joint pain    Goals: 160 lbs  Hydration: drinks water throughout the day; coffee with cream and sugar  Alcohol: denies  Exercise: no  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    B: banana or skips (starts work at 4 29)  S: none  L: cafeteria; varies- today salad  S: salsa and tortilla  D: protein +/- veg + pasta  S:  none    The following portions of the patient's history were reviewed and updated as appropriate: allergies, current medications, past family history, past medical history, past social history, past surgical history and problem list     Review of Systems   Constitutional: Negative for chills and fever  HENT: Negative for sore throat  Respiratory: Negative for cough and shortness of breath      Cardiovascular: Negative for chest pain and palpitations  Gastrointestinal: Negative for constipation and diarrhea  Genitourinary: Negative for dysuria  Musculoskeletal: Positive for arthralgias  Skin: Negative for rash  Neurological: Positive for headaches (rcommend eval by PCP)  Psychiatric/Behavioral:        Controlled with current tx     Objective:    /70 (BP Location: Left arm, Patient Position: Sitting, Cuff Size: Large)   Pulse 76   Temp (!) 96 8 °F (36 °C)   Ht 5' 5 5" (1 664 m)   Wt 98 3 kg (216 lb 12 8 oz)   BMI 35 53 kg/m²     Physical Exam  Vitals signs and nursing note reviewed  Constitutional   General appearance: Abnormal   well developed and obese  Eyes No conjunctival pallor  Ears, Nose, Mouth, and Throat external ears without erythema, edema, and drainage  Pulmonary   Respiratory effort: No increased work of breathing or signs of respiratory distress  Auscultation of lungs: Clear to auscultation, equal breath sounds bilaterally, no wheezes, no rales, no rhonci  Cardiovascular   Auscultation of heart: Normal rate and rhythm, normal S1 and S2, without murmurs  Examination of extremities for edema and/or varicosities: Normal   no edema  Abdomen   Abdomen: Abnormal   The abdomen was obese  Bowel sounds were normal  The abdomen was soft and nontender     Musculoskeletal   Gait and station: Normal     Psychiatric   Orientation to person, place and time: Normal     Affect: appropriate

## 2020-09-16 ENCOUNTER — CONSULT (OUTPATIENT)
Dept: BARIATRICS | Facility: CLINIC | Age: 35
End: 2020-09-16
Payer: COMMERCIAL

## 2020-09-16 VITALS
BODY MASS INDEX: 34.84 KG/M2 | SYSTOLIC BLOOD PRESSURE: 100 MMHG | DIASTOLIC BLOOD PRESSURE: 70 MMHG | HEART RATE: 76 BPM | HEIGHT: 66 IN | WEIGHT: 216.8 LBS | TEMPERATURE: 96.8 F

## 2020-09-16 DIAGNOSIS — Z91.89 AT RISK FOR SLEEP APNEA: ICD-10-CM

## 2020-09-16 DIAGNOSIS — E66.9 CLASS II OBESITY: Primary | ICD-10-CM

## 2020-09-16 DIAGNOSIS — K29.50 CHRONIC GASTRITIS WITHOUT BLEEDING, UNSPECIFIED GASTRITIS TYPE: ICD-10-CM

## 2020-09-16 DIAGNOSIS — F33.2 MAJOR DEPRESSIVE DISORDER, RECURRENT, SEVERE W/O PSYCHOTIC BEHAVIOR (HCC): ICD-10-CM

## 2020-09-16 PROCEDURE — 99244 OFF/OP CNSLTJ NEW/EST MOD 40: CPT | Performed by: PHYSICIAN ASSISTANT

## 2020-09-16 NOTE — PATIENT INSTRUCTIONS
Goals: Food log (ie ) www myfitnesspal com,sparkpeople  com,loseit com,calorieking  com,etc  baritastic  No sugary beverages  At least 64oz of water daily  Increase physical activity by 10 minutes daily   Gradually increase physical activity to a goal of 5 days per week for 30 minutes of MODERATE intensity PLUS 2 days per week of FULL BODY resistance training  5-10 servings of fruits and vegetables per day and 25-35 grams of dietary fiber per day, gradually increasing  3385-0771 calories    If choosing starch pick whole grain/complex carbs and keep 1/2 cup: brown rice, quinoa, whole wheat pasta, sweet potato, chickpea noodles, red lentil noodles  Consider Saxenda

## 2020-09-17 ENCOUNTER — SOCIAL WORK (OUTPATIENT)
Dept: BEHAVIORAL/MENTAL HEALTH CLINIC | Facility: CLINIC | Age: 35
End: 2020-09-17
Payer: COMMERCIAL

## 2020-09-17 DIAGNOSIS — F33.2 MAJOR DEPRESSIVE DISORDER, RECURRENT, SEVERE W/O PSYCHOTIC BEHAVIOR (HCC): ICD-10-CM

## 2020-09-17 DIAGNOSIS — F41.1 GAD (GENERALIZED ANXIETY DISORDER): Primary | ICD-10-CM

## 2020-09-17 PROCEDURE — 90834 PSYTX W PT 45 MINUTES: CPT | Performed by: SOCIAL WORKER

## 2020-09-17 NOTE — PSYCH
Psychotherapy Provided: Individual Psychotherapy 50 minutes     Length of time in session: 5:55 pm to 6:45 pm, follow up in 1 week    Goals addressed in session: Goal 1 and Goal 2     Pain:      moderate to severe    7    Current suicide risk : Low     Therapist met with Callie Taylor  She shared that she continued to struggle with her diet  She reported that she had met with weight management and was not eager to begin the processes that they had discussed  She felt that she was unable to complete things that were necessary for her health  Therapist explored what keeps her stuck  She shared her relationship is a continued chapa for her  She struggles with anxiety and lack of trust   Therapist encouraged use of CBT skills and listening to body cues for triggers  She discussed how she is fearful that this will not work  Therapist and Callie Taylor will monitor use during upcoming sessions  Behavioral Health Treatment Plan [facility]: Diagnosis and Treatment Plan explained to Oli Bridges relates understanding diagnosis and is agreeable to Treatment Plan   Yes

## 2020-09-18 ENCOUNTER — TELEMEDICINE (OUTPATIENT)
Dept: FAMILY MEDICINE CLINIC | Facility: CLINIC | Age: 35
End: 2020-09-18
Payer: COMMERCIAL

## 2020-09-18 ENCOUNTER — TELEPHONE (OUTPATIENT)
Dept: DERMATOLOGY | Facility: CLINIC | Age: 35
End: 2020-09-18

## 2020-09-18 DIAGNOSIS — Z20.828 EXPOSURE TO SARS-ASSOCIATED CORONAVIRUS: Primary | ICD-10-CM

## 2020-09-18 PROCEDURE — 99213 OFFICE O/P EST LOW 20 MIN: CPT | Performed by: FAMILY MEDICINE

## 2020-09-18 NOTE — PROGRESS NOTES
COVID-19 Virtual Visit     Assessment/Plan:    Problem List Items Addressed This Visit     None        This virtual check-in was done via [employer] and patient was informed that this is not a secure, HIPAA-complaint platform  She agrees to proceed     Disposition:      I referred Chong Hernandez to one of our centralized sites for a COVID-19 swab  COVID-19 Home Care Guidelines    Your healthcare provider and/or public health staff have evaluated you and have determined that you do not need to remain in the hospital at this time  At this time you can be isolated at home where you will be monitored by staff from your local or state health department  You should carefully follow the prevention and isolation steps below until a healthcare provider or local or state health department says that you can return to your normal activities  Stay home except to get medical care    People who are mildly ill with COVID-19 are able to isolate at home during their illness  You should restrict activities outside your home, except for getting medical care  Do not go to work, school, or public areas  Avoid using public transportation, ride-sharing, or taxis  Separate yourself from other people and animals in your home    People: As much as possible, you should stay in a specific room and away from other people in your home  Also, you should use a separate bathroom, if available  Animals: You should restrict contact with pets and other animals while you are sick with COVID-19, just like you would around other people  Although there have not been reports of pets or other animals becoming sick with COVID-19, it is still recommended that people sick with COVID-19 limit contact with animals until more information is known about the virus  When possible, have another member of your household care for your animals while you are sick   If you are sick with COVID-19, avoid contact with your pet, including petting, snuggling, being kissed or licked, and sharing food  If you must care for your pet or be around animals while you are sick, wash your hands before and after you interact with pets and wear a facemask  See COVID-19 and Animals for more information  Call ahead before visiting your doctor    If you have a medical appointment, call the healthcare provider and tell them that you have or may have COVID-19  This will help the healthcare providers office take steps to keep other people from getting infected or exposed  Wear a facemask    You should wear a facemask when you are around other people (e g , sharing a room or vehicle) or pets and before you enter a healthcare providers office  If you are not able to wear a facemask (for example, because it causes trouble breathing), then people who live with you should not stay in the same room with you, or they should wear a facemask if they enter your room  Cover your coughs and sneezes    Cover your mouth and nose with a tissue when you cough or sneeze  Throw used tissues in a lined trash can  Immediately wash your hands with soap and water for at least 20 seconds or, if soap and water are not available, clean your hands with an alcohol-based hand  that contains at least 60% alcohol  Clean your hands often    Wash your hands often with soap and water for at least 20 seconds, especially after blowing your nose, coughing, or sneezing; going to the bathroom; and before eating or preparing food  If soap and water are not readily available, use an alcohol-based hand  with at least 60% alcohol, covering all surfaces of your hands and rubbing them together until they feel dry  Soap and water are the best option if hands are visibly dirty  Avoid touching your eyes, nose, and mouth with unwashed hands  Avoid sharing personal household items    You should not share dishes, drinking glasses, cups, eating utensils, towels, or bedding with other people or pets in your home   After using these items, they should be washed thoroughly with soap and water  Clean all high-touch surfaces everyday    High touch surfaces include counters, tabletops, doorknobs, bathroom fixtures, toilets, phones, keyboards, tablets, and bedside tables  Also, clean any surfaces that may have blood, stool, or body fluids on them  Use a household cleaning spray or wipe, according to the label instructions  Labels contain instructions for safe and effective use of the cleaning product including precautions you should take when applying the product, such as wearing gloves and making sure you have good ventilation during use of the product  Monitor your symptoms    Seek prompt medical attention if your illness is worsening (e g , difficulty breathing)  Before seeking care, call your healthcare provider and tell them that you have, or are being evaluated for, COVID-19  Put on a facemask before you enter the facility  These steps will help the healthcare providers office to keep other people in the office or waiting room from getting infected or exposed  Ask your healthcare provider to call the local or [facility] health department  Persons who are placed under active monitoring or facilitated self-monitoring should follow instructions provided by their local health department or occupational health professionals, as appropriate  If you have a medical emergency and need to call 911, notify the dispatch personnel that you have, or are being evaluated for COVID-19  If possible, put on a facemask before emergency medical services arrive      Discontinuing home isolation    Patients with confirmed COVID-19 should remain under home isolation precautions until the following conditions are met:   - They have had no fever for at least 24 hours (that is one full day of no fever without the use medicine that reduces fevers)  AND  - other symptoms have improved (for example, when their cough or shortness of breath have improved)  AND  - at least 10 days have passed since their symptoms first appeared  Patients with confirmed COVID-19 should also notify close contacts (including their workplace) and ask that they self-quarantine  Currently, close contact is defined as being within 6 feet for 10 minutes or more from the period 48 hours before symptom onset to the time at which the patient went into isolation  Close contacts of patients diagnosed with COVID-19 should be instructed by the patient to self-quarantine for 14 days from the last time of their last contact with the patient  Source: Atul mendoza I spent 12 minutes directly with the patient during this visit    Encounter provider Soo Maria DO    Provider located at [address] [place] [address] Road [id number]    Recent Visits  No visits were found meeting these conditions  Showing recent visits within past 7 days and meeting all other requirements     Future Appointments  No visits were found meeting these conditions  Showing future appointments within next 150 days and meeting all other requirements        Patient agrees to participate in a virtual check in via telephone or video visit instead of presenting to the office to address urgent/immediate medical needs  Patient is aware this is a billable service  After connecting through [employer], the patient was identified by name and date of birth  Soledad Whitley was informed that this was a telemedicine visit and that the exam was being conducted confidentially over secure lines  My office door was closed  No one else was in the room  Soledad Whitley acknowledged consent and understanding of privacy and security of the telemedicine visit  I informed the patient that I have reviewed her record in Epic and presented the opportunity for her to ask any questions regarding the visit today   The patient agreed to participate  Subjective  Dorcas Lennon is a 28 y o  female who is concerned about COVID-19  She reports cough, headache, sore throat, fatigue and myalgias  She has not experienced fever, chills, shortness of breath, anosmia, abdominal pain, diarrhea, nausea and vomiting She has not had contact with a person who is under investigation for or who is positive for COVID-19 within the last 14 days  She has not been hospitalized recently for fever and/or lower respiratory symptoms      Past Medical History:   Diagnosis Date    Anxiety     Back pain     Bilateral carpal tunnel syndrome     last assessed: 11/17/2016    Bladder prolapse, female, acquired     Contact lens overwear of both eyes     Depression     GERD (gastroesophageal reflux disease)     Headache     Hypothyroidism     hypothyroidism    Kidney stones     Lupus (systemic lupus erythematosus) (HCC)     Nausea     PONV (postoperative nausea and vomiting)     Psychiatric disorder     depression    Stress incontinence     Wears glasses        Past Surgical History:   Procedure Laterality Date    CHOLECYSTECTOMY      NE CYSTOURETHROSCOPY N/A 2/3/2020    Procedure: CYSTOSCOPY;  Surgeon: Emily Alvarez MD;  [facility];  Service: UroGynecology           NE LAP,CHOLECYSTECTOMY N/A 9/6/2018    Procedure: CHOLECYSTECTOMY LAPAROSCOPIC W/ ROBOTICS;  Surgeon: Seda Marie MD;  [facility];  Service: General    NE POST COLPORRHAPHY,RECTUM/VAGINA N/A 2/3/2020    Procedure: POSTERIOR COLPORRHAPHY;  Surgeon: Emily Alvarez MD;  [facility];  Service: UroGynecology           NE SLING OPER STRES INCONTINENCE N/A 2/3/2020    Procedure: PUBOVAGINAL SLING;  Surgeon: Emily Alvarez MD;  [facility];  Service: UroGynecology           TUBAL LIGATION         Current Outpatient Medications   Medication Sig Dispense Refill    acetaminophen (TYLENOL) 325 mg tablet Take 2 tablets (650 mg total) by mouth every 6 (six) hours as needed for mild pain, moderate pain, headaches or fever 30 tablet 0    celecoxib (CeleBREX) 200 mg capsule Take 1 capsule (200 mg total) by mouth 2 (two) times a day 60 capsule 3    cholecalciferol (VITAMIN D3) 400 units tablet Take 400 Units by mouth daily      clonazePAM (KlonoPIN) 0 5 mg tablet Take 1/2 to 1 tab twice daily as needed for anxiety 60 tablet 2    Cranberry 1000 MG CAPS Take by mouth      famotidine (PEPCID) 40 MG tablet Take 1 tablet (40 mg total) by mouth as needed for heartburn 1 at bedtime  90 tablet 1    folic acid (FOLVITE) 779 MCG tablet Take 800 mcg by mouth daily       hydrocortisone 2 5 % ointment Apply topically 2 (two) times a day (Patient not taking: Reported on 9/16/2020) 20 g 3    hydroxychloroquine (PLAQUENIL) 200 mg tablet Take 1 tablet (200 mg total) by mouth 2 (two) times a day 60 tablet 3    ibuprofen (MOTRIN) 600 mg tablet Take 1 tablet (600 mg total) by mouth every 6 (six) hours as needed (cramping) 30 tablet 0    ipratropium (ATROVENT) 0 06 % nasal spray 2 sprays into each nostril 4 (four) times a day 15 mL 5    levothyroxine 25 mcg tablet TAKE 1 TABLET (25 MCG TOTAL) BY MOUTH DAILY IN THE EARLY MORNING 90 tablet 0    methocarbamol (ROBAXIN) 500 mg tablet Take 1 tablet (500 mg total) by mouth 2 (two) times a day 60 tablet 3    modafinil (PROVIGIL) 100 mg tablet Take 1 tablet (100 mg total) by mouth daily 30 tablet 2    Vortioxetine HBr (Trintellix) 20 MG tablet Take 1 tablet (20 mg total) by mouth daily 30 tablet 2     No current facility-administered medications for this visit  Allergies   Allergen Reactions    Penicillins Hives     At young age       Review of Systems    Video Exam    There were no vitals filed for this visit  Ely Orts appears mild distress  Physical Exam  Constitutional:       General: She is not in acute distress  Appearance: She is well-developed  HENT:      Head: Normocephalic and atraumatic  Eyes:      Conjunctiva/sclera: Conjunctivae normal    Pulmonary:      Effort: Pulmonary effort is normal    Neurological:      Mental Status: She is alert and oriented to person, place, and time  Psychiatric:         Judgment: Judgment normal           VIRTUAL VISIT DISCLAIMER    Elaina Benz acknowledges that she has consented to an online visit or consultation  She understands that the online visit is based solely on information provided by her, and that, in the absence of a face-to-face physical evaluation by the physician, the diagnosis she receives is both limited and provisional in terms of accuracy and completeness  This is not intended to replace a full medical face-to-face evaluation by the physician  Elainabreanna Benz understands and accepts these terms

## 2020-09-19 DIAGNOSIS — Z20.828 EXPOSURE TO SARS-ASSOCIATED CORONAVIRUS: ICD-10-CM

## 2020-09-19 PROCEDURE — U0003 INFECTIOUS AGENT DETECTION BY NUCLEIC ACID (DNA OR RNA); SEVERE ACUTE RESPIRATORY SYNDROME CORONAVIRUS 2 (SARS-COV-2) (CORONAVIRUS DISEASE [COVID-19]), AMPLIFIED PROBE TECHNIQUE, MAKING USE OF HIGH THROUGHPUT TECHNOLOGIES AS DESCRIBED BY CMS-2020-01-R: HCPCS | Performed by: FAMILY MEDICINE

## 2020-09-21 LAB — SARS-COV-2 RNA SPEC QL NAA+PROBE: NOT DETECTED

## 2020-09-22 ENCOUNTER — OFFICE VISIT (OUTPATIENT)
Dept: FAMILY MEDICINE CLINIC | Facility: CLINIC | Age: 35
End: 2020-09-22
Payer: COMMERCIAL

## 2020-09-22 VITALS
DIASTOLIC BLOOD PRESSURE: 72 MMHG | BODY MASS INDEX: 35.07 KG/M2 | TEMPERATURE: 97.6 F | HEIGHT: 66 IN | SYSTOLIC BLOOD PRESSURE: 118 MMHG | WEIGHT: 218.2 LBS

## 2020-09-22 DIAGNOSIS — J01.10 ACUTE NON-RECURRENT FRONTAL SINUSITIS: Primary | ICD-10-CM

## 2020-09-22 PROBLEM — F41.8 DEPRESSION WITH ANXIETY: Status: ACTIVE | Noted: 2020-09-22

## 2020-09-22 PROCEDURE — 99213 OFFICE O/P EST LOW 20 MIN: CPT | Performed by: FAMILY MEDICINE

## 2020-09-22 RX ORDER — PREDNISONE 10 MG/1
TABLET ORAL
Qty: 15 TABLET | Refills: 0 | Status: SHIPPED | OUTPATIENT
Start: 2020-09-22 | End: 2020-11-10 | Stop reason: ALTCHOICE

## 2020-09-22 RX ORDER — CEFDINIR 300 MG/1
300 CAPSULE ORAL EVERY 12 HOURS SCHEDULED
Qty: 20 CAPSULE | Refills: 0 | Status: SHIPPED | OUTPATIENT
Start: 2020-09-22 | End: 2020-10-02

## 2020-09-22 NOTE — PROGRESS NOTES
Assessment/Plan:         Diagnoses and all orders for this visit:    Acute non-recurrent frontal sinusitis  Comments:   take medication as directed, call the office if symptoms do not improve  Orders:  -     predniSONE 10 mg tablet; 5 x 1 day, 4 x1 day, 3 x 1 day,2 x1 day,  1 x 1 day  -     cefdinir (OMNICEF) 300 mg capsule; Take 1 capsule (300 mg total) by mouth every 12 (twelve) hours for 10 days          Subjective:   Chief Complaint   Patient presents with    Headache     x 1 wk          Patient ID: Elina Duran is a 28 y o  female  She is complaining of frontal headaches that she describes as a pressure, some nausea, sinus congestion, runny nose, and a mild sore throat for over week  Sent for COVID testing this was negative  She continues to have symptoms  She took a Tylenol which helped a little bit  She has had migraines in the past, they are not similar to this  Sleeping does not impro      The following portions of the patient's history were reviewed and updated as appropriate: allergies, current medications, past family history, past medical history, past social history, past surgical history and problem list     Review of Systems   Constitutional: Negative for activity change, chills, diaphoresis, fatigue, fever and unexpected weight change  HENT: Positive for congestion, postnasal drip, rhinorrhea, sinus pressure, sinus pain and sore throat  Negative for ear pain, hearing loss, nosebleeds and tinnitus  Eyes: Negative for photophobia, pain, discharge, redness and visual disturbance  Respiratory: Negative for cough, chest tightness, shortness of breath and wheezing  Cardiovascular: Negative for chest pain, palpitations and leg swelling  Denies PAZ   Gastrointestinal: Positive for nausea  Negative for abdominal pain, blood in stool, constipation, diarrhea and vomiting  Endocrine: Negative      Genitourinary: Negative for difficulty urinating, dysuria, frequency, hematuria and urgency  Musculoskeletal: Negative for arthralgias, joint swelling and myalgias  Skin: Negative for rash and wound  Denies skin lesions, change in birthmarks   Allergic/Immunologic: Negative for environmental allergies, food allergies and immunocompromised state  Neurological: Negative for dizziness, tremors, seizures, syncope, weakness, numbness and headaches  Hematological: Negative  Psychiatric/Behavioral: Negative for behavioral problems, confusion, decreased concentration and sleep disturbance  The patient is not nervous/anxious  Objective:      /72   Temp 97 6 °F (36 4 °C)   Ht 5' 5 5" (1 664 m)   Wt 99 kg (218 lb 3 2 oz)   BMI 35 76 kg/m²          Physical Exam  Vitals signs and nursing note reviewed  Constitutional:       General: She is not in acute distress  Appearance: She is well-developed  She is obese  HENT:      Head: Normocephalic and atraumatic  Right Ear: Tympanic membrane and external ear normal  No middle ear effusion  Left Ear: Tympanic membrane and external ear normal   No middle ear effusion  Nose: Mucosal edema, congestion and rhinorrhea present  No nasal tenderness  Right Sinus: Frontal sinus tenderness present  No maxillary sinus tenderness  Left Sinus: Frontal sinus tenderness present  No maxillary sinus tenderness  Eyes:      Extraocular Movements: Extraocular movements intact  Conjunctiva/sclera: Conjunctivae normal       Pupils: Pupils are equal, round, and reactive to light  Neck:      Musculoskeletal: Normal range of motion and neck supple  Thyroid: No thyromegaly  Vascular: No JVD  Trachea: No tracheal deviation  Cardiovascular:      Rate and Rhythm: Normal rate and regular rhythm  Heart sounds: Normal heart sounds  No murmur  Pulmonary:      Effort: Pulmonary effort is normal       Breath sounds: Normal breath sounds  No wheezing     Abdominal:      General: Bowel sounds are normal  Palpations: Abdomen is soft  Tenderness: There is no abdominal tenderness  Lymphadenopathy:      Cervical: No cervical adenopathy  Skin:     General: Skin is warm and dry  Findings: No rash  Neurological:      Mental Status: She is alert and oriented to person, place, and time  Deep Tendon Reflexes: Reflexes are normal and symmetric     Psychiatric:         Mood and Affect: Mood normal          Judgment: Judgment normal

## 2020-09-23 ENCOUNTER — TELEPHONE (OUTPATIENT)
Dept: FAMILY MEDICINE CLINIC | Facility: CLINIC | Age: 35
End: 2020-09-23

## 2020-09-23 NOTE — TELEPHONE ENCOUNTER
Needs to give the medicine low chance to work  If she wants to, she could take a little Sudafed, that may help as well

## 2020-09-30 DIAGNOSIS — Z91.89 UNSPECIFIED PERSONAL HISTORY PRESENTING HAZARDS TO HEALTH: ICD-10-CM

## 2020-09-30 DIAGNOSIS — E66.9 CLASS II OBESITY: Primary | ICD-10-CM

## 2020-10-01 ENCOUNTER — SOCIAL WORK (OUTPATIENT)
Dept: BEHAVIORAL/MENTAL HEALTH CLINIC | Facility: CLINIC | Age: 35
End: 2020-10-01
Payer: COMMERCIAL

## 2020-10-01 DIAGNOSIS — F40.10 SOCIAL PHOBIA: ICD-10-CM

## 2020-10-01 DIAGNOSIS — F33.2 MAJOR DEPRESSIVE DISORDER, RECURRENT, SEVERE W/O PSYCHOTIC BEHAVIOR (HCC): ICD-10-CM

## 2020-10-01 DIAGNOSIS — F41.1 GAD (GENERALIZED ANXIETY DISORDER): Primary | ICD-10-CM

## 2020-10-01 PROCEDURE — 90834 PSYTX W PT 45 MINUTES: CPT | Performed by: SOCIAL WORKER

## 2020-10-06 DIAGNOSIS — E66.9 CLASS II OBESITY: ICD-10-CM

## 2020-10-06 DIAGNOSIS — Z91.89 AT RISK FOR SLEEP APNEA: Primary | ICD-10-CM

## 2020-10-08 ENCOUNTER — SOCIAL WORK (OUTPATIENT)
Dept: BEHAVIORAL/MENTAL HEALTH CLINIC | Facility: CLINIC | Age: 35
End: 2020-10-08
Payer: COMMERCIAL

## 2020-10-08 DIAGNOSIS — F41.1 GAD (GENERALIZED ANXIETY DISORDER): Primary | ICD-10-CM

## 2020-10-08 DIAGNOSIS — F40.10 SOCIAL PHOBIA: ICD-10-CM

## 2020-10-08 DIAGNOSIS — F33.2 MAJOR DEPRESSIVE DISORDER, RECURRENT, SEVERE W/O PSYCHOTIC BEHAVIOR (HCC): ICD-10-CM

## 2020-10-08 PROCEDURE — 90834 PSYTX W PT 45 MINUTES: CPT | Performed by: SOCIAL WORKER

## 2020-10-09 ENCOUNTER — TELEPHONE (OUTPATIENT)
Dept: FAMILY MEDICINE CLINIC | Facility: CLINIC | Age: 35
End: 2020-10-09

## 2020-10-09 DIAGNOSIS — K29.50 CHRONIC GASTRITIS WITHOUT BLEEDING, UNSPECIFIED GASTRITIS TYPE: Primary | ICD-10-CM

## 2020-10-09 RX ORDER — PANTOPRAZOLE SODIUM 20 MG/1
20 TABLET, DELAYED RELEASE ORAL DAILY
Qty: 90 TABLET | Refills: 1 | Status: SHIPPED | OUTPATIENT
Start: 2020-10-09 | End: 2021-03-22

## 2020-10-14 ENCOUNTER — OFFICE VISIT (OUTPATIENT)
Dept: BARIATRICS | Facility: CLINIC | Age: 35
End: 2020-10-14
Payer: COMMERCIAL

## 2020-10-14 VITALS
DIASTOLIC BLOOD PRESSURE: 64 MMHG | WEIGHT: 217 LBS | TEMPERATURE: 97.8 F | SYSTOLIC BLOOD PRESSURE: 102 MMHG | RESPIRATION RATE: 16 BRPM | HEIGHT: 66 IN | BODY MASS INDEX: 34.87 KG/M2 | HEART RATE: 89 BPM

## 2020-10-14 DIAGNOSIS — E66.9 CLASS II OBESITY: Primary | ICD-10-CM

## 2020-10-14 PROCEDURE — 99214 OFFICE O/P EST MOD 30 MIN: CPT | Performed by: PHYSICIAN ASSISTANT

## 2020-10-22 ENCOUNTER — OFFICE VISIT (OUTPATIENT)
Dept: FAMILY MEDICINE CLINIC | Facility: CLINIC | Age: 35
End: 2020-10-22
Payer: COMMERCIAL

## 2020-10-22 ENCOUNTER — OFFICE VISIT (OUTPATIENT)
Dept: OBGYN CLINIC | Facility: MEDICAL CENTER | Age: 35
End: 2020-10-22
Payer: COMMERCIAL

## 2020-10-22 VITALS
SYSTOLIC BLOOD PRESSURE: 101 MMHG | BODY MASS INDEX: 34.87 KG/M2 | HEART RATE: 77 BPM | TEMPERATURE: 98 F | HEIGHT: 66 IN | WEIGHT: 217 LBS | DIASTOLIC BLOOD PRESSURE: 68 MMHG

## 2020-10-22 VITALS
BODY MASS INDEX: 34.87 KG/M2 | TEMPERATURE: 97.8 F | WEIGHT: 217 LBS | HEIGHT: 66 IN | DIASTOLIC BLOOD PRESSURE: 72 MMHG | SYSTOLIC BLOOD PRESSURE: 118 MMHG

## 2020-10-22 DIAGNOSIS — E66.01 CLASS 2 SEVERE OBESITY DUE TO EXCESS CALORIES WITH SERIOUS COMORBIDITY AND BODY MASS INDEX (BMI) OF 35.0 TO 35.9 IN ADULT (HCC): ICD-10-CM

## 2020-10-22 DIAGNOSIS — G56.21 CUBITAL TUNNEL SYNDROME, RIGHT: ICD-10-CM

## 2020-10-22 DIAGNOSIS — G56.01 CARPAL TUNNEL SYNDROME ON RIGHT: Primary | ICD-10-CM

## 2020-10-22 DIAGNOSIS — L73.9 FOLLICULITIS: Primary | ICD-10-CM

## 2020-10-22 PROBLEM — E66.812 CLASS 2 OBESITY DUE TO EXCESS CALORIES IN ADULT: Status: ACTIVE | Noted: 2020-10-22

## 2020-10-22 PROBLEM — E66.09 CLASS 2 OBESITY DUE TO EXCESS CALORIES IN ADULT: Status: ACTIVE | Noted: 2020-10-22

## 2020-10-22 PROCEDURE — 99214 OFFICE O/P EST MOD 30 MIN: CPT | Performed by: ORTHOPAEDIC SURGERY

## 2020-10-22 PROCEDURE — 99213 OFFICE O/P EST LOW 20 MIN: CPT | Performed by: FAMILY MEDICINE

## 2020-10-22 RX ORDER — CEFAZOLIN SODIUM 2 G/50ML
2000 SOLUTION INTRAVENOUS ONCE
Status: CANCELLED | OUTPATIENT
Start: 2020-11-27 | End: 2020-10-22

## 2020-10-25 ENCOUNTER — HOSPITAL ENCOUNTER (EMERGENCY)
Facility: HOSPITAL | Age: 35
Discharge: HOME | End: 2020-10-25
Attending: EMERGENCY MEDICINE | Admitting: EMERGENCY MEDICINE
Payer: COMMERCIAL

## 2020-10-25 VITALS
SYSTOLIC BLOOD PRESSURE: 109 MMHG | TEMPERATURE: 98.8 F | RESPIRATION RATE: 16 BRPM | OXYGEN SATURATION: 100 % | WEIGHT: 214.07 LBS | DIASTOLIC BLOOD PRESSURE: 69 MMHG | HEART RATE: 66 BPM | BODY MASS INDEX: 35.08 KG/M2

## 2020-10-25 DIAGNOSIS — R52 GENERALIZED BODY ACHES: ICD-10-CM

## 2020-10-25 DIAGNOSIS — R05.9 COUGH: Primary | ICD-10-CM

## 2020-10-25 DIAGNOSIS — G44.209 ACUTE NON INTRACTABLE TENSION-TYPE HEADACHE: ICD-10-CM

## 2020-10-25 DIAGNOSIS — Z20.822 ENCOUNTER FOR LABORATORY TESTING FOR COVID-19 VIRUS: ICD-10-CM

## 2020-10-25 LAB — SARS-COV-2 RNA RESP QL NAA+PROBE: NEGATIVE

## 2020-10-25 PROCEDURE — 99283 EMERGENCY DEPT VISIT LOW MDM: CPT | Performed by: PHYSICIAN ASSISTANT

## 2020-10-25 PROCEDURE — 99283 EMERGENCY DEPT VISIT LOW MDM: CPT

## 2020-10-25 PROCEDURE — 87635 SARS-COV-2 COVID-19 AMP PRB: CPT | Performed by: PHYSICIAN ASSISTANT

## 2020-10-26 ENCOUNTER — PREP FOR PROCEDURE (OUTPATIENT)
Dept: OBGYN CLINIC | Facility: MEDICAL CENTER | Age: 35
End: 2020-10-26

## 2020-10-30 ENCOUNTER — TELEPHONE (OUTPATIENT)
Dept: BARIATRICS | Facility: CLINIC | Age: 35
End: 2020-10-30

## 2020-11-09 ENCOUNTER — LAB (OUTPATIENT)
Dept: LAB | Facility: HOSPITAL | Age: 35
End: 2020-11-09
Payer: COMMERCIAL

## 2020-11-09 DIAGNOSIS — R30.0 DYSURIA: Primary | ICD-10-CM

## 2020-11-09 DIAGNOSIS — R30.0 DYSURIA: ICD-10-CM

## 2020-11-09 LAB
BACTERIA UR QL AUTO: ABNORMAL /HPF
BILIRUB UR QL STRIP: NEGATIVE
CAOX CRY URNS QL MICRO: ABNORMAL /HPF
CLARITY UR: CLEAR
COLOR UR: YELLOW
GLUCOSE UR STRIP-MCNC: NEGATIVE MG/DL
HGB UR QL STRIP.AUTO: ABNORMAL
KETONES UR STRIP-MCNC: NEGATIVE MG/DL
LEUKOCYTE ESTERASE UR QL STRIP: NEGATIVE
NITRITE UR QL STRIP: NEGATIVE
NON-SQ EPI CELLS URNS QL MICRO: ABNORMAL /HPF
PH UR STRIP.AUTO: 6 [PH]
PROT UR STRIP-MCNC: NEGATIVE MG/DL
RBC #/AREA URNS AUTO: ABNORMAL /HPF
SP GR UR STRIP.AUTO: >=1.03 (ref 1–1.03)
UROBILINOGEN UR QL STRIP.AUTO: 0.2 E.U./DL
WBC #/AREA URNS AUTO: ABNORMAL /HPF

## 2020-11-09 PROCEDURE — 81001 URINALYSIS AUTO W/SCOPE: CPT

## 2020-11-10 ENCOUNTER — OFFICE VISIT (OUTPATIENT)
Dept: PODIATRY | Facility: CLINIC | Age: 35
End: 2020-11-10
Payer: COMMERCIAL

## 2020-11-10 VITALS
TEMPERATURE: 97.5 F | WEIGHT: 214 LBS | HEART RATE: 80 BPM | SYSTOLIC BLOOD PRESSURE: 107 MMHG | HEIGHT: 66 IN | BODY MASS INDEX: 34.39 KG/M2 | DIASTOLIC BLOOD PRESSURE: 70 MMHG

## 2020-11-10 DIAGNOSIS — B35.3 TINEA PEDIS OF BOTH FEET: ICD-10-CM

## 2020-11-10 DIAGNOSIS — M79.675 PAIN IN TOES OF BOTH FEET: ICD-10-CM

## 2020-11-10 DIAGNOSIS — M79.674 PAIN IN TOES OF BOTH FEET: ICD-10-CM

## 2020-11-10 DIAGNOSIS — L60.0 INGROWN TOENAIL: Primary | ICD-10-CM

## 2020-11-10 PROCEDURE — 99243 OFF/OP CNSLTJ NEW/EST LOW 30: CPT | Performed by: PODIATRIST

## 2020-11-10 RX ORDER — KETOCONAZOLE 20 MG/G
CREAM TOPICAL 2 TIMES DAILY
Qty: 60 G | Refills: 3 | Status: SHIPPED | OUTPATIENT
Start: 2020-11-10 | End: 2020-12-03

## 2020-11-11 ENCOUNTER — OFFICE VISIT (OUTPATIENT)
Dept: BARIATRICS | Facility: CLINIC | Age: 35
End: 2020-11-11
Payer: COMMERCIAL

## 2020-11-11 VITALS
WEIGHT: 210.3 LBS | TEMPERATURE: 96.3 F | HEIGHT: 66 IN | DIASTOLIC BLOOD PRESSURE: 60 MMHG | HEART RATE: 80 BPM | BODY MASS INDEX: 33.8 KG/M2 | RESPIRATION RATE: 20 BRPM | SYSTOLIC BLOOD PRESSURE: 120 MMHG

## 2020-11-11 DIAGNOSIS — E66.9 CLASS 1 OBESITY: Primary | ICD-10-CM

## 2020-11-11 PROCEDURE — 99214 OFFICE O/P EST MOD 30 MIN: CPT | Performed by: PHYSICIAN ASSISTANT

## 2020-11-12 PROBLEM — E66.811 CLASS 1 OBESITY: Status: ACTIVE | Noted: 2020-08-27

## 2020-11-17 ENCOUNTER — TELEPHONE (OUTPATIENT)
Dept: PSYCHIATRY | Facility: CLINIC | Age: 35
End: 2020-11-17

## 2020-11-18 ENCOUNTER — SOCIAL WORK (OUTPATIENT)
Dept: BEHAVIORAL/MENTAL HEALTH CLINIC | Facility: CLINIC | Age: 35
End: 2020-11-18
Payer: COMMERCIAL

## 2020-11-18 DIAGNOSIS — F40.10 SOCIAL PHOBIA: ICD-10-CM

## 2020-11-18 DIAGNOSIS — F41.1 GAD (GENERALIZED ANXIETY DISORDER): Primary | ICD-10-CM

## 2020-11-18 DIAGNOSIS — F33.2 MAJOR DEPRESSIVE DISORDER, RECURRENT, SEVERE W/O PSYCHOTIC BEHAVIOR (HCC): ICD-10-CM

## 2020-11-18 PROCEDURE — 90834 PSYTX W PT 45 MINUTES: CPT | Performed by: SOCIAL WORKER

## 2020-11-25 ENCOUNTER — ANESTHESIA EVENT (OUTPATIENT)
Dept: PERIOP | Facility: HOSPITAL | Age: 35
End: 2020-11-25
Payer: COMMERCIAL

## 2020-11-27 ENCOUNTER — HOSPITAL ENCOUNTER (OUTPATIENT)
Facility: HOSPITAL | Age: 35
Setting detail: OUTPATIENT SURGERY
Discharge: HOME | End: 2020-11-27
Attending: ORTHOPAEDIC SURGERY | Admitting: ORTHOPAEDIC SURGERY
Payer: COMMERCIAL

## 2020-11-27 ENCOUNTER — APPOINTMENT (OUTPATIENT)
Dept: LAB | Facility: HOSPITAL | Age: 35
End: 2020-11-27
Payer: COMMERCIAL

## 2020-11-27 ENCOUNTER — ANESTHESIA (OUTPATIENT)
Dept: PERIOP | Facility: HOSPITAL | Age: 35
End: 2020-11-27
Payer: COMMERCIAL

## 2020-11-27 VITALS
HEIGHT: 66 IN | RESPIRATION RATE: 14 BRPM | BODY MASS INDEX: 32.14 KG/M2 | DIASTOLIC BLOOD PRESSURE: 55 MMHG | SYSTOLIC BLOOD PRESSURE: 104 MMHG | HEART RATE: 89 BPM | WEIGHT: 200 LBS | OXYGEN SATURATION: 99 % | TEMPERATURE: 97.2 F

## 2020-11-27 VITALS — HEART RATE: 73 BPM

## 2020-11-27 DIAGNOSIS — R73.9 HYPERGLYCEMIA: ICD-10-CM

## 2020-11-27 DIAGNOSIS — G56.21 CUBITAL TUNNEL SYNDROME ON RIGHT: Primary | ICD-10-CM

## 2020-11-27 DIAGNOSIS — G56.01 CARPAL TUNNEL SYNDROME ON RIGHT: ICD-10-CM

## 2020-11-27 DIAGNOSIS — E03.9 ADULT HYPOTHYROIDISM: ICD-10-CM

## 2020-11-27 LAB
ALBUMIN SERPL BCP-MCNC: 4.3 G/DL (ref 3.5–5)
ALP SERPL-CCNC: 40 U/L (ref 46–116)
ALT SERPL W P-5'-P-CCNC: 28 U/L (ref 12–78)
ANION GAP SERPL CALCULATED.3IONS-SCNC: 5 MMOL/L (ref 4–13)
AST SERPL W P-5'-P-CCNC: 14 U/L (ref 5–45)
BASOPHILS # BLD AUTO: 0.02 THOUSANDS/ΜL (ref 0–0.1)
BASOPHILS NFR BLD AUTO: 0 % (ref 0–1)
BILIRUB SERPL-MCNC: 0.62 MG/DL (ref 0.2–1)
BUN SERPL-MCNC: 14 MG/DL (ref 5–25)
CALCIUM SERPL-MCNC: 9 MG/DL (ref 8.3–10.1)
CHLORIDE SERPL-SCNC: 104 MMOL/L (ref 100–108)
CHOLEST SERPL-MCNC: 155 MG/DL (ref 50–200)
CO2 SERPL-SCNC: 28 MMOL/L (ref 21–32)
CREAT SERPL-MCNC: 0.75 MG/DL (ref 0.6–1.3)
EOSINOPHIL # BLD AUTO: 0.11 THOUSAND/ΜL (ref 0–0.61)
EOSINOPHIL NFR BLD AUTO: 2 % (ref 0–6)
ERYTHROCYTE [DISTWIDTH] IN BLOOD BY AUTOMATED COUNT: 13 % (ref 11.6–15.1)
EST. AVERAGE GLUCOSE BLD GHB EST-MCNC: 85 MG/DL
EXT PREGNANCY TEST URINE: NEGATIVE
EXT. CONTROL: NORMAL
GFR SERPL CREATININE-BSD FRML MDRD: 104 ML/MIN/1.73SQ M
GLUCOSE P FAST SERPL-MCNC: 89 MG/DL (ref 65–99)
HBA1C MFR BLD: 4.6 %
HCT VFR BLD AUTO: 43.7 % (ref 34.8–46.1)
HDLC SERPL-MCNC: 36 MG/DL
HGB BLD-MCNC: 14.2 G/DL (ref 11.5–15.4)
IMM GRANULOCYTES # BLD AUTO: 0.03 THOUSAND/UL (ref 0–0.2)
IMM GRANULOCYTES NFR BLD AUTO: 1 % (ref 0–2)
LDLC SERPL CALC-MCNC: 95 MG/DL (ref 0–100)
LYMPHOCYTES # BLD AUTO: 1.97 THOUSANDS/ΜL (ref 0.6–4.47)
LYMPHOCYTES NFR BLD AUTO: 31 % (ref 14–44)
MCH RBC QN AUTO: 30.7 PG (ref 26.8–34.3)
MCHC RBC AUTO-ENTMCNC: 32.5 G/DL (ref 31.4–37.4)
MCV RBC AUTO: 95 FL (ref 82–98)
MONOCYTES # BLD AUTO: 0.39 THOUSAND/ΜL (ref 0.17–1.22)
MONOCYTES NFR BLD AUTO: 6 % (ref 4–12)
NEUTROPHILS # BLD AUTO: 3.77 THOUSANDS/ΜL (ref 1.85–7.62)
NEUTS SEG NFR BLD AUTO: 60 % (ref 43–75)
NONHDLC SERPL-MCNC: 119 MG/DL
NRBC BLD AUTO-RTO: 0 /100 WBCS
PLATELET # BLD AUTO: 267 THOUSANDS/UL (ref 149–390)
PMV BLD AUTO: 9.6 FL (ref 8.9–12.7)
POTASSIUM SERPL-SCNC: 4.4 MMOL/L (ref 3.5–5.3)
PROT SERPL-MCNC: 8.1 G/DL (ref 6.4–8.2)
RBC # BLD AUTO: 4.62 MILLION/UL (ref 3.81–5.12)
SODIUM SERPL-SCNC: 137 MMOL/L (ref 136–145)
T4 SERPL-MCNC: 7.4 UG/DL (ref 4.7–13.3)
TRIGL SERPL-MCNC: 119 MG/DL
TSH SERPL DL<=0.05 MIU/L-ACNC: 1.47 UIU/ML (ref 0.36–3.74)
WBC # BLD AUTO: 6.29 THOUSAND/UL (ref 4.31–10.16)

## 2020-11-27 PROCEDURE — NC001 PR NO CHARGE: Performed by: ORTHOPAEDIC SURGERY

## 2020-11-27 PROCEDURE — 85025 COMPLETE CBC W/AUTO DIFF WBC: CPT

## 2020-11-27 PROCEDURE — 83036 HEMOGLOBIN GLYCOSYLATED A1C: CPT

## 2020-11-27 PROCEDURE — 84443 ASSAY THYROID STIM HORMONE: CPT

## 2020-11-27 PROCEDURE — 84436 ASSAY OF TOTAL THYROXINE: CPT

## 2020-11-27 PROCEDURE — 64721 CARPAL TUNNEL SURGERY: CPT | Performed by: PHYSICIAN ASSISTANT

## 2020-11-27 PROCEDURE — 81025 URINE PREGNANCY TEST: CPT | Performed by: ORTHOPAEDIC SURGERY

## 2020-11-27 PROCEDURE — 80053 COMPREHEN METABOLIC PANEL: CPT

## 2020-11-27 PROCEDURE — 64718 REVISE ULNAR NERVE AT ELBOW: CPT | Performed by: ORTHOPAEDIC SURGERY

## 2020-11-27 PROCEDURE — 64721 CARPAL TUNNEL SURGERY: CPT | Performed by: ORTHOPAEDIC SURGERY

## 2020-11-27 PROCEDURE — 36415 COLL VENOUS BLD VENIPUNCTURE: CPT

## 2020-11-27 PROCEDURE — 80061 LIPID PANEL: CPT

## 2020-11-27 PROCEDURE — 64718 REVISE ULNAR NERVE AT ELBOW: CPT | Performed by: PHYSICIAN ASSISTANT

## 2020-11-27 RX ORDER — OXYCODONE HYDROCHLORIDE 5 MG/1
5 TABLET ORAL EVERY 4 HOURS PRN
Status: DISCONTINUED | OUTPATIENT
Start: 2020-11-27 | End: 2020-11-27 | Stop reason: HOSPADM

## 2020-11-27 RX ORDER — SODIUM CHLORIDE 9 MG/ML
125 INJECTION, SOLUTION INTRAVENOUS CONTINUOUS
Status: DISCONTINUED | OUTPATIENT
Start: 2020-11-27 | End: 2020-11-27 | Stop reason: HOSPADM

## 2020-11-27 RX ORDER — HYDROMORPHONE HCL/PF 1 MG/ML
0.5 SYRINGE (ML) INJECTION
Status: DISCONTINUED | OUTPATIENT
Start: 2020-11-27 | End: 2020-11-27 | Stop reason: HOSPADM

## 2020-11-27 RX ORDER — CEFAZOLIN SODIUM 2 G/50ML
2000 SOLUTION INTRAVENOUS ONCE
Status: COMPLETED | OUTPATIENT
Start: 2020-11-27 | End: 2020-11-27

## 2020-11-27 RX ORDER — FENTANYL CITRATE 50 UG/ML
INJECTION, SOLUTION INTRAMUSCULAR; INTRAVENOUS AS NEEDED
Status: DISCONTINUED | OUTPATIENT
Start: 2020-11-27 | End: 2020-11-27

## 2020-11-27 RX ORDER — MEPERIDINE HYDROCHLORIDE 25 MG/ML
12.5 INJECTION INTRAMUSCULAR; INTRAVENOUS; SUBCUTANEOUS ONCE AS NEEDED
Status: DISCONTINUED | OUTPATIENT
Start: 2020-11-27 | End: 2020-11-27 | Stop reason: HOSPADM

## 2020-11-27 RX ORDER — OXYCODONE HYDROCHLORIDE 5 MG/1
10 TABLET ORAL EVERY 4 HOURS PRN
Status: DISCONTINUED | OUTPATIENT
Start: 2020-11-27 | End: 2020-11-27 | Stop reason: HOSPADM

## 2020-11-27 RX ORDER — ONDANSETRON 2 MG/ML
4 INJECTION INTRAMUSCULAR; INTRAVENOUS ONCE AS NEEDED
Status: DISCONTINUED | OUTPATIENT
Start: 2020-11-27 | End: 2020-11-27 | Stop reason: HOSPADM

## 2020-11-27 RX ORDER — FENTANYL CITRATE/PF 50 MCG/ML
50 SYRINGE (ML) INJECTION
Status: DISCONTINUED | OUTPATIENT
Start: 2020-11-27 | End: 2020-11-27 | Stop reason: HOSPADM

## 2020-11-27 RX ORDER — DEXAMETHASONE SODIUM PHOSPHATE 4 MG/ML
INJECTION, SOLUTION INTRA-ARTICULAR; INTRALESIONAL; INTRAMUSCULAR; INTRAVENOUS; SOFT TISSUE AS NEEDED
Status: DISCONTINUED | OUTPATIENT
Start: 2020-11-27 | End: 2020-11-27

## 2020-11-27 RX ORDER — MAGNESIUM HYDROXIDE 1200 MG/15ML
LIQUID ORAL AS NEEDED
Status: DISCONTINUED | OUTPATIENT
Start: 2020-11-27 | End: 2020-11-27 | Stop reason: HOSPADM

## 2020-11-27 RX ORDER — SCOLOPAMINE TRANSDERMAL SYSTEM 1 MG/1
1 PATCH, EXTENDED RELEASE TRANSDERMAL ONCE AS NEEDED
Status: DISCONTINUED | OUTPATIENT
Start: 2020-11-27 | End: 2020-11-27 | Stop reason: HOSPADM

## 2020-11-27 RX ORDER — OXYCODONE HYDROCHLORIDE 5 MG/1
5 TABLET ORAL EVERY 4 HOURS PRN
Qty: 10 TABLET | Refills: 0 | Status: SHIPPED | OUTPATIENT
Start: 2020-11-27 | End: 2020-12-07

## 2020-11-27 RX ORDER — ONDANSETRON 2 MG/ML
INJECTION INTRAMUSCULAR; INTRAVENOUS AS NEEDED
Status: DISCONTINUED | OUTPATIENT
Start: 2020-11-27 | End: 2020-11-27

## 2020-11-27 RX ORDER — MIDAZOLAM HYDROCHLORIDE 2 MG/2ML
INJECTION, SOLUTION INTRAMUSCULAR; INTRAVENOUS AS NEEDED
Status: DISCONTINUED | OUTPATIENT
Start: 2020-11-27 | End: 2020-11-27

## 2020-11-27 RX ORDER — LIDOCAINE HYDROCHLORIDE 20 MG/ML
INJECTION, SOLUTION EPIDURAL; INFILTRATION; INTRACAUDAL; PERINEURAL AS NEEDED
Status: DISCONTINUED | OUTPATIENT
Start: 2020-11-27 | End: 2020-11-27

## 2020-11-27 RX ORDER — PROPOFOL 10 MG/ML
INJECTION, EMULSION INTRAVENOUS AS NEEDED
Status: DISCONTINUED | OUTPATIENT
Start: 2020-11-27 | End: 2020-11-27

## 2020-11-27 RX ADMIN — DEXAMETHASONE SODIUM PHOSPHATE 4 MG: 4 INJECTION INTRA-ARTICULAR; INTRALESIONAL; INTRAMUSCULAR; INTRAVENOUS; SOFT TISSUE at 10:08

## 2020-11-27 RX ADMIN — FENTANYL CITRATE 50 MCG: 50 INJECTION, SOLUTION INTRAMUSCULAR; INTRAVENOUS at 10:21

## 2020-11-27 RX ADMIN — FENTANYL CITRATE 50 MCG: 50 INJECTION INTRAMUSCULAR; INTRAVENOUS at 11:26

## 2020-11-27 RX ADMIN — FENTANYL CITRATE 50 MCG: 50 INJECTION INTRAMUSCULAR; INTRAVENOUS at 11:19

## 2020-11-27 RX ADMIN — HYDROMORPHONE HYDROCHLORIDE 0.5 MG: 1 INJECTION, SOLUTION INTRAMUSCULAR; INTRAVENOUS; SUBCUTANEOUS at 11:33

## 2020-11-27 RX ADMIN — FENTANYL CITRATE 50 MCG: 50 INJECTION, SOLUTION INTRAMUSCULAR; INTRAVENOUS at 10:30

## 2020-11-27 RX ADMIN — SODIUM CHLORIDE 125 ML/HR: 0.9 INJECTION, SOLUTION INTRAVENOUS at 09:39

## 2020-11-27 RX ADMIN — FENTANYL CITRATE 100 MCG: 50 INJECTION, SOLUTION INTRAMUSCULAR; INTRAVENOUS at 10:05

## 2020-11-27 RX ADMIN — PROPOFOL 200 MG: 10 INJECTION, EMULSION INTRAVENOUS at 10:05

## 2020-11-27 RX ADMIN — ONDANSETRON 4 MG: 2 INJECTION INTRAMUSCULAR; INTRAVENOUS at 10:08

## 2020-11-27 RX ADMIN — CEFAZOLIN SODIUM 2000 MG: 2 SOLUTION INTRAVENOUS at 09:55

## 2020-11-27 RX ADMIN — SCOPALAMINE 1 PATCH: 1 PATCH, EXTENDED RELEASE TRANSDERMAL at 10:00

## 2020-11-27 RX ADMIN — SODIUM CHLORIDE 125 ML/HR: 0.9 INJECTION, SOLUTION INTRAVENOUS at 11:30

## 2020-11-27 RX ADMIN — MIDAZOLAM 4 MG: 1 INJECTION INTRAMUSCULAR; INTRAVENOUS at 10:02

## 2020-11-27 RX ADMIN — SODIUM CHLORIDE: 0.9 INJECTION, SOLUTION INTRAVENOUS at 10:53

## 2020-11-27 RX ADMIN — OXYCODONE HYDROCHLORIDE 5 MG: 5 TABLET ORAL at 12:51

## 2020-11-27 RX ADMIN — LIDOCAINE HYDROCHLORIDE 10 MG: 20 INJECTION, SOLUTION EPIDURAL; INFILTRATION; INTRACAUDAL; PERINEURAL at 10:05

## 2020-11-27 RX ADMIN — HYDROMORPHONE HYDROCHLORIDE 0.5 MG: 1 INJECTION, SOLUTION INTRAMUSCULAR; INTRAVENOUS; SUBCUTANEOUS at 11:45

## 2020-11-28 ENCOUNTER — TELEPHONE (OUTPATIENT)
Dept: OTHER | Facility: OTHER | Age: 35
End: 2020-11-28

## 2020-12-03 ENCOUNTER — OFFICE VISIT (OUTPATIENT)
Dept: FAMILY MEDICINE CLINIC | Facility: CLINIC | Age: 35
End: 2020-12-03
Payer: COMMERCIAL

## 2020-12-03 VITALS
SYSTOLIC BLOOD PRESSURE: 110 MMHG | BODY MASS INDEX: 34.07 KG/M2 | DIASTOLIC BLOOD PRESSURE: 68 MMHG | WEIGHT: 212 LBS | HEIGHT: 66 IN | TEMPERATURE: 97.3 F

## 2020-12-03 DIAGNOSIS — M32.9 LUPUS (HCC): ICD-10-CM

## 2020-12-03 DIAGNOSIS — F41.8 DEPRESSION WITH ANXIETY: ICD-10-CM

## 2020-12-03 DIAGNOSIS — E03.9 ACQUIRED HYPOTHYROIDISM: ICD-10-CM

## 2020-12-03 DIAGNOSIS — E66.01 CLASS 2 SEVERE OBESITY DUE TO EXCESS CALORIES WITH SERIOUS COMORBIDITY AND BODY MASS INDEX (BMI) OF 35.0 TO 35.9 IN ADULT (HCC): ICD-10-CM

## 2020-12-03 DIAGNOSIS — K29.50 CHRONIC GASTRITIS WITHOUT BLEEDING, UNSPECIFIED GASTRITIS TYPE: ICD-10-CM

## 2020-12-03 DIAGNOSIS — E55.9 VITAMIN D DEFICIENCY: ICD-10-CM

## 2020-12-03 DIAGNOSIS — E03.9 ADULT HYPOTHYROIDISM: Primary | ICD-10-CM

## 2020-12-03 DIAGNOSIS — Z96.0 HISTORY OF PUBOVAGINAL SLING: ICD-10-CM

## 2020-12-03 PROCEDURE — 99214 OFFICE O/P EST MOD 30 MIN: CPT | Performed by: FAMILY MEDICINE

## 2020-12-03 RX ORDER — LEVOTHYROXINE SODIUM 0.03 MG/1
25 TABLET ORAL
Qty: 90 TABLET | Refills: 1 | Status: SHIPPED | OUTPATIENT
Start: 2020-12-03 | End: 2021-06-24 | Stop reason: SDUPTHER

## 2020-12-04 ENCOUNTER — OFFICE VISIT (OUTPATIENT)
Dept: PODIATRY | Facility: CLINIC | Age: 35
End: 2020-12-04
Payer: COMMERCIAL

## 2020-12-04 VITALS — HEIGHT: 66 IN | BODY MASS INDEX: 33.75 KG/M2 | WEIGHT: 210 LBS

## 2020-12-04 DIAGNOSIS — L60.0 INGROWN TOENAIL: Primary | ICD-10-CM

## 2020-12-04 DIAGNOSIS — B35.3 TINEA PEDIS OF BOTH FEET: ICD-10-CM

## 2020-12-04 PROCEDURE — 11750 EXCISION NAIL&NAIL MATRIX: CPT | Performed by: PODIATRIST

## 2020-12-04 RX ORDER — LIDOCAINE HYDROCHLORIDE 10 MG/ML
3 INJECTION, SOLUTION EPIDURAL; INFILTRATION; INTRACAUDAL; PERINEURAL ONCE
Status: COMPLETED | OUTPATIENT
Start: 2020-12-04 | End: 2020-12-04

## 2020-12-04 RX ADMIN — LIDOCAINE HYDROCHLORIDE 3 ML: 10 INJECTION, SOLUTION EPIDURAL; INFILTRATION; INTRACAUDAL; PERINEURAL at 11:36

## 2020-12-10 ENCOUNTER — TELEMEDICINE (OUTPATIENT)
Dept: PSYCHIATRY | Facility: CLINIC | Age: 35
End: 2020-12-10
Payer: COMMERCIAL

## 2020-12-10 ENCOUNTER — OFFICE VISIT (OUTPATIENT)
Dept: OBGYN CLINIC | Facility: MEDICAL CENTER | Age: 35
End: 2020-12-10

## 2020-12-10 VITALS
DIASTOLIC BLOOD PRESSURE: 71 MMHG | WEIGHT: 210 LBS | SYSTOLIC BLOOD PRESSURE: 106 MMHG | BODY MASS INDEX: 33.75 KG/M2 | HEART RATE: 82 BPM | TEMPERATURE: 97.2 F | HEIGHT: 66 IN

## 2020-12-10 VITALS — HEIGHT: 66 IN | BODY MASS INDEX: 33.75 KG/M2 | WEIGHT: 210 LBS

## 2020-12-10 DIAGNOSIS — Z47.89 AFTERCARE FOLLOWING SURGERY OF THE MUSCULOSKELETAL SYSTEM: ICD-10-CM

## 2020-12-10 DIAGNOSIS — F40.10 SOCIAL PHOBIA: ICD-10-CM

## 2020-12-10 DIAGNOSIS — F41.8 DEPRESSION WITH ANXIETY: ICD-10-CM

## 2020-12-10 DIAGNOSIS — E66.01 CLASS 2 SEVERE OBESITY DUE TO EXCESS CALORIES WITH SERIOUS COMORBIDITY AND BODY MASS INDEX (BMI) OF 35.0 TO 35.9 IN ADULT (HCC): ICD-10-CM

## 2020-12-10 DIAGNOSIS — F41.1 GAD (GENERALIZED ANXIETY DISORDER): ICD-10-CM

## 2020-12-10 DIAGNOSIS — F51.13 HYPERSOMNIA DUE TO MENTAL DISORDER: ICD-10-CM

## 2020-12-10 DIAGNOSIS — G56.21 CUBITAL TUNNEL SYNDROME, RIGHT: ICD-10-CM

## 2020-12-10 DIAGNOSIS — F33.2 MAJOR DEPRESSIVE DISORDER, RECURRENT, SEVERE W/O PSYCHOTIC BEHAVIOR (HCC): Primary | ICD-10-CM

## 2020-12-10 DIAGNOSIS — G56.01 CARPAL TUNNEL SYNDROME ON RIGHT: Primary | ICD-10-CM

## 2020-12-10 PROCEDURE — 90833 PSYTX W PT W E/M 30 MIN: CPT | Performed by: NURSE PRACTITIONER

## 2020-12-10 PROCEDURE — 99214 OFFICE O/P EST MOD 30 MIN: CPT | Performed by: NURSE PRACTITIONER

## 2020-12-10 PROCEDURE — 99024 POSTOP FOLLOW-UP VISIT: CPT | Performed by: ORTHOPAEDIC SURGERY

## 2020-12-10 RX ORDER — CLONAZEPAM 0.5 MG/1
TABLET ORAL
Qty: 60 TABLET | Refills: 2 | Status: SHIPPED | OUTPATIENT
Start: 2020-12-10 | End: 2021-03-22

## 2020-12-10 RX ORDER — MODAFINIL 200 MG/1
200 TABLET ORAL DAILY
Qty: 30 TABLET | Refills: 2 | Status: SHIPPED | OUTPATIENT
Start: 2020-12-10 | End: 2021-03-22

## 2020-12-18 ENCOUNTER — OFFICE VISIT (OUTPATIENT)
Dept: PODIATRY | Facility: CLINIC | Age: 35
End: 2020-12-18
Payer: COMMERCIAL

## 2020-12-18 VITALS
SYSTOLIC BLOOD PRESSURE: 115 MMHG | HEIGHT: 66 IN | BODY MASS INDEX: 33.75 KG/M2 | HEART RATE: 72 BPM | WEIGHT: 210 LBS | TEMPERATURE: 98.2 F | DIASTOLIC BLOOD PRESSURE: 78 MMHG

## 2020-12-18 DIAGNOSIS — L60.0 INGROWN TOENAIL: Primary | ICD-10-CM

## 2020-12-18 PROCEDURE — 99212 OFFICE O/P EST SF 10 MIN: CPT | Performed by: PODIATRIST

## 2021-01-06 ENCOUNTER — OFFICE VISIT (OUTPATIENT)
Dept: BARIATRICS | Facility: CLINIC | Age: 36
End: 2021-01-06
Payer: COMMERCIAL

## 2021-01-06 VITALS
HEIGHT: 66 IN | SYSTOLIC BLOOD PRESSURE: 110 MMHG | HEART RATE: 82 BPM | WEIGHT: 214.2 LBS | TEMPERATURE: 99.4 F | BODY MASS INDEX: 34.42 KG/M2 | DIASTOLIC BLOOD PRESSURE: 78 MMHG

## 2021-01-06 DIAGNOSIS — E66.01 SEVERE OBESITY (HCC): Primary | ICD-10-CM

## 2021-01-06 DIAGNOSIS — K29.50 CHRONIC GASTRITIS WITHOUT BLEEDING, UNSPECIFIED GASTRITIS TYPE: ICD-10-CM

## 2021-01-06 PROBLEM — E66.811 CLASS 1 OBESITY: Status: RESOLVED | Noted: 2020-08-27 | Resolved: 2021-01-06

## 2021-01-06 PROBLEM — E66.9 CLASS 1 OBESITY: Status: RESOLVED | Noted: 2020-08-27 | Resolved: 2021-01-06

## 2021-01-06 PROCEDURE — 99213 OFFICE O/P EST LOW 20 MIN: CPT | Performed by: PHYSICIAN ASSISTANT

## 2021-01-06 NOTE — PROGRESS NOTES
Assessment/Plan:    Severe obesity ([facility] [address] )  -Patient is pursuing Conservative Program  -Initial weight loss goal of 5-10% weight loss for improved health  -Has interest in bariatric surgery, but does not meet criteria  She was referred to sleep medicine for eval but had decided to hold off on this  -Avoid phentermine: on modofinil  -Caution Topamax: kidney stones (multiple)  -Avoid Wellbutrin- worsened depression   -Screening labs: reviewed/UTD  -Patient denied personal and family history of MEN2 tumors and medullary thyroid/thyroid carcinoma  Patient denies personal history of pancreatitis  Elwanda Sober started 10/14/20 (217)  Has issues with nausea related to Elwanda Sober  She came off of the medication for about 2-3 weeks and has now restarted again and is at 0 6mg  Advised she only titrate the dose once every 4 weeks  Will continue to monitor weight loss  If not meeting required weight loss, will d/c   -reviewed benefits of food logging, interval eating     Initial: 216 8  Current: 214 2  Change: -2 6 lbs  Goal: 160s, STG under 200 lbs    Chronic gastritis without bleeding  -followed by PCP  -now on PPI  -avoid food triggers, large portions      Goals:    Food log (ie ) www myfitnesspal com,sparkpeople  com,loseit com,calorieking  com,etc    No sugary beverages  At least 64oz of water daily  Increase physical activity by 10 minutes daily  Gradually increase physical activity to a goal of 5 days per week for 30 minutes of MODERATE intensity PLUS 2 days per week of FULL BODY resistance training  6580-1035 calories per day  5-10 servings of fruits and vegetables per day  85 grams of protein per  day    Follow up in approximately 6-8 weeks with Non-Surgical Physician/Advanced Practitioner       Diagnoses and all orders for this visit:    [facility]    Chronic gastritis without bleeding, unspecified gastritis type          Subjective:   Chief Complaint   Patient presents with    Follow-up     mwm 2 month follow up Patient ID: Lawton Blizzard  is a 28 y o  female with excess weight/obesity here to pursue weight managment  Patient is pursuing Conservative Program      HPI Patient presents for MW follow up  Reports being off of [facility] [address] [place] [address] for about 2-3 weeks  Has been out of work for past ~6 weeks due to recent surgery for cubital tunnel syndrome  Due to not working, there has been less structure with meals     Food logging: not for the past 3 weeks   Exercise: denies  Hydration: 64 oz of water; large coffee + caramel sweetener and creamer    B: breakfast sandwich from [address] DD  S: skips  L: reports doesn't have a structured meal OR salad if working  S: skips  D:  Skips  S:        The following portions of the patient's history were reviewed and updated as appropriate: allergies, current medications, past family history, past medical history, past social history, past surgical history and problem list     Review of Systems   Respiratory: Negative  Cardiovascular: Negative  Gastrointestinal: Positive for nausea  Negative for vomiting  Objective:    /78 (BP Location: Left arm, Patient Position: Sitting, Cuff Size: Large)   Pulse 82   Temp 99 4 °F (37 4 °C)   Ht 5' 5 5" (1 664 m)   Wt 97 2 kg (214 lb 3 2 oz)   BMI 35 10 kg/m²      Physical Exam  Vitals signs and nursing note reviewed  Constitutional:       General: She is not in acute distress  Appearance: She is not toxic-appearing  HENT:      Head: Normocephalic and atraumatic  Eyes:      General: No scleral icterus  Pulmonary:      Effort: Pulmonary effort is normal  No respiratory distress  Neurological:      General: No focal deficit present  Mental Status: She is alert and oriented to person, place, and time  Psychiatric:         Mood and Affect: Mood normal          Thought Content:  Thought content normal          Judgment: Judgment normal

## 2021-01-06 NOTE — ASSESSMENT & PLAN NOTE
-Patient is pursuing Conservative Program  -Initial weight loss goal of 5-10% weight loss for improved health  -Has interest in bariatric surgery, but does not meet criteria  She was referred to sleep medicine for eval but had decided to hold off on this  -Avoid phentermine: on modofinil  -Caution Topamax: kidney stones (multiple)  -Avoid Wellbutrin- worsened depression   -Screening labs: reviewed/UTD  -Patient denied personal and family history of MEN2 tumors and medullary thyroid/thyroid carcinoma  Patient denies personal history of pancreatitis  Tacey Herter started 10/14/20 (217)  Has issues with nausea related to Tacey Herter  She came off of the medication for about 2-3 weeks and has now restarted again and is at 0 6mg  Advised she only titrate the dose once every 4 weeks  Will continue to monitor weight loss   If not meeting required weight loss, will d/c   -reviewed benefits of food logging, interval eating     Initial: 216 8  Current: 214 2  Change: -2 6 lbs  Goal: 160s, STG under 200 lbs

## 2021-01-06 NOTE — PATIENT INSTRUCTIONS
Goals: Food log (ie ) www myfitnesspal com,sparkpeople  com,loseit com,calorieking  com,etc    No sugary beverages  At least 64oz of water daily  Increase physical activity by 10 minutes daily   Gradually increase physical activity to a goal of 5 days per week for 30 minutes of MODERATE intensity PLUS 2 days per week of FULL BODY resistance training  7343-1111 calories per day  5-10 servings of fruits and vegetables per day  85 grams of protein per  day

## 2021-01-07 ENCOUNTER — OFFICE VISIT (OUTPATIENT)
Dept: OBGYN CLINIC | Facility: MEDICAL CENTER | Age: 36
End: 2021-01-07

## 2021-01-07 VITALS
WEIGHT: 215 LBS | BODY MASS INDEX: 34.55 KG/M2 | HEIGHT: 66 IN | DIASTOLIC BLOOD PRESSURE: 73 MMHG | SYSTOLIC BLOOD PRESSURE: 116 MMHG | HEART RATE: 78 BPM

## 2021-01-07 DIAGNOSIS — G56.01 CARPAL TUNNEL SYNDROME ON RIGHT: Primary | ICD-10-CM

## 2021-01-07 DIAGNOSIS — G56.21 CUBITAL TUNNEL SYNDROME, RIGHT: ICD-10-CM

## 2021-01-07 PROCEDURE — 99024 POSTOP FOLLOW-UP VISIT: CPT | Performed by: ORTHOPAEDIC SURGERY

## 2021-01-07 NOTE — PROGRESS NOTES
History of the Present Illness     Houston Booker is a 28 y o  female presents to the office 6 weeks status post right carpal and cubital tunnel release  Patient states that her pain and numbness has not improved since last visit  Patient notes the greatest amount of numbness is about both incision sites  Patient stated she still experiences some swelling at both areas  Patient noted she is experiences a clicking sensation when rotating her wrist that she did not experience prior to surgery  Physical Exam     /73   Pulse 78   Ht 5' 5 5" (1 664 m)   Wt 97 5 kg (215 lb)   BMI 35 23 kg/m²     Right Upper Extremity:  Well healed surgical incisions  No erythema or ecchymosis   Palmar cutaneous nerve sensation in tact to light touch   5/5 Motor to the APB, FDI, FDP2, FDP5, EDC  Sensation intact to light touch in the median, radial, and ulnar nerve distribution  Brisk capillary refill  Palpable distal radial pulse     Data Review       Imaging:  None today     Assessment and Plan       28 y o  female patient 6 weeks status post right carpal and cubital tunnel releases  I discussed that she begin light duty at work with restrictions  She may only lift up to 20 pounds at this time  I am allowing her to begin  duties but no direct patient handling  We discussed hand therapy to work on edema control  I will see her back in office in one month to reevaluate        Follow Up: 1 month    To Do Next Visit: re-evaluate right wrist and elbow     PROCEDURES PERFORMED:  No Procedures performed today      Scribe Attestation    I,:  Monique Alvarez am acting as a scribe while in the presence of the attending physician :       I,:  Elizabeth Yan MD personally performed the services described in this documentation    as scribed in my presence :

## 2021-01-07 NOTE — LETTER
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

## 2021-01-13 ENCOUNTER — IMMUNIZATIONS (OUTPATIENT)
Dept: FAMILY MEDICINE CLINIC | Facility: HOSPITAL | Age: 36
End: 2021-01-13

## 2021-01-13 DIAGNOSIS — Z23 ENCOUNTER FOR IMMUNIZATION: ICD-10-CM

## 2021-01-13 PROCEDURE — 91301 SARS-COV-2 / COVID-19 MRNA VACCINE (MODERNA) 100 MCG: CPT

## 2021-01-13 PROCEDURE — 0011A SARS-COV-2 / COVID-19 MRNA VACCINE (MODERNA) 100 MCG: CPT

## 2021-01-14 DIAGNOSIS — Z23 ENCOUNTER FOR IMMUNIZATION: ICD-10-CM

## 2021-01-19 ENCOUNTER — EVALUATION (OUTPATIENT)
Dept: PHYSICAL THERAPY | Facility: MEDICAL CENTER | Age: 36
End: 2021-01-19
Payer: COMMERCIAL

## 2021-01-19 DIAGNOSIS — G56.01 CARPAL TUNNEL SYNDROME ON RIGHT: ICD-10-CM

## 2021-01-19 DIAGNOSIS — G56.21 CUBITAL TUNNEL SYNDROME, RIGHT: ICD-10-CM

## 2021-01-19 PROCEDURE — 97161 PT EVAL LOW COMPLEX 20 MIN: CPT | Performed by: PHYSICAL THERAPIST

## 2021-01-19 PROCEDURE — 97110 THERAPEUTIC EXERCISES: CPT | Performed by: PHYSICAL THERAPIST

## 2021-01-19 PROCEDURE — 97140 MANUAL THERAPY 1/> REGIONS: CPT | Performed by: PHYSICAL THERAPIST

## 2021-01-19 NOTE — PROGRESS NOTES
PT Evaluation     Today's date: 2021  Patient name: Racquel Madrid  : [id number]  MRN: [id number]  Referring provider: Keisha Burkett MD  Dx:   Encounter Diagnosis     ICD-10-CM    1  Carpal tunnel syndrome on right  G56 01 Ambulatory referral to PT/OT hand therapy   2  Cubital tunnel syndrome, right  G56 21 Ambulatory referral to PT/OT hand therapy                  Assessment  Assessment details: Ms Cathy Bautista is a 28 y o  female who presents today for physical therapy evaluation s/p R cubital and carpal tunnel releases 2020  No further referral appears necessary at this time based upon examination findings  Patient presents with mild adhesions at incision sites  Patient presents with no loss of mobility, myotomes symmetrical bilaterally, but  strength is diminished compared to non-dominant hand  Patient was provided with tubigrip for compressoin at the medial elbow which provided her with relief  Patient is an excellent candidate for outpatient physical therapy services to address the observed impairments to reduce pain and maximize functional mobility  Prognosis is good given compliance with PT attendance and HEP performance  Discussed ergonomic today for work  Please contact me with any questions  Thank you for the referral    Impairments: abnormal or restricted ROM, activity intolerance, lacks appropriate home exercise program and pain with function  Barriers to therapy: lupus  Understanding of Dx/Px/POC: good   Prognosis: good    Goals  1  Patient will be independent in individualize HEP  2  Patient will report reduction in symptoms to 1/10   3  Patient will be able to perform all ADLs/IADLs at [facility]  4  Patient will achieve score on FOTO by MDIC       Plan  Patient would benefit from: skilled physical therapy  Planned modality interventions: thermotherapy: hydrocollator packs  Planned therapy interventions: patient education, strengthening, stretching, therapeutic activities, therapeutic exercise, home exercise program, functional ROM exercises, activity modification, manual therapy and neuromuscular re-education  Frequency: 1x week  Duration in weeks: 6  Plan of Care beginning date: 2021  Plan of Care expiration date: 2021  Treatment plan discussed with: patient        Subjective Evaluation    History of Present Illness  Date of surgery: 2020  Mechanism of injury: surgery  Mechanism of injury: Ms Era Matamoros is a 28 y o  female who presents today s/p R cubital and carpal tunnel releases 2020  Patient reports a chronic history of right UE paraesthesias and pain  Patient did previously have splinting and physical therapy with no long term results  Patient elected for surgical intervention for chronic symptoms  Patient denies any complications at the time of surgery and was discharged home that same day  Patient reports much improvement in numbness/tingling but does have occasional shooting pain  She reports pain ta her incision sites  Patient states her motion is good but does feel she has loss of strength  Patient states hygiene, hair, and driving are difficult at times secondary to pain and mobility  Patient states she is able to perform all ADLs/IADLs but is concern that her symptoms are going to return to what they were prior to the surgery  Pain  Current pain ratin  At best pain rating: 3  At worst pain ratin  Quality: numb  Alleviating factors: celebrex, tylenol prn  Exacerbated by: hair, driving, hygiene, mouse  Progression: improved    Social Support    Employment status: working (medical assistant)  Hand dominance: right    Treatments  Previous treatment: physical therapy and immobilization  Patient Goals  Patient goals for therapy: decreased pain, increased strength and independence with ADLs/IADLs  Patient's goals regarding treatment: perform ADL/IADLs and hobbies without pain          Objective     Observations     Additional Observation Details  Incision on right medial elbow and volar surface of hand well healed and Incision on volar surface of hand well healed    Tenderness     Additional Tenderness Details  (+) TTP at incision sites    Neurological Testing     Additional Neurological Details  Monofilament testing:  Diminished light through throughout right hand and forearm    Myotomes intact and symmetrical bilateral UE    Active Range of Motion     Right Elbow   Flexion: 135 degrees   Extension: 0 degrees     Right Wrist   Wrist flexion: 50 degrees with pain  Wrist extension: 70 degrees   Radial deviation: WFL  Ulnar deviation: WFL    Passive Range of Motion     Additional Passive Range of Motion Details  extrinsic flexors and extensors WNL    Strength/Myotome Testing     Left Elbow   Normal strength    Right Elbow   Normal strength    Left Wrist/Hand   Normal wrist strength     (2nd hand position)     Trial 1: 90    Right Wrist/Hand   Normal wrist strength     (2nd hand position)     Trial 1: 55    Tests     Right Wrist/Hand   Negative AIN OK sign, extrinsic extensor tightness, extrinsic flexor tightness, Froment's sign and teardrop pinch         Flowsheet Rows      Most Recent Value   PT/OT G-Codes   Current Score  67   Projected Score  79   FOTO information reviewed  Yes   Assessment Type  Evaluation             Precautions: lupus  DOS: 11/27/2020    Manuals 1/19            Scar massage x5'            PNG's x3'                                      Neuro Re-Ed                                                                                                        Ther Ex             Wand chest press x20 OH nv            Ball rolls at [employer] and abd x15 ea            UBE nv            Finer web             Grooved pegs                                       HEP instruction x15' + ergonomics            Ther Activity                                       Gait Training                                       Modalities             MHP R UE pre tx nv

## 2021-01-21 ENCOUNTER — SOCIAL WORK (OUTPATIENT)
Dept: BEHAVIORAL/MENTAL HEALTH CLINIC | Facility: CLINIC | Age: 36
End: 2021-01-21
Payer: COMMERCIAL

## 2021-01-21 DIAGNOSIS — F41.1 GAD (GENERALIZED ANXIETY DISORDER): Primary | ICD-10-CM

## 2021-01-21 DIAGNOSIS — F33.2 MAJOR DEPRESSIVE DISORDER, RECURRENT, SEVERE W/O PSYCHOTIC BEHAVIOR (HCC): ICD-10-CM

## 2021-01-21 PROCEDURE — 90834 PSYTX W PT 45 MINUTES: CPT | Performed by: SOCIAL WORKER

## 2021-01-21 NOTE — PSYCH
Psychotherapy Provided: Individual Psychotherapy 55 minutes     Length of time in session: 6:00 pm to 6:55 pm, follow up in 2 week    Goals addressed in session: Goal 1 and Goal 2     Pain:      moderate    6    Current suicide risk : Low     Therapist met with Jennifer Simmons  She discussed her thoughts and feelings regarding her relationship, and how she continues to feel about her boyfriend  She noted that she has thoughts about her past relationships and changes that she could have made in order to have a different life  She also explored her relationship with her mother and how she desires that to be different  Therapist and Jennifer Simmons will continue to explore relationships and changes that she would like to initiate during upcoming sessions  Behavioral Health Treatment Plan [facility]: Diagnosis and Treatment Plan explained to Eddy Quijano relates understanding diagnosis and is agreeable to Treatment Plan   Yes

## 2021-01-28 ENCOUNTER — OFFICE VISIT (OUTPATIENT)
Dept: PHYSICAL THERAPY | Facility: MEDICAL CENTER | Age: 36
End: 2021-01-28
Payer: COMMERCIAL

## 2021-01-28 DIAGNOSIS — G56.01 CARPAL TUNNEL SYNDROME ON RIGHT: Primary | ICD-10-CM

## 2021-01-28 DIAGNOSIS — G56.21 CUBITAL TUNNEL SYNDROME, RIGHT: ICD-10-CM

## 2021-01-28 PROCEDURE — 97110 THERAPEUTIC EXERCISES: CPT | Performed by: PHYSICAL THERAPIST

## 2021-01-28 PROCEDURE — 97010 HOT OR COLD PACKS THERAPY: CPT | Performed by: PHYSICAL THERAPIST

## 2021-01-28 PROCEDURE — 97140 MANUAL THERAPY 1/> REGIONS: CPT | Performed by: PHYSICAL THERAPIST

## 2021-01-28 NOTE — PROGRESS NOTES
Daily Note     Today's date: 2021  Patient name: Yosef Peterson  :   MRN: [id number]  Referring provider: Macario Ceja MD  Dx:   Encounter Diagnosis     ICD-10-CM    1  Carpal tunnel syndrome on right  G56 01    2  Cubital tunnel syndrome, right  G56 21                   Subjective: Patient states her arm feels "tight" at work  Objective: See treatment diary below      Assessment: Patient tolerated manual interventions well  Followed up with functional nerve mobility exercises  Patient reported mild increase in tenderness at volar incision  Monitor response nv  Plan: Continue per plan of care  Progress treatment as tolerated         Precautions: lupus  DOS: 2020    Manuals            Scar massage x5' x8'           PNG's x3' x5'                                     Neuro Re-Ed                                                                                                        Ther Ex             Wand chest press + OH flexion x20 OH nv x20           Ball rolls at [employer] and abd x15 ea x20           Wrist rolls ball nv x2'           Finger web             Grooved pegs             UBE                          HEP instruction x15' + ergonomics            Ther Activity                                       Gait Training                                       Modalities             MHP R UE pre tx nv x10'

## 2021-02-02 ENCOUNTER — APPOINTMENT (OUTPATIENT)
Dept: PHYSICAL THERAPY | Facility: MEDICAL CENTER | Age: 36
End: 2021-02-02
Payer: COMMERCIAL

## 2021-02-03 ENCOUNTER — OFFICE VISIT (OUTPATIENT)
Dept: PHYSICAL THERAPY | Facility: MEDICAL CENTER | Age: 36
End: 2021-02-03
Payer: COMMERCIAL

## 2021-02-03 DIAGNOSIS — G56.21 CUBITAL TUNNEL SYNDROME, RIGHT: ICD-10-CM

## 2021-02-03 DIAGNOSIS — E66.9 CLASS II OBESITY: ICD-10-CM

## 2021-02-03 DIAGNOSIS — G56.01 CARPAL TUNNEL SYNDROME ON RIGHT: Primary | ICD-10-CM

## 2021-02-03 PROCEDURE — 97110 THERAPEUTIC EXERCISES: CPT | Performed by: PHYSICAL THERAPIST

## 2021-02-03 PROCEDURE — 97010 HOT OR COLD PACKS THERAPY: CPT | Performed by: PHYSICAL THERAPIST

## 2021-02-03 PROCEDURE — 97140 MANUAL THERAPY 1/> REGIONS: CPT | Performed by: PHYSICAL THERAPIST

## 2021-02-03 NOTE — PROGRESS NOTES
Daily Note     Today's date: 2/3/2021  Patient name: Elan Abarca  :   MRN: [id number]  Referring provider: Juma Bear MD  Dx:   Encounter Diagnosis     ICD-10-CM    1  Carpal tunnel syndrome on right  G56 01    2  Cubital tunnel syndrome, right  G56 21                   Subjective: Patient reports sensitivity at her medial elbow incision  Objective: See treatment diary below      Assessment: Patient presents with no passive neural tension or loss of motion  She did have some irritation in the hand with UBE  Provided with new tubigrip for elbow  Monitor response nv  Plan: Continue per plan of care  Progress treatment as tolerated         Precautions: lupus  DOS: 2020    Manuals 1/19 1/28 2/3          Scar massage x5' x8' x8'          PNG's x3' x5' x5'                                    Neuro Re-Ed                                                                                                        Ther Ex             Wand chest press + OH flexion x20 OH nv x20 x20          Ball rolls at [employer] and abd x15 ea x20 x20          Wrist rolls ball nv x2' x2'          Finger web   Red x30          Grooved pegs   x1 board          UBE   x2'/2'                       HEP instruction x15' + ergonomics            Ther Activity                                       Gait Training                                       Modalities             MHP R UE pre tx nv x10' x10'

## 2021-02-04 ENCOUNTER — SOCIAL WORK (OUTPATIENT)
Dept: BEHAVIORAL/MENTAL HEALTH CLINIC | Facility: CLINIC | Age: 36
End: 2021-02-04
Payer: COMMERCIAL

## 2021-02-04 DIAGNOSIS — F33.2 MAJOR DEPRESSIVE DISORDER, RECURRENT, SEVERE W/O PSYCHOTIC BEHAVIOR (HCC): ICD-10-CM

## 2021-02-04 DIAGNOSIS — F41.1 GAD (GENERALIZED ANXIETY DISORDER): Primary | ICD-10-CM

## 2021-02-04 PROCEDURE — 90834 PSYTX W PT 45 MINUTES: CPT | Performed by: SOCIAL WORKER

## 2021-02-05 NOTE — PSYCH
Psychotherapy Provided: Individual Psychotherapy 60 minutes     Length of time in session: 6:05 pm to 7:05 pm, follow up in 2 week    Goals addressed in session: Goal 1 and Goal 2     Pain:      moderate to severe    6    Current suicide risk : Low     Therapist met with Chidi Hawkins  She shared that she is experiencing dissatisfaction with her current life status  She noted that she would like things to be different, but is also aware that this is within her control  Therapist and Chidi Hawkins explored her thoughts regarding her relationship and desire to change this  She also noted that she remains unsure of her desire to return to college  She will continue to explore these thoughts during upcoming sessions  Behavioral Health Treatment Plan [facility]: Diagnosis and Treatment Plan explained to Santino Monaco relates understanding diagnosis and is agreeable to Treatment Plan   Yes

## 2021-02-08 ENCOUNTER — TELEMEDICINE (OUTPATIENT)
Dept: PSYCHIATRY | Facility: CLINIC | Age: 36
End: 2021-02-08
Payer: COMMERCIAL

## 2021-02-08 VITALS — HEIGHT: 66 IN | BODY MASS INDEX: 34.55 KG/M2 | WEIGHT: 215 LBS

## 2021-02-08 DIAGNOSIS — F40.10 SOCIAL PHOBIA: ICD-10-CM

## 2021-02-08 DIAGNOSIS — Z15.89 HETEROZYGOUS FOR MTHFR GENE MUTATION: ICD-10-CM

## 2021-02-08 DIAGNOSIS — F51.13 HYPERSOMNIA DUE TO MENTAL DISORDER: ICD-10-CM

## 2021-02-08 DIAGNOSIS — E55.9 VITAMIN D DEFICIENCY: ICD-10-CM

## 2021-02-08 DIAGNOSIS — E03.9 ADULT HYPOTHYROIDISM: ICD-10-CM

## 2021-02-08 DIAGNOSIS — F41.1 GAD (GENERALIZED ANXIETY DISORDER): ICD-10-CM

## 2021-02-08 DIAGNOSIS — F33.2 MAJOR DEPRESSIVE DISORDER, RECURRENT, SEVERE W/O PSYCHOTIC BEHAVIOR (HCC): Primary | ICD-10-CM

## 2021-02-08 DIAGNOSIS — Z79.899 HIGH RISK MEDICATION USE: ICD-10-CM

## 2021-02-08 DIAGNOSIS — F41.8 DEPRESSION WITH ANXIETY: ICD-10-CM

## 2021-02-08 PROCEDURE — 90833 PSYTX W PT W E/M 30 MIN: CPT | Performed by: NURSE PRACTITIONER

## 2021-02-08 PROCEDURE — 99214 OFFICE O/P EST MOD 30 MIN: CPT | Performed by: NURSE PRACTITIONER
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
## 2021-02-08 NOTE — TELEPHONE ENCOUNTER
OPERATIVE REPORT    PATIENT NAME: Melly Paulino    :  1941  MRN: [id number]  Pt Location: [facility] OR ROOM 01    Surgery Date: 2021    Surgeon(s) and Role:     * Rosa Isela Andrade MD - Primary    Age-related nuclear cataract, left eye [H25 12]    Post-Op Diagnosis Codes:     * Age-related nuclear cataract, left eye [H25 12]    Procedure(s):  EXTRACTION EXTRACAPSULAR CATARACT PHACO INTRAOCULAR LENS (IOL)    Anesthesia Type:   IV Sedation with Anesthesia    Operative Indications:  Age-related nuclear cataract, left eye [H25 12]  Decreased vision to 20/70  With problems reading  Pt requested cataract sx the left eye    Procedure and Technique:    Procedure Details     The patient was brought in the OR in stable condition and placed on the operative table  The left eye was prepped and draped in the usual sterile manner  Attention was directed to the left eye where a lid speculum was placed  A 2 4 mm clear corneal incision was made temperally  1/2 cc of 1% MPF Lidocaine was irrigated into the anterior chamber followed by viscoat  The side port incision was placed superiorly  The capsularrhexis was made and the nucleus was hydrodissected with BSS  The nucleus was then removed with the phaco handpiece followed by removal of the cortical material with the I/A handpiece  The capsular bag was then filled with Provisc  The IOL was folded and placed in to the capsular bag and centered well  The remaining Provisc was removed from the eye with the I/A  The wounds were hydrated with BSS and found to be water tight  The lid speculum was removed and 2 drops of Gatifloxicin were placed over the cornea  A protective eye shield was taped over the eye and the patient went to PACU in stable condition  I will see the patient in the office tomorrow and the expected post op period is a few weeks         Complications: None        Disposition: PACU   Condition: Stable    SIGNATURE: Rosa Isela Andrade MD  DATE: 2021  TIME: Yoly Cespedes left a message that she would like [employer] information  8:47 AM

## 2021-02-09 ENCOUNTER — OFFICE VISIT (OUTPATIENT)
Dept: PHYSICAL THERAPY | Facility: MEDICAL CENTER | Age: 36
End: 2021-02-09
Payer: COMMERCIAL

## 2021-02-09 ENCOUNTER — IMMUNIZATIONS (OUTPATIENT)
Dept: FAMILY MEDICINE CLINIC | Facility: HOSPITAL | Age: 36
End: 2021-02-09

## 2021-02-09 DIAGNOSIS — G56.21 CUBITAL TUNNEL SYNDROME, RIGHT: ICD-10-CM

## 2021-02-09 DIAGNOSIS — Z23 ENCOUNTER FOR IMMUNIZATION: Primary | ICD-10-CM

## 2021-02-09 DIAGNOSIS — G56.01 CARPAL TUNNEL SYNDROME ON RIGHT: Primary | ICD-10-CM

## 2021-02-09 PROCEDURE — 97140 MANUAL THERAPY 1/> REGIONS: CPT | Performed by: PHYSICAL THERAPIST

## 2021-02-09 PROCEDURE — 91301 SARS-COV-2 / COVID-19 MRNA VACCINE (MODERNA) 100 MCG: CPT

## 2021-02-09 PROCEDURE — 97010 HOT OR COLD PACKS THERAPY: CPT | Performed by: PHYSICAL THERAPIST

## 2021-02-09 PROCEDURE — 97110 THERAPEUTIC EXERCISES: CPT | Performed by: PHYSICAL THERAPIST

## 2021-02-09 PROCEDURE — 0012A SARS-COV-2 / COVID-19 MRNA VACCINE (MODERNA) 100 MCG: CPT

## 2021-02-09 NOTE — PROGRESS NOTES
Daily Note     Today's date: 2021  Patient name: Boris Helms  :   MRN: [id number]  Referring provider: Lesley Anderson MD  Dx:   Encounter Diagnosis     ICD-10-CM    1  Carpal tunnel syndrome on right  G56 01    2  Cubital tunnel syndrome, right  G56 21                   Subjective: Patient states her primary limitation is the sensitivity at the medial elbow  She states when her sleeves brush up against the incision it is very uncomfortable  She reports interment "popping" at the elbow  Objective: See treatment diary below      Assessment: Extensive discussion and education regarding desensitization at home  Patient encouraged to continue to use compression for comfort  No ROM restrictions noted  Palmar incision softening with no significant adhesions noted  No significant adhesions at medial elbow incision just sensitive to touch  Plan: Continue per plan of care  Progress treatment as tolerated         Precautions: lupus  DOS: 2020    Manuals 1/19 1/28 2/3 2/9         Scar massage x5' x8' x8' x8' IASTM palmar         PNG's x3' x5' x5' x2'         desensitization    x5'                      Neuro Re-Ed                                                                                                        Ther Ex             Wand chest press + OH flexion x20 OH nv x20 x20 x20         Ball rolls at [employer] and abd x15 ea x20 x20 x20         Wrist rolls ball nv x2' x2' np         Finger web   Red x30 Red x30         Grooved pegs   x1 board x1 board         UBE   x2'/2' x2'/2'         desensitization instruction    x5'         HEP instruction x15' + ergonomics            Ther Activity                                       Gait Training                                       Modalities             MHP R UE pre tx nv x10' x10' x10'

## 2021-02-11 ENCOUNTER — OFFICE VISIT (OUTPATIENT)
Dept: OBGYN CLINIC | Facility: MEDICAL CENTER | Age: 36
End: 2021-02-11

## 2021-02-11 ENCOUNTER — TELEPHONE (OUTPATIENT)
Dept: PSYCHIATRY | Facility: CLINIC | Age: 36
End: 2021-02-11

## 2021-02-11 VITALS
SYSTOLIC BLOOD PRESSURE: 120 MMHG | HEIGHT: 66 IN | HEART RATE: 73 BPM | DIASTOLIC BLOOD PRESSURE: 73 MMHG | WEIGHT: 215 LBS | BODY MASS INDEX: 34.55 KG/M2

## 2021-02-11 DIAGNOSIS — G56.21 CUBITAL TUNNEL SYNDROME, RIGHT: ICD-10-CM

## 2021-02-11 DIAGNOSIS — G56.01 CARPAL TUNNEL SYNDROME ON RIGHT: Primary | ICD-10-CM

## 2021-02-11 DIAGNOSIS — Z47.89 AFTERCARE FOLLOWING SURGERY OF THE MUSCULOSKELETAL SYSTEM: ICD-10-CM

## 2021-02-11 PROCEDURE — 99024 POSTOP FOLLOW-UP VISIT: CPT | Performed by: ORTHOPAEDIC SURGERY

## 2021-02-11 NOTE — PROGRESS NOTES
History of the Present Illness     Светлана Mae is a 28 y o  female who presents today s/p right carpal and cubital tunnel release on 11/27/2020  Patient states her numbness and tingling has improved and she hasn't really had this  She does still have some soreness, primarily along elbow incision  Patient has been going to therapy  Patient states she did feel a pop the other day in the elbow  Physical Exam     /73   Pulse 73   Ht 5' 5 5" (1 664 m)   Wt 97 5 kg (215 lb)   BMI 35 23 kg/m²     Right Upper Extremity:  Incisions are well-healed  There is a palpable click along incision with elbow flexion  Hypersensitivity along cubital tunnel incision  Full elbow and wrist range of motion  5/5 Motor to the APB, FDI, FDP2, FDP5, EDC  Sensation intact to light touch in the median, radial, and ulnar nerve distribution  Data Review       Imaging:  No new imaging  Assessment and Plan     28 y o  female who presents today s/p right carpal and cubital tunnel release on 11/27/2020  There is slight subluxation along patient's right elbow that I believe is likely scar tissue, but explained that I would like to order a dynamic ultrasound for this to make sure her ulnar nerve is not subluxing  Continue with therapy to work on scar tissue and desensitization  Patient is already back to work  Follow Up:  After testing    To Do Next Visit: Go over ultrasound results    PROCEDURES PERFORMED:  Procedures  No Procedures performed today

## 2021-02-16 ENCOUNTER — OFFICE VISIT (OUTPATIENT)
Dept: PHYSICAL THERAPY | Facility: MEDICAL CENTER | Age: 36
End: 2021-02-16
Payer: COMMERCIAL

## 2021-02-16 DIAGNOSIS — G56.21 CUBITAL TUNNEL SYNDROME, RIGHT: ICD-10-CM

## 2021-02-16 DIAGNOSIS — G56.01 CARPAL TUNNEL SYNDROME ON RIGHT: Primary | ICD-10-CM

## 2021-02-16 PROCEDURE — 97010 HOT OR COLD PACKS THERAPY: CPT | Performed by: PHYSICAL THERAPIST

## 2021-02-16 PROCEDURE — 97140 MANUAL THERAPY 1/> REGIONS: CPT | Performed by: PHYSICAL THERAPIST

## 2021-02-16 PROCEDURE — 97110 THERAPEUTIC EXERCISES: CPT | Performed by: PHYSICAL THERAPIST

## 2021-02-16 NOTE — PROGRESS NOTES
Daily Note     Today's date: 2021  Patient name: Светлана Mae  : [birth year]  MRN: [id number]  Referring provider: Charli Myers MD  Dx:   Encounter Diagnosis     ICD-10-CM    1  Carpal tunnel syndrome on right  G56 01    2  Cubital tunnel syndrome, right  G56 21                   Subjective: Patient followed up with physician  She will receive an US for possible ulnar nerve subluxation  Overall she has no other complaints  Objective: See treatment diary below    Outcome measures: FOTO = 84 (improved from 67 at intake)    Assessment: Reviewed desensitization techniques for home  Trialed light IASTM to medial elbow incision which no issues  Also applied KT for medial elbow incision as she is most sensitive in this area  She was educated in any adverse reactions and wear time with verbal understanding  Patient educated in application of tape and provided with additional pieces to use as needed until next visit  Plan: Continue per plan of care  Progress treatment as tolerated         Precautions: lupus  DOS: 2020    Manuals 1/19 1/28 2/3 2/9 2/16        Scar massage x5' x8' x8' x8' IASTM palmar x8' IASTM         PNG's x3' x5' x5' x2' x2'        desensitization    x5' X5'+ KT                     Neuro Re-Ed                                                                                                        Ther Ex             Wand chest press + OH flexion x20 OH nv x20 x20 x20 x20        Ball rolls at plinth flex and abd x15 ea x20 x20 x20 x20        Wrist rolls ball nv x2' x2' np         Finger web   Red x30 Red x30 blue x30        Grooved pegs   x1 board x1 board x1 board        UBE   x2'/2' x2'/2' x2'/2'        desensitization instruction    x5'         HEP instruction x15' + ergonomics            Ther Activity                                       Gait Training                                       Modalities             MHP R UE pre tx nv x10' x10' x10' x10'

## 2021-02-23 ENCOUNTER — APPOINTMENT (OUTPATIENT)
Dept: PHYSICAL THERAPY | Facility: MEDICAL CENTER | Age: 36
End: 2021-02-23
Payer: COMMERCIAL

## 2021-03-02 ENCOUNTER — OFFICE VISIT (OUTPATIENT)
Dept: PHYSICAL THERAPY | Facility: MEDICAL CENTER | Age: 36
End: 2021-03-02
Payer: COMMERCIAL

## 2021-03-02 DIAGNOSIS — G56.01 CARPAL TUNNEL SYNDROME ON RIGHT: Primary | ICD-10-CM

## 2021-03-02 DIAGNOSIS — G56.21 CUBITAL TUNNEL SYNDROME, RIGHT: ICD-10-CM

## 2021-03-02 PROCEDURE — 97010 HOT OR COLD PACKS THERAPY: CPT | Performed by: PHYSICAL THERAPIST

## 2021-03-02 PROCEDURE — 97110 THERAPEUTIC EXERCISES: CPT | Performed by: PHYSICAL THERAPIST

## 2021-03-02 PROCEDURE — 97140 MANUAL THERAPY 1/> REGIONS: CPT | Performed by: PHYSICAL THERAPIST

## 2021-03-02 NOTE — PROGRESS NOTES
Daily Note     Today's date: 3/2/2021  Patient name: Irish Raines  :   MRN: [id number]  Referring provider: Samreen Gallagher MD  Dx:   Encounter Diagnosis     ICD-10-CM    1  Carpal tunnel syndrome on right  G56 01    2  Cubital tunnel syndrome, right  G56 21                   Subjective: Patient reports she bought a compression sleeve which she provides her with some comfort  She states over the weekend she was having radiating pain into her hand  She is unable to identify any changes in her activity that may have contributed to her symptoms  She does report the KT helped  Objective: See treatment diary below    Assessment: Extensive discussion on ergonomics with computer mouse/keyboard  Also discussed body mechanics with cell phone use  Patient instructed in avoiding positions that place in her in sustained periods of elbow flexion or leaning/resting on the elbows/ wrists  Patient provided with extra KT for home application  Reviewed desensitization technique for home  Plan: Continue PT  Transition to HEP as appropriate        Precautions: lupus  DOS: 2020    Manuals 1/19 1/28 2/3 2/9 2/16 3/       Scar massage x5' x8' x8' x8' IASTM palmar x8' IASTM  x8' IASTM       PNG's x3' x5' x5' x2' x2' x2'       desensitization    x5' X5'+ KT x5' + KT                    Neuro Re-Ed                                                                                                        Ther Ex             Wand chest press + OH flexion x20 OH nv x20 x20 x20 x20 x20       Ball rolls at [employer] and abd x15 ea x20 x20 x20 x20 x20       Wrist rolls ball nv x2' x2' np         Finger web   Red x30 Red x30 blue x30 Blue x30       Grooved pegs   x1 board x1 board x1 board x1 board       UBE   x2'/2' x2'/2' x2'/2' x2'/2'       desensitization instruction    x5'         HEP instruction x15' + ergonomics            Ther Activity                                       Gait Training Modalities             MHP R UE pre tx nv x10' x10' x10' x10' x10'

## 2021-03-03 ENCOUNTER — HOSPITAL ENCOUNTER (OUTPATIENT)
Dept: ULTRASOUND IMAGING | Facility: HOSPITAL | Age: 36
Discharge: HOME | End: 2021-03-03
Payer: COMMERCIAL

## 2021-03-03 DIAGNOSIS — G56.21 CUBITAL TUNNEL SYNDROME, RIGHT: ICD-10-CM

## 2021-03-03 DIAGNOSIS — Z47.89 AFTERCARE FOLLOWING SURGERY OF THE MUSCULOSKELETAL SYSTEM: ICD-10-CM

## 2021-03-03 PROCEDURE — 76882 US LMTD JT/FCL EVL NVASC XTR: CPT

## 2021-03-09 ENCOUNTER — APPOINTMENT (OUTPATIENT)
Dept: PHYSICAL THERAPY | Facility: MEDICAL CENTER | Age: 36
End: 2021-03-09
Payer: COMMERCIAL

## 2021-03-16 ENCOUNTER — APPOINTMENT (OUTPATIENT)
Dept: PHYSICAL THERAPY | Facility: MEDICAL CENTER | Age: 36
End: 2021-03-16
Payer: COMMERCIAL

## 2021-03-22 DIAGNOSIS — F40.10 SOCIAL PHOBIA: ICD-10-CM

## 2021-03-22 DIAGNOSIS — K29.50 CHRONIC GASTRITIS WITHOUT BLEEDING, UNSPECIFIED GASTRITIS TYPE: ICD-10-CM

## 2021-03-22 DIAGNOSIS — F41.1 GAD (GENERALIZED ANXIETY DISORDER): ICD-10-CM

## 2021-03-22 DIAGNOSIS — F33.2 MAJOR DEPRESSIVE DISORDER, RECURRENT, SEVERE W/O PSYCHOTIC BEHAVIOR (HCC): ICD-10-CM

## 2021-03-22 DIAGNOSIS — E66.9 CLASS II OBESITY: ICD-10-CM

## 2021-03-22 DIAGNOSIS — F51.13 HYPERSOMNIA DUE TO MENTAL DISORDER: ICD-10-CM

## 2021-03-22 RX ORDER — PANTOPRAZOLE SODIUM 20 MG/1
TABLET, DELAYED RELEASE ORAL
Qty: 90 TABLET | Refills: 1 | Status: SHIPPED | OUTPATIENT
Start: 2021-03-22 | End: 2021-10-06

## 2021-03-22 RX ORDER — MODAFINIL 200 MG/1
TABLET ORAL
Qty: 30 TABLET | Refills: 0 | Status: SHIPPED | OUTPATIENT
Start: 2021-03-22 | End: 2021-04-19 | Stop reason: SDUPTHER

## 2021-03-22 RX ORDER — CLONAZEPAM 0.5 MG/1
TABLET ORAL
Qty: 60 TABLET | Refills: 0 | Status: SHIPPED | OUTPATIENT
Start: 2021-03-22 | End: 2021-04-19 | Stop reason: SDUPTHER

## 2021-03-22 NOTE — TELEPHONE ENCOUNTER
Called patient and had to leave a message to give the office a call to schedule a follow up appointment per Lamont Rosenberg PA-C since refilling previous medication request

## 2021-03-22 NOTE — TELEPHONE ENCOUNTER
Clonazepam/Klonopin 0 5 mg take half to 1 tablet twice daily as needed for anxiety quantity 60 tablets Per PDMP last filled 02/16/2021 refill for same sent to pharmacy 03/22/2021  With 2 refills  modafinil/ Provigil 200 mg p o  daily last filled per PDMP on 02/16/2021 quantity 30  Refill for same sent to pharmacy 03/22/2021 quantity 30 with 2 refills

## 2021-03-23 ENCOUNTER — OFFICE VISIT (OUTPATIENT)
Dept: PHYSICAL THERAPY | Facility: MEDICAL CENTER | Age: 36
End: 2021-03-23
Payer: COMMERCIAL

## 2021-03-23 DIAGNOSIS — G89.29 CHRONIC RIGHT-SIDED LOW BACK PAIN WITHOUT SCIATICA: Primary | ICD-10-CM

## 2021-03-23 DIAGNOSIS — M54.50 CHRONIC RIGHT-SIDED LOW BACK PAIN WITHOUT SCIATICA: Primary | ICD-10-CM

## 2021-03-23 PROCEDURE — 97161 PT EVAL LOW COMPLEX 20 MIN: CPT | Performed by: PHYSICAL THERAPIST

## 2021-03-23 PROCEDURE — 97110 THERAPEUTIC EXERCISES: CPT | Performed by: PHYSICAL THERAPIST

## 2021-03-23 NOTE — PROGRESS NOTES
PT Evaluation     Today's date: 3/23/2021  Patient name: Favio Farfan  :   MRN: [id number]  Referring provider: Asiya Barth PT  Dx:   Encounter Diagnosis     ICD-10-CM    1  Chronic right-sided low back pain without sciatica  M54 5     G89 29                   Assessment  Assessment details: Ms Kiara Philippe is a pleasant 39 y o  who presents today for physical therapy evaluation for chronic low back pain  No further referral appears necessary at this time based upon examination findings  Patient presents with primary movement diagnosis of poor lumbo pelvic movement coordination and poor activation of lumbar multifidus and transversus abdominis resulting in chronic right sided low back pain with referred leg pain decreasing patients tolerance to walking, standing, squatting, and sitting  Patient is an excellent candidate for outpatient physical therapy services to address the observed impairments to reduce pain and improve tolerance to activity  If patient does not respond to physical therapy interventions patient will be referred appropriately  Impairments: abnormal muscle firing, abnormal or restricted ROM, activity intolerance, impaired physical strength, lacks appropriate home exercise program, pain with function and poor body mechanics  Barriers to therapy: Chronic pain, lupus  Understanding of Dx/Px/POC: good   Prognosis: good    Goals  1  Patient will be independent in individualized HEP  2  Patient will improve proximal strength by 1 grade  3  Patient will be able to squat without pain  4  Patient will be able to tolerate standing at work  5  Patient will achieve score on FOTO by MDIC  6  Patient will be able to manage symptoms independently with comprehensive HEP       Plan  Patient would benefit from: skilled physical therapy  Planned modality interventions: thermotherapy: hydrocollator packs  Planned therapy interventions: manual therapy, patient education, postural training, strengthening, stretching, therapeutic activities, therapeutic exercise, abdominal trunk stabilization, home exercise program, functional ROM exercises, graded activity and neuromuscular re-education  Frequency: 1x week  Duration in weeks: 4  Plan of Care beginning date: 3/23/2021  Plan of Care expiration date: 2021  Treatment plan discussed with: patient        Subjective Evaluation    History of Present Illness  Mechanism of injury: Ms Merary Condon presents today with reports of chronic low back pain  Patient reports a fall > 5 years ago landing on her left knee and right hip  Patient did have an MRI in  that was insignificant, no recent imaging  Patient was also seeing a chiropractor 3x/week for roughly 6 months with no change  Patient reports pain has worsened over the past few weeks  Patient denies any numbness/tingling  Patient denies any changes in bowel or bladder function  Denies any recent falls or injuries  Patient takes muscles relaxers and OTC tylenol and celebrex with no relief  Patient states she is able to perform all activities but has pain and less tolerance  Patient reports concern for something being wrong in her back as this has been occurring for several years  Pain  Current pain ratin  At best pain ratin  At worst pain ratin  Pain location: right hip, groin, thigh  Quality: radiating and sharp  Relieving factors: heat, change in position and support  Exacerbated by: walking, standing, squatting, sitting  Social Support    Employment status: working ([address] [place] [address])  Patient Goals  Patient goals for therapy: decreased pain  Patient's goals regarding treatment: exercise  Objective     Concurrent Complaints  Negative for night pain, disturbed sleep, bladder dysfunction, bowel dysfunction and saddle (S4) numbnessTrauma: 2013      Postural Observations    Additional Postural Observation Details  Lumbar hyperlordosis    Tenderness     Right Hip   Tenderness in the PSIS      Neurological Testing     Reflexes   Left   Patellar (L4): normal (2+)  Achilles (S1): normal (2+)    Right   Patellar (L4): normal (2+)  Achilles (S1): normal (2+)    Additional Neurological Details  Dermatomes/myotomes intact and symmetrical B LE    Active Range of Motion     Lumbar   Flexion: Active lumbar flexion: left deviation  with pain  Extension:  with pain    Passive Range of Motion     Additional Passive Range of Motion Details  Hip mobility WNL bilaterally    Joint Play     Hypermobile: L1, L2, L3, L4 and L5     Strength/Myotome Testing     Lumbar   Left   Heel walk: normal  Toe walk: normal    Right   Heel walk: normal  Toe walk: normal    Left Hip   Planes of Motion   Flexion: 4  Extension: 3  Abduction: 3+  External rotation: 4    Right Hip   Planes of Motion   Flexion: 4  Extension: 3  Abduction: 3+  External rotation: 4    Left Knee   Flexion: 5  Extension: 5    Right Knee   Flexion: 5  Extension: 5    Left Ankle/Foot   Dorsiflexion: 5  Plantar flexion: 5    Right Ankle/Foot   Dorsiflexion: 5  Plantar flexion: 5    Tests     Lumbar   Positive prone instability  and sacral spring   Left   Negative crossed SLR, passive SLR and quadrant  Right   Positive passive SLR and quadrant  Negative crossed SLR  Left Pelvic Girdle/Sacrum   Positive: active SLR test      Right Pelvic Girdle/Sacrum   Positive: active SLR test      Left Hip   Positive TONY  Negative FADIR and long sit  Right Hip   Positive TONY  Negative long sit  Functional Assessment      Squat    Pain                Precautions: lupus      Manuals 3/23            TPR                                                    Neuro Re-Ed             TA brace + supine clam Blue nv            Add almita + bridge nv            SL series  - clam  - reverse clam  - SLR nv                                                   HEP instruction and performance x10'            Ther Ex             bike             Foam roller LTR nv Foam roller DKTC nv            Seated pirirformis stretch nv            Standing modified down dog stretch nv                                                                Ther Activity                                       Gait Training                                       Modalities             MHP Low back prn

## 2021-03-30 ENCOUNTER — APPOINTMENT (OUTPATIENT)
Dept: PHYSICAL THERAPY | Facility: MEDICAL CENTER | Age: 36
End: 2021-03-30
Payer: COMMERCIAL

## 2021-04-06 ENCOUNTER — APPOINTMENT (OUTPATIENT)
Dept: PHYSICAL THERAPY | Facility: MEDICAL CENTER | Age: 36
End: 2021-04-06
Payer: COMMERCIAL

## 2021-04-06 ENCOUNTER — SOCIAL WORK (OUTPATIENT)
Dept: BEHAVIORAL/MENTAL HEALTH CLINIC | Facility: CLINIC | Age: 36
End: 2021-04-06
Payer: COMMERCIAL

## 2021-04-06 DIAGNOSIS — F33.2 MAJOR DEPRESSIVE DISORDER, RECURRENT, SEVERE W/O PSYCHOTIC BEHAVIOR (HCC): ICD-10-CM

## 2021-04-06 DIAGNOSIS — F40.10 SOCIAL PHOBIA: ICD-10-CM

## 2021-04-06 DIAGNOSIS — F41.1 GAD (GENERALIZED ANXIETY DISORDER): Primary | ICD-10-CM

## 2021-04-06 PROCEDURE — 90834 PSYTX W PT 45 MINUTES: CPT | Performed by: SOCIAL WORKER

## 2021-04-06 NOTE — PSYCH
Psychotherapy Provided: Individual Psychotherapy 55 minutes     Length of time in session: 3:00 pm to 3:55 pm, follow up in 2 week    Encounter Diagnosis     ICD-10-CM    1  JG (generalized anxiety disorder)  F41 1    2  Major depressive disorder, recurrent, severe w/o psychotic behavior ([facility] [address] )  F33 2    3  Social phobia  F40 10        Goals addressed in session: Goal 1 and Goal 2     Pain:      moderate to severe    7    Current suicide risk : Low     Therapist met with Nilson Boogie  She shared that her boyfriend's cancer has spread to his lungs  Therapist and Nilson Boogie discussed her struggles with this and how she continues to cope  She also shared her feelings of unappreciative and difficulty managing the amount of tasks on her plate  Therapist and Nilson Boogie completed a treatment plan update  Nilson Boogie will continue to explore her feelings in upcoming sessions  Behavioral Health Treatment Plan [facility]: Diagnosis and Treatment Plan explained to Kristen Humaira relates understanding diagnosis and is agreeable to Treatment Plan   Yes

## 2021-04-06 NOTE — BH TREATMENT PLAN
Farhat Mcwilliams  [birth year]       Date of Initial Treatment Plan: 3/19/19   Date of Current Treatment Plan: 4/6/21     Treatment Plan Number 5     Strengths/Personal Resources for Self Care: kind, loving, friendly, intelligent     Diagnosis:   1  JG (generalized anxiety disorder)      2  Major depressive disorder, recurrent, severe w/o psychotic behavior ([facility] [address] )      3   Social phobia            Area of Needs: motivation and support         Long Term Goal 1: AImprove focus and attention on necessary activities that improve her life satisfaction       Target Date: 10/6/21  Completion Date: na         Short Term Objectives for Goal 1: AI will be able to ask for support from professionals in order to improve my focus and attention to my health needs by at least 75%  and BI will continue to be able to redirect myself to my goals at least 75% of the time       Long Term Goal 2: Sustain reduced symptoms of depression       Target Date: 10/6/21  Completion Date:  na     Short Term Objectives for Goal 2: AI will focus on improving my happiness at least 5 times a week, by following guidelines that I implement for myself by at least 80%  and BI will continue to comply with all recommendations made by the psychiatirst during all scheduled sessions        GOAL 1: Modality: Individual 2x per month   Completion Date ongoing, Medication Management and The person(s) responsible for carrying out the plan is  Alon Juárez     GOAL 2: Modality: Individual 2x per month   Completion Date ongoing, Medication Management and The person(s) responsible for carrying out the plan is  Paige Grimes, [address]: Diagnosis and Treatment Plan explained to Clovis Singer relates understanding diagnosis and is agreeable to Treatment Plan         Client Comments : Please share your thoughts, feelings, need and/or experiences regarding your treatment plan: na

## 2021-04-13 ENCOUNTER — OFFICE VISIT (OUTPATIENT)
Dept: PHYSICAL THERAPY | Facility: MEDICAL CENTER | Age: 36
End: 2021-04-13
Payer: COMMERCIAL

## 2021-04-13 DIAGNOSIS — G89.29 CHRONIC RIGHT-SIDED LOW BACK PAIN WITHOUT SCIATICA: Primary | ICD-10-CM

## 2021-04-13 DIAGNOSIS — M54.50 CHRONIC RIGHT-SIDED LOW BACK PAIN WITHOUT SCIATICA: Primary | ICD-10-CM

## 2021-04-13 PROCEDURE — 97110 THERAPEUTIC EXERCISES: CPT | Performed by: PHYSICAL THERAPIST

## 2021-04-13 PROCEDURE — 97112 NEUROMUSCULAR REEDUCATION: CPT | Performed by: PHYSICAL THERAPIST

## 2021-04-13 PROCEDURE — 97140 MANUAL THERAPY 1/> REGIONS: CPT | Performed by: PHYSICAL THERAPIST

## 2021-04-13 NOTE — PROGRESS NOTES
Daily Note     Today's date: 2021  Patient name: Leif Oliva  : [birth year]  MRN: [id number]  Referring provider: Wilfrid Corona PT  Dx:   Encounter Diagnosis     ICD-10-CM    1  Chronic right-sided low back pain without sciatica  M54 5     G89 29                   Subjective: Patient she has been having joint pain flare ups with her history of lupus  She states she also has had some personal things going on in her life and has been unable to come to therapy the last two visits  Objective: See treatment diary below      Assessment: Patient required frequent cueing for maintaining neutral pelvis and TA brace with therapeutic interventions  Reviewed stretches for home that are providing her with relief during the work day  Patient educated in benefit of consistency with home program as she is only coming 1x/week  Plan: Continue per plan of care  Progress treatment as tolerated  Focus on lumbopelvic stabilization        Precautions: lupus      Manuals 3/23 4           TPR  Stick x5'           scaral PA mobs  x3'                                     Neuro Re-Ed             TA brace + supine clam Blue nv blue 2x10           Add almita + bridge nv 2x10           SL series  - clam  - heel-toe tap  - SLR nv x10 ea                                                  HEP instruction and performance x10'            Ther Ex             bike  x5'           1/2 Foam roller LTR nv x10           1/2 Foam roller DKTC nv x10           Seated pirirformis stretch nv 20"x4           Standing modified down dog stretch nv x10           Supine hip flexor stretch R  EOT 20"x4                                                  Ther Activity                                       Gait Training                                       Modalities             MHP Low back prn  x10' post
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
## 2021-04-19 ENCOUNTER — TELEMEDICINE (OUTPATIENT)
Dept: PSYCHIATRY | Facility: CLINIC | Age: 36
End: 2021-04-19
Payer: COMMERCIAL

## 2021-04-19 VITALS — BODY MASS INDEX: 35.36 KG/M2 | HEIGHT: 66 IN | WEIGHT: 220 LBS

## 2021-04-19 DIAGNOSIS — E66.01 SEVERE OBESITY (HCC): ICD-10-CM

## 2021-04-19 DIAGNOSIS — F40.10 SOCIAL PHOBIA: ICD-10-CM

## 2021-04-19 DIAGNOSIS — F51.13 HYPERSOMNIA DUE TO MENTAL DISORDER: ICD-10-CM

## 2021-04-19 DIAGNOSIS — F41.1 GAD (GENERALIZED ANXIETY DISORDER): ICD-10-CM

## 2021-04-19 DIAGNOSIS — F33.2 MAJOR DEPRESSIVE DISORDER, RECURRENT, SEVERE W/O PSYCHOTIC BEHAVIOR (HCC): Primary | ICD-10-CM

## 2021-04-19 DIAGNOSIS — E55.9 VITAMIN D DEFICIENCY: ICD-10-CM

## 2021-04-19 PROCEDURE — 99214 OFFICE O/P EST MOD 30 MIN: CPT | Performed by: NURSE PRACTITIONER

## 2021-04-19 PROCEDURE — 90833 PSYTX W PT W E/M 30 MIN: CPT | Performed by: NURSE PRACTITIONER

## 2021-04-19 RX ORDER — CLONAZEPAM 0.5 MG/1
TABLET ORAL
Qty: 60 TABLET | Refills: 2 | Status: SHIPPED | OUTPATIENT
Start: 2021-04-19 | End: 2021-08-24 | Stop reason: SDUPTHER

## 2021-04-19 RX ORDER — MODAFINIL 200 MG/1
200 TABLET ORAL DAILY
Qty: 30 TABLET | Refills: 2 | Status: SHIPPED | OUTPATIENT
Start: 2021-04-19 | End: 2021-08-24 | Stop reason: SDUPTHER

## 2021-04-19 RX ORDER — NICOTINE POLACRILEX 2 MG
GUM BUCCAL
COMMUNITY
End: 2021-10-06

## 2021-04-20 ENCOUNTER — OFFICE VISIT (OUTPATIENT)
Dept: PHYSICAL THERAPY | Facility: MEDICAL CENTER | Age: 36
End: 2021-04-20
Payer: COMMERCIAL

## 2021-04-20 DIAGNOSIS — G89.29 CHRONIC RIGHT-SIDED LOW BACK PAIN WITHOUT SCIATICA: Primary | ICD-10-CM

## 2021-04-20 DIAGNOSIS — M54.50 CHRONIC RIGHT-SIDED LOW BACK PAIN WITHOUT SCIATICA: Primary | ICD-10-CM

## 2021-04-20 PROCEDURE — 97110 THERAPEUTIC EXERCISES: CPT

## 2021-04-20 PROCEDURE — 97112 NEUROMUSCULAR REEDUCATION: CPT

## 2021-04-20 PROCEDURE — 97140 MANUAL THERAPY 1/> REGIONS: CPT

## 2021-04-20 NOTE — PROGRESS NOTES
Daily Note     Today's date: 2021  Patient name: Adalberto Soto  :   MRN: [id number]  Referring provider: Harika Mayers, PT  Dx:   Encounter Diagnosis     ICD-10-CM    1  Chronic right-sided low back pain without sciatica  M54 5     G89 29                   Subjective: Pt reports continued R LBP, both with rest and activity  Pt states that she has nerve pain down her leg and wraps around her pelvis  Pt states that she has been compliant with her hep  Objective: See treatment diary below      Assessment: Tolerated treatment well  Patient demonstrated fatigue post treatment, exhibited good technique with therapeutic exercises and would benefit from continued PT   Pt experienced some discomfort near the end of treatment and could only perform 7 reps of the downward dog  Plan: Continue per plan of care        Precautions: lupus      Manuals 3/23 4/13 4          TPR  Stick x5' Stick and STM  x10'          scaral PA mobs  x3'                                     Neuro Re-Ed             TA brace + supine clam Blue nv blue 2x10 Blue  2x10          Add almita + bridge nv 2x10 2x10          SL series  - clam  - heel-toe tap  - SLR nv x10 ea x15/ea                                                 HEP instruction and performance x10'            Ther Ex             bike  x5' NP          1/2 Foam roller LTR nv x10 x10          1/2 Foam roller DKTC nv x10 x10          Seated pirirformis stretch nv 20"x4 20"x4          Standing modified down dog stretch nv x10 x7          Supine hip flexor stretch R  EOT 20"x4 EOT  20"x4                                                 Ther Activity                                       Gait Training                                       Modalities             MHP Low back prn  x10' post x10'  Pre tx

## 2021-04-27 ENCOUNTER — OFFICE VISIT (OUTPATIENT)
Dept: PHYSICAL THERAPY | Facility: MEDICAL CENTER | Age: 36
End: 2021-04-27
Payer: COMMERCIAL

## 2021-04-27 DIAGNOSIS — M54.50 CHRONIC RIGHT-SIDED LOW BACK PAIN WITHOUT SCIATICA: Primary | ICD-10-CM

## 2021-04-27 DIAGNOSIS — G89.29 CHRONIC RIGHT-SIDED LOW BACK PAIN WITHOUT SCIATICA: Primary | ICD-10-CM

## 2021-04-27 PROCEDURE — 97110 THERAPEUTIC EXERCISES: CPT | Performed by: PHYSICAL THERAPIST

## 2021-04-27 PROCEDURE — 97112 NEUROMUSCULAR REEDUCATION: CPT | Performed by: PHYSICAL THERAPIST

## 2021-04-27 PROCEDURE — 97140 MANUAL THERAPY 1/> REGIONS: CPT | Performed by: PHYSICAL THERAPIST

## 2021-04-27 NOTE — PROGRESS NOTES
Progress Note     Today's date: 2021  Patient name: Gina Garcia  :   MRN: [id number]  Referring provider: Po Gunn PT  Dx:   Encounter Diagnosis     ICD-10-CM    1  Chronic right-sided low back pain without sciatica  M54 5     G89 29                   Subjective: Patient continues to have primary reports of right lower back pain, worse with standing, walking, and bending  Patient reported feeling better post treatment session  Objective: See treatment diary below      Concurrent Complaints  Negative for night pain, disturbed sleep, bladder dysfunction, bowel dysfunction and saddle (S4) numbnessTrauma: fall   Postural Observations    Additional Postural Observation Details  Lumbar hyperlordosis    Tenderness     Right Hip   Tenderness in the PSIS  Neurological Testing     Reflexes   Left   Patellar (L4): normal (2+)  Achilles (S1): normal (2+)    Right   Patellar (L4): normal (2+)  Achilles (S1): normal (2+)    Additional Neurological Details  Dermatomes/myotomes intact and symmetrical B LE    Active Range of Motion     Lumbar   Flexion: Active lumbar flexion: left deviation  with pain  Extension:  with pain    Passive Range of Motion     Additional Passive Range of Motion Details  Hip mobility WNL bilaterally    Joint Play     Hypermobile: L1, L2, L3, L4 and L5     Strength/Myotome Testing     Lumbar   Left   Heel walk: normal  Toe walk: normal    Right   Heel walk: normal  Toe walk: normal    Left Hip   Planes of Motion   Flexion: 4  Extension: 3  Abduction: 3+  External rotation: 4    Right Hip   Planes of Motion   Flexion: 4  Extension: 3  Abduction: 3+  External rotation: 4    Left Knee   Flexion: 5  Extension: 5    Right Knee   Flexion: 5  Extension: 5    Left Ankle/Foot   Dorsiflexion: 5  Plantar flexion: 5    Right Ankle/Foot   Dorsiflexion: 5  Plantar flexion: 5    Tests     Lumbar   Positive prone instability  and sacral spring        Left   Negative crossed SLR, passive SLR and quadrant  Right   Positive passive SLR and quadrant  Negative crossed SLR  Left Pelvic Girdle/Sacrum   Positive: active SLR test      Right Pelvic Girdle/Sacrum   Positive: active SLR test      Left Hip   Positive TONY  Negative FADIR and long sit  Right Hip   Positive TONY  Negative long sit  Functional Assessment      Squat    Pain  Assessment: Patient presents with primary movement diagnosis of poor lumbopelvic motor control and coordination resulting in chronic right sided low back pain with activity intolerance to walking, standing, bending  No further referral appears necessary at this time based upon examination findings  Patient would benefit from outpatient physical therapy services to address the observed impairments to reduce pain and improve function  Please contact me with any questions  Impairments: abnormal muscle firing, abnormal or restricted ROM, activity intolerance, impaired physical strength, lacks appropriate home exercise program, pain with function and poor body mechanics  Barriers to therapy: Chronic pain, lupus  Understanding of Dx/Px/POC: good   Prognosis: good    Goals  1  Patient will be independent in individualized HEP  2  Patient will improve proximal strength by 1 grade  3  Patient will be able to squat without pain  4  Patient will be able to tolerate standing at work  5  Patient will achieve score on FOTO by MDIC  6  Patient will be able to manage symptoms independently with comprehensive HEP       Plan  Patient would benefit from: skilled physical therapy  Planned modality interventions: thermotherapy: hydrocollator packs  Planned therapy interventions: manual therapy, patient education, postural training, strengthening, stretching, therapeutic activities, therapeutic exercise, abdominal trunk stabilization, home exercise program, functional ROM exercises, graded activity and neuromuscular re-education  Frequency: 1x week  Duration in weeks: 4-5    Treatment plan discussed with: patient       Precautions: lupus      Manuals 3/23 4/13 4/20 4/27         TPR  Stick x5' Stick and STM  x10' x8'+ STM          scaral PA mobs  x3'           Long axis distraction    x2'                      Neuro Re-Ed             TA brace + supine clam Blue nv blue 2x10 Blue  2x10 Blue 3x10         Add almita + bridge nv 2x10 2x10 3x10         SL series  - clam  - heel-toe tap  - SLR nv x10 ea x15 ea x15 ea         pball     5"x20                                   HEP instruction and performance x10'            Ther Ex             bike  x5' NP           LTR nv x10 x10 5" x10          roller DKTC nv x10 x10 5" x10         Seated pirirformis stretch nv 20"x4 20"x4 20"x4         Standing modified down dog stretch nv x10 x7 x10         Supine hip flexor stretch R  EOT 20"x4 EOT  20"x4 EOT 20"x4                                                Ther Activity                                       Gait Training                                       Modalities             MHP Low back prn  x10' post x10'  Pre tx x10' pre tx

## 2021-05-04 ENCOUNTER — TRANSCRIBE ORDERS (OUTPATIENT)
Dept: ADMINISTRATIVE | Facility: HOSPITAL | Age: 36
End: 2021-05-04

## 2021-05-04 ENCOUNTER — APPOINTMENT (OUTPATIENT)
Dept: LAB | Facility: HOSPITAL | Age: 36
End: 2021-05-04

## 2021-05-04 ENCOUNTER — OFFICE VISIT (OUTPATIENT)
Dept: PHYSICAL THERAPY | Facility: MEDICAL CENTER | Age: 36
End: 2021-05-04
Payer: COMMERCIAL

## 2021-05-04 DIAGNOSIS — G89.29 CHRONIC RIGHT-SIDED LOW BACK PAIN WITHOUT SCIATICA: Primary | ICD-10-CM

## 2021-05-04 DIAGNOSIS — Z00.8 OTHER SPECIFIED GENERAL MEDICAL EXAMINATION: Primary | ICD-10-CM

## 2021-05-04 DIAGNOSIS — Z00.8 OTHER SPECIFIED GENERAL MEDICAL EXAMINATION: ICD-10-CM

## 2021-05-04 DIAGNOSIS — M54.50 CHRONIC RIGHT-SIDED LOW BACK PAIN WITHOUT SCIATICA: Primary | ICD-10-CM

## 2021-05-04 LAB
CHOLEST SERPL-MCNC: 145 MG/DL (ref 50–200)
EST. AVERAGE GLUCOSE BLD GHB EST-MCNC: 82 MG/DL
HBA1C MFR BLD: 4.5 %
HDLC SERPL-MCNC: 37 MG/DL
LDLC SERPL CALC-MCNC: 66 MG/DL (ref 0–100)
NONHDLC SERPL-MCNC: 108 MG/DL
TRIGL SERPL-MCNC: 212 MG/DL

## 2021-05-04 PROCEDURE — 80061 LIPID PANEL: CPT

## 2021-05-04 PROCEDURE — 97112 NEUROMUSCULAR REEDUCATION: CPT | Performed by: PHYSICAL THERAPIST

## 2021-05-04 PROCEDURE — 83036 HEMOGLOBIN GLYCOSYLATED A1C: CPT

## 2021-05-04 PROCEDURE — 36415 COLL VENOUS BLD VENIPUNCTURE: CPT

## 2021-05-04 PROCEDURE — 97140 MANUAL THERAPY 1/> REGIONS: CPT | Performed by: PHYSICAL THERAPIST

## 2021-05-04 PROCEDURE — 97110 THERAPEUTIC EXERCISES: CPT | Performed by: PHYSICAL THERAPIST

## 2021-05-04 NOTE — PROGRESS NOTES
Daily Note     Today's date: 2021  Patient name: Sj Skaggs  :   MRN: [id number]  Referring provider: Bao Urias, PT  Dx:   Encounter Diagnosis     ICD-10-CM    1  Chronic right-sided low back pain without sciatica  M54 5     G89 29                   Subjective: Patient reports muscle soreness post previous treatment session  She states she did do yardwork over the weekend resulting in bilateral low back pain  Objective: See treatment diary below      Assessment: Improving in motor control  Impairment list:  1) poor lumbopelvic motor control- addressing with NMRE  2) SIJ dysfunction- addressing with joint mobs and stabilization      Plan: Continue PT POC  Progress treatment as tolerated        Precautions: lupus      Manuals 3/23 4/13 4/20 4/27 5/4        TPR  Stick x5' Stick and STM  x10' x8'+ STM  x5'        scaral PA mobs  x3'   x3'        Long axis distraction    x2'         Prone hip rotation     x5'        Neuro Re-Ed             TA brace + supine clam Blue nv blue 2x10 Blue  2x10 Blue 3x10 Blue 3x10        Add almita + bridge nv 2x10 2x10 3x10 3x10        SL series  - clam  - heel-toe tap  - SLR nv x10 ea x15 ea x15 ea x15 ea        pball     5"x20 5"x20                                  HEP instruction and performance x10'            Ther Ex             bike  x5' NP  x10'         LTR nv x10 x10 5" x10 5" x10         DKTC nv x10 x10 5" x10 5" x10        Seated pirirformis stretch nv 20"x4 20"x4 20"x4 20"x4        Standing modified down dog stretch nv x10 x7 x10 x10        Supine hip flexor stretch R  EOT 20"x4 EOT  20"x4 EOT 20"x4 manual x2'                                               Ther Activity                                       Gait Training                                       Modalities             MHP Low back prn  x10' post x10'  Pre tx x10' pre tx np

## 2021-05-11 ENCOUNTER — OFFICE VISIT (OUTPATIENT)
Dept: PHYSICAL THERAPY | Facility: MEDICAL CENTER | Age: 36
End: 2021-05-11
Payer: COMMERCIAL

## 2021-05-11 DIAGNOSIS — M54.50 CHRONIC RIGHT-SIDED LOW BACK PAIN WITHOUT SCIATICA: Primary | ICD-10-CM

## 2021-05-11 DIAGNOSIS — G89.29 CHRONIC RIGHT-SIDED LOW BACK PAIN WITHOUT SCIATICA: Primary | ICD-10-CM

## 2021-05-11 PROCEDURE — 97140 MANUAL THERAPY 1/> REGIONS: CPT | Performed by: PHYSICAL THERAPIST

## 2021-05-11 PROCEDURE — 97112 NEUROMUSCULAR REEDUCATION: CPT | Performed by: PHYSICAL THERAPIST

## 2021-05-11 PROCEDURE — 97110 THERAPEUTIC EXERCISES: CPT | Performed by: PHYSICAL THERAPIST

## 2021-05-11 PROCEDURE — 97010 HOT OR COLD PACKS THERAPY: CPT | Performed by: PHYSICAL THERAPIST

## 2021-05-11 NOTE — PROGRESS NOTES
Daily Note     Today's date: 2021  Patient name: Millie Calvillo  :   MRN: [id number]  Referring provider: Jose Juarez PT  Dx:   Encounter Diagnosis     ICD-10-CM    1  Chronic right-sided low back pain without sciatica  M54 5     G89 29                   Subjective: Patient reports temporary relief after PT session, no long standing relief noted at this time  Objective: See treatment diary below      Assessment: Added sidelying hip stability series to home program  Patient will benefit from continued PT  Impairment list:  1) poor lumbopelvic motor control- addressing with NMRE  2) SIJ dysfunction- addressing with joint mobs and stabilization    Plan: Continue PT POC  Progress treatment as tolerated        Precautions: lupus      Manuals 3/23 4/13 4/20 4/27 5/4 5/11       TPR  Stick x5' Stick and STM  x10' x8'+ STM  x5' x5'       scaral PA mobs  x3'   x3' x3'       Long axis distraction    x2'         Prone hip rotation     x5' x5'       Neuro Re-Ed             TA brace + supine clam Blue nv blue 2x10 Blue  2x10 Blue 3x10 Blue 3x10 Blue 3x10       Add almita + bridge nv 2x10 2x10 3x10 3x10 3x10       SL series  - clam  - heel-toe tap  - SLR nv x10 ea x15 ea x15 ea x15 ea x15 ea       pball     5"x20 5"x20 5"x20                                 HEP instruction and performance x10'            Ther Ex             bike  x5' NP  x10' x10'        LTR nv x10 x10 5" x10 5" x10 5"x10        DKTC nv x10 x10 5" x10 5" x10 5"x10       Seated pirirformis stretch nv 20"x4 20"x4 20"x4 20"x4 HEP       Standing modified down dog stretch nv x10 x7 x10 x10 HEP       Supine hip flexor stretch R  EOT 20"x4 EOT  20"x4 EOT 20"x4 manual x2' manual x2'       Standing hip abd      x15                                 Ther Activity                                       Gait Training                                       Modalities             MHP Low back prn  x10' post x10'  Pre tx x10' pre tx np x10' post

## 2021-05-18 ENCOUNTER — OFFICE VISIT (OUTPATIENT)
Dept: PHYSICAL THERAPY | Facility: MEDICAL CENTER | Age: 36
End: 2021-05-18
Payer: COMMERCIAL

## 2021-05-18 DIAGNOSIS — M54.50 CHRONIC RIGHT-SIDED LOW BACK PAIN WITHOUT SCIATICA: Primary | ICD-10-CM

## 2021-05-18 DIAGNOSIS — G89.29 CHRONIC RIGHT-SIDED LOW BACK PAIN WITHOUT SCIATICA: Primary | ICD-10-CM

## 2021-05-18 PROCEDURE — 97140 MANUAL THERAPY 1/> REGIONS: CPT | Performed by: PHYSICAL THERAPIST

## 2021-05-18 PROCEDURE — 97112 NEUROMUSCULAR REEDUCATION: CPT | Performed by: PHYSICAL THERAPIST

## 2021-05-18 PROCEDURE — 97110 THERAPEUTIC EXERCISES: CPT | Performed by: PHYSICAL THERAPIST

## 2021-05-18 NOTE — PROGRESS NOTES
Daily Note     Today's date: 2021  Patient name: Kwame Doe  : [id number]  MRN: [id number]  Referring provider: Indira Rosas, PT  Dx:   Encounter Diagnosis     ICD-10-CM    1  Chronic right-sided low back pain without sciatica  M54 5     G89 29                   Subjective: Patient reports no significant changes at this time  Objective: See treatment diary below      Assessment:     Impairment list:  1) poor lumbopelvic motor control- addressing with NMRE  2) SIJ dysfunction- addressing with joint mobs and stabilization    Plan: Continue PT POC  Progress treatment as tolerated        Precautions: lupus      Manuals 3/23 4/13 4/20 4/27 5/4 5/11 5/18      TPR  Stick x5' Stick and STM  x10' x8'+ STM  x5' x5' x5'      scaral PA mobs  x3'   x3' x3' x5'      Long axis distraction    x2'         Prone hip rotation     x5' x5' x3'      Neuro Re-Ed             TA brace + supine clam Blue nv blue 2x10 Blue  2x10 Blue 3x10 Blue 3x10 Blue 3x10 Blue 3x10      Add almita + bridge nv 2x10 2x10 3x10 3x10 3x10 3x10      SL series  - clam  - heel-toe tap  - SLR nv x10 ea x15 ea x15 ea x15 ea x15 ea x15ea      pball     5"x20 5"x20 5"x20 np                                HEP instruction and performance x10'            Ther Ex             bike  x5' NP  x10' x10' x10'       LTR nv x10 x10 5" x10 5" x10 5"x10 5"x10       DKTC nv x10 x10 5" x10 5" x10 5"x10 np      Seated pirirformis stretch nv 20"x4 20"x4 20"x4 20"x4 HEP 20"x4      Standing modified down dog stretch nv x10 x7 x10 x10 HEP       Supine hip flexor stretch R  EOT 20"x4 EOT  20"x4 EOT 20"x4 manual x2' manual x2' manual x2'      Standing hip abd      x15 x15                                Ther Activity                                       Gait Training                                       Modalities             MHP Low back prn  x10' post x10'  Pre tx x10' pre tx np x10' post x10' post

## 2021-05-20 ENCOUNTER — SOCIAL WORK (OUTPATIENT)
Dept: BEHAVIORAL/MENTAL HEALTH CLINIC | Facility: CLINIC | Age: 36
End: 2021-05-20
Payer: COMMERCIAL

## 2021-05-20 DIAGNOSIS — F41.1 GAD (GENERALIZED ANXIETY DISORDER): Primary | ICD-10-CM

## 2021-05-20 DIAGNOSIS — F33.2 MAJOR DEPRESSIVE DISORDER, RECURRENT, SEVERE W/O PSYCHOTIC BEHAVIOR (HCC): ICD-10-CM

## 2021-05-20 PROCEDURE — 90834 PSYTX W PT 45 MINUTES: CPT | Performed by: SOCIAL WORKER

## 2021-05-20 NOTE — PSYCH
Psychotherapy Provided: Individual Psychotherapy 50 minutes     Length of time in session: 4:00 pm to 4:50 pm, follow up in 2 week    Encounter Diagnosis     ICD-10-CM    1  JG (generalized anxiety disorder)  F41 1    2  Major depressive disorder, recurrent, severe w/o psychotic behavior ([facility] [address] )  F33 2        Goals addressed in session: Goal 1 and Goal 2     Pain:      mild    4    Current suicide risk : Low     Therapist met with Bibi Cross  She discussed her feelings regarding her relationship with her boyfriend  She shared that was going on a trip with her boyfriend  Therapist and Bibi Cross discussed feeling as though she was not feeling overly positive about this trip, but that she was also feeling flat about most things  Therapist explored pandemic fatigue and how this has impacted her mental status  Therapist and Bibi Cross explored how she could focus on positives and sustain her mood through this review  Behavioral Health Treatment Plan [facility]: Diagnosis and Treatment Plan explained to Shanell Beavers relates understanding diagnosis and is agreeable to Treatment Plan   Yes

## 2021-05-24 ENCOUNTER — OFFICE VISIT (OUTPATIENT)
Dept: RHEUMATOLOGY | Facility: CLINIC | Age: 36
End: 2021-05-24
Payer: COMMERCIAL

## 2021-05-24 VITALS
SYSTOLIC BLOOD PRESSURE: 116 MMHG | WEIGHT: 225.8 LBS | TEMPERATURE: 98.2 F | HEART RATE: 67 BPM | DIASTOLIC BLOOD PRESSURE: 79 MMHG | BODY MASS INDEX: 36.29 KG/M2 | HEIGHT: 66 IN

## 2021-05-24 DIAGNOSIS — R76.8 ANA POSITIVE: ICD-10-CM

## 2021-05-24 DIAGNOSIS — M32.9 LUPUS (HCC): Primary | ICD-10-CM

## 2021-05-24 DIAGNOSIS — Z79.899 HIGH RISK MEDICATION USE: ICD-10-CM

## 2021-05-24 PROCEDURE — 99214 OFFICE O/P EST MOD 30 MIN: CPT | Performed by: INTERNAL MEDICINE

## 2021-05-24 RX ORDER — HYDROXYCHLOROQUINE SULFATE 200 MG/1
400 TABLET, FILM COATED ORAL DAILY
Qty: 180 TABLET | Refills: 1 | Status: SHIPPED | OUTPATIENT
Start: 2021-05-24 | End: 2021-08-09 | Stop reason: SDUPTHER

## 2021-05-24 RX ORDER — CELECOXIB 200 MG/1
200 CAPSULE ORAL 2 TIMES DAILY
Qty: 60 CAPSULE | Refills: 3 | Status: SHIPPED | OUTPATIENT
Start: 2021-05-24 | End: 2021-08-09 | Stop reason: SDUPTHER

## 2021-05-24 NOTE — PROGRESS NOTES
Assessment/Plan: Millie Calvillo is a 39 y o  female who presents for follow-up of her SLE (malar rash, arthralgias, dry eyes, hair thinning, equivocal anti-dsDNA, indeterminate anticardiolipin IgM, +MAYUR at 1:160 speckled pattern)  She went and got a 2nd opinion from Dr Leonora Ojeda since her last clinic visit with me in April 2020, and he did agree with my diagnosis of lupus and her her current medication management  Has some mild lupus symptoms such as malar erythema, mouth sores, and arthralgia  Lupus activity labs ordered, which all returned normal   Since patient was forgetting to take her 2nd hydroxychloroquine tab in a day, asked her to take both at a time if she is able to tolerate it; and is to continue regular eye exams  Prescribed celecoxib 200 mg p o  b i d  as needed for joint pain  Problem List Items Addressed This Visit        Other    High risk medication use    Lupus ([facility] [address] ) - Primary    Relevant Medications    hydroxychloroquine (PLAQUENIL) 200 mg tablet    celecoxib (CeleBREX) 200 mg capsule    Other Relevant Orders    CBC and differential (Completed)    C4 complement (Completed)    C3 complement (Completed)    Anti-DNA antibody, double-stranded (Completed)    Comprehensive metabolic panel (Completed)    C-reactive protein (Completed)    Protein / creatinine ratio, urine (Completed)    Sedimentation rate, automated (Completed)    Urinalysis with microscopic (Completed)    MAYUR positive      High risk medication use - Benefits and risks of hydroxychloroquine, including but not limited to retinal toxicity, corneal deposits, gastrointestinal side effects, and headaches were previously discussed with the patient  Patient is aware of the need for a regular eye exam to monitor for ocular toxicity while on this medication  Follow-up: RTC in 3 months        Rheumatic Disease Summary  Maximus Jgelkinbreanna a 28 y  o  female who originally presented on 9/17/19 as a Rheumatology consult referred by her Latonya Blood DO for evaluation of possible autoimmune disease given positive MAYUR of 1:160 in a speckled pattern   Patient's clinical signs and symptoms were consistent with early lupus given her history of a malar rash and arthralgias  She also had dry eyes, but no dry mouth, and admitted to hair thinning  She denied blood clot or miscarriage history, or Raynaud's symptoms  Admitted to reflux symptoms  Lupus activity labs were ordered, which returned unremarkable; anti-dsDNA returned equivocal at 6 and anti-SSA/SSB were negative  She also had a livedoid-appearing rash on her arms, so antiphospholipid antibody panel was ordered, with anticardiolipin IgM returning indeterminate at 13  Her Vit  D level was low, so she was prescribed ergocalciferol 50,000 units po weekly  Abdomen ultrasound was ordered to work-up her transaminitis, but it returned significant only for splenomegaly  For her arthralgias, had prescribed meloxicam 7 5 mg 1-2 times a day, and for her shooting/neuropathic pain, had prescribed gabapentin 100 mg p o  Q h s      She presented on 10/18/19 for follow-up of likely early SLE (malar rash, arthralgias, dry eyes, hair thinning, equivocal anti-dsDNA, indeterminate anticardiolipin IgM, +MAYUR at 1:160 speckled pattern)  Patient continued to have arthralgias  Had stopped meloxicam since it wasn't helping patient, and started her on diclofenac 75mg po bid instead to help with her arthralgias  For long-term management of her lupus, had initiated weight-based hydroxychloroquine 200mg po bid  Advised patient to get regular eye exams while on this medication to monitor for plaquenil-related retinal toxicity  Since patient has chronic low back pain and right SI joint tenderness, had ordered a HLA-B27 and SI joint x-rays to evaluate for a concurrent seronegative spondyloarthropathy such as ankylosing spondylitis, but these returned unremarkable   Had also ordered a Hepatitis B core Ab to do along with PCP's labs to further work-up her transaminitis, which returned negative      She presented for follow-up on 1/14/20, at which time her main symptoms were arthralgia and fatigue  Lupus activity labs ordered; repeat anticardiolipin Ab ordered to rule-out antiphospholipid syndrome since test was previously indeterminate, patient already is in a hypercoagulable state from being heterozygous for the MTHFR gene mutation  She was to continue hydroxychloroquine 200mg po bid  Stopped oral diclofenac since patient was not finding benefit from it for her joint pain symptoms; instead prescribed celecoxib 100mg po bid  Also prescribed methocarbamol 500mg po bid prn for back muscle spasm  Physical therapy referral also made for low back pain  4/20/20: Patient's arthralgia symptoms have been prominent, some days are better than others  Also has had episodes of hives on arms  Increased her celecoxib to 200mg po bid  She is to continue hydroxychloroquine 200mg twice a day; reminded her to get eye exam  Prescribed hydrocortisone ointment as needed for itchiness/rash  Increased her methocarbamol frequency to 500mg po bid as needed for her back spasms  Recommended patient to make a physical therapy appointment  Subjective/HPI  Sj Skaggs is a 39 y o  female who presents for follow-up of her likely SLE (malar rash, arthralgias, dry eyes, hair thinning, equivocal anti-dsDNA, indeterminate anticardiolipin IgM, +MAYUR at 1:160 speckled pattern)  Last visit was 4/20/20  Since then, she went for 2nd opinion with Dr Francesca Cm  He agreed with SLE diagnosis  Given plaquenil and celebrex  Still taking plaquenil 200mg twice daily, but does forget second dose  Symptoms mild, had flare few weeks ago, which was the first in a long time that lasted a week, occurs every few months and lasts about 1-2 weeks  Joint pain spiked and emotional status was off during the flare  This year, the sun is affecting her and is less tolerable - photosensitivity  Joint pain seems to be a little better with medication  Admits to sores in her mouth and nose, and malar rash  Her last eye exam was the end of last year  Past Medical History:   Diagnosis Date    Anxiety     Back pain     Bipolar disorder ([address] )     Carpal tunnel syndrome on right     Contact lens overwear of both eyes     Cubital tunnel syndrome on right     Depression     GERD (gastroesophageal reflux disease)     Headache     Hypothyroidism     Kidney stones     Lupus (systemic lupus erythematosus) (HCC)     Nausea     PONV (postoperative nausea and vomiting)     Wears glasses        Past Surgical History:   Procedure Laterality Date    WA CYSTOURETHROSCOPY N/A 2/3/2020    Procedure: CYSTOSCOPY;  Surgeon: Arvind Bautista MD;  [facility];  Service: UroGynecology           WA LAP,CHOLECYSTECTOMY N/A 9/6/2018    Procedure: CHOLECYSTECTOMY LAPAROSCOPIC W/ ROBOTICS;  Surgeon: Stefania Reed MD;  [facility];  Service: General    WA POST COLPORRHAPHY,RECTUM/VAGINA N/A 2/3/2020    Procedure: POSTERIOR COLPORRHAPHY;  Surgeon: Arvind Bautista MD;  [facility];  Service: UroGynecology           WA REVISE MEDIAN N/CARPAL TUNNEL SURG Right 11/27/2020    Procedure: release CARPAL TUNNEL;  Surgeon: Fatmata Thorne MD;  [facility];  Service: Orthopedics    WA REVISE ULNAR NERVE AT ELBOW Right 11/27/2020    Procedure: Sherman Ishmael;  Surgeon:  Fatmata Thorne MD;  [facility];  Service: Orthopedics    WA SLING OPER STRES INCONTINENCE N/A 2/3/2020    Procedure: PUBOVAGINAL SLING;  Surgeon: Arvind Bautista MD;  [facility];  Service: UroGynecology           TUBAL LIGATION         Current Outpatient Medications   Medication Sig Dispense Refill    acetaminophen (TYLENOL) 325 mg tablet Take 2 tablets (650 mg total) by mouth every 6 (six) hours as needed for mild pain, moderate pain, headaches or fever 30 tablet 0    Biotin 1 MG CAPS biotin  celecoxib (CeleBREX) 200 mg capsule Take 1 capsule (200 mg total) by mouth 2 (two) times a day 60 capsule 3    cholecalciferol (VITAMIN D3) 400 units tablet Take 400 Units by mouth daily      clonazePAM (KlonoPIN) 0 5 mg tablet TAKE 1/2 TO 1 TABLET BY MOUTH 2 TIMES A DAY AS NEEDED FOR ANXIETY 60 tablet 2    Cyanocobalamin (B-12) 2500 MCG SUBL Take 1 tablet by mouth daily      folic acid (FOLVITE) 524 MCG tablet Take 800 mcg by mouth daily       hydroxychloroquine (PLAQUENIL) 200 mg tablet Take 2 tablets (400 mg total) by mouth daily 180 tablet 1    levothyroxine 25 mcg tablet Take 1 tablet (25 mcg total) by mouth daily in the early morning 90 tablet 1    modafinil (PROVIGIL) 200 MG tablet Take 1 tablet (200 mg total) by mouth daily 30 tablet 2    Vortioxetine HBr (Trintellix) 20 MG tablet Take 1 tablet (20 mg total) by mouth daily 30 tablet 2    baclofen 20 mg tablet Take 1 tablet (20 mg total) by mouth 3 (three) times a day 30 tablet 0    liraglutide (SAXENDA) injection Inject 0 6 mg subcutaneously once per day for the first week  Increase in weekly intervals by 0 6 mg until a dose of 3 mg is reached 15 mL 5    NovoFine Plus 32G X 4 MM MISC USE AS DIRECTED DAILY 100 each 0    pantoprazole (PROTONIX) 20 mg tablet TAKE ONE TABLET BY MOUTH DAILY 90 tablet 1     No current facility-administered medications for this visit  Allergies   Allergen Reactions    Penicillins Hives     At young age       Review of Systems   Constitutional: Positive for fatigue  Negative for unexpected weight change  HENT: Negative for mouth sores  Dry mouth sometimes   Eyes:        Dry eyes   Respiratory: Negative for cough and shortness of breath  Gastrointestinal: Positive for nausea  Negative for constipation and diarrhea  Endocrine: Positive for cold intolerance and heat intolerance  Musculoskeletal: Positive for arthralgias, back pain, joint swelling and myalgias  Skin: Positive for rash   Negative for color change  Allergic/Immunologic: Positive for environmental allergies  Neurological: Positive for headaches  Negative for weakness  Psychiatric/Behavioral: Negative for sleep disturbance  Physical exam:   Physical Exam  Constitutional:       General: She is not in acute distress  Appearance: She is well-developed  HENT:      Head: Normocephalic and atraumatic  Eyes:      General: Lids are normal  No scleral icterus  Conjunctiva/sclera: Conjunctivae normal    Cardiovascular:      Rate and Rhythm: Normal rate and regular rhythm  Heart sounds: S1 normal and S2 normal  No murmur heard  No friction rub  Pulmonary:      Effort: Pulmonary effort is normal  No tachypnea or respiratory distress  Breath sounds: Normal breath sounds  No wheezing, rhonchi or rales  Musculoskeletal:         General: Tenderness present  Cervical back: Neck supple  No muscular tenderness  Comments: Bilateral wrist tenderness   Skin:     General: Skin is warm and dry  Findings: Erythema present  No rash  Nails: There is no clubbing  Comments: Malar erythema   Neurological:      Mental Status: She is alert  Sensory: No sensory deficit  Psychiatric:         Behavior: Behavior normal  Behavior is cooperative         Imaging:  SI Joint x-rays 11/9/2019 - unremarkable

## 2021-05-24 NOTE — PATIENT INSTRUCTIONS
Do labs today  Take 2 tabs hydroxychloroquine daily; continue regular eye exams  Can take celecoxib twice a day as needed for joint pain    Return to clinic in 3 months    Lupus Erythematosus   WHAT YOU NEED TO KNOW:   What is lupus? Lupus is an autoimmune inflammatory disease  This means that your immune system starts to attack your body instead of harmful germs  It is also called systemic lupus erythematosus  Lupus is a lifelong disease that affects all parts of your body  Lupus has active and quiet periods  The active periods, also called flares, are when you have symptoms  The quiet periods, or remission, are when you have few or no symptoms  A remission period may last months or years, or you may not have remission periods at all  What increases my risk for lupus? The cause of lupus is unknown  You are at increased risk if you are female, take hormones, or have a family member with lupus  Certain medicines, such as hydralazine and minocycline, can increase your risk for lupus  Ask your healthcare provider for more information about what increases your risk for lupus  What are the signs and symptoms of lupus? · Fever over 100°F (38°C)    · Tiredness, weight loss, or headache    · Rash shaped like butterfly wings    · Sensitivity to sunlight    · Hair loss    · Mouth or nose sores    · Painful joints    · Chest pain or cough when you take a deep breath    · Abdominal pain, nausea, or vomiting    How is lupus diagnosed? · Blood tests:  Your blood will be tested for infection, inflammation, or anemia (low red blood cells)  · Urine tests:  Your urine will be tested for protein or blood  · X-rays: This is a picture of your joints or chest to check for infection or extra fluid  · Biopsy:  Tissue may be taken from your skin, muscle, or kidney to check for the cause of your lupus  How is lupus treated? There is no cure for lupus   Lupus may be triggered by stress, ultraviolet light, or an infection, such as a cold  It can also be triggered by cigarette smoke or foods you eat  The following will help control your symptoms:  · Antimalarial medicine: This is used to relieve your joint and skin symptoms of lupus, such as rash and joint pain  · Steroids: These decrease inflammation  They may be given as a pill, IV, or ointment  · NSAIDs:  These decrease swelling, pain, and fever  NSAIDs are available without a doctor's order  Ask your healthcare provider which medicine is right for you  Ask how much to take and when to take it  Take as directed  NSAIDs can cause stomach bleeding and kidney problems if not taken correctly  · Immunosuppressive medicine: This is used to slow down your immune system  This will help your immune system not attack your body  · Cytotoxic medicine: This is used to decrease inflammation in muscles or joints  It also slows down your immune system  What are the risks of lupus? · You may be so tired that you cannot work at times  Your risk for a serious infection is increased  You may develop vision loss  You may become depressed or anxious  Your fingers may turn pale or blue when they are cold  This is called Reynaud syndrome  You may become forgetful or have trouble concentrating  You may develop headaches, vision problems, or have a seizure  · You may develop kidney disease or kidney failure  You may have high blood pressure or narrowing of your arteries  This can lead to heart disease or heart failure  You may have bleeding problems, such as anemia  You may get a blood clot in your leg  The clot may travel to your heart or brain and cause life-threatening problems, such as a heart attack or stroke  How can I manage my symptoms? · Rest:  Rest when you feel it is needed  Slowly start to do more each day  Return to your daily activities as directed  · Protect your skin from UV light:  Sunlight can make your lupus symptoms worse   Avoid the sun between 10 am and 4 pm, when the rays are strongest  Apply sunscreen with a SPF of 30 or more every 2 hours when you are outside  Do this even on cloudy days  Wear pants and long sleeves to cover your body  A hat with a wide brim can protect your face, head, and neck  · Heat:  Heat helps decrease joint pain or swelling  Apply heat on the painful joint for 20 to 30 minutes every 2 hours for as many days as directed  · Ice:  Ice helps decrease swelling and pain  Ice may also help prevent tissue damage  Use an ice pack, or put crushed ice in a plastic bag  Cover it with a towel and place it on the painful area for 15 to 20 minutes every hour or as directed  · Avoid others who are sick:  You are at increased risk of a severe infection  · Treat flares quickly: This will help prevent serious illness  How can I help prevent a lupus flare? · Eat healthy foods:  Healthy foods include fruits, vegetables, whole-grain breads, low-fat dairy products, beans, lean meats, and fish  Ask if you need to be on a special diet  · Exercise: This will help decrease your symptoms and prevent depression  Ask your healthcare provider about the best exercise plan for you  · Maintain a healthy weight:  Ask your healthcare provider how much you should weigh  Ask him to help you create a weight loss plan if you are overweight  · Do not smoke: If you smoke, it is never too late to quit  Ask for information about how to stop smoking if you need help  · Manage your stress:  Stress may slow healing and lead to illness  Learn ways to control stress, such as relaxation, deep breathing, and music  Talk to someone about things that upset you  Where can I find support and more information? · [facility] [address] , [place] [address] , [address]  Phone: Gavin Bedoya  Phone: 9- 442 - [phone]  Web Address: [employer]  org    · [facility]  [address] , [address]  Phone: 5- [phone]  Phone: 3- [phone]  Web Address: [employer] com   [employer] gov  When should I contact my healthcare provider? · You have a flare of your lupus symptoms  · You have a fever or headache  · You feel like you are starting to get sick  · You start to urinate less than usual     · You are bleeding from your nose or gums  · You bruise easily  · You have questions or concerns about your condition or care  When should I seek immediate care or call 911? · You have blood in your urine, bowel movement, or vomit  · You have severe abdominal pain  · You are confused or feel dizzy or faint  · You have numbness or weakness of your face or limbs, or have trouble seeing or speaking  · You have a seizure  · You have new, sudden vision changes  · You have trouble breathing  · You have chest pain, pressure, or discomfort that may spread to your arms, jaw, or back  · Your leg feels warm, tender, and painful  It may look swollen and red  · You suddenly feel lightheaded and short of breath  · You have chest pain when you take a deep breath or cough  · You cough up blood  CARE AGREEMENT:   You have the right to help plan your care  Learn about your health condition and how it may be treated  Discuss treatment options with your healthcare providers to decide what care you want to receive  You always have the right to refuse treatment  The above information is an  only  It is not intended as medical advice for individual conditions or treatments  Talk to your doctor, nurse or pharmacist before following any medical regimen to see if it is safe and effective for you  © Copyright [address] Information is for End User's use only and may not be sold, redistributed or otherwise used for commercial purposes   All illustrations and images included in CareNotes® are the copyrighted property of [employer] M [employer]  or Ricarda Fisher

## 2021-05-25 ENCOUNTER — APPOINTMENT (OUTPATIENT)
Dept: LAB | Facility: HOSPITAL | Age: 36
End: 2021-05-25
Attending: INTERNAL MEDICINE
Payer: COMMERCIAL

## 2021-05-25 ENCOUNTER — APPOINTMENT (OUTPATIENT)
Dept: PHYSICAL THERAPY | Facility: MEDICAL CENTER | Age: 36
End: 2021-05-25
Payer: COMMERCIAL

## 2021-05-25 LAB
ALBUMIN SERPL BCP-MCNC: 4.2 G/DL (ref 3.5–5)
ALP SERPL-CCNC: 58 U/L (ref 46–116)
ALT SERPL W P-5'-P-CCNC: 56 U/L (ref 12–78)
ANION GAP SERPL CALCULATED.3IONS-SCNC: 9 MMOL/L (ref 4–13)
AST SERPL W P-5'-P-CCNC: 27 U/L (ref 5–45)
BACTERIA UR QL AUTO: ABNORMAL /HPF
BASOPHILS # BLD AUTO: 0.02 THOUSANDS/ΜL (ref 0–0.1)
BASOPHILS NFR BLD AUTO: 0 % (ref 0–1)
BILIRUB SERPL-MCNC: 0.47 MG/DL (ref 0.2–1)
BILIRUB UR QL STRIP: NEGATIVE
BUN SERPL-MCNC: 9 MG/DL (ref 5–25)
C3 SERPL-MCNC: 108 MG/DL (ref 90–180)
C4 SERPL-MCNC: 26 MG/DL (ref 10–40)
CALCIUM SERPL-MCNC: 8.9 MG/DL (ref 8.3–10.1)
CHLORIDE SERPL-SCNC: 103 MMOL/L (ref 100–108)
CLARITY UR: CLEAR
CO2 SERPL-SCNC: 30 MMOL/L (ref 21–32)
COLOR UR: YELLOW
CREAT SERPL-MCNC: 0.75 MG/DL (ref 0.6–1.3)
CREAT UR-MCNC: 72.3 MG/DL
CRP SERPL QL: <3 MG/L
EOSINOPHIL # BLD AUTO: 0.17 THOUSAND/ΜL (ref 0–0.61)
EOSINOPHIL NFR BLD AUTO: 3 % (ref 0–6)
ERYTHROCYTE [DISTWIDTH] IN BLOOD BY AUTOMATED COUNT: 13.2 % (ref 11.6–15.1)
ERYTHROCYTE [SEDIMENTATION RATE] IN BLOOD: 1 MM/HOUR (ref 0–19)
GFR SERPL CREATININE-BSD FRML MDRD: 103 ML/MIN/1.73SQ M
GLUCOSE P FAST SERPL-MCNC: 86 MG/DL (ref 65–99)
GLUCOSE UR STRIP-MCNC: NEGATIVE MG/DL
HCT VFR BLD AUTO: 42.4 % (ref 34.8–46.1)
HGB BLD-MCNC: 13.8 G/DL (ref 11.5–15.4)
HGB UR QL STRIP.AUTO: ABNORMAL
IMM GRANULOCYTES # BLD AUTO: 0.02 THOUSAND/UL (ref 0–0.2)
IMM GRANULOCYTES NFR BLD AUTO: 0 % (ref 0–2)
KETONES UR STRIP-MCNC: NEGATIVE MG/DL
LEUKOCYTE ESTERASE UR QL STRIP: NEGATIVE
LYMPHOCYTES # BLD AUTO: 1.71 THOUSANDS/ΜL (ref 0.6–4.47)
LYMPHOCYTES NFR BLD AUTO: 28 % (ref 14–44)
MCH RBC QN AUTO: 30.8 PG (ref 26.8–34.3)
MCHC RBC AUTO-ENTMCNC: 32.5 G/DL (ref 31.4–37.4)
MCV RBC AUTO: 95 FL (ref 82–98)
MONOCYTES # BLD AUTO: 0.37 THOUSAND/ΜL (ref 0.17–1.22)
MONOCYTES NFR BLD AUTO: 6 % (ref 4–12)
NEUTROPHILS # BLD AUTO: 3.91 THOUSANDS/ΜL (ref 1.85–7.62)
NEUTS SEG NFR BLD AUTO: 63 % (ref 43–75)
NITRITE UR QL STRIP: NEGATIVE
NON-SQ EPI CELLS URNS QL MICRO: ABNORMAL /HPF
NRBC BLD AUTO-RTO: 0 /100 WBCS
PH UR STRIP.AUTO: 6 [PH]
PLATELET # BLD AUTO: 229 THOUSANDS/UL (ref 149–390)
PMV BLD AUTO: 9.3 FL (ref 8.9–12.7)
POTASSIUM SERPL-SCNC: 4 MMOL/L (ref 3.5–5.3)
PROT SERPL-MCNC: 7.5 G/DL (ref 6.4–8.2)
PROT UR STRIP-MCNC: NEGATIVE MG/DL
PROT UR-MCNC: <6 MG/DL
PROT/CREAT UR: <0.08 MG/G{CREAT} (ref 0–0.1)
RBC # BLD AUTO: 4.48 MILLION/UL (ref 3.81–5.12)
RBC #/AREA URNS AUTO: ABNORMAL /HPF
SODIUM SERPL-SCNC: 142 MMOL/L (ref 136–145)
SP GR UR STRIP.AUTO: 1.01 (ref 1–1.03)
UROBILINOGEN UR QL STRIP.AUTO: 0.2 E.U./DL
WBC # BLD AUTO: 6.2 THOUSAND/UL (ref 4.31–10.16)
WBC #/AREA URNS AUTO: ABNORMAL /HPF

## 2021-05-25 PROCEDURE — 85025 COMPLETE CBC W/AUTO DIFF WBC: CPT | Performed by: INTERNAL MEDICINE

## 2021-05-25 PROCEDURE — 86225 DNA ANTIBODY NATIVE: CPT | Performed by: INTERNAL MEDICINE

## 2021-05-25 PROCEDURE — 86140 C-REACTIVE PROTEIN: CPT | Performed by: INTERNAL MEDICINE

## 2021-05-25 PROCEDURE — 82570 ASSAY OF URINE CREATININE: CPT | Performed by: INTERNAL MEDICINE

## 2021-05-25 PROCEDURE — 81001 URINALYSIS AUTO W/SCOPE: CPT | Performed by: INTERNAL MEDICINE

## 2021-05-25 PROCEDURE — 36415 COLL VENOUS BLD VENIPUNCTURE: CPT | Performed by: INTERNAL MEDICINE

## 2021-05-25 PROCEDURE — 84156 ASSAY OF PROTEIN URINE: CPT | Performed by: INTERNAL MEDICINE

## 2021-05-25 PROCEDURE — 80053 COMPREHEN METABOLIC PANEL: CPT | Performed by: INTERNAL MEDICINE

## 2021-05-25 PROCEDURE — 86160 COMPLEMENT ANTIGEN: CPT | Performed by: INTERNAL MEDICINE

## 2021-05-25 PROCEDURE — 85652 RBC SED RATE AUTOMATED: CPT | Performed by: INTERNAL MEDICINE

## 2021-05-26 LAB — DSDNA AB SER-ACNC: 9 IU/ML (ref 0–9)

## 2021-06-03 ENCOUNTER — SOCIAL WORK (OUTPATIENT)
Dept: BEHAVIORAL/MENTAL HEALTH CLINIC | Facility: CLINIC | Age: 36
End: 2021-06-03
Payer: COMMERCIAL

## 2021-06-03 DIAGNOSIS — F41.1 GAD (GENERALIZED ANXIETY DISORDER): Primary | ICD-10-CM

## 2021-06-03 DIAGNOSIS — F33.2 MAJOR DEPRESSIVE DISORDER, RECURRENT, SEVERE W/O PSYCHOTIC BEHAVIOR (HCC): ICD-10-CM

## 2021-06-03 PROCEDURE — 90834 PSYTX W PT 45 MINUTES: CPT | Performed by: SOCIAL WORKER

## 2021-06-03 NOTE — PSYCH
Psychotherapy Provided: Individual Psychotherapy 55 minutes     Length of time in session: 4:00 pm to 4:55 pm, follow up in 2 week    Encounter Diagnosis     ICD-10-CM    1  JG (generalized anxiety disorder)  F41 1    2  Major depressive disorder, recurrent, severe w/o psychotic behavior ([facility] [address] )  F33 2        Goals addressed in session: Goal 1 and Goal 2     Pain:      moderate    5    Current suicide risk : Low     Therapist met with Joseline Donaldson  She discussed difficulty with her car and feeling powerless in dealing with the dealership  She discussed her feelings regarding interacting with them  Therapist encouraged her to maintain assertive communication and focus on supports and her strengths through these communications  She agreed to try this and will communicate her results during the next session  Behavioral Health Treatment Plan [facility]: Diagnosis and Treatment Plan explained to Cammie Joy relates understanding diagnosis and is agreeable to Treatment Plan   Yes

## 2021-06-14 ENCOUNTER — OFFICE VISIT (OUTPATIENT)
Dept: FAMILY MEDICINE CLINIC | Facility: CLINIC | Age: 36
End: 2021-06-14
Payer: COMMERCIAL

## 2021-06-14 VITALS
TEMPERATURE: 98.5 F | WEIGHT: 224 LBS | SYSTOLIC BLOOD PRESSURE: 118 MMHG | BODY MASS INDEX: 37.32 KG/M2 | HEIGHT: 65 IN | DIASTOLIC BLOOD PRESSURE: 72 MMHG

## 2021-06-14 DIAGNOSIS — E03.9 ADULT HYPOTHYROIDISM: ICD-10-CM

## 2021-06-14 DIAGNOSIS — G89.29 CHRONIC RIGHT-SIDED LOW BACK PAIN WITH RIGHT-SIDED SCIATICA: ICD-10-CM

## 2021-06-14 DIAGNOSIS — E55.9 VITAMIN D DEFICIENCY: ICD-10-CM

## 2021-06-14 DIAGNOSIS — E03.9 ACQUIRED HYPOTHYROIDISM: Primary | ICD-10-CM

## 2021-06-14 DIAGNOSIS — M54.41 CHRONIC RIGHT-SIDED LOW BACK PAIN WITH RIGHT-SIDED SCIATICA: ICD-10-CM

## 2021-06-14 DIAGNOSIS — F41.8 DEPRESSION WITH ANXIETY: ICD-10-CM

## 2021-06-14 DIAGNOSIS — M32.9 LUPUS (HCC): ICD-10-CM

## 2021-06-14 PROCEDURE — 99214 OFFICE O/P EST MOD 30 MIN: CPT | Performed by: FAMILY MEDICINE

## 2021-06-14 RX ORDER — BACLOFEN 20 MG/1
20 TABLET ORAL 3 TIMES DAILY
Qty: 30 TABLET | Refills: 0 | Status: SHIPPED | OUTPATIENT
Start: 2021-06-14 | End: 2021-10-06

## 2021-06-14 NOTE — PROGRESS NOTES
Assessment/Plan:    Adult hypothyroidism    She is due for lab  Slip given  Continue levothyroxine 25 mcg for now  Right-sided low back pain with right-sided sciatica    At this point would hold off on further physical therapy, will obtain x-rays and go from there  Trial of baclofen also  Vitamin D deficiency    Improved on supplements    Depression with anxiety   Continue probably Ev 200 mg daily, clonazepam 0 5 mg twice a day as needed, Trintellix 20 mg daily    Lupus ([facility])   Continue Plaquenil and Celebrex daily  Follow-up with rheumatology scheduled       Diagnoses and all orders for this visit:    Acquired hypothyroidism  -     T4; Future  -     TSH, 3rd generation; Future    Chronic right-sided low back pain with right-sided sciatica  -     XR spine lumbar minimum 4 views non injury; Future  -     baclofen 20 mg tablet; Take 1 tablet (20 mg total) by mouth 3 (three) times a day    Adult hypothyroidism    Vitamin D deficiency    Lupus ([facility] [address] )    Depression with anxiety          Subjective:   Chief Complaint   Patient presents with    Hypothyroidism    Depression     no refills needed           Patient ID: Jane Avilez is a 39 y o  female  She is here for follow-up on hypothyroidism, depression, and update me on her back and arthritis  Her lupus seems to be stable, she is see Rheumatology taking Plaquenil and Celebrex  Recently injured her back it has been going to physical therapy  Unfortunately this is not improved  Pain seems to be localized in the right sacroiliac area  None of her medication is helping  Robaxin did not help, Flexeril made her too tired  And it really did not help with the pain        The following portions of the patient's history were reviewed and updated as appropriate: allergies, current medications, past family history, past medical history, past social history, past surgical history and problem list     Review of Systems   Constitutional: Negative for chills and fever    HENT: Negative for ear pain and sore throat  Eyes: Negative for pain and visual disturbance  Respiratory: Negative for cough and shortness of breath  Cardiovascular: Negative for chest pain and palpitations  Gastrointestinal: Negative for abdominal pain and vomiting  Genitourinary: Negative for dysuria and hematuria  Musculoskeletal: Positive for back pain (  Radiates around into the right groin  )  Negative for arthralgias  Skin: Negative for color change and rash  Neurological: Negative for seizures and syncope  Psychiatric/Behavioral: Positive for agitation, behavioral problems and confusion  Negative for decreased concentration  All other systems reviewed and are negative  Objective:      /72   Temp 98 5 °F (36 9 °C)   Ht 5' 5" (1 651 m)   Wt 102 kg (224 lb)   BMI 37 28 kg/m²          Physical Exam  Constitutional:       General: She is not in acute distress  Appearance: She is well-developed  HENT:      Head: Normocephalic and atraumatic  Right Ear: Tympanic membrane, ear canal and external ear normal       Left Ear: Tympanic membrane, ear canal and external ear normal       Nose: Nose normal    Eyes:      Conjunctiva/sclera: Conjunctivae normal       Pupils: Pupils are equal, round, and reactive to light  Neck:      Thyroid: No thyromegaly  Vascular: No JVD  Trachea: No tracheal deviation  Cardiovascular:      Rate and Rhythm: Normal rate and regular rhythm  Heart sounds: Normal heart sounds  No murmur heard  Pulmonary:      Effort: Pulmonary effort is normal       Breath sounds: Normal breath sounds  No wheezing or rales  Abdominal:      General: Bowel sounds are normal       Palpations: Abdomen is soft  There is no mass  Tenderness: There is no abdominal tenderness  There is no guarding or rebound  Musculoskeletal:      Cervical back: Neck supple  Spasms present  Lumbar back: Laceration and tenderness present  Back:    Lymphadenopathy:      Cervical: No cervical adenopathy  Skin:     General: Skin is warm and dry  Findings: No lesion or rash  Neurological:      Mental Status: She is alert and oriented to person, place, and time  Cranial Nerves: No cranial nerve deficit  Deep Tendon Reflexes: Reflexes are normal and symmetric     Psychiatric:         Mood and Affect: Mood normal          Behavior: Behavior normal          Judgment: Judgment normal

## 2021-06-14 NOTE — ASSESSMENT & PLAN NOTE
At this point would hold off on further physical therapy, will obtain x-rays and go from there  Trial of baclofen also

## 2021-06-16 ENCOUNTER — TELEPHONE (OUTPATIENT)
Dept: PSYCHIATRY | Facility: CLINIC | Age: 36
End: 2021-06-16

## 2021-06-16 NOTE — TELEPHONE ENCOUNTER
----- Message from Libra Shrestha sent at 6/16/2021  9:48 AM EDT -----  Regarding: CX 6/17 and 7/2  Patient needed to cancel appointments due to working at that time  Scheduled today at 6 will keep in mind for cancellations week of 6/28  Can do any day except Thursday and would need to be 2 or later

## 2021-06-16 NOTE — TELEPHONE ENCOUNTER
----- Message from Cliff Freitas sent at 6/16/2021  6:35 PM EDT -----  Regarding: Patient No Show  Wali Murray [[id number]] No Showed 06/16/21  for Nelly Zamorano LCSW and did not call with proper notice to Cx appt   They are marked as a No Show for today's visit

## 2021-06-17 ENCOUNTER — TELEPHONE (OUTPATIENT)
Dept: PSYCHIATRY | Facility: CLINIC | Age: 36
End: 2021-06-17

## 2021-06-17 NOTE — TELEPHONE ENCOUNTER
NO-SHOW LETTER MAILED TO [facility]    ADDRESS: [facility] [id number] [facility]

## 2021-06-17 NOTE — TELEPHONE ENCOUNTER
Patient came into the office tonight thinking that her appt was for tonight at 6pm instead it was yesterday at 6pm  I there was margo confusion with the days and times  She just asks that fs anything opens up before her 7/15 appt if she can be called  Thank you

## 2021-06-21 ENCOUNTER — TELEPHONE (OUTPATIENT)
Dept: PSYCHIATRY | Facility: CLINIC | Age: 36
End: 2021-06-21

## 2021-06-21 ENCOUNTER — OFFICE VISIT (OUTPATIENT)
Dept: PSYCHIATRY | Facility: CLINIC | Age: 36
End: 2021-06-21
Payer: COMMERCIAL

## 2021-06-21 VITALS — BODY MASS INDEX: 35.72 KG/M2 | HEIGHT: 66 IN | WEIGHT: 222.3 LBS

## 2021-06-21 DIAGNOSIS — F40.10 SOCIAL PHOBIA: ICD-10-CM

## 2021-06-21 DIAGNOSIS — E03.9 ADULT HYPOTHYROIDISM: ICD-10-CM

## 2021-06-21 DIAGNOSIS — F33.2 MAJOR DEPRESSIVE DISORDER, RECURRENT, SEVERE W/O PSYCHOTIC BEHAVIOR (HCC): Primary | ICD-10-CM

## 2021-06-21 DIAGNOSIS — F41.8 DEPRESSION WITH ANXIETY: ICD-10-CM

## 2021-06-21 DIAGNOSIS — F51.13 HYPERSOMNIA DUE TO MENTAL DISORDER: ICD-10-CM

## 2021-06-21 DIAGNOSIS — M32.9 LUPUS (HCC): ICD-10-CM

## 2021-06-21 DIAGNOSIS — F41.1 GAD (GENERALIZED ANXIETY DISORDER): ICD-10-CM

## 2021-06-21 PROCEDURE — 99214 OFFICE O/P EST MOD 30 MIN: CPT | Performed by: NURSE PRACTITIONER
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
## 2021-06-23 ENCOUNTER — HOSPITAL ENCOUNTER (OUTPATIENT)
Dept: RADIOLOGY | Facility: HOSPITAL | Age: 36
Discharge: HOME | End: 2021-06-23
Payer: COMMERCIAL

## 2021-06-23 ENCOUNTER — LAB (OUTPATIENT)
Dept: LAB | Facility: HOSPITAL | Age: 36
End: 2021-06-23
Payer: COMMERCIAL

## 2021-06-23 DIAGNOSIS — G89.29 CHRONIC RIGHT-SIDED LOW BACK PAIN WITH RIGHT-SIDED SCIATICA: ICD-10-CM

## 2021-06-23 DIAGNOSIS — E03.9 ACQUIRED HYPOTHYROIDISM: ICD-10-CM

## 2021-06-23 DIAGNOSIS — M54.41 CHRONIC RIGHT-SIDED LOW BACK PAIN WITH RIGHT-SIDED SCIATICA: ICD-10-CM

## 2021-06-23 LAB
T4 SERPL-MCNC: 6.3 UG/DL (ref 4.7–13.3)
TSH SERPL DL<=0.05 MIU/L-ACNC: 2.97 UIU/ML (ref 0.36–3.74)

## 2021-06-23 PROCEDURE — 84436 ASSAY OF TOTAL THYROXINE: CPT

## 2021-06-23 PROCEDURE — 84443 ASSAY THYROID STIM HORMONE: CPT

## 2021-06-23 PROCEDURE — 36415 COLL VENOUS BLD VENIPUNCTURE: CPT

## 2021-06-23 PROCEDURE — 72110 X-RAY EXAM L-2 SPINE 4/>VWS: CPT

## 2021-06-24 DIAGNOSIS — E03.9 ACQUIRED HYPOTHYROIDISM: ICD-10-CM

## 2021-06-24 RX ORDER — LEVOTHYROXINE SODIUM 0.03 MG/1
25 TABLET ORAL
Qty: 90 TABLET | Refills: 1 | Status: SHIPPED | OUTPATIENT
Start: 2021-06-24 | End: 2021-12-14

## 2021-07-01 ENCOUNTER — APPOINTMENT (EMERGENCY)
Dept: CT IMAGING | Facility: HOSPITAL | Age: 36
End: 2021-07-01
Payer: COMMERCIAL

## 2021-07-01 ENCOUNTER — HOSPITAL ENCOUNTER (EMERGENCY)
Facility: HOSPITAL | Age: 36
Discharge: HOME | End: 2021-07-02
Attending: EMERGENCY MEDICINE | Admitting: EMERGENCY MEDICINE
Payer: COMMERCIAL

## 2021-07-01 VITALS
BODY MASS INDEX: 36.89 KG/M2 | RESPIRATION RATE: 16 BRPM | DIASTOLIC BLOOD PRESSURE: 57 MMHG | TEMPERATURE: 98.1 F | OXYGEN SATURATION: 99 % | WEIGHT: 225.09 LBS | HEART RATE: 65 BPM | SYSTOLIC BLOOD PRESSURE: 102 MMHG

## 2021-07-01 DIAGNOSIS — R19.5 LOOSE STOOLS: ICD-10-CM

## 2021-07-01 DIAGNOSIS — R63.0 DECREASED APPETITE: ICD-10-CM

## 2021-07-01 DIAGNOSIS — R19.8 ABDOMINAL BURNING SENSATION IN LEFT UPPER QUADRANT: Primary | ICD-10-CM

## 2021-07-01 LAB
ALBUMIN SERPL BCP-MCNC: 3.8 G/DL (ref 3.5–5)
ALP SERPL-CCNC: 55 U/L (ref 46–116)
ALT SERPL W P-5'-P-CCNC: 35 U/L (ref 12–78)
ANION GAP SERPL CALCULATED.3IONS-SCNC: 3 MMOL/L (ref 4–13)
AST SERPL W P-5'-P-CCNC: 19 U/L (ref 5–45)
BASOPHILS # BLD AUTO: 0.01 THOUSANDS/ΜL (ref 0–0.1)
BASOPHILS NFR BLD AUTO: 0 % (ref 0–1)
BILIRUB SERPL-MCNC: 0.35 MG/DL (ref 0.2–1)
BUN SERPL-MCNC: 12 MG/DL (ref 5–25)
CALCIUM SERPL-MCNC: 8.6 MG/DL (ref 8.3–10.1)
CHLORIDE SERPL-SCNC: 106 MMOL/L (ref 100–108)
CO2 SERPL-SCNC: 32 MMOL/L (ref 21–32)
CREAT SERPL-MCNC: 0.7 MG/DL (ref 0.6–1.3)
EOSINOPHIL # BLD AUTO: 0.18 THOUSAND/ΜL (ref 0–0.61)
EOSINOPHIL NFR BLD AUTO: 3 % (ref 0–6)
ERYTHROCYTE [DISTWIDTH] IN BLOOD BY AUTOMATED COUNT: 12.9 % (ref 11.6–15.1)
GFR SERPL CREATININE-BSD FRML MDRD: 112 ML/MIN/1.73SQ M
GLUCOSE SERPL-MCNC: 91 MG/DL (ref 65–140)
HCT VFR BLD AUTO: 36.5 % (ref 34.8–46.1)
HGB BLD-MCNC: 12.3 G/DL (ref 11.5–15.4)
IMM GRANULOCYTES # BLD AUTO: 0.02 THOUSAND/UL (ref 0–0.2)
IMM GRANULOCYTES NFR BLD AUTO: 0 % (ref 0–2)
LACTATE SERPL-SCNC: 0.6 MMOL/L (ref 0.5–2)
LYMPHOCYTES # BLD AUTO: 2.26 THOUSANDS/ΜL (ref 0.6–4.47)
LYMPHOCYTES NFR BLD AUTO: 36 % (ref 14–44)
MCH RBC QN AUTO: 31.9 PG (ref 26.8–34.3)
MCHC RBC AUTO-ENTMCNC: 33.7 G/DL (ref 31.4–37.4)
MCV RBC AUTO: 95 FL (ref 82–98)
MONOCYTES # BLD AUTO: 0.43 THOUSAND/ΜL (ref 0.17–1.22)
MONOCYTES NFR BLD AUTO: 7 % (ref 4–12)
NEUTROPHILS # BLD AUTO: 3.32 THOUSANDS/ΜL (ref 1.85–7.62)
NEUTS SEG NFR BLD AUTO: 54 % (ref 43–75)
NRBC BLD AUTO-RTO: 0 /100 WBCS
PLATELET # BLD AUTO: 201 THOUSANDS/UL (ref 149–390)
PMV BLD AUTO: 9.2 FL (ref 8.9–12.7)
POTASSIUM SERPL-SCNC: 4.2 MMOL/L (ref 3.5–5.3)
PROT SERPL-MCNC: 7 G/DL (ref 6.4–8.2)
RBC # BLD AUTO: 3.85 MILLION/UL (ref 3.81–5.12)
SODIUM SERPL-SCNC: 141 MMOL/L (ref 136–145)
WBC # BLD AUTO: 6.22 THOUSAND/UL (ref 4.31–10.16)

## 2021-07-01 PROCEDURE — 83605 ASSAY OF LACTIC ACID: CPT | Performed by: EMERGENCY MEDICINE

## 2021-07-01 PROCEDURE — 99284 EMERGENCY DEPT VISIT MOD MDM: CPT

## 2021-07-01 PROCEDURE — 36415 COLL VENOUS BLD VENIPUNCTURE: CPT | Performed by: EMERGENCY MEDICINE

## 2021-07-01 PROCEDURE — 99284 EMERGENCY DEPT VISIT MOD MDM: CPT | Performed by: EMERGENCY MEDICINE

## 2021-07-01 PROCEDURE — 80053 COMPREHEN METABOLIC PANEL: CPT | Performed by: EMERGENCY MEDICINE

## 2021-07-01 PROCEDURE — 85025 COMPLETE CBC W/AUTO DIFF WBC: CPT | Performed by: EMERGENCY MEDICINE

## 2021-07-01 PROCEDURE — 74177 CT ABD & PELVIS W/CONTRAST: CPT

## 2021-07-01 PROCEDURE — 96374 THER/PROPH/DIAG INJ IV PUSH: CPT

## 2021-07-01 PROCEDURE — 96361 HYDRATE IV INFUSION ADD-ON: CPT

## 2021-07-01 RX ORDER — KETOROLAC TROMETHAMINE 30 MG/ML
30 INJECTION, SOLUTION INTRAMUSCULAR; INTRAVENOUS ONCE
Status: COMPLETED | OUTPATIENT
Start: 2021-07-01 | End: 2021-07-01

## 2021-07-01 RX ADMIN — KETOROLAC TROMETHAMINE 30 MG: 30 INJECTION, SOLUTION INTRAMUSCULAR; INTRAVENOUS at 22:48

## 2021-07-01 RX ADMIN — SODIUM CHLORIDE 1000 ML: 0.9 INJECTION, SOLUTION INTRAVENOUS at 22:47

## 2021-07-02 RX ADMIN — IOHEXOL 100 ML: 350 INJECTION, SOLUTION INTRAVENOUS at 00:05

## 2021-07-02 NOTE — ED PROVIDER NOTES
History  Chief Complaint   Patient presents with    Abdominal Pain     Reports LUQ abdominal pain, described as burning  denies n/v  diarrhea reported  decreased appetite reported  39 up female with hx of lupus who presents with vague LUQ abdominal pain she describes as burning with some associated loose stool and decreased appetite  Reports she chronically has "mild discomfort" in LUQ but has never had it evaluated - this is worse pain than usual   Denies nausea, vomiting, fever/chills  History provided by:  Patient, significant other and relative   used: No    Abdominal Pain  Pain location:  LUQ  Pain quality: burning    Pain radiates to:  Does not radiate  Pain severity:  Moderate  Onset quality:  Gradual  Duration:  1 day  Timing:  Constant  Progression:  Unchanged  Chronicity:  New  Relieved by:  Nothing  Worsened by:  Nothing  Ineffective treatments:  None tried  Associated symptoms: anorexia    Associated symptoms: no chest pain, no chills, no constipation, no dysuria, no fatigue, no fever, no hematuria, no nausea, no shortness of breath, no sore throat, no vaginal bleeding, no vaginal discharge and no vomiting  Diarrhea: loose stool  Multiple surgeries: blanca, tubal         Prior to Admission Medications   Prescriptions Last Dose Informant Patient Reported? Taking?    Biotin 1 MG CAPS   Yes No   Sig: biotin   COLLAGEN PO   Yes No   Sig: Take 3 capsules by mouth daily   Cyanocobalamin (B-12) 2500 MCG SUBL   Yes No   Sig: Take 1 tablet by mouth daily   NovoFine Plus 32G X 4 MM MISC   No No   Sig: USE AS DIRECTED DAILY   Vortioxetine HBr (Trintellix) 20 MG tablet   No No   Sig: Take 1 tablet (20 mg total) by mouth daily   acetaminophen (TYLENOL) 325 mg tablet  Self No No   Sig: Take 2 tablets (650 mg total) by mouth every 6 (six) hours as needed for mild pain, moderate pain, headaches or fever   baclofen 20 mg tablet   No No   Sig: Take 1 tablet (20 mg total) by mouth 3 (three) times a day   celecoxib (CeleBREX) 200 mg capsule   No No   Sig: Take 1 capsule (200 mg total) by mouth 2 (two) times a day   cholecalciferol (VITAMIN D3) 400 units tablet  Self Yes No   Sig: Take 400 Units by mouth daily   Patient not taking: Reported on 6/21/2021   clonazePAM (KlonoPIN) 0 5 mg tablet   No No   Sig: TAKE 1/2 TO 1 TABLET BY MOUTH 2 TIMES A DAY AS NEEDED FOR ANXIETY   folic acid (FOLVITE) 650 MCG tablet  Self Yes No   Sig: Take 800 mcg by mouth daily    hydroxychloroquine (PLAQUENIL) 200 mg tablet   No No   Sig: Take 2 tablets (400 mg total) by mouth daily   levothyroxine 25 mcg tablet   No No   Sig: Take 1 tablet (25 mcg total) by mouth daily in the early morning   liraglutide (SAXENDA) injection  Self No No   Sig: Inject 0 6 mg subcutaneously once per day for the first week   Increase in weekly intervals by 0 6 mg until a dose of 3 mg is reached   modafinil (PROVIGIL) 200 MG tablet   No No   Sig: Take 1 tablet (200 mg total) by mouth daily   pantoprazole (PROTONIX) 20 mg tablet   No No   Sig: TAKE ONE TABLET BY MOUTH DAILY      Facility-Administered Medications: None       Past Medical History:   Diagnosis Date    Anxiety     Back pain     Bipolar disorder ([address] )     Carpal tunnel syndrome on right     Contact lens overwear of both eyes     Cubital tunnel syndrome on right     Depression     GERD (gastroesophageal reflux disease)     Headache     Hypothyroidism     Kidney stones     Lupus (systemic lupus erythematosus) (HCC)     Nausea     PONV (postoperative nausea and vomiting)     Wears glasses        Past Surgical History:   Procedure Laterality Date    [place] CYSTOURETHROSCOPY N/A 2/3/2020    Procedure: CYSTOSCOPY;  Surgeon: Mason Brasher MD;  [facility];  Service: UroGynecology           [place] LAP,CHOLECYSTECTOMY N/A 9/6/2018    Procedure: CHOLECYSTECTOMY LAPAROSCOPIC W/ ROBOTICS;  Surgeon: Sylvia Delaney MD;  [facility];  Service: General    [place] POST COLPORRHAPHY,RECTUM/VAGINA N/A 2/3/2020    Procedure: POSTERIOR COLPORRHAPHY;  Surgeon: Carla Langston MD;  [facility];  Service: UroGynecology           [place] REVISE MEDIAN N/CARPAL TUNNEL SURG Right 11/27/2020    Procedure: release CARPAL TUNNEL;  Surgeon: Mirna Austin MD;  [facility];  Service: Orthopedics    [place] REVISE ULNAR NERVE AT ELBOW Right 11/27/2020    Procedure: Penny Sharma;  Surgeon: Mirna Austin MD;  [facility];  Service: Orthopedics    [place] SLING OPER STRES INCONTINENCE N/A 2/3/2020    Procedure: PUBOVAGINAL SLING;  Surgeon: Carla Langston MD;  [facility];  Service: UroGynecology           TUBAL LIGATION         Family History   Problem Relation Age of Onset    No Known Problems Mother     Alcohol abuse Father     Drug abuse Family     Psoriasis Maternal Grandmother     Colon cancer Maternal Uncle     Hypertension Maternal Grandfather     Heart disease Maternal Grandfather     Diabetes Neg Hx      I have reviewed and agree with the history as documented  E-Cigarette/Vaping    E-Cigarette Use Never User      E-Cigarette/Vaping Substances    Nicotine No     THC No     CBD No     Flavoring No     Other No     Unknown No      Social History     Tobacco Use    Smoking status: Never Smoker    Smokeless tobacco: Never Used   Vaping Use    Vaping Use: Never used   Substance Use Topics    Alcohol use: Not Currently    Drug use: No       Review of Systems   Constitutional: Negative for appetite change, chills, fatigue and fever  HENT: Negative for sore throat  Eyes: Negative for visual disturbance  Respiratory: Negative for shortness of breath  Cardiovascular: Negative for chest pain  Gastrointestinal: Positive for abdominal pain (LUQ) and anorexia  Negative for constipation, nausea and vomiting  Diarrhea: loose stool  Genitourinary: Negative for dysuria, frequency, hematuria, vaginal bleeding and vaginal discharge  Musculoskeletal: Negative for back pain, neck pain and neck stiffness  Skin: Negative for pallor and rash  Allergic/Immunologic: Negative for immunocompromised state  Neurological: Negative for light-headedness and headaches  Psychiatric/Behavioral: Negative for confusion  All other systems reviewed and are negative  Physical Exam  Physical Exam  Vitals and nursing note reviewed  Constitutional:       General: She is not in acute distress  Appearance: She is well-developed  HENT:      Head: Normocephalic and atraumatic  Right Ear: External ear normal       Left Ear: External ear normal    Cardiovascular:      Rate and Rhythm: Normal rate and regular rhythm  Heart sounds: No murmur heard  Pulmonary:      Effort: Pulmonary effort is normal  No respiratory distress  Breath sounds: Normal breath sounds  Abdominal:      General: Bowel sounds are normal       Palpations: Abdomen is soft  Tenderness: There is abdominal tenderness (mild) in the left upper quadrant  There is no rebound  Musculoskeletal:         General: Normal range of motion  Cervical back: Neck supple  Skin:     General: Skin is warm  Coloration: Skin is not pale  Findings: No rash  Neurological:      Mental Status: She is alert and oriented to person, place, and time     Psychiatric:         Behavior: Behavior normal          Vital Signs  ED Triage Vitals [07/01/21 2018]   Temperature Pulse Respirations Blood Pressure SpO2   98 1 °F (36 7 °C) 60 16 126/60 98 %      Temp Source Heart Rate Source Patient Position - Orthostatic VS BP Location FiO2 (%)   Oral Monitor Sitting Right arm --      Pain Score       6           Vitals:    07/01/21 2018 07/01/21 2216   BP: 126/60 102/57   Pulse: 60 65   Patient Position - Orthostatic VS: Sitting Lying         Visual Acuity      ED Medications  Medications   sodium chloride 0 9 % bolus 1,000 mL (0 mL Intravenous Stopped 7/2/21 0037)   ketorolac (TORADOL) injection 30 mg (30 mg Intravenous Given 7/1/21 2248)   iohexol (OMNIPAQUE) 350 MG/ML injection (SINGLE-DOSE) 100 mL (100 mL Intravenous Given 7/2/21 0005)       Diagnostic Studies  Results Reviewed     Procedure Component Value Units Date/Time    Lactic acid [[id number]]  (Normal) Collected: 07/01/21 2249    Lab Status: Final result Specimen: Blood from Arm, Right Updated: 07/01/21 2327     LACTIC ACID 0 6 mmol/L     Narrative:      Result may be elevated if tourniquet was used during collection      Comprehensive metabolic panel [[id number]]  (Abnormal) Collected: 07/01/21 2249    Lab Status: Final result Specimen: Blood from Arm, Right Updated: 07/01/21 2324     Sodium 141 mmol/L      Potassium 4 2 mmol/L      Chloride 106 mmol/L      CO2 32 mmol/L      ANION GAP 3 mmol/L      BUN 12 mg/dL      Creatinine 0 70 mg/dL      Glucose 91 mg/dL      Calcium 8 6 mg/dL      AST 19 U/L      ALT 35 U/L      Alkaline Phosphatase 55 U/L      Total Protein 7 0 g/dL      Albumin 3 8 g/dL      Total Bilirubin 0 35 mg/dL      eGFR 112 ml/min/1 73sq m     Narrative:      [place] guidelines for Chronic Kidney Disease (CKD):     Stage 1 with normal or high GFR (GFR > 90 mL/min/1 73 square meters)    Stage 2 Mild CKD (GFR = 60-89 mL/min/1 73 square meters)    Stage 3A Moderate CKD (GFR = 45-59 mL/min/1 73 square meters)    Stage 3B Moderate CKD (GFR = 30-44 mL/min/1 73 square meters)    Stage 4 Severe CKD (GFR = 15-29 mL/min/1 73 square meters)    Stage 5 End Stage CKD (GFR <15 mL/min/1 73 square meters)  Note: GFR calculation is accurate only with a steady state creatinine    CBC and differential [[id number]] Collected: 07/01/21 2249    Lab Status: Final result Specimen: Blood from Arm, Right Updated: 07/01/21 2257     WBC 6 22 Thousand/uL      RBC 3 85 Million/uL      Hemoglobin 12 3 g/dL      Hematocrit 36 5 %      MCV 95 fL      MCH 31 9 pg      MCHC 33 7 g/dL      RDW 12 9 %      MPV 9 2 fL Platelets 263 Thousands/uL      nRBC 0 /100 WBCs      Neutrophils Relative 54 %      Immat GRANS % 0 %      Lymphocytes Relative 36 %      Monocytes Relative 7 %      Eosinophils Relative 3 %      Basophils Relative 0 %      Neutrophils Absolute 3 32 Thousands/µL      Immature Grans Absolute 0 02 Thousand/uL      Lymphocytes Absolute 2 26 Thousands/µL      Monocytes Absolute 0 43 Thousand/µL      Eosinophils Absolute 0 18 Thousand/µL      Basophils Absolute 0 01 Thousands/µL                  CT abdomen pelvis with contrast   Final Result by Gómez Narayanan DO (07/02 0017)      No acute findings            Workstation performed: [id number]                    Procedures  Procedures         ED Course  ED Course as of Jul 02 0039   Thu Jul 01, 2021   2156 Pt seen and examined  39 up female with hx of lupus who presents with vague LUQ abdominal pain she describes as burning with some associated loose stool and decreased appetite  Reports she chronically has "mild discomfort" in LUQ but has never had it evaluated - this is worse pain than usual   Denies nausea, vomiting, fever/chills  Will give IVF, toradol and check labs including lactate, CT a/p       2327 Labs WNL, awaiting CT a/p       2347 Pt taken for CT a/p  Fri Jul 02, 2021   0020 CT a/p - No acute findings  SBIRT 22yo+      Most Recent Value   SBIRT (22 yo +)   In order to provide better care to our patients, we are screening all of our patients for alcohol and drug use  Would it be okay to ask you these screening questions? Yes Filed at: 07/01/2021 2329   Initial Alcohol Screen: US AUDIT-C    1  How often do you have a drink containing alcohol?  0 Filed at: 07/01/2021 2329   2  How many drinks containing alcohol do you have on a typical day you are drinking? 0 Filed at: 07/01/2021 2329   3a  Male UNDER 65: How often do you have five or more drinks on one occasion? 0 Filed at: 07/01/2021 2329   3b   FEMALE Any Age, or MALE 65+: How often do you have 4 or more drinks on one occassion? 0 Filed at: 07/01/2021 2329   Audit-C Score  0 Filed at: 07/01/2021 2329   JHONATHAN: How many times in the past year have you    Used an illegal drug or used a prescription medication for non-medical reasons? Never Filed at: 07/01/2021 2329                    MDM    Disposition  Final diagnoses:   Abdominal burning sensation in left upper quadrant   Decreased appetite   Loose stools     Time reflects when diagnosis was documented in both MDM as applicable and the Disposition within this note     Time User Action Codes Description Comment    7/2/2021 12:28 AM Xochilt Band M Add [R10 12] LUQ abdominal pain     7/2/2021 12:28 AM Xochilt Band M Add [R19 8] Abdominal burning sensation in left upper quadrant     7/2/2021 12:28 AM Xochilt Band M Modify [R19 8] Abdominal burning sensation in left upper quadrant     7/2/2021 12:28 AM Xochilt Band M Remove [R10 12] LUQ abdominal pain     7/2/2021 12:28 AM Xochilt Band M Add [R63 0] Decreased appetite     7/2/2021 12:28 AM Xochilt Band M Add [R19 5] Loose stools       ED Disposition     ED Disposition Condition Date/Time Comment    Discharge Stable Fri Jul 2, 4671 15:92 AM Bean Jones discharge to home/self care              Follow-up Information     Follow up With Specialties Details Why Contact Info    Maya Dixon DO Family Medicine Schedule an appointment as soon as possible for a visit  As needed [facility]  [address]  [phone]            Discharge Medication List as of 7/2/2021 12:29 AM      CONTINUE these medications which have NOT CHANGED    Details   acetaminophen (TYLENOL) 325 mg tablet Take 2 tablets (650 mg total) by mouth every 6 (six) hours as needed for mild pain, moderate pain, headaches or fever, Starting Mon 2/3/2020, No Print      baclofen 20 mg tablet Take 1 tablet (20 mg total) by mouth 3 (three) times a day, Starting Mon 6/14/2021, Normal Biotin 1 MG CAPS biotin, Historical Med      celecoxib (CeleBREX) 200 mg capsule Take 1 capsule (200 mg total) by mouth 2 (two) times a day, Starting Mon 5/24/2021, Normal      cholecalciferol (VITAMIN D3) 400 units tablet Take 400 Units by mouth daily, Historical Med      clonazePAM (KlonoPIN) 0 5 mg tablet TAKE 1/2 TO 1 TABLET BY MOUTH 2 TIMES A DAY AS NEEDED FOR ANXIETY, Normal      COLLAGEN PO Take 3 capsules by mouth daily, Historical Med      Cyanocobalamin (B-12) 2500 MCG SUBL Take 1 tablet by mouth daily, Historical Med      folic acid (FOLVITE) 584 MCG tablet Take 800 mcg by mouth daily , Historical Med      hydroxychloroquine (PLAQUENIL) 200 mg tablet Take 2 tablets (400 mg total) by mouth daily, Starting Mon 5/24/2021, Until Sat 11/20/2021, Normal      levothyroxine 25 mcg tablet Take 1 tablet (25 mcg total) by mouth daily in the early morning, Starting u 6/24/2021, Normal      liraglutide (SAXENDA) injection Inject 0 6 mg subcutaneously once per day for the first week  Increase in weekly intervals by 0 6 mg until a dose of 3 mg is reached, Normal      modafinil (PROVIGIL) 200 MG tablet Take 1 tablet (200 mg total) by mouth daily, Starting Mon 4/19/2021, Until Sun 7/18/2021, Normal      NovoFine Plus 32G X 4 MM MISC USE AS DIRECTED DAILY, Normal      pantoprazole (PROTONIX) 20 mg tablet TAKE ONE TABLET BY MOUTH DAILY, Normal      Vortioxetine HBr (Trintellix) 20 MG tablet Take 1 tablet (20 mg total) by mouth daily, Starting Mon 6/21/2021, Until Sun 9/19/2021, Normal           No discharge procedures on file      PDMP Review       Value Time User    PDMP Reviewed  Yes 11/27/2020 11:12 AM Jose Gallegos PA-C          ED Provider  Electronically Signed by           Mich Melendez DO  07/02/21 0795

## 2021-07-15 ENCOUNTER — SOCIAL WORK (OUTPATIENT)
Dept: BEHAVIORAL/MENTAL HEALTH CLINIC | Facility: CLINIC | Age: 36
End: 2021-07-15
Payer: COMMERCIAL

## 2021-07-15 DIAGNOSIS — F41.1 GAD (GENERALIZED ANXIETY DISORDER): Primary | ICD-10-CM

## 2021-07-15 DIAGNOSIS — F33.2 MAJOR DEPRESSIVE DISORDER, RECURRENT, SEVERE W/O PSYCHOTIC BEHAVIOR (HCC): ICD-10-CM

## 2021-07-15 PROCEDURE — 90834 PSYTX W PT 45 MINUTES: CPT | Performed by: SOCIAL WORKER

## 2021-07-15 NOTE — PSYCH
Psychotherapy Provided: Individual Psychotherapy 50 minutes     Length of time in session: 2:00 pm to 2:50 pm, follow up in 2 week    Encounter Diagnosis     ICD-10-CM    1  JG (generalized anxiety disorder)  F41 1    2  Major depressive disorder, recurrent, severe w/o psychotic behavior ([address] )  F33 2        Goals addressed in session: Goal 1 and Goal 2     Pain:      moderate to severe    7    Current suicide risk : Low     Therapist met with Vira Palm  She shared her feelings regarding her boyfriends health and how it continues to deteriorate  Therapist and Vira Palm explored her feelings regarding their relationship and her ability to support his needs  Therapist and Vira Palm will continue to discuss her feelings during upcoming sessions  Behavioral Health Treatment Plan [facility]: Diagnosis and Treatment Plan explained to Starluceroe Ronners relates understanding diagnosis and is agreeable to Treatment Plan   Yes

## 2021-07-27 DIAGNOSIS — M32.9 LUPUS (HCC): Primary | ICD-10-CM

## 2021-07-27 RX ORDER — PREDNISONE 10 MG/1
TABLET ORAL
Qty: 21 TABLET | Refills: 0 | Status: SHIPPED | OUTPATIENT
Start: 2021-07-27 | End: 2021-08-05 | Stop reason: ALTCHOICE

## 2021-07-29 ENCOUNTER — TELEPHONE (OUTPATIENT)
Dept: PSYCHIATRY | Facility: CLINIC | Age: 36
End: 2021-07-29

## 2021-07-29 NOTE — TELEPHONE ENCOUNTER
Called the patient and left her a VM notifying her that her 5pm appt is being cancelled due to the provider having to leave

## 2021-08-03 ENCOUNTER — TELEPHONE (OUTPATIENT)
Dept: RHEUMATOLOGY | Facility: CLINIC | Age: 36
End: 2021-08-03

## 2021-08-03 NOTE — TELEPHONE ENCOUNTER
I spoke with the patient  I offered her a noon slot on Thursday 8/5 but she declined the appointment  She explained she works until 1 pm daily and cannot take off  The next available after 1 was Friday 8/20  She had an appt scheduled already for the 27th - which we just rescheduled that appointment for the 20th

## 2021-08-03 NOTE — TELEPHONE ENCOUNTER
----- Message from Valentino Gonzalez MD sent at 8/3/2021  6:52 AM EDT -----  Regarding: FW: Non-Urgent Medical Question  Contact: [phone]  Please get her in for a followup this week, same-day is fine  ----- Message -----  From: Annika Alejandro  Sent: 9/9/9255   5:42 AM EDT  To: Valentino Gonzalez MD  Subject: RE: Non-Urgent [place] [address] Aylin Bunn  I did as you directed  I am having a big flare up, that's been hanging around for a while  My left knee hurts so bad I am having trouble walking and moving around  My body hurts all over  I get sharp, shooting pain through out my body  My hands hurt, I am having sharp pain in both hands and fingers  I feel very foggy, headache is still lingering and I been reading things backwards or not as is  I just want to sleep, I am very fatigue  I am pushing myself to get through the days

## 2021-08-05 ENCOUNTER — TELEPHONE (OUTPATIENT)
Dept: RHEUMATOLOGY | Facility: CLINIC | Age: 36
End: 2021-08-05

## 2021-08-05 ENCOUNTER — OFFICE VISIT (OUTPATIENT)
Dept: SLEEP CENTER | Facility: CLINIC | Age: 36
End: 2021-08-05
Payer: COMMERCIAL

## 2021-08-05 VITALS — DIASTOLIC BLOOD PRESSURE: 68 MMHG | BODY MASS INDEX: 36.89 KG/M2 | HEIGHT: 66 IN | SYSTOLIC BLOOD PRESSURE: 102 MMHG

## 2021-08-05 DIAGNOSIS — E66.9 CLASS II OBESITY: ICD-10-CM

## 2021-08-05 DIAGNOSIS — R53.83 TIREDNESS: ICD-10-CM

## 2021-08-05 DIAGNOSIS — G47.8 NON-RESTORATIVE SLEEP: ICD-10-CM

## 2021-08-05 DIAGNOSIS — M32.9 LUPUS (HCC): Primary | ICD-10-CM

## 2021-08-05 DIAGNOSIS — R06.89 GASPING FOR BREATH: ICD-10-CM

## 2021-08-05 DIAGNOSIS — G47.19 EXCESSIVE DAYTIME SLEEPINESS: ICD-10-CM

## 2021-08-05 DIAGNOSIS — R06.83 SNORING: Primary | ICD-10-CM

## 2021-08-05 DIAGNOSIS — R53.83 FATIGUE, UNSPECIFIED TYPE: ICD-10-CM

## 2021-08-05 DIAGNOSIS — Z91.89 AT RISK FOR SLEEP APNEA: ICD-10-CM

## 2021-08-05 PROCEDURE — 99244 OFF/OP CNSLTJ NEW/EST MOD 40: CPT | Performed by: PSYCHIATRY & NEUROLOGY

## 2021-08-05 RX ORDER — METHYLPREDNISOLONE 4 MG/1
TABLET ORAL
Qty: 21 TABLET | Refills: 0 | Status: SHIPPED | OUTPATIENT
Start: 2021-08-05 | End: 2021-10-06

## 2021-08-05 NOTE — TELEPHONE ENCOUNTER
Information provided to patient since it appears she did not read her [employer] message yet

## 2021-08-05 NOTE — PATIENT INSTRUCTIONS
Recommendations:  1) HST with in lab PSG if non-diagnostic  2) Driving safety was reviewed with patient  If the patient feels too sleepy to drive she knows not to drive  If she becomes sleepy while driving she will pull over and nap  3) Follow-up after initiation of treatment  4) Call with any questions or concerns     5) continue modafinil 200mg daily

## 2021-08-05 NOTE — TELEPHONE ENCOUNTER
----- Message from Harmony Rabago MD sent at 8/5/2021  1:53 AM EDT -----  Regarding: FW: Non-Urgent Medical Question  Contact: [phone]  Please put her on waitlist to contact if any follow-up slot becomes available after 1pm  Also, please let her know that I want her to stop the prednisone, and take a Medrol dose pack instead that I sent to her pharmacy  MANISHA Vaz  ----- Message -----  From: Ladarius Miller MA  Sent: 8/4/2021   7:52 AM EDT  To: Harmony Rabago MD  Subject: FW: Non-Urgent Medical Question                  I did offer these slots  The patient works and cannot come in until after 1   The 20th was the soonest she could do that was after 1    ----- Message -----  From: Harmony Rabago MD  Sent: 8/3/2021   9:52 PM EDT  To: Ladarius Miller MA  Subject: FW: Non-Urgent Medical Question                  See if you can accommodate this patient at noon this Thursday, or 12:30pm this Friday  ----- Message -----  From: Dawood Tom  Sent: 4/5/5036   8:27 AM EDT  To: Harmony Rabago MD  Subject: RE: Non-Urgent Medical Question                  eric vaz

## 2021-08-05 NOTE — LETTER
August 5, 2021     Lesa Mast DO  [id number]  [address] [facility]    Patient: Renetta Villanueva   YOB: 1985   Date of Visit: 8/5/2021       Dear Dr Laws Pi: Thank you for referring Renetta Villanueva to me for evaluation  Below are my notes for this consultation  If you have questions, please do not hesitate to call me  I look forward to following your patient along with you  Sincerely,        Jin Walker MD        CC: No Recipients  Jin Walker MD  8/5/2021  4:00 PM  Sign when Signing Visit  Sleep Medicine Consultation Note    HPI:  Ms Renetta Villanueva is a 39 y o  female seen at the request of Matthew Massey DO for advice regarding suspected sleep disordered breathing  The patient stated that she is tired and fatigued during the day and when she wakes up she is not refreshed  This has been ongoing for years now  She had a sleep study at home some years ago, but it was in 2010/2011 and not available to review in epic  She takes modafinil 200mg daily to help her stay awake   She works 430a-1pm     Please see below for continuation of the HPI:      Sleep Disordered Breathing:  -Snoring: yes   -Severity: loudly   -Frequency: not all the time   -Duration: at least 14 years ago   -Over time: unsure   -Modifying factors: n/a  -Observed Apneas: denied  -Mouth Breathing at night: yes  -Dry Mouth in morning: sometimes   -Nocturnal Gasping: yes  -Nasal Obstruction: no  -Weight: gained 25 lbs in the last 18 months    Sleep Pattern:  -Location: bedroom   -Bed/Recliner/Wedge: bed  -Bed Partner: yes  -HOB: flat  -# of pillows under head: 2  -Position: side or back  -Bedtime: 8pm  -Lights out: same time  -Environmental: No lights/TV  -Latency: 30 min  -Awakenings: 1   -Reason: void   -Duration: mins  -Wake time: 330am   -Alarm: yes  -Rise time: 345am  -Days off: 9p- wakes ad serg, usually not past 8am  -Shift Work: M-F 4a-1p  -Patient's estimate of total sleep time: 6-8h      Daytime Symptoms:  -Upon Awakening: tired, no rested  -Daytime fatigue/sleepiness: fatigued and tired throughout the day  She can be sleepy  -Naps: no  -Involuntary Dozing: denied  -Cognitive Symptoms: no  -Driving: Difficulty with sleepiness and driving:  denied   -- Close calls related to sleepiness: denied   -- Accidents related to sleepiness: denied      Questionnaires:  Sitting and reading: Moderate chance of dozing  Watching TV: High chance of dozing  Sitting, inactive in a public place (e g  a theatre or a meeting): Would never doze  As a passenger in a car for an hour without a break: Moderate chance of dozing  Lying down to rest in the afternoon when circumstances permit: High chance of dozing  Sitting and talking to someone: Would never doze  Sitting quietly after a lunch without alcohol: Would never doze  In a car, while stopped for a few minutes in traffic: Would never doze  Total score: 10      Sleep Review of Symptoms:  -Parasomnias:  --Sleep Walking: no  --Dream Enactment: no  --Bruxism: no  -Motor:  --RLS: no  --PLMS: no  -Narcolepsy:  --Hallucinations: no  --Paralysis: no  --Cataplexy: no    Childhood Sleep History: denied    Prior Sleep Studies/Evaluations: Many years ago, but doesn't remember anything about it      Family History:  Family history of sleep disorders: son has CARLOS ENRIQUE    Patient Active Problem List   Diagnosis    JG (generalized anxiety disorder)    Major depressive disorder, recurrent, severe w/o psychotic behavior ([facility] [address] )    Social phobia    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    Adult hypothyroidism    Allergic rhinitis    Arthralgia of multiple joints    Right-sided low back pain with right-sided sciatica    Acne    Vitamin D deficiency    Hypersomnia due to mental disorder    Rectocele    Stress incontinence (female) (male)    Hypermobility of urethra    History of pubovaginal sling    High risk medication use    Lupus ([facility] [address] )    Chronic gastritis without bleeding    MAYUR positive    Depression with anxiety    Severe obesity (HCC)    Carpal tunnel syndrome on right     Past Medical History:   Diagnosis Date    Anxiety     Back pain     Bipolar disorder (HCC)     Carpal tunnel syndrome on right     Contact lens overwear of both eyes     Cubital tunnel syndrome on right     Depression     GERD (gastroesophageal reflux disease)     Headache     Hypothyroidism     Kidney stones     Lupus (systemic lupus erythematosus) (HCC)     Nausea     PONV (postoperative nausea and vomiting)     Wears glasses          --> Denies any cardiopulmonary disease  --> Seizure hx: denies  --> Head injury with LOC: denies  --> Supplemental Oxygen Use: denies    Labs   Results for Harjinder Kiser (MRN [id number]) as of 8/5/2021 15:39   Ref   Range 7/1/2021 22:49   Sodium Latest Ref Range: 136 - 145 mmol/L 141   Potassium Latest Ref Range: 3 5 - 5 3 mmol/L 4 2   Chloride Latest Ref Range: 100 - 108 mmol/L 106   CO2 Latest Ref Range: 21 - 32 mmol/L 32   Anion Gap Latest Ref Range: 4 - 13 mmol/L 3 (L)   BUN Latest Ref Range: 5 - 25 mg/dL 12   Creatinine Latest Ref Range: 0 60 - 1 30 mg/dL 0 70   Glucose, Random Latest Ref Range: 65 - 140 mg/dL 91   Calcium Latest Ref Range: 8 3 - 10 1 mg/dL 8 6   AST Latest Ref Range: 5 - 45 U/L 19   ALT Latest Ref Range: 12 - 78 U/L 35   Alkaline Phosphatase Latest Ref Range: 46 - 116 U/L 55   Total Protein Latest Ref Range: 6 4 - 8 2 g/dL 7 0   Albumin Latest Ref Range: 3 5 - 5 0 g/dL 3 8   TOTAL BILIRUBIN Latest Ref Range: 0 20 - 1 00 mg/dL 0 35   eGFR Latest Units: ml/min/1 73sq m 112   LACTIC ACID Latest Ref Range: 0 5 - 2 0 mmol/L 0 6   WBC Latest Ref Range: 4 31 - 10 16 Thousand/uL 6 22   Red Blood Cell Count Latest Ref Range: 3 81 - 5 12 Million/uL 3 85   Hemoglobin Latest Ref Range: 11 5 - 15 4 g/dL 12 3   HCT Latest Ref Range: 34 8 - 46 1 % 36 5   MCV Latest Ref Range: 82 - 98 fL 95   MCH Latest Ref Range: 26 8 - 34 3 pg 31 9   MCHC Latest Ref Range: 31 4 - 37 4 g/dL 33 7   RDW Latest Ref Range: 11 6 - 15 1 % 12 9   Platelet Count Latest Ref Range: 149 - 390 Thousands/uL 201   MPV Latest Ref Range: 8 9 - 12 7 fL 9 2   nRBC Latest Units: /100 WBCs 0   Neutrophils % Latest Ref Range: 43 - 75 % 54   Immat GRANS % Latest Ref Range: 0 - 2 % 0   Lymphocytes Relative Latest Ref Range: 14 - 44 % 36   Monocytes Relative Latest Ref Range: 4 - 12 % 7   Eosinophils Latest Ref Range: 0 - 6 % 3   Basophils Relative Latest Ref Range: 0 - 1 % 0   Immature Grans Absolute Latest Ref Range: 0 00 - 0 20 Thousand/uL 0 02   Absolute Neutrophils Latest Ref Range: 1 85 - 7 62 Thousands/µL 3 32   Lymphocytes Absolute Latest Ref Range: 0 60 - 4 47 Thousands/µL 2 26   Absolute Monocytes Latest Ref Range: 0 17 - 1 22 Thousand/µL 0 43   Absolute Eosinophils Latest Ref Range: 0 00 - 0 61 Thousand/µL 0 18   Basophils Absolute Latest Ref Range: 0 00 - 0 10 Thousands/µL 0 01     EKG 9/2019: Normal sinus rhythm  Cannot rule out Anterior infarct , age undetermined  Abnormal ECG  No previous ECGs available  Confirmed by Ana Paula Landa ([id number]) on 9/24/2019 2:20:20 PM    Past Surgical History:   Procedure Laterality Date    MA CYSTOURETHROSCOPY N/A 2/3/2020    Procedure: CYSTOSCOPY;  Surgeon: Heydi Roach MD;  [facility];  Service: UroGynecology           MA LAP,CHOLECYSTECTOMY N/A 9/6/2018    Procedure: CHOLECYSTECTOMY LAPAROSCOPIC W/ ROBOTICS;  Surgeon: Henry Alicea MD;  [facility];  Service: General    MA POST COLPORRHAPHY,RECTUM/VAGINA N/A 2/3/2020    Procedure: POSTERIOR COLPORRHAPHY;  Surgeon: Heydi Roach MD;  [facility];  Service: UroGynecology           MA REVISE MEDIAN N/CARPAL TUNNEL SURG Right 11/27/2020    Procedure: release CARPAL TUNNEL;  Surgeon: Kain Rincon MD;  [facility];  Service: Orthopedics    MA REVISE ULNAR NERVE AT ELBOW Right 11/27/2020    Procedure: German Calvillo;  Surgeon:  Lissy Ventura Brittany Potter MD;  [facility];  Service: Orthopedics    GA SLING OPER STRES INCONTINENCE N/A 2/3/2020    Procedure: PUBOVAGINAL SLING;  Surgeon: Elieser Shultz MD;  [facility];  Service: UroGynecology           TUBAL LIGATION             Current Outpatient Medications   Medication Sig Dispense Refill    acetaminophen (TYLENOL) 325 mg tablet Take 2 tablets (650 mg total) by mouth every 6 (six) hours as needed for mild pain, moderate pain, headaches or fever 30 tablet 0    baclofen 20 mg tablet Take 1 tablet (20 mg total) by mouth 3 (three) times a day 30 tablet 0    celecoxib (CeleBREX) 200 mg capsule Take 1 capsule (200 mg total) by mouth 2 (two) times a day 60 capsule 3    Cyanocobalamin (B-12) 2500 MCG SUBL Take 1 tablet by mouth daily      hydroxychloroquine (PLAQUENIL) 200 mg tablet Take 2 tablets (400 mg total) by mouth daily 180 tablet 1    levothyroxine 25 mcg tablet Take 1 tablet (25 mcg total) by mouth daily in the early morning 90 tablet 1    methocarbamol (ROBAXIN) 750 mg tablet Take 1 tablet (750 mg total) by mouth every 6 (six) hours as needed for muscle spasms 40 tablet 0    NovoFine Plus 32G X 4 MM MISC USE AS DIRECTED DAILY 100 each 0    Vortioxetine HBr (Trintellix) 20 MG tablet Take 1 tablet (20 mg total) by mouth daily 30 tablet 2    Biotin 1 MG CAPS biotin (Patient not taking: Reported on 8/5/2021)      cholecalciferol (VITAMIN D3) 400 units tablet Take 400 Units by mouth daily (Patient not taking: Reported on 6/21/2021)      clonazePAM (KlonoPIN) 0 5 mg tablet TAKE 1/2 TO 1 TABLET BY MOUTH 2 TIMES A DAY AS NEEDED FOR ANXIETY 60 tablet 2    COLLAGEN PO Take 3 capsules by mouth daily (Patient not taking: Reported on 8/6/1642)      folic acid (FOLVITE) 681 MCG tablet Take 800 mcg by mouth daily  (Patient not taking: Reported on 8/5/2021)      liraglutide (SAXENDA) injection Inject 0 6 mg subcutaneously once per day for the first week   Increase in weekly intervals by 0 6 mg until a dose of 3 mg is reached (Patient not taking: Reported on 8/5/2021) 15 mL 5    methylPREDNISolone 4 MG tablet therapy pack Use as directed on package (Patient not taking: Reported on 8/5/2021) 21 tablet 0    modafinil (PROVIGIL) 200 MG tablet Take 1 tablet (200 mg total) by mouth daily 30 tablet 2    pantoprazole (PROTONIX) 20 mg tablet TAKE ONE TABLET BY MOUTH DAILY (Patient not taking: Reported on 8/5/2021) 90 tablet 1     No current facility-administered medications for this visit  Social History:  -Employment: PCA/unit clerk  -Smoking: no  -Caffeine: 8 oz of coffee a day  -Alcohol: no  -THC: no  -OTC/Supplements/herbals: no  -Illicits:  denies  -Family: lives with family    ROS:  Genitourinary none   Cardiology ankle/leg swelling   Gastrointestinal none   Neurology frequent headaches, awaken with headache, forgetfulness, poor concentration or confusion,  and difficulty with memory   Constitutional fatigue   Integumentary rash or dry skin and itching   Psychiatry anxiety, depression, aggressiveness or irritability and mood change   Musculoskeletal joint pain, muscle aches, back pain, sciatica and leg cramps   Pulmonary shortness of breath with activity   ENT none   Endocrine none   Hematological none       MSE:  -Alert and appropriate: alert, calm, cooperative  -Oriented to person, place and time:  name, age, location, day/date/mon/yr  -Behavior: good, sustained eye contact  -Speech: Unremarkable rate/rhythm/volume  -Mood: "blah"  -Affect: mood congruent  -Thought Processes: linear, logical, goal directed  PE:  Body mass index is 36 89 kg/m²    Vitals:    08/05/21 1537   BP: 102/68   Height: 5' 5 5" (1 664 m)       -General:  In NAD    -Eyes: Conjunctival injection: bilaterally      -EOM:  PERRLA, EOMI   -Eyelid hooding: no    -ENT: MP: 3/4   -Facial deformity: no retrognathia   -Hard palate: moderate  arch   -Soft palate:  No crowding   -Gums and teeth: normal dentition   -Tongue: Scalloping   -Nares:  Patent    -Neck/Lymphatics: Lymphadenopathy:  none appreciated   -Masses:  none appreciated   -Circumference: Neck Circumference: 11 "    -Cardiac: Auscultation:  RRR   - LE edema over shins: none appreciated    -Pulm: -Respirations: unlaboured         -Auscultation:  CTA bilaterally, posterior fields    -Neuro: No resting tremor     -Musculoskeletal: Gait and stance: normal turning and ambulation; unremarkable  Assessment:  Ms Christopher Lara is a 39 y o  female who is seen to evaluate for possible obstructive sleep apnea  Given the patient's symptoms of snoring, nocturnal gasping, daytime tiredness/fatigue/sleepiness, non-restorative sleep, and weight gain in the context of a somewhat narrow airway and obesity a diagnosis of obstructive sleep apnea is likely  She should continue provigil 200mg daily  The pathophysiology of, the reasons to treat and treatment options for obstructive sleep apnea were all reviewed with the patient today  Discussed the testing options and reviewed the benefits and downsides of both, patient opted for home sleep apnea testing  She is amenable to treatment with PAP therapy  Discussed keeping nasal passages clear, abstaining from alcohol, and other sedating drugs at night- which will worsen symptoms of CARLOS ENRIQUE  --History provided by: patient   --Records reviewed: in chart      Recommendations:  1) HST with in lab PSG if non-diagnostic  2) Driving safety was reviewed with patient  If the patient feels too sleepy to drive she knows not to drive  If she becomes sleepy while driving she will pull over and nap  3) Follow-up after initiation of treatment  4) Call with any questions or concerns  5) continue modafinil 200mg daily    All questions answered for the patient, who indicated understanding and agreed with the plan       Kristie Ocampo MD  Psychiatry/ Sleep medicine

## 2021-08-05 NOTE — PROGRESS NOTES
Sleep Medicine Consultation Note    HPI:  Ms Ronak Johnson is a 39 y o  female seen at the request of Marco Antonio Arredondo DO for advice regarding suspected sleep disordered breathing  The patient stated that she is tired and fatigued during the day and when she wakes up she is not refreshed  This has been ongoing for years now  She had a sleep study at home some years ago, but it was in 2010/2011 and not available to review in epic  She takes modafinil 200mg daily to help her stay awake  She works 430a-1pm     Please see below for continuation of the HPI:      Sleep Disordered Breathing:  -Snoring: yes   -Severity: loudly   -Frequency: not all the time   -Duration: at least 14 years ago   -Over time: unsure   -Modifying factors: n/a  -Observed Apneas: denied  -Mouth Breathing at night: yes  -Dry Mouth in morning: sometimes   -Nocturnal Gasping: yes  -Nasal Obstruction: no  -Weight: gained 25 lbs in the last 18 months    Sleep Pattern:  -Location: bedroom   -Bed/Recliner/Wedge: bed  -Bed Partner: yes  -HOB: flat  -# of pillows under head: 2  -Position: side or back  -Bedtime: 8pm  -Lights out: same time  -Environmental: No lights/TV  -Latency: 30 min  -Awakenings: 1   -Reason: void   -Duration: mins  -Wake time: 330am   -Alarm: yes  -Rise time: 345am  -Days off: 9p- wakes ad serg, usually not past 8am  -Shift Work: M-F 4a-1p  -Patient's estimate of total sleep time: 6-8h      Daytime Symptoms:  -Upon Awakening: tired, no rested  -Daytime fatigue/sleepiness: fatigued and tired throughout the day  She can be sleepy  -Naps: no  -Involuntary Dozing: denied  -Cognitive Symptoms: no  -Driving: Difficulty with sleepiness and driving:  denied   -- Close calls related to sleepiness: denied   -- Accidents related to sleepiness: denied      Questionnaires:  Sitting and reading: Moderate chance of dozing  Watching TV: High chance of dozing  Sitting, inactive in a public place (e g  a theatre or a meeting):  Would never doze  As a passenger in a car for an hour without a break: Moderate chance of dozing  Lying down to rest in the afternoon when circumstances permit: High chance of dozing  Sitting and talking to someone: Would never doze  Sitting quietly after a lunch without alcohol: Would never doze  In a car, while stopped for a few minutes in traffic: Would never doze  Total score: 10      Sleep Review of Symptoms:  -Parasomnias:  --Sleep Walking: no  --Dream Enactment: no  --Bruxism: no  -Motor:  --RLS: no  --PLMS: no  -Narcolepsy:  --Hallucinations: no  --Paralysis: no  --Cataplexy: no    Childhood Sleep History: denied    Prior Sleep Studies/Evaluations: Many years ago, but doesn't remember anything about it      Family History:  Family history of sleep disorders: son has CARLOS ENRIQUE    Patient Active Problem List   Diagnosis    JG (generalized anxiety disorder)    Major depressive disorder, recurrent, severe w/o psychotic behavior ([facility] )    Social phobia    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    Adult hypothyroidism    Allergic rhinitis    Arthralgia of multiple joints    Right-sided low back pain with right-sided sciatica    Acne    Vitamin D deficiency    Hypersomnia due to mental disorder    Rectocele    Stress incontinence (female) (male)    Hypermobility of urethra    History of pubovaginal sling    High risk medication use    Lupus ([address]    Chronic gastritis without bleeding    MAYUR positive    Depression with anxiety    Severe obesity ([address] )    Carpal tunnel syndrome on right     Past Medical History:   Diagnosis Date    Anxiety     Back pain     [facility])     Carpal tunnel syndrome on right     Contact lens overwear of both eyes     Cubital tunnel syndrome on right     Depression     GERD (gastroesophageal reflux disease)     Headache     Hypothyroidism     Kidney stones     Lupus (systemic lupus erythematosus) ([facility])     Nausea     PONV (postoperative nausea and vomiting)     Wears glasses          --> Denies any cardiopulmonary disease  --> Seizure hx: denies  --> Head injury with LOC: denies  --> Supplemental Oxygen Use: denies    Labs   Results for Frank Pink (MRN [id number]) as of 8/5/2021 15:39   Ref   Range 7/1/2021 22:49   Sodium Latest Ref Range: 136 - 145 mmol/L 141   Potassium Latest Ref Range: 3 5 - 5 3 mmol/L 4 2   Chloride Latest Ref Range: 100 - 108 mmol/L 106   CO2 Latest Ref Range: 21 - 32 mmol/L 32   Anion Gap Latest Ref Range: 4 - 13 mmol/L 3 (L)   BUN Latest Ref Range: 5 - 25 mg/dL 12   Creatinine Latest Ref Range: 0 60 - 1 30 mg/dL 0 70   Glucose, Random Latest Ref Range: 65 - 140 mg/dL 91   Calcium Latest Ref Range: 8 3 - 10 1 mg/dL 8 6   AST Latest Ref Range: 5 - 45 U/L 19   ALT Latest Ref Range: 12 - 78 U/L 35   Alkaline Phosphatase Latest Ref Range: 46 - 116 U/L 55   Total Protein Latest Ref Range: 6 4 - 8 2 g/dL 7 0   Albumin Latest Ref Range: 3 5 - 5 0 g/dL 3 8   TOTAL BILIRUBIN Latest Ref Range: 0 20 - 1 00 mg/dL 0 35   eGFR Latest Units: ml/min/1 73sq m 112   LACTIC ACID Latest Ref Range: 0 5 - 2 0 mmol/L 0 6   WBC Latest Ref Range: 4 31 - 10 16 Thousand/uL 6 22   Red Blood Cell Count Latest Ref Range: 3 81 - 5 12 Million/uL 3 85   Hemoglobin Latest Ref Range: 11 5 - 15 4 g/dL 12 3   HCT Latest Ref Range: 34 8 - 46 1 % 36 5   MCV Latest Ref Range: 82 - 98 fL 95   MCH Latest Ref Range: 26 8 - 34 3 pg 31 9   MCHC Latest Ref Range: 31 4 - 37 4 g/dL 33 7   RDW Latest Ref Range: 11 6 - 15 1 % 12 9   Platelet Count Latest Ref Range: 149 - 390 Thousands/uL 201   MPV Latest Ref Range: 8 9 - 12 7 fL 9 2   nRBC Latest Units: /100 WBCs 0   Neutrophils % Latest Ref Range: 43 - 75 % 54   Immat GRANS % Latest Ref Range: 0 - 2 % 0   Lymphocytes Relative Latest Ref Range: 14 - 44 % 36   Monocytes Relative Latest Ref Range: 4 - 12 % 7   Eosinophils Latest Ref Range: 0 - 6 % 3   Basophils Relative Latest Ref Range: 0 - 1 % 0   Immature Grans Absolute Latest Ref Range: 0 00 - 0 20 Thousand/uL 0 02   Absolute Neutrophils Latest Ref Range: 1 85 - 7 62 Thousands/µL 3 32   Lymphocytes Absolute Latest Ref Range: 0 60 - 4 47 Thousands/µL 2 26   Absolute Monocytes Latest Ref Range: 0 17 - 1 22 Thousand/µL 0 43   Absolute Eosinophils Latest Ref Range: 0 00 - 0 61 Thousand/µL 0 18   Basophils Absolute Latest Ref Range: 0 00 - 0 10 Thousands/µL 0 01     EKG 9/2019: Normal sinus rhythm  Cannot rule out Anterior infarct , age undetermined  Abnormal ECG  No previous ECGs available  Confirmed by Ondina Arcos ([id number]) on 9/24/2019 2:20:20 PM    Past Surgical History:   Procedure Laterality Date    OH CYSTOURETHROSCOPY N/A 2/3/2020    Procedure: CYSTOSCOPY;  Surgeon: Geoff Ardon MD;  [facility];  Service: UroGynecology           OH LAP,CHOLECYSTECTOMY N/A 9/6/2018    Procedure: CHOLECYSTECTOMY LAPAROSCOPIC W/ ROBOTICS;  Surgeon: Loraine Kulkarni MD;  [facility];  Service: General    OH POST [address] [place] [address] N/A 2/3/2020    Procedure: POSTERIOR COLPORRHAPHY;  Surgeon: Geoff Ardon MD;  [facility];  Service: UroGynecology           OH REVISE MEDIAN N/CARPAL TUNNEL SURG Right 11/27/2020    Procedure: release CARPAL TUNNEL;  Surgeon: Patric More MD;  [facility];  Service: Orthopedics    OH REVISE ULNAR NERVE AT ELBOW Right 11/27/2020    Procedure: Jacek Busing;  Surgeon:  Patric More MD;  [facility];  Service: Orthopedics    OH SLING OPER STRES INCONTINENCE N/A 2/3/2020    Procedure: PUBOVAGINAL SLING;  Surgeon: Geoff Ardon MD;  [facility];  Service: UroGynecology           TUBAL LIGATION             Current Outpatient Medications   Medication Sig Dispense Refill    acetaminophen (TYLENOL) 325 mg tablet Take 2 tablets (650 mg total) by mouth every 6 (six) hours as needed for mild pain, moderate pain, headaches or fever 30 tablet 0    baclofen 20 mg tablet Take 1 tablet (20 mg total) by mouth 3 (three) times a day 30 tablet 0    celecoxib (CeleBREX) 200 mg capsule Take 1 capsule (200 mg total) by mouth 2 (two) times a day 60 capsule 3    Cyanocobalamin (B-12) 2500 MCG SUBL Take 1 tablet by mouth daily      hydroxychloroquine (PLAQUENIL) 200 mg tablet Take 2 tablets (400 mg total) by mouth daily 180 tablet 1    levothyroxine 25 mcg tablet Take 1 tablet (25 mcg total) by mouth daily in the early morning 90 tablet 1    methocarbamol (ROBAXIN) 750 mg tablet Take 1 tablet (750 mg total) by mouth every 6 (six) hours as needed for muscle spasms 40 tablet 0    NovoFine Plus 32G X 4 MM MISC USE AS DIRECTED DAILY 100 each 0    Vortioxetine HBr (Trintellix) 20 MG tablet Take 1 tablet (20 mg total) by mouth daily 30 tablet 2    Biotin 1 MG CAPS biotin (Patient not taking: Reported on 8/5/2021)      cholecalciferol (VITAMIN D3) 400 units tablet Take 400 Units by mouth daily (Patient not taking: Reported on 6/21/2021)      clonazePAM (KlonoPIN) 0 5 mg tablet TAKE 1/2 TO 1 TABLET BY MOUTH 2 TIMES A DAY AS NEEDED FOR ANXIETY 60 tablet 2    COLLAGEN PO Take 3 capsules by mouth daily (Patient not taking: Reported on 6/6/6928)      folic acid (FOLVITE) 221 MCG tablet Take 800 mcg by mouth daily  (Patient not taking: Reported on 8/5/2021)      liraglutide (SAXENDA) injection Inject 0 6 mg subcutaneously once per day for the first week  Increase in weekly intervals by 0 6 mg until a dose of 3 mg is reached (Patient not taking: Reported on 8/5/2021) 15 mL 5    methylPREDNISolone 4 MG tablet therapy pack Use as directed on package (Patient not taking: Reported on 8/5/2021) 21 tablet 0    modafinil (PROVIGIL) 200 MG tablet Take 1 tablet (200 mg total) by mouth daily 30 tablet 2    pantoprazole (PROTONIX) 20 mg tablet TAKE ONE TABLET BY MOUTH DAILY (Patient not taking: Reported on 8/5/2021) 90 tablet 1     No current facility-administered medications for this visit           Social History:  -Employment: PCA/unit clerk  -Smoking: no  -Caffeine: 8 oz of coffee a day  -Alcohol: no  -THC: no  -OTC/Supplements/herbals: no  -Illicits:  denies  -Family: lives with family    ROS:  Genitourinary none   Cardiology ankle/leg swelling   Gastrointestinal none   Neurology frequent headaches, awaken with headache, forgetfulness, poor concentration or confusion,  and difficulty with memory   Constitutional fatigue   Integumentary rash or dry skin and itching   Psychiatry anxiety, depression, aggressiveness or irritability and mood change   Musculoskeletal joint pain, muscle aches, back pain, sciatica and leg cramps   Pulmonary shortness of breath with activity   ENT none   Endocrine none   Hematological none       MSE:  -Alert and appropriate: alert, calm, cooperative  -Oriented to person, place and time:  name, age, location, day/date/mon/yr  -Behavior: good, sustained eye contact  -Speech: Unremarkable rate/rhythm/volume  -Mood: "blah"  -Affect: mood congruent  -Thought Processes: linear, logical, goal directed  PE:  Body mass index is 36 89 kg/m²  Vitals:    08/05/21 1537   BP: 102/68   Height: 5' 5 5" (1 664 m)       -General:  In NAD    -Eyes: Conjunctival injection: bilaterally      -EOM:  PERRLA, EOMI   -Eyelid hooding: no    -ENT: MP: 3/4   -Facial deformity: no retrognathia   -Hard palate: moderate  arch   -Soft palate:  No crowding   -Gums and teeth: normal dentition   -Tongue:  Scalloping   -Nares:  Patent    -Neck/Lymphatics: Lymphadenopathy:  none appreciated   -Masses:  none appreciated   -Circumference: Neck Circumference: 11 "    -Cardiac: Auscultation:  RRR   - LE edema over shins: none appreciated    -Pulm: -Respirations: unlaboured         -Auscultation:  CTA bilaterally, posterior fields    -Neuro: No resting tremor     -Musculoskeletal: Gait and stance: normal turning and ambulation; unremarkable  Assessment:  Ms Isrrael Baugh is a 39 y o  female who is seen to evaluate for possible obstructive sleep apnea    Given the patient's symptoms of snoring, nocturnal gasping, daytime tiredness/fatigue/sleepiness, non-restorative sleep, and weight gain in the context of a somewhat narrow airway and obesity a diagnosis of obstructive sleep apnea is likely  She should continue provigil 200mg daily  The pathophysiology of, the reasons to treat and treatment options for obstructive sleep apnea were all reviewed with the patient today  Discussed the testing options and reviewed the benefits and downsides of both, patient opted for home sleep apnea testing  She is amenable to treatment with PAP therapy  Discussed keeping nasal passages clear, abstaining from alcohol, and other sedating drugs at night- which will worsen symptoms of CARLOS ENRIQUE  --History provided by: patient   --Records reviewed: in chart      Recommendations:  1) HST with in lab PSG if non-diagnostic  2) Driving safety was reviewed with patient  If the patient feels too sleepy to drive she knows not to drive  If she becomes sleepy while driving she will pull over and nap  3) Follow-up after initiation of treatment  4) Call with any questions or concerns  5) continue modafinil 200mg daily    All questions answered for the patient, who indicated understanding and agreed with the plan       Brigitte Jacob MD  Psychiatry/ Sleep medicine

## 2021-08-09 ENCOUNTER — APPOINTMENT (OUTPATIENT)
Dept: LAB | Facility: MEDICAL CENTER | Age: 36
End: 2021-08-09
Payer: COMMERCIAL

## 2021-08-09 ENCOUNTER — OFFICE VISIT (OUTPATIENT)
Dept: RHEUMATOLOGY | Facility: CLINIC | Age: 36
End: 2021-08-09
Payer: COMMERCIAL

## 2021-08-09 VITALS
SYSTOLIC BLOOD PRESSURE: 104 MMHG | HEIGHT: 66 IN | DIASTOLIC BLOOD PRESSURE: 70 MMHG | BODY MASS INDEX: 36.16 KG/M2 | WEIGHT: 225 LBS

## 2021-08-09 DIAGNOSIS — Z79.899 HIGH RISK MEDICATION USE: ICD-10-CM

## 2021-08-09 DIAGNOSIS — B34.9 VIRAL INFECTION, UNSPECIFIED: ICD-10-CM

## 2021-08-09 DIAGNOSIS — M32.9 LUPUS (HCC): Primary | ICD-10-CM

## 2021-08-09 DIAGNOSIS — M25.50 ARTHRALGIA OF MULTIPLE JOINTS: ICD-10-CM

## 2021-08-09 DIAGNOSIS — R76.8 ANA POSITIVE: ICD-10-CM

## 2021-08-09 LAB
25(OH)D3 SERPL-MCNC: 21 NG/ML (ref 30–100)
ALBUMIN SERPL BCP-MCNC: 3.9 G/DL (ref 3.5–5)
ALP SERPL-CCNC: 50 U/L (ref 46–116)
ALT SERPL W P-5'-P-CCNC: 37 U/L (ref 12–78)
ANION GAP SERPL CALCULATED.3IONS-SCNC: 8 MMOL/L (ref 4–13)
AST SERPL W P-5'-P-CCNC: 14 U/L (ref 5–45)
BACTERIA UR QL AUTO: ABNORMAL /HPF
BASOPHILS # BLD AUTO: 0.02 THOUSANDS/ΜL (ref 0–0.1)
BASOPHILS NFR BLD AUTO: 0 % (ref 0–1)
BILIRUB SERPL-MCNC: 0.48 MG/DL (ref 0.2–1)
BILIRUB UR QL STRIP: NEGATIVE
BUN SERPL-MCNC: 14 MG/DL (ref 5–25)
C3 SERPL-MCNC: 102 MG/DL (ref 90–180)
C4 SERPL-MCNC: 22 MG/DL (ref 10–40)
CALCIUM SERPL-MCNC: 8.8 MG/DL (ref 8.3–10.1)
CAOX CRY URNS QL MICRO: ABNORMAL /HPF
CHLORIDE SERPL-SCNC: 109 MMOL/L (ref 100–108)
CLARITY UR: CLEAR
CO2 SERPL-SCNC: 24 MMOL/L (ref 21–32)
COLOR UR: ABNORMAL
CREAT SERPL-MCNC: 0.64 MG/DL (ref 0.6–1.3)
CREAT UR-MCNC: 270 MG/DL
CRP SERPL QL: <3 MG/L
EOSINOPHIL # BLD AUTO: 0.09 THOUSAND/ΜL (ref 0–0.61)
EOSINOPHIL NFR BLD AUTO: 1 % (ref 0–6)
ERYTHROCYTE [DISTWIDTH] IN BLOOD BY AUTOMATED COUNT: 13.2 % (ref 11.6–15.1)
ERYTHROCYTE [SEDIMENTATION RATE] IN BLOOD: <1 MM/HOUR (ref 0–19)
GFR SERPL CREATININE-BSD FRML MDRD: 115 ML/MIN/1.73SQ M
GLUCOSE SERPL-MCNC: 82 MG/DL (ref 65–140)
GLUCOSE UR STRIP-MCNC: NEGATIVE MG/DL
HCT VFR BLD AUTO: 41.7 % (ref 34.8–46.1)
HGB BLD-MCNC: 13.5 G/DL (ref 11.5–15.4)
HGB UR QL STRIP.AUTO: NEGATIVE
IMM GRANULOCYTES # BLD AUTO: 0.05 THOUSAND/UL (ref 0–0.2)
IMM GRANULOCYTES NFR BLD AUTO: 1 % (ref 0–2)
KETONES UR STRIP-MCNC: ABNORMAL MG/DL
LEUKOCYTE ESTERASE UR QL STRIP: NEGATIVE
LYMPHOCYTES # BLD AUTO: 2.15 THOUSANDS/ΜL (ref 0.6–4.47)
LYMPHOCYTES NFR BLD AUTO: 21 % (ref 14–44)
MCH RBC QN AUTO: 31.3 PG (ref 26.8–34.3)
MCHC RBC AUTO-ENTMCNC: 32.4 G/DL (ref 31.4–37.4)
MCV RBC AUTO: 97 FL (ref 82–98)
MONOCYTES # BLD AUTO: 0.59 THOUSAND/ΜL (ref 0.17–1.22)
MONOCYTES NFR BLD AUTO: 6 % (ref 4–12)
NEUTROPHILS # BLD AUTO: 7.34 THOUSANDS/ΜL (ref 1.85–7.62)
NEUTS SEG NFR BLD AUTO: 71 % (ref 43–75)
NITRITE UR QL STRIP: NEGATIVE
NON-SQ EPI CELLS URNS QL MICRO: ABNORMAL /HPF
NRBC BLD AUTO-RTO: 0 /100 WBCS
PH UR STRIP.AUTO: 5.5 [PH]
PLATELET # BLD AUTO: 258 THOUSANDS/UL (ref 149–390)
PMV BLD AUTO: 9.5 FL (ref 8.9–12.7)
POTASSIUM SERPL-SCNC: 4 MMOL/L (ref 3.5–5.3)
PROT SERPL-MCNC: 7.4 G/DL (ref 6.4–8.2)
PROT UR STRIP-MCNC: NEGATIVE MG/DL
PROT UR-MCNC: 38 MG/DL
PROT/CREAT UR: 0.14 MG/G{CREAT} (ref 0–0.1)
RBC # BLD AUTO: 4.32 MILLION/UL (ref 3.81–5.12)
RBC #/AREA URNS AUTO: ABNORMAL /HPF
SODIUM SERPL-SCNC: 141 MMOL/L (ref 136–145)
SP GR UR STRIP.AUTO: 1.03 (ref 1–1.03)
UROBILINOGEN UR QL STRIP.AUTO: 0.2 E.U./DL
WBC # BLD AUTO: 10.24 THOUSAND/UL (ref 4.31–10.16)
WBC #/AREA URNS AUTO: ABNORMAL /HPF

## 2021-08-09 PROCEDURE — 86225 DNA ANTIBODY NATIVE: CPT | Performed by: INTERNAL MEDICINE

## 2021-08-09 PROCEDURE — 82306 VITAMIN D 25 HYDROXY: CPT | Performed by: INTERNAL MEDICINE

## 2021-08-09 PROCEDURE — U0003 INFECTIOUS AGENT DETECTION BY NUCLEIC ACID (DNA OR RNA); SEVERE ACUTE RESPIRATORY SYNDROME CORONAVIRUS 2 (SARS-COV-2) (CORONAVIRUS DISEASE [COVID-19]), AMPLIFIED PROBE TECHNIQUE, MAKING USE OF HIGH THROUGHPUT TECHNOLOGIES AS DESCRIBED BY CMS-2020-01-R: HCPCS | Performed by: INTERNAL MEDICINE

## 2021-08-09 PROCEDURE — 86665 EPSTEIN-BARR CAPSID VCA: CPT | Performed by: INTERNAL MEDICINE

## 2021-08-09 PROCEDURE — 86160 COMPLEMENT ANTIGEN: CPT | Performed by: INTERNAL MEDICINE

## 2021-08-09 PROCEDURE — 84156 ASSAY OF PROTEIN URINE: CPT | Performed by: INTERNAL MEDICINE

## 2021-08-09 PROCEDURE — 85652 RBC SED RATE AUTOMATED: CPT | Performed by: INTERNAL MEDICINE

## 2021-08-09 PROCEDURE — 81001 URINALYSIS AUTO W/SCOPE: CPT | Performed by: INTERNAL MEDICINE

## 2021-08-09 PROCEDURE — 80053 COMPREHEN METABOLIC PANEL: CPT | Performed by: INTERNAL MEDICINE

## 2021-08-09 PROCEDURE — U0005 INFEC AGEN DETEC AMPLI PROBE: HCPCS | Performed by: INTERNAL MEDICINE

## 2021-08-09 PROCEDURE — 99214 OFFICE O/P EST MOD 30 MIN: CPT | Performed by: INTERNAL MEDICINE

## 2021-08-09 PROCEDURE — 85025 COMPLETE CBC W/AUTO DIFF WBC: CPT | Performed by: INTERNAL MEDICINE

## 2021-08-09 PROCEDURE — 36415 COLL VENOUS BLD VENIPUNCTURE: CPT | Performed by: INTERNAL MEDICINE

## 2021-08-09 PROCEDURE — 86140 C-REACTIVE PROTEIN: CPT | Performed by: INTERNAL MEDICINE

## 2021-08-09 PROCEDURE — 82570 ASSAY OF URINE CREATININE: CPT | Performed by: INTERNAL MEDICINE

## 2021-08-09 PROCEDURE — 86664 EPSTEIN-BARR NUCLEAR ANTIGEN: CPT | Performed by: INTERNAL MEDICINE

## 2021-08-09 PROCEDURE — 86663 EPSTEIN-BARR ANTIBODY: CPT | Performed by: INTERNAL MEDICINE

## 2021-08-09 RX ORDER — INDOMETHACIN 75 MG/1
75 CAPSULE, EXTENDED RELEASE ORAL 2 TIMES DAILY WITH MEALS
Qty: 60 CAPSULE | Refills: 3 | Status: SHIPPED | OUTPATIENT
Start: 2021-08-09 | End: 2021-10-06

## 2021-08-09 RX ORDER — CELECOXIB 200 MG/1
200 CAPSULE ORAL 2 TIMES DAILY
Qty: 60 CAPSULE | Refills: 3 | Status: SHIPPED | OUTPATIENT
Start: 2021-08-09 | End: 2021-08-09 | Stop reason: ALTCHOICE

## 2021-08-09 RX ORDER — HYDROXYCHLOROQUINE SULFATE 200 MG/1
400 TABLET, FILM COATED ORAL DAILY
Qty: 180 TABLET | Refills: 1 | Status: SHIPPED | OUTPATIENT
Start: 2021-08-09 | End: 2022-05-02 | Stop reason: SDUPTHER

## 2021-08-09 NOTE — PATIENT INSTRUCTIONS
Do labs  Continue hydroxychloroquine 400mg daily  Stop celecoxib, start indomethacin once a day as needed, can go up to twice a day as needed  Finish Medrol dose pack    Return to clinic in 3 months    Lupus Erythematosus   WHAT YOU NEED TO KNOW:   What is lupus? Lupus is an autoimmune inflammatory disease  This means that your immune system starts to attack your body instead of harmful germs  It is also called systemic lupus erythematosus  Lupus is a lifelong disease that affects all parts of your body  Lupus has active and quiet periods  The active periods, also called flares, are when you have symptoms  The quiet periods, or remission, are when you have few or no symptoms  A remission period may last months or years, or you may not have remission periods at all  What increases my risk for lupus? The cause of lupus is unknown  You are at increased risk if you are female, take hormones, or have a family member with lupus  Certain medicines, such as hydralazine and minocycline, can increase your risk for lupus  Ask your healthcare provider for more information about what increases your risk for lupus  What are the signs and symptoms of lupus? · Fever over 100°F (38°C)    · Tiredness, weight loss, or headache    · Rash shaped like butterfly wings    · Sensitivity to sunlight    · Hair loss    · Mouth or nose sores    · Painful joints    · Chest pain or cough when you take a deep breath    · Abdominal pain, nausea, or vomiting    How is lupus diagnosed? · Blood tests:  Your blood will be tested for infection, inflammation, or anemia (low red blood cells)  · Urine tests:  Your urine will be tested for protein or blood  · X-rays: This is a picture of your joints or chest to check for infection or extra fluid  · Biopsy:  Tissue may be taken from your skin, muscle, or kidney to check for the cause of your lupus  How is lupus treated? There is no cure for lupus   Lupus may be triggered by stress, ultraviolet light, or an infection, such as a cold  It can also be triggered by cigarette smoke or foods you eat  The following will help control your symptoms:  · Antimalarial medicine: This is used to relieve your joint and skin symptoms of lupus, such as rash and joint pain  · Steroids: These decrease inflammation  They may be given as a pill, IV, or ointment  · NSAIDs:  These decrease swelling, pain, and fever  NSAIDs are available without a doctor's order  Ask your healthcare provider which medicine is right for you  Ask how much to take and when to take it  Take as directed  NSAIDs can cause stomach bleeding and kidney problems if not taken correctly  · Immunosuppressive medicine: This is used to slow down your immune system  This will help your immune system not attack your body  · Cytotoxic medicine: This is used to decrease inflammation in muscles or joints  It also slows down your immune system  What are the risks of lupus? · You may be so tired that you cannot work at times  Your risk for a serious infection is increased  You may develop vision loss  You may become depressed or anxious  Your fingers may turn pale or blue when they are cold  This is called Reynaud syndrome  You may become forgetful or have trouble concentrating  You may develop headaches, vision problems, or have a seizure  · You may develop kidney disease or kidney failure  You may have high blood pressure or narrowing of your arteries  This can lead to heart disease or heart failure  You may have bleeding problems, such as anemia  You may get a blood clot in your leg  The clot may travel to your heart or brain and cause life-threatening problems, such as a heart attack or stroke  How can I manage my symptoms? · Rest:  Rest when you feel it is needed  Slowly start to do more each day  Return to your daily activities as directed       · Protect your skin from UV light:  Sunlight can make your lupus symptoms worse  Avoid the sun between 10 am and 4 pm, when the rays are strongest  Apply sunscreen with a SPF of 30 or more every 2 hours when you are outside  Do this even on cloudy days  Wear pants and long sleeves to cover your body  A hat with a wide brim can protect your face, head, and neck  · Heat:  Heat helps decrease joint pain or swelling  Apply heat on the painful joint for 20 to 30 minutes every 2 hours for as many days as directed  · Ice:  Ice helps decrease swelling and pain  Ice may also help prevent tissue damage  Use an ice pack, or put crushed ice in a plastic bag  Cover it with a towel and place it on the painful area for 15 to 20 minutes every hour or as directed  · Avoid others who are sick:  You are at increased risk of a severe infection  · Treat flares quickly: This will help prevent serious illness  How can I help prevent a lupus flare? · Eat healthy foods:  Healthy foods include fruits, vegetables, whole-grain breads, low-fat dairy products, beans, lean meats, and fish  Ask if you need to be on a special diet  · Exercise: This will help decrease your symptoms and prevent depression  Ask your healthcare provider about the best exercise plan for you  · Maintain a healthy weight:  Ask your healthcare provider how much you should weigh  Ask him to help you create a weight loss plan if you are overweight  · Do not smoke: If you smoke, it is never too late to quit  Ask for information about how to stop smoking if you need help  · Manage your stress:  Stress may slow healing and lead to illness  Learn ways to control stress, such as relaxation, deep breathing, and music  Talk to someone about things that upset you  Where can I find support and more information? · [facility] of [address] , [place] [address] , [address]  Phone: Gavin Bedoya  Phone: [phone]  Web Address: [employer] [place] [address] [address] [place] of Arthritis and Musculoskeletal and Skin Disease  [address] [place] [id number]  Phone: [phone]  [address]  Phone: [phone]  Web Address: [employer] [facility] gov  When should I contact my healthcare provider? · You have a flare of your lupus symptoms  · You have a fever or headache  · You feel like you are starting to get sick  · You start to urinate less than usual     · You are bleeding from your nose or gums  · You bruise easily  · You have questions or concerns about your condition or care  When should I seek immediate care or call 911? · You have blood in your urine, bowel movement, or vomit  · You have severe abdominal pain  · You are confused or feel dizzy or faint  · You have numbness or weakness of your face or limbs, or have trouble seeing or speaking  · You have a seizure  · You have new, sudden vision changes  · You have trouble breathing  · You have chest pain, pressure, or discomfort that may spread to your arms, jaw, or back  · Your leg feels warm, tender, and painful  It may look swollen and red  · You suddenly feel lightheaded and short of breath  · You have chest pain when you take a deep breath or cough  · You cough up blood  CARE AGREEMENT:   You have the right to help plan your care  Learn about your health condition and how it may be treated  Discuss treatment options with your healthcare providers to decide what care you want to receive  You always have the right to refuse treatment  The above information is an  only  It is not intended as medical advice for individual conditions or treatments  Talk to your doctor, nurse or pharmacist before following any medical regimen to see if it is safe and effective for you    © Copyright [employer] 2021 Information is for End User's use only and may not be sold, redistributed or otherwise used for commercial purposes   All illustrations and images included in CareNotes® are the copyrighted property of A D A M , Inc  or [facility] Fisher

## 2021-08-09 NOTE — PROGRESS NOTES
Assessment/Plan: Leonel Alford is a 39 y o  female who presents for follow-up of her SLE (malar rash, arthralgias, dry eyes, hair thinning, equivocal anti-dsDNA, indeterminate anticardiolipin IgM, +MAYUR at 1:160 speckled pattern)  She went and got a 2nd opinion from Dr Lacie Sam since her last clinic visit with me in April 2020, and he did agree with my diagnosis of lupus and her her current medication management  Has some mild lupus symptoms such as malar erythema, mouth sores, and arthralgia  Lupus activity labs ordered, which all returned normal   Since patient was forgetting to take her 2nd hydroxychloroquine tab in a day, asked her to take both at a time if she is able to tolerate it; and is to continue regular eye exams  Prescribed celecoxib 200 mg p o  b i d  as needed for joint pain      Do labs  Continue hydroxychloroquine 400mg daily  Stop celecoxib, start indomethacin once a day as needed, can go up to twice a day as needed  Finish Medrol dose pack    Return to clinic in 3 months      Problem List Items Addressed This Visit        Other    Arthralgia of multiple joints    Relevant Medications    indomethacin (INDOCIN SR) 75 mg CR capsule    Other Relevant Orders    EBV acute panel (Completed)    Vitamin D 25 hydroxy (Completed)    High risk medication use    Lupus (HCC) - Primary    Relevant Medications    hydroxychloroquine (PLAQUENIL) 200 mg tablet    indomethacin (INDOCIN SR) 75 mg CR capsule    Other Relevant Orders    CBC and differential (Completed)    Comprehensive metabolic panel (Completed)    C-reactive protein (Completed)    Sedimentation rate, automated (Completed)    Protein / creatinine ratio, urine (Completed)    Urinalysis with microscopic (Completed)    C4 complement (Completed)    C3 complement (Completed)    Anti-DNA antibody, double-stranded (Completed)    MAYUR positive      Other Visit Diagnoses     Viral infection, unspecified        Relevant Orders    Novel Coronavirus (Covid-19),PCR [facility] - Collected at [facility] or [facility] (Completed)      High risk medication use - Benefits and risks of hydroxychloroquine, including but not limited to retinal toxicity, corneal deposits, gastrointestinal side effects, and headaches were previously discussed with the patient  Patient is aware of the need for a regular eye exam to monitor for ocular toxicity while on this medication  Follow-up: RTC in 3 months        Rheumatic Disease Summary  Yenny Wood a 28 y  o  female who originally presented on 9/17/19 as a Rheumatology consult referred by her Eve Lesch, DO for evaluation of possible autoimmune disease given positive MAYUR of 1:160 in a speckled pattern   Patient's clinical signs and symptoms were consistent with early lupus given her history of a malar rash and arthralgias  She also had dry eyes, but no dry mouth, and admitted to hair thinning  She denied blood clot or miscarriage history, or Raynaud's symptoms  Admitted to reflux symptoms  Lupus activity labs were ordered, which returned unremarkable; anti-dsDNA returned equivocal at 6 and anti-SSA/SSB were negative  She also had a livedoid-appearing rash on her arms, so antiphospholipid antibody panel was ordered, with anticardiolipin IgM returning indeterminate at 13  Her Vit  D level was low, so she was prescribed ergocalciferol 50,000 units po weekly  Abdomen ultrasound was ordered to work-up her transaminitis, but it returned significant only for splenomegaly  For her arthralgias, had prescribed meloxicam 7 5 mg 1-2 times a day, and for her shooting/neuropathic pain, had prescribed gabapentin 100 mg p o  Q h s      She presented on 10/18/19 for follow-up of likely early SLE (malar rash, arthralgias, dry eyes, hair thinning, equivocal anti-dsDNA, indeterminate anticardiolipin IgM, +MAYUR at 1:160 speckled pattern)   Patient continued to have arthralgias  Had stopped meloxicam since it wasn't helping patient, and started her on diclofenac 75mg po bid instead to help with her arthralgias  For long-term management of her lupus, had initiated weight-based hydroxychloroquine 200mg po bid  Advised patient to get regular eye exams while on this medication to monitor for plaquenil-related retinal toxicity  Since patient has chronic low back pain and right SI joint tenderness, had ordered a HLA-B27 and SI joint x-rays to evaluate for a concurrent seronegative spondyloarthropathy such as ankylosing spondylitis, but these returned unremarkable  Had also ordered a Hepatitis B core Ab to do along with PCP's labs to further work-up her transaminitis, which returned negative      She presented for follow-up on 1/14/20, at which time her main symptoms were arthralgia and fatigue  Lupus activity labs ordered; repeat anticardiolipin Ab ordered to rule-out antiphospholipid syndrome since test was previously indeterminate, patient already is in a hypercoagulable state from being heterozygous for the MTHFR gene mutation  She was to continue hydroxychloroquine 200mg po bid  Stopped oral diclofenac since patient was not finding benefit from it for her joint pain symptoms; instead prescribed celecoxib 100mg po bid  Also prescribed methocarbamol 500mg po bid prn for back muscle spasm  Physical therapy referral also made for low back pain  4/20/20: Patient's arthralgia symptoms have been prominent, some days are better than others  Also has had episodes of hives on arms  Increased her celecoxib to 200mg po bid  She is to continue hydroxychloroquine 200mg twice a day; reminded her to get eye exam  Prescribed hydrocortisone ointment as needed for itchiness/rash  Increased her methocarbamol frequency to 500mg po bid as needed for her back spasms  Recommended patient to make a physical therapy appointment      Subjective/HPI  Benedict Schmidt is a 39 y o  female who presents for follow-up of her likely SLE (malar rash, arthralgias, dry eyes, hair thinning, equivocal anti-dsDNA, indeterminate anticardiolipin IgM, +MAYUR at 1:160 speckled pattern)  Last visit was 4/20/20  Since then, she went for 2nd opinion with Dr Nikky High  He agreed with SLE diagnosis  Given plaquenil and celebrex  Still taking plaquenil 200mg twice daily, but does forget second dose  Symptoms mild, had flare few weeks ago, which was the first in a long time that lasted a week, occurs every few months and lasts about 1-2 weeks  Joint pain spiked and emotional status was off during the flare  This year, the sun is affecting her and is less tolerable - photosensitivity  Joint pain seems to be a little better with medication  Admits to sores in her mouth and nose, and malar rash  Her last eye exam was the end of last year  For past 2 weeks, has been feeling foggy, body pain everywhere, headaches, burning sensation of skin  Has been having jolting pain through feet and internal pain  Has no energy or motivation      Past Medical History:   Diagnosis Date    Anxiety     Back pain     Bipolar disorder ([address] )     Carpal tunnel syndrome on right     Contact lens overwear of both eyes     Cubital tunnel syndrome on right     Depression     GERD (gastroesophageal reflux disease)     Headache     Hypothyroidism     Kidney stones     Lupus (systemic lupus erythematosus) (HCC)     Nausea     PONV (postoperative nausea and vomiting)     Wears glasses        Past Surgical History:   Procedure Laterality Date    VT CYSTOURETHROSCOPY N/A 2/3/2020    Procedure: CYSTOSCOPY;  Surgeon: Damián Peace MD;  [facility];  Service: UroGynecology           VT LAP,CHOLECYSTECTOMY N/A 9/6/2018    Procedure: CHOLECYSTECTOMY LAPAROSCOPIC W/ ROBOTICS;  Surgeon: Kishan Mittal MD;  [facility];  Service: General    VT POST COLPORRHAPHY,RECTUM/VAGINA N/A 2/3/2020    Procedure: POSTERIOR COLPORRHAPHY;  Surgeon: Damián Peace MD;  [facility];  Service: UroGynecology           NH REVISE MEDIAN N/CARPAL TUNNEL SURG Right 11/27/2020    Procedure: release CARPAL TUNNEL;  Surgeon: Loi Winn MD;  [facility];  Service: Orthopedics    NH REVISE ULNAR NERVE AT ELBOW Right 11/27/2020    Procedure: Bulmaroia Decant;  Surgeon:  Loi Winn MD;  [facility];  Service: Orthopedics    NH SLING OPER STRES INCONTINENCE N/A 2/3/2020    Procedure: PUBOVAGINAL SLING;  Surgeon: Octavio Augustine MD;  [facility];  Service: UroGynecology           TUBAL LIGATION         Current Outpatient Medications   Medication Sig Dispense Refill    acetaminophen (TYLENOL) 325 mg tablet Take 2 tablets (650 mg total) by mouth every 6 (six) hours as needed for mild pain, moderate pain, headaches or fever 30 tablet 0    baclofen 20 mg tablet Take 1 tablet (20 mg total) by mouth 3 (three) times a day (Patient not taking: Reported on 8/14/2021) 30 tablet 0    clonazePAM (KlonoPIN) 0 5 mg tablet TAKE 1/2 TO 1 TABLET BY MOUTH 2 TIMES A DAY AS NEEDED FOR ANXIETY 60 tablet 2    Cyanocobalamin (B-12) 2500 MCG SUBL Take 1 tablet by mouth daily (Patient not taking: Reported on 8/14/2021)      hydroxychloroquine (PLAQUENIL) 200 mg tablet Take 2 tablets (400 mg total) by mouth daily 180 tablet 1    levothyroxine 25 mcg tablet Take 1 tablet (25 mcg total) by mouth daily in the early morning 90 tablet 1    methocarbamol (ROBAXIN) 750 mg tablet Take 1 tablet (750 mg total) by mouth every 6 (six) hours as needed for muscle spasms (Patient not taking: Reported on 8/14/2021) 40 tablet 0    modafinil (PROVIGIL) 200 MG tablet Take 1 tablet (200 mg total) by mouth daily 30 tablet 2    NovoFine Plus 32G X 4 MM MISC USE AS DIRECTED DAILY 100 each 0    Vortioxetine HBr (Trintellix) 20 MG tablet Take 1 tablet (20 mg total) by mouth daily 30 tablet 2    Biotin 1 MG CAPS biotin (Patient not taking: Reported on 8/5/2021)      cholecalciferol (VITAMIN D3) 400 units tablet Take 400 Units by mouth daily (Patient not taking: Reported on 6/21/2021)      COLLAGEN PO Take 3 capsules by mouth daily (Patient not taking: Reported on 7/1/7084)      folic acid (FOLVITE) 755 MCG tablet Take 800 mcg by mouth daily  (Patient not taking: Reported on 8/5/2021)      indomethacin (INDOCIN SR) 75 mg CR capsule Take 1 capsule (75 mg total) by mouth 2 (two) times a day with meals Start at 1 capsule a day 60 capsule 3    liraglutide (SAXENDA) injection Inject 0 6 mg subcutaneously once per day for the first week  Increase in weekly intervals by 0 6 mg until a dose of 3 mg is reached (Patient not taking: Reported on 8/5/2021) 15 mL 5    methylPREDNISolone 4 MG tablet therapy pack Use as directed on package (Patient not taking: Reported on 8/5/2021) 21 tablet 0    pantoprazole (PROTONIX) 20 mg tablet TAKE ONE TABLET BY MOUTH DAILY (Patient not taking: Reported on 8/5/2021) 90 tablet 1     No current facility-administered medications for this visit  Allergies   Allergen Reactions    Penicillins Hives     At young age       Review of Systems   Constitutional: Positive for chills and fatigue  Negative for fever and unexpected weight change  HENT: Positive for mouth sores  Negative for trouble swallowing  Dry mouth   Eyes: Positive for pain  Negative for visual disturbance  Dry eyes   Respiratory: Negative for cough and shortness of breath  Cardiovascular: Positive for leg swelling  Negative for chest pain  Gastrointestinal: Positive for abdominal pain, diarrhea and nausea  Negative for constipation  Musculoskeletal: Positive for arthralgias, back pain, joint swelling and myalgias  Skin: Positive for rash  Negative for color change  Allergic/Immunologic: Positive for environmental allergies  Neurological: Positive for headaches  Negative for weakness  Hematological: Negative for adenopathy  Psychiatric/Behavioral: Negative for sleep disturbance       Physical exam:   Physical Exam  Constitutional:       General: She is not in acute distress  Appearance: She is well-developed  HENT:      Head: Normocephalic and atraumatic  Eyes:      General: Lids are normal  No scleral icterus  Conjunctiva/sclera: Conjunctivae normal    Cardiovascular:      Rate and Rhythm: Normal rate and regular rhythm  Heart sounds: S1 normal and S2 normal  No murmur heard  No friction rub  Pulmonary:      Effort: Pulmonary effort is normal  No tachypnea or respiratory distress  Breath sounds: Normal breath sounds  No wheezing, rhonchi or rales  Musculoskeletal:         General: Tenderness present  Cervical back: Neck supple  No muscular tenderness  Comments: Bilateral wrist tenderness   Skin:     General: Skin is warm and dry  Findings: Erythema present  No rash  Nails: There is no clubbing  Comments: Malar erythema   Neurological:      Mental Status: She is alert  Sensory: No sensory deficit  Psychiatric:         Behavior: Behavior normal  Behavior is cooperative         Imaging:  SI Joint x-rays 11/9/2019 - unremarkable

## 2021-08-09 NOTE — LETTER
August 9, 8754     Patient: Caio Monroe   YOB: 1985   Date of Visit: 8/9/2021       To Whom it May Concern:    Caio Monroe is under my professional care  She was seen in my office on 8/9/2021  If you have any questions or concerns, please don't hesitate to call           Sincerely,          Theodor Osgood, MD        CC: No Recipients

## 2021-08-10 LAB
DSDNA AB SER-ACNC: 8 IU/ML (ref 0–9)
EBV NA IGG SER IA-ACNC: 106 U/ML (ref 0–17.9)
EBV VCA IGG SER IA-ACNC: 178 U/ML (ref 0–17.9)
EBV VCA IGM SER IA-ACNC: <36 U/ML (ref 0–35.9)
INTERPRETATION: ABNORMAL
SARS-COV-2 RNA RESP QL NAA+PROBE: NEGATIVE

## 2021-08-12 ENCOUNTER — SOCIAL WORK (OUTPATIENT)
Dept: BEHAVIORAL/MENTAL HEALTH CLINIC | Facility: CLINIC | Age: 36
End: 2021-08-12
Payer: COMMERCIAL

## 2021-08-12 DIAGNOSIS — F41.1 GAD (GENERALIZED ANXIETY DISORDER): ICD-10-CM

## 2021-08-12 DIAGNOSIS — F33.2 MAJOR DEPRESSIVE DISORDER, RECURRENT, SEVERE W/O PSYCHOTIC BEHAVIOR (HCC): Primary | ICD-10-CM

## 2021-08-12 PROCEDURE — 90834 PSYTX W PT 45 MINUTES: CPT | Performed by: SOCIAL WORKER

## 2021-08-12 NOTE — PSYCH
Psychotherapy Provided: Individual Psychotherapy 55 minutes     Length of time in session: 3:00 pm to 3:55 pm, follow up in 2 week    Encounter Diagnosis     ICD-10-CM    1  Major depressive disorder, recurrent, severe w/o psychotic behavior ([address] )  F33 2    2  JG (generalized anxiety disorder)  F41 1        Goals addressed in session: Goal 1 and Goal 2     Pain:      moderate to severe    7    Current suicide risk : Low     Therapist met with Landy Sacks  She shared that her dog had passed away suddenly  Therapist and Landy Sacks explored her feelings of grief and discussed how she is relating them to her boyfriend and his declining health  Therapist provided supportive counseling to Landy Sacks through this session  She discussed feelings related to healing and fears for her future  Therapist and Landy Sacks will explore her process through grief during the next session  Behavioral Health Treatment Plan [facility]: Diagnosis and Treatment Plan explained to Leo Jose relates understanding diagnosis and is agreeable to Treatment Plan   Yes

## 2021-08-14 ENCOUNTER — OFFICE VISIT (OUTPATIENT)
Dept: OBGYN CLINIC | Facility: MEDICAL CENTER | Age: 36
End: 2021-08-14
Payer: COMMERCIAL

## 2021-08-14 ENCOUNTER — APPOINTMENT (OUTPATIENT)
Dept: RADIOLOGY | Facility: MEDICAL CENTER | Age: 36
End: 2021-08-14
Payer: COMMERCIAL

## 2021-08-14 VITALS
SYSTOLIC BLOOD PRESSURE: 105 MMHG | WEIGHT: 222.8 LBS | BODY MASS INDEX: 35.81 KG/M2 | DIASTOLIC BLOOD PRESSURE: 71 MMHG | HEART RATE: 84 BPM | HEIGHT: 66 IN

## 2021-08-14 DIAGNOSIS — G89.29 CHRONIC RIGHT HIP PAIN: ICD-10-CM

## 2021-08-14 DIAGNOSIS — M25.551 CHRONIC RIGHT HIP PAIN: ICD-10-CM

## 2021-08-14 DIAGNOSIS — M25.551 GREATER TROCHANTERIC PAIN SYNDROME OF RIGHT LOWER EXTREMITY: ICD-10-CM

## 2021-08-14 DIAGNOSIS — G89.29 CHRONIC RIGHT-SIDED LOW BACK PAIN WITH RIGHT-SIDED SCIATICA: Primary | ICD-10-CM

## 2021-08-14 DIAGNOSIS — M54.41 CHRONIC RIGHT-SIDED LOW BACK PAIN WITH RIGHT-SIDED SCIATICA: Primary | ICD-10-CM

## 2021-08-14 PROCEDURE — 73502 X-RAY EXAM HIP UNI 2-3 VIEWS: CPT

## 2021-08-14 PROCEDURE — 99214 OFFICE O/P EST MOD 30 MIN: CPT | Performed by: STUDENT IN AN ORGANIZED HEALTH CARE EDUCATION/TRAINING PROGRAM

## 2021-08-14 PROCEDURE — 20610 DRAIN/INJ JOINT/BURSA W/O US: CPT | Performed by: STUDENT IN AN ORGANIZED HEALTH CARE EDUCATION/TRAINING PROGRAM

## 2021-08-14 RX ORDER — LIDOCAINE HYDROCHLORIDE 10 MG/ML
4 INJECTION, SOLUTION INFILTRATION; PERINEURAL
Status: COMPLETED | OUTPATIENT
Start: 2021-08-14 | End: 2021-08-14

## 2021-08-14 RX ORDER — TRIAMCINOLONE ACETONIDE 40 MG/ML
40 INJECTION, SUSPENSION INTRA-ARTICULAR; INTRAMUSCULAR
Status: COMPLETED | OUTPATIENT
Start: 2021-08-14 | End: 2021-08-14

## 2021-08-14 RX ADMIN — LIDOCAINE HYDROCHLORIDE 4 ML: 10 INJECTION, SOLUTION INFILTRATION; PERINEURAL at 15:23

## 2021-08-14 RX ADMIN — TRIAMCINOLONE ACETONIDE 40 MG: 40 INJECTION, SUSPENSION INTRA-ARTICULAR; INTRAMUSCULAR at 15:23

## 2021-08-14 NOTE — PROGRESS NOTES
1  Chronic right-sided low back pain with right-sided sciatica  Ambulatory referral to Sports Medicine    MRI lumbar spine wo contrast    Ambulatory referral to Pain Management    Ambulatory referral to Pain Management   2  Chronic right hip pain  XR hip/pelv 2-3 vws right if performed    Ambulatory referral to Pain Management    Ambulatory referral to Pain Management    Large joint arthrocentesis: R greater trochanteric bursa   3  Greater trochanteric pain syndrome of right lower extremity  Large joint arthrocentesis: R greater trochanteric bursa     Orders Placed This Encounter   Procedures    Large joint arthrocentesis: R greater trochanteric bursa    XR hip/pelv 2-3 vws right if performed    MRI lumbar spine wo contrast    Ambulatory referral to Pain Management        Imaging Studies (I personally reviewed images in PACS and report): 1  X-ray right hip 2021: No acute osseous abnormalities  Prior imagin  X-ray lumbar spine 2021:  No acute osseous abnormalities  Age-appropriate lumbar degenerative changes are seen with mild facet arthropathy at L4-5 and L5-S1   2   CT abdomen pelvis with contrast 2021: No acute abnormalities  In regards to osseous structures there were no acute fractures or destructive osseous lesions noted  IMPRESSION:  Chronic midline and right axial lumbar pain (DDx: SIJ dysfunction/pain)  Chronic right lateral hip pain secondary to greater trochanteric pain syndrome /bursitis  - Patient reports has been an ongoing issue since  after slipping on a wet floor and falling onto her right buttock/hip   - Interventions: Formal PT from 2021 through 2021 along with home exercises (pt reports no relief and in some instance aggravates pain too much to toelrate)    Other factors:  BMI 37  Systemic lupus erythematosus    Consultation evaluation    PLAN:  - Clinical exam and radiographic imaging reviewed with patient today, with impression as per above   I have discussed with the patient the pathophysiology of this diagnosis and reviewed how the examination correlates with this diagnosis  -  I offered a right greater trochanteric bursa injection of cortisone to address right lateral hip pain control, which patient was agreeable today as per procedure note below  - In regards to her chronic lumbar pain, given she reports no relief from formal PT and other conservative treatments, I have ordered an MRI of her lumbar spine without contrast   In addition I have referred her to pain management after she obtains the MRI to discuss other treatment modalities  - I discussed with patient that medications, injections are all methods of reducing the intensity of her pain but that it is still important to maintain home exercise program from physical therapy to prevent recurrence of this pain in the long term so as to minimize need for further injections in the future  - I have also counseled that her BMI is a contributing factor as well to her ongoing pain as it increases pressure through her lumbar spine as well as her lower extremities which can exacerbate pain  Counseled her working on appropriate methods of weight loss with her PCP    Return for Follow up with pain management after MRI  CHIEF COMPLAINT:  Back/right hip pain    HPI:  Renetta Villanueva is a 39 y o  female  who presents in regards to referral from her PCP, Dr Laws Pi, for       Visit 8/14/2021 :  Initial evaluation of back/right hip pain: patient reports has been an ongoing issue since 2013 in which she states having an incident in a store when she slipped on water and landed on her right buttock and right lumbar aspect of her back  She states that since this incident she would always have midline and right lumbar pain which would radiate to her right lateral hip as well as her right groin    She describes it as a sharp / uncomfortable pain that was constant and aggravated randomly without any specific pattern  She states it has progressively increased difficulty for her activities of daily living as well as her sleep  She denies bowel / bladder incontinence, lower extremity numbness/tingling, swelling, lower extremity bruising, and other ROS as per below  In regards to treatments, she states seeing a chiropractor as well as physical therapy early this year; she feels that it did not improve her pain and in some instances aggravated her pain  She has trouble doing home exercise at home due to the pain of her lumbar back and right hip  In regards to medications, she states being on muscle relaxers, NSAIDs, Tylenol all with minimal to no relief  She states she is chronically on NSAIDs due to her history of SLE in general     She has imaging of her lumbar spine as noted above  She states prior to her fall in 2013 that she did not have any prior back injury/surgeries or chronic back pain in general     Review of Systems   Constitutional: Negative for chills, fatigue, fever and unexpected weight change  HENT: Negative for sore throat  Respiratory: Negative for cough and shortness of breath  Gastrointestinal: Negative for abdominal pain, nausea and vomiting  Denies bowel incontinence   Genitourinary:        Denies urinary incontinence   Musculoskeletal:        As per HPI   Skin: Negative for rash and wound     Neurological:        As per HPI         Medical, Surgical, Family, and Social History    Past Medical History:   Diagnosis Date    Anxiety     Back pain     Bipolar disorder ([address] )     Carpal tunnel syndrome on right     Contact lens overwear of both eyes     Cubital tunnel syndrome on right     Depression     GERD (gastroesophageal reflux disease)     Headache     Hypothyroidism     Kidney stones     Lupus (systemic lupus erythematosus) ([address] )     Nausea     PONV (postoperative nausea and vomiting)     Wears glasses      Past Surgical History:   Procedure Laterality Date    IL CYSTOURETHROSCOPY N/A 2/3/2020    Procedure: CYSTOSCOPY;  Surgeon: Denise Abreu MD;  [facility];  Service: UroGynecology           IL LAP,CHOLECYSTECTOMY N/A 9/6/2018    Procedure: Armanda Lesch W/ ROBOTICS;  Surgeon: Tiana Link MD;  [facility];  Service: General    IL POST COLPORRHAPHY,RECTUM/VAGINA N/A 2/3/2020    Procedure: POSTERIOR COLPORRHAPHY;  Surgeon: Denise Abreu MD;  [facility];  Service: UroGynecology           IL REVISE MEDIAN N/CARPAL TUNNEL SURG Right 11/27/2020    Procedure: release CARPAL TUNNEL;  Surgeon: Laverne Baumann MD;  [facility];  Service: Orthopedics    IL REVISE ULNAR NERVE AT ELBOW Right 11/27/2020    Procedure: Tory Caldwell;  Surgeon:  Laverne Baumann MD;  [facility];  Service: Orthopedics    IL SLING OPER STRES INCONTINENCE N/A 2/3/2020    Procedure: PUBOVAGINAL SLING;  Surgeon: Denise Abreu MD;  [facility];  Service: UroGynecology           TUBAL LIGATION       Social History   Social History     Substance and Sexual Activity   Alcohol Use Not Currently     Social History     Substance and Sexual Activity   Drug Use No     Social History     Tobacco Use   Smoking Status Never Smoker   Smokeless Tobacco Never Used     Family History   Problem Relation Age of Onset    No Known Problems Mother     Alcohol abuse Father     Drug abuse Family     Psoriasis Maternal Grandmother     Colon cancer Maternal Uncle     Hypertension Maternal Grandfather     Heart disease Maternal Grandfather     Diabetes Neg Hx      Allergies   Allergen Reactions    Penicillins Hives     At young age          Physical Exam  /71   Pulse 84   Ht 5' 5 5" (1 664 m)   Wt 101 kg (222 lb 12 8 oz)   BMI 36 51 kg/m²     Constitutional:  see vital signs  Gen: well-developed, normocephalic/atraumatic, well-groomed  Eyes: No inflammation or discharge of conjunctiva or lids; sclera clear   Pharynx: no inflammation, lesion, or mass of lips  Neck: supple, no masses, non-distended  MSK: no inflammation, lesion, mass, or clubbing of nails and digits except for other than mentioned below  SKIN: no visible rashes or skin lesions  Pulmonary/Chest: Effort normal  No respiratory distress  NEURO: cranial nerves grossly intact  PSYCH:  Alert and oriented to person, place, and time; recent and remote memory intact; mood normal, no depression, anxiety, or agitation, judgment and insight good and intact     Ortho Exam  BACK EXAM:  Gait: normal, no trendelenberg gait, no antalgic gait    BACK TENDERNESS:  Spinous Processes: +L4, +L5  Paraspinal Muscles: +right paralumbar  SI Joint: +right  Sacrum: no    ROM:  Flexion: 80, aggravates lumbar pain  Extension:  10, aggravates lumbar pain  Lateral flexion: 20 bilaterally, right lateral flexion aggravates right lumbar pain  Rotation: 20 bilaterally, right rotation aggravates right lumbar pain    DERMATOMAL SENSATION:  L1: normal   L2: normal   L3: normal   L4: normal   L5: normal   S1: normal    STRENGTH (bilateral):  Knee Extension: 5/5  Knee Flexion: 5/5  Foot Dorsiflexion: 5/5  Great Toe Extension: 5/5  Foot Plantarflexion: 5/5  Hip Flexion: 5/5  Hip Abduction: 5/5 (aggravates lateral hip pain)    REFLEXES:  Patellar: 2+ bilateral  Achilles: 2+ bilateral  Clonus: negative bilateral    BACK:   SUPINE STRAIGHT LEG: negative, but aggravates lumbar pain when testing right side SLR    HIP:  Palpation:+  Right greater trochanteric bursa  LOG ROLL: negative  TONY: negative  FADIR: aggravates lateral right hip pain    Large joint arthrocentesis: R greater trochanteric bursa  Universal Protocol:  Consent: Verbal consent obtained    Risks and benefits: risks, benefits and alternatives were discussed  Consent given by: patient  Time out: Immediately prior to procedure a "time out" was called to verify the correct patient, procedure, equipment, support staff and site/side marked as required  Timeout called at: 8/14/2021 9:15 AM   Patient understanding: patient states understanding of the procedure being performed  Site marked: the operative site was marked  Radiology Images displayed and confirmed   If images not available, report reviewed: imaging studies available  Patient identity confirmed: verbally with patient    Supporting Documentation  Indications: pain   Procedure Details  Location: hip - R greater trochanteric bursa  Preparation: Patient was prepped and draped in the usual sterile fashion  Needle size: 22 G (3 5 inch spinal needle)  Ultrasound guidance: no  Approach: lateral (perpindicular over greater trochanter at site of maximal tenderness)  Medications administered: 4 mL lidocaine 1 %; 40 mg triamcinolone acetonide 40 mg/mL    Patient tolerance: patient tolerated the procedure well with no immediate complications  Dressing:  Sterile dressing applied

## 2021-08-14 NOTE — PATIENT INSTRUCTIONS

## 2021-08-24 ENCOUNTER — OFFICE VISIT (OUTPATIENT)
Dept: PSYCHIATRY | Facility: CLINIC | Age: 36
End: 2021-08-24
Payer: COMMERCIAL

## 2021-08-24 VITALS — WEIGHT: 227.5 LBS | HEIGHT: 66 IN | BODY MASS INDEX: 36.56 KG/M2

## 2021-08-24 DIAGNOSIS — M32.9 LUPUS (HCC): ICD-10-CM

## 2021-08-24 DIAGNOSIS — F41.1 GAD (GENERALIZED ANXIETY DISORDER): ICD-10-CM

## 2021-08-24 DIAGNOSIS — E55.9 VITAMIN D DEFICIENCY: ICD-10-CM

## 2021-08-24 DIAGNOSIS — F40.10 SOCIAL PHOBIA: ICD-10-CM

## 2021-08-24 DIAGNOSIS — F41.8 DEPRESSION WITH ANXIETY: ICD-10-CM

## 2021-08-24 DIAGNOSIS — F33.2 MAJOR DEPRESSIVE DISORDER, RECURRENT, SEVERE W/O PSYCHOTIC BEHAVIOR (HCC): Primary | ICD-10-CM

## 2021-08-24 DIAGNOSIS — F51.13 HYPERSOMNIA DUE TO MENTAL DISORDER: ICD-10-CM

## 2021-08-24 PROCEDURE — 99214 OFFICE O/P EST MOD 30 MIN: CPT | Performed by: NURSE PRACTITIONER

## 2021-08-24 RX ORDER — CLONAZEPAM 0.5 MG/1
TABLET ORAL
Qty: 60 TABLET | Refills: 2 | Status: SHIPPED | OUTPATIENT
Start: 2021-08-24 | End: 2021-11-12 | Stop reason: SDUPTHER

## 2021-08-24 RX ORDER — MODAFINIL 200 MG/1
200 TABLET ORAL DAILY
Qty: 30 TABLET | Refills: 2 | Status: SHIPPED | OUTPATIENT
Start: 2021-08-24 | End: 2021-10-19 | Stop reason: SDUPTHER
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
## 2021-08-26 ENCOUNTER — SOCIAL WORK (OUTPATIENT)
Dept: BEHAVIORAL/MENTAL HEALTH CLINIC | Facility: CLINIC | Age: 36
End: 2021-08-26
Payer: COMMERCIAL

## 2021-08-26 DIAGNOSIS — F33.2 MAJOR DEPRESSIVE DISORDER, RECURRENT, SEVERE W/O PSYCHOTIC BEHAVIOR (HCC): Primary | ICD-10-CM

## 2021-08-26 DIAGNOSIS — F41.1 GAD (GENERALIZED ANXIETY DISORDER): ICD-10-CM

## 2021-08-26 PROCEDURE — 90834 PSYTX W PT 45 MINUTES: CPT | Performed by: SOCIAL WORKER

## 2021-08-26 NOTE — PSYCH
Psychotherapy Provided: Individual Psychotherapy 50 minutes     Length of time in session: 4:55 pm to 5:45 pm, follow up in 2 week    Encounter Diagnosis     ICD-10-CM    1  Major depressive disorder, recurrent, severe w/o psychotic behavior ([facility] [address] )  F33 2    2  JG (generalized anxiety disorder)  F41 1        Goals addressed in session: Goal 1 and Goal 2     Pain:      moderate    7    Current suicide risk : Low     Therapist met with Taylor Wright shared increased life stressors related to the health of her fiancee and the loss of her dog  She also shared increased responsibilities and her feelings regarding these  Therapist provided supportive counseling to Taylor Wright during this session  Therapist will follow up with emotional support through this review  Behavioral Health Treatment Plan [facility]: Diagnosis and Treatment Plan explained to Joselito Pride relates understanding diagnosis and is agreeable to Treatment Plan   Yes

## 2021-08-27 ENCOUNTER — HOSPITAL ENCOUNTER (OUTPATIENT)
Dept: MRI IMAGING | Facility: HOSPITAL | Age: 36
Discharge: HOME | End: 2021-08-27
Payer: COMMERCIAL

## 2021-08-27 DIAGNOSIS — G89.29 CHRONIC RIGHT-SIDED LOW BACK PAIN WITH RIGHT-SIDED SCIATICA: ICD-10-CM

## 2021-08-27 DIAGNOSIS — M54.41 CHRONIC RIGHT-SIDED LOW BACK PAIN WITH RIGHT-SIDED SCIATICA: ICD-10-CM

## 2021-08-27 PROCEDURE — G1004 CDSM NDSC: HCPCS

## 2021-08-27 PROCEDURE — 72148 MRI LUMBAR SPINE W/O DYE: CPT

## 2021-09-02 DIAGNOSIS — M79.7 FIBROMYALGIA: Primary | ICD-10-CM

## 2021-09-02 RX ORDER — GABAPENTIN 100 MG/1
100 CAPSULE ORAL 3 TIMES DAILY
Qty: 90 CAPSULE | Refills: 3 | Status: SHIPPED | OUTPATIENT
Start: 2021-09-02

## 2021-09-08 ENCOUNTER — TELEPHONE (OUTPATIENT)
Dept: PSYCHIATRY | Facility: CLINIC | Age: 36
End: 2021-09-08

## 2021-09-08 NOTE — TELEPHONE ENCOUNTER
9-8 Pt called and canceled her appt for tommorow at 3pm, stated she needed to cancel and not rescedule didn't give me a reason either

## 2021-09-17 NOTE — PROGRESS NOTES
Please complete your bloodwork as soon as possible <-- Click to add NO pertinent Past Medical History

## 2021-09-29 DIAGNOSIS — M25.50 ARTHRALGIA OF MULTIPLE JOINTS: Primary | ICD-10-CM

## 2021-09-29 RX ORDER — IBUPROFEN 600 MG/1
600 TABLET ORAL EVERY 6 HOURS PRN
Qty: 120 TABLET | Refills: 3 | Status: SHIPPED | OUTPATIENT
Start: 2021-09-29 | End: 2021-09-30

## 2021-09-30 RX ORDER — IBUPROFEN 600 MG/1
600 TABLET ORAL EVERY 6 HOURS PRN
Qty: 120 TABLET | Refills: 3 | Status: SHIPPED | OUTPATIENT
Start: 2021-09-30 | End: 2022-04-21 | Stop reason: HOSPADM

## 2021-10-01 ENCOUNTER — APPOINTMENT (OUTPATIENT)
Dept: LAB | Facility: HOSPITAL | Age: 36
End: 2021-10-01
Attending: INTERNAL MEDICINE
Payer: COMMERCIAL

## 2021-10-01 DIAGNOSIS — M25.50 ARTHRALGIA OF MULTIPLE JOINTS: Primary | ICD-10-CM

## 2021-10-01 DIAGNOSIS — R53.83 OTHER FATIGUE: ICD-10-CM

## 2021-10-01 LAB — VIT B12 SERPL-MCNC: 1490 PG/ML (ref 100–900)

## 2021-10-01 PROCEDURE — 36415 COLL VENOUS BLD VENIPUNCTURE: CPT | Performed by: INTERNAL MEDICINE

## 2021-10-01 PROCEDURE — 83918 ORGANIC ACIDS TOTAL QUANT: CPT | Performed by: INTERNAL MEDICINE

## 2021-10-01 PROCEDURE — 82607 VITAMIN B-12: CPT | Performed by: INTERNAL MEDICINE

## 2021-10-06 ENCOUNTER — OFFICE VISIT (OUTPATIENT)
Dept: FAMILY MEDICINE CLINIC | Facility: CLINIC | Age: 36
End: 2021-10-06
Payer: COMMERCIAL

## 2021-10-06 ENCOUNTER — TELEPHONE (OUTPATIENT)
Dept: PSYCHIATRY | Facility: CLINIC | Age: 36
End: 2021-10-06

## 2021-10-06 VITALS
TEMPERATURE: 97.1 F | BODY MASS INDEX: 36.83 KG/M2 | DIASTOLIC BLOOD PRESSURE: 78 MMHG | SYSTOLIC BLOOD PRESSURE: 118 MMHG | WEIGHT: 229.2 LBS | HEIGHT: 66 IN

## 2021-10-06 DIAGNOSIS — E55.9 VITAMIN D DEFICIENCY: ICD-10-CM

## 2021-10-06 DIAGNOSIS — E03.9 ACQUIRED HYPOTHYROIDISM: ICD-10-CM

## 2021-10-06 DIAGNOSIS — R19.8 CHANGE IN BOWEL FUNCTION: Primary | ICD-10-CM

## 2021-10-06 PROCEDURE — 99214 OFFICE O/P EST MOD 30 MIN: CPT | Performed by: FAMILY MEDICINE

## 2021-10-06 RX ORDER — DICYCLOMINE HYDROCHLORIDE 10 MG/1
10 CAPSULE ORAL
Qty: 120 CAPSULE | Refills: 5 | Status: SHIPPED | OUTPATIENT
Start: 2021-10-06 | End: 2022-04-21 | Stop reason: HOSPADM

## 2021-10-07 ENCOUNTER — TELEMEDICINE (OUTPATIENT)
Dept: BEHAVIORAL/MENTAL HEALTH CLINIC | Facility: CLINIC | Age: 36
End: 2021-10-07
Payer: COMMERCIAL

## 2021-10-07 ENCOUNTER — APPOINTMENT (OUTPATIENT)
Dept: LAB | Facility: HOSPITAL | Age: 36
End: 2021-10-07
Payer: COMMERCIAL

## 2021-10-07 DIAGNOSIS — F33.2 MAJOR DEPRESSIVE DISORDER, RECURRENT, SEVERE W/O PSYCHOTIC BEHAVIOR (HCC): Primary | ICD-10-CM

## 2021-10-07 DIAGNOSIS — E55.9 VITAMIN D DEFICIENCY: ICD-10-CM

## 2021-10-07 DIAGNOSIS — R19.8 CHANGE IN BOWEL FUNCTION: ICD-10-CM

## 2021-10-07 DIAGNOSIS — E03.9 ACQUIRED HYPOTHYROIDISM: ICD-10-CM

## 2021-10-07 DIAGNOSIS — F41.1 GAD (GENERALIZED ANXIETY DISORDER): ICD-10-CM

## 2021-10-07 LAB
25(OH)D3 SERPL-MCNC: 18.6 NG/ML (ref 30–100)
ALBUMIN SERPL BCP-MCNC: 4 G/DL (ref 3.5–5)
ALP SERPL-CCNC: 46 U/L (ref 46–116)
ALT SERPL W P-5'-P-CCNC: 39 U/L (ref 12–78)
ANION GAP SERPL CALCULATED.3IONS-SCNC: 7 MMOL/L (ref 4–13)
AST SERPL W P-5'-P-CCNC: 18 U/L (ref 5–45)
BASOPHILS # BLD AUTO: 0.02 THOUSANDS/ΜL (ref 0–0.1)
BASOPHILS NFR BLD AUTO: 0 % (ref 0–1)
BILIRUB SERPL-MCNC: 0.54 MG/DL (ref 0.2–1)
BUN SERPL-MCNC: 12 MG/DL (ref 5–25)
CALCIUM SERPL-MCNC: 8.9 MG/DL (ref 8.3–10.1)
CHLORIDE SERPL-SCNC: 104 MMOL/L (ref 100–108)
CO2 SERPL-SCNC: 28 MMOL/L (ref 21–32)
CREAT SERPL-MCNC: 0.72 MG/DL (ref 0.6–1.3)
CRP SERPL QL: <3 MG/L
EOSINOPHIL # BLD AUTO: 0.18 THOUSAND/ΜL (ref 0–0.61)
EOSINOPHIL NFR BLD AUTO: 2 % (ref 0–6)
ERYTHROCYTE [DISTWIDTH] IN BLOOD BY AUTOMATED COUNT: 13 % (ref 11.6–15.1)
GFR SERPL CREATININE-BSD FRML MDRD: 108 ML/MIN/1.73SQ M
GLUCOSE P FAST SERPL-MCNC: 61 MG/DL (ref 65–99)
HCT VFR BLD AUTO: 41.5 % (ref 34.8–46.1)
HGB BLD-MCNC: 13.4 G/DL (ref 11.5–15.4)
IMM GRANULOCYTES # BLD AUTO: 0.05 THOUSAND/UL (ref 0–0.2)
IMM GRANULOCYTES NFR BLD AUTO: 1 % (ref 0–2)
LYMPHOCYTES # BLD AUTO: 1.96 THOUSANDS/ΜL (ref 0.6–4.47)
LYMPHOCYTES NFR BLD AUTO: 26 % (ref 14–44)
MCH RBC QN AUTO: 30.2 PG (ref 26.8–34.3)
MCHC RBC AUTO-ENTMCNC: 32.3 G/DL (ref 31.4–37.4)
MCV RBC AUTO: 94 FL (ref 82–98)
MONOCYTES # BLD AUTO: 0.57 THOUSAND/ΜL (ref 0.17–1.22)
MONOCYTES NFR BLD AUTO: 8 % (ref 4–12)
NEUTROPHILS # BLD AUTO: 4.73 THOUSANDS/ΜL (ref 1.85–7.62)
NEUTS SEG NFR BLD AUTO: 63 % (ref 43–75)
NRBC BLD AUTO-RTO: 0 /100 WBCS
PLATELET # BLD AUTO: 236 THOUSANDS/UL (ref 149–390)
PMV BLD AUTO: 9.3 FL (ref 8.9–12.7)
POTASSIUM SERPL-SCNC: 3.8 MMOL/L (ref 3.5–5.3)
PROT SERPL-MCNC: 7.4 G/DL (ref 6.4–8.2)
RBC # BLD AUTO: 4.43 MILLION/UL (ref 3.81–5.12)
SODIUM SERPL-SCNC: 139 MMOL/L (ref 136–145)
T4 SERPL-MCNC: 7.3 UG/DL (ref 4.7–13.3)
TSH SERPL DL<=0.05 MIU/L-ACNC: 3.59 UIU/ML (ref 0.36–3.74)
WBC # BLD AUTO: 7.51 THOUSAND/UL (ref 4.31–10.16)

## 2021-10-07 PROCEDURE — 83516 IMMUNOASSAY NONANTIBODY: CPT

## 2021-10-07 PROCEDURE — 86255 FLUORESCENT ANTIBODY SCREEN: CPT

## 2021-10-07 PROCEDURE — 84443 ASSAY THYROID STIM HORMONE: CPT

## 2021-10-07 PROCEDURE — 85025 COMPLETE CBC W/AUTO DIFF WBC: CPT

## 2021-10-07 PROCEDURE — 84436 ASSAY OF TOTAL THYROXINE: CPT

## 2021-10-07 PROCEDURE — 36415 COLL VENOUS BLD VENIPUNCTURE: CPT

## 2021-10-07 PROCEDURE — 82306 VITAMIN D 25 HYDROXY: CPT

## 2021-10-07 PROCEDURE — 86140 C-REACTIVE PROTEIN: CPT

## 2021-10-07 PROCEDURE — 82784 ASSAY IGA/IGD/IGG/IGM EACH: CPT

## 2021-10-07 PROCEDURE — 80053 COMPREHEN METABOLIC PANEL: CPT

## 2021-10-07 PROCEDURE — 90834 PSYTX W PT 45 MINUTES: CPT | Performed by: SOCIAL WORKER

## 2021-10-08 LAB
ENDOMYSIUM IGA SER QL: NEGATIVE
GLIADIN PEPTIDE IGA SER-ACNC: 4 UNITS (ref 0–19)
GLIADIN PEPTIDE IGG SER-ACNC: 2 UNITS (ref 0–19)
IGA SERPL-MCNC: 279 MG/DL (ref 87–352)
METHYLMALONATE SERPL-SCNC: 119 NMOL/L (ref 0–378)
SL AMB DISCLAIMER: NORMAL
TTG IGA SER-ACNC: <2 U/ML (ref 0–3)
TTG IGG SER-ACNC: 7 U/ML (ref 0–5)

## 2021-10-12 ENCOUNTER — TELEPHONE (OUTPATIENT)
Dept: FAMILY MEDICINE CLINIC | Facility: CLINIC | Age: 36
End: 2021-10-12

## 2021-10-15 ENCOUNTER — HOSPITAL ENCOUNTER (OUTPATIENT)
Dept: SLEEP CENTER | Facility: CLINIC | Age: 36
Discharge: HOME | End: 2021-10-15
Payer: COMMERCIAL

## 2021-10-15 DIAGNOSIS — R06.83 SNORING: ICD-10-CM

## 2021-10-15 DIAGNOSIS — G47.8 NON-RESTORATIVE SLEEP: ICD-10-CM

## 2021-10-15 DIAGNOSIS — R53.83 FATIGUE, UNSPECIFIED TYPE: ICD-10-CM

## 2021-10-15 DIAGNOSIS — Z91.89 AT RISK FOR SLEEP APNEA: ICD-10-CM

## 2021-10-15 DIAGNOSIS — R53.83 TIREDNESS: ICD-10-CM

## 2021-10-15 DIAGNOSIS — G47.19 EXCESSIVE DAYTIME SLEEPINESS: ICD-10-CM

## 2021-10-15 DIAGNOSIS — R06.89 GASPING FOR BREATH: ICD-10-CM

## 2021-10-15 DIAGNOSIS — E66.9 CLASS II OBESITY: ICD-10-CM

## 2021-10-15 PROCEDURE — G0399 HOME SLEEP TEST/TYPE 3 PORTA: HCPCS

## 2021-10-19 ENCOUNTER — TELEPHONE (OUTPATIENT)
Dept: SLEEP CENTER | Facility: CLINIC | Age: 36
End: 2021-10-19

## 2021-10-19 ENCOUNTER — OFFICE VISIT (OUTPATIENT)
Dept: PSYCHIATRY | Facility: CLINIC | Age: 36
End: 2021-10-19
Payer: COMMERCIAL

## 2021-10-19 VITALS
DIASTOLIC BLOOD PRESSURE: 80 MMHG | SYSTOLIC BLOOD PRESSURE: 138 MMHG | BODY MASS INDEX: 37.53 KG/M2 | WEIGHT: 229 LBS | HEART RATE: 101 BPM

## 2021-10-19 DIAGNOSIS — F51.13 HYPERSOMNIA DUE TO MENTAL DISORDER: ICD-10-CM

## 2021-10-19 DIAGNOSIS — F41.1 GAD (GENERALIZED ANXIETY DISORDER): ICD-10-CM

## 2021-10-19 DIAGNOSIS — F40.10 SOCIAL PHOBIA: ICD-10-CM

## 2021-10-19 DIAGNOSIS — F33.2 MAJOR DEPRESSIVE DISORDER, RECURRENT, SEVERE W/O PSYCHOTIC BEHAVIOR (HCC): ICD-10-CM

## 2021-10-19 PROCEDURE — 90833 PSYTX W PT W E/M 30 MIN: CPT | Performed by: NURSE PRACTITIONER

## 2021-10-19 PROCEDURE — 99213 OFFICE O/P EST LOW 20 MIN: CPT | Performed by: NURSE PRACTITIONER

## 2021-10-19 RX ORDER — MODAFINIL 200 MG/1
200 TABLET ORAL DAILY
Qty: 30 TABLET | Refills: 2 | Status: SHIPPED | OUTPATIENT
Start: 2021-10-19 | End: 2021-11-12 | Stop reason: SDUPTHER

## 2021-10-20 ENCOUNTER — SOCIAL WORK (OUTPATIENT)
Dept: BEHAVIORAL/MENTAL HEALTH CLINIC | Facility: CLINIC | Age: 36
End: 2021-10-20

## 2021-10-20 DIAGNOSIS — F41.1 GAD (GENERALIZED ANXIETY DISORDER): ICD-10-CM

## 2021-10-20 DIAGNOSIS — F33.2 MAJOR DEPRESSIVE DISORDER, RECURRENT, SEVERE W/O PSYCHOTIC BEHAVIOR (HCC): Primary | ICD-10-CM

## 2021-10-21 ENCOUNTER — TELEPHONE (OUTPATIENT)
Dept: FAMILY MEDICINE CLINIC | Facility: CLINIC | Age: 36
End: 2021-10-21

## 2021-10-21 DIAGNOSIS — G47.10 HYPERSOMNIA, UNSPECIFIED: ICD-10-CM

## 2021-10-21 DIAGNOSIS — Z91.89 UNSPECIFIED PERSONAL HISTORY PRESENTING HAZARDS TO HEALTH: ICD-10-CM

## 2021-10-21 DIAGNOSIS — G47.8 SLEEP DISORDER DUE TO A GENERAL MEDICAL CONDITION, MIXED TYPE: Primary | ICD-10-CM

## 2021-10-21 DIAGNOSIS — R06.89 ABNORMAL RESPIRATORY FUNCTION: ICD-10-CM

## 2021-10-22 ENCOUNTER — OFFICE VISIT (OUTPATIENT)
Dept: SLEEP CENTER | Facility: CLINIC | Age: 36
End: 2021-10-22
Payer: COMMERCIAL

## 2021-10-22 VITALS
DIASTOLIC BLOOD PRESSURE: 72 MMHG | HEIGHT: 66 IN | BODY MASS INDEX: 37.16 KG/M2 | SYSTOLIC BLOOD PRESSURE: 114 MMHG | WEIGHT: 231.2 LBS

## 2021-10-22 DIAGNOSIS — G47.8 NON-RESTORATIVE SLEEP: ICD-10-CM

## 2021-10-22 DIAGNOSIS — E66.9 OBESITY (BMI 30-39.9): ICD-10-CM

## 2021-10-22 DIAGNOSIS — F41.8 DEPRESSION WITH ANXIETY: ICD-10-CM

## 2021-10-22 DIAGNOSIS — G47.8 SLEEP DISORDER DUE TO A GENERAL MEDICAL CONDITION, MIXED TYPE: ICD-10-CM

## 2021-10-22 DIAGNOSIS — R06.89 ABNORMAL RESPIRATORY FUNCTION: ICD-10-CM

## 2021-10-22 DIAGNOSIS — F51.12 INSUFFICIENT SLEEP SYNDROME: ICD-10-CM

## 2021-10-22 DIAGNOSIS — M32.9 LUPUS (HCC): ICD-10-CM

## 2021-10-22 DIAGNOSIS — G47.10 HYPERSOMNIA, UNSPECIFIED: ICD-10-CM

## 2021-10-22 DIAGNOSIS — G47.33 OSA (OBSTRUCTIVE SLEEP APNEA): Primary | ICD-10-CM

## 2021-10-22 DIAGNOSIS — Z91.89 UNSPECIFIED PERSONAL HISTORY PRESENTING HAZARDS TO HEALTH: ICD-10-CM

## 2021-10-22 PROCEDURE — 99215 OFFICE O/P EST HI 40 MIN: CPT | Performed by: INTERNAL MEDICINE

## 2021-10-25 ENCOUNTER — TELEPHONE (OUTPATIENT)
Dept: SLEEP CENTER | Facility: CLINIC | Age: 36
End: 2021-10-25

## 2021-10-25 ENCOUNTER — OFFICE VISIT (OUTPATIENT)
Dept: BARIATRICS | Facility: CLINIC | Age: 36
End: 2021-10-25
Payer: COMMERCIAL

## 2021-10-25 VITALS
RESPIRATION RATE: 18 BRPM | SYSTOLIC BLOOD PRESSURE: 132 MMHG | HEART RATE: 68 BPM | HEIGHT: 66 IN | WEIGHT: 229.8 LBS | BODY MASS INDEX: 36.93 KG/M2 | TEMPERATURE: 97.7 F | DIASTOLIC BLOOD PRESSURE: 79 MMHG

## 2021-10-25 DIAGNOSIS — F33.2 MAJOR DEPRESSIVE DISORDER, RECURRENT, SEVERE W/O PSYCHOTIC BEHAVIOR (HCC): ICD-10-CM

## 2021-10-25 DIAGNOSIS — G47.33 OSA (OBSTRUCTIVE SLEEP APNEA): ICD-10-CM

## 2021-10-25 DIAGNOSIS — F41.1 GAD (GENERALIZED ANXIETY DISORDER): ICD-10-CM

## 2021-10-25 DIAGNOSIS — E66.01 SEVERE OBESITY (HCC): Primary | ICD-10-CM

## 2021-10-25 DIAGNOSIS — F41.8 DEPRESSION WITH ANXIETY: ICD-10-CM

## 2021-10-25 PROBLEM — R94.130 ABNORMAL NERVE CONDUCTION STUDIES: Status: ACTIVE | Noted: 2021-03-15

## 2021-10-25 PROBLEM — M79.641 PAIN IN RIGHT HAND: Status: ACTIVE | Noted: 2021-03-10

## 2021-10-25 PROBLEM — R52 PAIN IN SURGICAL SCAR: Status: ACTIVE | Noted: 2021-03-15

## 2021-10-25 PROBLEM — M25.9 ELBOW DISORDER: Status: ACTIVE | Noted: 2021-03-15

## 2021-10-25 PROBLEM — R20.0 NUMBNESS OF HAND: Status: ACTIVE | Noted: 2021-03-10

## 2021-10-25 PROBLEM — L90.5 PAIN IN SURGICAL SCAR: Status: ACTIVE | Noted: 2021-03-15

## 2021-10-25 PROCEDURE — 99214 OFFICE O/P EST MOD 30 MIN: CPT | Performed by: FAMILY MEDICINE

## 2021-11-01 ENCOUNTER — OFFICE VISIT (OUTPATIENT)
Dept: BARIATRICS | Facility: CLINIC | Age: 36
End: 2021-11-01

## 2021-11-01 VITALS
DIASTOLIC BLOOD PRESSURE: 72 MMHG | WEIGHT: 229.5 LBS | TEMPERATURE: 97 F | HEIGHT: 66 IN | HEART RATE: 63 BPM | SYSTOLIC BLOOD PRESSURE: 110 MMHG | BODY MASS INDEX: 36.88 KG/M2

## 2021-11-01 DIAGNOSIS — E66.9 OBESITY, UNSPECIFIED CLASSIFICATION, UNSPECIFIED OBESITY TYPE, UNSPECIFIED WHETHER SERIOUS COMORBIDITY PRESENT: Primary | ICD-10-CM

## 2021-11-01 DIAGNOSIS — E66.9 OBESITY, CLASS II, BMI 35-39.9: Primary | ICD-10-CM

## 2021-11-01 PROCEDURE — RECHECK: Performed by: DIETITIAN, REGISTERED

## 2021-11-03 DIAGNOSIS — E66.9 OBESITY, CLASS II, BMI 35-39.9: ICD-10-CM

## 2021-11-03 DIAGNOSIS — E55.9 VITAMIN D DEFICIENCY: ICD-10-CM

## 2021-11-03 DIAGNOSIS — K91.2 POSTSURGICAL MALABSORPTION: ICD-10-CM

## 2021-11-03 DIAGNOSIS — Z98.84 BARIATRIC SURGERY STATUS: Primary | ICD-10-CM

## 2021-11-03 RX ORDER — ERGOCALCIFEROL 1.25 MG/1
50000 CAPSULE ORAL
Qty: 24 CAPSULE | Refills: 0 | Status: SHIPPED | OUTPATIENT
Start: 2021-11-04 | End: 2022-04-05

## 2021-11-05 ENCOUNTER — TELEPHONE (OUTPATIENT)
Dept: SLEEP CENTER | Facility: CLINIC | Age: 36
End: 2021-11-05

## 2021-11-09 ENCOUNTER — IMMUNIZATIONS (OUTPATIENT)
Dept: FAMILY MEDICINE CLINIC | Facility: HOSPITAL | Age: 36
End: 2021-11-09

## 2021-11-09 DIAGNOSIS — Z23 ENCOUNTER FOR IMMUNIZATION: Primary | ICD-10-CM

## 2021-11-09 PROCEDURE — 0013A COVID-19 MODERNA VACC 0.25 ML BOOSTER: CPT

## 2021-11-09 PROCEDURE — 91306 COVID-19 MODERNA VACC 0.25 ML BOOSTER: CPT

## 2021-11-11 ENCOUNTER — TELEPHONE (OUTPATIENT)
Dept: PSYCHIATRY | Facility: CLINIC | Age: 36
End: 2021-11-11

## 2021-11-11 DIAGNOSIS — F51.13 HYPERSOMNIA DUE TO MENTAL DISORDER: ICD-10-CM

## 2021-11-11 DIAGNOSIS — F41.1 GAD (GENERALIZED ANXIETY DISORDER): ICD-10-CM

## 2021-11-11 DIAGNOSIS — F33.2 MAJOR DEPRESSIVE DISORDER, RECURRENT, SEVERE W/O PSYCHOTIC BEHAVIOR (HCC): ICD-10-CM

## 2021-11-11 DIAGNOSIS — F40.10 SOCIAL PHOBIA: ICD-10-CM

## 2021-11-12 RX ORDER — MODAFINIL 200 MG/1
200 TABLET ORAL DAILY
Qty: 30 TABLET | Refills: 2 | Status: SHIPPED | OUTPATIENT
Start: 2021-11-30 | End: 2022-02-09 | Stop reason: SDUPTHER

## 2021-11-12 RX ORDER — CLONAZEPAM 0.5 MG/1
TABLET ORAL
Qty: 60 TABLET | Refills: 2 | Status: SHIPPED | OUTPATIENT
Start: 2021-11-12 | End: 2022-02-09 | Stop reason: SDUPTHER

## 2021-11-17 ENCOUNTER — SOCIAL WORK (OUTPATIENT)
Dept: BEHAVIORAL/MENTAL HEALTH CLINIC | Facility: CLINIC | Age: 36
End: 2021-11-17
Payer: COMMERCIAL

## 2021-11-17 DIAGNOSIS — F41.1 GAD (GENERALIZED ANXIETY DISORDER): ICD-10-CM

## 2021-11-17 DIAGNOSIS — F33.2 MAJOR DEPRESSIVE DISORDER, RECURRENT, SEVERE W/O PSYCHOTIC BEHAVIOR (HCC): Primary | ICD-10-CM

## 2021-11-17 PROCEDURE — 90834 PSYTX W PT 45 MINUTES: CPT | Performed by: SOCIAL WORKER

## 2021-12-01 ENCOUNTER — SOCIAL WORK (OUTPATIENT)
Dept: BEHAVIORAL/MENTAL HEALTH CLINIC | Facility: CLINIC | Age: 36
End: 2021-12-01
Payer: COMMERCIAL

## 2021-12-01 DIAGNOSIS — F41.1 GAD (GENERALIZED ANXIETY DISORDER): ICD-10-CM

## 2021-12-01 DIAGNOSIS — F33.2 MAJOR DEPRESSIVE DISORDER, RECURRENT, SEVERE W/O PSYCHOTIC BEHAVIOR (HCC): Primary | ICD-10-CM

## 2021-12-01 PROCEDURE — 90834 PSYTX W PT 45 MINUTES: CPT | Performed by: SOCIAL WORKER

## 2021-12-09 ENCOUNTER — OFFICE VISIT (OUTPATIENT)
Dept: BARIATRICS | Facility: CLINIC | Age: 36
End: 2021-12-09

## 2021-12-09 VITALS — BODY MASS INDEX: 37.5 KG/M2 | HEIGHT: 65 IN | WEIGHT: 225.1 LBS

## 2021-12-09 DIAGNOSIS — E66.9 OBESITY, CLASS II, BMI 35-39.9: Primary | ICD-10-CM

## 2021-12-09 PROCEDURE — RECHECK: Performed by: DIETITIAN, REGISTERED

## 2021-12-14 DIAGNOSIS — E03.9 ACQUIRED HYPOTHYROIDISM: ICD-10-CM

## 2021-12-14 RX ORDER — LEVOTHYROXINE SODIUM 0.03 MG/1
25 TABLET ORAL
Qty: 90 TABLET | Refills: 1 | Status: SHIPPED | OUTPATIENT
Start: 2021-12-14 | End: 2022-07-11

## 2021-12-17 ENCOUNTER — CONSULT (OUTPATIENT)
Dept: CARDIOLOGY CLINIC | Facility: CLINIC | Age: 36
End: 2021-12-17
Payer: COMMERCIAL

## 2021-12-17 VITALS
HEIGHT: 65 IN | HEART RATE: 60 BPM | WEIGHT: 225.8 LBS | SYSTOLIC BLOOD PRESSURE: 112 MMHG | RESPIRATION RATE: 19 BRPM | BODY MASS INDEX: 37.62 KG/M2 | OXYGEN SATURATION: 99 % | DIASTOLIC BLOOD PRESSURE: 64 MMHG

## 2021-12-17 DIAGNOSIS — G47.33 OSA (OBSTRUCTIVE SLEEP APNEA): ICD-10-CM

## 2021-12-17 DIAGNOSIS — Z01.810 PREOPERATIVE CARDIOVASCULAR EXAMINATION: Primary | ICD-10-CM

## 2021-12-17 DIAGNOSIS — E66.01 SEVERE OBESITY (HCC): ICD-10-CM

## 2021-12-17 DIAGNOSIS — E66.9 OBESITY, UNSPECIFIED CLASSIFICATION, UNSPECIFIED OBESITY TYPE, UNSPECIFIED WHETHER SERIOUS COMORBIDITY PRESENT: ICD-10-CM

## 2021-12-17 PROCEDURE — 93000 ELECTROCARDIOGRAM COMPLETE: CPT | Performed by: INTERNAL MEDICINE

## 2021-12-17 PROCEDURE — 99244 OFF/OP CNSLTJ NEW/EST MOD 40: CPT | Performed by: INTERNAL MEDICINE

## 2021-12-30 ENCOUNTER — SOCIAL WORK (OUTPATIENT)
Dept: BEHAVIORAL/MENTAL HEALTH CLINIC | Facility: CLINIC | Age: 36
End: 2021-12-30
Payer: COMMERCIAL

## 2021-12-30 DIAGNOSIS — F41.1 GAD (GENERALIZED ANXIETY DISORDER): ICD-10-CM

## 2021-12-30 DIAGNOSIS — F33.2 MAJOR DEPRESSIVE DISORDER, RECURRENT, SEVERE W/O PSYCHOTIC BEHAVIOR (HCC): Primary | ICD-10-CM

## 2021-12-30 PROCEDURE — 90834 PSYTX W PT 45 MINUTES: CPT | Performed by: SOCIAL WORKER

## 2022-01-07 ENCOUNTER — TELEMEDICINE (OUTPATIENT)
Dept: FAMILY MEDICINE CLINIC | Facility: CLINIC | Age: 37
End: 2022-01-07
Payer: COMMERCIAL

## 2022-01-07 DIAGNOSIS — B34.9 VIRAL INFECTION, UNSPECIFIED: ICD-10-CM

## 2022-01-07 DIAGNOSIS — Z20.822 EXPOSURE TO COVID-19 VIRUS: ICD-10-CM

## 2022-01-07 PROCEDURE — G2012 BRIEF CHECK IN BY MD/QHP: HCPCS | Performed by: FAMILY MEDICINE

## 2022-01-07 PROCEDURE — U0003 INFECTIOUS AGENT DETECTION BY NUCLEIC ACID (DNA OR RNA); SEVERE ACUTE RESPIRATORY SYNDROME CORONAVIRUS 2 (SARS-COV-2) (CORONAVIRUS DISEASE [COVID-19]), AMPLIFIED PROBE TECHNIQUE, MAKING USE OF HIGH THROUGHPUT TECHNOLOGIES AS DESCRIBED BY CMS-2020-01-R: HCPCS | Performed by: FAMILY MEDICINE

## 2022-01-07 NOTE — PROGRESS NOTES
COVID-19 Outpatient Progress Note    Assessment/Plan:    Problem List Items Addressed This Visit     None         Disposition:     Referred patient to centralized site to test for COVID-19  [address]    Your healthcare provider and/or public health staff have evaluated you and have determined that you do not need to remain in the hospital at this time   At this time you can be isolated at home where you will be monitored by staff from your local or state health department  You should carefully follow the prevention and isolation steps below until a healthcare provider or local or state health department says that you can return to your normal activities  Stay home except to get medical care    People who are mildly ill with COVID-19 are able to isolate at home during their illness  You should restrict activities outside your home, except for getting medical care  Do not go to work, school, or public areas  Avoid using public transportation, ride-sharing, or taxis  Separate yourself from other people and animals in your home    People: As much as possible, you should stay in a specific room and away from other people in your home  Also, you should use a separate bathroom, if available  Animals: You should restrict contact with pets and other animals while you are sick with COVID-19, just like you would around other people  Although there have not been reports of pets or other animals becoming sick with COVID-19, it is still recommended that people sick with COVID-19 limit contact with animals until more information is known about the virus  When possible, have another member of your household care for your animals while you are sick  If you are sick with COVID-19, avoid contact with your pet, including petting, snuggling, being kissed or licked, and sharing food   If you must care for your pet or be around animals while you are sick, wash your hands before and after you interact with pets and wear a facemask  See COVID-19 and Animals for more information  Call ahead before visiting your doctor    If you have a medical appointment, call the healthcare provider and tell them that you have or may have COVID-19  This will help the healthcare providers office take steps to keep other people from getting infected or exposed  Wear a facemask    You should wear a facemask when you are around other people (e g , sharing a room or vehicle) or pets and before you enter a healthcare providers office  If you are not able to wear a facemask (for example, because it causes trouble breathing), then people who live with you should not stay in the same room with you, or they should wear a facemask if they enter your room  Cover your coughs and sneezes    Cover your mouth and nose with a tissue when you cough or sneeze  Throw used tissues in a lined trash can  Immediately wash your hands with soap and water for at least 20 seconds or, if soap and water are not available, clean your hands with an alcohol-based hand  that contains at least 60% alcohol  Clean your hands often    Wash your hands often with soap and water for at least 20 seconds, especially after blowing your nose, coughing, or sneezing; going to the bathroom; and before eating or preparing food  If soap and water are not readily available, use an alcohol-based hand  with at least 60% alcohol, covering all surfaces of your hands and rubbing them together until they feel dry  Soap and water are the best option if hands are visibly dirty  Avoid touching your eyes, nose, and mouth with unwashed hands  Avoid sharing personal household items    You should not share dishes, drinking glasses, cups, eating utensils, towels, or bedding with other people or pets in your home  After using these items, they should be washed thoroughly with soap and water      Clean all high-touch surfaces everyday    High touch surfaces include counters, tabletops, doorknobs, bathroom fixtures, toilets, phones, keyboards, tablets, and bedside tables  Also, clean any surfaces that may have blood, stool, or body fluids on them  Use a household cleaning spray or wipe, according to the label instructions  Labels contain instructions for safe and effective use of the cleaning product including precautions you should take when applying the product, such as wearing gloves and making sure you have good ventilation during use of the product  Monitor your symptoms    Seek prompt medical attention if your illness is worsening (e g , difficulty breathing)  Before seeking care, call your healthcare provider and tell them that you have, or are being evaluated for, COVID-19  Put on a facemask before you enter the facility  These steps will help the healthcare providers office to keep other people in the office or waiting room from getting infected or exposed  Ask your healthcare provider to call the local or [facility] health department  Persons who are placed under active monitoring or facilitated self-monitoring should follow instructions provided by their local health department or occupational health professionals, as appropriate  If you have a medical emergency and need to call 911, notify the dispatch personnel that you have, or are being evaluated for COVID-19  If possible, put on a facemask before emergency medical services arrive  Discontinuing home isolation    Patients with confirmed COVID-19 should remain under home isolation precautions until the following conditions are met:    They have had no fever for at least 24 hours (that is one full day of no fever without the use medicine that reduces fevers)  AND  other symptoms have improved (for example, when their cough or shortness of breath have improved)  AND  If had mild or moderate illness, at least 10 days have passed since their symptoms first appeared or if severe illness (needed oxygen) or immunosuppressed, at least 20 days have passed since symptoms first appeared  Patients with confirmed COVID-19 should also notify close contacts (including their workplace) and ask that they self-quarantine  Currently, close contact is defined as being within 6 feet for 15 minutes or more from the period 24 hours starting 48 hours before symptom onset to the time at which the patient went into isolation   Close contacts of patients diagnosed with COVID-19 should be instructed by the patient to self-quarantine for 14 days from the last time of their last contact with the patient  Source: [employer]      I have spent 10 minutes directly with the patient  Greater than 50% of this time was spent in counseling/coordination of care regarding: prognosis, risks and benefits of treatment options, instructions for management, patient and family education, importance of treatment compliance and risk factor reductions  Recommend vitamin-C, vitamin-D     Encounter provider Lizbeth Gale DO    Provider located at [address] [place] [address] [address]  [facility] [id number] [phone]    Recent Visits  No visits were found meeting these conditions  Showing recent visits within past 7 days and meeting all other requirements  Future Appointments  No visits were found meeting these conditions  Showing future appointments within next 150 days and meeting all other requirements       Patient agrees to participate in a virtual check in via telephone or video visit instead of presenting to the office to address urgent/immediate medical needs  Patient is aware this is a billable service  After connecting through [facility], the patient was identified by name and date of birth   Argentina Giron was informed that this was a telemedicine visit and that the exam was being conducted confidentially over secure lines  My office door was closed  No one else was in the room  Jane Avilez acknowledged consent and understanding of privacy and security of the telemedicine visit  I informed the patient that I have reviewed her record in Epic and presented the opportunity for her to ask any questions regarding the visit today  The patient agreed to participate  Verification of patient location:  Patient is located in the following state in which I hold an active license: [place]    Subjective:   Jane Avilez is a 39 y o  female who is concerned about COVID-19  Patient's symptoms include fatigue, malaise, myalgias and headache  Patient denies fever, chills, congestion, rhinorrhea, sore throat, anosmia, loss of taste, cough, shortness of breath, chest tightness, abdominal pain, nausea, vomiting and diarrhea  COVID-19 vaccination status: Fully vaccinated with booster    Exposure:   Contact with a person who is under investigation (PUI) for or who is positive for COVID-19 within the last 14 days?: Yes    Hospitalized recently for fever and/or lower respiratory symptoms?: No      Currently a healthcare worker that is involved in direct patient care?: Yes      Works in a special setting where the risk of COVID-19 transmission may be high? (this may include long-term care, correctional and FPC facilities; homeless shelters; assisted-living facilities and group homes ): No      Resident in a special setting where the risk of COVID-19 transmission may be high? 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      Lab Results   Component Value Date    SARSCOV2 Negative 08/09/2021    SARSCOV2 Not Detected 09/19/2020     Past Medical History:   Diagnosis Date    Anxiety     Back pain     Bipolar disorder ([facility] [address] )     Carpal tunnel syndrome on right     Contact lens overwear of both eyes     Cubital tunnel syndrome on right     Depression     GERD (gastroesophageal reflux disease)     Headache     Hypothyroidism     Kidney stones     Lupus (systemic lupus erythematosus) (HCC)     Nausea     PONV (postoperative nausea and vomiting)     Wears glasses      Past Surgical History:   Procedure Laterality Date    IN CYSTOURETHROSCOPY N/A 2/3/2020    Procedure: CYSTOSCOPY;  Surgeon: Jung Sorensen MD;  [facility];  Service: UroGynecology           IN LAP,CHOLECYSTECTOMY N/A 9/6/2018    Procedure: CHOLECYSTECTOMY LAPAROSCOPIC W/ ROBOTICS;  Surgeon: Laura Cox MD;  [facility];  Service: General    IN POST COLPORRHAPHY,RECTUM/VAGINA N/A 2/3/2020    Procedure: POSTERIOR COLPORRHAPHY;  Surgeon: Jung Sorensen MD;  [facility];  Service: UroGynecology           IN REVISE MEDIAN N/CARPAL TUNNEL SURG Right 11/27/2020    Procedure: release CARPAL TUNNEL;  Surgeon: Kip Smallwood MD;  [facility];  Service: Orthopedics    IN REVISE ULNAR NERVE AT ELBOW Right 11/27/2020    Procedure: Christen Century;  Surgeon:  Kip Smallwood MD;  [facility];  Service: Orthopedics    IN SLING OPER STRES INCONTINENCE N/A 2/3/2020    Procedure: PUBOVAGINAL SLING;  Surgeon: Jung Sorensen MD;  [facility];  Service: UroGynecology           TUBAL LIGATION       Current Outpatient Medications   Medication Sig Dispense Refill    acetaminophen (TYLENOL) 325 mg tablet Take 2 tablets (650 mg total) by mouth every 6 (six) hours as needed for mild pain, moderate pain, headaches or fever 30 tablet 0    clonazePAM (KlonoPIN) 0 5 mg tablet TAKE 1/2 TO 1 TABLET BY MOUTH 2 TIMES A DAY AS NEEDED FOR ANXIETY 60 tablet 2    dicyclomine (BENTYL) 10 mg capsule Take 1 capsule (10 mg total) by mouth 4 (four) times a day (before meals and at bedtime) (Patient not taking: Reported on 12/17/2021 ) 120 capsule 5    ergocalciferol (VITAMIN D2) 50,000 units Take 1 capsule (50,000 Units total) by mouth 2 (two) times a week with meals 24 capsule 0    gabapentin (NEURONTIN) 100 mg capsule Take 1 capsule (100 mg total) by mouth 3 (three) times a day (Patient taking differently: Take 100 mg by mouth as needed  ) 90 capsule 3    hydroxychloroquine (PLAQUENIL) 200 mg tablet Take 2 tablets (400 mg total) by mouth daily 180 tablet 1    ibuprofen (MOTRIN) 600 mg tablet Take 1 tablet (600 mg total) by mouth every 6 (six) hours as needed for moderate pain 120 tablet 3    levothyroxine 25 mcg tablet TAKE 1 TABLET (25 MCG TOTAL) BY MOUTH DAILY IN THE EARLY MORNING 90 tablet 1    modafinil (PROVIGIL) 200 MG tablet Take 1 tablet (200 mg total) by mouth daily 30 tablet 2    Vortioxetine HBr (Trintellix) 20 MG tablet Take 1 tablet (20 mg total) by mouth daily 30 tablet 2     No current facility-administered medications for this visit  Allergies   Allergen Reactions    Penicillins Hives     At young age       Review of Systems   Constitutional: Positive for fatigue  Negative for activity change, appetite change, chills and fever  HENT: Negative for congestion, rhinorrhea and sore throat  Respiratory: Negative for cough, chest tightness and shortness of breath  Gastrointestinal: Negative for abdominal pain, diarrhea, nausea and vomiting  Musculoskeletal: Positive for myalgias  Neurological: Positive for headaches  Objective: There were no vitals filed for this visit  Physical Exam  Constitutional:       Appearance: She is well-developed  She is obese  HENT:      Head: Normocephalic and atraumatic  Eyes:      Conjunctiva/sclera: Conjunctivae normal    Pulmonary:      Effort: Pulmonary effort is normal    Neurological:      Mental Status: She is alert and oriented to person, place, and time     Psychiatric:         Judgment: Judgment normal          VIRTUAL VISIT DISCLAIMER    Argentina Giron verbally agrees to participate in Virtual Care Services  Pt is aware that [employer] could be limited without vital signs or the ability to perform a full hands-on physical Jihandestiny Howe understands she or the provider may request at any time to terminate the video visit and request the patient to seek care or treatment in person

## 2022-01-12 ENCOUNTER — OFFICE VISIT (OUTPATIENT)
Dept: BARIATRICS | Facility: CLINIC | Age: 37
End: 2022-01-12
Payer: COMMERCIAL

## 2022-01-12 VITALS
HEIGHT: 66 IN | TEMPERATURE: 97.9 F | DIASTOLIC BLOOD PRESSURE: 68 MMHG | BODY MASS INDEX: 36.64 KG/M2 | HEART RATE: 67 BPM | SYSTOLIC BLOOD PRESSURE: 108 MMHG | WEIGHT: 228 LBS

## 2022-01-12 DIAGNOSIS — K21.9 GERD (GASTROESOPHAGEAL REFLUX DISEASE): ICD-10-CM

## 2022-01-12 DIAGNOSIS — E66.01 MORBID (SEVERE) OBESITY DUE TO EXCESS CALORIES (HCC): Primary | ICD-10-CM

## 2022-01-12 PROCEDURE — 99203 OFFICE O/P NEW LOW 30 MIN: CPT | Performed by: SURGERY

## 2022-01-12 NOTE — PROGRESS NOTES
BARIATRIC CONSULT-INITIAL - BARIATRIC SURGERY  Debbi Melendez 39 y o  female MRN: [id number]  Unit/Bed#:  Encounter: [id number]      HPI:  Debbi Melendez is a 39 y o  female who presents with morbid obesity to discuss weight loss options  Review of Systems    Historical Information   Past Medical History:   Diagnosis Date    Anxiety     Back pain     Bipolar disorder ([address] )     Carpal tunnel syndrome on right     Contact lens overwear of both eyes     Cubital tunnel syndrome on right     Depression     GERD (gastroesophageal reflux disease)     Headache     Hypothyroidism     Kidney stones     Lupus (systemic lupus erythematosus) (HCC)     Nausea     PONV (postoperative nausea and vomiting)     Wears glasses      Past Surgical History:   Procedure Laterality Date    [place] CYSTOURETHROSCOPY N/A 2/3/2020    Procedure: CYSTOSCOPY;  Surgeon: Madai Sanchez MD;  [facility];  Service: UroGynecology           [place] LAP,CHOLECYSTECTOMY N/A 9/6/2018    Procedure: CHOLECYSTECTOMY LAPAROSCOPIC W/ ROBOTICS;  Surgeon: Randee Reina MD;  [facility];  Service: General    [place] POST COLPORRHAPHY,RECTUM/VAGINA N/A 2/3/2020    Procedure: POSTERIOR COLPORRHAPHY;  Surgeon: Madai Sanchez MD;  [facility];  Service: UroGynecology           [place] REVISE MEDIAN N/CARPAL TUNNEL SURG Right 11/27/2020    Procedure: release CARPAL TUNNEL;  Surgeon: Janae Wetzel MD;  [facility];  Service: Orthopedics    [place] REVISE ULNAR NERVE AT ELBOW Right 11/27/2020    Procedure: Clifton Oconnor;  Surgeon:  Janae Wetzel MD;  [facility];  Service: Orthopedics    [place] SLING OPER STRES INCONTINENCE N/A 2/3/2020    Procedure: PUBOVAGINAL SLING;  Surgeon: Madai Sanchze MD;  [facility];  Service: UroGynecology           TUBAL LIGATION       Social History   Social History     Substance and Sexual Activity   Alcohol Use Not Currently     Social History     Substance and Sexual Activity   Drug Use No     Social History     Tobacco Use   Smoking Status Never Smoker   Smokeless Tobacco Never Used     Family History: non-contributory    Meds/Allergies   all medications and allergies reviewed  Allergies   Allergen Reactions    Penicillins Hives     At young age       Objective       Current Vitals:   Blood Pressure: 108/68 (01/12/22 1456)  Pulse: 67 (01/12/22 1456)  Temperature: 97 9 °F (36 6 °C) (01/12/22 1456)  Temp Source: Tympanic (01/12/22 1456)  Height: 5' 5 5" (166 4 cm) (01/12/22 1456)  Weight - Scale: 103 kg (228 lb) (01/12/22 1456)  Body mass index is 37 36 kg/m²  Invasive Devices  Report    None                 Physical Exam    Lab Results: I have personally reviewed pertinent lab results  Imaging: I have personally reviewed pertinent reports  EKG, Pathology, and Other Studies: I have personally reviewed pertinent reports  Code Status: [unfilled]  Advance Directive and Living Will:      Power of :    POLST:      Assessment/PLAN:            Patient has a long history of morbid obesity and is presenting to discuss the surgical weight loss options  Despite the patient best efforts patient was unable to lose any meaningful or sustainable weight using nonsurgical means  We had a long discussion regarding all the surgical weight-loss options at our disposal at this point and reviewed the risks and benefits of each procedure in details as it relates to her age, BMI and medical conditions  Patient elected to undergo a gastric bypass    Risks and benefits were explained to the patient  We also discussed the importance and need of a preoperative workup to make sure that the patient can undergo the procedure safely  Preoperative workup includes sleep apnea screening, cardiac evaluation, nutrition/psych and preoperative EGD   Risks and benefits of all the preoperative diagnostic tests were discussed with the patient including but not limited to the upper endoscopy  Alternatives to surgery and alternative forms of surgery were also explained  Postsurgical commitment and aftercare programs were discussed and explained to the patient in details   In terms of comorbidities patient suffers mostly of   Past Medical History:   Diagnosis Date    Anxiety     Back pain     Bipolar disorder ([address] )     Carpal tunnel syndrome on right     Contact lens overwear of both eyes     Cubital tunnel syndrome on right     Depression     GERD (gastroesophageal reflux disease)     Headache     Hypothyroidism     Kidney stones     Lupus (systemic lupus erythematosus) (HCC)     Nausea     PONV (postoperative nausea and vomiting)     Wears glasses        I informed the patient that the rate of resolution of comorbid conditions following weight loss surgery is between 60 and 90% depending on the severity of the specific medical condition  I discussed and educated the patient regarding the different components of our multidisciplinary program and the importance of compliance and follow-up in the postoperative period  All questions answered  Patient understands risks and benefits  An image of the procedure was also shown to the patient  After showing the image we discussed all the technical aspects of the procedure and also the potential complications including but not limited to gastrointestinal perforation, leak, obstruction, stricture and hemorrhage  I spent 30 min with the patient more than 50% of the time was spent educating the patient and coordinating care

## 2022-01-18 ENCOUNTER — OFFICE VISIT (OUTPATIENT)
Dept: SLEEP CENTER | Facility: CLINIC | Age: 37
End: 2022-01-18
Payer: COMMERCIAL

## 2022-01-18 VITALS
HEART RATE: 60 BPM | HEIGHT: 65 IN | DIASTOLIC BLOOD PRESSURE: 66 MMHG | WEIGHT: 233 LBS | BODY MASS INDEX: 38.82 KG/M2 | OXYGEN SATURATION: 98 % | SYSTOLIC BLOOD PRESSURE: 118 MMHG

## 2022-01-18 DIAGNOSIS — G47.33 OSA (OBSTRUCTIVE SLEEP APNEA): Primary | ICD-10-CM

## 2022-01-18 PROCEDURE — 99213 OFFICE O/P EST LOW 20 MIN: CPT | Performed by: PHYSICIAN ASSISTANT

## 2022-01-18 NOTE — ASSESSMENT & PLAN NOTE
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

## 2022-01-18 NOTE — PROGRESS NOTES
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 and to help with the short staffing crisis  I do believe that that leaves her with not enough hours in the day to use her CPAP at night  We did discuss the importance of sleep hygiene as well as good overall health including good nutritional intake and daily exercise and sleeping for 7-9 hours a night  6  I would like her to follow-up in 6 months for recheck      Problem List Items Addressed This Visit        Respiratory    CARLOS ENRIQUE (obstructive sleep apnea) - Primary     7  Continue to use CPAP  8  I have made no changes to her setting  9  Supply refill x1 year  10  Call with questions or concerns  11  Patient unfortunately has poor compliance with her CPAP which I do suspect is likely secondary to her current job  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 and to help with the short staffing crisis  I do believe that that leaves her with not enough hours in the day to use her CPAP at night  We did discuss the importance of sleep hygiene as well as good overall health including good nutritional intake and daily exercise and sleeping for 7-9 hours a night  12  I would like her to follow-up in 6 months for recheck         Relevant Orders    PAP DME Resupply/Reorder            ______________________________________________________________________    HPI:    Joseline Munoz presents today for follow-up of her CARLOS ENRIQUE  Patient reports that prior to her sleep study she was always feeling tired    She was waking up gasping for air in the middle of the night she would wake up with daytime headaches and she would have severe daytime fatigue  Since being treated with her CPAP machine she is feeling significantly better however she is still having some daytime fatigue  Unfortunately patient does not wear her device for long enough period of time  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 which leads her little time to get an appropriate night sleep  I would like her to follow-up after she continues to work on good sleep hygiene and improved hours of sleep on her CPAP  I am not making any changes to her settings or her supplies at this time  She will continue to attempt to use her CPAP more often  She is agreeable to continue to try to treat her sleep apnea with her CPAP machine and is aware of the risks of untreated sleep apnea  I provided her with education today about balanced diet, healthy exercise routine, healthy sleep hygiene, and the importance of using her CPAP  Review of Systems:  Review of Systems   All other systems reviewed and are negative          Social history updates:  Social History     Tobacco Use   Smoking Status Never Smoker   Smokeless Tobacco Never Used     Social History     Socioeconomic History    Marital status: Single     Spouse name: Not on file    Number of children: 3    Years of education: Not on file    Highest education level: Associate degree: academic program   Occupational History    Occupation: [employer]     Employer: [employer]   Tobacco Use    Smoking status: Never Smoker    Smokeless tobacco: Never Used   Vaping Use    Vaping Use: Never used   Substance and Sexual Activity    Alcohol use: Not Currently    Drug use: No    Sexual activity: Yes     Partners: Male     Birth control/protection: OCP     Comment: single   Other Topics Concern    Not on file   Social History Narrative    Uses seatbelts Caffeine use     Social Determinants of Health     Financial Resource Strain: Not on file   Food Insecurity: Not on file   Transportation Needs: Not on file   Physical Activity: Sufficiently Active    Days of Exercise per Week: 3 days    Minutes of Exercise per Session: 60 min   Stress: No Stress Concern Present    Feeling of Stress : Not at all   Social Connections: Socially Isolated    Frequency of Communication with Friends and Family: Never    Frequency of Social Gatherings with Friends and Family: Never    Attends Yazidi Services: Never    Active Member of Clubs or Organizations: No    Attends Club or Organization Meetings: Never    Marital Status: Never    Intimate Partner Violence: Not At Risk    Fear of Current or Ex-Partner: No    Emotionally Abused: No    Physically Abused: No    Sexually Abused: No   Housing Stability: Not on file       PhysicalExamination:  Vitals:   /66   Pulse 60   Ht 5' 5" (1 651 m)   Wt 106 kg (233 lb)   SpO2 98%   BMI 38 77 kg/m²     Patient is well groomed; appears tired and drowsy  H&N: EOMI; NC/AT:no facial pressure marks, no rashes  Nasal airway is patent  There is some asymmetry of the nares  Oral airway is crowded  Base of tongue is at Mallampati III  There is redundant soft palate tissue  Psych: cooperativeand in no distress  Mental state has a constricted affect  CNS: Alert, orientated, clear & coherent speech  Skin/Extrem: col & hydration normal; no edema  Resp:  Respiratory effort is normal  Physical findings are otherwise essentially unchanged from previous     Diagnostic Data:  Labs: I personally reviewed the most recent laboratory data pertinent to today's visit  Orders Only on 01/07/2022   Component Date Value    SARS-CoV-2 01/07/2022 Negative        I have personally reviewed pertinent lab results  , ABG: No results found for: PHART, VIR4JIQ, PO2ART, RIJ4MCG, F9RQFCXU, BEART, SOURCE, BNP: No results found for: BNP, CBC: No results found for: WBC, HGB, HCT, MCV, PLT, ADJUSTEDWBC, MCH, MCHC, RDW, MPV, NRBC, CMP: No results found for: SODIUM, K, CL, CO2, ANIONGAP, BUN, CREATININE, GLUCOSE, CALCIUM, AST, ALT, ALKPHOS, PROT, BILITOT, EGFR, PT/INR: No results found for: PT, INR, Troponin: No results found for: TROPONINI  Lab Results   Component Value Date    WBC 7 51 10/07/2021    HGB 13 4 10/07/2021    HCT 41 5 10/07/2021    MCV 94 10/07/2021     10/07/2021     Lab Results   Component Value Date    GLUCOSE 91 03/10/2015    CALCIUM 8 9 10/07/2021     03/10/2015    K 3 8 10/07/2021    CO2 28 10/07/2021     10/07/2021    BUN 12 10/07/2021    CREATININE 0 72 10/07/2021     No results found for: IGE  Lab Results   Component Value Date    ALT 39 10/07/2021    AST 18 10/07/2021    ALKPHOS 46 10/07/2021    BILITOT 0 5 03/10/2015     No results found for: IRON, TIBC, FERRITIN  Lab Results   Component Value Date    QIFEKDYX09 1,490 (H) 10/01/2021     No results found for: FOLATE    STUDY FORMAT:   The patient was admitted to the sleep laboratory at the [place] [address] and oriented to the home   sleep study testing procedure and equipment   The home sleep testing study   was performed using the [employer] Nox-T3 Recorder which is a Type III   monitoring system   A return demonstration by the patient helped to assure   correct usage   The following parameters were monitored: p-flow via nasal   cannula, body position, oximetry, pulse rate and respiratory effort via   thoracic and abdominal RIP belts   The sleep study was scored following   the rules established by the American Academy of Sleep Medicine (AASM)      Hypopneas are defined as a ?30% drop in flow for ? 10 seconds that are   associated with ?4% desaturations   CATIE is the Respiratory Event Index   (number of respiratory events per hour) and is a surrogate for the   apnea/hypopnea index (AHI)       PATIENT HISTORY:   Home sleep testing was undertaken to evaluate this patient with suggestive   symptoms and /or risk factors for sleep  disordered breathing  TESTING RESULTS:   The test results are from Night of 10/15/21   The total time in bed   (analysis time) was 351 minutes        The patient had a total of 95 respiratory events made up of 2 obstructive   apneas, 0 central apneas/mixed apneas and 93 hypopneas resulting in a   respiratory event index (CATIE) of 16 2  The lowest SpO2 recorded is 86%  The snore index was 44 6%  Impression  Moderate obstructive sleep apnea     Limitations of home sleep testing compromises accuracy  Based on the   results of this study, a follow-up study to titrate positive airway   pressure is recommended    Other treatment options that may be considered   include use of a mandibular advancement device or upper airway surgery      Clinical correlation is advised       Compliance Data:  Type of CPAP:  Auto CPAP 6-16                                    Percent usage: 57%                                    Average time used: 4 hours 46 minutes                                   Time in large leak: 7 6 LPM                                    Residual AHI: 1 1                                          Joe Wagner PA-C

## 2022-01-19 ENCOUNTER — TELEPHONE (OUTPATIENT)
Dept: SLEEP CENTER | Facility: CLINIC | Age: 37
End: 2022-01-19

## 2022-01-31 ENCOUNTER — PREP FOR PROCEDURE (OUTPATIENT)
Dept: BARIATRICS | Facility: CLINIC | Age: 37
End: 2022-01-31

## 2022-01-31 DIAGNOSIS — Z98.84 BARIATRIC SURGERY STATUS: Primary | ICD-10-CM

## 2022-02-03 ENCOUNTER — TELEMEDICINE (OUTPATIENT)
Dept: BEHAVIORAL/MENTAL HEALTH CLINIC | Facility: CLINIC | Age: 37
End: 2022-02-03
Payer: COMMERCIAL

## 2022-02-03 DIAGNOSIS — F41.1 GAD (GENERALIZED ANXIETY DISORDER): ICD-10-CM

## 2022-02-03 DIAGNOSIS — F33.2 MAJOR DEPRESSIVE DISORDER, RECURRENT, SEVERE W/O PSYCHOTIC BEHAVIOR (HCC): Primary | ICD-10-CM

## 2022-02-03 PROCEDURE — 90834 PSYTX W PT 45 MINUTES: CPT | Performed by: SOCIAL WORKER

## 2022-02-03 NOTE — PSYCH
Virtual Regular Visit    Verification of patient location:    Patient is located in the following state in which I hold an active license [place]      Assessment/Plan:    Problem List Items Addressed This Visit        Other    JG (generalized anxiety disorder)    Major depressive disorder, recurrent, severe w/o psychotic behavior ([facility] [address] ) - Primary          Goals addressed in session: Goal 1 and Goal 2          Reason for visit is   Chief Complaint   Patient presents with    Virtual Regular Visit        Encounter provider David Deluca    Provider located at [address] [place] [id number] [phone]      Recent Visits  No visits were found meeting these conditions  Showing recent visits within past 7 days and meeting all other requirements  Future Appointments  No visits were found meeting these conditions  Showing future appointments within next 150 days and meeting all other requirements       The patient was identified by name and date of birth  Adalberto Soto was informed that this is a telemedicine visit and that the visit is being conducted through[employer] Embedded and patient was informed this is a secure, HIPAA-complaint platform  She agrees to proceed     My office door was closed  No one else was in the room  She acknowledged consent and understanding of privacy and security of the video platform  The patient has agreed to participate and understands they can discontinue the visit at any time  Patient is aware this is a billable service  Subjective  Adalberto Soto is a 39 y o  female          HPI     Past Medical History:   Diagnosis Date    Anxiety     Back pain     Bipolar disorder ([facility] [address] )     Carpal tunnel syndrome on right     Contact lens overwear of both eyes     Cubital tunnel syndrome on right     Depression     GERD (gastroesophageal reflux disease)     Headache     Hypothyroidism  Kidney stones     Lupus (systemic lupus erythematosus) (HCC)     Nausea     PONV (postoperative nausea and vomiting)     Wears glasses        Past Surgical History:   Procedure Laterality Date    HI CYSTOURETHROSCOPY N/A 2/3/2020    Procedure: CYSTOSCOPY;  Surgeon: Chaparro Powell MD;  [facility];  Service: UroGynecology           HI LAP,CHOLECYSTECTOMY N/A 9/6/2018    Procedure: CHOLECYSTECTOMY LAPAROSCOPIC W/ ROBOTICS;  Surgeon: Carol Ann Mccord MD;  [facility];  Service: General    HI POST COLPORRHAPHY,RECTUM/VAGINA N/A 2/3/2020    Procedure: POSTERIOR COLPORRHAPHY;  Surgeon: Chaparro Powell MD;  [facility];  Service: UroGynecology           HI REVISE MEDIAN N/CARPAL TUNNEL SURG Right 11/27/2020    Procedure: release CARPAL TUNNEL;  Surgeon: Dirk Garcia MD;  [facility];  Service: Orthopedics    HI REVISE ULNAR NERVE AT ELBOW Right 11/27/2020    Procedure: Chrissy Furl;  Surgeon:  Dirk Garcia MD;  [facility];  Service: Orthopedics    HI SLING OPER STRES INCONTINENCE N/A 2/3/2020    Procedure: PUBOVAGINAL SLING;  Surgeon: Chaparro Powell MD;  [facility];  Service: UroGynecology           TUBAL LIGATION         Current Outpatient Medications   Medication Sig Dispense Refill    acetaminophen (TYLENOL) 325 mg tablet Take 2 tablets (650 mg total) by mouth every 6 (six) hours as needed for mild pain, moderate pain, headaches or fever 30 tablet 0    clonazePAM (KlonoPIN) 0 5 mg tablet TAKE 1/2 TO 1 TABLET BY MOUTH 2 TIMES A DAY AS NEEDED FOR ANXIETY 60 tablet 2    dicyclomine (BENTYL) 10 mg capsule Take 1 capsule (10 mg total) by mouth 4 (four) times a day (before meals and at bedtime) 120 capsule 5    ergocalciferol (VITAMIN D2) 50,000 units Take 1 capsule (50,000 Units total) by mouth 2 (two) times a week with meals 24 capsule 0    gabapentin (NEURONTIN) 100 mg capsule Take 1 capsule (100 mg total) by mouth 3 (three) times a day (Patient taking differently: Take 100 mg by mouth as needed  ) 90 capsule 3    hydroxychloroquine (PLAQUENIL) 200 mg tablet Take 2 tablets (400 mg total) by mouth daily 180 tablet 1    ibuprofen (MOTRIN) 600 mg tablet Take 1 tablet (600 mg total) by mouth every 6 (six) hours as needed for moderate pain 120 tablet 3    levothyroxine 25 mcg tablet TAKE 1 TABLET (25 MCG TOTAL) BY MOUTH DAILY IN THE EARLY MORNING 90 tablet 1    modafinil (PROVIGIL) 200 MG tablet Take 1 tablet (200 mg total) by mouth daily 30 tablet 2    Vortioxetine HBr (Trintellix) 20 MG tablet Take 1 tablet (20 mg total) by mouth daily 30 tablet 2     No current facility-administered medications for this visit  Allergies   Allergen Reactions    Penicillins Hives     At young age       Review of Systems    Video Exam    There were no vitals filed for this visit  Physical Exam     I spent 45 minutes directly with the patient during this visit     Psychotherapy Provided: Individual Psychotherapy 45 minutes     Length of time in session: 3:15 pm to 4:00 pm, follow up in 2 week    Encounter Diagnosis     ICD-10-CM    1  Major depressive disorder, recurrent, severe w/o psychotic behavior ([facility] [address] )  F33 2    2  JG (generalized anxiety disorder)  F41 1        Goals addressed in session: Goal 1 and Goal 2     Pain:      moderate to severe    7    Current suicide risk : Low     Therapist met with Desire via [employer]  She shared that she continued to work overtime at a significant rate  She also noted a significant decrease in her self worth  Therapist encouraged time to herself and improvements in her focus on her future and desires  She expressed her feelings and thoughts openly through this session  Therapist will continue to explore these feelings with her through this review       Behavioral Health Treatment Plan [facility]: Diagnosis and Treatment Plan explained to Marshall Nash relates understanding diagnosis and is agreeable to Treatment Plan  Yes     VIRTUAL VISIT DISCLAIMER    Gina Garcia verbally agrees to participate in [employer]  Pt is aware that [employer] could be limited without vital signs or the ability to perform a full hands-on physical Addyrenata Esparza understands she or the provider may request at any time to terminate the video visit and request the patient to seek care or treatment in person

## 2022-02-09 ENCOUNTER — OFFICE VISIT (OUTPATIENT)
Dept: PSYCHIATRY | Facility: CLINIC | Age: 37
End: 2022-02-09
Payer: COMMERCIAL

## 2022-02-09 VITALS — WEIGHT: 230 LBS | BODY MASS INDEX: 38.27 KG/M2

## 2022-02-09 DIAGNOSIS — E03.9 ADULT HYPOTHYROIDISM: ICD-10-CM

## 2022-02-09 DIAGNOSIS — F40.10 SOCIAL PHOBIA: ICD-10-CM

## 2022-02-09 DIAGNOSIS — F41.1 GAD (GENERALIZED ANXIETY DISORDER): ICD-10-CM

## 2022-02-09 DIAGNOSIS — F51.13 HYPERSOMNIA DUE TO MENTAL DISORDER: ICD-10-CM

## 2022-02-09 DIAGNOSIS — E55.9 VITAMIN D DEFICIENCY: ICD-10-CM

## 2022-02-09 DIAGNOSIS — F33.2 MAJOR DEPRESSIVE DISORDER, RECURRENT, SEVERE W/O PSYCHOTIC BEHAVIOR (HCC): Primary | ICD-10-CM

## 2022-02-09 PROCEDURE — 99214 OFFICE O/P EST MOD 30 MIN: CPT | Performed by: PSYCHIATRY & NEUROLOGY

## 2022-02-09 PROCEDURE — 90833 PSYTX W PT W E/M 30 MIN: CPT | Performed by: PSYCHIATRY & NEUROLOGY

## 2022-02-09 RX ORDER — MODAFINIL 200 MG/1
200 TABLET ORAL DAILY
Qty: 30 TABLET | Refills: 2 | Status: SHIPPED | OUTPATIENT
Start: 2022-02-15 | End: 2022-04-13 | Stop reason: SDUPTHER

## 2022-02-09 RX ORDER — CLONAZEPAM 0.5 MG/1
TABLET ORAL
Qty: 60 TABLET | Refills: 2 | Status: SHIPPED | OUTPATIENT
Start: 2022-02-09 | End: 2022-04-13 | Stop reason: SDUPTHER
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
## 2022-02-10 ENCOUNTER — APPOINTMENT (OUTPATIENT)
Dept: LAB | Facility: HOSPITAL | Age: 37
End: 2022-02-10
Attending: PSYCHIATRY & NEUROLOGY
Payer: COMMERCIAL

## 2022-02-10 ENCOUNTER — OFFICE VISIT (OUTPATIENT)
Dept: BARIATRICS | Facility: CLINIC | Age: 37
End: 2022-02-10

## 2022-02-10 VITALS — BODY MASS INDEX: 38.09 KG/M2 | WEIGHT: 228.9 LBS

## 2022-02-10 DIAGNOSIS — E03.9 ADULT HYPOTHYROIDISM: ICD-10-CM

## 2022-02-10 DIAGNOSIS — E66.9 OBESITY, CLASS I, BMI 30-34.9: Primary | ICD-10-CM

## 2022-02-10 DIAGNOSIS — E55.9 VITAMIN D DEFICIENCY: ICD-10-CM

## 2022-02-10 LAB
25(OH)D3 SERPL-MCNC: 52.4 NG/ML (ref 30–100)
TSH SERPL DL<=0.05 MIU/L-ACNC: 1.52 UIU/ML (ref 0.36–3.74)

## 2022-02-10 PROCEDURE — 84443 ASSAY THYROID STIM HORMONE: CPT

## 2022-02-10 PROCEDURE — RECHECK

## 2022-02-10 PROCEDURE — 36415 COLL VENOUS BLD VENIPUNCTURE: CPT

## 2022-02-10 PROCEDURE — 82306 VITAMIN D 25 HYDROXY: CPT

## 2022-02-10 NOTE — PROGRESS NOTES
Behavioral Health Follow Up Note:      No required Weight Check: Dr Gila Lyle    Starting weight 229 8 #  Today's weight 228 9 #  Goal:  218#    Mental health and wellness -  Anxious for surgery  In the program since November  Will get back to tracking her food  Discouraged with the lack of weight loss  Eating behaviors -  Changed her eating patterns  Reduced her coffee intake  Eating more salads and vegetables  Protein shakes  Activity -  Increased her activity  More walking  Progress toward program requirements    Workflow:  · Psych and/or D+A Clearance: n/a  · PCP Letter: 11/2/2021  · Support Group: PENDING  · Surgeon Appt  : 1/12/2022  · EGD : 3/16/2022  · Cardiac Risk Assessment: 12/17/2022  · Sleep Studies: n/a  · Bloodwork: 11/1/2022       Goals:    Would like to feel better post surgery  Would like to have more energy  Would like to have reduced pain post surgery  (lower back pain)  Will start tracking for her weight loss effort

## 2022-02-17 ENCOUNTER — TELEMEDICINE (OUTPATIENT)
Dept: BEHAVIORAL/MENTAL HEALTH CLINIC | Facility: CLINIC | Age: 37
End: 2022-02-17
Payer: COMMERCIAL

## 2022-02-17 DIAGNOSIS — F40.10 SOCIAL PHOBIA: ICD-10-CM

## 2022-02-17 DIAGNOSIS — F33.2 MAJOR DEPRESSIVE DISORDER, RECURRENT, SEVERE W/O PSYCHOTIC BEHAVIOR (HCC): Primary | ICD-10-CM

## 2022-02-17 PROCEDURE — 90834 PSYTX W PT 45 MINUTES: CPT | Performed by: SOCIAL WORKER
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
## 2022-02-28 DIAGNOSIS — E66.9 OBESITY, CLASS II, BMI 35-39.9: ICD-10-CM

## 2022-02-28 DIAGNOSIS — K29.50 CHRONIC GASTRITIS WITHOUT BLEEDING: ICD-10-CM

## 2022-02-28 DIAGNOSIS — Z15.89 HETEROZYGOUS FOR MTHFR GENE MUTATION: ICD-10-CM

## 2022-02-28 DIAGNOSIS — Z01.818 PRE-OPERATIVE CLEARANCE: Primary | ICD-10-CM

## 2022-03-01 ENCOUNTER — TELEPHONE (OUTPATIENT)
Dept: GASTROENTEROLOGY | Facility: HOSPITAL | Age: 37
End: 2022-03-01

## 2022-03-02 ENCOUNTER — APPOINTMENT (OUTPATIENT)
Dept: LAB | Facility: HOSPITAL | Age: 37
End: 2022-03-02
Attending: SURGERY
Payer: COMMERCIAL

## 2022-03-02 ENCOUNTER — ANESTHESIA EVENT (OUTPATIENT)
Dept: GASTROENTEROLOGY | Facility: HOSPITAL | Age: 37
End: 2022-03-02

## 2022-03-02 ENCOUNTER — HOSPITAL ENCOUNTER (OUTPATIENT)
Dept: GASTROENTEROLOGY | Facility: HOSPITAL | Age: 37
Setting detail: OUTPATIENT SURGERY
Discharge: HOME | End: 2022-03-02
Attending: SURGERY
Payer: COMMERCIAL

## 2022-03-02 ENCOUNTER — ANESTHESIA (OUTPATIENT)
Dept: GASTROENTEROLOGY | Facility: HOSPITAL | Age: 37
End: 2022-03-02

## 2022-03-02 VITALS
DIASTOLIC BLOOD PRESSURE: 59 MMHG | BODY MASS INDEX: 37.99 KG/M2 | TEMPERATURE: 97.3 F | OXYGEN SATURATION: 99 % | SYSTOLIC BLOOD PRESSURE: 102 MMHG | RESPIRATION RATE: 16 BRPM | WEIGHT: 228 LBS | HEART RATE: 81 BPM | HEIGHT: 65 IN

## 2022-03-02 DIAGNOSIS — Z98.84 BARIATRIC SURGERY STATUS: ICD-10-CM

## 2022-03-02 DIAGNOSIS — Z01.818 PRE-OPERATIVE CLEARANCE: ICD-10-CM

## 2022-03-02 DIAGNOSIS — Z15.89 HETEROZYGOUS FOR MTHFR GENE MUTATION: ICD-10-CM

## 2022-03-02 DIAGNOSIS — K29.50 CHRONIC GASTRITIS WITHOUT BLEEDING: ICD-10-CM

## 2022-03-02 DIAGNOSIS — E66.9 OBESITY, CLASS II, BMI 35-39.9: ICD-10-CM

## 2022-03-02 LAB
ALBUMIN SERPL BCP-MCNC: 4.3 G/DL (ref 3.5–5)
ALP SERPL-CCNC: 51 U/L (ref 46–116)
ALT SERPL W P-5'-P-CCNC: 29 U/L (ref 12–78)
ANION GAP SERPL CALCULATED.3IONS-SCNC: 7 MMOL/L (ref 4–13)
AST SERPL W P-5'-P-CCNC: 15 U/L (ref 5–45)
BILIRUB SERPL-MCNC: 0.71 MG/DL (ref 0.2–1)
BUN SERPL-MCNC: 14 MG/DL (ref 5–25)
CALCIUM SERPL-MCNC: 9.2 MG/DL (ref 8.3–10.1)
CHLORIDE SERPL-SCNC: 103 MMOL/L (ref 100–108)
CO2 SERPL-SCNC: 30 MMOL/L (ref 21–32)
CREAT SERPL-MCNC: 0.68 MG/DL (ref 0.6–1.3)
ERYTHROCYTE [DISTWIDTH] IN BLOOD BY AUTOMATED COUNT: 13.2 % (ref 11.6–15.1)
EXT PREGNANCY TEST URINE: NEGATIVE
EXT. CONTROL: NORMAL
GFR SERPL CREATININE-BSD FRML MDRD: 112 ML/MIN/1.73SQ M
GLUCOSE SERPL-MCNC: 85 MG/DL (ref 65–140)
HCT VFR BLD AUTO: 39.2 % (ref 34.8–46.1)
HGB BLD-MCNC: 13.3 G/DL (ref 11.5–15.4)
MCH RBC QN AUTO: 31 PG (ref 26.8–34.3)
MCHC RBC AUTO-ENTMCNC: 33.9 G/DL (ref 31.4–37.4)
MCV RBC AUTO: 91 FL (ref 82–98)
PLATELET # BLD AUTO: 237 THOUSANDS/UL (ref 149–390)
PMV BLD AUTO: 9.7 FL (ref 8.9–12.7)
POTASSIUM SERPL-SCNC: 3.9 MMOL/L (ref 3.5–5.3)
PROT SERPL-MCNC: 7.8 G/DL (ref 6.4–8.2)
RBC # BLD AUTO: 4.29 MILLION/UL (ref 3.81–5.12)
SODIUM SERPL-SCNC: 140 MMOL/L (ref 136–145)
WBC # BLD AUTO: 6.86 THOUSAND/UL (ref 4.31–10.16)

## 2022-03-02 PROCEDURE — 80053 COMPREHEN METABOLIC PANEL: CPT

## 2022-03-02 PROCEDURE — 36415 COLL VENOUS BLD VENIPUNCTURE: CPT

## 2022-03-02 PROCEDURE — 88305 TISSUE EXAM BY PATHOLOGIST: CPT | Performed by: PATHOLOGY

## 2022-03-02 PROCEDURE — 43239 EGD BIOPSY SINGLE/MULTIPLE: CPT | Performed by: SURGERY

## 2022-03-02 PROCEDURE — 81025 URINE PREGNANCY TEST: CPT | Performed by: ANESTHESIOLOGY

## 2022-03-02 PROCEDURE — 85027 COMPLETE CBC AUTOMATED: CPT

## 2022-03-02 RX ORDER — SODIUM CHLORIDE 9 MG/ML
125 INJECTION, SOLUTION INTRAVENOUS CONTINUOUS
Status: DISCONTINUED | OUTPATIENT
Start: 2022-03-02 | End: 2022-03-06 | Stop reason: HOSPADM

## 2022-03-02 RX ORDER — LIDOCAINE HYDROCHLORIDE 20 MG/ML
INJECTION, SOLUTION EPIDURAL; INFILTRATION; INTRACAUDAL; PERINEURAL AS NEEDED
Status: DISCONTINUED | OUTPATIENT
Start: 2022-03-02 | End: 2022-03-02

## 2022-03-02 RX ORDER — PROPOFOL 10 MG/ML
INJECTION, EMULSION INTRAVENOUS AS NEEDED
Status: DISCONTINUED | OUTPATIENT
Start: 2022-03-02 | End: 2022-03-02

## 2022-03-02 RX ADMIN — SODIUM CHLORIDE: 0.9 INJECTION, SOLUTION INTRAVENOUS at 10:10

## 2022-03-02 RX ADMIN — LIDOCAINE HYDROCHLORIDE 100 MG: 20 INJECTION, SOLUTION EPIDURAL; INFILTRATION; INTRACAUDAL; PERINEURAL at 10:17

## 2022-03-02 RX ADMIN — PROPOFOL 160 MG: 10 INJECTION, EMULSION INTRAVENOUS at 10:17

## 2022-03-02 NOTE — ANESTHESIA PREPROCEDURE EVALUATION
Procedure:  EGD    Relevant Problems   MUSCULOSKELETAL   (+) Right-sided low back pain with right-sided sciatica      NEURO/PSYCH   (+) Depression with anxiety   (+) JG (generalized anxiety disorder)   (+) Major depressive disorder, recurrent, severe w/o psychotic behavior (HCC)   (+) Pain in surgical scar   (+) Social phobia      PULMONARY   (+) CARLOS ENRIQUE (obstructive sleep apnea)      Other   (+) Lupus (HCC)   (+) Severe obesity (HCC)        Physical Exam    Airway    Mallampati score: II  TM Distance: >3 FB       Dental       Cardiovascular  Rhythm: regular, Rate: normal,     Pulmonary  Breath sounds clear to auscultation,     Other Findings        Anesthesia Plan  ASA Score- 3     Anesthesia Type- IV sedation with anesthesia with ASA Monitors  Additional Monitors:   Airway Plan:           Plan Factors-Exercise tolerance (METS): >4 METS  Chart reviewed  Existing labs reviewed  Patient summary reviewed  Patient is not a current smoker  Induction- intravenous  Postoperative Plan-     Informed Consent- Anesthetic plan and risks discussed with patient

## 2022-03-02 NOTE — H&P
H&P EXAM - Outpatient Endoscopy  [facility] GI LAB INTRA   Soledad Boor 40 y o  female MRN: [id number]  Unit/Bed#:  Encounter: [id number]        Impression: Morbid obesity    Plan:Upper endoscopy and a biopsy to rule out H  Pylori    Chief Complaint: Morbid obesity and preoperative endoscopy    Physical Exam: Normal not in acute distress   Chest: Clear to auscultation   Heart: Normal S1 and S2

## 2022-03-02 NOTE — ANESTHESIA POSTPROCEDURE EVALUATION
Post-Op Assessment Note    CV Status:  Stable    Pain management: adequate     Mental Status:  Alert   PONV Controlled:  None   Airway Patency:  Patent      Post Op Vitals Reviewed: Yes      Staff: Anesthesiologist       Blood pressure 102/59, pulse 81, temperature (!) 97 3 °F (36 3 °C), temperature source Temporal, resp  rate 16, height 5' 5" (1 651 m), weight 103 kg (228 lb), SpO2 99 %, not currently breastfeeding  No complications documented      BP      Temp     Pulse     Resp      SpO2

## 2022-03-03 ENCOUNTER — TELEPHONE (OUTPATIENT)
Dept: PSYCHIATRY | Facility: CLINIC | Age: 37
End: 2022-03-03

## 2022-03-15 PROCEDURE — G0476 HPV COMBO ASSAY CA SCREEN: HCPCS | Performed by: OBSTETRICS & GYNECOLOGY

## 2022-03-15 PROCEDURE — G0145 SCR C/V CYTO,THINLAYER,RESCR: HCPCS | Performed by: OBSTETRICS & GYNECOLOGY

## 2022-03-16 ENCOUNTER — TELEPHONE (OUTPATIENT)
Dept: BARIATRICS | Facility: CLINIC | Age: 37
End: 2022-03-16

## 2022-03-16 ENCOUNTER — LAB REQUISITION (OUTPATIENT)
Dept: LAB | Facility: HOSPITAL | Age: 37
End: 2022-03-16
Payer: COMMERCIAL

## 2022-03-16 ENCOUNTER — PREP FOR PROCEDURE (OUTPATIENT)
Dept: BARIATRICS | Facility: CLINIC | Age: 37
End: 2022-03-16

## 2022-03-16 DIAGNOSIS — G47.33 OSA ON CPAP: ICD-10-CM

## 2022-03-16 DIAGNOSIS — Z99.89 OSA ON CPAP: ICD-10-CM

## 2022-03-16 DIAGNOSIS — Z12.4 ENCOUNTER FOR SCREENING FOR MALIGNANT NEOPLASM OF CERVIX: ICD-10-CM

## 2022-03-16 DIAGNOSIS — E66.01 MORBID OBESITY (HCC): Primary | ICD-10-CM

## 2022-03-17 ENCOUNTER — SOCIAL WORK (OUTPATIENT)
Dept: BEHAVIORAL/MENTAL HEALTH CLINIC | Facility: CLINIC | Age: 37
End: 2022-03-17
Payer: COMMERCIAL

## 2022-03-17 DIAGNOSIS — F41.1 GAD (GENERALIZED ANXIETY DISORDER): ICD-10-CM

## 2022-03-17 DIAGNOSIS — F33.2 MAJOR DEPRESSIVE DISORDER, RECURRENT, SEVERE W/O PSYCHOTIC BEHAVIOR (HCC): Primary | ICD-10-CM

## 2022-03-17 PROCEDURE — 90834 PSYTX W PT 45 MINUTES: CPT | Performed by: SOCIAL WORKER

## 2022-03-17 NOTE — PSYCH
Psychotherapy Provided: Individual Psychotherapy 55 minutes     Length of time in session: 4:00 pm to 4:55 pm, follow up in 2 week    Encounter Diagnosis     ICD-10-CM    1  Major depressive disorder, recurrent, severe w/o psychotic behavior ([facility] [address] )  F33 2    2  JG (generalized anxiety disorder)  F41 1        Goals addressed in session: Goal 1 and Goal 2     Pain:      moderate to severe    8    Current suicide risk : Low     Therapist met with Dalila Fernández and therapist discussed her upcoming weight loss surgery and how to prepare for this  She also shared that she was feeling more prepared to stand on her own without her boyfriend  Therapist explored this with her and encouraged her to do so  Therapist provided encouragement and support to her through this session  Behavioral Health Treatment Plan [facility]: Diagnosis and Treatment Plan explained to Eddie Rosales relates understanding diagnosis and is agreeable to Treatment Plan   Yes

## 2022-03-18 LAB
HPV HR 12 DNA CVX QL NAA+PROBE: NEGATIVE
HPV16 DNA CVX QL NAA+PROBE: NEGATIVE
HPV18 DNA CVX QL NAA+PROBE: NEGATIVE

## 2022-03-23 LAB
LAB AP GYN PRIMARY INTERPRETATION: NORMAL
Lab: NORMAL

## 2022-03-31 ENCOUNTER — SOCIAL WORK (OUTPATIENT)
Dept: BEHAVIORAL/MENTAL HEALTH CLINIC | Facility: CLINIC | Age: 37
End: 2022-03-31
Payer: COMMERCIAL

## 2022-03-31 DIAGNOSIS — F33.2 MAJOR DEPRESSIVE DISORDER, RECURRENT, SEVERE W/O PSYCHOTIC BEHAVIOR (HCC): Primary | ICD-10-CM

## 2022-03-31 DIAGNOSIS — F41.1 GAD (GENERALIZED ANXIETY DISORDER): ICD-10-CM

## 2022-03-31 PROCEDURE — 90834 PSYTX W PT 45 MINUTES: CPT | Performed by: SOCIAL WORKER

## 2022-03-31 NOTE — PSYCH
Psychotherapy Provided: Individual Psychotherapy 55 minutes     Length of time in session: 4:00 pm to 4:55 pm, follow up in 2 week    Encounter Diagnosis     ICD-10-CM    1  Major depressive disorder, recurrent, severe w/o psychotic behavior ([address] )  F33 2    2  JG (generalized anxiety disorder)  F41 1        Goals addressed in session: Goal 1 and Goal 2     Pain:      moderate    6    Current suicide risk : Low     Therapist met with Vanessa Lovell  She discussed her feelings regarding recent events that have occurred at home  She noted that she has continued to have difficulties with her boyfriend and feels that they are roommates and less in a relationships  She noted that she no longer wants to lend him money and is less trusting that he will help her with things in the house  She noted that she feels stronger about her ability to care for herself and her family  Therapist will continue to explore this with her  Therapist also discussed her upcoming weight loss surgery and anxiety related to this process  Behavioral Health Treatment Plan [facility]: Diagnosis and Treatment Plan explained to Lizzy Swift relates understanding diagnosis and is agreeable to Treatment Plan   Yes

## 2022-04-05 RX ORDER — FOLIC ACID 1 MG/1
800 TABLET ORAL DAILY
COMMUNITY

## 2022-04-05 NOTE — PRE-PROCEDURE INSTRUCTIONS
Pre-Surgery Instructions:   Medication Instructions    acetaminophen (TYLENOL) 325 mg tablet Hold DOS    clonazePAM (KlonoPIN) 0 5 mg tablet May take DOS    ergocalciferol (VITAMIN D2) 50,000 units Hold 7 days prior to surgery    folic acid (FOLVITE) 1 mg tablet Hold 7 days prior to surgery    hydroxychloroquine (PLAQUENIL) 200 mg tablet Take DOS    levothyroxine 25 mcg tablet Take DOS    modafinil (PROVIGIL) 200 MG tablet Hold DOS    Vortioxetine HBr (Trintellix) 20 MG tablet Take DOS          NPO after midnight, meds in am with sips of water  Understands when to expect preop phone call  Remove all jewelry  Advised of visitation policy  Pre op drinks as directed by surgeon  Before your operation, you play an important role in decreasing your risk for infection by washing with special antiseptic soap  This is an effective way to reduce bacteria on the skin which may help to prevent infections at the surgical site  Please read the following directions in advance  1  In the week before your operation purchase a 4 ounce bottle of antiseptic soap containing chlorhexidine gluconate 4%  Some brand names include: Aplicare, Endure, and Hibiclens  The cost is usually less than $5 00  · For your convenience, the [address] [place] [address] carries the soap  · It may also be available at your doctor's office or pre-admission testing center, and at most retail pharmacies  · If you are allergic or sensitive to soaps containing chlorhexidine gluconate (CHG), please let your doctor know so another antiseptic soap can be suggested  · CHG antiseptic soap is for external use only  2      The day before your operation follow these directions carefully to get ready  · Place clean lines (sheets) on your bed; you should sleep on clean sheets after your evening shower    · Get clean towels and washcloths ready - you need enough for 2 showers  · Set aside clean underwear, pajamas, and clothing to wear after the shower  Reminders:  · DO NOT use any other soap or body rinse on your skin during or after the antiseptic showers  · DO NOT use lotion , powder, deodorant, or perfume/aftershave of any kind on your skin after your antiseptic shower  · DO NOT shave any body parts in the 24 hours/the day before your operation  · DO NOT get the antiseptic soap in your eyes, ears, nose, mouth, or vaginal area  3      You will need to shower the night before AND the morning of your Surgery  Shower 1:  · The evening before your operation, take the fist shower  · First, shampoo your hair with regular shampoo and rinse it completely before you use the anitseptic soap  After washing and rinsing your hair, rinse your body  · Next, use a clean wash cloth to apply the antiseptic soap and wash your body from the neck down to your toes using 1/2 bottle of the antiseptic soap  You will use the other 1/2 bottle for the second shower  · Clean the area where your incision will be; later this area well for about 2 minutes  · If you ar having head or neck surgery, wash areas with the antiseptic soap  · Rinse yourself completely with running water  · Use a clean towel to dry off  · Wear clean underwear and clothing/pajamas  Shower 2:  · The Morning of your operation, take the second shower following the same steps as Shower 1 using the second 1/2 of the bottle of antiseptic soap  · Use clean cloths and towels to was and dry yourself off  · Wear clean underwear and clothing

## 2022-04-07 ENCOUNTER — OFFICE VISIT (OUTPATIENT)
Dept: BARIATRICS | Facility: CLINIC | Age: 37
End: 2022-04-07
Payer: COMMERCIAL

## 2022-04-07 ENCOUNTER — CLINICAL SUPPORT (OUTPATIENT)
Dept: BARIATRICS | Facility: CLINIC | Age: 37
End: 2022-04-07

## 2022-04-07 VITALS
BODY MASS INDEX: 34.87 KG/M2 | SYSTOLIC BLOOD PRESSURE: 100 MMHG | DIASTOLIC BLOOD PRESSURE: 64 MMHG | HEIGHT: 66 IN | HEART RATE: 66 BPM | WEIGHT: 217 LBS | TEMPERATURE: 98.3 F

## 2022-04-07 DIAGNOSIS — E66.01 MORBID (SEVERE) OBESITY DUE TO EXCESS CALORIES (HCC): Primary | ICD-10-CM

## 2022-04-07 DIAGNOSIS — E66.9 OBESITY, CLASS II, BMI 35-39.9: Primary | ICD-10-CM

## 2022-04-07 DIAGNOSIS — Z98.84 BARIATRIC SURGERY STATUS: ICD-10-CM

## 2022-04-07 PROCEDURE — RECHECK: Performed by: DIETITIAN, REGISTERED

## 2022-04-07 PROCEDURE — 99213 OFFICE O/P EST LOW 20 MIN: CPT | Performed by: SURGERY

## 2022-04-07 RX ORDER — GABAPENTIN 300 MG/1
300 CAPSULE ORAL ONCE
Status: CANCELLED | OUTPATIENT
Start: 2022-04-19 | End: 2022-04-07

## 2022-04-07 RX ORDER — CELECOXIB 200 MG/1
200 CAPSULE ORAL ONCE
Status: CANCELLED | OUTPATIENT
Start: 2022-04-19 | End: 2022-04-07

## 2022-04-07 RX ORDER — LEVOFLOXACIN 5 MG/ML
750 INJECTION, SOLUTION INTRAVENOUS ONCE
Status: CANCELLED | OUTPATIENT
Start: 2022-04-19 | End: 2022-04-07

## 2022-04-07 RX ORDER — SCOLOPAMINE TRANSDERMAL SYSTEM 1 MG/1
1 PATCH, EXTENDED RELEASE TRANSDERMAL ONCE
Status: CANCELLED | OUTPATIENT
Start: 2022-04-19 | End: 2022-04-07

## 2022-04-07 RX ORDER — HEPARIN SODIUM 5000 [USP'U]/ML
5000 INJECTION, SOLUTION INTRAVENOUS; SUBCUTANEOUS
Status: CANCELLED | OUTPATIENT
Start: 2022-04-19 | End: 2022-04-20

## 2022-04-07 RX ORDER — ACETAMINOPHEN 325 MG/1
975 TABLET ORAL ONCE
Status: CANCELLED | OUTPATIENT
Start: 2022-04-19 | End: 2022-04-07

## 2022-04-07 NOTE — H&P (VIEW-ONLY)
BARIATRIC HISTORY AND PHYSICAL - BARIATRIC SURGERY  Elan Abarca 40 y o  female MRN: [id number]  Unit/Bed#:  Encounter: [id number]      HPI:  Elan Abarca is a 40 y o  female who presents to review their preoperative workup and see if they are a good candidate to undergo a bariatric procedure  Review of Systems   Constitutional: Negative for chills and fever  HENT: Negative for ear pain and sore throat  Eyes: Negative for pain and visual disturbance  Respiratory: Negative for cough and shortness of breath  Cardiovascular: Negative for chest pain and palpitations  Gastrointestinal: Negative for abdominal pain and vomiting  Genitourinary: Negative for dysuria and hematuria  Musculoskeletal: Negative for arthralgias and back pain  Skin: Negative for color change and rash  Neurological: Negative for seizures and syncope  All other systems reviewed and are negative        Historical Information   Past Medical History:   Diagnosis Date    Anxiety     Back pain     Bipolar disorder ([address] )     Carpal tunnel syndrome on right     Contact lens overwear of both eyes     CPAP (continuous positive airway pressure) dependence     Cubital tunnel syndrome on right     Depression     Disease of thyroid gland     GERD (gastroesophageal reflux disease)     Headache     Hypothyroidism     Kidney stone     Kidney stones     Lupus (systemic lupus erythematosus) (HCC)     Nausea     PONV (postoperative nausea and vomiting)     Sleep apnea     Wears glasses      Past Surgical History:   Procedure Laterality Date    CARPAL TUNNEL RELEASE      CHOLECYSTECTOMY      AR CYSTOURETHROSCOPY N/A 2/3/2020    Procedure: CYSTOSCOPY;  Surgeon: Tracy Carrel, MD;  [facility];  Service: UroGynecology           AR LAP,CHOLECYSTECTOMY N/A 9/6/2018    Procedure: CHOLECYSTECTOMY LAPAROSCOPIC W/ ROBOTICS;  Surgeon: Anny Steele MD;  [facility];  Service: General    AR POST COLPORRHAPHY,RECTUM/VAGINA N/A 2/3/2020    Procedure: POSTERIOR COLPORRHAPHY;  Surgeon: Maksim Simons MD;  [facility];  Service: UroGynecology           NC REVISE MEDIAN N/CARPAL TUNNEL SURG Right 11/27/2020    Procedure: release CARPAL TUNNEL;  Surgeon: Evelio Doyle MD;  [facility];  Service: Orthopedics    NC REVISE ULNAR NERVE AT ELBOW Right 11/27/2020    Procedure: Juanda Rear;  Surgeon: Evelio Doyle MD;  [facility];  Service: Orthopedics    NC SLING OPER STRES INCONTINENCE N/A 2/3/2020    Procedure: PUBOVAGINAL SLING;  Surgeon: Maksim Simons MD;  [facility];  Service: UroGynecology           TUBAL LIGATION       Social History   Social History     Substance and Sexual Activity   Alcohol Use Not Currently     Social History     Substance and Sexual Activity   Drug Use No     Social History     Tobacco Use   Smoking Status Never Smoker   Smokeless Tobacco Never Used     Family History:   Family History   Problem Relation Age of Onset    No Known Problems Mother     Alcohol abuse Father     Drug abuse Family     Psoriasis Maternal Grandmother     Colon cancer Maternal Uncle     Hypertension Maternal Grandfather     Heart disease Maternal Grandfather     Diabetes Neg Hx        Meds/Allergies   all medications and allergies reviewed  Allergies   Allergen Reactions    Penicillins Hives     At young age       Objective     Current Vitals:   Blood Pressure: 100/64 (04/07/22 1337)  Pulse: 66 (04/07/22 1337)  Temperature: 98 3 °F (36 8 °C) (04/07/22 1337)  Temp Source: Tympanic (04/07/22 1337)  Height: 5' 5 5" (166 4 cm) (04/07/22 1337)  Weight - Scale: 98 4 kg (217 lb) (04/07/22 1337)  bowel movement  [unfilled]    Invasive Devices  Report    None                 Physical Exam  Constitutional:       Appearance: Normal appearance  She is obese  HENT:      Head: Normocephalic and atraumatic        Nose: Nose normal       Mouth/Throat:      Mouth: Mucous membranes are moist    Eyes:      Extraocular Movements: Extraocular movements intact  Pupils: Pupils are equal, round, and reactive to light  Cardiovascular:      Rate and Rhythm: Normal rate and regular rhythm  Pulses: Normal pulses  Heart sounds: Normal heart sounds  Pulmonary:      Effort: Pulmonary effort is normal       Breath sounds: Normal breath sounds  Abdominal:      Palpations: Abdomen is soft  Comments: Incisions healing well with no signs of infection or drainage   Musculoskeletal:      Cervical back: Normal range of motion  Skin:     General: Skin is warm  Neurological:      General: No focal deficit present  Mental Status: She is alert and oriented to person, place, and time  Psychiatric:         Mood and Affect: Mood normal          Behavior: Behavior normal          Lab Results: I have personally reviewed pertinent lab results  Imaging: I have personally reviewed pertinent reports  EKG, Pathology, and Other Studies: I have personally reviewed pertinent reports  DATE OF SERVICE:  3/02/22     PHYSICIAN(S):  Marshall Caldera MD Proceduralist         INDICATION:  Bariatric surgery status     POST-OP DIAGNOSIS:  See the impression below      PREPROCEDURE:  Informed consent was obtained for the procedure, including sedation  Risks of perforation, hemorrhage, adverse drug reaction and aspiration were discussed  The patient was placed in the left lateral decubitus position      Patient was explained about the risks and benefits of the procedure  Risks including but not limited to bleeding, infection, and perforation were explained in detail  Also explained about less than 100% sensitivity with the exam and other alternatives      DETAILS OF PROCEDURE:  Patient was taken to the procedure room where a time out was performed to confirm correct patient and correct procedure   The patient underwent monitored anesthesia care, which was administered by an anesthesia professional  The patient's blood pressure, heart rate, level of consciousness, respirations and oxygen were monitored throughout the procedure  The scope was advanced to the second part of the duodenum  Retroflexion was performed in the fundus  The patient experienced no blood loss  The procedure was not difficult  The patient tolerated the procedure well  There were no apparent complications       ANESTHESIA INFORMATION:  ASA: III  Anesthesia Type: IV Sedation with Anesthesia     MEDICATIONS:  No administrations occurring from 1011 to 1020 on 03/02/22         FINDINGS:  · The esophagus, stomach and 2nd part of the duodenum appeared normal   · Performed single biopsy to rule out H  pylori in the antrum  · Small sliding hiatal hernia (type I hiatal hernia) without Pinkie Mini lesions present        SPECIMENS:  ID Type Source Tests Collected by Time Destination   1 : bx r/o h pylori Tissue Stomach TISSUE EXAM Rozina Adame MD 3/2/2022 1018              IMPRESSION:  Small sliding hiatal hernia  Otherwise normal finding        RECOMMENDATION:  There is no recommended follow-up for this procedure       Rozina Adame MD      Final Diagnosis  A  Stomach, "Stomach biopsy, rule out H pylori," Biopsy:  - Antral mucosa with reactive gastropathy  - Negative for intestinal metaplasia  - Negative for curvilinear Helicobacter microorganisms       Code Status: [unfilled]  Advance Directive and Living Will:      Power of :    POLST:        Assessment/Plan:    The patient presented to review the preoperative workup and see if bariatric surgery is appropriate and indicated following the extensive preoperative workup and the enrollment in our weight loss program   Preoperative workup was complete  Results were reviewed with the patient including the blood work results and the endoscopy findings and the biopsy results  We also reviewed the cardiology evaluation      The patient was determined to be a good candidate for Laparoscopic RNYGB with robotic assist   ----------------------------------------------------------------------  Smoking: Never smoker   Blood thinner use: Denies  Home pain medication use and who manages it: Denies  CPAP use: Yes (If yes, sleep study obtained and reminded pt to bring CPAP to hospital)  History of blood clots: Denies  Previous abdominal surgeries: Lap blanca, tubal ligation  Cardiac clearance obtained: 12/17/21 Dr Davie Martinez   EKG performed: 12/17/21 NSR with sinus arhythmia   EGD prior to surgery: 3/2/22 Small sliding hiatal hernia, Otherwise normal finding  Screening Labs obtained (CBC, CMP): 3/2/22  H  Pylori status: Negative  Medication allergies: PCN (hives)  SLIM consult Post-Op: No  Reminded patient about 2 week pre-op liquid diet: Yes  10% body weight loss pre-operatively met/discussed: Yes  Consent signed: Yes  Pre-Op ERAS Orders placed: DONE with levoquin  -----------------------------------------------------------------------  Risks and benefits explained one more time to the patient  Alternatives to surgery and alternative forms of surgery were also explained  Post-surgical commitment and after care programs were explained  We also discussed that the [employer] robot may be available to us to use on the day of the patient procedure and that the procedure may be performed robotically  I discussed in details the advantages and disadvantages of this approach including the potential decrease in postoperative pain because of the remote center technology  I also mentioned the lack of strong evidence for the use of robot in bariatric patients and the potential disadvantage to patients because of the prolonged operative time  Consent was signed  Questions were answered and concerns were addressed  Patient will need to start the 2 week liquid diet prior to surgery  Patient wishes to proceed   As per [facility] guidelines, I had a discussion with the patient regarding their CODE STATUS in the perioperative period and the patient is level 1 or FULL CODE STATUS      Frances Hurtado PA-C  Bariatric Surgery   4/7/2022  1:54 PM

## 2022-04-07 NOTE — PROGRESS NOTES
BARIATRIC HISTORY AND PHYSICAL - BARIATRIC SURGERY  Kim Irene 40 y o  female MRN: [id number]  Unit/Bed#:  Encounter: [id number]      HPI:  Kim Irene is a 40 y o  female who presents to review their preoperative workup and see if they are a good candidate to undergo a bariatric procedure  Review of Systems   Constitutional: Negative for chills and fever  HENT: Negative for ear pain and sore throat  Eyes: Negative for pain and visual disturbance  Respiratory: Negative for cough and shortness of breath  Cardiovascular: Negative for chest pain and palpitations  Gastrointestinal: Negative for abdominal pain and vomiting  Genitourinary: Negative for dysuria and hematuria  Musculoskeletal: Negative for arthralgias and back pain  Skin: Negative for color change and rash  Neurological: Negative for seizures and syncope  All other systems reviewed and are negative        Historical Information   Past Medical History:   Diagnosis Date    Anxiety     Back pain     Bipolar disorder ([address] )     Carpal tunnel syndrome on right     Contact lens overwear of both eyes     CPAP (continuous positive airway pressure) dependence     Cubital tunnel syndrome on right     Depression     Disease of thyroid gland     GERD (gastroesophageal reflux disease)     Headache     Hypothyroidism     Kidney stone     Kidney stones     Lupus (systemic lupus erythematosus) (HCC)     Nausea     PONV (postoperative nausea and vomiting)     Sleep apnea     Wears glasses      Past Surgical History:   Procedure Laterality Date    CARPAL TUNNEL RELEASE      CHOLECYSTECTOMY      MS CYSTOURETHROSCOPY N/A 2/3/2020    Procedure: CYSTOSCOPY;  Surgeon: Durward Krabbe, MD;  [facility];  Service: UroGynecology           MS LAP,CHOLECYSTECTOMY N/A 9/6/2018    Procedure: CHOLECYSTECTOMY LAPAROSCOPIC W/ ROBOTICS;  Surgeon: Nydia Lockhart MD;  [facility];  Service: General    MS POST COLPORRHAPHY,RECTUM/VAGINA N/A 2/3/2020    Procedure: POSTERIOR COLPORRHAPHY;  Surgeon: Apoorva Mijares MD;  [facility];  Service: UroGynecology           MO REVISE MEDIAN N/CARPAL TUNNEL SURG Right 11/27/2020    Procedure: release CARPAL TUNNEL;  Surgeon: Francisco Javier Diana MD;  [facility];  Service: Orthopedics    MO REVISE ULNAR NERVE AT ELBOW Right 11/27/2020    Procedure: Houston Lease;  Surgeon: Francisco Javier Diana MD;  [facility];  Service: Orthopedics    MO SLING OPER STRES INCONTINENCE N/A 2/3/2020    Procedure: PUBOVAGINAL SLING;  Surgeon: Apoorva Mijares MD;  [facility];  Service: UroGynecology           TUBAL LIGATION       Social History   Social History     Substance and Sexual Activity   Alcohol Use Not Currently     Social History     Substance and Sexual Activity   Drug Use No     Social History     Tobacco Use   Smoking Status Never Smoker   Smokeless Tobacco Never Used     Family History:   Family History   Problem Relation Age of Onset    No Known Problems Mother     Alcohol abuse Father     Drug abuse Family     Psoriasis Maternal Grandmother     Colon cancer Maternal Uncle     Hypertension Maternal Grandfather     Heart disease Maternal Grandfather     Diabetes Neg Hx        Meds/Allergies   all medications and allergies reviewed  Allergies   Allergen Reactions    Penicillins Hives     At young age       Objective     Current Vitals:   Blood Pressure: 100/64 (04/07/22 1337)  Pulse: 66 (04/07/22 1337)  Temperature: 98 3 °F (36 8 °C) (04/07/22 1337)  Temp Source: Tympanic (04/07/22 1337)  Height: 5' 5 5" (166 4 cm) (04/07/22 1337)  Weight - Scale: 98 4 kg (217 lb) (04/07/22 1337)  bowel movement  [unfilled]    Invasive Devices  Report    None                 Physical Exam  Constitutional:       Appearance: Normal appearance  She is obese  HENT:      Head: Normocephalic and atraumatic        Nose: Nose normal       Mouth/Throat:      Mouth: Mucous membranes are moist    Eyes:      Extraocular Movements: Extraocular movements intact  Pupils: Pupils are equal, round, and reactive to light  Cardiovascular:      Rate and Rhythm: Normal rate and regular rhythm  Pulses: Normal pulses  Heart sounds: Normal heart sounds  Pulmonary:      Effort: Pulmonary effort is normal       Breath sounds: Normal breath sounds  Abdominal:      Palpations: Abdomen is soft  Comments: Incisions healing well with no signs of infection or drainage   Musculoskeletal:      Cervical back: Normal range of motion  Skin:     General: Skin is warm  Neurological:      General: No focal deficit present  Mental Status: She is alert and oriented to person, place, and time  Psychiatric:         Mood and Affect: Mood normal          Behavior: Behavior normal          Lab Results: I have personally reviewed pertinent lab results  Imaging: I have personally reviewed pertinent reports  EKG, Pathology, and Other Studies: I have personally reviewed pertinent reports  DATE OF SERVICE:  3/02/22     PHYSICIAN(S):  Sunitha Ruiz MD Proceduralist         INDICATION:  Bariatric surgery status     POST-OP DIAGNOSIS:  See the impression below      PREPROCEDURE:  Informed consent was obtained for the procedure, including sedation  Risks of perforation, hemorrhage, adverse drug reaction and aspiration were discussed  The patient was placed in the left lateral decubitus position      Patient was explained about the risks and benefits of the procedure  Risks including but not limited to bleeding, infection, and perforation were explained in detail  Also explained about less than 100% sensitivity with the exam and other alternatives      DETAILS OF PROCEDURE:  Patient was taken to the procedure room where a time out was performed to confirm correct patient and correct procedure   The patient underwent monitored anesthesia care, which was administered by an anesthesia professional  The patient's blood pressure, heart rate, level of consciousness, respirations and oxygen were monitored throughout the procedure  The scope was advanced to the second part of the duodenum  Retroflexion was performed in the fundus  The patient experienced no blood loss  The procedure was not difficult  The patient tolerated the procedure well  There were no apparent complications       ANESTHESIA INFORMATION:  ASA: III  Anesthesia Type: IV Sedation with Anesthesia     MEDICATIONS:  No administrations occurring from 1011 to 1020 on 03/02/22         FINDINGS:  · The esophagus, stomach and 2nd part of the duodenum appeared normal   · Performed single biopsy to rule out H  pylori in the antrum  · Small sliding hiatal hernia (type I hiatal hernia) without Pinkie Richard lesions present        SPECIMENS:  ID Type Source Tests Collected by Time Destination   1 : bx r/o h pylori Tissue Stomach TISSUE EXAM Daniel Mcdaniel MD 3/2/2022 1018              IMPRESSION:  Small sliding hiatal hernia  Otherwise normal finding        RECOMMENDATION:  There is no recommended follow-up for this procedure       Daniel Mcdaniel MD      Final Diagnosis  A  Stomach, "Stomach biopsy, rule out H pylori," Biopsy:  - Antral mucosa with reactive gastropathy  - Negative for intestinal metaplasia  - Negative for curvilinear Helicobacter microorganisms       Code Status: [unfilled]  Advance Directive and Living Will:      Power of :    POLST:        Assessment/Plan:    The patient presented to review the preoperative workup and see if bariatric surgery is appropriate and indicated following the extensive preoperative workup and the enrollment in our weight loss program   Preoperative workup was complete  Results were reviewed with the patient including the blood work results and the endoscopy findings and the biopsy results  We also reviewed the cardiology evaluation      The patient was determined to be a good candidate for Laparoscopic RNYGB with robotic assist   ----------------------------------------------------------------------  Smoking: Never smoker   Blood thinner use: Denies  Home pain medication use and who manages it: Denies  CPAP use: Yes (If yes, sleep study obtained and reminded pt to bring CPAP to hospital)  History of blood clots: Denies  Previous abdominal surgeries: Lap blanca, tubal ligation  Cardiac clearance obtained: 12/17/21 Dr Matt Painting   EKG performed: 12/17/21 NSR with sinus arhythmia   EGD prior to surgery: 3/2/22 Small sliding hiatal hernia, Otherwise normal finding  Screening Labs obtained (CBC, CMP): 3/2/22  H  Pylori status: Negative  Medication allergies: PCN (hives)  SLIM consult Post-Op: No  Reminded patient about 2 week pre-op liquid diet: Yes  10% body weight loss pre-operatively met/discussed: Yes  Consent signed: Yes  Pre-Op ERAS Orders placed: DONE with levoquin  -----------------------------------------------------------------------  Risks and benefits explained one more time to the patient  Alternatives to surgery and alternative forms of surgery were also explained  Post-surgical commitment and after care programs were explained  We also discussed that the [employer] robot may be available to us to use on the day of the patient procedure and that the procedure may be performed robotically  I discussed in details the advantages and disadvantages of this approach including the potential decrease in postoperative pain because of the remote center technology  I also mentioned the lack of strong evidence for the use of robot in bariatric patients and the potential disadvantage to patients because of the prolonged operative time  Consent was signed  Questions were answered and concerns were addressed  Patient will need to start the 2 week liquid diet prior to surgery  Patient wishes to proceed   As per [address] [place] [address] guidelines, I had a discussion with the patient regarding their CODE STATUS in the perioperative period and the patient is level 1 or FULL CODE STATUS      Maria A Yusuf PA-C  Bariatric Surgery   4/7/2022  1:54 PM

## 2022-04-08 DIAGNOSIS — E66.01 SEVERE OBESITY (HCC): Primary | ICD-10-CM

## 2022-04-08 NOTE — TELEPHONE ENCOUNTER
PO meds for Laparoscopic Mary Lou-En-Y Gastric Bypass on 4/19/2022 with Dr Guevara Stage     **pts chart states that she has an opioid agreement on file (10-) did not see one--after speaking to pt she verified that she was only prescribed narcotics back in November 2020 for post-op arm surgery, she has not been prescribed any since then**

## 2022-04-12 ENCOUNTER — TELEPHONE (OUTPATIENT)
Dept: PSYCHIATRY | Facility: CLINIC | Age: 37
End: 2022-04-12

## 2022-04-12 NOTE — TELEPHONE ENCOUNTER
Patient called the office to cancel same day appt for 4/12/2022 due to being at work and unable to make it for the appt   Patient is rescheduled for 4/18/2022 at 3pm

## 2022-04-13 ENCOUNTER — OFFICE VISIT (OUTPATIENT)
Dept: PSYCHIATRY | Facility: CLINIC | Age: 37
End: 2022-04-13
Payer: COMMERCIAL

## 2022-04-13 DIAGNOSIS — F33.2 MAJOR DEPRESSIVE DISORDER, RECURRENT, SEVERE W/O PSYCHOTIC BEHAVIOR (HCC): ICD-10-CM

## 2022-04-13 DIAGNOSIS — F41.1 GAD (GENERALIZED ANXIETY DISORDER): ICD-10-CM

## 2022-04-13 DIAGNOSIS — F40.10 SOCIAL PHOBIA: ICD-10-CM

## 2022-04-13 DIAGNOSIS — F51.13 HYPERSOMNIA DUE TO MENTAL DISORDER: ICD-10-CM

## 2022-04-13 PROCEDURE — 90833 PSYTX W PT W E/M 30 MIN: CPT | Performed by: PSYCHIATRY & NEUROLOGY

## 2022-04-13 PROCEDURE — 99214 OFFICE O/P EST MOD 30 MIN: CPT | Performed by: PSYCHIATRY & NEUROLOGY

## 2022-04-13 RX ORDER — MODAFINIL 200 MG/1
200 TABLET ORAL DAILY
Qty: 30 TABLET | Refills: 2 | Status: SHIPPED | OUTPATIENT
Start: 2022-04-13 | End: 2022-07-12

## 2022-04-13 RX ORDER — CLONAZEPAM 0.5 MG/1
TABLET ORAL
Qty: 60 TABLET | Refills: 2 | Status: SHIPPED | OUTPATIENT
Start: 2022-04-13 | End: 2022-08-03
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
## 2022-04-14 RX ORDER — OMEPRAZOLE 20 MG/1
20 CAPSULE, DELAYED RELEASE ORAL DAILY
Qty: 30 CAPSULE | Refills: 3 | Status: SHIPPED | OUTPATIENT
Start: 2022-04-20 | End: 2022-04-27

## 2022-04-14 RX ORDER — OXYCODONE HYDROCHLORIDE 5 MG/1
5 TABLET ORAL EVERY 4 HOURS PRN
Qty: 10 TABLET | Refills: 0 | Status: SHIPPED | OUTPATIENT
Start: 2022-04-20 | End: 2022-06-15

## 2022-04-17 ENCOUNTER — ANESTHESIA EVENT (OUTPATIENT)
Dept: PERIOP | Facility: HOSPITAL | Age: 37
DRG: 620 | End: 2022-04-17
Payer: COMMERCIAL

## 2022-04-18 ENCOUNTER — SOCIAL WORK (OUTPATIENT)
Dept: BEHAVIORAL/MENTAL HEALTH CLINIC | Facility: CLINIC | Age: 37
End: 2022-04-18
Payer: COMMERCIAL

## 2022-04-18 ENCOUNTER — TELEPHONE (OUTPATIENT)
Dept: PSYCHIATRY | Facility: CLINIC | Age: 37
End: 2022-04-18

## 2022-04-18 DIAGNOSIS — F41.1 GAD (GENERALIZED ANXIETY DISORDER): ICD-10-CM

## 2022-04-18 DIAGNOSIS — F33.2 MAJOR DEPRESSIVE DISORDER, RECURRENT, SEVERE W/O PSYCHOTIC BEHAVIOR (HCC): Primary | ICD-10-CM

## 2022-04-18 PROCEDURE — 90834 PSYTX W PT 45 MINUTES: CPT | Performed by: SOCIAL WORKER

## 2022-04-18 NOTE — TELEPHONE ENCOUNTER
Called and spoke to Patient regarding appointment scheduled 05/05 with Gloria Read Will be cancel due to Provider will be on Vacation   Notify Patient of upcoming appointment with Gloria Read

## 2022-04-18 NOTE — PSYCH
Psychotherapy Provided: Individual Psychotherapy 45 minutes     Length of time in session: 3:00 pm to 3:45 pm, follow up in 2 week    Encounter Diagnosis     ICD-10-CM    1  Major depressive disorder, recurrent, severe w/o psychotic behavior ([address] )  F33 2    2  JG (generalized anxiety disorder)  F41 1        Goals addressed in session: Goal 1 and Goal 2     Pain:      mild    5    Current suicide risk : Low     Therapist met with Sahra Sanderson  Therapist and Sahra Sanderson discussed her upcoming surgery and her relationship with her boyfriend  She shared her feelings regarding this procedure and worries related to her recovery  Therapist and Sahra Sanderson also explored her relationship and how it has progressed  She noted feelings flat in her relationship and refrains from engaging in communication with her boyfriend in order to avoid fights  Therapist provided supportive counseling and encouragement through this session  Therapist and Sahra Sanderson will continue to explore these interactions through this review period  Behavioral Health Treatment Plan [facility]: Diagnosis and Treatment Plan explained to Carissa Everett relates understanding diagnosis and is agreeable to Treatment Plan   Yes

## 2022-04-19 ENCOUNTER — ANESTHESIA (OUTPATIENT)
Dept: PERIOP | Facility: HOSPITAL | Age: 37
DRG: 620 | End: 2022-04-19
Payer: COMMERCIAL

## 2022-04-19 ENCOUNTER — HOSPITAL ENCOUNTER (INPATIENT)
Facility: HOSPITAL | Age: 37
LOS: 2 days | Discharge: HOME | DRG: 620 | End: 2022-04-21
Attending: SURGERY | Admitting: SURGERY
Payer: COMMERCIAL

## 2022-04-19 DIAGNOSIS — E66.01 MORBID OBESITY (HCC): Primary | ICD-10-CM

## 2022-04-19 LAB
EXT PREGNANCY TEST URINE: NEGATIVE
EXT. CONTROL: NORMAL

## 2022-04-19 PROCEDURE — 43644 LAP GASTRIC BYPASS/ROUX-EN-Y: CPT | Performed by: SURGERY

## 2022-04-19 PROCEDURE — C1781 MESH (IMPLANTABLE): HCPCS | Performed by: SURGERY

## 2022-04-19 PROCEDURE — 43644 LAP GASTRIC BYPASS/ROUX-EN-Y: CPT | Performed by: STUDENT IN AN ORGANIZED HEALTH CARE EDUCATION/TRAINING PROGRAM

## 2022-04-19 PROCEDURE — 81025 URINE PREGNANCY TEST: CPT | Performed by: ANESTHESIOLOGY

## 2022-04-19 PROCEDURE — C9290 INJ, BUPIVACAINE LIPOSOME: HCPCS | Performed by: PHYSICIAN ASSISTANT

## 2022-04-19 PROCEDURE — 8E0W4CZ ROBOTIC ASSISTED PROCEDURE OF TRUNK REGION, PERCUTANEOUS ENDOSCOPIC APPROACH: ICD-10-PCS | Performed by: SURGERY

## 2022-04-19 PROCEDURE — 0DJ08ZZ INSPECTION OF UPPER INTESTINAL TRACT, VIA NATURAL OR ARTIFICIAL OPENING ENDOSCOPIC: ICD-10-PCS | Performed by: SURGERY

## 2022-04-19 PROCEDURE — 0D164ZA BYPASS STOMACH TO JEJUNUM, PERCUTANEOUS ENDOSCOPIC APPROACH: ICD-10-PCS | Performed by: SURGERY

## 2022-04-19 PROCEDURE — S2900 ROBOTIC SURGICAL SYSTEM: HCPCS | Performed by: SURGERY

## 2022-04-19 DEVICE — SEAMGUARD STPL REINF INTUITIVE SUREFORM 60 GREEN: Type: IMPLANTABLE DEVICE | Site: ABDOMEN | Status: FUNCTIONAL

## 2022-04-19 RX ORDER — SODIUM CHLORIDE, SODIUM LACTATE, POTASSIUM CHLORIDE, CALCIUM CHLORIDE 600; 310; 30; 20 MG/100ML; MG/100ML; MG/100ML; MG/100ML
125 INJECTION, SOLUTION INTRAVENOUS CONTINUOUS
Status: DISCONTINUED | OUTPATIENT
Start: 2022-04-19 | End: 2022-04-21 | Stop reason: HOSPADM

## 2022-04-19 RX ORDER — SODIUM CHLORIDE 9 MG/ML
INJECTION, SOLUTION INTRAVENOUS AS NEEDED
Status: DISCONTINUED | OUTPATIENT
Start: 2022-04-19 | End: 2022-04-19 | Stop reason: HOSPADM

## 2022-04-19 RX ORDER — GABAPENTIN 300 MG/1
300 CAPSULE ORAL ONCE
Status: COMPLETED | OUTPATIENT
Start: 2022-04-19 | End: 2022-04-19

## 2022-04-19 RX ORDER — CLONAZEPAM 0.5 MG/1
0.5 TABLET ORAL 2 TIMES DAILY PRN
Status: DISCONTINUED | OUTPATIENT
Start: 2022-04-19 | End: 2022-04-21 | Stop reason: HOSPADM

## 2022-04-19 RX ORDER — OXYCODONE HCL 5 MG/5 ML
5 SOLUTION, ORAL ORAL EVERY 4 HOURS PRN
Status: DISCONTINUED | OUTPATIENT
Start: 2022-04-19 | End: 2022-04-21 | Stop reason: HOSPADM

## 2022-04-19 RX ORDER — LEVOTHYROXINE SODIUM 0.03 MG/1
25 TABLET ORAL
Status: DISCONTINUED | OUTPATIENT
Start: 2022-04-20 | End: 2022-04-21 | Stop reason: HOSPADM

## 2022-04-19 RX ORDER — ROCURONIUM BROMIDE 10 MG/ML
INJECTION, SOLUTION INTRAVENOUS AS NEEDED
Status: DISCONTINUED | OUTPATIENT
Start: 2022-04-19 | End: 2022-04-19

## 2022-04-19 RX ORDER — ONDANSETRON 2 MG/ML
INJECTION INTRAMUSCULAR; INTRAVENOUS AS NEEDED
Status: DISCONTINUED | OUTPATIENT
Start: 2022-04-19 | End: 2022-04-19

## 2022-04-19 RX ORDER — MODAFINIL 100 MG/1
200 TABLET ORAL DAILY
Status: DISCONTINUED | OUTPATIENT
Start: 2022-04-19 | End: 2022-04-21 | Stop reason: HOSPADM

## 2022-04-19 RX ORDER — SCOLOPAMINE TRANSDERMAL SYSTEM 1 MG/1
1 PATCH, EXTENDED RELEASE TRANSDERMAL ONCE
Status: DISCONTINUED | OUTPATIENT
Start: 2022-04-19 | End: 2022-04-21 | Stop reason: HOSPADM

## 2022-04-19 RX ORDER — ONDANSETRON 2 MG/ML
4 INJECTION INTRAMUSCULAR; INTRAVENOUS EVERY 6 HOURS PRN
Status: DISCONTINUED | OUTPATIENT
Start: 2022-04-19 | End: 2022-04-21 | Stop reason: HOSPADM

## 2022-04-19 RX ORDER — HEPARIN SODIUM 5000 [USP'U]/ML
5000 INJECTION, SOLUTION INTRAVENOUS; SUBCUTANEOUS
Status: COMPLETED | OUTPATIENT
Start: 2022-04-19 | End: 2022-04-19

## 2022-04-19 RX ORDER — SODIUM CHLORIDE 9 MG/ML
INJECTION INTRAVENOUS AS NEEDED
Status: DISCONTINUED | OUTPATIENT
Start: 2022-04-19 | End: 2022-04-19 | Stop reason: HOSPADM

## 2022-04-19 RX ORDER — DEXAMETHASONE SODIUM PHOSPHATE 10 MG/ML
INJECTION, SOLUTION INTRAMUSCULAR; INTRAVENOUS AS NEEDED
Status: DISCONTINUED | OUTPATIENT
Start: 2022-04-19 | End: 2022-04-19

## 2022-04-19 RX ORDER — MAGNESIUM HYDROXIDE 1200 MG/15ML
LIQUID ORAL AS NEEDED
Status: DISCONTINUED | OUTPATIENT
Start: 2022-04-19 | End: 2022-04-19 | Stop reason: HOSPADM

## 2022-04-19 RX ORDER — LIDOCAINE HYDROCHLORIDE 20 MG/ML
INJECTION, SOLUTION EPIDURAL; INFILTRATION; INTRACAUDAL; PERINEURAL AS NEEDED
Status: DISCONTINUED | OUTPATIENT
Start: 2022-04-19 | End: 2022-04-19

## 2022-04-19 RX ORDER — ONDANSETRON 2 MG/ML
4 INJECTION INTRAMUSCULAR; INTRAVENOUS ONCE AS NEEDED
Status: COMPLETED | OUTPATIENT
Start: 2022-04-19 | End: 2022-04-19

## 2022-04-19 RX ORDER — GABAPENTIN 100 MG/1
100 CAPSULE ORAL 3 TIMES DAILY
Status: DISCONTINUED | OUTPATIENT
Start: 2022-04-19 | End: 2022-04-21 | Stop reason: HOSPADM

## 2022-04-19 RX ORDER — LEVOFLOXACIN 5 MG/ML
750 INJECTION, SOLUTION INTRAVENOUS ONCE
Status: COMPLETED | OUTPATIENT
Start: 2022-04-19 | End: 2022-04-19

## 2022-04-19 RX ORDER — HYDROMORPHONE HCL/PF 1 MG/ML
0.5 SYRINGE (ML) INJECTION
Status: COMPLETED | OUTPATIENT
Start: 2022-04-19 | End: 2022-04-19

## 2022-04-19 RX ORDER — SODIUM CHLORIDE, SODIUM LACTATE, POTASSIUM CHLORIDE, CALCIUM CHLORIDE 600; 310; 30; 20 MG/100ML; MG/100ML; MG/100ML; MG/100ML
100 INJECTION, SOLUTION INTRAVENOUS CONTINUOUS
Status: DISCONTINUED | OUTPATIENT
Start: 2022-04-19 | End: 2022-04-21 | Stop reason: HOSPADM

## 2022-04-19 RX ORDER — ACETAMINOPHEN 325 MG/1
975 TABLET ORAL ONCE
Status: COMPLETED | OUTPATIENT
Start: 2022-04-19 | End: 2022-04-19

## 2022-04-19 RX ORDER — PROPOFOL 10 MG/ML
INJECTION, EMULSION INTRAVENOUS AS NEEDED
Status: DISCONTINUED | OUTPATIENT
Start: 2022-04-19 | End: 2022-04-19

## 2022-04-19 RX ORDER — FENTANYL CITRATE/PF 50 MCG/ML
25 SYRINGE (ML) INJECTION
Status: DISCONTINUED | OUTPATIENT
Start: 2022-04-19 | End: 2022-04-19 | Stop reason: HOSPADM

## 2022-04-19 RX ORDER — DIPHENHYDRAMINE HCL 25 MG
25 TABLET ORAL EVERY 8 HOURS PRN
Status: DISCONTINUED | OUTPATIENT
Start: 2022-04-19 | End: 2022-04-21 | Stop reason: HOSPADM

## 2022-04-19 RX ORDER — PROMETHAZINE HYDROCHLORIDE 25 MG/ML
25 INJECTION, SOLUTION INTRAMUSCULAR; INTRAVENOUS EVERY 6 HOURS PRN
Status: DISCONTINUED | OUTPATIENT
Start: 2022-04-19 | End: 2022-04-21 | Stop reason: HOSPADM

## 2022-04-19 RX ORDER — ACETAMINOPHEN 160 MG/5ML
975 SUSPENSION, ORAL (FINAL DOSE FORM) ORAL EVERY 8 HOURS
Status: DISCONTINUED | OUTPATIENT
Start: 2022-04-19 | End: 2022-04-21 | Stop reason: HOSPADM

## 2022-04-19 RX ORDER — MEPERIDINE HYDROCHLORIDE 25 MG/ML
12.5 INJECTION INTRAMUSCULAR; INTRAVENOUS; SUBCUTANEOUS
Status: DISCONTINUED | OUTPATIENT
Start: 2022-04-19 | End: 2022-04-19 | Stop reason: HOSPADM

## 2022-04-19 RX ORDER — OXYCODONE HCL 5 MG/5 ML
10 SOLUTION, ORAL ORAL EVERY 4 HOURS PRN
Status: DISCONTINUED | OUTPATIENT
Start: 2022-04-19 | End: 2022-04-21 | Stop reason: HOSPADM

## 2022-04-19 RX ORDER — MORPHINE SULFATE 4 MG/ML
4 INJECTION, SOLUTION INTRAMUSCULAR; INTRAVENOUS EVERY 4 HOURS PRN
Status: DISCONTINUED | OUTPATIENT
Start: 2022-04-19 | End: 2022-04-21 | Stop reason: HOSPADM

## 2022-04-19 RX ORDER — METOCLOPRAMIDE HYDROCHLORIDE 5 MG/ML
10 INJECTION INTRAMUSCULAR; INTRAVENOUS EVERY 6 HOURS PRN
Status: DISCONTINUED | OUTPATIENT
Start: 2022-04-19 | End: 2022-04-21 | Stop reason: HOSPADM

## 2022-04-19 RX ORDER — FENTANYL CITRATE 50 UG/ML
INJECTION, SOLUTION INTRAMUSCULAR; INTRAVENOUS AS NEEDED
Status: DISCONTINUED | OUTPATIENT
Start: 2022-04-19 | End: 2022-04-19

## 2022-04-19 RX ORDER — MIDAZOLAM HYDROCHLORIDE 2 MG/2ML
INJECTION, SOLUTION INTRAMUSCULAR; INTRAVENOUS AS NEEDED
Status: DISCONTINUED | OUTPATIENT
Start: 2022-04-19 | End: 2022-04-19

## 2022-04-19 RX ORDER — ACETAMINOPHEN 325 MG/1
975 TABLET ORAL EVERY 8 HOURS
Status: DISCONTINUED | OUTPATIENT
Start: 2022-04-19 | End: 2022-04-21 | Stop reason: HOSPADM

## 2022-04-19 RX ORDER — SIMETHICONE 80 MG
80 TABLET,CHEWABLE ORAL 4 TIMES DAILY PRN
Status: DISCONTINUED | OUTPATIENT
Start: 2022-04-19 | End: 2022-04-21 | Stop reason: HOSPADM

## 2022-04-19 RX ORDER — KETAMINE HYDROCHLORIDE 50 MG/ML
INJECTION, SOLUTION, CONCENTRATE INTRAMUSCULAR; INTRAVENOUS AS NEEDED
Status: DISCONTINUED | OUTPATIENT
Start: 2022-04-19 | End: 2022-04-19

## 2022-04-19 RX ORDER — CELECOXIB 200 MG/1
200 CAPSULE ORAL ONCE
Status: COMPLETED | OUTPATIENT
Start: 2022-04-19 | End: 2022-04-19

## 2022-04-19 RX ORDER — BUPIVACAINE HYDROCHLORIDE 5 MG/ML
INJECTION, SOLUTION EPIDURAL; INTRACAUDAL AS NEEDED
Status: DISCONTINUED | OUTPATIENT
Start: 2022-04-19 | End: 2022-04-19 | Stop reason: HOSPADM

## 2022-04-19 RX ADMIN — ROCURONIUM BROMIDE 70 MG: 50 INJECTION, SOLUTION INTRAVENOUS at 07:30

## 2022-04-19 RX ADMIN — SUGAMMADEX 200 MG: 100 INJECTION, SOLUTION INTRAVENOUS at 09:19

## 2022-04-19 RX ADMIN — SODIUM CHLORIDE, SODIUM LACTATE, POTASSIUM CHLORIDE, AND CALCIUM CHLORIDE: .6; .31; .03; .02 INJECTION, SOLUTION INTRAVENOUS at 09:27

## 2022-04-19 RX ADMIN — FENTANYL CITRATE 100 MCG: 50 INJECTION INTRAMUSCULAR; INTRAVENOUS at 07:30

## 2022-04-19 RX ADMIN — DEXAMETHASONE SODIUM PHOSPHATE 5 MG: 10 INJECTION INTRAMUSCULAR; INTRAVENOUS at 07:40

## 2022-04-19 RX ADMIN — ONDANSETRON 4 MG: 2 INJECTION INTRAMUSCULAR; INTRAVENOUS at 09:17

## 2022-04-19 RX ADMIN — SCOPALAMINE 1 PATCH: 1 PATCH, EXTENDED RELEASE TRANSDERMAL at 05:39

## 2022-04-19 RX ADMIN — GABAPENTIN 300 MG: 300 CAPSULE ORAL at 05:38

## 2022-04-19 RX ADMIN — SODIUM CHLORIDE, SODIUM LACTATE, POTASSIUM CHLORIDE, AND CALCIUM CHLORIDE 100 ML/HR: .6; .31; .03; .02 INJECTION, SOLUTION INTRAVENOUS at 23:36

## 2022-04-19 RX ADMIN — TRIMETHOBENZAMIDE HYDROCHLORIDE 200 MG: 100 INJECTION INTRAMUSCULAR at 11:56

## 2022-04-19 RX ADMIN — SODIUM CHLORIDE, SODIUM LACTATE, POTASSIUM CHLORIDE, AND CALCIUM CHLORIDE 125 ML/HR: .6; .31; .03; .02 INJECTION, SOLUTION INTRAVENOUS at 13:27

## 2022-04-19 RX ADMIN — PROPOFOL 40 MG: 10 INJECTION, EMULSION INTRAVENOUS at 09:19

## 2022-04-19 RX ADMIN — ONDANSETRON 4 MG: 2 INJECTION INTRAMUSCULAR; INTRAVENOUS at 18:19

## 2022-04-19 RX ADMIN — METRONIDAZOLE 500 MG: 500 INJECTION, SOLUTION INTRAVENOUS at 23:38

## 2022-04-19 RX ADMIN — FENTANYL CITRATE 50 MCG: 50 INJECTION INTRAMUSCULAR; INTRAVENOUS at 09:11

## 2022-04-19 RX ADMIN — CELECOXIB 200 MG: 200 CAPSULE ORAL at 05:38

## 2022-04-19 RX ADMIN — KETAMINE HYDROCHLORIDE 50 MG: 50 INJECTION INTRAMUSCULAR; INTRAVENOUS at 07:30

## 2022-04-19 RX ADMIN — GABAPENTIN 100 MG: 100 CAPSULE ORAL at 21:21

## 2022-04-19 RX ADMIN — LEVOFLOXACIN: 5 INJECTION, SOLUTION INTRAVENOUS at 07:19

## 2022-04-19 RX ADMIN — ACETAMINOPHEN 975 MG: 325 TABLET ORAL at 21:21

## 2022-04-19 RX ADMIN — METRONIDAZOLE 500 MG: 500 INJECTION, SOLUTION INTRAVENOUS at 07:40

## 2022-04-19 RX ADMIN — METOCLOPRAMIDE 10 MG: 5 INJECTION, SOLUTION INTRAMUSCULAR; INTRAVENOUS at 14:19

## 2022-04-19 RX ADMIN — HYDROMORPHONE HYDROCHLORIDE 0.5 MG: 1 INJECTION, SOLUTION INTRAMUSCULAR; INTRAVENOUS; SUBCUTANEOUS at 10:25

## 2022-04-19 RX ADMIN — METRONIDAZOLE 500 MG: 500 INJECTION, SOLUTION INTRAVENOUS at 15:22

## 2022-04-19 RX ADMIN — HEPARIN SODIUM 5000 UNITS: 5000 INJECTION INTRAVENOUS; SUBCUTANEOUS at 06:05

## 2022-04-19 RX ADMIN — FENTANYL CITRATE 25 MCG: 50 INJECTION INTRAMUSCULAR; INTRAVENOUS at 10:10

## 2022-04-19 RX ADMIN — HYDROMORPHONE HYDROCHLORIDE 0.5 MG: 1 INJECTION, SOLUTION INTRAMUSCULAR; INTRAVENOUS; SUBCUTANEOUS at 10:43

## 2022-04-19 RX ADMIN — HYDROMORPHONE HYDROCHLORIDE 0.5 MG: 1 INJECTION, SOLUTION INTRAMUSCULAR; INTRAVENOUS; SUBCUTANEOUS at 10:56

## 2022-04-19 RX ADMIN — ROCURONIUM BROMIDE 30 MG: 50 INJECTION, SOLUTION INTRAVENOUS at 08:35

## 2022-04-19 RX ADMIN — ONDANSETRON 4 MG: 2 INJECTION INTRAMUSCULAR; INTRAVENOUS at 10:05

## 2022-04-19 RX ADMIN — LIDOCAINE HYDROCHLORIDE 100 MG: 20 INJECTION, SOLUTION EPIDURAL; INFILTRATION; INTRACAUDAL; PERINEURAL at 07:30

## 2022-04-19 RX ADMIN — PROPOFOL 200 MG: 10 INJECTION, EMULSION INTRAVENOUS at 07:30

## 2022-04-19 RX ADMIN — FENTANYL CITRATE 25 MCG: 50 INJECTION INTRAMUSCULAR; INTRAVENOUS at 10:02

## 2022-04-19 RX ADMIN — MORPHINE SULFATE 4 MG: 4 INJECTION INTRAVENOUS at 14:19

## 2022-04-19 RX ADMIN — HYDROMORPHONE HYDROCHLORIDE 0.5 MG: 1 INJECTION, SOLUTION INTRAMUSCULAR; INTRAVENOUS; SUBCUTANEOUS at 11:16

## 2022-04-19 RX ADMIN — FAMOTIDINE 20 MG: 10 INJECTION, SOLUTION INTRAVENOUS at 21:21

## 2022-04-19 RX ADMIN — GABAPENTIN 100 MG: 100 CAPSULE ORAL at 15:21

## 2022-04-19 RX ADMIN — MIDAZOLAM 2 MG: 1 INJECTION INTRAMUSCULAR; INTRAVENOUS at 07:27

## 2022-04-19 RX ADMIN — MORPHINE SULFATE 4 MG: 4 INJECTION INTRAVENOUS at 18:19

## 2022-04-19 RX ADMIN — FENTANYL CITRATE 25 MCG: 50 INJECTION INTRAMUSCULAR; INTRAVENOUS at 10:17

## 2022-04-19 RX ADMIN — SODIUM CHLORIDE, SODIUM LACTATE, POTASSIUM CHLORIDE, AND CALCIUM CHLORIDE: .6; .31; .03; .02 INJECTION, SOLUTION INTRAVENOUS at 08:07

## 2022-04-19 RX ADMIN — SODIUM CHLORIDE, SODIUM LACTATE, POTASSIUM CHLORIDE, AND CALCIUM CHLORIDE 125 ML/HR: .6; .31; .03; .02 INJECTION, SOLUTION INTRAVENOUS at 06:12

## 2022-04-19 RX ADMIN — FENTANYL CITRATE 50 MCG: 50 INJECTION INTRAMUSCULAR; INTRAVENOUS at 07:55

## 2022-04-19 RX ADMIN — ACETAMINOPHEN 325MG 975 MG: 325 TABLET ORAL at 05:37

## 2022-04-19 NOTE — ANESTHESIA POSTPROCEDURE EVALUATION
Post-Op Assessment Note    CV Status:  Stable    Pain management: adequate     Mental Status:  Alert and awake   Hydration Status:  Euvolemic   PONV Controlled:  Controlled   Airway Patency:  Patent      Post Op Vitals Reviewed: Yes      Staff: Anesthesiologist         No complications documented    /59   Pulse 88   Temp 97 7 °F (36 5 °C) (Temporal)   Resp (!) 11   Ht 5' 5 5" (1 664 m)   Wt 98 2 kg (216 lb 7 9 oz)   LMP 04/14/2022   SpO2 97%   BMI 35 48 kg/m²

## 2022-04-19 NOTE — OP NOTE
OPERATIVE REPORT  PATIENT NAME: Shawn Johnson    :  1985  MRN: [id number]  Pt [facility]    SURGERY DATE: 2022    Surgeon(s) and Role:     * Karime Lyle MD - Primary     * Candy Curtis MD - Fellow    Preop Diagnosis:  Morbid obesity ([facility] ) [E66 01]  CARLOS ENRIQUE on CPAP [G47 33, Z99 89]    Post-Op Diagnosis Codes:     * Morbid obesity ([facility] ) [E66 01]     * CARLOS ENRIQUE on CPAP [G47 33, Z99 89]    Procedure(s) (LRB):  LAPAROSCOPIC KATLYN-EN-Y GASTRIC BYPASS & INTRAOPERATIVE EGD ROBOTICALLY ASSISTED (N/A)    Specimen(s):  * No specimens in log *    Estimated Blood Loss:   20 ml    Drains:  * No LDAs found *    Anesthesia Type:   General    Operative Indications: Morbid obesity ([facility] ) [E66 01]  CARLOS ENRIQUE on CPAP [G47 33, Z99 89]      Operative Findings:  Normal findings    Complications:   None    Procedure and Technique:  Robotic Katlyn-en-Y gastric bypass with intraop endoscopy   I was present for the entire procedure    Patient Disposition:  hemodynamically stable     No qualified resident was available  Assistant was necessary for instrument exchange and counter traction and assistance with stapling and intraop endoscopy    Indication:   Patient suffers from morbid obesity and associated co-morbidities  and failed to achieve any meaningful or sustainable weight loss and was therefore consented to undergo a laparoscopic Katlyn en Y Gastric Bypass  Risks and benefits were explained to the patient, patient consented for the procedure  The patient was brought to the operating room and placed in a supine position  The patient received a dose of IV antibiotics and a dose of subcutaneous Heparin prior to the procedure  The patient was induced under general endotracheal anesthesia  The abdominal wall was prepped and draped under sterile conditions in the usual fashion   The procedure was started by obtaining access to the abdominal cavity using a Veress needle to the left side of the midline around 6 inches from the xiphoid the abdominal cavity was insufflated with CO2 to a pressure of 15 mmHg  After that, the abdomen was entered with an 8 mm trocar using an Optiview trocar under direct visualization  At that point, an 8 and 12 mm robotic trocars were placed on the right side of the abdominal wall, under direct visualization, and another 8 mm robotic trocar and 12 mm assistant trocar were placed on the left side of the abdominal wall, also under direct visualization  A TAP block was performed using 20 ml of Exparel mixed with saline and 0 5 % Marcaine for a total of 100 ml  The patient was then placed in a reverse Trendelenburg position  A Jolanta retractor was placed through a small stab incision below the xiphoid and was used to retract the left lobe of the liver in a medial fashion, the robot was then docked and locked in place  An OG tube was placed to decompress the stomach and then removed immediately  The procedure was started by lifting the omentum in a cephalad fashion  The omentum was then split in half using the vessel sealer  The ligament of Treitz was identified and then the small bowel was transected with a 60 mm stapler using a white load  The mesentery of the small bowel was then transected using the vessel sealer,   After that, the distal small bowel was run for 150 cm in a way to obtain a 150 cm long Mary Lou limb  At that point, two enterotomies were made in the BP limb and the Mary Lou limb with hot scissors l, and the jejunojejunostomy was fashioned using a 60 mm stapler with a white load  After firing the stapler, the staple line was inspected and there was no evidence of bleeding and the staple line was well formed  The common enterotomy of the jejunojejunostomy was closed in two layers using 2-0 Vicryl running suture for the first layer and  2-0 V LOC in a running fashion for the second layer  The mesenteric defect of the small bowel was approximated using nonabsorbable suture in a running fashion    At that point, we turned our attention to the upper portion of the abdomen  The pars flaccida was opened and a gastric pouch was created with serial firings of the Sureform 60 mm intuitive  stapler with a green cartridge reinforced with Seamguard  After the formation of the gastric pouch, the staple lines of the pouch and the gastric remnant were inspected and appeared to be well formed and also appeared to be hemostatic  A new gastrojejunostomy anastomosis was then fashioned in an antecolic antegastric fashion in 2 layers  Outer layer was a running layer of 2-0 V lock suture and the inner  layer was a running 2-0 Vicryl suture  The Sarina Bowl defect was closed with a simple nonabsorbable 2 0 Ethibond suture in a figure-of-eight  Fashion  Upper endoscopy was then performed and showed no evidence of bubbles or bleeding at the suture line of the anastomosis or the staple line of the gastric pouch  The gastrojejunostomy was covered with an omental flap that was secured in place with an absorbable suture in a simple fashion  The [facility] liver retractor was removed  The 12 mm port was closed  with 1-0 Vicryl in a simple fashion  All the skin edges of the trocar sites were approximated with 4-0 Monocryl in a subcuticular inverted fashion  The patient was extubated and transferred to the PACU in stable condition        SIGNATURE: Rogers Mendoza MD  DATE: April 19, 2022  TIME: 9:22 AM

## 2022-04-19 NOTE — DISCHARGE INSTR - AVS FIRST PAGE
your medications from [address] in [facility] [address] or cut your pills and open capsules, mix with liquid to drink    Take Tylenol every 8 hours around the clock, unless instructed otherwise  Take your omeprazole daily  It is important to stay hydrated and follow your discharge diet progression   Mild nausea is ok as long as you can drink fluids, sip very slowly and get up and walk during any periods of nausea  You may shower normally after 48 hours, but do not scrub incision sites, blot gently with clean towel to dry incisions  Take home medications as usual unless instructed otherwise while in hospital  Follow up with Dr Maria G Walker and your PCP within the next week

## 2022-04-19 NOTE — ANESTHESIA PREPROCEDURE EVALUATION
Procedure:  LAPAROSCOPIC KATLYN-EN-Y GASTRIC BYPASS & INTRAOPERATIVE EGD ROBOTICALLY ASSISTED (N/A Abdomen)    Relevant Problems   ANESTHESIA   (+) PONV (postoperative nausea and vomiting)      GI/HEPATIC   (+) GERD (gastroesophageal reflux disease)      MUSCULOSKELETAL   (+) Right-sided low back pain with right-sided sciatica      NEURO/PSYCH   (+) Depression with anxiety   (+) JG (generalized anxiety disorder)   (+) Major depressive disorder, recurrent, severe w/o psychotic behavior (HCC)   (+) Social phobia      PULMONARY   (+) CARLOS ENRIQUE (obstructive sleep apnea)      Other   (+) Lupus (systemic lupus erythematosus) (HCC)   (+) Severe obesity (HCC)        Physical Exam    Airway    Mallampati score: III  TM Distance: >3 FB       Dental   No notable dental hx     Cardiovascular  Rhythm: regular, Rate: normal,     Pulmonary  Breath sounds clear to auscultation,     Other Findings        Anesthesia Plan  ASA Score- 3     Anesthesia Type- general with ASA Monitors  Additional Monitors:   Airway Plan:     Comment: Pt works SDS at [employer]  Plan Factors-Exercise tolerance (METS): >4 METS  Chart reviewed  Existing labs reviewed  Patient summary reviewed  Patient is not a current smoker  Induction- intravenous  Postoperative Plan- Plan for postoperative opioid use  Informed Consent- Anesthetic plan and risks discussed with patient

## 2022-04-19 NOTE — PLAN OF CARE
Problem: PAIN - ADULT  Goal: Verbalizes/displays adequate comfort level or baseline comfort level  Description: Interventions:  - Encourage patient to monitor pain and request assistance  - Assess pain using appropriate pain scale  - Administer analgesics based on type and severity of pain and evaluate response  - Implement non-pharmacological measures as appropriate and evaluate response  - Consider cultural and social influences on pain and pain management  - Notify physician/advanced practitioner if interventions unsuccessful or patient reports new pain  Outcome: Progressing     Problem: INFECTION - ADULT  Goal: Absence or prevention of progression during hospitalization  Description: INTERVENTIONS:  - Assess and monitor for signs and symptoms of infection  - Monitor lab/diagnostic results  - Monitor all insertion sites, i e  indwelling lines, tubes, and drains  - Monitor endotracheal if appropriate and nasal secretions for changes in amount and color  - [place] appropriate cooling/warming therapies per order  - Administer medications as ordered  - Instruct and encourage patient and family to use good hand hygiene technique  - Identify and instruct in appropriate isolation precautions for identified infection/condition  Outcome: Progressing  Goal: Absence of fever/infection during neutropenic period  Description: INTERVENTIONS:  - Monitor WBC    Outcome: Progressing     Problem: DISCHARGE PLANNING  Goal: Discharge to home or other facility with appropriate resources  Description: INTERVENTIONS:  - Identify barriers to discharge w/patient and caregiver  - Arrange for needed discharge resources and transportation as appropriate  - Identify discharge learning needs (meds, wound care, etc )  - Arrange for interpretive services to assist at discharge as needed  - Refer to Case Management Department for coordinating discharge planning if the patient needs post-hospital services based on physician/advanced practitioner order or complex needs related to functional status, cognitive ability, or social support system  Outcome: Progressing     Problem: Knowledge Deficit  Goal: Patient/family/caregiver demonstrates understanding of disease process, treatment plan, medications, and discharge instructions  Description: Complete learning assessment and assess knowledge base    Interventions:  - Provide teaching at level of understanding  - Provide teaching via preferred learning methods  Outcome: Progressing     Problem: GASTROINTESTINAL - ADULT  Goal: Minimal or absence of nausea and/or vomiting  Description: INTERVENTIONS:  - Administer IV fluids if ordered to ensure adequate hydration  - Maintain NPO status until nausea and vomiting are resolved  - Nasogastric tube if ordered  - Administer ordered antiemetic medications as needed  - Provide nonpharmacologic comfort measures as appropriate  - Advance diet as tolerated, if ordered  - Consider nutrition services referral to assist patient with adequate nutrition and appropriate food choices  Outcome: Progressing  Goal: Maintains or returns to baseline bowel function  Description: INTERVENTIONS:  - Assess bowel function  - Encourage oral fluids to ensure adequate hydration  - Administer IV fluids if ordered to ensure adequate hydration  - Administer ordered medications as needed  - Encourage mobilization and activity  - Consider nutritional services referral to assist patient with adequate nutrition and appropriate food choices  Outcome: Progressing  Goal: Maintains adequate nutritional intake  Description: INTERVENTIONS:  - Monitor percentage of each meal consumed  - Identify factors contributing to decreased intake, treat as appropriate  - Assist with meals as needed  - Monitor I&O, weight, and lab values if indicated  - Obtain nutrition services referral as needed  Outcome: Progressing

## 2022-04-19 NOTE — DISCHARGE INSTRUCTIONS
Bariatric/Weight Loss Surgery  Hospital Discharge Instructions  1  ACTIVITY:  a  Progress as feels comfortable - a good rule is:  if you are doing something and it begins to hurt, stop doing the activity  Walk every hour while at home  b  Jes Fonseca may walk stairs if you do so slowly  c  You may shower 48 hours after surgery  Do not scrub incision sites  Blot gently with clean towel to dry incisions  (see #4 below)   d  Use your incentive spirometer 10 times per hour while awake for 1 week after surgery  e  Do NOT drive for 48 hours after surgery  No driving 24 hours after taking certain prescription pain medications  Examples of such medication are Percocet, Darvocet, Oxycodone, Tylenol #3, and Tylenol with Codeine  2  DIET  a  Stay on a liquid diet for 7 days after your surgery date, sipping slowly  Refer to your manual for examples of choices  Remember to keep your liquids sugar free or low calorie  You may have protein drinks  Make sure to drink 48 to 64 ounces per day of fluids  b  Jes Fonseca may advance to a pureed diet one week after surgery as instructed by your diet progression pamphlet  Once you get approval from your surgeon at your first post operative visit, you may advance to the soft diet and remain on soft diet for 8 weeks unless otherwise instructed  3  MEDICATIONS:  a  The abdominal nerve block will wear off during the first 1-2 days that you are home, and you may become sore (especially over incision site/sites where abdominal wall is sutured)  This may create a pulling sensation, especially while moving around, and will fade over time  Continue to take your Tylenol and your pain medication as instructed  b  Start vitamins and minerals one week after surgery or when you start stage 3/puree diet  c  Anti-acid Medication as per prescription  d  Other medications as indicated on the Physician Patient Discharge Instructions form given to you at the time of discharge    e  Make sure that you are splitting your pill or tablet medications in halves or fourths or even crushing them before you take them  Capsules should be opened and mixed with water or jello  You need to do this for at least 4 weeks after surgery  Eventually you will be able to take your medications the regular way as they were prescribed  Sergio Milner will need to consult with your Family Doctor in regards to all your prescribed medication, particularly those for blood pressure and diabetes  As you lose weight, medical conditions may change, requiring an alteration or elimination of the drug dose  Monitor blood pressure closely and call PCP with any concerns  g  Sleeve Gastrectomy patients ONLY:  Complete full course of lovenox injections!  h  DO NOT TAKE BIRTH CONTROL(BC) MEDICATIONS, INSERT BC VAGINAL RINGS, OR PLACE IUD OR ANY OTHER BC METHODS UNTIL 31 DAYS FROM DAY OF DISCHARGE FROM HOSPITAL  THIS PLACES YOU AT HIGH RISK FOR A POTENTIALLY LIFE THREATENING BLOOD CLOT  Remember to always use barrier methods for birth control and speak to your GYN about using two forms of birth control to start 31 days after surgery  It is very important to avoid pregnancy until at least 18-24 months after surgery  4  INCISION CARE  a  You may shower and get incisions wet 2 days after surgery  No soaking tub baths or swimming for 30 days after surgery  Keep abdominal area and incisions clean  Use soap and water to create a good lather and rinse off  Do not scrub incisions  b  If you have a drain, empty the drain as the nurses instructed  5  FOLLOW-UP APPOINTMENT should be made for one week after discharge  Call surgeons office at [phone] to schedule an appointment      6  CALL YOUR DOCTOR FOR:  pain not controlled by pain medications, a temperature greater than 101 5° F, any increase or change in drainage or redness from any incision, any vomiting or inability to keep liquids down, shortness of breath, shoulder pain, or bleeding

## 2022-04-20 LAB
ANION GAP SERPL CALCULATED.3IONS-SCNC: 7 MMOL/L (ref 4–13)
BUN SERPL-MCNC: 9 MG/DL (ref 5–25)
CALCIUM SERPL-MCNC: 8.5 MG/DL (ref 8.3–10.1)
CHLORIDE SERPL-SCNC: 106 MMOL/L (ref 100–108)
CO2 SERPL-SCNC: 28 MMOL/L (ref 21–32)
CREAT SERPL-MCNC: 0.76 MG/DL (ref 0.6–1.3)
ERYTHROCYTE [DISTWIDTH] IN BLOOD BY AUTOMATED COUNT: 13.2 % (ref 11.6–15.1)
GFR SERPL CREATININE-BSD FRML MDRD: 100 ML/MIN/1.73SQ M
GLUCOSE SERPL-MCNC: 90 MG/DL (ref 65–140)
HCT VFR BLD AUTO: 38.4 % (ref 34.8–46.1)
HGB BLD-MCNC: 12.2 G/DL (ref 11.5–15.4)
MCH RBC QN AUTO: 31.3 PG (ref 26.8–34.3)
MCHC RBC AUTO-ENTMCNC: 31.8 G/DL (ref 31.4–37.4)
MCV RBC AUTO: 99 FL (ref 82–98)
PLATELET # BLD AUTO: 211 THOUSANDS/UL (ref 149–390)
PMV BLD AUTO: 9.7 FL (ref 8.9–12.7)
POTASSIUM SERPL-SCNC: 3.9 MMOL/L (ref 3.5–5.3)
RBC # BLD AUTO: 3.9 MILLION/UL (ref 3.81–5.12)
SODIUM SERPL-SCNC: 141 MMOL/L (ref 136–145)
WBC # BLD AUTO: 6.33 THOUSAND/UL (ref 4.31–10.16)

## 2022-04-20 PROCEDURE — 80048 BASIC METABOLIC PNL TOTAL CA: CPT | Performed by: PHYSICIAN ASSISTANT

## 2022-04-20 PROCEDURE — 99024 POSTOP FOLLOW-UP VISIT: CPT | Performed by: STUDENT IN AN ORGANIZED HEALTH CARE EDUCATION/TRAINING PROGRAM

## 2022-04-20 PROCEDURE — 85027 COMPLETE CBC AUTOMATED: CPT | Performed by: PHYSICIAN ASSISTANT

## 2022-04-20 RX ORDER — KETOROLAC TROMETHAMINE 30 MG/ML
15 INJECTION, SOLUTION INTRAMUSCULAR; INTRAVENOUS EVERY 6 HOURS SCHEDULED
Status: COMPLETED | OUTPATIENT
Start: 2022-04-20 | End: 2022-04-21

## 2022-04-20 RX ADMIN — GABAPENTIN 100 MG: 100 CAPSULE ORAL at 15:41

## 2022-04-20 RX ADMIN — SODIUM CHLORIDE, SODIUM LACTATE, POTASSIUM CHLORIDE, AND CALCIUM CHLORIDE 100 ML/HR: .6; .31; .03; .02 INJECTION, SOLUTION INTRAVENOUS at 20:19

## 2022-04-20 RX ADMIN — ACETAMINOPHEN 975 MG: 325 SUSPENSION ORAL at 21:14

## 2022-04-20 RX ADMIN — ACETAMINOPHEN 975 MG: 325 SUSPENSION ORAL at 13:15

## 2022-04-20 RX ADMIN — METOCLOPRAMIDE 10 MG: 5 INJECTION, SOLUTION INTRAMUSCULAR; INTRAVENOUS at 20:39

## 2022-04-20 RX ADMIN — ACETAMINOPHEN 975 MG: 325 SUSPENSION ORAL at 05:04

## 2022-04-20 RX ADMIN — FAMOTIDINE 20 MG: 10 INJECTION, SOLUTION INTRAVENOUS at 08:06

## 2022-04-20 RX ADMIN — FAMOTIDINE 20 MG: 10 INJECTION, SOLUTION INTRAVENOUS at 20:39

## 2022-04-20 RX ADMIN — OXYCODONE HYDROCHLORIDE 10 MG: 5 SOLUTION ORAL at 03:20

## 2022-04-20 RX ADMIN — LEVOTHYROXINE SODIUM 25 MCG: 25 TABLET ORAL at 05:03

## 2022-04-20 RX ADMIN — OXYCODONE HYDROCHLORIDE 10 MG: 5 SOLUTION ORAL at 15:41

## 2022-04-20 RX ADMIN — METOCLOPRAMIDE 10 MG: 5 INJECTION, SOLUTION INTRAMUSCULAR; INTRAVENOUS at 08:06

## 2022-04-20 RX ADMIN — GABAPENTIN 100 MG: 100 CAPSULE ORAL at 08:06

## 2022-04-20 RX ADMIN — OXYCODONE HYDROCHLORIDE 10 MG: 5 SOLUTION ORAL at 08:06

## 2022-04-20 RX ADMIN — MODAFINIL 200 MG: 100 TABLET ORAL at 08:06

## 2022-04-20 RX ADMIN — KETOROLAC TROMETHAMINE 15 MG: 30 INJECTION, SOLUTION INTRAMUSCULAR at 17:18

## 2022-04-20 RX ADMIN — KETOROLAC TROMETHAMINE 15 MG: 30 INJECTION, SOLUTION INTRAMUSCULAR at 11:18

## 2022-04-20 RX ADMIN — ONDANSETRON 4 MG: 2 INJECTION INTRAMUSCULAR; INTRAVENOUS at 03:18

## 2022-04-20 RX ADMIN — OXYCODONE HYDROCHLORIDE 10 MG: 5 SOLUTION ORAL at 20:38

## 2022-04-20 RX ADMIN — GABAPENTIN 100 MG: 100 CAPSULE ORAL at 20:39

## 2022-04-20 RX ADMIN — SODIUM CHLORIDE, SODIUM LACTATE, POTASSIUM CHLORIDE, AND CALCIUM CHLORIDE 100 ML/HR: .6; .31; .03; .02 INJECTION, SOLUTION INTRAVENOUS at 10:10

## 2022-04-20 NOTE — PLAN OF CARE
Problem: PAIN - ADULT  Goal: Verbalizes/displays adequate comfort level or baseline comfort level  Description: Interventions:  - Encourage patient to monitor pain and request assistance  - Assess pain using appropriate pain scale  - Administer analgesics based on type and severity of pain and evaluate response  - Implement non-pharmacological measures as appropriate and evaluate response  - Consider cultural and social influences on pain and pain management  - Notify physician/advanced practitioner if interventions unsuccessful or patient reports new pain  Outcome: Progressing     Problem: INFECTION - ADULT  Goal: Absence or prevention of progression during hospitalization  Description: INTERVENTIONS:  - Assess and monitor for signs and symptoms of infection  - Monitor lab/diagnostic results  - Monitor all insertion sites, i e  indwelling lines, tubes, and drains  - Monitor endotracheal if appropriate and nasal secretions for changes in amount and color  - [place] appropriate cooling/warming therapies per order  - Administer medications as ordered  - Instruct and encourage patient and family to use good hand hygiene technique  - Identify and instruct in appropriate isolation precautions for identified infection/condition  Outcome: Progressing  Goal: Absence of fever/infection during neutropenic period  Description: INTERVENTIONS:  - Monitor WBC    Outcome: Progressing     Problem: DISCHARGE PLANNING  Goal: Discharge to home or other facility with appropriate resources  Description: INTERVENTIONS:  - Identify barriers to discharge w/patient and caregiver  - Arrange for needed discharge resources and transportation as appropriate  - Identify discharge learning needs (meds, wound care, etc )  - Arrange for interpretive services to assist at discharge as needed  - Refer to Case Management Department for coordinating discharge planning if the patient needs post-hospital services based on physician/advanced practitioner order or complex needs related to functional status, cognitive ability, or social support system  Outcome: Progressing     Problem: Knowledge Deficit  Goal: Patient/family/caregiver demonstrates understanding of disease process, treatment plan, medications, and discharge instructions  Description: Complete learning assessment and assess knowledge base    Interventions:  - Provide teaching at level of understanding  - Provide teaching via preferred learning methods  Outcome: Progressing     Problem: GASTROINTESTINAL - ADULT  Goal: Minimal or absence of nausea and/or vomiting  Description: INTERVENTIONS:  - Administer IV fluids if ordered to ensure adequate hydration  - Maintain NPO status until nausea and vomiting are resolved  - Nasogastric tube if ordered  - Administer ordered antiemetic medications as needed  - Provide nonpharmacologic comfort measures as appropriate  - Advance diet as tolerated, if ordered  - Consider nutrition services referral to assist patient with adequate nutrition and appropriate food choices  Outcome: Progressing  Goal: Maintains or returns to baseline bowel function  Description: INTERVENTIONS:  - Assess bowel function  - Encourage oral fluids to ensure adequate hydration  - Administer IV fluids if ordered to ensure adequate hydration  - Administer ordered medications as needed  - Encourage mobilization and activity  - Consider nutritional services referral to assist patient with adequate nutrition and appropriate food choices  Outcome: Progressing  Goal: Maintains adequate nutritional intake  Description: INTERVENTIONS:  - Monitor percentage of each meal consumed  - Identify factors contributing to decreased intake, treat as appropriate  - Assist with meals as needed  - Monitor I&O, weight, and lab values if indicated  - Obtain nutrition services referral as needed  Outcome: Progressing

## 2022-04-20 NOTE — UTILIZATION REVIEW
Initial Clinical Review    Elective   IP     surgical procedure    Age/Sex: 40 y o  female     Surgery Date:    4/19/22    Procedure: LAPAROSCOPIC KATLYN-EN-Y GASTRIC BYPASS & INTRAOPERATIVE EGD ROBOTICALLY ASSISTED (N/A)    Anesthesia:    general    Operative Findings:    normal    POD#1 Progress Note:   4/20   Continue post op care  Continue pain control/antiemetics as needed  Monitor labs  Hemoglobin stable  Tolerating cl liq diet      Admission Orders: Date/Time/Statement:   Admission Orders (From admission, onward)     Ordered        04/19/22 0814  Inpatient Admission  Once                      Orders Placed This Encounter   Procedures    Inpatient Admission     Standing Status:   Standing     Number of Occurrences:   1     Order Specific Question:   Level of Care     Answer:   Med Surg [16]     Order Specific Question:   Estimated length of stay     Answer:   Inpatient Only Surgery     Vital Signs: /81   Pulse 75   Temp 97 9 °F (36 6 °C)   Resp 18   Ht 5' 5 5" (1 664 m)   Wt 98 2 kg (216 lb 7 9 oz)   LMP 04/14/2022   SpO2 96%   BMI 35 48 kg/m²     Pertinent Labs/Diagnostic Test Results:         Results from last 7 days   Lab Units 04/20/22  0502   WBC Thousand/uL 6 33   HEMOGLOBIN g/dL 12 2   HEMATOCRIT % 38 4   PLATELETS Thousands/uL 211         Results from last 7 days   Lab Units 04/20/22  0502   SODIUM mmol/L 141   POTASSIUM mmol/L 3 9   CHLORIDE mmol/L 106   CO2 mmol/L 28   ANION GAP mmol/L 7   BUN mg/dL 9   CREATININE mg/dL 0 76   EGFR ml/min/1 73sq m 100   CALCIUM mg/dL 8 5             Results from last 7 days   Lab Units 04/20/22  0502   GLUCOSE RANDOM mg/dL 90                     Diet:   Bariatric cl liq    Mobility:   OOB as tolerated    DVT Prophylaxis:    SCD'S    Medications/Pain Control:   Scheduled Medications:  acetaminophen, 975 mg, Oral, Q8H   Or  acetaminophen, 975 mg, Oral, Q8H  famotidine, 20 mg, Intravenous, BID  gabapentin, 100 mg, Oral, TID  levothyroxine, 25 mcg, Oral, Early Morning  modafinil, 200 mg, Oral, Daily  scopolamine, 1 patch, Transdermal, Once      Continuous IV Infusions:  lactated ringers, 125 mL/hr, Intravenous, Continuous  lactated ringers, 100 mL/hr, Intravenous, Continuous      PRN Meds:  clonazePAM, 0 5 mg, Oral, BID PRN  diphenhydrAMINE, 25 mg, Oral, Q8H PRN  metoclopramide, 10 mg, Intravenous, Q6H PRN   ( x1  4/19 and  X 1  4/20 thus far)  morphine injection, 4 mg, Intravenous, Q4H PRN  ( x2  4/19(   ondansetron, 4 mg, Intravenous, Q6H PRN   ( x1  4/19 and X 1  4/20 thus far)  oxyCODONE, 10 mg, Oral, Q4H PRN  oxyCODONE, 5 mg, Oral, Q4H PRN  phenol, 2 spray, Mouth/Throat, Q2H PRN  promethazine, 25 mg, Intravenous, Q6H PRN  simethicone, 80 mg, Oral, 4x Daily PRN        Network Utilization Review Department  ATTENTION: Please call with any questions or concerns to [phone] and carefully listen to the prompts so that you are directed to the right person  All voicemails are confidential   Lay Veloz all requests for admission clinical reviews, approved or denied determinations and any other requests to dedicated fax number below belonging to the campus where the patient is receiving treatment   List of dedicated fax numbers for the Facilities:  [address] DENIALS (Administrative/Medical Necessity) [phone]   [address] (Maternity/NICU/Pediatrics) [phone] [address] [phone]   [address]  [phone]   James Salcedo [address] [place] Avery [place] [address] [id number] [address] [place] [address] [phone]

## 2022-04-20 NOTE — PLAN OF CARE
Problem: PAIN - ADULT  Goal: Verbalizes/displays adequate comfort level or baseline comfort level  Description: Interventions:  - Encourage patient to monitor pain and request assistance  - Assess pain using appropriate pain scale  - Administer analgesics based on type and severity of pain and evaluate response  - Implement non-pharmacological measures as appropriate and evaluate response  - Consider cultural and social influences on pain and pain management  - Notify physician/advanced practitioner if interventions unsuccessful or patient reports new pain  Outcome: Progressing     Problem: INFECTION - ADULT  Goal: Absence or prevention of progression during hospitalization  Description: INTERVENTIONS:  - Assess and monitor for signs and symptoms of infection  - Monitor lab/diagnostic results  - Monitor all insertion sites, i e  indwelling lines, tubes, and drains  - Monitor endotracheal if appropriate and nasal secretions for changes in amount and color  - [place] appropriate cooling/warming therapies per order  - Administer medications as ordered  - Instruct and encourage patient and family to use good hand hygiene technique  - Identify and instruct in appropriate isolation precautions for identified infection/condition  Outcome: Progressing  Goal: Absence of fever/infection during neutropenic period  Description: INTERVENTIONS:  - Monitor WBC    Outcome: Progressing     Problem: DISCHARGE PLANNING  Goal: Discharge to home or other facility with appropriate resources  Description: INTERVENTIONS:  - Identify barriers to discharge w/patient and caregiver  - Arrange for needed discharge resources and transportation as appropriate  - Identify discharge learning needs (meds, wound care, etc )  - Arrange for interpretive services to assist at discharge as needed  - Refer to Case Management Department for coordinating discharge planning if the patient needs post-hospital services based on physician/advanced practitioner order or complex needs related to functional status, cognitive ability, or social support system  Outcome: Progressing     Problem: Knowledge Deficit  Goal: Patient/family/caregiver demonstrates understanding of disease process, treatment plan, medications, and discharge instructions  Description: Complete learning assessment and assess knowledge base    Interventions:  - Provide teaching at level of understanding  - Provide teaching via preferred learning methods  Outcome: Progressing     Problem: GASTROINTESTINAL - ADULT  Goal: Minimal or absence of nausea and/or vomiting  Description: INTERVENTIONS:  - Administer IV fluids if ordered to ensure adequate hydration  - Maintain NPO status until nausea and vomiting are resolved  - Nasogastric tube if ordered  - Administer ordered antiemetic medications as needed  - Provide nonpharmacologic comfort measures as appropriate  - Advance diet as tolerated, if ordered  - Consider nutrition services referral to assist patient with adequate nutrition and appropriate food choices  Outcome: Progressing  Goal: Maintains or returns to baseline bowel function  Description: INTERVENTIONS:  - Assess bowel function  - Encourage oral fluids to ensure adequate hydration  - Administer IV fluids if ordered to ensure adequate hydration  - Administer ordered medications as needed  - Encourage mobilization and activity  - Consider nutritional services referral to assist patient with adequate nutrition and appropriate food choices  Outcome: Progressing  Goal: Maintains adequate nutritional intake  Description: INTERVENTIONS:  - Monitor percentage of each meal consumed  - Identify factors contributing to decreased intake, treat as appropriate  - Assist with meals as needed  - Monitor I&O, weight, and lab values if indicated  - Obtain nutrition services referral as needed  Outcome: Progressing

## 2022-04-20 NOTE — PROGRESS NOTES
Progress Note - Bariatric Surgery   Elan Abarca 40 y o  female MRN: [id number]  Unit/Bed#: E5 -01 Encounter: [id number]      Subjective/Objective     Subjective:  Patient with morbid obesity POD1 s/p robotic RNYGB   Patient denies fevers, chills, sweats, SOB, CP, calf pain  Pain adequately controlled on oral pain medication  Ambulating without assistance, voiding well, and using incentive spirometer  Patient tolerating liquid diet without nausea or vomiting today  Vital signs stable  CBC today shows stable H/H  BMP obtained today is within normal limits  Objective:    /81   Pulse 75   Temp 97 9 °F (36 6 °C)   Resp 18   Ht 5' 5 5" (1 664 m)   Wt 98 2 kg (216 lb 7 9 oz)   LMP 04/14/2022   SpO2 96%   BMI 35 48 kg/m²       Intake/Output Summary (Last 24 hours) at 4/20/2022 0810  Last data filed at 4/19/2022 2336  Gross per 24 hour   Intake 2890 ml   Output 460 ml   Net 2430 ml       Invasive Devices  Report    Peripheral Intravenous Line            Peripheral IV 04/19/22 Dorsal (posterior); Left Wrist 1 day                ROS: 10-point system completed  All negative except see HPI  Physical Exam    General Appearance:    Alert, cooperative, no distress, appears stated age   Head:    Normocephalic, without obvious abnormality, atraumatic   Lungs:     Respirations unlabored   Heart:    Regular rate and rhythm   Abdomen:     Soft, appropriate tenderness, no masses, no organomegaly, non-distended   Extremities:   Extremities normal, atraumatic, no cyanosis or edema   Neurologic:  Incision:  Psych:   Normal strength and sensation    Clean, dry, and intact, no bleeding, no drain    Normal mood and affect       Lab, Imaging and other studies:  I have personally reviewed pertinent lab results    , CBC:   Lab Results   Component Value Date    WBC 6 33 04/20/2022    HGB 12 2 04/20/2022    HCT 38 4 04/20/2022    MCV 99 (H) 04/20/2022     04/20/2022    MCH 31 3 04/20/2022    MCHC 31 8 04/20/2022    RDW 13 2 04/20/2022    MPV 9 7 04/20/2022   , CMP:   Lab Results   Component Value Date    SODIUM 141 04/20/2022    K 3 9 04/20/2022     04/20/2022    CO2 28 04/20/2022    BUN 9 04/20/2022    CREATININE 0 76 04/20/2022    CALCIUM 8 5 04/20/2022    EGFR 100 04/20/2022        VTE Mechanical Prophylaxis: sequential compression device    Assessment/Plan  1)  Patient with morbid Obesity s/p robotic RNYGB with stable post op course  Patient afebrile and hemodynamically stable  - Encourage PO fluids   - Recommend ambulation, use of SCDs when not ambulating, and incentive spirometry  - Complete antibiotic course  - Plan to D/C patient home today pending anticipated progression    Plan of care was discussed with patient    Care plan discussed with Dr Issa Dietrich MD  Bariatric Surgery  4/20/2022  8:10 AM

## 2022-04-21 VITALS
DIASTOLIC BLOOD PRESSURE: 53 MMHG | WEIGHT: 216.49 LBS | OXYGEN SATURATION: 95 % | HEIGHT: 66 IN | SYSTOLIC BLOOD PRESSURE: 93 MMHG | BODY MASS INDEX: 34.79 KG/M2 | TEMPERATURE: 98.1 F | RESPIRATION RATE: 18 BRPM | HEART RATE: 63 BPM

## 2022-04-21 PROCEDURE — NC001 PR NO CHARGE: Performed by: STUDENT IN AN ORGANIZED HEALTH CARE EDUCATION/TRAINING PROGRAM

## 2022-04-21 PROCEDURE — 99024 POSTOP FOLLOW-UP VISIT: CPT | Performed by: SURGERY

## 2022-04-21 RX ORDER — ACETAMINOPHEN 160 MG/5ML
975 SUSPENSION, ORAL (FINAL DOSE FORM) ORAL EVERY 8 HOURS
Qty: 638.4 ML | Refills: 0 | Status: SHIPPED | OUTPATIENT
Start: 2022-04-21 | End: 2022-04-28

## 2022-04-21 RX ADMIN — SODIUM CHLORIDE, SODIUM LACTATE, POTASSIUM CHLORIDE, AND CALCIUM CHLORIDE 100 ML/HR: .6; .31; .03; .02 INJECTION, SOLUTION INTRAVENOUS at 05:32

## 2022-04-21 RX ADMIN — OXYCODONE HYDROCHLORIDE 5 MG: 5 SOLUTION ORAL at 08:43

## 2022-04-21 RX ADMIN — FAMOTIDINE 20 MG: 10 INJECTION, SOLUTION INTRAVENOUS at 08:44

## 2022-04-21 RX ADMIN — LEVOTHYROXINE SODIUM 25 MCG: 25 TABLET ORAL at 05:27

## 2022-04-21 RX ADMIN — ACETAMINOPHEN 975 MG: 325 SUSPENSION ORAL at 05:26

## 2022-04-21 RX ADMIN — KETOROLAC TROMETHAMINE 15 MG: 30 INJECTION, SOLUTION INTRAMUSCULAR at 05:28

## 2022-04-21 RX ADMIN — ONDANSETRON 4 MG: 2 INJECTION INTRAMUSCULAR; INTRAVENOUS at 08:44

## 2022-04-21 RX ADMIN — KETOROLAC TROMETHAMINE 15 MG: 30 INJECTION, SOLUTION INTRAMUSCULAR at 00:40

## 2022-04-21 RX ADMIN — GABAPENTIN 100 MG: 100 CAPSULE ORAL at 08:43

## 2022-04-21 RX ADMIN — MODAFINIL 200 MG: 100 TABLET ORAL at 08:43

## 2022-04-21 NOTE — PLAN OF CARE
Problem: PAIN - ADULT  Goal: Verbalizes/displays adequate comfort level or baseline comfort level  Description: Interventions:  - Encourage patient to monitor pain and request assistance  - Assess pain using appropriate pain scale  - Administer analgesics based on type and severity of pain and evaluate response  - Implement non-pharmacological measures as appropriate and evaluate response  - Consider cultural and social influences on pain and pain management  - Notify physician/advanced practitioner if interventions unsuccessful or patient reports new pain  Outcome: Progressing     Problem: INFECTION - ADULT  Goal: Absence or prevention of progression during hospitalization  Description: INTERVENTIONS:  - Assess and monitor for signs and symptoms of infection  - Monitor lab/diagnostic results  - Monitor all insertion sites, i e  indwelling lines, tubes, and drains  - Monitor endotracheal if appropriate and nasal secretions for changes in amount and color  - [place] appropriate cooling/warming therapies per order  - Administer medications as ordered  - Instruct and encourage patient and family to use good hand hygiene technique  - Identify and instruct in appropriate isolation precautions for identified infection/condition  Outcome: Progressing  Goal: Absence of fever/infection during neutropenic period  Description: INTERVENTIONS:  - Monitor WBC    Outcome: Progressing     Problem: DISCHARGE PLANNING  Goal: Discharge to home or other facility with appropriate resources  Description: INTERVENTIONS:  - Identify barriers to discharge w/patient and caregiver  - Arrange for needed discharge resources and transportation as appropriate  - Identify discharge learning needs (meds, wound care, etc )  - Arrange for interpretive services to assist at discharge as needed  - Refer to Case Management Department for coordinating discharge planning if the patient needs post-hospital services based on physician/advanced practitioner order or complex needs related to functional status, cognitive ability, or social support system  Outcome: Progressing     Problem: Knowledge Deficit  Goal: Patient/family/caregiver demonstrates understanding of disease process, treatment plan, medications, and discharge instructions  Description: Complete learning assessment and assess knowledge base    Interventions:  - Provide teaching at level of understanding  - Provide teaching via preferred learning methods  Outcome: Progressing     Problem: GASTROINTESTINAL - ADULT  Goal: Minimal or absence of nausea and/or vomiting  Description: INTERVENTIONS:  - Administer IV fluids if ordered to ensure adequate hydration  - Maintain NPO status until nausea and vomiting are resolved  - Nasogastric tube if ordered  - Administer ordered antiemetic medications as needed  - Provide nonpharmacologic comfort measures as appropriate  - Advance diet as tolerated, if ordered  - Consider nutrition services referral to assist patient with adequate nutrition and appropriate food choices  Outcome: Progressing  Goal: Maintains or returns to baseline bowel function  Description: INTERVENTIONS:  - Assess bowel function  - Encourage oral fluids to ensure adequate hydration  - Administer IV fluids if ordered to ensure adequate hydration  - Administer ordered medications as needed  - Encourage mobilization and activity  - Consider nutritional services referral to assist patient with adequate nutrition and appropriate food choices  Outcome: Progressing  Goal: Maintains adequate nutritional intake  Description: INTERVENTIONS:  - Monitor percentage of each meal consumed  - Identify factors contributing to decreased intake, treat as appropriate  - Assist with meals as needed  - Monitor I&O, weight, and lab values if indicated  - Obtain nutrition services referral as needed  Outcome: Progressing

## 2022-04-21 NOTE — PROGRESS NOTES
Progress Note - Bariatric Surgery   Brad Luna 40 y o  female MRN: [id number]  Unit/Bed#: E5 -01 Encounter: [id number]      Subjective/Objective     Subjective:  Patient with morbid obesity POD2 s/p robotic RNYGB   Patient denies fevers, chills, sweats, SOB, CP, calf pain  Pain adequately controlled on oral pain medication  Ambulating without assistance, voiding well, and using incentive spirometer  Patient tolerating liquid diet without nausea or vomiting today  Vital signs stable  Pain is better controlled today    Objective:    BP 93/53   Pulse 63   Temp 98 1 °F (36 7 °C)   Resp 18   Ht 5' 5 5" (1 664 m)   Wt 98 2 kg (216 lb 7 9 oz)   LMP 04/14/2022   SpO2 95%   BMI 35 48 kg/m²       Intake/Output Summary (Last 24 hours) at 4/21/2022 0851  Last data filed at 4/21/2022 0532  Gross per 24 hour   Intake 990 ml   Output 800 ml   Net 190 ml       Invasive Devices  Report    Peripheral Intravenous Line            Peripheral IV 04/19/22 Dorsal (posterior); Left Wrist 2 days                ROS: 10-point system completed  All negative except see HPI  Physical Exam    General Appearance:    Alert, cooperative, no distress, appears stated age   Head:    Normocephalic, without obvious abnormality, atraumatic   Lungs:     Respirations unlabored   Heart:    Regular rate and rhythm   Abdomen:     Soft, appropriate tenderness, no masses, no organomegaly, non-distended   Extremities:   Extremities normal, atraumatic, no cyanosis or edema   Neurologic:  Incision:  Psych:   Normal strength and sensation    Clean, dry, and intact, no bleeding, no drain    Normal mood and affect       Lab, Imaging and other studies:  I have personally reviewed pertinent lab results  VTE Mechanical Prophylaxis: sequential compression device    Assessment/Plan  1)  Patient with morbid Obesity s/p robotic RNYGB with stable post op course  Patient afebrile and hemodynamically stable       - Encourage PO fluids   - Recommend ambulation, use of SCDs when not ambulating, and incentive spirometry  - Complete antibiotic course  - Plan to D/C patient home today pending anticipated progression    Plan of care was discussed with patient    Care plan discussed with Dr Jomar Pierre MD  Bariatric Surgery  4/21/2022  8:51 AM

## 2022-04-21 NOTE — DISCHARGE SUMMARY
Discharge Summary - Ludmila Sanchez 40 y o  female MRN: [id number]    Unit/Bed#: E5 -01 Encounter: [id number]      Pre-Operative Diagnosis: Pre-Op Diagnosis Codes:     * Morbid obesity ([facility] [address] ) [E66 01]     * CARLOS ENRIQUE on CPAP [G47 33, Z99 89]    Post-Operative Diagnosis: Post-Op Diagnosis Codes:     * Morbid obesity ([facility] [address] ) [E66 01]     * CARLOS ENRIQUE on CPAP [G47 33, Z99 89]    Procedures Performed:  Procedure(s):  LAPAROSCOPIC KATLYN-EN-Y GASTRIC BYPASS & INTRAOPERATIVE EGD ROBOTICALLY ASSISTED    Surgeon: Margarito Murphy MD    See H & P for full details of admission and Operative Note for full details of operations performed  Patient tolerated surgery well without complications  In the morning postoperative Day 1, the patient had mild nausea and moderate abdominal pain  Tolerated a clear liquid diet without vomiting  Able to ambulate and voiding independently  Patient kept overnight for further pain control  In the morning of POD2, pain improved  Patient was deemed ready for discharge home  Patient was seen and examined prior to discharge  Provisions for Follow-Up Care:  See After Visit Summary/Discharge Instructions for information related to follow-up care and home orders  Disposition: Home, in stable condition  Planned Readmission: No    Discharge Medications:  See After Visit Summary/Discharge Instructions for reconciled discharge medications provided to patient and family  Post Operative instructions: Reviewed with patient and/or family      Signature:   Alisson Fuentes PA-C  Date: 4/21/2022 Time: 9:47 AM

## 2022-04-21 NOTE — PLAN OF CARE
Problem: PAIN - ADULT  Goal: Verbalizes/displays adequate comfort level or baseline comfort level  Description: Interventions:  - Encourage patient to monitor pain and request assistance  - Assess pain using appropriate pain scale  - Administer analgesics based on type and severity of pain and evaluate response  - Implement non-pharmacological measures as appropriate and evaluate response  - Consider cultural and social influences on pain and pain management  - Notify physician/advanced practitioner if interventions unsuccessful or patient reports new pain  Outcome: Progressing     Problem: INFECTION - ADULT  Goal: Absence or prevention of progression during hospitalization  Description: INTERVENTIONS:  - Assess and monitor for signs and symptoms of infection  - Monitor lab/diagnostic results  - Monitor all insertion sites, i e  indwelling lines, tubes, and drains  - Monitor endotracheal if appropriate and nasal secretions for changes in amount and color  - [place] appropriate cooling/warming therapies per order  - Administer medications as ordered  - Instruct and encourage patient and family to use good hand hygiene technique  - Identify and instruct in appropriate isolation precautions for identified infection/condition  Outcome: Progressing  Goal: Absence of fever/infection during neutropenic period  Description: INTERVENTIONS:  - Monitor WBC    Outcome: Progressing     Problem: DISCHARGE PLANNING  Goal: Discharge to home or other facility with appropriate resources  Description: INTERVENTIONS:  - Identify barriers to discharge w/patient and caregiver  - Arrange for needed discharge resources and transportation as appropriate  - Identify discharge learning needs (meds, wound care, etc )  - Arrange for interpretive services to assist at discharge as needed  - Refer to Case Management Department for coordinating discharge planning if the patient needs post-hospital services based on physician/advanced practitioner order or complex needs related to functional status, cognitive ability, or social support system  Outcome: Progressing     Problem: Knowledge Deficit  Goal: Patient/family/caregiver demonstrates understanding of disease process, treatment plan, medications, and discharge instructions  Description: Complete learning assessment and assess knowledge base    Interventions:  - Provide teaching at level of understanding  - Provide teaching via preferred learning methods  Outcome: Progressing     Problem: GASTROINTESTINAL - ADULT  Goal: Minimal or absence of nausea and/or vomiting  Description: INTERVENTIONS:  - Administer IV fluids if ordered to ensure adequate hydration  - Maintain NPO status until nausea and vomiting are resolved  - Nasogastric tube if ordered  - Administer ordered antiemetic medications as needed  - Provide nonpharmacologic comfort measures as appropriate  - Advance diet as tolerated, if ordered  - Consider nutrition services referral to assist patient with adequate nutrition and appropriate food choices  Outcome: Progressing  Goal: Maintains or returns to baseline bowel function  Description: INTERVENTIONS:  - Assess bowel function  - Encourage oral fluids to ensure adequate hydration  - Administer IV fluids if ordered to ensure adequate hydration  - Administer ordered medications as needed  - Encourage mobilization and activity  - Consider nutritional services referral to assist patient with adequate nutrition and appropriate food choices  Outcome: Progressing  Goal: Maintains adequate nutritional intake  Description: INTERVENTIONS:  - Monitor percentage of each meal consumed  - Identify factors contributing to decreased intake, treat as appropriate  - Assist with meals as needed  - Monitor I&O, weight, and lab values if indicated  - Obtain nutrition services referral as needed  Outcome: Progressing

## 2022-04-22 ENCOUNTER — TRANSITIONAL CARE MANAGEMENT (OUTPATIENT)
Dept: FAMILY MEDICINE CLINIC | Facility: CLINIC | Age: 37
End: 2022-04-22

## 2022-04-22 ENCOUNTER — TELEPHONE (OUTPATIENT)
Dept: MEDSURG UNIT | Facility: HOSPITAL | Age: 37
End: 2022-04-22

## 2022-04-22 NOTE — INTERVAL H&P NOTE
H&P reviewed  After examining the patient I find no changes in the patients condition since the H&P had been written      Vitals:    04/21/22 0646   BP: 93/53   Pulse: 63   Resp:    Temp:    SpO2: 95%

## 2022-04-25 ENCOUNTER — TELEPHONE (OUTPATIENT)
Dept: MEDSURG UNIT | Facility: HOSPITAL | Age: 37
End: 2022-04-25

## 2022-04-25 NOTE — TELEPHONE ENCOUNTER
Post op follow up phone call completed  Pt is sipping liquids  Using IS as instructed, reinforced importance of using IS to help prevent pneumonia  Ambulating about home without difficulty  Pain controlled with analgesia  Having loose bm each day  Not taking miralax  Informed pt that some do develop loose stools on a liquid diet  Reaffirmed examples of liquid diet over the next week  Pt stated understanding about discharge instructions and medication adjustments  Follow up appt with surgeon scheduled for next week  Instructed to call with any additional questions or concerns

## 2022-04-27 ENCOUNTER — OFFICE VISIT (OUTPATIENT)
Dept: FAMILY MEDICINE CLINIC | Facility: CLINIC | Age: 37
End: 2022-04-27
Payer: COMMERCIAL

## 2022-04-27 VITALS
DIASTOLIC BLOOD PRESSURE: 78 MMHG | SYSTOLIC BLOOD PRESSURE: 120 MMHG | HEIGHT: 66 IN | BODY MASS INDEX: 33.91 KG/M2 | TEMPERATURE: 97.2 F | WEIGHT: 211 LBS

## 2022-04-27 DIAGNOSIS — R11.2 PONV (POSTOPERATIVE NAUSEA AND VOMITING): Chronic | ICD-10-CM

## 2022-04-27 DIAGNOSIS — G47.33 OSA (OBSTRUCTIVE SLEEP APNEA): ICD-10-CM

## 2022-04-27 DIAGNOSIS — Z98.84 STATUS POST GASTRIC BYPASS FOR OBESITY: Primary | ICD-10-CM

## 2022-04-27 DIAGNOSIS — K21.9 GASTROESOPHAGEAL REFLUX DISEASE WITHOUT ESOPHAGITIS: Chronic | ICD-10-CM

## 2022-04-27 DIAGNOSIS — F33.2 MAJOR DEPRESSIVE DISORDER, RECURRENT, SEVERE W/O PSYCHOTIC BEHAVIOR (HCC): ICD-10-CM

## 2022-04-27 DIAGNOSIS — E55.9 VITAMIN D DEFICIENCY: ICD-10-CM

## 2022-04-27 DIAGNOSIS — Z98.890 PONV (POSTOPERATIVE NAUSEA AND VOMITING): Chronic | ICD-10-CM

## 2022-04-27 PROBLEM — E66.811 CLASS 1 OBESITY DUE TO EXCESS CALORIES WITH SERIOUS COMORBIDITY AND BODY MASS INDEX (BMI) OF 34.0 TO 34.9 IN ADULT: Status: ACTIVE | Noted: 2020-10-22

## 2022-04-27 PROCEDURE — 99495 TRANSJ CARE MGMT MOD F2F 14D: CPT | Performed by: FAMILY MEDICINE

## 2022-04-27 RX ORDER — CHOLECALCIFEROL (VITAMIN D3) 50 MCG
2000 TABLET ORAL DAILY
Qty: 90 TABLET | Refills: 1 | Status: SHIPPED | OUTPATIENT
Start: 2022-04-27

## 2022-04-27 RX ORDER — PANTOPRAZOLE SODIUM 40 MG/1
40 TABLET, DELAYED RELEASE ORAL DAILY
Qty: 90 TABLET | Refills: 1 | Status: SHIPPED | OUTPATIENT
Start: 2022-04-27

## 2022-04-28 ENCOUNTER — OFFICE VISIT (OUTPATIENT)
Dept: BARIATRICS | Facility: CLINIC | Age: 37
End: 2022-04-28

## 2022-04-28 VITALS
SYSTOLIC BLOOD PRESSURE: 122 MMHG | WEIGHT: 208.5 LBS | HEIGHT: 66 IN | HEART RATE: 64 BPM | DIASTOLIC BLOOD PRESSURE: 74 MMHG | TEMPERATURE: 97.2 F | BODY MASS INDEX: 33.51 KG/M2

## 2022-04-28 DIAGNOSIS — Z98.84 BARIATRIC SURGERY STATUS: Primary | ICD-10-CM

## 2022-04-28 DIAGNOSIS — Z98.84 STATUS POST GASTRIC BYPASS FOR OBESITY: ICD-10-CM

## 2022-04-28 DIAGNOSIS — E66.09 CLASS 1 OBESITY DUE TO EXCESS CALORIES WITH SERIOUS COMORBIDITY AND BODY MASS INDEX (BMI) OF 34.0 TO 34.9 IN ADULT: Primary | ICD-10-CM

## 2022-04-28 PROCEDURE — 99024 POSTOP FOLLOW-UP VISIT: CPT | Performed by: SURGERY

## 2022-04-28 PROCEDURE — RECHECK: Performed by: DIETITIAN, REGISTERED

## 2022-04-28 NOTE — PROGRESS NOTES
POST OP UP VISIT - BARIATRIC SURGERY  Macrina Petty 40 y o  female MRN: [id number]  Unit/Bed#:  Encounter: [id number]      HPI:  Macrina Petty is a 40 y o  female status post robotic RNYGB performed by Dr Nichole Lanes on 4/19/22 returning to office for first post op visit since surgery  Tolerating pureed diet  Denies nausea and vomiting  Taking multivitamins and PPI daily  Review of Systems   Constitutional: Negative for chills and fever  HENT: Negative for ear pain and sore throat  Eyes: Negative for pain and visual disturbance  Respiratory: Negative for cough and shortness of breath  Cardiovascular: Negative for chest pain and palpitations  Gastrointestinal: Negative for abdominal pain and vomiting  Genitourinary: Negative for dysuria and hematuria  Musculoskeletal: Negative for arthralgias and back pain  Skin: Negative for color change and rash  Neurological: Negative for seizures and syncope  All other systems reviewed and are negative        Historical Information   Past Medical History:   Diagnosis Date    Anxiety     Back pain     Bipolar disorder ([address] )     pt denies    Carpal tunnel syndrome on right     Contact lens overwear of both eyes     CPAP (continuous positive airway pressure) dependence     Cubital tunnel syndrome on right     Depression     Disease of thyroid gland     GERD (gastroesophageal reflux disease)     Headache     Hypothyroidism     Kidney stone     Kidney stones     Lupus (systemic lupus erythematosus) (HCC)     Nausea     Obese     gastric KATLYN-EN-Y  today 4/19/2022    PONV (postoperative nausea and vomiting)     Sleep apnea     Wears glasses      Past Surgical History:   Procedure Laterality Date    CARPAL TUNNEL RELEASE      CHOLECYSTECTOMY      EGD      [place] CYSTOURETHROSCOPY N/A 2/3/2020    Procedure: CYSTOSCOPY;  Surgeon: Maksim Simons MD;  [facility];  Service: UroGynecology           [place] LAP GASTRIC BYPASS/KATLYN-EN-Y N/A 4/19/2022    Procedure: LAPAROSCOPIC KATLYN-EN-Y GASTRIC BYPASS & INTRAOPERATIVE EGD ROBOTICALLY ASSISTED;  Surgeon: Brigid Payan MD;  [facility];  Service: Kandis Biggs [place] LAP,CHOLECYSTECTOMY N/A 9/6/2018    Procedure: CHOLECYSTECTOMY LAPAROSCOPIC W/ ROBOTICS;  Surgeon: Dereje Sexton MD;  [facility];  Service: General    [place] POST COLPORRHAPHY,RECTUM/VAGINA N/A 2/3/2020    Procedure: POSTERIOR COLPORRHAPHY;  Surgeon: Dennie Dolphin, MD;  [facility];  Service: UroGynecology           [place] REVISE MEDIAN N/CARPAL TUNNEL SURG Right 11/27/2020    Procedure: release CARPAL TUNNEL;  Surgeon: Jimmy Coffman MD;  [facility];  Service: Orthopedics    [place] REVISE ULNAR NERVE AT ELBOW Right 11/27/2020    Procedure: Jhoan Antoine;  Surgeon:  Jimmy Coffman MD;  [facility];  Service: Orthopedics    [place] SLING OPER STRES INCONTINENCE N/A 2/3/2020    Procedure: PUBOVAGINAL SLING;  Surgeon: Dennie Dolphin, MD;  [facility];  Service: UroGynecology           TUBAL LIGATION       Social History   Social History     Substance and Sexual Activity   Alcohol Use Yes    Alcohol/week: 1 0 standard drink    Types: 1 Shots of liquor per week    Comment: 4 x yearly     Social History     Substance and Sexual Activity   Drug Use No     Social History     Tobacco Use   Smoking Status Never Smoker   Smokeless Tobacco Never Used     Family History: non-contributory    Meds/Allergies   all medications and allergies reviewed  Allergies   Allergen Reactions    Wound Dressing Adhesive Rash     Gets red rash from paper tape relatively quickly after application    Penicillins Hives     At young age       Objective       Current Vitals:   Blood Pressure: 122/74 (04/28/22 0908)  Pulse: 64 (04/28/22 0908)  Temperature: (!) 97 2 °F (36 2 °C) (04/28/22 0908)  Temp Source: Tympanic (04/28/22 0908)  Height: 5' 5 5" (166 4 cm) (04/28/22 0908)  Weight - Scale: 94 6 kg (208 lb 8 oz) (04/28/22 0908)      Invasive Devices  Report    None                 Physical Exam  Constitutional:       Appearance: Normal appearance  She is obese  HENT:      Head: Normocephalic and atraumatic  Cardiovascular:      Rate and Rhythm: Normal rate  Pulmonary:      Effort: Pulmonary effort is normal    Abdominal:      Palpations: Abdomen is soft  Comments: Incisions c/d/i without evidence of infection, healing well   Musculoskeletal:         General: Normal range of motion  Skin:     General: Skin is warm and dry  Neurological:      General: No focal deficit present  Mental Status: She is alert and oriented to person, place, and time  Psychiatric:         Mood and Affect: Mood normal          Behavior: Behavior normal              Assessment/PLAN:    40 y o  female status post robotic RNYGB done on 4/19/22 by Dr Pita Shields, doing well post op  No major issues and healing well  Increase physical activity slowly as tolerated and instructed  Advance diet as instructed by our dietitians today and as indicated in the binder  Continue PPI    Follow up in one month as scheduled            Ignacio Pulido MD  Bariatric Surgery   4/28/2022  9:24 AM

## 2022-04-28 NOTE — PROGRESS NOTES
Weight Management Nutrition Class     Diagnosis: Obesity    Bariatric Surgeon: Dr Mervat Sargent    Surgery: Gastric Bypass Laparoscopic    Class: first post op note    Topics discussed today include:     fluid goals post op, protein goals post op, constipation, chew food well, exercise, diet progression, hypoglycemia, dumping syndrome, protein supplems, vitamin/mineral supplements, calcium supplements, additional vitamin B12, iron supplements and fat soluble vitamins     Patient was able to verbalize basic diet (protein, fluid, vitamin and mineral) recommendations and possible nutrition-related complications   Yes

## 2022-05-02 DIAGNOSIS — M32.9 LUPUS (HCC): ICD-10-CM

## 2022-05-02 RX ORDER — HYDROXYCHLOROQUINE SULFATE 200 MG/1
400 TABLET, FILM COATED ORAL DAILY
Qty: 180 TABLET | Refills: 1 | Status: SHIPPED | OUTPATIENT
Start: 2022-05-02 | End: 2022-10-29

## 2022-05-04 ENCOUNTER — OFFICE VISIT (OUTPATIENT)
Dept: DERMATOLOGY | Facility: CLINIC | Age: 37
End: 2022-05-04
Payer: COMMERCIAL

## 2022-05-04 VITALS — HEIGHT: 66 IN | BODY MASS INDEX: 32.95 KG/M2 | WEIGHT: 205 LBS | TEMPERATURE: 97.8 F

## 2022-05-04 DIAGNOSIS — L70.0 ACNE VULGARIS: Primary | ICD-10-CM

## 2022-05-04 PROCEDURE — 99203 OFFICE O/P NEW LOW 30 MIN: CPT | Performed by: DERMATOLOGY

## 2022-05-04 RX ORDER — METRONIDAZOLE 7.5 MG/G
GEL TOPICAL
Qty: 45 G | Refills: 3 | Status: SHIPPED | OUTPATIENT
Start: 2022-05-04

## 2022-05-04 NOTE — PATIENT INSTRUCTIONS
1  ACNE VULGARIS ("COMMON ACNE")    Physical Exam:   Psychiatric/Mood:Normal   Anatomic Location Affected: Face   Morphological Description: See photos                     Additional History of Present Condition:  Present for 2-3 years  Patient has used benzaclin gel, tretinoin 0 025 % cream and taken spironolactone 50 mg daily  Assessment and Plan:   We reviewed the causes of acne, the kinds of acne, and the expected clinical course   We discussed treatment options ranging from over-the-counter products, topical retinoids, antibiotics, BP, hormonal therapies (OCPs/spironolactone), and isotretinoin (Accutane)   We reviewed specific over-the-counter interventions and medications  Recommended typical hygiene measures including water-based facial products, washing regularly with mild cleanser, and refraining from picking and popping any pimples   Recommended non-comedogenic sunscreen use daily   Expectations of therapy discussed  Side effects, risks and benefits of medications discussed   A comprehensive handout on Acne was provided   The phone number to call in case of questions or concerns (and instructions to stop medications in such a scenario) was provided   After lengthy discussion of etiology and treatment options, we decided to implement the following personalized treatment plan:    Based on a thorough discussion of this condition and the management approach to it (including a comprehensive discussion of the known risks, side effects and potential benefits of treatment), the patient (family) agrees to implement the following specific pl    Begin metronidazole gel twice a day  Begin Differin gel (OTC) apply a pea size amount to entire face one time a day    ACNE:  WHAT ZIT ALL ABOUT? WHY DO I HAVE ACNE/PIMPLES? Your skin is made of layers  To keep the skin from becoming dry and cracked, the skin needs oil  The oil is made in little wells in the deeper layers in the skin  People with acne have glands that make more oil and are more easily plugged, causing the glands to swell  Hormones, bacteria and your inherited tendency to have acne all play a role  The medical term for pimples is acne or acne vulgaris (vulgaris means common)  Most people get some acne  Acne does not come from being dirty  Instead, it is an expected consequence of changes that occur during normal growth and development  Hormones, bacteria, and your family's tendency to have acne may all play a role  Whiteheads or blackheads are openings of the glands (glands are the oil factories) onto the surface of the skin  Blackheads are not caused by dirt blocking the pores; instead, they result from the oxidation reaction of oil and skin in the pores with the air (like a rust reaction)  WHAT ABOUT STRESS? Stress does not cause acne but it can make it worse  Make sure you get enough sleep and daily exercise! WHAT ABOUT FOODS/DIET? Try to eat a balanced, healthy diet  Some people feel that certain foods worsen their acne  While there aren't many studies available on this question, severe dietary changes are unlikely to help your acne and may be harmful to the health of your skin  If you find that a certain food seems to aggravate your acne, you may consider avoiding that food  Discuss this with your physician! WHAT CAUSES MY ACNE? There are four contributors to acne--the body's natural oil (sebum), clogged pores, bacteria (with the scientific name Propionibacterium acnes, or P  acnes, for short), and the body's reaction to the bacteria living in the clogged pores (which causes inflammation)  Here's what happens:     Sebum is produced in the normal oil-making glands in the deeper layers of the skin and reaches the surface through the skin's pores   An increase in certain hormones occurs around the time of puberty, and these hormones trigger the oil glands to produce increased amounts of sebum    Pores with excess oil tend to become clogged more easily   At the same time, P  acnes--one of the many types of bacteria that normally live on everyone's skin--thrives in the excess oil and causes a skin reaction (inflammation)   If a pore is clogged close to the surface, there is little inflammation  However, this results in the formation of whiteheads (closed comedones) or blackheads (open comedones) at the surface of the skin   A plug that extends to, or forms a little deeper in the pore, or one that enlarges or ruptures may cause more inflammation  The result is red bumps (papules) and pus-filled pimples (pustules)   If plugging happens in the deepest skin layer, the inflammation may be even more severe, resulting in the formation of nodules or cysts  When these types of acne heal, they may leave behind discolored areas or true scars  SKIN HYGIENE:  HOW SHOULD I [address] MY SKIN? Acne does not come from being dirty, however, washing your face is part of taking good care of your skin and will help keep your face clear  Good skin hygiene is, therefore, critical to support any acne treatment plan  Here are several specific suggestions for practicing good skin hygiene and keeping your skin looking its best:     You should wash acne-prone skin TWICE A DAY: Once in the morning and once in the evening  This does include any showers you take that day, so do not overdo it!  Do not scrub the skin with a washcloth or loofah as these can irritate and inflame your acne  Acne does not come from dirt, so it is not necessary to scrub the skin clean  In fact, scrubbing may lead to dryness and irritation that makes the acne even worse and harder for patients to tolerate acne medications   Use a gentle facial moisturizing cleanser (Cetaphil Moisturizing Cleanser or Dove Fragrance-Free bar)    Avoid using soaps like [address], [place] Spring, Lever, or soft/liquid soaps as these products will dry your skin    Do not use any over-the-counter acne washes without your doctor's specific instruction to do so  These products often contain salicylic acid or benzoyl peroxide  These ingredients can be helpful in clearing oil from the skin and reducing bacteria, but they may also be drying and can add to irritation   Do not use exfoliating products with microbeads or brushes as these can cause irritation to the skin   Facials and other treatments to remove, squeeze, or clean out pores are not recommended  Manipulating the skin in this way can make acne worse and can lead to severe infections and/or scarring  It also increases the likelihood that the skin will not be able to tolerate acne medications   Try not to pop pimples or pick at your acne as this can delay healing and may result in scarring or skin color changes (dark spots) that are often more noticeable than the acne itself  Picking/popping acne can also cause a serious skin infection   Wash or change your pillow case once to twice a week, especially if you use products in your hair   Wash the skin as soon as possible after playing sports or other activities that cause a lot of sweating  Also, pay attention to how your sports equipment (shoulder pads, helmet strap, etc ) might be making your acne worse   When you use makeup, moisturizer, or sunscreen make sure that these products are labeled non-comedogenic, or won't clog pores, or won't cause acne         SHOULD I TREAT MY ACNE? There are a number of other skin conditions that can look like acne  If there is any question about the diagnosis, then the person should be evaluated by a board certified pediatric and adolescent dermatologist   A physician should examine any child with acne who is between the ages of 3and 9years of age, as acne in this mid-childhood age group is not normal and may signal an underlying problem     If a preadolescent (9to 6years of age) or adolescent (15to 25years of age) has mild acne and the condition is not bothersome to the individual, proper and regular skin care (what your doctor may call skin hygiene) may be all that is needed at this point  Many people do, however, need specific acne medications to help their skin look and feel its best  Your doctor will tell you if you are one of these people  If so, you may be advised to use an over-the-counter or prescription medication that is applied to the skin (a topical medication) or if the addition of an oral medication (a medication taken by [employer]) is needed  The good news is that the medications work well when used properly! Some specific factors that may influence the choice of acne therapy include:     Severity  The number and type of skin lesions (papules or comedones) and the degree of inflammation (mild, moderate or severe)   Scarring  Scarring is most common when acne is severe, but it can happen even in children with mild acne   Impact  If a child is experiencing emotional complications because of the acne or is experiencing negative comments from other children   Cost of the acne medications  An acne expert can help to keep out of pocket costs to a minimum by utilizing the correct medications and the least expensive options   The patient's skin type (oily versus dry or combination skin, for example)   Potential side effects of the medication   The ease or overall complexity of the treatment plan or medication  WHAT ACNE TREATMENTS ARE AVAILABLE? Medications for acne try to stop the formation of new pimples by reducing or removing the oil, bacteria, and other things (like dead skin cells) that clog the pores  They can also decrease the inflammation or irritation response of the skin to bacteria  It may take from 6 to 8 weeks (about 2 months!) before you see any improvement and know if the medication is effective   It takes the layers of skin this long to regenerate  Remember, these medications do not cure the condition--the acne improves because of the medication  Therefore, treatment must be continued in order to prevent the return of acne lesions  There are many types of acne treatments  Some are applied to the skin (topical medications) and some are taken by mouth (oral medications)  In most cases of mild acne, the doctor will start with a topical medication  There are many different topical medications that are helpful for acne  If acne is more severe and it does not respond adequately to a topical medication, or if it covers large body surface areas such as the back and/or chest, oral antibiotics such as Doxycycline or Minocycline and/or oral hormone therapy such as Oral Contraceptive Pills or Spironolactone may be prescribed  In the most severe cases, isotretinoin (Accutane) may be used  In general, it is usually best to start with acne medications that are least likely to cause side effects but are at the same time capable of addressing the specific causes for the acne  Some patients have a good result with just one medication, but many will need to use a combination of treatments: two or more different topical agents or an oral medication plus a topical medication  Another treatment used for acne may include corticosteroid injections, which are used to help relieve pain, decrease the size, and encourage the healing of large, inflamed acne nodules  Also, dermatologists sometimes perform acne surgery, using a fine needle, a pointed blade, or an instrument known as a comedone extractor to mechanically clean out clogged pores  One must always weigh the risk for inducing a scar with the potential benefits of any procedure  Prior treatment with topical retinoids can loosen whiteheads and blackheads and make it easier to physically remove such lesions       Heat-based devices, and light and laser therapy are being studied to see whether there is any role for such treatments in mild to moderate acne  At this time, there is not enough evidence to make general recommendations about their use  TOPICAL ACNE MEDICATIONS    WHAT KIND OF TOPICALS ARE THERE?  Benzoyl peroxide (BP) helps to fight inflammation and is anti-microbial (kills bacteria, viruses, and other microorganisms) and is believed to help prevent resistance of bacteria to topical antibiotics  A benzoyl peroxide wash may be recommended for use on large areas such as the chest and/or back  Mild irritation and dryness are common when first using benzoyl peroxide-containing products  Be careful because benzoyl peroxide can bleach towels and clothing!  Retinoids (such as adapalene, tretinoin, or tazarotene) unplug the oil glands by helping peel away the layers of skin and other things plugging the opening of the glands  Mild irritation and dryness are common when first using these products  Facial waxing and other skin procedures can lead to excessive irritation and should be avoided during retinoid therapy   Antibiotics fight bacteria and help decrease inflammation  Topical antibiotics commonly used in acne include clindamycin, erythromycin, and combination agents (such as clindamycin/benzoyl peroxide or erythromycin/benzoyl peroxide)  Mild irritation and dryness are common when first using these products  Typically, topical antibiotics should not be used alone as treatment for acne   Other topical agents include salicylic acid, azelaic acid, dapsone, and sulfacetamide  Mild irritation and dryness can also occur when first using these products  USING YOUR TOPICAL TREATMENTS LIKE A PRO   Apply topical medications only to clean, dry skin  Topical medications may lead to significant dryness of the affected areas  To minimize this, wait 15-20 minutes after washing before applying your topical medication   These medications work deep in the skin to prevent new breakouts   Spot treatment of individual pimples does not do much  When applying topical medications to the face, use the 5-dot method  Start by placing a small pea-sized amount of the medication on your finger  Then, place dots in each of five locations of your face: Mid-forehead, each cheek, nose, and chin  Next, rub the medication into the entire area of skin - not just on individual pimples! Try to avoid the delicate skin around your eyes and corners of your mouth   The medications are not magic! They take weeks if not months to work  Be patient and use your medicine on a daily basis or as directed for six weeks before asking if your skin looks better  Try not to miss more than one or two days each week when using your medications   If you are starting a new medication, then try using it every other night or even every third night   Gradually work up to Vinny & Shauna a day    This will give your skin time to adjust    The same medications often come in various forms or formulations: Creams, ointments, lotions, gels, microspheres, or foams  Use the formulation that has been recommended and don't switch to other forms unless instructed  Some forms (such as alcohol based gels) may be more drying and less tolerable for certain skin types   Sometimes individual medications are not as effective as a combination of two or more agents  The doctor may need to try several medications or combinations before finding the one that is best for that patient   Moisturizer, sunscreen, and make-up may be used in conjunction with topical acne medications  In general, acne medications are applied first so they may directly contact the skin  Ask your physician to review specific application instructions!  It is especially important to always use sunscreen when using a topical retinoid or oral antibiotic  These drugs can make your skin more sensitive to the sun  In general, sunscreen gets applied AFTER any acne medications     Don't stop using your acne medications just because your acne got better  Remember, the acne is better because of the medication, and prevention is the suero to treatment  COMMON POSSIBLE SIDE EFFECTS OF MEDICATIONS     Retinoids - dryness, redness, increased sun sensitivity  WHEN AND WHERE TO CALL WITH CONCERNS  We are here to help! If you experience any unusual symptoms, then stop taking or using the medication and call our office at [phone])  It is better to be safe than to be sorry!

## 2022-05-04 NOTE — PROGRESS NOTES
[facility] Follow Up Note    Patient Name: Ishaan Petty  Encounter Date: 5/4/2022    Today's Chief Concerns:   Concern #1:  Follow up acne on face      Current Medications:    Current Outpatient Medications:     Cholecalciferol (Vitamin D) 50 MCG (2000 UT) tablet, Take 1 tablet (2,000 Units total) by mouth daily, Disp: 90 tablet, Rfl: 1    clonazePAM (KlonoPIN) 0 5 mg tablet, TAKE 1/2 TO 1 TABLET BY MOUTH 2 TIMES A DAY AS NEEDED FOR ANXIETY, Disp: 60 tablet, Rfl: 2    folic acid (FOLVITE) 1 mg tablet, Take 800 mcg by mouth daily, Disp: , Rfl:     gabapentin (NEURONTIN) 100 mg capsule, Take 1 capsule (100 mg total) by mouth 3 (three) times a day (Patient taking differently: Take 100 mg by mouth as needed  ), Disp: 90 capsule, Rfl: 3    hydroxychloroquine (PLAQUENIL) 200 mg tablet, Take 2 tablets (400 mg total) by mouth daily, Disp: 180 tablet, Rfl: 1    levothyroxine 25 mcg tablet, TAKE 1 TABLET (25 MCG TOTAL) BY MOUTH DAILY IN THE EARLY MORNING, Disp: 90 tablet, Rfl: 1    modafinil (PROVIGIL) 200 MG tablet, Take 1 tablet (200 mg total) by mouth daily, Disp: 30 tablet, Rfl: 2    Vortioxetine HBr (Trintellix) 20 MG tablet, Take 1 tablet (20 mg total) by mouth daily, Disp: 30 tablet, Rfl: 2    ergocalciferol (VITAMIN D2) 50,000 units, Take 1 capsule (50,000 Units total) by mouth 2 (two) times a week with meals (Patient taking differently: Take 5,000 Units by mouth in the morning  ), Disp: 24 capsule, Rfl: 0    oxyCODONE (Roxicodone) 5 immediate release tablet, Take 1 tablet (5 mg total) by mouth every 4 (four) hours as needed for moderate pain Max Daily Amount: 30 mg (Patient not taking: Reported on 4/28/2022 ), Disp: 10 tablet, Rfl: 0    pantoprazole (PROTONIX) 40 mg tablet, Take 1 tablet (40 mg total) by mouth daily (Patient not taking: Reported on 5/4/2022 ), Disp: 90 tablet, Rfl: 1    CONSTITUTIONAL:   Vitals:    05/04/22 1433   Temp: 97 8 °F (36 6 °C)   TempSrc: Temporal   Weight: 93 kg (205 lb)   Height: 5' 5 5" (1 664 m)       Specific Alerts:    Have you been seen by a [facility] in the last 3 years? YES    Are you pregnant or planning to become pregnant? No    Are you currently or planning to be nursing or breast feeding? No    Allergies   Allergen Reactions    Wound Dressing Adhesive Rash     Gets red rash from paper tape relatively quickly after application    Penicillins Hives     At young age       May we call your Preferred Phone number to discuss your specific medical information? YES    May we leave a detailed message that includes your specific medical information? YES    Have you traveled outside of the [facility] in the past 3 months? No    Do you currently have a pacemaker or defibrillator? No    Do you have any artificial heart valves, joints, plates, screws, rods, stents, pins, etc? No   - If Yes, were any placed within the last 2 years? Do you require any medications prior to a surgical procedure? No   - If Yes, for which procedure? - If Yes, what medications to you require? Are you taking any medications that cause you to bleed more easily ("blood thinners") No    Have you ever experienced a rapid heartbeat with epinephrine? No    Have you ever been treated with "gold" (gold sodium thiomalate) therapy? No    [facility] can help with wrinkles, "laugh lines," facial volume loss, "double chin," "love handles," age spots, and more  Are you interested in learning today about some of the skin enhancement procedures that we offer? (If Yes, please provide more detail) No    Review of Systems:  Have you recently had or currently have any of the following?     · Fever or chills: No  · Night Sweats: No  · Headaches: No  · Weight Gain: No  · Weight Loss: No  · Blurry Vision: No  · Nausea: No  · Vomiting: No  · Diarrhea: No  · Blood in Stool: No  · Abdominal Pain: No  · Itchy Skin: YES  · Painful Joints: YES, patient has lupus, treated with plaquenil  · Swollen Joints: YES  · Muscle Pain: No  · Irregular Mole: No  · Sun Burn: No  · Dry Skin: YES  · Skin Color Changes: No  · Scar or Keloid: No  · Cold Sores/Fever Blisters: No  · Bacterial Infections/MRSA: No  · Anxiety: YES, treated  · Depression: YES, treated  · Suicidal or Homicidal Thoughts: No      PSYCH: Normal mood and affect  EYES: Normal conjunctiva  ENT: Normal lips and oral mucosa  CARDIOVASCULAR: No edema  RESPIRATORY: Normal respirations      FULL ORGAN SYSTEM SKIN EXAM (SKIN)   Face Normal except as noted below in Assessment                                           1  ACNE VULGARIS ("COMMON ACNE")    Physical Exam:   Psychiatric/Mood:Normal   Anatomic Location Affected: Face   Morphological Description: See photos                     Additional History of Present Condition:  Present for 2-3 years  Patient has used Benzaclin gel, tretinoin 0 025 % cream and taken spironolactone 50 mg daily without good results (per patient)  Patient has SLE and was taken off spironolactone for that reason (and it was ineffective, per patient)  Assessment and Plan:   We reviewed the causes of acne, the kinds of acne, and the expected clinical course   We discussed treatment options ranging from over-the-counter products, topical retinoids, antibiotics, BP, hormonal therapies (OCPs/spironolactone), and isotretinoin (Accutane)   We reviewed specific over-the-counter interventions and medications  Recommended typical hygiene measures including water-based facial products, washing regularly with mild cleanser, and refraining from picking and popping any pimples   Recommended non-comedogenic sunscreen use daily   Expectations of therapy discussed  Side effects, risks and benefits of medications discussed   A comprehensive handout on Acne was provided     The phone number to call in case of questions or concerns (and instructions to stop medications in such a scenario) was provided   After lengthy discussion of etiology and treatment options, we decided to implement the following personalized treatment plan:    Based on a thorough discussion of this condition and the management approach to it (including a comprehensive discussion of the known risks, side effects and potential benefits of treatment), the patient (family) agrees to implement the following specific pl    Begin metronidazole gel twice a day  Begin Differin gel (OTC) apply a pea size amount to entire face one time a day    ACNE:  WHAT ZIT ALL ABOUT? WHY DO I HAVE ACNE/PIMPLES? Your skin is made of layers  To keep the skin from becoming dry and cracked, the skin needs oil  The oil is made in little wells in the deeper layers in the skin  People with acne have glands that make more oil and are more easily plugged, causing the glands to swell  Hormones, bacteria and your inherited tendency to have acne all play a role  The medical term for pimples is acne or acne vulgaris (vulgaris means common)  Most people get some acne  Acne does not come from being dirty  Instead, it is an expected consequence of changes that occur during normal growth and development  Hormones, bacteria, and your family's tendency to have acne may all play a role  Whiteheads or blackheads are openings of the glands (glands are the oil factories) onto the surface of the skin  Blackheads are not caused by dirt blocking the pores; instead, they result from the oxidation reaction of oil and skin in the pores with the air (like a rust reaction)  WHAT ABOUT STRESS? Stress does not cause acne but it can make it worse  Make sure you get enough sleep and daily exercise! WHAT ABOUT FOODS/DIET? Try to eat a balanced, healthy diet  Some people feel that certain foods worsen their acne   While there aren't many studies available on this question, severe dietary changes are unlikely to help your acne and may be harmful to the health of your skin  If you find that a certain food seems to aggravate your acne, you may consider avoiding that food  Discuss this with your physician! WHAT CAUSES MY ACNE? There are four contributors to acne--the body's natural oil (sebum), clogged pores, bacteria (with the scientific name Propionibacterium acnes, or P  acnes, for short), and the body's reaction to the bacteria living in the clogged pores (which causes inflammation)  Here's what happens:     Sebum is produced in the normal oil-making glands in the deeper layers of the skin and reaches the surface through the skin's pores  An increase in certain hormones occurs around the time of puberty, and these hormones trigger the oil glands to produce increased amounts of sebum   Pores with excess oil tend to become clogged more easily   At the same time, P  acnes--one of the many types of bacteria that normally live on everyone's skin--thrives in the excess oil and causes a skin reaction (inflammation)   If a pore is clogged close to the surface, there is little inflammation  However, this results in the formation of whiteheads (closed comedones) or blackheads (open comedones) at the surface of the skin   A plug that extends to, or forms a little deeper in the pore, or one that enlarges or ruptures may cause more inflammation  The result is red bumps (papules) and pus-filled pimples (pustules)   If plugging happens in the deepest skin layer, the inflammation may be even more severe, resulting in the formation of nodules or cysts  When these types of acne heal, they may leave behind discolored areas or true scars  SKIN HYGIENE:  HOW SHOULD I [facility] MY SKIN? Acne does not come from being dirty, however, washing your face is part of taking good care of your skin and will help keep your face clear  Good skin hygiene is, therefore, critical to support any acne treatment plan    Here are several specific suggestions for practicing good skin hygiene and keeping your skin looking its best:     You should wash acne-prone skin TWICE A DAY: Once in the morning and once in the evening  This does include any showers you take that day, so do not overdo it!  Do not scrub the skin with a washcloth or loofah as these can irritate and inflame your acne  Acne does not come from dirt, so it is not necessary to scrub the skin clean  In fact, scrubbing may lead to dryness and irritation that makes the acne even worse and harder for patients to tolerate acne medications   Use a gentle facial moisturizing cleanser (Cetaphil Moisturizing Cleanser or Dove Fragrance-Free bar)  Avoid using soaps like Yomaira Sinclair, Jaylon Villeda [address], [facility], or soft/liquid soaps as these products will dry your skin   Do not use any over-the-counter acne washes without your doctor's specific instruction to do so  These products often contain salicylic acid or benzoyl peroxide  These ingredients can be helpful in clearing oil from the skin and reducing bacteria, but they may also be drying and can add to irritation   Do not use exfoliating products with microbeads or brushes as these can cause irritation to the skin   Facials and other treatments to remove, squeeze, or clean out pores are not recommended  Manipulating the skin in this way can make acne worse and can lead to severe infections and/or scarring  It also increases the likelihood that the skin will not be able to tolerate acne medications   Try not to pop pimples or pick at your acne as this can delay healing and may result in scarring or skin color changes (dark spots) that are often more noticeable than the acne itself  Picking/popping acne can also cause a serious skin infection   Wash or change your pillow case once to twice a week, especially if you use products in your hair   Wash the skin as soon as possible after playing sports or other activities that cause a lot of sweating   Also, pay attention to how your sports equipment (shoulder pads, helmet strap, etc ) might be making your acne worse   When you use makeup, moisturizer, or sunscreen make sure that these products are labeled non-comedogenic, or won't clog pores, or won't cause acne         SHOULD I TREAT MY ACNE? There are a number of other skin conditions that can look like acne  If there is any question about the diagnosis, then the person should be evaluated by a board certified pediatric and adolescent dermatologist   A physician should examine any child with acne who is between the ages of 3and 9years of age, as acne in this mid-childhood age group is not normal and may signal an underlying problem  If a preadolescent (9to 6years of age) or adolescent (15to 25years of age) has mild acne and the condition is not bothersome to the individual, proper and regular skin care (what your doctor may call skin hygiene) may be all that is needed at this point  Many people do, however, need specific acne medications to help their skin look and feel its best  Your doctor will tell you if you are one of these people  If so, you may be advised to use an over-the-counter or prescription medication that is applied to the skin (a topical medication) or if the addition of an oral medication (a medication taken by [employer]) is needed  The good news is that the medications work well when used properly! Some specific factors that may influence the choice of acne therapy include:     Severity  The number and type of skin lesions (papules or comedones) and the degree of inflammation (mild, moderate or severe)   Scarring  Scarring is most common when acne is severe, but it can happen even in children with mild acne   Impact  If a child is experiencing emotional complications because of the acne or is experiencing negative comments from other children   Cost of the acne medications    An acne expert can help to keep out of pocket costs to a minimum by utilizing the correct medications and the least expensive options   The patient's skin type (oily versus dry or combination skin, for example)   Potential side effects of the medication   The ease or overall complexity of the treatment plan or medication  WHAT ACNE TREATMENTS ARE AVAILABLE? Medications for acne try to stop the formation of new pimples by reducing or removing the oil, bacteria, and other things (like dead skin cells) that clog the pores  They can also decrease the inflammation or irritation response of the skin to bacteria  It may take from 6 to 8 weeks (about 2 months!) before you see any improvement and know if the medication is effective  It takes the layers of skin this long to regenerate  Remember, these medications do not cure the condition--the acne improves because of the medication  Therefore, treatment must be continued in order to prevent the return of acne lesions  There are many types of acne treatments  Some are applied to the skin (topical medications) and some are taken by mouth (oral medications)  In most cases of mild acne, the doctor will start with a topical medication  There are many different topical medications that are helpful for acne  If acne is more severe and it does not respond adequately to a topical medication, or if it covers large body surface areas such as the back and/or chest, oral antibiotics such as Doxycycline or Minocycline and/or oral hormone therapy such as Oral Contraceptive Pills or Spironolactone may be prescribed  In the most severe cases, isotretinoin (Accutane) may be used  In general, it is usually best to start with acne medications that are least likely to cause side effects but are at the same time capable of addressing the specific causes for the acne   Some patients have a good result with just one medication, but many will need to use a combination of treatments: two or more different topical agents or an oral medication plus a topical medication  Another treatment used for acne may include corticosteroid injections, which are used to help relieve pain, decrease the size, and encourage the healing of large, inflamed acne nodules  Also, dermatologists sometimes perform acne surgery, using a fine needle, a pointed blade, or an instrument known as a comedone extractor to mechanically clean out clogged pores  One must always weigh the risk for inducing a scar with the potential benefits of any procedure  Prior treatment with topical retinoids can loosen whiteheads and blackheads and make it easier to physically remove such lesions  Heat-based devices, and light and laser therapy are being studied to see whether there is any role for such treatments in mild to moderate acne  At this time, there is not enough evidence to make general recommendations about their use  TOPICAL ACNE MEDICATIONS    WHAT KIND OF TOPICALS ARE THERE?  Benzoyl peroxide (BP) helps to fight inflammation and is anti-microbial (kills bacteria, viruses, and other microorganisms) and is believed to help prevent resistance of bacteria to topical antibiotics  A benzoyl peroxide wash may be recommended for use on large areas such as the chest and/or back  Mild irritation and dryness are common when first using benzoyl peroxide-containing products  Be careful because benzoyl peroxide can bleach towels and clothing!  Retinoids (such as adapalene, tretinoin, or tazarotene) unplug the oil glands by helping peel away the layers of skin and other things plugging the opening of the glands  Mild irritation and dryness are common when first using these products  Facial waxing and other skin procedures can lead to excessive irritation and should be avoided during retinoid therapy   Antibiotics fight bacteria and help decrease inflammation   Topical antibiotics commonly used in acne include clindamycin, erythromycin, and combination agents (such as clindamycin/benzoyl peroxide or erythromycin/benzoyl peroxide)  Mild irritation and dryness are common when first using these products  Typically, topical antibiotics should not be used alone as treatment for acne   Other topical agents include salicylic acid, azelaic acid, dapsone, and sulfacetamide  Mild irritation and dryness can also occur when first using these products  USING YOUR TOPICAL TREATMENTS LIKE A PRO   Apply topical medications only to clean, dry skin  Topical medications may lead to significant dryness of the affected areas  To minimize this, wait 15-20 minutes after washing before applying your topical medication   These medications work deep in the skin to prevent new breakouts  Spot treatment of individual pimples does not do much  When applying topical medications to the face, use the 5-dot method  Start by placing a small pea-sized amount of the medication on your finger  Then, place dots in each of five locations of your face: Mid-forehead, each cheek, nose, and chin  Next, rub the medication into the entire area of skin - not just on individual pimples! Try to avoid the delicate skin around your eyes and corners of your mouth   The medications are not magic! They take weeks if not months to work  Be patient and use your medicine on a daily basis or as directed for six weeks before asking if your skin looks better  Try not to miss more than one or two days each week when using your medications   If you are starting a new medication, then try using it every other night or even every third night   Gradually work up to Vinny & Shauna a day    This will give your skin time to adjust    The same medications often come in various forms or formulations: Creams, ointments, lotions, gels, microspheres, or foams  Use the formulation that has been recommended and don't switch to other forms unless instructed   Some forms (such as alcohol based gels) may be more drying and less tolerable for certain skin types   Sometimes individual medications are not as effective as a combination of two or more agents  The doctor may need to try several medications or combinations before finding the one that is best for that patient   Moisturizer, sunscreen, and make-up may be used in conjunction with topical acne medications  In general, acne medications are applied first so they may directly contact the skin  Ask your physician to review specific application instructions!  It is especially important to always use sunscreen when using a topical retinoid or oral antibiotic  These drugs can make your skin more sensitive to the sun  In general, sunscreen gets applied AFTER any acne medications   Don't stop using your acne medications just because your acne got better  Remember, the acne is better because of the medication, and prevention is the suero to treatment  COMMON POSSIBLE SIDE EFFECTS OF MEDICATIONS     Retinoids - dryness, redness, increased sun sensitivity  WHEN AND WHERE TO CALL WITH CONCERNS  We are here to help! If you experience any unusual symptoms, then stop taking or using the medication and call our office at [phone] (SKIN)  It is better to be safe than to be sorry!     Scribe Attestation    I,:  Reese Elizondo MA am acting as a scribe while in the presence of the attending physician :       I,:  Naomy Rodney MD personally performed the services described in this documentation    as scribed in my presence :

## 2022-05-05 NOTE — UTILIZATION REVIEW
Notification of Discharge   This is a Notification of Discharge from our facility [address]  Please be advised that this patient has been discharge from our facility  Below you will find the admission and discharge date and time including the patients disposition  UTILIZATION REVIEW CONTACT:  Mari Rojas  Utilization   Network Utilization Review Department  Phone: [phone] x carefully listen to the prompts  All voicemails are confidential   Email: [email or web address]     PHYSICIAN ADVISORY SERVICES:  FOR [id number] REVIEW - MEDICAL NECESSITY DENIAL  Phone: [phone]  Fax: [phone]  Email: [email or web address]     PRESENTATION DATE: 4/19/2022  5:13 AM  OBERVATION ADMISSION DATE: N/A  INPATIENT ADMISSION DATE: 4/19/22  8:14 AM   DISCHARGE DATE: 4/21/2022 10:59 AM  DISPOSITION: Home/Self Care Home/Self Care      IMPORTANT INFORMATION:  Send all requests for admission clinical reviews, approved or denied determinations and any other requests to dedicated fax number below belonging to the campus where the patient is receiving treatment   List of dedicated fax numbers:  [address] DENIALS (Administrative/Medical Necessity) [phone]   [address] (Maternity/NICU/Pediatrics) [phone]   [facility] [phone]   [address] [facility] [phone]   [address] [place] [address] [phone]   [address] [phone]   [facility]  [phone]   [address] [facility]  [address] [facility] [address] [facility] [phone]

## 2022-05-19 ENCOUNTER — TELEMEDICINE (OUTPATIENT)
Dept: BEHAVIORAL/MENTAL HEALTH CLINIC | Facility: CLINIC | Age: 37
End: 2022-05-19
Payer: COMMERCIAL

## 2022-05-19 ENCOUNTER — TELEPHONE (OUTPATIENT)
Dept: PSYCHIATRY | Facility: CLINIC | Age: 37
End: 2022-05-19

## 2022-05-19 ENCOUNTER — TELEPHONE (OUTPATIENT)
Dept: BARIATRICS | Facility: CLINIC | Age: 37
End: 2022-05-19

## 2022-05-19 DIAGNOSIS — F33.2 MAJOR DEPRESSIVE DISORDER, RECURRENT, SEVERE W/O PSYCHOTIC BEHAVIOR (HCC): Primary | ICD-10-CM

## 2022-05-19 DIAGNOSIS — K91.2 POSTSURGICAL MALABSORPTION: Primary | ICD-10-CM

## 2022-05-19 DIAGNOSIS — F41.1 GAD (GENERALIZED ANXIETY DISORDER): ICD-10-CM

## 2022-05-19 PROCEDURE — 90834 PSYTX W PT 45 MINUTES: CPT | Performed by: SOCIAL WORKER

## 2022-05-19 NOTE — BH TREATMENT PLAN
Mary Jo Sandersjitendra  [birth year]       Date of Initial Treatment Plan: 3/19/19   Date of Current Treatment Plan: 5/19/22     Treatment Plan Number 6     Strengths/Personal Resources for Self Care: kind, loving, friendly, intelligent     Diagnosis:   1  JG (generalized anxiety disorder)      2  Major depressive disorder, recurrent, severe w/o psychotic behavior ([facility] [address] )      3   Social phobia            Area of Needs: motivation and support         Long Term Goal 1: AImprove focus and attention on necessary activities that improve her life satisfaction       Target Date: 11/19/22  Completion Date: na         Short Term Objectives for Goal 1: AI will continue to ask for support from others as I identify a need, at least 75% of the time  and BI will continue to be able to set goals for myself and my family, including a future that I have designed for myself, at least 3 days per week       Long Term Goal 2: Sustain reduced symptoms of depression       Target Date: 11/19/22  Completion Date:  na     Short Term Objectives for Goal 2: AI will continue to focus on my mental health and identify and engage in activities that I enjoy at least three times per week  and BI will continue to comply with all recommendations made by the psychiatirst during all scheduled sessions        GOAL 1: Modality: Individual 2x per month   Completion Date ongoing, Medication Management and The person(s) responsible for carrying out the bernie Jimenez, Dr Angelo Gusman     GOAL 2: Modality: Individual 2x per month   Completion Date ongoing, Medication Management and The person(s) responsible for carrying out the bernie Jimenez, Dr Malinda Fisher and Treatment Plan explained to Desire Phipps relates understanding diagnosis and is agreeable to Treatment Plan          Client Comments : Please share your thoughts, feelings, need and/or experiences regarding your treatment plan:Desire العلي 1985, actively participated in the review and update of this treatment plan during a virtual session, using the [address] [place] [address]  [employer]  provided verbal consent on 5/19/2022 at 5:00 PM  The treatment plan was transcribed into the [facility]idi Record at a later time

## 2022-05-19 NOTE — TELEPHONE ENCOUNTER
Patient was Dg  With Covid- 19 on 5/18/22 she asked for medication   Tylenol, Mucinex  and cough syrup, can she take them

## 2022-05-19 NOTE — PSYCH
Virtual Regular Visit    Verification of patient location:    Patient is located in the following state in which I hold an active license [place]      Assessment/Plan:    Problem List Items Addressed This Visit        Other    JG (generalized anxiety disorder)    Major depressive disorder, recurrent, severe w/o psychotic behavior ([address] ) - Primary          Goals addressed in session: Goal 1 and Goal 2          Reason for visit is   Chief Complaint   Patient presents with    Virtual Regular Visit        Encounter provider Rachell Mcmahon    Provider located at [address] [place] [id number] [phone]      Recent Visits  No visits were found meeting these conditions  Showing recent visits within past 7 days and meeting all other requirements  Today's Visits  Date Type Provider Dept   05/19/22 Telephone Rachell [facility]   Showing today's visits and meeting all other requirements  Future Appointments  No visits were found meeting these conditions  Showing future appointments within next 150 days and meeting all other requirements       The patient was identified by name and date of birth  Elan Abarca was informed that this is a telemedicine visit and that the visit is being conducted throughic Embedded and patient was informed this is a secure, HIPAA-complaint platform  She agrees to proceed     My office door was closed  No one else was in the room  She acknowledged consent and understanding of privacy and security of the video platform  The patient has agreed to participate and understands they can discontinue the visit at any time  Patient is aware this is a billable service  Subjective  Elan Abarca is a 40 y o  female          HPI     Past Medical History:   Diagnosis Date    Acne     Anxiety     Back pain     Bipolar disorder ([address] )     pt denies    Carpal tunnel syndrome on right     Contact lens overwear of both eyes     CPAP (continuous positive airway pressure) dependence     Cubital tunnel syndrome on right     Depression     Disease of thyroid gland     GERD (gastroesophageal reflux disease)     Headache     Hypothyroidism     Kidney stone     Kidney stones     Lupus (systemic lupus erythematosus) (HCC)     Nausea     Obese     gastric KATLYN-EN-Y  today 4/19/2022    PONV (postoperative nausea and vomiting)     Sleep apnea     Wears glasses        Past Surgical History:   Procedure Laterality Date    CARPAL TUNNEL RELEASE      CHOLECYSTECTOMY      EGD      IN CYSTOURETHROSCOPY N/A 2/3/2020    Procedure: CYSTOSCOPY;  Surgeon: Shikha Mendoza MD;  [facility];  Service: UroGynecology           IN LAP GASTRIC BYPASS/KATLYN-EN-Y N/A 4/19/2022    Procedure: LAPAROSCOPIC KATLYN-EN-Y GASTRIC BYPASS & INTRAOPERATIVE EGD ROBOTICALLY ASSISTED;  Surgeon: Beni Gan MD;  [facility];  Service: Bariatrics    IN LAP,CHOLECYSTECTOMY N/A 9/6/2018    Procedure: CHOLECYSTECTOMY LAPAROSCOPIC W/ ROBOTICS;  Surgeon: Daniele Lebron MD;  [facility];  Service: General    IN POST [address] [place] [address] N/A 2/3/2020    Procedure: POSTERIOR COLPORRHAPHY;  Surgeon: Shikha Mendoza MD;  [facility];  Service: UroGynecology           IN REVISE MEDIAN N/CARPAL TUNNEL SURG Right 11/27/2020    Procedure: release CARPAL TUNNEL;  Surgeon: Jacinto Holley MD;  [facility];  Service: Orthopedics    IN REVISE ULNAR NERVE AT ELBOW Right 11/27/2020    Procedure: Collette Haleigh;  Surgeon:  Jacinto Holley MD;  [facility];  Service: Orthopedics    IN SLING OPER STRES INCONTINENCE N/A 2/3/2020    Procedure: PUBOVAGINAL SLING;  Surgeon: Shikha Mendoza MD;  [facility];  Service: UroGynecology           TUBAL LIGATION         Current Outpatient Medications   Medication Sig Dispense Refill    Cholecalciferol (Vitamin D) 50 MCG (2000 UT) tablet Take 1 tablet (2,000 Units total) by mouth daily 90 tablet 1    clonazePAM (KlonoPIN) 0 5 mg tablet TAKE 1/2 TO 1 TABLET BY MOUTH 2 TIMES A DAY AS NEEDED FOR ANXIETY 60 tablet 2    ergocalciferol (VITAMIN D2) 50,000 units Take 1 capsule (50,000 Units total) by mouth 2 (two) times a week with meals (Patient taking differently: Take 5,000 Units by mouth in the morning  ) 24 capsule 0    folic acid (FOLVITE) 1 mg tablet Take 800 mcg by mouth daily      gabapentin (NEURONTIN) 100 mg capsule Take 1 capsule (100 mg total) by mouth 3 (three) times a day (Patient taking differently: Take 100 mg by mouth as needed  ) 90 capsule 3    hydroxychloroquine (PLAQUENIL) 200 mg tablet Take 2 tablets (400 mg total) by mouth daily 180 tablet 1    levothyroxine 25 mcg tablet TAKE 1 TABLET (25 MCG TOTAL) BY MOUTH DAILY IN THE EARLY MORNING 90 tablet 1    metroNIDAZOLE (METROGEL) 0 75 % gel Apply topically twice a day 45 g 3    modafinil (PROVIGIL) 200 MG tablet Take 1 tablet (200 mg total) by mouth daily 30 tablet 2    oxyCODONE (Roxicodone) 5 immediate release tablet Take 1 tablet (5 mg total) by mouth every 4 (four) hours as needed for moderate pain Max Daily Amount: 30 mg (Patient not taking: Reported on 4/28/2022 ) 10 tablet 0    pantoprazole (PROTONIX) 40 mg tablet Take 1 tablet (40 mg total) by mouth daily (Patient not taking: Reported on 5/4/2022 ) 90 tablet 1    Vortioxetine HBr (Trintellix) 20 MG tablet Take 1 tablet (20 mg total) by mouth daily 30 tablet 2     No current facility-administered medications for this visit  Allergies   Allergen Reactions    Wound Dressing Adhesive Rash     Gets red rash from paper tape relatively quickly after application    Penicillins Hives     At young age       Review of Systems    Video Exam    There were no vitals filed for this visit      Physical Exam     I spent 50 minutes directly with the patient during this visit     Psychotherapy Provided: Individual Psychotherapy 50 minutes     Length of time in session: 4:00 pm to 4:50 pm, follow up in 2 week    Encounter Diagnosis     ICD-10-CM    1  Major depressive disorder, recurrent, severe w/o psychotic behavior ([address] )  F33 2    2  JG (generalized anxiety disorder)  F41 1        Goals addressed in session: Goal 1 and Goal 2     Pain:      moderate    5    Current suicide risk : Low     Therapist met Jose Del Rosario and therapist discussed her recovery from surgery  She shared that she was doing well and adjusting to her new eating habits  She discussed struggles with this adjustments and how it makes her feel  Therapist and Jose Del Rosario also explored her relationship with her boyfriend and ongoing struggles within this relationship  She noted that she continues to struggle with this but is managing ok  Therapist discussed transitioning from this therapist to another provider as she would be moving to a different program   Therapist will continue to explore her feelings regarding this in the coming weeks  Behavioral Health Treatment Plan [facility]: Diagnosis and Treatment Plan explained to Soledad Gambino relates understanding diagnosis and is agreeable to Treatment Plan  Yes     VIRTUAL VISIT DISCLAIMER    Ash Thomas verbally agrees to participate in [employer]  Pt is aware that [employer] could be limited without vital signs or the ability to perform a full hands-on physical Simon Beck understands she or the provider may request at any time to terminate the video visit and request the patient to seek care or treatment in person

## 2022-05-19 NOTE — TELEPHONE ENCOUNTER
Called Patient Left a message regarding appointment scheduled today in office for 4 pm will need to be switch to a Virtual visit due to the Provider working from home

## 2022-05-20 ENCOUNTER — APPOINTMENT (OUTPATIENT)
Dept: LAB | Facility: HOSPITAL | Age: 37
End: 2022-05-20
Attending: STUDENT IN AN ORGANIZED HEALTH CARE EDUCATION/TRAINING PROGRAM
Payer: COMMERCIAL

## 2022-05-20 ENCOUNTER — APPOINTMENT (OUTPATIENT)
Dept: LAB | Facility: HOSPITAL | Age: 37
End: 2022-05-20

## 2022-05-20 DIAGNOSIS — Z00.8 ENCOUNTER FOR OTHER GENERAL EXAMINATION: ICD-10-CM

## 2022-05-20 DIAGNOSIS — K91.2 POSTSURGICAL MALABSORPTION: ICD-10-CM

## 2022-05-20 LAB
25(OH)D3 SERPL-MCNC: 46.6 NG/ML (ref 30–100)
ALBUMIN SERPL BCP-MCNC: 4 G/DL (ref 3.5–5)
ALP SERPL-CCNC: 70 U/L (ref 46–116)
ALT SERPL W P-5'-P-CCNC: 39 U/L (ref 12–78)
ANION GAP SERPL CALCULATED.3IONS-SCNC: 12 MMOL/L (ref 4–13)
AST SERPL W P-5'-P-CCNC: 25 U/L (ref 5–45)
BILIRUB SERPL-MCNC: 0.48 MG/DL (ref 0.2–1)
BUN SERPL-MCNC: 12 MG/DL (ref 5–25)
CALCIUM SERPL-MCNC: 8.7 MG/DL (ref 8.3–10.1)
CHLORIDE SERPL-SCNC: 103 MMOL/L (ref 100–108)
CHOLEST SERPL-MCNC: 102 MG/DL
CO2 SERPL-SCNC: 26 MMOL/L (ref 21–32)
CREAT SERPL-MCNC: 0.67 MG/DL (ref 0.6–1.3)
ERYTHROCYTE [DISTWIDTH] IN BLOOD BY AUTOMATED COUNT: 14.1 % (ref 11.6–15.1)
EST. AVERAGE GLUCOSE BLD GHB EST-MCNC: 80 MG/DL
FERRITIN SERPL-MCNC: 102 NG/ML (ref 8–388)
FOLATE SERPL-MCNC: 16.6 NG/ML (ref 3.1–17.5)
GFR SERPL CREATININE-BSD FRML MDRD: 112 ML/MIN/1.73SQ M
GLUCOSE P FAST SERPL-MCNC: 78 MG/DL (ref 65–99)
HBA1C MFR BLD: 4.4 %
HCT VFR BLD AUTO: 41.2 % (ref 34.8–46.1)
HDLC SERPL-MCNC: 31 MG/DL
HGB BLD-MCNC: 13.1 G/DL (ref 11.5–15.4)
IRON SERPL-MCNC: 75 UG/DL (ref 50–170)
LDLC SERPL CALC-MCNC: 53 MG/DL (ref 0–100)
MCH RBC QN AUTO: 30.8 PG (ref 26.8–34.3)
MCHC RBC AUTO-ENTMCNC: 31.8 G/DL (ref 31.4–37.4)
MCV RBC AUTO: 97 FL (ref 82–98)
NONHDLC SERPL-MCNC: 71 MG/DL
PLATELET # BLD AUTO: 203 THOUSANDS/UL (ref 149–390)
PMV BLD AUTO: 11.4 FL (ref 8.9–12.7)
POTASSIUM SERPL-SCNC: 3.8 MMOL/L (ref 3.5–5.3)
PROT SERPL-MCNC: 7.1 G/DL (ref 6.4–8.2)
RBC # BLD AUTO: 4.26 MILLION/UL (ref 3.81–5.12)
SODIUM SERPL-SCNC: 141 MMOL/L (ref 136–145)
TRIGL SERPL-MCNC: 88 MG/DL
VIT B12 SERPL-MCNC: 634 PG/ML (ref 100–900)
WBC # BLD AUTO: 3.8 THOUSAND/UL (ref 4.31–10.16)

## 2022-05-20 PROCEDURE — 85027 COMPLETE CBC AUTOMATED: CPT

## 2022-05-20 PROCEDURE — 82607 VITAMIN B-12: CPT

## 2022-05-20 PROCEDURE — 36415 COLL VENOUS BLD VENIPUNCTURE: CPT

## 2022-05-20 PROCEDURE — 82746 ASSAY OF FOLIC ACID SERUM: CPT

## 2022-05-20 PROCEDURE — 84630 ASSAY OF ZINC: CPT

## 2022-05-20 PROCEDURE — 82728 ASSAY OF FERRITIN: CPT

## 2022-05-20 PROCEDURE — 83540 ASSAY OF IRON: CPT

## 2022-05-20 PROCEDURE — 82306 VITAMIN D 25 HYDROXY: CPT

## 2022-05-20 PROCEDURE — 83036 HEMOGLOBIN GLYCOSYLATED A1C: CPT

## 2022-05-20 PROCEDURE — 80053 COMPREHEN METABOLIC PANEL: CPT

## 2022-05-20 PROCEDURE — 84590 ASSAY OF VITAMIN A: CPT

## 2022-05-20 PROCEDURE — 84425 ASSAY OF VITAMIN B-1: CPT

## 2022-05-20 PROCEDURE — 80061 LIPID PANEL: CPT

## 2022-05-24 DIAGNOSIS — Z98.890 PONV (POSTOPERATIVE NAUSEA AND VOMITING): Primary | ICD-10-CM

## 2022-05-24 DIAGNOSIS — R11.2 PONV (POSTOPERATIVE NAUSEA AND VOMITING): Primary | ICD-10-CM

## 2022-05-24 LAB — ZINC SERPL-MCNC: 95 UG/DL (ref 44–115)

## 2022-05-24 RX ORDER — METOCLOPRAMIDE 5 MG/1
5 TABLET ORAL 4 TIMES DAILY PRN
Qty: 20 TABLET | Refills: 0 | Status: SHIPPED | OUTPATIENT
Start: 2022-05-24 | End: 2022-05-29

## 2022-05-25 DIAGNOSIS — D72.819 LEUKOPENIA, UNSPECIFIED TYPE: Primary | ICD-10-CM

## 2022-05-27 LAB — VIT B1 BLD-SCNC: 106.1 NMOL/L (ref 66.5–200)

## 2022-05-29 LAB — VIT A SERPL-MCNC: 7.6 UG/DL (ref 18.9–57.3)

## 2022-06-01 ENCOUNTER — CLINICAL SUPPORT (OUTPATIENT)
Dept: BARIATRICS | Facility: CLINIC | Age: 37
End: 2022-06-01

## 2022-06-01 DIAGNOSIS — K91.2 POSTSURGICAL MALABSORPTION: Primary | ICD-10-CM

## 2022-06-01 PROCEDURE — RECHECK

## 2022-06-02 ENCOUNTER — APPOINTMENT (OUTPATIENT)
Dept: LAB | Facility: HOSPITAL | Age: 37
End: 2022-06-02
Payer: COMMERCIAL

## 2022-06-02 LAB
BASOPHILS # BLD AUTO: 0.02 THOUSANDS/ΜL (ref 0–0.1)
BASOPHILS NFR BLD AUTO: 0 % (ref 0–1)
EOSINOPHIL # BLD AUTO: 0.18 THOUSAND/ΜL (ref 0–0.61)
EOSINOPHIL NFR BLD AUTO: 4 % (ref 0–6)
ERYTHROCYTE [DISTWIDTH] IN BLOOD BY AUTOMATED COUNT: 14.7 % (ref 11.6–15.1)
HCT VFR BLD AUTO: 36.2 % (ref 34.8–46.1)
HGB BLD-MCNC: 11.8 G/DL (ref 11.5–15.4)
IMM GRANULOCYTES # BLD AUTO: 0.01 THOUSAND/UL (ref 0–0.2)
IMM GRANULOCYTES NFR BLD AUTO: 0 % (ref 0–2)
LYMPHOCYTES # BLD AUTO: 1.52 THOUSANDS/ΜL (ref 0.6–4.47)
LYMPHOCYTES NFR BLD AUTO: 31 % (ref 14–44)
MCH RBC QN AUTO: 30.9 PG (ref 26.8–34.3)
MCHC RBC AUTO-ENTMCNC: 32.6 G/DL (ref 31.4–37.4)
MCV RBC AUTO: 95 FL (ref 82–98)
MONOCYTES # BLD AUTO: 0.49 THOUSAND/ΜL (ref 0.17–1.22)
MONOCYTES NFR BLD AUTO: 10 % (ref 4–12)
NEUTROPHILS # BLD AUTO: 2.68 THOUSANDS/ΜL (ref 1.85–7.62)
NEUTS SEG NFR BLD AUTO: 55 % (ref 43–75)
NRBC BLD AUTO-RTO: 0 /100 WBCS
PLATELET # BLD AUTO: 169 THOUSANDS/UL (ref 149–390)
PMV BLD AUTO: 11.2 FL (ref 8.9–12.7)
RBC # BLD AUTO: 3.82 MILLION/UL (ref 3.81–5.12)
WBC # BLD AUTO: 4.9 THOUSAND/UL (ref 4.31–10.16)

## 2022-06-02 PROCEDURE — 36415 COLL VENOUS BLD VENIPUNCTURE: CPT | Performed by: INTERNAL MEDICINE

## 2022-06-02 PROCEDURE — 85025 COMPLETE CBC W/AUTO DIFF WBC: CPT | Performed by: INTERNAL MEDICINE

## 2022-06-02 NOTE — PROGRESS NOTES
Virtual Weight Management Nutrition Class     Diagnosis: Obesity    Bariatric Surgeon: Dr Mariah Marcos    Surgery: Gastric Bypass Laparoscopic    Class: 5 week post op     Topics discussed today include:     fluid goals post op, protein goals post op, constipation, chew food well, exercise, avoidance of alcohol, PPI use, diet progression, hypoglycemia, dumping syndrome, protein supplems, vitamin/mineral supplements and calcium supplements    Patient was able to verbalize basic diet (protein, fluid, vitamin and mineral) recommendations and possible nutrition-related complications   Yes

## 2022-06-08 ENCOUNTER — TELEPHONE (OUTPATIENT)
Dept: PSYCHIATRY | Facility: CLINIC | Age: 37
End: 2022-06-08

## 2022-06-08 NOTE — TELEPHONE ENCOUNTER
spoke to pt and scheduled appt for 6/28 @ 5pm with alesha castrejon from [employer]

## 2022-06-09 ENCOUNTER — SOCIAL WORK (OUTPATIENT)
Dept: BEHAVIORAL/MENTAL HEALTH CLINIC | Facility: CLINIC | Age: 37
End: 2022-06-09
Payer: COMMERCIAL

## 2022-06-09 DIAGNOSIS — F40.10 SOCIAL PHOBIA: ICD-10-CM

## 2022-06-09 DIAGNOSIS — F33.2 MAJOR DEPRESSIVE DISORDER, RECURRENT, SEVERE W/O PSYCHOTIC BEHAVIOR (HCC): Primary | ICD-10-CM

## 2022-06-09 DIAGNOSIS — F41.1 GAD (GENERALIZED ANXIETY DISORDER): ICD-10-CM

## 2022-06-09 PROCEDURE — 90834 PSYTX W PT 45 MINUTES: CPT | Performed by: SOCIAL WORKER

## 2022-06-09 NOTE — PSYCH
[facility]    Patient Name Zion Comer     Date of Birth: 40 y o  1985      MRN: [id number]    Admission Date: 10/7/19    Date of Transfer: June 9, 2022    Admission Diagnosis:     1  Major Depressive Disorder  2  Generalized Anxiety Disorder    Current Diagnosis:     1  Major depressive disorder, recurrent, severe w/o psychotic behavior ([address] )     2  JG (generalized anxiety disorder)     3  Social phobia         Reason for Admission: Gopal Benitez presented for treatment due to depression and anxiety  Primary complaints included DEPRESSIVE SYMPTOMS: depressed mood, decreased interest, negative thoughts, irritability, difficulty sleeping and ANXIETY SYMPTOMS: daily anxiety symptoms, worrying about everyday issues, anxiety in social situations, anxiety attacks  Progress in Treatment: Gopal Benitez was seen for Psychiatric Evaluation, Medication Management and Individual Couseling  During the course of treatment she has been able to develop appropriate rapport with this therapist  She has been able to become strong and independent  She has developed reduced anxiety and depression, though struggles with social anxiety at times  She is kind and honest   She will continue to explore and expand on her strengths through upcoming treatment       Episodes of Higher Level of Care: Yes, one admission to Partial Hospitalization Program    Transfer request Initiated by: Psychiatrist: No Therapist: Grazyna Khoury    Reason for Transfer Request: clinician leaving practice    Does this individual need a clinician with specialized training/expertise?: No    Is this client working with any other hospitals Providers/Therapists?  Psychiatrist: Dr Beatriz Curtis Therapist: No    Other pertinent issues: Major Depressive Disorder and Generalized Anxiety Disorder    Are there any specific individuals who would be a best fit or who have already agreed to accept this transfer request?      Psychiatrist: Dr Saul Lopes   Therapist: Fausto Vincent at [facility]  Rationale: Prefers [facility] Therapist     Attempts to maintain the current therapeutic relationship: Not Applicable    Transfer request routed to Intake for input and/or approval      Comments from other involved providers and/or clinical coordinator: None    Holly Reyes06/09/22

## 2022-06-09 NOTE — PSYCH
Psychotherapy Provided: Individual Psychotherapy 45 minutes     Length of time in session: 4:00 pm to 4:45 pm, follow up in 2 week    Encounter Diagnosis     ICD-10-CM    1  Major depressive disorder, recurrent, severe w/o psychotic behavior ([facility] [address] )  F33 2    2  JG (generalized anxiety disorder)  F41 1    3  Social phobia  F40 10        Goals addressed in session: Goal 1 and Goal 2     Pain:      moderate to severe    6    Current suicide risk : Low     Therapist met with Nuris Patelfidel  She discussed her results of weight loss surgery and ongoing prognosis  She continues to grow in strength and demonstrates improved mood  She will continue to explore her struggles with social anxiety and discuss these feelings through upcoming treatment with a new therapist      [address]: Diagnosis and Treatment Plan explained to Richard Brennan relates understanding diagnosis and is agreeable to Treatment Plan   Yes

## 2022-06-15 ENCOUNTER — OFFICE VISIT (OUTPATIENT)
Dept: PSYCHIATRY | Facility: CLINIC | Age: 37
End: 2022-06-15
Payer: COMMERCIAL

## 2022-06-15 DIAGNOSIS — F40.10 SOCIAL PHOBIA: ICD-10-CM

## 2022-06-15 DIAGNOSIS — F41.1 GAD (GENERALIZED ANXIETY DISORDER): ICD-10-CM

## 2022-06-15 DIAGNOSIS — F33.2 MAJOR DEPRESSIVE DISORDER, RECURRENT, SEVERE W/O PSYCHOTIC BEHAVIOR (HCC): ICD-10-CM

## 2022-06-15 PROCEDURE — 99214 OFFICE O/P EST MOD 30 MIN: CPT | Performed by: PSYCHIATRY & NEUROLOGY

## 2022-06-15 PROCEDURE — 90833 PSYTX W PT W E/M 30 MIN: CPT | Performed by: PSYCHIATRY & NEUROLOGY
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
## 2022-06-28 ENCOUNTER — TELEMEDICINE (OUTPATIENT)
Dept: PSYCHIATRY | Facility: CLINIC | Age: 37
End: 2022-06-28

## 2022-06-28 DIAGNOSIS — F33.40 RECURRENT MAJOR DEPRESSIVE DISORDER, IN REMISSION (HCC): Primary | ICD-10-CM

## 2022-06-28 DIAGNOSIS — F41.1 GAD (GENERALIZED ANXIETY DISORDER): ICD-10-CM

## 2022-06-28 NOTE — PSYCH
Assessment/Plan:   Diagnoses and all orders for this visit:    Recurrent major depressive disorder, in remission ([facility] [address] )    JG (generalized anxiety disorder)           Patient ID:   Carole Paris is a 40 y o  female      Subjective:   Carole Paris presented in atypical and appropriate clothing  Carole Paris mood presented as anxious but appropriate to circumstances and affect as normal range and intensity, appropriate  Engagement pleasant and cooperative  Carole Paris reports that she is seeking out services as a result of changing therapist   Stevo Clarke has been with the same therapist for the past three years and her therapist has recently left marisa Phipps to swap therapist to ana treatment  Stevo Clarke has been working on her anxiety and depression symptoms  Stevo Clarke requested to not have to "unload and go over all of her history right away"  Stevo Clarke reports that she has been overall stable as of recent and has not experienced a relapse of depressive symptoms for a little while  Stevo Clarke recently had weight loss gastric bypass surgery a few months ago and continues to recover and learn healthy eating habits  She reports that she has been losing her hair as of recent and while this is an expected part of the weight loss surgery this is something that is concerning to her  She also reports that she has been batteling fatigue and that she is not able to get a grasp on this  She reports that she is seeing her surgeon tomorrow for a follow up visit post surgery and that she intends to asking him about this  As of recent she reports that things have been going well in her relationship but that they continue to have ups and downs  Stevo Clarke has two younger children who still reside in the home (1613 years old) and one older son (21) who lives out of the home  She reports that she is close with all of her children but wishes that she saw her elder son more often    Stevo Clarke denies that there are many interest that she had had and described that she hd her first son when she was 16 and by age 25 she had 3 children and that there was never a time in her life that she was just "her" or that she had time to look at what she liked  She is contemplative if this is an area that she wants to pursue and build upon  She reports that she is able to go out and be social but that when she is out there is a limit to how long she wants to be out before she is ready to go home  She described herself as someone that people come to unload their stuff on and tell her about things that they are going through  When asked who she unloads on or to, she replied "no one"  History of Present Illness: Symptoms began: 2019    Severity of symptoms has impacted patient by: Sahra Sanderson presented for treatment due to depression and anxiety  Primary complaints included DEPRESSIVE SYMPTOMS: depressed mood, decreased interest, negative thoughts, irritability, difficulty sleeping and ANXIETY SYMPTOMS: daily anxiety symptoms, worrying about everyday issues, anxiety in social situations, anxiety attacks  Strengths: caring, good listener, people feel comfortable talking to me, supportive family, employed     Problem Assessment:   Patient reports that the primary symptoms reported today that are causing distress:  worry of hair loss     Patients identified goals are: healthy outlets to use as coping skills and utilizing learned skills     HPI:   Pre-morbid level of function and History of Present Illness:reports that in 2019 it was bad but before that she reports feeling like she was able to handle her emotions    Previous Psychiatric/psychological treatment/year: 2019 to current   Current Psychiatrist/Therapist:  Psychiatrist: Dr Jannette Sutton Therapist: No  Outpatient and/or Partial and Other Community Resources Used (CTT, ICM, VNA): Yes, one admission to Partial Hospitalization Program    Are there any medical ailments that you are receiving treatment for: Yes, describe: Lupus for past 3 years  September will be 3 years  Had Gastric Bypass few months ago  Working on my eting and getting my proteins and food in  SOCIAL/VOCATION:  Family Constellation (include parents, relationship with each and pertinent Psych/Medical History):   Family History   Problem Relation Age of Onset    No Known Problems Mother     Alcohol abuse Father     Drug abuse Family     Psoriasis Maternal Grandmother     Colon cancer Maternal Uncle     Hypertension Maternal Grandfather     Heart disease Maternal Grandfather     Diabetes Neg Hx        Familial Relationships:    Spouse: This is an up and down  We are good right now  Children: Son [address] with his girlfriend we text often but I wish it was more frequent, had him when I was 14yo   Children: Son 16 Ty graduated , We are good and he is not moving out  We get along great, he works and has a girlfriend  Children: Daughter 15 yo  Jennifer  8th grade- we are good, she has her moments where she thinks she is grown   Previous marriages: Civil  Have 50/50 custody with father and he swaps back and forth week     Jory Michel relates best to "no one really, I stay to myself and my family"  Jory Michel lives with boyfriend and two younger children  Jory Michel does not live alone  Additional Comments related to family/relationships/peer support: Jory Michel reports I have people that are associates and work friends but I do not really like going out to socialize      Domestic Violence: No past history of domestic violence  Jory Michel denies experiencing domestic violence in her family of origin while growing up        Past History of traumatic events / losses / grief:      denies currently     Eating habits:  Working on this with healthy eating healing from gastic bypass   Sleep Habits:    School or Work History (strengths/limitations/needs):     Highest grade level achieved was:  12th [employer]     history includes: denies     Financial status includes: employed: [employer]   Been there 6 years * Been with this department for 3 years  When it comes to work it is not stressful and it is nothing that I take home with me  LEISURE ASSESSMENT   (Include past and present hobbies/interests and level of involvement (Ex: Group/Club Affiliations): has 2 dogs, if I am not cleaning or washing  There are not may things that I have done that I enjoy  "I don't know if I miss having this because I don't think I ever had that time"  Nuris Zaidi is contemplative if she wants to add this to her life at this time  Guicho Funez primary language is [place]  Preferred language is [place]  Ethnic and cultural considerations are: Guicho Armin denies that there are ethnic or cultural consideration that need to be monitored in treatment  Religions affiliations and level of involvement: Guicho Armin denies relating to Gnosticism or spiritual affiliation  Does spirituality help you cope? Yes      FUNCTIONAL STATUS: There has been a recent change in Guichojayda Funez ability to do the following: does not need can service      Level of Assistance Needed/By Whom?: not applicable     Allenwoodjayda Funez learns best by:  reading, listening, demonstration and picture      SUBSTANCE ABUSE ASSESSMENT: no substance abuse  Guicho Funez states that they have not used any of these substances in this time frame  DETOX HISTORY: Denies      Previous detox/rehab treatment: not applicable      HEALTH ASSESSMENT: no referral to PCP needed      Advanced Directive: No Mental Health Advance Directive or Power of  on file   Past legal history: Guicho Funez denies having a history of arrest, longterm/long term or DUIs  Prenatal History: uneventful pregnancy   Delivery History: born by vaginal delivery  Guichojayda Funez reports that her delivery was atypical and there were no concerns      Number of pregnancies: 3  Experiences with post partum depression: No     Developmental Milestones: N/A   Elan Abarca reports to her knowledge her mothers delivery was atypical and there were no concerns  Temperament as an infant was normal     Temperament as a toddler was normal   Temperament at school age was normal   Temperament as a teenager was normal      Elan Abarca reports that to her knowledge development was atypical, there were no concerns and all milestones were met within normal limits  Access to Weapons:   Elan Abarca denies access to the following weapons: n/a  The following steps have been taken to ensure weapons are properly secured: n/a     Risk Assessment:   The following ratings are based on my interview(s) with Desire     Risk of Harm to Self:   Demographic risk factors include   Historical Risk Factors include denies risk factors  Recent Specific Risk Factors include denies risk factors  Additional Factors for a Child or Adolescent adult patient  Elan Abarca denies current or history of self harm or suicidal ideations  Risk of Harm to Others:   Demographic Risk Factors include denies risk factors  Historical Risk Factors include denies risk factors  Recent Specific Risk Factors include denies risk factors  Elan Abarca denies  current or history of homicidal ideations  Based on the above information, the client presents the following risk of harm to self or others:  low     The following interventions are recommended:   no intervention changes      Notes regarding this Risk Assessment: The patient was educated to call 911 or go to the nearest emergency room if the symptoms become overwhelming or unable to remain in control  Verbalized understanding and agreed to seek help in case of distress or concern for safety  Elan Abarca  is of Minimal risk  Clinician processed with Elan Abarca past history of SI thoughts, plans and other self injurious behaviors  Elan Abarca denies all homicidal ideations both past and current  Review Of Systems:     Mood Normal   Behavior Normal    Thought Content Normal   General Emotional Problems   Personality Normal   Other Psych Symptoms Normal   Constitutional As Noted in HPI   ENT As Noted in HPI   Cardiovascular As Noted in HPI   Respiratory As Noted in HPI   Gastrointestinal Abdominal Pain, Nausea and (related to recent Gastric Bypass surgery) seeing surgeon 6/29/22   Genitourinary As Noted in HPI   Musculoskeletal As Noted in HPI   Integumentary As Noted in HPI   Neurological As Noted in HPI   Endocrine As Noted in HPI         Mental status:  Appearance calm and cooperative , adequate hygiene and grooming and good eye contact    Mood anxious   Affect affect appropriate    Speech a normal rate   Thought Processes normal thought processes   Hallucinations no hallucinations present    Thought Content no delusions   Abnormal Thoughts no suicidal thoughts  and no homicidal thoughts    Orientation  oriented to person and place and time   Remote Memory short term memory intact and long term memory intact   Attention Span concentration intact   Intellect Appears to be of Average Intelligence   Fund of Knowledge displays adequate knowledge of current events   Insight Insight intact   Judgement judgment was intact   Muscle Strength not formally assessed   Language no difficulty naming common objects   Pain none   Pain Scale 0       Virtual Regular Visit    Verification of patient location:    Patient is located in the following state in which I hold an active license [place]      Assessment/Plan:    Problem List Items Addressed This Visit        Other    JG (generalized anxiety disorder)    Major depressive disorder, recurrent, severe w/o psychotic behavior ([facility] [address] ) - Primary          Goals addressed in session: initial assessment completed         Reason for visit is No chief complaint on file         Encounter provider Karly Santos, EMELYN    Provider located at [address] [place] [id number] [phone]      Recent Visits  No visits were found meeting these conditions  Showing recent visits within past 7 days and meeting all other requirements  Today's Visits  Date Type Provider Dept   06/28/22 Telemedicine Leatha Sacks, LCSW [facility]   Showing today's visits and meeting all other requirements  Future Appointments  No visits were found meeting these conditions  Showing future appointments within next 150 days and meeting all other requirements       The patient was identified by name and date of birth  Ling Nogueira was informed that this is a telemedicine visit and that the visit is being conducted throughEpic Embedded and patient was informed this is a secure, HIPAA-complaint platform  She agrees to proceed     My office door was closed  No one else was in the room  She acknowledged consent and understanding of privacy and security of the video platform  The patient has agreed to participate and understands they can discontinue the visit at any time  Patient is aware this is a billable service  Subjective  Ling Nogueira is a 40 y o  female          HPI     Past Medical History:   Diagnosis Date    Acne     Anxiety     Back pain     Bipolar disorder ([address] )     pt denies    Carpal tunnel syndrome on right     Contact lens overwear of both eyes     CPAP (continuous positive airway pressure) dependence     Cubital tunnel syndrome on right     Depression     Disease of thyroid gland     GERD (gastroesophageal reflux disease)     Headache     Hypothyroidism     Kidney stone     Kidney stones     Lupus (systemic lupus erythematosus) (HCC)     Nausea     Obese     gastric KATLYN-EN-Y  today 4/19/2022    PONV (postoperative nausea and vomiting)     Sleep apnea     Wears glasses        Past Surgical History:   Procedure Laterality Date    CARPAL TUNNEL RELEASE      CHOLECYSTECTOMY      EGD      [place] CYSTOURETHROSCOPY N/A 2/3/2020    Procedure: CYSTOSCOPY;  Surgeon: Emmanuel Cooper MD;  [facility];  Service: UroGynecology           [place] LAP GASTRIC BYPASS/KATLYN-EN-Y N/A 4/19/2022    Procedure: LAPAROSCOPIC KATLYN-EN-Y GASTRIC BYPASS & INTRAOPERATIVE EGD ROBOTICALLY ASSISTED;  Surgeon: Chao Whitley MD;  [facility];  Service: Rose Marie Oar [place] LAP,CHOLECYSTECTOMY N/A 9/6/2018    Procedure: CHOLECYSTECTOMY LAPAROSCOPIC W/ ROBOTICS;  Surgeon: Luisito Lyons MD;  [facility];  Service: General    WA POST [address] [place] [address] N/A 2/3/2020    Procedure: POSTERIOR COLPORRHAPHY;  Surgeon: Emmanuel Cooper MD;  [facility];  Service: UroGynecology           [place] REVISE MEDIAN N/CARPAL TUNNEL SURG Right 11/27/2020    Procedure: release CARPAL TUNNEL;  Surgeon: Jennifer Mckinley MD;  [facility];  Service: Orthopedics    [place] REVISE ULNAR NERVE AT ELBOW Right 11/27/2020    Procedure: [place];  Surgeon:  Jennifer Mckinley MD;  [facility];  Service: Orthopedics    [place] SLING OPER STRES INCONTINENCE N/A 2/3/2020    Procedure: PUBOVAGINAL SLING;  Surgeon: Emmanuel Cooper MD;  [facility];  Service: UroGynecology           TUBAL LIGATION         Current Outpatient Medications   Medication Sig Dispense Refill    Cholecalciferol (Vitamin D) 50 MCG (2000 UT) tablet Take 1 tablet (2,000 Units total) by mouth daily 90 tablet 1    clonazePAM (KlonoPIN) 0 5 mg tablet TAKE 1/2 TO 1 TABLET BY MOUTH 2 TIMES A DAY AS NEEDED FOR ANXIETY 60 tablet 2    ergocalciferol (VITAMIN D2) 50,000 units Take 1 capsule (50,000 Units total) by mouth 2 (two) times a week with meals (Patient taking differently: Take 5,000 Units by mouth in the morning  ) 24 capsule 0    folic acid (FOLVITE) 1 mg tablet Take 800 mcg by mouth daily      gabapentin (NEURONTIN) 100 mg capsule Take 1 capsule (100 mg total) by mouth 3 (three) times a day (Patient taking differently: Take 100 mg by mouth as needed  ) 90 capsule 3    hydroxychloroquine (PLAQUENIL) 200 mg tablet Take 2 tablets (400 mg total) by mouth daily 180 tablet 1    levothyroxine 25 mcg tablet TAKE 1 TABLET (25 MCG TOTAL) BY MOUTH DAILY IN THE EARLY MORNING 90 tablet 1    metroNIDAZOLE (METROGEL) 0 75 % gel Apply topically twice a day 45 g 3    modafinil (PROVIGIL) 200 MG tablet Take 1 tablet (200 mg total) by mouth daily 30 tablet 2    pantoprazole (PROTONIX) 40 mg tablet Take 1 tablet (40 mg total) by mouth daily (Patient not taking: Reported on 5/4/2022 ) 90 tablet 1    Vortioxetine HBr (Trintellix) 20 MG tablet Take 1 tablet (20 mg total) by mouth daily 30 tablet 2     No current facility-administered medications for this visit  Allergies   Allergen Reactions    Wound Dressing Adhesive Rash     Gets red rash from paper tape relatively quickly after application    Penicillins Hives     At young age       Review of Systems    Video Exam    There were no vitals filed for this visit  Physical Exam     I spent 60 minutes directly with the patient during this visit    VIRTUAL VISIT DISCLAIMER    Jason Estrada verbally agrees to participate in [employer]  Pt is aware that [employer] could be limited without vital signs or the ability to perform a full hands-on physical Lord Nati understands she or the provider may request at any time to terminate the video visit and request the patient to seek care or treatment in person

## 2022-06-29 ENCOUNTER — OFFICE VISIT (OUTPATIENT)
Dept: BARIATRICS | Facility: CLINIC | Age: 37
End: 2022-06-29

## 2022-06-29 ENCOUNTER — TELEPHONE (OUTPATIENT)
Dept: DERMATOLOGY | Facility: CLINIC | Age: 37
End: 2022-06-29

## 2022-06-29 VITALS
DIASTOLIC BLOOD PRESSURE: 76 MMHG | BODY MASS INDEX: 28.61 KG/M2 | SYSTOLIC BLOOD PRESSURE: 110 MMHG | HEART RATE: 58 BPM | HEIGHT: 66 IN | WEIGHT: 178 LBS | TEMPERATURE: 98.5 F | OXYGEN SATURATION: 99 %

## 2022-06-29 DIAGNOSIS — Z98.84 BARIATRIC SURGERY STATUS: Primary | ICD-10-CM

## 2022-06-29 PROCEDURE — 99024 POSTOP FOLLOW-UP VISIT: CPT | Performed by: SURGERY

## 2022-06-29 RX ORDER — MULTIVIT-MIN/IRON/FOLIC ACID/K 45-800-120
CAPSULE ORAL DAILY
COMMUNITY

## 2022-06-29 NOTE — TELEPHONE ENCOUNTER
Called pt in regards of rescheduling her appt from aug 9th to aug 2nd, same time, location & provider  Lvm with our cb number

## 2022-06-29 NOTE — PROGRESS NOTES
Patient ID: Kim Irene is a 40 y o  female  Subjective:      40 y o  female  status post robotic RNYGB performed by Dr Devaughn Gottron on 4/19/22 presents to the office today for post-op follow up  Patient has been tolerating  A regular diet without N/V, dysphagia  Denies reflux symptoms and has been compliant with PPI therapy  Patient is having regular BMs and passing flatus  She has sustained a 38lb weight loss since the procedure  She is taking 48oz of liquid and getting 60grams of protein per day  Patient is also compliant with MVI therapy        Historical Information   Past Medical History:   Diagnosis Date    Acne     Anxiety     Back pain     Bipolar disorder ([address] )     pt denies    Carpal tunnel syndrome on right     Contact lens overwear of both eyes     CPAP (continuous positive airway pressure) dependence     Cubital tunnel syndrome on right     Depression     Disease of thyroid gland     GERD (gastroesophageal reflux disease)     Headache     Hypothyroidism     Kidney stone     Kidney stones     Lupus (systemic lupus erythematosus) (HCC)     Nausea     Obese     gastric KATLYN-EN-Y  today 4/19/2022    PONV (postoperative nausea and vomiting)     Sleep apnea     Wears glasses      Past Surgical History:   Procedure Laterality Date    CARPAL TUNNEL RELEASE      CHOLECYSTECTOMY      EGD      [place] CYSTOURETHROSCOPY N/A 2/3/2020    Procedure: CYSTOSCOPY;  Surgeon: Durward Krabbe, MD;  [facility];  Service: UroGynecology           [place] LAP GASTRIC BYPASS/KATLYN-EN-Y N/A 4/19/2022    Procedure: LAPAROSCOPIC KATLYN-EN-Y GASTRIC BYPASS & INTRAOPERATIVE EGD ROBOTICALLY ASSISTED;  Surgeon: Iris Eng MD;  [facility];  Service: Bariatrics    [place] LAP,CHOLECYSTECTOMY N/A 9/6/2018    Procedure: CHOLECYSTECTOMY LAPAROSCOPIC W/ ROBOTICS;  Surgeon: Nydia Lockhart MD;  [facility];  Service: General    GA [address] N/A 2/3/2020    Procedure: POSTERIOR COLPORRHAPHY;  Surgeon: Marco Johnson MD;  [facility];  Service: UroGynecology           [place] REVISE MEDIAN N/CARPAL TUNNEL SURG Right 11/27/2020    Procedure: release CARPAL TUNNEL;  Surgeon: Claudia Rivas MD;  [facility];  Service: Orthopedics    [place] REVISE ULNAR NERVE AT ELBOW Right 11/27/2020    Procedure: Kendra Francine;  Surgeon: Claudia Rivas MD;  [facility];  Service: Orthopedics    [place] SLING OPER STRES INCONTINENCE N/A 2/3/2020    Procedure: PUBOVAGINAL SLING;  Surgeon: Marco Johnson MD;  [facility];  Service: UroGynecology           TUBAL LIGATION       Social History   Social History     Substance and Sexual Activity   Alcohol Use Not Currently     Social History     Substance and Sexual Activity   Drug Use No     Social History     Tobacco Use   Smoking Status Never Smoker   Smokeless Tobacco Never Used       Meds/Allergies   all medications and allergies reviewed  Allergies   Allergen Reactions    Wound Dressing Adhesive Rash     Gets red rash from paper tape relatively quickly after application    Penicillins Hives     At young age       Review of Systems   Constitutional: Negative  HENT: Negative  Eyes: Negative  Respiratory: Negative  Cardiovascular: Negative  Gastrointestinal: Negative  Endocrine: Negative  Genitourinary: Negative  Musculoskeletal: Negative  Skin: Negative  Allergic/Immunologic: Negative  Neurological: Negative  Hematological: Negative  Psychiatric/Behavioral: Negative  All other systems reviewed and are negative  Objective:    /76 (BP Location: Left arm, Patient Position: Sitting, Cuff Size: Large)   Pulse 58   Temp 98 5 °F (36 9 °C) (Tympanic)   Ht 5' 5 5" (1 664 m)   Wt 80 7 kg (178 lb)   SpO2 99%   BMI 29 17 kg/m²       Physical Exam  Vitals and nursing note reviewed  Constitutional:       Appearance: Normal appearance     HENT:      Head: Normocephalic and atraumatic  Nose: Nose normal       Mouth/Throat:      Mouth: Mucous membranes are moist       Pharynx: Oropharynx is clear  Eyes:      Extraocular Movements: Extraocular movements intact  Pupils: Pupils are equal, round, and reactive to light  Cardiovascular:      Rate and Rhythm: Normal rate and regular rhythm  Pulses: Normal pulses  Pulmonary:      Effort: Pulmonary effort is normal    Abdominal:      General: Abdomen is flat  Bowel sounds are normal       Palpations: Abdomen is soft  Comments: Incisions are C/D/I  Healing well, without erythema or drainage  No bulges noted  Musculoskeletal:         General: Normal range of motion  Cervical back: Normal range of motion and neck supple  Skin:     General: Skin is warm and dry  Neurological:      General: No focal deficit present  Mental Status: She is alert and oriented to person, place, and time  Mental status is at baseline  Psychiatric:         Mood and Affect: Mood normal          Behavior: Behavior normal          Thought Content: Thought content normal          Judgment: Judgment normal            Intraoperative Pathology reviewed with patient  Assessment/Plan:     There are no diagnoses linked to this encounter  40 y o  female s/p status post robotic RNYGB performed by Dr Nichole Lanes on 4/19/22 , overall doing Well  Patient's has lost 66% of EBL since their initial visit with us  · Continue PPI therapy  · Continue vitamins as directed, with routine blood work follow up  · Patient to follow up with dietician per Bariatric protocols  · Continued/Maintain healthy weight loss with good nutrition intakes  · Adequate hydration with at least 64oz  fluid intake  · Follow diet as discussed  · Follow vitamin and mineral recommendations as reviewed with you  · Exercise as tolerated

## 2022-07-11 DIAGNOSIS — E03.9 ACQUIRED HYPOTHYROIDISM: ICD-10-CM

## 2022-07-11 RX ORDER — LEVOTHYROXINE SODIUM 0.03 MG/1
25 TABLET ORAL
Qty: 90 TABLET | Refills: 1 | Status: SHIPPED | OUTPATIENT
Start: 2022-07-11

## 2022-07-12 ENCOUNTER — TELEMEDICINE (OUTPATIENT)
Dept: PSYCHIATRY | Facility: CLINIC | Age: 37
End: 2022-07-12
Payer: COMMERCIAL

## 2022-07-12 DIAGNOSIS — F41.1 GAD (GENERALIZED ANXIETY DISORDER): ICD-10-CM

## 2022-07-12 DIAGNOSIS — F33.40 RECURRENT MAJOR DEPRESSIVE DISORDER IN REMISSION (HCC): Primary | ICD-10-CM

## 2022-07-12 PROCEDURE — 90834 PSYTX W PT 45 MINUTES: CPT | Performed by: SOCIAL WORKER

## 2022-07-12 NOTE — BH TREATMENT PLAN
Tana Fallon  [birth year]       Date of Initial Treatment Plan: 07/12/2022   Date of Current Treatment Plan: 07/12/22    Treatment Plan Number 1     Strengths/Personal Resources for Self Care: caring, good listener, people feel comfortable talking to me, supportive family, employed       Diagnosis:   1  Recurrent major depressive disorder in remission ([facility] [address] )     2  JG (generalized anxiety disorder)         Area of Needs: increase distress tolerance skills       Long Term Goal 1: Increase copings skills and management skills for anxiety    Target Date: 07/12/2023  Completion Date: to be determined          Short Term Objectives for Goal 1: Chong Hernandez to increase skills to manage intrusive and distracting thoughts that foster and increase anxiety- measured by her ability to be in the present moment and reduction in incidents of distraction  1  Tana Fallon identify 4-5 symptoms of anxiety that impacts her daily functioning  2  Clinician will educate Tana Fallon on skills to reduce anxiety such as but not limited to: DBT mindfulness practices, self soothing practices, CBT / REBT cognitive reframing, awareness of physical sensations from emotions (emotional intelligence), EMDR Imagery   3  Tana Fallon will utilize 1 new skill weekly and report barriers and successes to alterations in anxiety and emotional management   4  Clinician will provide Psychoeducational material on diagnosis including: positive and negative symptoms, treatments, signs of relapse or worsening of symptoms  5  Clinician will educate Tana Fallon on DBT radical acceptance, Wise Mind, Willingness vs  Willfulness and Turning the Mind   6  Clinician will engage Tnaa Fallon in a strength based open process to assist Tana Fallon in identifying 3-4 area of her life where she displayed and experienced personal strengths  7   Clinician will introduce self affirmations and Tana Fallon will identify 2-4 affirmations in session that can be utilized  This list will be built upon in ongoing sessions  8  Argentina Giron will engage self in daily affirmations and process successes / barriers to their use  9  Argentina Giron will develop a routine of self care and healthy habits  Clinician will provide education on the importance of healthy self care and provide suggestions of daily habits that are both small and large that Argentina Giron may have interest in  10  Clinician will have Argentinabilly Giron complete "Who am I" activity to assist Argentina Giron in developing a greater picture of how she currently sees himself and a gaining greater insight into who she wants to be       GOAL 1: Modality: Individual 2x per month   Completion Date to be determined and The person(s) responsible for carrying out the plan is  Melia Clayton: Diagnosis and Treatment Plan explained to Vera Mccallum relates understanding diagnosis and is agreeable to Treatment Plan  Client Comments : Please share your thoughts, feelings, need and/or experiences regarding your treatment plan: "I am sure there are more things that will come up but this is a good start"    Argentina Mack, [birth year], actively participated in the creation of this treatment plan during a virtual session, using the [employer]  Argentina Giron  provided verbal consent on 7/12/2022 at 355 PM  The treatment plan was transcribed into the [employer] [facility] at a later time

## 2022-07-12 NOTE — PSYCH
Problem List Items Addressed This Visit    None          Length of time in session: 45 minutes, follow up in 2   week    Psychotherapy Provided: Individual    Goals addressed in session: Goal 1    Pain: none    Current suicide risk: Lucretia Pro: low    Data: Met with Elaina Benz for scheduled individual session  Topics discussed included mood regulation and symptoms  Elaina Benz reports that overall she feels like she is doing well  As Brii Echols got to talking she began to open up with clinician and shared that she has been feeling more anxiety as of recent and is not sure where it is coming from  Brii Echols had a strong rapport with her previous therapist and had a long standing relationship with her  Brii Echols was accustomed to her previous therapist having history on her and being able to refect this back  When asked if this made talking difficult for her she stated "there is much more but we will get into it in time"  Brii Echols did share that she struggles with staying focused on a task  Through processing it was determined that this is only when she is at home and not at work or other environments  Clinician introduced grounding activities that Brii Echols could use and mindfulness self talk when working around the home  This is hopeful as Brii Echols presents as needing time to build rapport with writer  Elaina Benz shows evidence of utilizing Mindfulness-based strategies and emotion regulation skills to manage mental health symptoms  During this session, this clinical used the following therapeutic modalities Supportive psychotherapy  Assessment:  Elaina Benz presents with a within normal limits mood  Elaina Benz affect is normal range and intensity and appropriate  she exhibits growing therapeutic rapport with this clinician  she continues to exhibit willingness to work on treatment goals and objectives    Elaina Benz is showing progress in treatment by utilizing communication with therapist and beginning to open up  Gina Garcia continues to struggle with anxiety and distraction  Gina Garcia presents with a low risk of suicide, low risk of self-harm and minimal of harm to others  Plan:  Gina Garcia will return in 2 weeks for the next scheduled session  Between sessions, she will work on using mindfulness and grounding techniques and will report back during the next session re: success and barriers  At the next session, this clinical will use Supportive psychotherapy to address anxiety and distraction, in effort to assist Gina Garcia with meeting treatment goals  Behavior Health Treatment Plan [facility]: Diagnosis and treatment plan explained to Gina Garcia, she relates understanding and is agreeable to Treatment Plan: yes    Virtual Regular Visit    Verification of patient location:    Patient is located in the following state in which I hold an active license [place]      Assessment/Plan:    Problem List Items Addressed This Visit        Other    JG (generalized anxiety disorder)    Recurrent major depressive disorder in remission [facility]) - Primary          Goals addressed in session: Goal 1          Reason for visit is No chief complaint on file  Encounter provider Jens Larkin LCSW    Provider located at [address] [place] [id number] [phone]      Recent Visits  No visits were found meeting these conditions  Showing recent visits within past 7 days and meeting all other requirements  Today's Visits  Date Type Provider Dept   07/12/22 Telemedicine Jens Larkin LCSW [facility]   Showing today's visits and meeting all other requirements  Future Appointments  No visits were found meeting these conditions  Showing future appointments within next 150 days and meeting all other requirements       The patient was identified by name and date of birth  Sj Skaggs was informed that this is a telemedicine visit and that the visit is being conducted throughic Embedded and patient was informed this is a secure, HIPAA-complaint platform  She agrees to proceed     My office door was closed  No one else was in the room  She acknowledged consent and understanding of privacy and security of the video platform  The patient has agreed to participate and understands they can discontinue the visit at any time  Patient is aware this is a billable service  Subjective  Sj Skaggs is a 40 y o  female          HPI     Past Medical History:   Diagnosis Date    Acne     Anxiety     Back pain     Bipolar disorder ([address] )     pt denies    Carpal tunnel syndrome on right     Contact lens overwear of both eyes     CPAP (continuous positive airway pressure) dependence     Cubital tunnel syndrome on right     Depression     Disease of thyroid gland     GERD (gastroesophageal reflux disease)     Headache     Hypothyroidism     Kidney stone     Kidney stones     Lupus (systemic lupus erythematosus) (HCC)     Nausea     Obese     gastric KATLYN-EN-Y  today 4/19/2022    PONV (postoperative nausea and vomiting)     Sleep apnea     Wears glasses        Past Surgical History:   Procedure Laterality Date    CARPAL TUNNEL RELEASE      CHOLECYSTECTOMY      EGD      ID CYSTOURETHROSCOPY N/A 2/3/2020    Procedure: CYSTOSCOPY;  Surgeon: Katharine Chowdhury MD;  [facility];  Service: UroGynecology           ID LAP GASTRIC BYPASS/KATLYN-EN-Y N/A 4/19/2022    Procedure: LAPAROSCOPIC KATLYN-EN-Y GASTRIC BYPASS & INTRAOPERATIVE EGD ROBOTICALLY ASSISTED;  Surgeon: Nydia Carson MD;  [facility];  Service: Bariatrics    ID LAP,CHOLECYSTECTOMY N/A 9/6/2018    Procedure: CHOLECYSTECTOMY LAPAROSCOPIC W/ ROBOTICS;  Surgeon: Hardy Conway MD;  [facility];  Service: General    ID POST COLPORRHAPHY,RECTUM/VAGINA N/A 2/3/2020    Procedure: POSTERIOR COLPORRHAPHY;  Surgeon: Chaparro Powell MD;  [facility];  Service: UroGynecology           NE REVISE MEDIAN N/CARPAL TUNNEL SURG Right 11/27/2020    Procedure: release CARPAL TUNNEL;  Surgeon: Dirk Garcia MD;  [facility];  Service: Orthopedics    NE REVISE ULNAR NERVE AT ELBOW Right 11/27/2020    Procedure: Chrissy Furl;  Surgeon:  Dirk Garcia MD;  [facility];  Service: Orthopedics    NE SLING OPER STRES INCONTINENCE N/A 2/3/2020    Procedure: PUBOVAGINAL SLING;  Surgeon: Chaparro Powell MD;  [facility];  Service: UroGynecology           TUBAL LIGATION         Current Outpatient Medications   Medication Sig Dispense Refill    Calcium 600-400 MG-UNIT CHEW Chew daily      Cholecalciferol (Vitamin D) 50 MCG (2000 UT) tablet Take 1 tablet (2,000 Units total) by mouth daily 90 tablet 1    clonazePAM (KlonoPIN) 0 5 mg tablet TAKE 1/2 TO 1 TABLET BY MOUTH 2 TIMES A DAY AS NEEDED FOR ANXIETY 60 tablet 2    ergocalciferol (VITAMIN D2) 50,000 units Take 1 capsule (50,000 Units total) by mouth 2 (two) times a week with meals (Patient taking differently: Take 5,000 Units by mouth in the morning  ) 24 capsule 0    folic acid (FOLVITE) 1 mg tablet Take 800 mcg by mouth daily (Patient not taking: Reported on 6/29/2022)      gabapentin (NEURONTIN) 100 mg capsule Take 1 capsule (100 mg total) by mouth 3 (three) times a day (Patient not taking: Reported on 6/29/2022) 90 capsule 3    hydroxychloroquine (PLAQUENIL) 200 mg tablet Take 2 tablets (400 mg total) by mouth daily 180 tablet 1    levothyroxine 25 mcg tablet TAKE 1 TABLET (25 MCG TOTAL) BY MOUTH DAILY IN THE EARLY MORNING 90 tablet 1    metroNIDAZOLE (METROGEL) 0 75 % gel Apply topically twice a day 45 g 3    modafinil (PROVIGIL) 200 MG tablet Take 1 tablet (200 mg total) by mouth daily 30 tablet 2    Multiple Vitamins-Minerals (Bariatric Multivitamins/Iron) CAPS Take by mouth daily      pantoprazole (PROTONIX) 40 mg tablet Take 1 tablet (40 mg total) by mouth daily (Patient not taking: No sig reported) 90 tablet 1    Vortioxetine HBr (Trintellix) 20 MG tablet Take 1 tablet (20 mg total) by mouth daily 30 tablet 2     No current facility-administered medications for this visit  Allergies   Allergen Reactions    Wound Dressing Adhesive Rash     Gets red rash from paper tape relatively quickly after application    Penicillins Hives     At young age       Review of Systems    Video Exam    There were no vitals filed for this visit  Physical Exam     I spent 45 minutes directly with the patient during this visit    VIRTUAL VISIT DISCLAIMER    Russell Houser verbally agrees to participate in [employer]  Pt is aware that [employer] could be limited without vital signs or the ability to perform a full hands-on physical Jordi Mercer understands she or the provider may request at any time to terminate the video visit and request the patient to seek care or treatment in person

## 2022-07-14 ENCOUNTER — TELEPHONE (OUTPATIENT)
Dept: DERMATOLOGY | Facility: CLINIC | Age: 37
End: 2022-07-14

## 2022-07-14 NOTE — TELEPHONE ENCOUNTER
Called pt a second time in regards of rescheduling her appt from aug 9th to aug 2nd, same time, location & provider  Lvm with our cb number

## 2022-07-21 ENCOUNTER — TELEPHONE (OUTPATIENT)
Dept: DERMATOLOGY | Facility: CLINIC | Age: 37
End: 2022-07-21

## 2022-07-21 NOTE — TELEPHONE ENCOUNTER
Called pt a 3rd time in regards of rescheduling her appt from aug 9th to aug 2nd, same time, location & provider  Lvm stating that her appt is cancelled

## 2022-07-22 ENCOUNTER — TELEPHONE (OUTPATIENT)
Dept: BARIATRICS | Facility: CLINIC | Age: 37
End: 2022-07-22

## 2022-07-22 DIAGNOSIS — E50.9 VITAMIN A DEFICIENCY: ICD-10-CM

## 2022-07-22 DIAGNOSIS — Z98.84 BARIATRIC SURGERY STATUS: Primary | ICD-10-CM

## 2022-07-22 NOTE — TELEPHONE ENCOUNTER
Patient called office reporting feeling of "tired and drained " She is tolerating PO fluids and foods  Taking her MVI daily  She also reports she has a hx of CARLOS ENRIQUE and is using CPAP at night  Per chart review, labs are all within limits EXCEPT for her vitamin A  Discussed with patient her fatigue may or may not be related; in the meantime, start on 10,000 IU of retinyl acetate or palmitate  Advised risk of of toxicity and to avoid pregnancy  Stop vitamin A supplementation in 12 weeks and repeat lab in 4 months  In regards to patient's symptom, advised patient to follow up with PCP for further workup

## 2022-07-28 ENCOUNTER — OFFICE VISIT (OUTPATIENT)
Dept: FAMILY MEDICINE CLINIC | Facility: CLINIC | Age: 37
End: 2022-07-28
Payer: COMMERCIAL

## 2022-07-28 VITALS
BODY MASS INDEX: 27.16 KG/M2 | TEMPERATURE: 97.7 F | WEIGHT: 169 LBS | DIASTOLIC BLOOD PRESSURE: 60 MMHG | HEIGHT: 66 IN | SYSTOLIC BLOOD PRESSURE: 102 MMHG

## 2022-07-28 DIAGNOSIS — F51.13 HYPERSOMNIA DUE TO MENTAL DISORDER: ICD-10-CM

## 2022-07-28 DIAGNOSIS — F41.1 GAD (GENERALIZED ANXIETY DISORDER): ICD-10-CM

## 2022-07-28 DIAGNOSIS — E03.9 ACQUIRED HYPOTHYROIDISM: Primary | ICD-10-CM

## 2022-07-28 DIAGNOSIS — E55.9 VITAMIN D DEFICIENCY: ICD-10-CM

## 2022-07-28 DIAGNOSIS — L21.0 SEBORRHEA CAPITIS: ICD-10-CM

## 2022-07-28 DIAGNOSIS — K21.9 GASTROESOPHAGEAL REFLUX DISEASE WITHOUT ESOPHAGITIS: ICD-10-CM

## 2022-07-28 DIAGNOSIS — Z98.84 STATUS POST GASTRIC BYPASS FOR OBESITY: ICD-10-CM

## 2022-07-28 PROCEDURE — 99214 OFFICE O/P EST MOD 30 MIN: CPT | Performed by: FAMILY MEDICINE

## 2022-07-28 RX ORDER — CLOBETASOL PROPIONATE 0.46 MG/ML
SOLUTION TOPICAL 2 TIMES DAILY
Qty: 50 ML | Refills: 0 | Status: SHIPPED | OUTPATIENT
Start: 2022-07-28

## 2022-07-28 NOTE — PROGRESS NOTES
Assessment/Plan:    Acquired hypothyroidism  Continue levothyroxine 25 mcg daily  Recheck lab in 6 months    Status post gastric bypass for obesity  Has tolerated the surgery well, continue current diet and weight loss  BMI 27 0-27 9,adult  Continued weight loss encouraged    Vitamin D deficiency  Continue supplementation, check labs    GERD (gastroesophageal reflux disease)  Continue pantoprazole 40 mg daily    JG (generalized anxiety disorder)  Follow-up with psychiatry    Hypersomnia due to mental disorder  Had been started on provigil by Psychiatry  Follow up with Psychiatry  [facility]  Seems to be doing quite well on current medications  Follow-up with Psychiatry  Diagnoses and all orders for this visit:    Acquired hypothyroidism  -     TSH, 3rd generation; Future  -     T4; Future    Status post gastric bypass for obesity  -     Comprehensive metabolic panel; Future  -     CBC and differential; Future    Gastroesophageal reflux disease without esophagitis    Vitamin D deficiency    Seborrhea capitis  -     clobetasol (TEMOVATE) 0 05 % external solution; Apply topically 2 (two) times a day    BMI 27 0-27 9,adult    JG (generalized anxiety disorder)    Hypersomnia due to mental disorder          Subjective:   Chief Complaint   Patient presents with    Hypothyroidism    Depression          Patient ID: Kwame Doe is a 40 y o  female  She is here for follow-up on her hypothyroidism, GERD, vitamin-D deficiency, to update me on her depression and bipolar disorder, and for 3 month checkup after her gastric bypass surgery  She has lost 34 lb since May  She is tolerating her diet, with only occasional episodes reflux or vomiting  Her moods are fairly well stable on her medications, she continues to see Psychiatry  She is also complaining her hair thinning and a scaly rash on her scalp        The following portions of the patient's history were reviewed and updated as appropriate: allergies, current medications, past family history, past medical history, past social history, past surgical history and problem list     Review of Systems   Constitutional: Positive for fatigue ( she is getting a good 7 hours of sleep per night, but does not feel rested  )  Negative for activity change, chills, diaphoresis, fever and unexpected weight change (  Designed weight loss)  HENT: Negative for congestion, ear pain, hearing loss, nosebleeds, postnasal drip, rhinorrhea, sinus pressure, sore throat and tinnitus  Eyes: Negative for photophobia, pain, discharge, redness and visual disturbance  Respiratory: Negative for cough, chest tightness, shortness of breath and wheezing  Cardiovascular: Negative for chest pain, palpitations and leg swelling  Denies PAZ   Gastrointestinal: Negative for abdominal pain, blood in stool, constipation, diarrhea, nausea and vomiting  Endocrine: Negative  Genitourinary: Negative for difficulty urinating, dysuria, frequency, hematuria and urgency  Musculoskeletal: Negative for arthralgias, joint swelling and myalgias  Skin: Negative for rash and wound  Denies skin lesions, change in birthmarks   Allergic/Immunologic: Negative for environmental allergies, food allergies and immunocompromised state  Neurological: Negative for dizziness, tremors, seizures, syncope, weakness, numbness and headaches  Hematological: Negative  Psychiatric/Behavioral: Negative for behavioral problems, confusion, decreased concentration and sleep disturbance  The patient is not nervous/anxious  Objective:      /60   Temp 97 7 °F (36 5 °C)   Ht 5' 5 5" (1 664 m)   Wt 76 7 kg (169 lb)   BMI 27 70 kg/m²          Physical Exam  Vitals and nursing note reviewed  Constitutional:       General: She is not in acute distress  Appearance: She is well-developed        Comments:   Overweight   HENT:      Head: Normocephalic and atraumatic  Right Ear: Tympanic membrane, ear canal and external ear normal       Left Ear: Tympanic membrane, ear canal and external ear normal       Nose: Nose normal    Eyes:      Conjunctiva/sclera: Conjunctivae normal       Pupils: Pupils are equal, round, and reactive to light  Neck:      Thyroid: No thyromegaly  Vascular: No JVD  Trachea: No tracheal deviation  Cardiovascular:      Rate and Rhythm: Normal rate and regular rhythm  Heart sounds: Normal heart sounds  No murmur heard  Pulmonary:      Effort: Pulmonary effort is normal       Breath sounds: Normal breath sounds  No wheezing or rales  Abdominal:      General: Bowel sounds are normal       Palpations: Abdomen is soft  There is no mass  Tenderness: There is no abdominal tenderness  There is no guarding or rebound  Musculoskeletal:         General: No tenderness  Normal range of motion  Cervical back: Normal range of motion and neck supple  Lymphadenopathy:      Cervical: No cervical adenopathy  Skin:     General: Skin is warm and dry  Findings: Rash (  Mild seborrhea with some thinning of the hair ) present  No lesion  Neurological:      Mental Status: She is alert and oriented to person, place, and time  Cranial Nerves: No cranial nerve deficit  Deep Tendon Reflexes: Reflexes are normal and symmetric     Psychiatric:         Judgment: Judgment normal

## 2022-07-29 ENCOUNTER — APPOINTMENT (OUTPATIENT)
Dept: LAB | Facility: HOSPITAL | Age: 37
End: 2022-07-29
Payer: COMMERCIAL

## 2022-07-29 DIAGNOSIS — E03.9 ACQUIRED HYPOTHYROIDISM: ICD-10-CM

## 2022-07-29 DIAGNOSIS — Z98.84 STATUS POST GASTRIC BYPASS FOR OBESITY: ICD-10-CM

## 2022-07-29 LAB
ALBUMIN SERPL BCP-MCNC: 3.7 G/DL (ref 3.5–5)
ALP SERPL-CCNC: 74 U/L (ref 46–116)
ALT SERPL W P-5'-P-CCNC: 27 U/L (ref 12–78)
ANION GAP SERPL CALCULATED.3IONS-SCNC: 7 MMOL/L (ref 4–13)
AST SERPL W P-5'-P-CCNC: 15 U/L (ref 5–45)
BASOPHILS # BLD AUTO: 0.02 THOUSANDS/ΜL (ref 0–0.1)
BASOPHILS NFR BLD AUTO: 1 % (ref 0–1)
BILIRUB SERPL-MCNC: 0.4 MG/DL (ref 0.2–1)
BUN SERPL-MCNC: 8 MG/DL (ref 5–25)
CALCIUM SERPL-MCNC: 8.4 MG/DL (ref 8.3–10.1)
CHLORIDE SERPL-SCNC: 102 MMOL/L (ref 96–108)
CO2 SERPL-SCNC: 31 MMOL/L (ref 21–32)
CREAT SERPL-MCNC: 0.54 MG/DL (ref 0.6–1.3)
EOSINOPHIL # BLD AUTO: 0.13 THOUSAND/ΜL (ref 0–0.61)
EOSINOPHIL NFR BLD AUTO: 4 % (ref 0–6)
ERYTHROCYTE [DISTWIDTH] IN BLOOD BY AUTOMATED COUNT: 13.5 % (ref 11.6–15.1)
GFR SERPL CREATININE-BSD FRML MDRD: 120 ML/MIN/1.73SQ M
GLUCOSE P FAST SERPL-MCNC: 80 MG/DL (ref 65–99)
HCT VFR BLD AUTO: 38 % (ref 34.8–46.1)
HGB BLD-MCNC: 12 G/DL (ref 11.5–15.4)
IMM GRANULOCYTES # BLD AUTO: 0.01 THOUSAND/UL (ref 0–0.2)
IMM GRANULOCYTES NFR BLD AUTO: 0 % (ref 0–2)
LYMPHOCYTES # BLD AUTO: 1.24 THOUSANDS/ΜL (ref 0.6–4.47)
LYMPHOCYTES NFR BLD AUTO: 35 % (ref 14–44)
MCH RBC QN AUTO: 31.3 PG (ref 26.8–34.3)
MCHC RBC AUTO-ENTMCNC: 31.6 G/DL (ref 31.4–37.4)
MCV RBC AUTO: 99 FL (ref 82–98)
MONOCYTES # BLD AUTO: 0.28 THOUSAND/ΜL (ref 0.17–1.22)
MONOCYTES NFR BLD AUTO: 8 % (ref 4–12)
NEUTROPHILS # BLD AUTO: 1.9 THOUSANDS/ΜL (ref 1.85–7.62)
NEUTS SEG NFR BLD AUTO: 52 % (ref 43–75)
NRBC BLD AUTO-RTO: 0 /100 WBCS
PLATELET # BLD AUTO: 180 THOUSANDS/UL (ref 149–390)
PMV BLD AUTO: 10.8 FL (ref 8.9–12.7)
POTASSIUM SERPL-SCNC: 3.9 MMOL/L (ref 3.5–5.3)
PROT SERPL-MCNC: 6.8 G/DL (ref 6.4–8.4)
RBC # BLD AUTO: 3.84 MILLION/UL (ref 3.81–5.12)
SODIUM SERPL-SCNC: 140 MMOL/L (ref 135–147)
T4 SERPL-MCNC: 4.3 UG/DL (ref 4.7–13.3)
TSH SERPL DL<=0.05 MIU/L-ACNC: 2.64 UIU/ML (ref 0.45–4.5)
WBC # BLD AUTO: 3.58 THOUSAND/UL (ref 4.31–10.16)

## 2022-07-29 PROCEDURE — 36415 COLL VENOUS BLD VENIPUNCTURE: CPT

## 2022-07-29 PROCEDURE — 84436 ASSAY OF TOTAL THYROXINE: CPT

## 2022-07-29 PROCEDURE — 80053 COMPREHEN METABOLIC PANEL: CPT

## 2022-07-29 PROCEDURE — 84443 ASSAY THYROID STIM HORMONE: CPT

## 2022-07-29 PROCEDURE — 85025 COMPLETE CBC W/AUTO DIFF WBC: CPT

## 2022-08-03 ENCOUNTER — TELEPHONE (OUTPATIENT)
Dept: PSYCHIATRY | Facility: CLINIC | Age: 37
End: 2022-08-03

## 2022-08-09 ENCOUNTER — TELEPHONE (OUTPATIENT)
Dept: PSYCHIATRY | Facility: CLINIC | Age: 37
End: 2022-08-09

## 2022-08-22 ENCOUNTER — TELEMEDICINE (OUTPATIENT)
Dept: PSYCHIATRY | Facility: CLINIC | Age: 37
End: 2022-08-22
Payer: COMMERCIAL

## 2022-08-22 DIAGNOSIS — F33.2 MAJOR DEPRESSIVE DISORDER, RECURRENT, SEVERE W/O PSYCHOTIC BEHAVIOR (HCC): ICD-10-CM

## 2022-08-22 DIAGNOSIS — F41.1 GAD (GENERALIZED ANXIETY DISORDER): Primary | ICD-10-CM

## 2022-08-22 PROCEDURE — 90834 PSYTX W PT 45 MINUTES: CPT | Performed by: SOCIAL WORKER

## 2022-08-22 NOTE — PSYCH
Problem List Items Addressed This Visit        Other    JG (generalized anxiety disorder) - Primary    Major depressive disorder, recurrent, severe w/o psychotic behavior ([address] )          Length of time in session: 45 minutes, follow up in 3   week    Psychotherapy Provided: Individual    Goals addressed in session: Goal 1    Pain: none reported      Current suicide risk: Mounika Car: low    Data: Met with Genekarissa Marilyn for scheduled individual session  Topics discussed included anxiety and excessive worry and focus / attention span  Cher Sanders reports that she has bene having trouble focusing and feels "like the older I get the worse it is getting"  "I honestly feel like I have 20 computer tabs open all at once and no matter what I do I can never stay on pone long enough to close it"  Vanessa Lovell reports that she bought this up with her doctor a while ago but plans to bring it back up again  Clinician processed with Vanessa Haring behavioral changes that can be made to limit the amount of projects she can get into  Clinician provided example for Vanessa Harneel that it would be like limiting the amount of computer tabs that can be open  Vanessa Lovell agreed to give this a try and designated two areas of her home that would allow herself to complete projects at  This included an arts box and a 3x6 foot table  She agreed that she will work on only allowing herself projects that can fit on these two spaces and that in order to bring out a new project she must put something away  If this works, she has agreed to introduce alternative new behavioral changes  Cher Sanders shows evidence of utilizing willingness and observation of self and behaviors to manage mental health symptoms  During this session, this clinical used the following therapeutic modalities Motivations interviewing and Solution-focused therapy  Assessment:  Cher Sanders presents with a within normal limits mood    Cher Sanders affect is normal range and intensity and appropriate  he exhibits good therapeutic rapport with this clinician  she continues to exhibit willingness to work on treatment goals and objectives  Bobby Worley continues to struggle with attention according to her reports  Mali Morris reports frustration in not being able to focus  Bobby Worley presents with a low risk of suicide, low risk of self-harm and minimal of harm to others  Plan:  Bobby Worley will return in 3 weeks for the next scheduled session  Between sessions, she will work on organizational behavioral changes and will report back during the next session re: success and barriers  At the next session, this clinical will use Motivations interviewing and Solution-focused therapy to address anxiety and focus, in effort to assist Bobby Worley with meeting treatment goals  Behavior Health Treatment Plan [facility]: Diagnosis and treatment plan explained to Bobby Worley, she relates understanding and is agreeable to Treatment Plan: yes    Virtual Regular Visit    Verification of patient location:    Patient is located in the following state in which I hold an active license [place]      Assessment/Plan:    Problem List Items Addressed This Visit        Other    JG (generalized anxiety disorder) - Primary    Major depressive disorder, recurrent, severe w/o psychotic behavior ([address] )          Goals addressed in session: Goal 1          Reason for visit is No chief complaint on file  Encounter provider Sol Barraza LCSW    Provider located at [address] [place] [id number] [phone]      Recent Visits  No visits were found meeting these conditions    Showing recent visits within past 7 days and meeting all other requirements  Today's Visits  Date Type Provider Dept   08/22/22 Telemedicine Sol Barraza LCSW [facility]   Showing today's visits and meeting all other requirements  Future Appointments  No visits were found meeting these conditions  Showing future appointments within next 150 days and meeting all other requirements       The patient was identified by name and date of birth  Jorge Reese was informed that this is a telemedicine visit and that the visit is being conducted throughEpic Embedded and patient was informed this is a secure, HIPAA-complaint platform  She agrees to proceed     My office door was closed  No one else was in the room  She acknowledged consent and understanding of privacy and security of the video platform  The patient has agreed to participate and understands they can discontinue the visit at any time  Patient is aware this is a billable service  Subjective  Jorge Reese is a 40 y o  female          HPI     Past Medical History:   Diagnosis Date    Acne     Anxiety     Back pain     Bipolar disorder ([facility] [address] )     pt denies    Carpal tunnel syndrome on right     Contact lens overwear of both eyes     CPAP (continuous positive airway pressure) dependence     Cubital tunnel syndrome on right     Depression     Disease of thyroid gland     GERD (gastroesophageal reflux disease)     Headache     Hypothyroidism     Kidney stone     Kidney stones     Lupus (systemic lupus erythematosus) (HCC)     Nausea     Obese     gastric KATLYN-EN-Y  today 4/19/2022    PONV (postoperative nausea and vomiting)     Sleep apnea     Wears glasses        Past Surgical History:   Procedure Laterality Date    CARPAL TUNNEL RELEASE      CHOLECYSTECTOMY      EGD      RI CYSTOURETHROSCOPY N/A 2/3/2020    Procedure: CYSTOSCOPY;  Surgeon: Anil Borrego MD;  [facility];  Service: UroGynecology           RI LAP GASTRIC BYPASS/KATLYN-EN-Y N/A 4/19/2022    Procedure: LAPAROSCOPIC KATLYN-EN-Y GASTRIC BYPASS & INTRAOPERATIVE EGD ROBOTICALLY ASSISTED;  Surgeon: Natalia Hernadez MD;  [facility];  Service: Bariatrics  MS LAP,CHOLECYSTECTOMY N/A 9/6/2018    Procedure: CHOLECYSTECTOMY LAPAROSCOPIC W/ ROBOTICS;  Surgeon: Darrell Gunn MD;  [facility];  Service: General    MS POST COLPORRHAPHY,RECTUM/VAGINA N/A 2/3/2020    Procedure: POSTERIOR COLPORRHAPHY;  Surgeon: Jorge Pérez MD;  [facility];  Service: UroGynecology           MS REVISE MEDIAN N/CARPAL TUNNEL SURG Right 11/27/2020    Procedure: release CARPAL TUNNEL;  Surgeon: Jarred Sloan MD;  [facility];  Service: Orthopedics    MS REVISE ULNAR NERVE AT ELBOW Right 11/27/2020    Procedure: Karly Seller;  Surgeon:  Jarred Sloan MD;  [facility];  Service: Orthopedics    MS SLING OPER STRES INCONTINENCE N/A 2/3/2020    Procedure: PUBOVAGINAL SLING;  Surgeon: Jorge Pérez MD;  [facility];  Service: UroGynecology           TUBAL LIGATION         Current Outpatient Medications   Medication Sig Dispense Refill    Calcium 600-400 MG-UNIT CHEW Chew daily      Cholecalciferol (Vitamin D) 50 MCG (2000 UT) tablet Take 1 tablet (2,000 Units total) by mouth daily 90 tablet 1    clobetasol (TEMOVATE) 0 05 % external solution Apply topically 2 (two) times a day 50 mL 0    clonazePAM (KlonoPIN) 0 5 mg tablet TAKE 1/2 TO 1 TABLET BY MOUTH 2 TIMES A DAY AS NEEDED FOR ANXIETY 60 tablet 2    ergocalciferol (VITAMIN D2) 50,000 units Take 1 capsule (50,000 Units total) by mouth 2 (two) times a week with meals (Patient taking differently: Take 5,000 Units by mouth in the morning  ) 24 capsule 0    folic acid (FOLVITE) 1 mg tablet Take 800 mcg by mouth daily      gabapentin (NEURONTIN) 100 mg capsule Take 1 capsule (100 mg total) by mouth 3 (three) times a day 90 capsule 3    hydroxychloroquine (PLAQUENIL) 200 mg tablet Take 2 tablets (400 mg total) by mouth daily 180 tablet 1    levothyroxine 25 mcg tablet TAKE 1 TABLET (25 MCG TOTAL) BY MOUTH DAILY IN THE EARLY MORNING 90 tablet 1    metroNIDAZOLE (METROGEL) 0 75 % gel Apply topically twice a day 45 g 3    modafinil (PROVIGIL) 200 MG tablet Take 1 tablet (200 mg total) by mouth daily 30 tablet 2    Multiple Vitamins-Minerals (Bariatric Multivitamins/Iron) CAPS Take by mouth daily      pantoprazole (PROTONIX) 40 mg tablet Take 1 tablet (40 mg total) by mouth daily 90 tablet 1    Vortioxetine HBr (Trintellix) 20 MG tablet Take 1 tablet (20 mg total) by mouth daily 30 tablet 2     No current facility-administered medications for this visit  Allergies   Allergen Reactions    Wound Dressing Adhesive Rash     Gets red rash from paper tape relatively quickly after application    Penicillins Hives     At young age       Review of Systems    Video Exam    There were no vitals filed for this visit      Physical Exam     I spent 45 minutes directly with the patient during this visit

## 2022-09-06 DIAGNOSIS — F33.2 MAJOR DEPRESSIVE DISORDER, RECURRENT, SEVERE W/O PSYCHOTIC BEHAVIOR (HCC): ICD-10-CM

## 2022-09-06 DIAGNOSIS — F51.13 HYPERSOMNIA DUE TO MENTAL DISORDER: ICD-10-CM

## 2022-09-06 NOTE — TELEPHONE ENCOUNTER
Patient called to cancel her appt for 9/7 due to having dental work done  She rescheduled for 11/9/22    Requesting refill of modafinil 200 mg sent to [facility]

## 2022-09-07 RX ORDER — MODAFINIL 200 MG/1
200 TABLET ORAL DAILY
Qty: 30 TABLET | Refills: 2 | Status: SHIPPED | OUTPATIENT
Start: 2022-09-07 | End: 2022-12-06

## 2022-09-13 ENCOUNTER — OFFICE VISIT (OUTPATIENT)
Dept: RHEUMATOLOGY | Facility: CLINIC | Age: 37
End: 2022-09-13
Payer: COMMERCIAL

## 2022-09-13 VITALS
SYSTOLIC BLOOD PRESSURE: 112 MMHG | HEIGHT: 66 IN | WEIGHT: 148 LBS | BODY MASS INDEX: 23.78 KG/M2 | DIASTOLIC BLOOD PRESSURE: 70 MMHG

## 2022-09-13 DIAGNOSIS — M32.9 LUPUS (HCC): Primary | ICD-10-CM

## 2022-09-13 DIAGNOSIS — Z79.899 HIGH RISK MEDICATION USE: ICD-10-CM

## 2022-09-13 DIAGNOSIS — R53.83 OTHER FATIGUE: ICD-10-CM

## 2022-09-13 DIAGNOSIS — M25.50 ARTHRALGIA OF MULTIPLE JOINTS: ICD-10-CM

## 2022-09-13 PROCEDURE — 99214 OFFICE O/P EST MOD 30 MIN: CPT | Performed by: INTERNAL MEDICINE

## 2022-09-13 RX ORDER — HYDROXYCHLOROQUINE SULFATE 200 MG/1
300 TABLET, FILM COATED ORAL DAILY
Qty: 135 TABLET | Refills: 1 | Status: SHIPPED | OUTPATIENT
Start: 2022-09-13 | End: 2023-03-12

## 2022-09-13 NOTE — PROGRESS NOTES
Assessment/Plan: Lethaniel Phalen is a 40 y o  female who presents for follow-up of her SLE (malar rash, arthralgias, dry eyes, hair thinning, equivocal anti-dsDNA, indeterminate anticardiolipin IgM, +MAYUR at 1:160 speckled pattern)  Has lost a lot of weight since gastric bypass surgery in April  Stopped Celebrex  Has been feeling tired lately, which may or may not be due to her recent weight loss surgery  Do labs  Decrease hydroxychlorquine to one and a half tabs daily due to weight loss; continue regular eye exams    Return to clinic in 7 months    Problem List Items Addressed This Visit        Other    Arthralgia of multiple joints    High risk medication use      Other Visit Diagnoses     Lupus ([facility] [address] )    -  Primary    Relevant Medications    hydroxychloroquine (PLAQUENIL) 200 mg tablet    Other Relevant Orders    CBC and differential (Completed)    C4 complement (Completed)    C3 complement (Completed)    Anti-DNA antibody, double-stranded (Completed)    Urinalysis with microscopic (Completed)    Sedimentation rate, automated (Completed)    Protein / creatinine ratio, urine (Completed)    C-reactive protein (Completed)    Other fatigue          High risk medication use - Benefits and risks of hydroxychloroquine, including but not limited to retinal toxicity, corneal deposits, gastrointestinal side effects, and headaches were previously discussed with the patient  Patient is aware of the need for a regular eye exam to monitor for ocular toxicity while on this medication  Follow-up: RTC in 7 months        Rheumatic Disease Summary  Sj Power a 40 y  o  female who originally presented on 9/17/19 as a Rheumatology consult referred by her Mark Solares DO for evaluation of possible autoimmune disease given positive MAYUR of 1:160 in a speckled pattern   Patient's clinical signs and symptoms were consistent with early lupus given her history of a malar rash and arthralgias  She also had dry eyes, but no dry mouth, and admitted to hair thinning  She denied blood clot or miscarriage history, or Raynaud's symptoms  Admitted to reflux symptoms  Lupus activity labs were ordered, which returned unremarkable; anti-dsDNA returned equivocal at 6 and anti-SSA/SSB were negative  She also had a livedoid-appearing rash on her arms, so antiphospholipid antibody panel was ordered, with anticardiolipin IgM returning indeterminate at 13  Her Vit  D level was low, so she was prescribed ergocalciferol 50,000 units po weekly  Abdomen ultrasound was ordered to work-up her transaminitis, but it returned significant only for splenomegaly  For her arthralgias, had prescribed meloxicam 7 5 mg 1-2 times a day, and for her shooting/neuropathic pain, had prescribed gabapentin 100 mg p o  Q h s      She presented on 10/18/19 for follow-up of likely early SLE (malar rash, arthralgias, dry eyes, hair thinning, equivocal anti-dsDNA, indeterminate anticardiolipin IgM, +MAYUR at 1:160 speckled pattern)  Patient continued to have arthralgias  Had stopped meloxicam since it wasn't helping patient, and started her on diclofenac 75mg po bid instead to help with her arthralgias  For long-term management of her lupus, had initiated weight-based hydroxychloroquine 200mg po bid  Advised patient to get regular eye exams while on this medication to monitor for plaquenil-related retinal toxicity  Since patient has chronic low back pain and right SI joint tenderness, had ordered a HLA-B27 and SI joint x-rays to evaluate for a concurrent seronegative spondyloarthropathy such as ankylosing spondylitis, but these returned unremarkable  Had also ordered a Hepatitis B core Ab to do along with PCP's labs to further work-up her transaminitis, which returned negative      She presented for follow-up on 1/14/20, at which time her main symptoms were arthralgia and fatigue   Lupus activity labs ordered; repeat anticardiolipin Ab ordered to rule-out antiphospholipid syndrome since test was previously indeterminate, patient already is in a hypercoagulable state from being heterozygous for the MTHFR gene mutation  She was to continue hydroxychloroquine 200mg po bid  Stopped oral diclofenac since patient was not finding benefit from it for her joint pain symptoms; instead prescribed celecoxib 100mg po bid  Also prescribed methocarbamol 500mg po bid prn for back muscle spasm  Physical therapy referral also made for low back pain  4/20/20: Patient's arthralgia symptoms have been prominent, some days are better than others  Also has had episodes of hives on arms  Increased her celecoxib to 200mg po bid  She is to continue hydroxychloroquine 200mg twice a day; reminded her to get eye exam  Prescribed hydrocortisone ointment as needed for itchiness/rash  Increased her methocarbamol frequency to 500mg po bid as needed for her back spasms  Recommended patient to make a physical therapy appointment  36/8/1659: Kamille Olvera is a 39 y o  female who presented for follow-up of her SLE (malar rash, arthralgias, dry eyes, hair thinning, equivocal anti-dsDNA, indeterminate anticardiolipin IgM, +MAYUR at 1:160 speckled pattern)  She went and got a 2nd opinion from Dr Susanna Alvarado since her last clinic visit with me in April 2020, and he did agree with my diagnosis of lupus and her her current medication management  Has some mild lupus symptoms such as malar erythema, mouth sores, and arthralgia  Lupus activity labs ordered, which all returned normal   Since patient was forgetting to take her 2nd hydroxychloroquine tab in a day, asked her to take both at a time if she is able to tolerate it; and is to continue regular eye exams  Prescribed celecoxib 200 mg p o  b i d  as needed for joint pain  Do labs  Continue hydroxychloroquine 400mg daily  Stop celecoxib, start indomethacin once a day as needed, can go up to twice a day as needed  Finish Medrol dose pack        Subjective/HPI  Starling Plants Eric Montaño is a 40 y o  female who presents for follow-up of her likely SLE (malar rash, arthralgias, dry eyes, hair thinning, equivocal anti-dsDNA, indeterminate anticardiolipin IgM, +MAYUR at 1:160 speckled pattern)  Last visit was 08/9/2021  Patient lost 80 lb since her last visit  She had gastric bypass surgery in April  Has been feeling tired lately  She stopped Celebrex  She is up-to-date on her eye exams  For past 2 weeks, has been feeling foggy, body pain everywhere, headaches, burning sensation of skin  Has been having jolting pain through feet and internal pain  Has no energy or motivation      Past Medical History:   Diagnosis Date   • Acne    • Anxiety    • Back pain    • Bipolar disorder ([address] )     pt denies   • Carpal tunnel syndrome on right    • Contact lens overwear of both eyes    • CPAP (continuous positive airway pressure) dependence    • Cubital tunnel syndrome on right    • Depression    • Disease of thyroid gland    • GERD (gastroesophageal reflux disease)    • Headache    • Hypothyroidism    • Kidney stone    • Kidney stones    • Lupus (systemic lupus erythematosus) (HCC)    • Nausea    • Obese     gastric KATLYN-EN-Y  today 4/19/2022   • PONV (postoperative nausea and vomiting)    • Sleep apnea    • Wears glasses        Past Surgical History:   Procedure Laterality Date   • CARPAL TUNNEL RELEASE     • CHOLECYSTECTOMY     • EGD     • MS CYSTOURETHROSCOPY N/A 2/3/2020    Procedure: CYSTOSCOPY;  Surgeon: Cortney Juares MD;  [facility];  Service: UroGynecology          • MS LAP GASTRIC BYPASS/KATLYN-EN-Y N/A 4/19/2022    Procedure: LAPAROSCOPIC KATLYN-EN-Y GASTRIC BYPASS & INTRAOPERATIVE EGD ROBOTICALLY ASSISTED;  Surgeon: Burgess Ehsan MD;  [facility];  Service: Bariatrics   • MS LAP,CHOLECYSTECTOMY N/A 9/6/2018    Procedure: CHOLECYSTECTOMY LAPAROSCOPIC W/ ROBOTICS;  Surgeon: Cynthia England MD;  [facility];  Service: General   • MS POST COLPORRHAPHY,RECTUM/VAGINA N/A 2/3/2020    Procedure: POSTERIOR COLPORRHAPHY;  Surgeon: Ellen Beckham MD;  [facility];  Service: UroGynecology          • VA REVISE MEDIAN N/CARPAL TUNNEL SURG Right 11/27/2020    Procedure: release CARPAL TUNNEL;  Surgeon: Ebony Louie MD;  [facility];  Service: Orthopedics   • VA REVISE ULNAR NERVE AT ELBOW Right 11/27/2020    Procedure: Letty Abide;  Surgeon:  Ebony Louie MD;  [facility];  Service: Orthopedics   • VA SLING OPER STRES INCONTINENCE N/A 2/3/2020    Procedure: PUBOVAGINAL SLING;  Surgeon: Ellen Beckham MD;  [facility];  Service: UroGynecology          • TUBAL LIGATION         Current Outpatient Medications   Medication Sig Dispense Refill   • Calcium 600-400 MG-UNIT CHEW Chew daily     • Cholecalciferol (Vitamin D) 50 MCG (2000 UT) tablet Take 1 tablet (2,000 Units total) by mouth daily 90 tablet 1   • clobetasol (TEMOVATE) 0 05 % external solution Apply topically 2 (two) times a day 50 mL 0   • folic acid (FOLVITE) 1 mg tablet Take 800 mcg by mouth daily     • gabapentin (NEURONTIN) 100 mg capsule Take 1 capsule (100 mg total) by mouth 3 (three) times a day 90 capsule 3   • hydroxychloroquine (PLAQUENIL) 200 mg tablet Take 1 5 tablets (300 mg total) by mouth daily 135 tablet 1   • levothyroxine 25 mcg tablet TAKE 1 TABLET (25 MCG TOTAL) BY MOUTH DAILY IN THE EARLY MORNING 90 tablet 1   • metroNIDAZOLE (METROGEL) 0 75 % gel Apply topically twice a day 45 g 3   • modafinil (PROVIGIL) 200 MG tablet Take 1 tablet (200 mg total) by mouth daily 30 tablet 2   • Multiple Vitamins-Minerals (Bariatric Multivitamins/Iron) CAPS Take by mouth daily     • pantoprazole (PROTONIX) 40 mg tablet Take 1 tablet (40 mg total) by mouth daily 90 tablet 1   • Vortioxetine HBr (Trintellix) 20 MG tablet Take 1 tablet (20 mg total) by mouth daily 30 tablet 2   • clonazePAM (KlonoPIN) 0 5 mg tablet TAKE 1/2 TO 1 TABLET BY MOUTH 2 TIMES A DAY AS NEEDED FOR ANXIETY 60 tablet 2     No current facility-administered medications for this visit  Allergies   Allergen Reactions   • Wound Dressing Adhesive Rash     Gets red rash from paper tape relatively quickly after application   • Penicillins Hives     At young age       Review of Systems:  Review of Systems   Constitutional: Positive for fatigue  HENT: Negative for mouth sores  Eyes: Negative for pain  Respiratory: Negative for shortness of breath  Cardiovascular: Negative for leg swelling  Endocrine: Positive for cold intolerance  Musculoskeletal: Positive for back pain  Negative for arthralgias and joint swelling  Skin: Negative for rash  Allergic/Immunologic: Positive for environmental allergies  Neurological: Negative for weakness  Hematological: Negative for adenopathy  Psychiatric/Behavioral: Negative for sleep disturbance  Physical exam:   Physical Exam  Constitutional:       General: She is not in acute distress  HENT:      Head: Normocephalic and atraumatic  Eyes:      Conjunctiva/sclera: Conjunctivae normal    Cardiovascular:      Rate and Rhythm: Normal rate and regular rhythm  Heart sounds: S1 normal and S2 normal      No friction rub  Pulmonary:      Effort: Pulmonary effort is normal  No respiratory distress  Breath sounds: Normal breath sounds  No wheezing, rhonchi or rales  Musculoskeletal:      Cervical back: Neck supple  Skin:     Coloration: Skin is not pale  Neurological:      Mental Status: She is alert  Mental status is at baseline     Psychiatric:         Mood and Affect: Mood normal          Behavior: Behavior normal        Imaging:  EGD 01/31/2022  The esophagus, stomach and 2nd part of the duodenum appeared normal   Performed single biopsy to rule out H  pylori in the antrum  Small sliding hiatal hernia (type I hiatal hernia) without Kristy Salgado lesions present    SI Joint x-rays 11/9/2019 - unremarkable    Labs:  Office Visit on 09/13/2022   Component Date Value Ref Range Status   • WBC 09/14/2022 3 24 (A) 4 31 - 10 16 Thousand/uL Final   • RBC 09/14/2022 3 68 (A) 3 81 - 5 12 Million/uL Final   • Hemoglobin 09/14/2022 11 7  11 5 - 15 4 g/dL Final   • Hematocrit 09/14/2022 35 9  34 8 - 46 1 % Final   • MCV 09/14/2022 98  82 - 98 fL Final   • MCH 09/14/2022 31 8  26 8 - 34 3 pg Final   • MCHC 09/14/2022 32 6  31 4 - 37 4 g/dL Final   • RDW 09/14/2022 13 2  11 6 - 15 1 % Final   • MPV 09/14/2022 10 2  8 9 - 12 7 fL Final   • Platelets 77/86/1879 168  149 - 390 Thousands/uL Final   • nRBC 09/14/2022 0  /100 WBCs Final   • Neutrophils Relative 09/14/2022 53  43 - 75 % Final   • Immat GRANS % 09/14/2022 0  0 - 2 % Final   • Lymphocytes Relative 09/14/2022 36  14 - 44 % Final   • Monocytes Relative 09/14/2022 8  4 - 12 % Final   • Eosinophils Relative 09/14/2022 3  0 - 6 % Final   • Basophils Relative 09/14/2022 0  0 - 1 % Final   • Neutrophils Absolute 09/14/2022 1 71 (A) 1 85 - 7 62 Thousands/µL Final   • Immature Grans Absolute 09/14/2022 0 01  0 00 - 0 20 Thousand/uL Final   • Lymphocytes Absolute 09/14/2022 1 16  0 60 - 4 47 Thousands/µL Final   • Monocytes Absolute 09/14/2022 0 27  0 17 - 1 22 Thousand/µL Final   • Eosinophils Absolute 09/14/2022 0 08  0 00 - 0 61 Thousand/µL Final   • Basophils Absolute 09/14/2022 0 01  0 00 - 0 10 Thousands/µL Final   • C4, COMPLEMENT 09/14/2022 28 0  10 0 - 40 0 mg/dL Final   • C3 Complement 09/14/2022 74 7 (A) 90 0 - 180 0 mg/dL Final   • ds DNA Ab 09/14/2022 4  0 - 9 IU/mL Final                                       Negative      <5                                     Equivocal  5 - 9                                     Positive      >9   • Color, UA 09/14/2022 Yellow   Final   • Clarity, UA 09/14/2022 Clear   Final   • Specific Gravity, UA 09/14/2022 >=1 030  1 003 - 1 030 Final   • pH, UA 09/14/2022 5 5  4 5, 5 0, 5 5, 6 0, 6 5, 7 0, 7 5, 8 0 Final   • Leukocytes, UA 09/14/2022 Negative  Negative Final   • Nitrite, UA 09/14/2022 Negative  Negative Final   • Protein, UA 09/14/2022 Trace (A) Negative mg/dl Final   • Glucose, UA 09/14/2022 Negative  Negative mg/dl Final   • Ketones, UA 09/14/2022 15 (1+) (A) Negative mg/dl Final   • Urobilinogen, UA 09/14/2022 0 2  0 2, 1 0 E U /dl E U /dl Final   • Bilirubin, UA 09/14/2022 Small (A) Negative Final   • Occult Blood, UA 09/14/2022 Negative  Negative Final   • RBC, UA 09/14/2022 None Seen  None Seen, 2-4 /hpf Final   • WBC, UA 09/14/2022 1-2 (A) None Seen, 2-4, 5-60 /hpf Final   • Epithelial Cells 09/14/2022 Occasional  None Seen, Occasional /hpf Final   • Bacteria, UA 09/14/2022 Occasional  None Seen, Occasional /hpf Final   • MUCUS THREADS 09/14/2022 Moderate (A) None Seen Final   • Sed Rate 09/14/2022 <1  0 - 19 mm/hour Final   • Creatinine, Ur 09/14/2022 223 0  mg/dL Final   • Protein Urine Random 09/14/2022 70  mg/dL Final   • Prot/Creat Ratio, Ur 09/14/2022 0 31 (A) 0 00 - 0 10 Final   • CRP 09/14/2022 <3 0  <3 0 mg/L Final   Appointment on 07/29/2022   Component Date Value Ref Range Status   • TSH 3RD GENERATON 07/29/2022 2 641  0 450 - 4 500 uIU/mL Final    The recommended reference ranges for TSH during pregnancy are as follows:   First trimester 0 1 to 2 5 uIU/mL   Second trimester  0 2 to 3 0 uIU/mL   Third trimester 0 3 to 3 0 uIU/m    Note: Normal ranges may not apply to patients who are transgender, non-binary, or whose legal sex, sex at birth, and gender identity differ  Adult TSH (3rd generation) reference range follows the recommended guidelines of the American Thyroid Association, January, 2020     • T4 TOTAL 07/29/2022 4 3 (A) 4 7 - 13 3 ug/dL Final   • Sodium 07/29/2022 140  135 - 147 mmol/L Final   • Potassium 07/29/2022 3 9  3 5 - 5 3 mmol/L Final   • Chloride 07/29/2022 102  96 - 108 mmol/L Final   • CO2 07/29/2022 31  21 - 32 mmol/L Final   • ANION GAP 07/29/2022 7  4 - 13 mmol/L Final   • BUN 07/29/2022 8  5 - 25 mg/dL Final   • Creatinine 07/29/2022 0 54 (A) 0 60 - 1 30 mg/dL Final    Standardized to IDMS reference method   • Glucose, Fasting 07/29/2022 80  65 - 99 mg/dL Final    Specimen collection should occur prior to Sulfasalazine administration due to the potential for falsely depressed results  Specimen collection should occur prior to Sulfapyridine administration due to the potential for falsely elevated results  • Calcium 07/29/2022 8 4  8 3 - 10 1 mg/dL Final   • AST 07/29/2022 15  5 - 45 U/L Final    Specimen collection should occur prior to Sulfasalazine administration due to the potential for falsely depressed results  • ALT 07/29/2022 27  12 - 78 U/L Final    Specimen collection should occur prior to Sulfasalazine administration due to the potential for falsely depressed results  • Alkaline Phosphatase 07/29/2022 74  46 - 116 U/L Final   • Total Protein 07/29/2022 6 8  6 4 - 8 4 g/dL Final   • Albumin 07/29/2022 3 7  3 5 - 5 0 g/dL Final   • Total Bilirubin 07/29/2022 0 40  0 20 - 1 00 mg/dL Final    Use of this assay is not recommended for patients undergoing treatment with eltrombopag due to the potential for falsely elevated results     • eGFR 07/29/2022 120  ml/min/1 73sq m Final   • WBC 07/29/2022 3 58 (A) 4 31 - 10 16 Thousand/uL Final   • RBC 07/29/2022 3 84  3 81 - 5 12 Million/uL Final   • Hemoglobin 07/29/2022 12 0  11 5 - 15 4 g/dL Final   • Hematocrit 07/29/2022 38 0  34 8 - 46 1 % Final   • MCV 07/29/2022 99 (A) 82 - 98 fL Final   • MCH 07/29/2022 31 3  26 8 - 34 3 pg Final   • MCHC 07/29/2022 31 6  31 4 - 37 4 g/dL Final   • RDW 07/29/2022 13 5  11 6 - 15 1 % Final   • MPV 07/29/2022 10 8  8 9 - 12 7 fL Final   • Platelets 74/23/8721 180  149 - 390 Thousands/uL Final   • nRBC 07/29/2022 0  /100 WBCs Final   • Neutrophils Relative 07/29/2022 52  43 - 75 % Final   • Immat GRANS % 07/29/2022 0  0 - 2 % Final   • Lymphocytes Relative 07/29/2022 35  14 - 44 % Final   • Monocytes Relative 07/29/2022 8  4 - 12 % Final   • Eosinophils Relative 07/29/2022 4 0 - 6 % Final   • Basophils Relative 07/29/2022 1  0 - 1 % Final   • Neutrophils Absolute 07/29/2022 1 90  1 85 - 7 62 Thousands/µL Final   • Immature Grans Absolute 07/29/2022 0 01  0 00 - 0 20 Thousand/uL Final   • Lymphocytes Absolute 07/29/2022 1 24  0 60 - 4 47 Thousands/µL Final   • Monocytes Absolute 07/29/2022 0 28  0 17 - 1 22 Thousand/µL Final   • Eosinophils Absolute 07/29/2022 0 13  0 00 - 0 61 Thousand/µL Final   • Basophils Absolute 07/29/2022 0 02  0 00 - 0 10 Thousands/µL Final   Orders Only on 05/25/2022   Component Date Value Ref Range Status   • WBC 06/02/2022 4 90  4 31 - 10 16 Thousand/uL Final   • RBC 06/02/2022 3 82  3 81 - 5 12 Million/uL Final   • Hemoglobin 06/02/2022 11 8  11 5 - 15 4 g/dL Final   • Hematocrit 06/02/2022 36 2  34 8 - 46 1 % Final   • MCV 06/02/2022 95  82 - 98 fL Final   • MCH 06/02/2022 30 9  26 8 - 34 3 pg Final   • MCHC 06/02/2022 32 6  31 4 - 37 4 g/dL Final   • RDW 06/02/2022 14 7  11 6 - 15 1 % Final   • MPV 06/02/2022 11 2  8 9 - 12 7 fL Final   • Platelets 26/88/2429 169  149 - 390 Thousands/uL Final   • nRBC 06/02/2022 0  /100 WBCs Final   • Neutrophils Relative 06/02/2022 55  43 - 75 % Final   • Immat GRANS % 06/02/2022 0  0 - 2 % Final   • Lymphocytes Relative 06/02/2022 31  14 - 44 % Final   • Monocytes Relative 06/02/2022 10  4 - 12 % Final   • Eosinophils Relative 06/02/2022 4  0 - 6 % Final   • Basophils Relative 06/02/2022 0  0 - 1 % Final   • Neutrophils Absolute 06/02/2022 2 68  1 85 - 7 62 Thousands/µL Final   • Immature Grans Absolute 06/02/2022 0 01  0 00 - 0 20 Thousand/uL Final   • Lymphocytes Absolute 06/02/2022 1 52  0 60 - 4 47 Thousands/µL Final   • Monocytes Absolute 06/02/2022 0 49  0 17 - 1 22 Thousand/µL Final   • Eosinophils Absolute 06/02/2022 0 18  0 00 - 0 61 Thousand/µL Final   • Basophils Absolute 06/02/2022 0 02  0 00 - 0 10 Thousands/µL Final   Appointment on 05/20/2022   Component Date Value Ref Range Status   • WBC 05/20/2022 3 80 (A) 4 31 - 10 16 Thousand/uL Final   • RBC 05/20/2022 4 26  3 81 - 5 12 Million/uL Final   • Hemoglobin 05/20/2022 13 1  11 5 - 15 4 g/dL Final   • Hematocrit 05/20/2022 41 2  34 8 - 46 1 % Final   • MCV 05/20/2022 97  82 - 98 fL Final   • MCH 05/20/2022 30 8  26 8 - 34 3 pg Final   • MCHC 05/20/2022 31 8  31 4 - 37 4 g/dL Final   • RDW 05/20/2022 14 1  11 6 - 15 1 % Final   • Platelets 24/90/8550 203  149 - 390 Thousands/uL Final   • MPV 05/20/2022 11 4  8 9 - 12 7 fL Final   • Sodium 05/20/2022 141  136 - 145 mmol/L Final   • Potassium 05/20/2022 3 8  3 5 - 5 3 mmol/L Final   • Chloride 05/20/2022 103  100 - 108 mmol/L Final   • CO2 05/20/2022 26  21 - 32 mmol/L Final   • ANION GAP 05/20/2022 12  4 - 13 mmol/L Final   • BUN 05/20/2022 12  5 - 25 mg/dL Final   • Creatinine 05/20/2022 0 67  0 60 - 1 30 mg/dL Final    Standardized to IDMS reference method   • Glucose, Fasting 05/20/2022 78  65 - 99 mg/dL Final    Specimen collection should occur prior to Sulfasalazine administration due to the potential for falsely depressed results  Specimen collection should occur prior to Sulfapyridine administration due to the potential for falsely elevated results  • Calcium 05/20/2022 8 7  8 3 - 10 1 mg/dL Final   • AST 05/20/2022 25  5 - 45 U/L Final    Specimen collection should occur prior to Sulfasalazine administration due to the potential for falsely depressed results  • ALT 05/20/2022 39  12 - 78 U/L Final    Specimen collection should occur prior to Sulfasalazine administration due to the potential for falsely depressed results  • Alkaline Phosphatase 05/20/2022 70  46 - 116 U/L Final   • Total Protein 05/20/2022 7 1  6 4 - 8 2 g/dL Final   • Albumin 05/20/2022 4 0  3 5 - 5 0 g/dL Final   • Total Bilirubin 05/20/2022 0 48  0 20 - 1 00 mg/dL Final    Use of this assay is not recommended for patients undergoing treatment with eltrombopag due to the potential for falsely elevated results     • eGFR 05/20/2022 112 ml/min/1 73sq m Final   • Iron 05/20/2022 75  50 - 170 ug/dL Final    Patients treated with metal-binding drugs (ie  Deferoxamine) may have depressed iron values  • Ferritin 05/20/2022 102  8 - 388 ng/mL Final   • Folate 05/20/2022 16 6  3 1 - 17 5 ng/mL Final   • Zinc 05/20/2022 95  44 - 115 ug/dL Final                                    Detection Limit = 5   • Vitamin A 05/20/2022 7 6 (A) 18 9 - 57 3 ug/dL Final    Reference intervals for vitamin A determined from [facility] internal  studies  Individuals with vitamin A less than 20 ug/dL are considered  vitamin A deficient and those with serum concentrations less than  10 ug/dL are considered severely deficient  This test was developed and its performance characteristics  determined by [facility]  It has not been cleared or approved  by the Food and Drug Administration  • Vitamin B1, Whole Blood 05/20/2022 106 1  66 5 - 200 0 nmol/L Final   • Vit D, 25-Hydroxy 05/20/2022 46 6  30 0 - 100 0 ng/mL Final   • Vitamin B-12 05/20/2022 634  100 - 900 pg/mL Final   Appointment on 05/20/2022   Component Date Value Ref Range Status   • Cholesterol 05/20/2022 102  See Comment mg/dL Final    Cholesterol:         Pediatric <18 Years        Desirable          <170 mg/dL      Borderline High    170-199 mg/dL      High               >=200 mg/dL        Adult >=18 Years            Desirable         <200 mg/dL      Borderline High   200-239 mg/dL      High              >239 mg/dL     • Triglycerides 05/20/2022 88  See Comment mg/dL Final    Triglyceride:     0-9Y            <75mg/dL     10Y-17Y         <90 mg/dL       >=18Y     Normal          <150 mg/dL     Borderline High 150-199 mg/dL     High            200-499 mg/dL        Very High       >499 mg/dL    Specimen collection should occur prior to N-Acetylcysteine or Metamizole administration due to the potential for falsely depressed results     • HDL, Direct 05/20/2022 31 (A) >=50 mg/dL Final    Specimen collection should occur prior to Metamizole administration due to the potential for falsley depressed results  • LDL Calculated 05/20/2022 53  0 - 100 mg/dL Final    LDL Cholesterol:     Optimal           <100 mg/dl     Near Optimal      100-129 mg/dl     Above Optimal       Borderline High 130-159 mg/dl       High            160-189 mg/dl       Very High       >189 mg/dl         This screening LDL is a calculated result  It does not have the accuracy of the Direct Measured LDL in the monitoring of patients with hyperlipidemia and/or statin therapy  Direct Measure LDL (SRQ051) must be ordered separately in these patients  • Non-HDL-Chol (CHOL-HDL) 05/20/2022 71  mg/dl Final   • Hemoglobin A1C 05/20/2022 4 4  Normal 3 8-5 6%; PreDiabetic 5 7-6 4%; Diabetic >=6 5%; Glycemic control for adults with diabetes <7 0% % Final   • EAG 05/20/2022 80  mg/dl Final   Admission on 04/19/2022, Discharged on 04/21/2022   Component Date Value Ref Range Status   • EXT Preg Test, Ur 04/19/2022 Negative  Negative Final   • Control 04/19/2022 Valid  Valid Final   • Sodium 04/20/2022 141  136 - 145 mmol/L Final   • Potassium 04/20/2022 3 9  3 5 - 5 3 mmol/L Final   • Chloride 04/20/2022 106  100 - 108 mmol/L Final   • CO2 04/20/2022 28  21 - 32 mmol/L Final   • ANION GAP 04/20/2022 7  4 - 13 mmol/L Final   • BUN 04/20/2022 9  5 - 25 mg/dL Final   • Creatinine 04/20/2022 0 76  0 60 - 1 30 mg/dL Final    Standardized to IDMS reference method   • Glucose 04/20/2022 90  65 - 140 mg/dL Final    6  If the patient is fasting, the ADA then defines impaired fasting glucose as > 100 mg/dL and diabetes as > or equal to 123 mg/dL  Specimen collection should occur prior to Sulfasalazine administration due to the potential for falsely depressed results  Specimen collection should occur prior to Sulfapyridine administration due to the potential for falsely elevated results     • Calcium 04/20/2022 8 5  8 3 - 10 1 mg/dL Final   • eGFR 04/20/2022 100  ml/min/1 73sq m Final   • WBC 04/20/2022 6 33  4 31 - 10 16 Thousand/uL Final   • RBC 04/20/2022 3 90  3 81 - 5 12 Million/uL Final   • Hemoglobin 04/20/2022 12 2  11 5 - 15 4 g/dL Final   • Hematocrit 04/20/2022 38 4  34 8 - 46 1 % Final   • MCV 04/20/2022 99 (A) 82 - 98 fL Final   • MCH 04/20/2022 31 3  26 8 - 34 3 pg Final   • MCHC 04/20/2022 31 8  31 4 - 37 4 g/dL Final   • RDW 04/20/2022 13 2  11 6 - 15 1 % Final   • Platelets 84/69/0253 211  149 - 390 Thousands/uL Final   • MPV 04/20/2022 9 7  8 9 - 12 7 fL Final   Lab Requisition on 03/15/2022   Component Date Value Ref Range Status   • Case Report 03/15/2022    Final                    Value:Gynecologic Cytology Report                       Case: [id number]                                  Authorizing Provider:  Shravan Vazquez DO       Collected:           03/15/2022                   Ordering Location:     [address] [place] [address]      Received:            03/18/2022 [address] [facility]                                                                                  Laboratory                                                                   First Screen:          Ella Khalil                                                               Specimen:    LIQUID-BASED PAP, SCREENING, Cervix                                                       • Primary Interpretation 03/15/2022 Negative for intraepithelial lesion or malignancy   Final   • Specimen Adequacy 03/15/2022 Satisfactory for evaluation  Endocervical/transformation zone component present  Final   • Additional Information 03/15/2022    Final                    Value: This result contains rich text formatting which cannot be displayed here  • HPV Other HR 03/15/2022 Negative  Negative Final    HPV types: 02,51,55,78,78,76,94,66,01,94,44 and 68 DNA are undetectable or below the pre-set threshold     • HPV16 03/15/2022 Negative  Negative Final   • HPV18 03/15/2022 Negative Negative Final

## 2022-09-14 ENCOUNTER — APPOINTMENT (OUTPATIENT)
Dept: LAB | Facility: HOSPITAL | Age: 37
End: 2022-09-14
Payer: COMMERCIAL

## 2022-09-14 LAB
BACTERIA UR QL AUTO: ABNORMAL /HPF
BASOPHILS # BLD AUTO: 0.01 THOUSANDS/ÂΜL (ref 0–0.1)
BASOPHILS NFR BLD AUTO: 0 % (ref 0–1)
BILIRUB UR QL STRIP: ABNORMAL
C3 SERPL-MCNC: 74.7 MG/DL (ref 90–180)
C4 SERPL-MCNC: 28 MG/DL (ref 10–40)
CLARITY UR: CLEAR
COLOR UR: YELLOW
CREAT UR-MCNC: 223 MG/DL
CRP SERPL QL: <3 MG/L
EOSINOPHIL # BLD AUTO: 0.08 THOUSAND/ÂΜL (ref 0–0.61)
EOSINOPHIL NFR BLD AUTO: 3 % (ref 0–6)
ERYTHROCYTE [DISTWIDTH] IN BLOOD BY AUTOMATED COUNT: 13.2 % (ref 11.6–15.1)
ERYTHROCYTE [SEDIMENTATION RATE] IN BLOOD: <1 MM/HOUR (ref 0–19)
GLUCOSE UR STRIP-MCNC: NEGATIVE MG/DL
HCT VFR BLD AUTO: 35.9 % (ref 34.8–46.1)
HGB BLD-MCNC: 11.7 G/DL (ref 11.5–15.4)
HGB UR QL STRIP.AUTO: NEGATIVE
IMM GRANULOCYTES # BLD AUTO: 0.01 THOUSAND/UL (ref 0–0.2)
IMM GRANULOCYTES NFR BLD AUTO: 0 % (ref 0–2)
KETONES UR STRIP-MCNC: ABNORMAL MG/DL
LEUKOCYTE ESTERASE UR QL STRIP: NEGATIVE
LYMPHOCYTES # BLD AUTO: 1.16 THOUSANDS/ÂΜL (ref 0.6–4.47)
LYMPHOCYTES NFR BLD AUTO: 36 % (ref 14–44)
MCH RBC QN AUTO: 31.8 PG (ref 26.8–34.3)
MCHC RBC AUTO-ENTMCNC: 32.6 G/DL (ref 31.4–37.4)
MCV RBC AUTO: 98 FL (ref 82–98)
MONOCYTES # BLD AUTO: 0.27 THOUSAND/ÂΜL (ref 0.17–1.22)
MONOCYTES NFR BLD AUTO: 8 % (ref 4–12)
MUCOUS THREADS UR QL AUTO: ABNORMAL
NEUTROPHILS # BLD AUTO: 1.71 THOUSANDS/ÂΜL (ref 1.85–7.62)
NEUTS SEG NFR BLD AUTO: 53 % (ref 43–75)
NITRITE UR QL STRIP: NEGATIVE
NON-SQ EPI CELLS URNS QL MICRO: ABNORMAL /HPF
NRBC BLD AUTO-RTO: 0 /100 WBCS
PH UR STRIP.AUTO: 5.5 [PH]
PLATELET # BLD AUTO: 168 THOUSANDS/UL (ref 149–390)
PMV BLD AUTO: 10.2 FL (ref 8.9–12.7)
PROT UR STRIP-MCNC: ABNORMAL MG/DL
PROT UR-MCNC: 70 MG/DL
PROT/CREAT UR: 0.31 MG/G{CREAT} (ref 0–0.1)
RBC # BLD AUTO: 3.68 MILLION/UL (ref 3.81–5.12)
RBC #/AREA URNS AUTO: ABNORMAL /HPF
SP GR UR STRIP.AUTO: >=1.03 (ref 1–1.03)
UROBILINOGEN UR QL STRIP.AUTO: 0.2 E.U./DL
WBC # BLD AUTO: 3.24 THOUSAND/UL (ref 4.31–10.16)
WBC #/AREA URNS AUTO: ABNORMAL /HPF

## 2022-09-14 PROCEDURE — 86140 C-REACTIVE PROTEIN: CPT | Performed by: INTERNAL MEDICINE

## 2022-09-14 PROCEDURE — 36415 COLL VENOUS BLD VENIPUNCTURE: CPT | Performed by: INTERNAL MEDICINE

## 2022-09-14 PROCEDURE — 86225 DNA ANTIBODY NATIVE: CPT | Performed by: INTERNAL MEDICINE

## 2022-09-14 PROCEDURE — 86160 COMPLEMENT ANTIGEN: CPT | Performed by: INTERNAL MEDICINE

## 2022-09-14 PROCEDURE — 81001 URINALYSIS AUTO W/SCOPE: CPT | Performed by: INTERNAL MEDICINE

## 2022-09-14 PROCEDURE — 82570 ASSAY OF URINE CREATININE: CPT | Performed by: INTERNAL MEDICINE

## 2022-09-14 PROCEDURE — 84156 ASSAY OF PROTEIN URINE: CPT | Performed by: INTERNAL MEDICINE

## 2022-09-14 PROCEDURE — 85025 COMPLETE CBC W/AUTO DIFF WBC: CPT | Performed by: INTERNAL MEDICINE

## 2022-09-14 PROCEDURE — 85652 RBC SED RATE AUTOMATED: CPT | Performed by: INTERNAL MEDICINE

## 2022-09-15 ENCOUNTER — TELEMEDICINE (OUTPATIENT)
Dept: PSYCHIATRY | Facility: CLINIC | Age: 37
End: 2022-09-15
Payer: COMMERCIAL

## 2022-09-15 DIAGNOSIS — F33.40 RECURRENT MAJOR DEPRESSIVE DISORDER IN REMISSION (HCC): Primary | ICD-10-CM

## 2022-09-15 DIAGNOSIS — F41.1 GAD (GENERALIZED ANXIETY DISORDER): ICD-10-CM

## 2022-09-15 LAB — DSDNA AB SER-ACNC: 4 IU/ML (ref 0–9)

## 2022-09-15 PROCEDURE — 90834 PSYTX W PT 45 MINUTES: CPT | Performed by: SOCIAL WORKER

## 2022-09-15 NOTE — PSYCH
Problem List Items Addressed This Visit        Other    JG (generalized anxiety disorder)    Recurrent major depressive disorder in remission ([facility] [address] ) - Primary          Length of time in session: 45 minutes, follow up in 2   week    Psychotherapy Provided: Individual    Goals addressed in session: Goal 1    Pain: none reported    Current suicide risk: Marcia Flores: low    Data: Met with Russell Houser for scheduled individual session  Topics discussed included relationship with significant other and stress management and self care  Russell Ellafunmilayo reports that she has been feeling really tired as of late  Clinician asked about her own personal health and how she has been feeling  She reports that she saw her hematologist this week and that she voiced concerns and medications were changed but denies that she was told there was anything of significance going on  When asked about stress levels Desire shared for the first time that her boyfriend of 7 years has been battling cancer for the past 5 years and the past two of those five years has been really difficult  She reports that the last trial he was on for his cancer treatment failed and that he was just released from the hospital Tuesday  She reports that he is in pain and sleeping most of the time  He is in his early 29's  Estela Booker processed with writer how she is handling this stress and she identified "the best I can, I just regret how much time we spent fighting"  Estela Booker has never really opened up about her relationship since beginning with this clinician  Estela Booker shared that there have been trust issues in the past and that things from her home were going missing  She did not elaborate more but she shared that this was how her personal room with all of her belongings came about  Estela Booker shared that she struggles with being a care taker and getting out of that role    "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 and I am not sure how to be anything else"   "I feel guilt for doing things for myself or spending money on myself"  Communication (always a problem), intimacy (2yrs gone) and vulnerability (never) are 3 struggles that [place] identified between her and her boyfriend  Sj Skaggs shows evidence of utilizing weighing pros and cons to manage mental health symptoms  During this session, this clinical used the following therapeutic modalities Engagement strategies, Supportive psychotherapy and CBT techniques  Assessment:  Sj Skaggs presents with a sad mood  Sj Skaggs affect is normal range and intensity and appropriate  she exhibits growing therapeutic rapport with this clinician  she continues to exhibit willingness to work on treatment goals and objectives  Sj Skaggs is showing progress in treatment by utilizing identification of her emotions and verbalization of her emotions  Sj Skaggs continues to struggle with communication with her partner and belief that she needs and deserves self care  Sj Skaggs presents with a low risk of suicide, low risk of self-harm and minimal of harm to others  Plan:  Sj Skaggs will return in 2 weeks for the next scheduled session  Between sessions, she will work on identifying ways that she can implement self care and will report back during the next session re: success and barriers  At the next session, this clinical will use Supportive psychotherapy, Motivations interviewing and Solution-focused therapy to address self care and distorted beleifs, in effort to assist Sj Skaggs with meeting treatment goals        Behavior Health Treatment Plan [facility]: Diagnosis and treatment plan explained to Sj Skaggs, she relates understanding and is agreeable to Treatment Plan: yes    Virtual Regular Visit    Verification of patient location:    Patient is located in the following state in which I hold an active license [place]      Assessment/Plan:    Problem List Items Addressed This Visit        Other    JG (generalized anxiety disorder)    Recurrent major depressive disorder in remission [facility]) - Primary          Goals addressed in session: Goal 1          Reason for visit is No chief complaint on file  Encounter provider Justo King LCSW    Provider located at [address] [place] [address] [id number] [phone]      Recent Visits  No visits were found meeting these conditions  Showing recent visits within past 7 days and meeting all other requirements  Today's Visits  Date Type Provider Dept   09/15/22 Telemedicine Justo King LCSW [facility]   Showing today's visits and meeting all other requirements  Future Appointments  No visits were found meeting these conditions  Showing future appointments within next 150 days and meeting all other requirements       The patient was identified by name and date of birth  Argentina Giron was informed that this is a telemedicine visit and that the visit is being conducted throughEpic Embedded and patient was informed this is a secure, HIPAA-complaint platform  She agrees to proceed     My office door was closed  No one else was in the room  She acknowledged consent and understanding of privacy and security of the video platform  The patient has agreed to participate and understands they can discontinue the visit at any time  Patient is aware this is a billable service  Subjective  Argentina Giron is a 40 y o  female          HPI     Past Medical History:   Diagnosis Date    Acne     Anxiety     Back pain     Bipolar disorder ([facility] [address] )     pt denies    Carpal tunnel syndrome on right     Contact lens overwear of both eyes     CPAP (continuous positive airway pressure) dependence     Cubital tunnel syndrome on right     Depression     Disease of thyroid gland     GERD (gastroesophageal reflux disease)     Headache     Hypothyroidism     Kidney stone     Kidney stones     Lupus (systemic lupus erythematosus) (HCC)     Nausea     Obese     gastric KATLYN-EN-Y  today 4/19/2022    PONV (postoperative nausea and vomiting)     Sleep apnea     Wears glasses        Past Surgical History:   Procedure Laterality Date    CARPAL TUNNEL RELEASE      CHOLECYSTECTOMY      EGD      [place] CYSTOURETHROSCOPY N/A 2/3/2020    Procedure: CYSTOSCOPY;  Surgeon: Hernando Whelan MD;  [facility];  Service: UroGynecology           WA LAP GASTRIC BYPASS/KATLYN-EN-Y N/A 4/19/2022    Procedure: LAPAROSCOPIC KATLYN-EN-Y GASTRIC BYPASS & INTRAOPERATIVE EGD ROBOTICALLY ASSISTED;  Surgeon: Corona Fournier MD;  [facility];  Service: Jocelyne Herd [place] LAP,CHOLECYSTECTOMY N/A 9/6/2018    Procedure: CHOLECYSTECTOMY LAPAROSCOPIC W/ ROBOTICS;  Surgeon: Duane Navarro MD;  [facility];  Service: General    WA POST [address] [place] [address] N/A 2/3/2020    Procedure: POSTERIOR COLPORRHAPHY;  Surgeon: Hernando Whelan MD;  [facility];  Service: UroGynecology           WA REVISE MEDIAN N/CARPAL TUNNEL SURG Right 11/27/2020    Procedure: release CARPAL TUNNEL;  Surgeon: Abdias Gonzalez MD;  [facility];  Service: Orthopedics    WA REVISE ULNAR NERVE AT ELBOW Right 11/27/2020    Procedure: Clifton Jackson;  Surgeon:  Abdias Gonzalez MD;  [facility];  Service: Orthopedics    WA SLING OPER STRES INCONTINENCE N/A 2/3/2020    Procedure: PUBOVAGINAL SLING;  Surgeon: Hernando Whelan MD;  [facility];  Service: UroGynecology           TUBAL LIGATION         Current Outpatient Medications   Medication Sig Dispense Refill    Calcium 600-400 MG-UNIT CHEW Chew daily      Cholecalciferol (Vitamin D) 50 MCG (2000 UT) tablet Take 1 tablet (2,000 Units total) by mouth daily 90 tablet 1    clobetasol (TEMOVATE) 0 05 % external solution Apply topically 2 (two) times a day 50 mL 0    clonazePAM (KlonoPIN) 0 5 mg tablet TAKE 1/2 TO 1 TABLET BY MOUTH 2 TIMES A DAY AS NEEDED FOR ANXIETY 60 tablet 2    ergocalciferol (VITAMIN D2) 50,000 units Take 1 capsule (50,000 Units total) by mouth 2 (two) times a week with meals (Patient taking differently: Take 5,000 Units by mouth in the morning  ) 24 capsule 0    folic acid (FOLVITE) 1 mg tablet Take 800 mcg by mouth daily      gabapentin (NEURONTIN) 100 mg capsule Take 1 capsule (100 mg total) by mouth 3 (three) times a day 90 capsule 3    hydroxychloroquine (PLAQUENIL) 200 mg tablet Take 1 5 tablets (300 mg total) by mouth daily 135 tablet 1    levothyroxine 25 mcg tablet TAKE 1 TABLET (25 MCG TOTAL) BY MOUTH DAILY IN THE EARLY MORNING 90 tablet 1    metroNIDAZOLE (METROGEL) 0 75 % gel Apply topically twice a day 45 g 3    modafinil (PROVIGIL) 200 MG tablet Take 1 tablet (200 mg total) by mouth daily 30 tablet 2    Multiple Vitamins-Minerals (Bariatric Multivitamins/Iron) CAPS Take by mouth daily      pantoprazole (PROTONIX) 40 mg tablet Take 1 tablet (40 mg total) by mouth daily 90 tablet 1    Vortioxetine HBr (Trintellix) 20 MG tablet Take 1 tablet (20 mg total) by mouth daily 30 tablet 2     No current facility-administered medications for this visit  Allergies   Allergen Reactions    Wound Dressing Adhesive Rash     Gets red rash from paper tape relatively quickly after application    Penicillins Hives     At young age       Review of Systems    Video Exam    There were no vitals filed for this visit      Physical Exam     I spent 45 minutes directly with the patient during this visit

## 2022-09-26 ENCOUNTER — TELEMEDICINE (OUTPATIENT)
Dept: PSYCHIATRY | Facility: CLINIC | Age: 37
End: 2022-09-26
Payer: COMMERCIAL

## 2022-09-26 DIAGNOSIS — F41.1 GAD (GENERALIZED ANXIETY DISORDER): ICD-10-CM

## 2022-09-26 DIAGNOSIS — F33.40 RECURRENT MAJOR DEPRESSIVE DISORDER IN REMISSION (HCC): Primary | ICD-10-CM

## 2022-09-26 PROCEDURE — 90834 PSYTX W PT 45 MINUTES: CPT | Performed by: SOCIAL WORKER

## 2022-09-26 NOTE — PSYCH
Problem List Items Addressed This Visit        Other    JG (generalized anxiety disorder)    Recurrent major depressive disorder in remission [facility]) - Primary          VIRTUAL VISIT DISCLAIMER  Collette Grewal verbally agrees to participate in [employer]  Pt is aware that [employer] could be limited without vital signs or the ability to perform a full hands-on physical exam   Collette Grewal understands she or the provider may request at any time to terminate the video visit and request the patient to seek care or treatment in person  Session start time: 1600  Session end time: 1650    Length of time in session: 50 minutes, follow up in 1   week    Psychotherapy Provided: Individual    Goals addressed in session: Goal 1    Pain: none reported     Current suicide risk: Rob Lugo: low    Data: Met with Collette Grewal for scheduled individual session  In last session Collette Grewal worked on: identifying ways that she self cares  This sessions topics discussed included anxiety and excessive worry and self care  Collette Grewal reports that she had a long day at work  Ancelmo Nolan and clinician processed what a day at work looks like for her and 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 in different states of emotion  Ancelmo Nolan shared that she does not get it with people because whenever she goes in for surgery she is calm  Clinician attempted to get Ancelmo Nolan to identify the strengths that she uses to maintain being calm and compare that to others as a strength that she possesses  Ancelmo Nolan struggled with this and clinician assisted her in identifying how she uses her knowledge of the field and radical acceptance to identify what is in her power and control  Clinician went on to progress into Ancelmo Nolan work on self care and her comments last session that she has not had time to self care since she was 12years old    Desire processed that the only thing that she likes is her arts and crafts  It was unclear why she dismissed this as her self care  Clinician had Adeola Diaz identify the emotions that she derives from engaging in arts and crafts and she identified happy, occupcied and proud when she creates something that she likes  Clinician validated that this is her self care  Dorcas Lennon shows evidence of utilizing radical acceptance and self care to manage mental health symptoms  During this session, this clinical used the following therapeutic modalities Supportive psychotherapy, Motivations interviewing and Solution-focused therapy  Assessment:  Dorcas Lennon presents with a anxious mood  Dorcas Lennon affect is normal range and intensity and appropriate  she exhibits good therapeutic rapport with this clinician  she presented calm and cooperative , adequate hygiene and grooming and poor eye contact   Speech was a normal rate  Thought process was normal thought processes  Thought content was no delusions  Insight was Insight intact  Judgement presents as judgment was intact  Dorcas Lennon presents with a low risk of suicide, low risk of self-harm and minimal of harm to others  she continues to exhibit willingness to work on treatment goals and objectives  Dorcas Lennon is showing progress in treatment by utilizing self care engagement  Dorcas Lennon continues to struggle with by her report lack of focus and attention and jumping from one task to another  Plan:  Dorcas Lennon will return in 2 weeks for the next scheduled session  Between sessions, she will continue with self care and identify one strength that she has shown in the two week period and will report back during the next session re: success and barriers  At the next session, this clinical will use Supportive psychotherapy, Motivations interviewing and Solution-focused therapy in effort to assist Dorcas Lennon with meeting treatment goals        Behavior Health Treatment Plan [facility]: Diagnosis and treatment plan explained to Elaina Benz, she relates understanding and is agreeable to Treatment Plan: yes    Virtual Regular Visit    Verification of patient location:    Patient is located in the following state in which I hold an active license [place]      Assessment/Plan:    Problem List Items Addressed This Visit        Other    JG (generalized anxiety disorder)    Recurrent major depressive disorder in remission [facility]) - Primary          Goals addressed in session: Goal 1          Reason for visit is No chief complaint on file  Encounter provider Jose Urena LCSW    Provider located at [address] [place] [id number] [phone]      Recent Visits  No visits were found meeting these conditions  Showing recent visits within past 7 days and meeting all other requirements  Future Appointments  No visits were found meeting these conditions  Showing future appointments within next 150 days and meeting all other requirements       The patient was identified by name and date of birth  Elaina Benz was informed that this is a telemedicine visit and that the visit is being conducted throughEpic Embedded and patient was informed this is a secure, HIPAA-complaint platform  She agrees to proceed     My office door was closed  No one else was in the room  She acknowledged consent and understanding of privacy and security of the video platform  The patient has agreed to participate and understands they can discontinue the visit at any time  Patient is aware this is a billable service  Subjective  Elaina Benz is a 40 y o  female          HPI     Past Medical History:   Diagnosis Date    Acne     Anxiety     Back pain     Bipolar disorder ([facility] [address] )     pt denies    Carpal tunnel syndrome on right     Contact lens overwear of both eyes     CPAP (continuous positive airway pressure) dependence     Cubital tunnel syndrome on right     Depression     Disease of thyroid gland     GERD (gastroesophageal reflux disease)     Headache     Hypothyroidism     Kidney stone     Kidney stones     Lupus (systemic lupus erythematosus) (HCC)     Nausea     Obese     gastric KATLYN-EN-Y  today 4/19/2022    PONV (postoperative nausea and vomiting)     Sleep apnea     Wears glasses        Past Surgical History:   Procedure Laterality Date    CARPAL TUNNEL RELEASE      CHOLECYSTECTOMY      EGD      ID CYSTOURETHROSCOPY N/A 2/3/2020    Procedure: CYSTOSCOPY;  Surgeon: Beatrice Spann MD;  [facility];  Service: UroGynecology           ID LAP GASTRIC BYPASS/KATLYN-EN-Y N/A 4/19/2022    Procedure: LAPAROSCOPIC KATLYN-EN-Y GASTRIC BYPASS & INTRAOPERATIVE EGD ROBOTICALLY ASSISTED;  Surgeon: Noel Larry MD;  [facility];  Service: Bariatrics    ID LAP,CHOLECYSTECTOMY N/A 9/6/2018    Procedure: CHOLECYSTECTOMY LAPAROSCOPIC W/ ROBOTICS;  Surgeon: Kayy Francis MD;  [facility];  Service: General    ID [address] N/A 2/3/2020    Procedure: POSTERIOR COLPORRHAPHY;  Surgeon: Beatrice Spann MD;  [facility];  Service: UroGynecology           ID REVISE MEDIAN N/CARPAL TUNNEL SURG Right 11/27/2020    Procedure: release CARPAL TUNNEL;  Surgeon: Prisca Fink MD;  [facility];  Service: Orthopedics    ID REVISE ULNAR NERVE AT ELBOW Right 11/27/2020    Procedure: Foreign Carry;  Surgeon:  Prisca Fink MD;  [facility];  Service: Orthopedics    ID SLING OPER STRES INCONTINENCE N/A 2/3/2020    Procedure: PUBOVAGINAL SLING;  Surgeon: Beatrice Spann MD;  [facility];  Service: UroGynecology           TUBAL LIGATION         Current Outpatient Medications   Medication Sig Dispense Refill    Calcium 600-400 MG-UNIT CHEW Chew daily      Cholecalciferol (Vitamin D) 50 MCG (2000 UT) tablet Take 1 tablet (2,000 Units total) by mouth daily 90 tablet 1    clobetasol (TEMOVATE) 0 05 % external solution Apply topically 2 (two) times a day 50 mL 0    clonazePAM (KlonoPIN) 0 5 mg tablet TAKE 1/2 TO 1 TABLET BY MOUTH 2 TIMES A DAY AS NEEDED FOR ANXIETY 60 tablet 2    ergocalciferol (VITAMIN D2) 50,000 units Take 1 capsule (50,000 Units total) by mouth 2 (two) times a week with meals (Patient taking differently: Take 5,000 Units by mouth in the morning  ) 24 capsule 0    folic acid (FOLVITE) 1 mg tablet Take 800 mcg by mouth daily      gabapentin (NEURONTIN) 100 mg capsule Take 1 capsule (100 mg total) by mouth 3 (three) times a day 90 capsule 3    hydroxychloroquine (PLAQUENIL) 200 mg tablet Take 1 5 tablets (300 mg total) by mouth daily 135 tablet 1    levothyroxine 25 mcg tablet TAKE 1 TABLET (25 MCG TOTAL) BY MOUTH DAILY IN THE EARLY MORNING 90 tablet 1    metroNIDAZOLE (METROGEL) 0 75 % gel Apply topically twice a day 45 g 3    modafinil (PROVIGIL) 200 MG tablet Take 1 tablet (200 mg total) by mouth daily 30 tablet 2    Multiple Vitamins-Minerals (Bariatric Multivitamins/Iron) CAPS Take by mouth daily      pantoprazole (PROTONIX) 40 mg tablet Take 1 tablet (40 mg total) by mouth daily 90 tablet 1    Vortioxetine HBr (Trintellix) 20 MG tablet Take 1 tablet (20 mg total) by mouth daily 30 tablet 2     No current facility-administered medications for this visit  Allergies   Allergen Reactions    Wound Dressing Adhesive Rash     Gets red rash from paper tape relatively quickly after application    Penicillins Hives     At young age       Review of Systems    Video Exam    There were no vitals filed for this visit      Physical Exam     I spent 50 minutes directly with the patient during this visit

## 2022-09-29 ENCOUNTER — OFFICE VISIT (OUTPATIENT)
Dept: BARIATRICS | Facility: CLINIC | Age: 37
End: 2022-09-29
Payer: COMMERCIAL

## 2022-09-29 VITALS
SYSTOLIC BLOOD PRESSURE: 90 MMHG | RESPIRATION RATE: 18 BRPM | WEIGHT: 147 LBS | TEMPERATURE: 98.5 F | HEIGHT: 66 IN | HEART RATE: 73 BPM | BODY MASS INDEX: 23.63 KG/M2 | DIASTOLIC BLOOD PRESSURE: 58 MMHG

## 2022-09-29 DIAGNOSIS — E50.9 VITAMIN A DEFICIENCY: ICD-10-CM

## 2022-09-29 DIAGNOSIS — K91.2 POSTSURGICAL MALABSORPTION: ICD-10-CM

## 2022-09-29 DIAGNOSIS — Z98.84 BARIATRIC SURGERY STATUS: ICD-10-CM

## 2022-09-29 DIAGNOSIS — K21.9 GASTROESOPHAGEAL REFLUX DISEASE WITHOUT ESOPHAGITIS: Chronic | ICD-10-CM

## 2022-09-29 DIAGNOSIS — Z48.815 ENCOUNTER FOR SURGICAL AFTERCARE FOLLOWING SURGERY OF DIGESTIVE SYSTEM: Primary | ICD-10-CM

## 2022-09-29 DIAGNOSIS — M32.9 LUPUS (SYSTEMIC LUPUS ERYTHEMATOSUS) (HCC): ICD-10-CM

## 2022-09-29 PROCEDURE — 99213 OFFICE O/P EST LOW 20 MIN: CPT | Performed by: NURSE PRACTITIONER

## 2022-09-29 NOTE — PATIENT INSTRUCTIONS
Discussed with patient her fatigue may or may not be related; in the meantime, start on 10,000 IU of retinyl acetate or palmitate  Advised risk of of toxicity and to avoid pregnancy  Stop vitamin A supplementation in 12 weeks and repeat lab in 4 months

## 2022-09-29 NOTE — PROGRESS NOTES
Assessment/Plan:     Patient ID: Debbi Melendez is a 40 y o  female  Bariatric Surgery Status/GERD    -s/p Mary Lou-En-Y Gastric Bypass with Dr Beatriz Ortiz on 04/19/2022  Presents to the office today for 3rd post op visit  Overall doing fair, tolerating a regular diet  Denies having any abdominal pain, NV/D/C, regurgitation, reflux or dysphagia  Taking her MVI and PPI daily  PLAN:     - taper off PPI in the next 1-2 months  Advised patient to avoid NSAIDs  She is prescribed indomethacin as needed by rheumatology but she reports she has not been taking since surgery  Discussed ulcer risks associated with NSAIDs  IF SHE IS TO GO THROUGH LUPUS FLARE UP, advised to prophylactically start on PPI 1-2 per day while on short course of NSAIDS and or steroids   - Routine follow up in 6 months for annual visit  - Continue with healthy lifestyle, adequate protein intake of 60 gm, fluid intake of at least 64 oz  - Continue with MVI daily  - Activity as tolerated  - Labs ordered and will adjust accordingly if any deficiency  - Follow up with RD and SW as needed  Lupus (SLE) - stable  Does report having continuing fatigue  Following with rheumatology  Continue to monitor for now  Vitamin A deficiency - was advised to start on retinyl palmitate or acetate 10,000 IU daily x 12 weeks then stop  Patient did not start on this yet  Advised to do so and will repeat labs at next visit at annual visit  · Continued/Maintain healthy weight loss with good nutrition intakes  · Adequate hydration with at least 64oz  fluid intake  · Follow diet as discussed  · Follow vitamin and mineral recommendations as reviewed with you  · Exercise as tolerated  · Colonoscopy referral made: not of age range  · Mammogram - not of age range    · Follow-up in 6 months for annual visit   We kindly ask that your arrive 15 minutes before your scheduled appointment time with your provider to allow our staff to room you, get your vital signs and update your chart  · Get lab work done  Please call the office if you need a script  It is recommended to check with your insurance BEFORE getting labs done to make sure they are covered by your policy  · Call our office if you have any problems with abdominal pain especially associated with fever, chills, nausea, vomiting or any other concerns  · All  Post-bariatric surgery patients should be aware that very small quantities of any alcohol can cause impairment and it is very possible not to feel the effect  The effect can be in the system for several hours  It is also a stomach irritant  · It is advised to AVOID alcohol, Nonsteroidal antiinflammatory drugs (NSAIDS) and nicotine of all forms   Any of these can cause stomach irritation/pain  · Discussed the effects of alcohol on a bariatric patient and the increased impairment risk  · Keep up the good work! Postsurgical Malabsorption   -At risk for malabsorption of vitamins/minerals secondary to malabsorption and restriction of intake from bariatric surgery  -Currently taking adequate postop bariatric surgery vitamin supplementation  -Last set of bariatric labs completed on 03/2022 and showed Low Vitamin A  - advised to start on vitamin A supplementation     -Patient received education about the importance of adhering to a lifelong supplementation regimen to avoid vitamin/mineral deficiencies      Diagnoses and all orders for this visit:    Encounter for surgical aftercare following surgery of digestive system    Bariatric surgery status    Postsurgical malabsorption    Gastroesophageal reflux disease without esophagitis    [facility]mat[facility] )    Vitamin A deficiency         Subjective:      Patient ID: Roman Franz is a 40 y o  female  -s/p Mary Lou-En-Y Gastric Bypass with Dr Jane Story on 04/19/2022  Presents to the office today for 3rd post op visit  Overall doing fair, tolerating a regular diet   Denies having any abdominal pain, NV/D/C, regurgitation, reflux or dysphagia  Taking her MVI and PPI daily  Initial: 229 lbs  Current: 147 lbs  EWL: (Weight loss is ahead of schedule at this post surgical period )  Mauro: current   Current BMI is Body mass index is 24 09 kg/m²  · Tolerating a regular diet-yes  · Eating at least 60 grams of protein per day-yes  · Following 30/60 minute rule with liquids-yes  · Drinking at least 64 ounces of fluid per day-yes  · Drinking carbonated beverages-no  · Sufficient exercise-no  · Using NSAIDs regularly-no  · Using nicotine-no  · Using alcohol-no  · Supplements: Multivitamins, Iron, Calcium  and folic acid     · EWL is 106%, which places the patient ahead of schedule for expected post surgical weight loss at this time  The following portions of the patient's history were reviewed and updated as appropriate: allergies, current medications, past family history, past medical history, past social history, past surgical history and problem list     Review of Systems   Constitutional: Negative  Respiratory: Negative  Cardiovascular: Positive for palpitations (Secondary to anxiety)  Musculoskeletal: Positive for arthralgias (SECONDARY TO LUPUS) and back pain  Skin: Positive for rash (using antifungal cream - rash in between abdominal folds  )  Neurological: Positive for light-headedness (POSITIONAL)  Psychiatric/Behavioral: Negative  Objective:    BP 90/58 (BP Location: Left arm, Patient Position: Sitting, Cuff Size: Standard)   Pulse 73   Temp 98 5 °F (36 9 °C) (Tympanic)   Resp 18   Ht 5' 5 5" (1 664 m)   Wt 66 7 kg (147 lb)   BMI 24 09 kg/m²      Physical Exam  Vitals and nursing note reviewed  Constitutional:       Appearance: Normal appearance  She is obese  Cardiovascular:      Rate and Rhythm: Normal rate and regular rhythm  Pulses: Normal pulses  Heart sounds: Normal heart sounds     Pulmonary:      Effort: Pulmonary effort is normal       Breath sounds: Normal breath sounds  Abdominal:      General: Bowel sounds are normal       Palpations: Abdomen is soft  Comments: All incisions healing well  Musculoskeletal:         General: Normal range of motion  Skin:     General: Skin is warm and dry  Neurological:      General: No focal deficit present  Mental Status: She is alert and oriented to person, place, and time  Psychiatric:         Mood and Affect: Mood normal          Behavior: Behavior normal          Thought Content:  Thought content normal          Judgment: Judgment normal

## 2022-10-11 ENCOUNTER — TELEPHONE (OUTPATIENT)
Dept: PSYCHIATRY | Facility: CLINIC | Age: 37
End: 2022-10-11

## 2022-10-13 ENCOUNTER — OFFICE VISIT (OUTPATIENT)
Dept: FAMILY MEDICINE CLINIC | Facility: CLINIC | Age: 37
End: 2022-10-13
Payer: COMMERCIAL

## 2022-10-13 VITALS
HEIGHT: 66 IN | WEIGHT: 141.4 LBS | SYSTOLIC BLOOD PRESSURE: 118 MMHG | DIASTOLIC BLOOD PRESSURE: 62 MMHG | BODY MASS INDEX: 22.73 KG/M2

## 2022-10-13 DIAGNOSIS — F41.8 DEPRESSION WITH ANXIETY: ICD-10-CM

## 2022-10-13 DIAGNOSIS — M32.9 SYSTEMIC LUPUS ERYTHEMATOSUS, UNSPECIFIED SLE TYPE, UNSPECIFIED ORGAN INVOLVEMENT STATUS (HCC): ICD-10-CM

## 2022-10-13 DIAGNOSIS — D70.9 NEUTROPENIA, UNSPECIFIED TYPE (HCC): ICD-10-CM

## 2022-10-13 DIAGNOSIS — M32.9 LUPUS (HCC): ICD-10-CM

## 2022-10-13 DIAGNOSIS — K29.50 CHRONIC GASTRITIS WITHOUT BLEEDING, UNSPECIFIED GASTRITIS TYPE: ICD-10-CM

## 2022-10-13 DIAGNOSIS — Z98.84 STATUS POST GASTRIC BYPASS FOR OBESITY: ICD-10-CM

## 2022-10-13 DIAGNOSIS — E55.9 VITAMIN D DEFICIENCY: ICD-10-CM

## 2022-10-13 DIAGNOSIS — E03.9 ACQUIRED HYPOTHYROIDISM: Primary | ICD-10-CM

## 2022-10-13 DIAGNOSIS — F33.40 RECURRENT MAJOR DEPRESSIVE DISORDER IN REMISSION (HCC): ICD-10-CM

## 2022-10-13 PROCEDURE — 99214 OFFICE O/P EST MOD 30 MIN: CPT | Performed by: FAMILY MEDICINE

## 2022-10-13 NOTE — PROGRESS NOTES
Chief Complaint   Patient presents with   • Hypothyroidism     Name: Cr Cueva      : 1985      MRN: [id number]  Encounter Provider: Art Abel DO  Encounter Date: 10/13/2022   Encounter department: [facility] PRIMARY CARE    Assessment & Plan     1  Acquired hypothyroidism  Assessment & Plan:   Continue levothyroxine, check labs scheduled      2  Vitamin D deficiency    3  Lupus ([place] [address] )    4  Depression with anxiety    5  Neutropenia, unspecified type ([place] [address] )  -     CBC and differential; Future  -     LD,Blood; Future; Expected date: 2022  -     Uric acid; Future    6  Chronic gastritis without bleeding, unspecified gastritis type  Assessment & Plan:   Continue pantoprazole 40 mg daily, recheck in 6 months      7  Systemic lupus erythematosus, unspecified SLE type, unspecified organ involvement status ([place] [address] )  Assessment & Plan:   Continue  Hydrochlorquinine 100 mg daily, follow-up with Rheumatology scheduled       8  Recurrent major depressive disorder in remission [facility])  Assessment & Plan:    Continue current medications, follow-up with psychiatry as scheduled  9  Status post gastric bypass for obesity  Assessment & Plan:    She has lost over 70 lb since her surgery  Lab work is stable, continue supplementation  Subjective        She is here for follow-up on her hypothyroidism, weight, update me on her depression, and review recent lab work  She is status post gastric bypass and has lost a substantial amount of weight  Had recent lab work and except her vitamin-A, her levels were fairly normal     Review of Systems   Constitutional: Negative for activity change, chills, diaphoresis, fatigue, fever and unexpected weight change  HENT: Negative for congestion, ear pain, hearing loss, nosebleeds, postnasal drip, rhinorrhea, sinus pressure, sore throat and tinnitus  Eyes: Negative for photophobia, pain, discharge, redness and visual disturbance  Respiratory: Negative for cough, chest tightness, shortness of breath and wheezing  Cardiovascular: Negative for chest pain, palpitations and leg swelling  Denies PAZ   Gastrointestinal: Negative for abdominal pain, blood in stool, constipation, diarrhea, nausea and vomiting  Endocrine: Negative  Genitourinary: Negative for difficulty urinating, dysuria, frequency, hematuria and urgency  Musculoskeletal: Negative for arthralgias, joint swelling and myalgias  Skin: Negative for rash and wound  Denies skin lesions, change in birthmarks   Allergic/Immunologic: Negative for environmental allergies, food allergies and immunocompromised state  Neurological: Negative for dizziness, tremors, seizures, syncope, weakness, numbness and headaches  Hematological: Negative  Psychiatric/Behavioral: Negative for behavioral problems, confusion, decreased concentration and sleep disturbance  The patient is not nervous/anxious          Current Outpatient Medications on File Prior to Visit   Medication Sig   • Calcium 600-400 MG-UNIT CHEW Chew daily   • Cholecalciferol (Vitamin D) 50 MCG (2000 UT) tablet Take 1 tablet (2,000 Units total) by mouth daily   • clobetasol (TEMOVATE) 0 05 % external solution Apply topically 2 (two) times a day   • folic acid (FOLVITE) 1 mg tablet Take 800 mcg by mouth daily   • gabapentin (NEURONTIN) 100 mg capsule Take 1 capsule (100 mg total) by mouth 3 (three) times a day   • hydroxychloroquine (PLAQUENIL) 200 mg tablet Take 1 5 tablets (300 mg total) by mouth daily   • levothyroxine 25 mcg tablet TAKE 1 TABLET (25 MCG TOTAL) BY MOUTH DAILY IN THE EARLY MORNING   • metroNIDAZOLE (METROGEL) 0 75 % gel Apply topically twice a day   • modafinil (PROVIGIL) 200 MG tablet Take 1 tablet (200 mg total) by mouth daily   • Multiple Vitamins-Minerals (Bariatric Multivitamins/Iron) CAPS Take by mouth daily   • pantoprazole (PROTONIX) 40 mg tablet Take 1 tablet (40 mg total) by mouth daily   • clonazePAM (KlonoPIN) 0 5 mg tablet TAKE 1/2 TO 1 TABLET BY MOUTH 2 TIMES A DAY AS NEEDED FOR ANXIETY   • Vortioxetine HBr (Trintellix) 20 MG tablet Take 1 tablet (20 mg total) by mouth daily       Objective     /62   Ht 5' 5 5" (1 664 m)   Wt 64 1 kg (141 lb 6 4 oz)   BMI 23 17 kg/m²     Physical Exam  Constitutional:       Appearance: She is well-developed  HENT:      Head: Normocephalic and atraumatic  Right Ear: Tympanic membrane and ear canal normal       Left Ear: Tympanic membrane and ear canal normal       Nose: Nose normal    Eyes:      Conjunctiva/sclera: Conjunctivae normal       Pupils: Pupils are equal, round, and reactive to light  Neck:      Thyroid: No thyromegaly  Vascular: No JVD  Trachea: No tracheal deviation  Cardiovascular:      Rate and Rhythm: Normal rate and regular rhythm  Heart sounds: No murmur heard  Pulmonary:      Effort: Pulmonary effort is normal       Breath sounds: Normal breath sounds  No wheezing or rales  Abdominal:      General: Bowel sounds are normal       Palpations: Abdomen is soft  There is no mass  Tenderness: There is no abdominal tenderness  There is no guarding or rebound  Musculoskeletal:         General: No tenderness or deformity  Cervical back: Normal range of motion and neck supple  Lymphadenopathy:      Cervical: No cervical adenopathy  Skin:     General: Skin is warm and dry  Findings: No lesion or rash  Nails: There is no clubbing  Neurological:      Mental Status: She is alert and oriented to person, place, and time  Cranial Nerves: No cranial nerve deficit  Motor: No abnormal muscle tone  Coordination: Coordination normal       Deep Tendon Reflexes: Reflexes are normal and symmetric   Reflexes normal    Psychiatric:         Judgment: Judgment normal        Jetfrancisco Erp, DO

## 2022-10-14 ENCOUNTER — APPOINTMENT (OUTPATIENT)
Dept: LAB | Facility: HOSPITAL | Age: 37
End: 2022-10-14
Payer: COMMERCIAL

## 2022-10-14 DIAGNOSIS — D70.9 NEUTROPENIA, UNSPECIFIED TYPE (HCC): ICD-10-CM

## 2022-10-14 LAB
BASOPHILS # BLD AUTO: 0.01 THOUSANDS/ΜL (ref 0–0.1)
BASOPHILS NFR BLD AUTO: 0 % (ref 0–1)
EOSINOPHIL # BLD AUTO: 0.06 THOUSAND/ΜL (ref 0–0.61)
EOSINOPHIL NFR BLD AUTO: 2 % (ref 0–6)
ERYTHROCYTE [DISTWIDTH] IN BLOOD BY AUTOMATED COUNT: 13.5 % (ref 11.6–15.1)
HCT VFR BLD AUTO: 34.9 % (ref 34.8–46.1)
HGB BLD-MCNC: 11.1 G/DL (ref 11.5–15.4)
IMM GRANULOCYTES # BLD AUTO: 0 THOUSAND/UL (ref 0–0.2)
IMM GRANULOCYTES NFR BLD AUTO: 0 % (ref 0–2)
LYMPHOCYTES # BLD AUTO: 1.3 THOUSANDS/ΜL (ref 0.6–4.47)
LYMPHOCYTES NFR BLD AUTO: 35 % (ref 14–44)
MCH RBC QN AUTO: 31.3 PG (ref 26.8–34.3)
MCHC RBC AUTO-ENTMCNC: 31.8 G/DL (ref 31.4–37.4)
MCV RBC AUTO: 98 FL (ref 82–98)
MONOCYTES # BLD AUTO: 0.22 THOUSAND/ΜL (ref 0.17–1.22)
MONOCYTES NFR BLD AUTO: 6 % (ref 4–12)
NEUTROPHILS # BLD AUTO: 2.1 THOUSANDS/ΜL (ref 1.85–7.62)
NEUTS SEG NFR BLD AUTO: 57 % (ref 43–75)
NRBC BLD AUTO-RTO: 0 /100 WBCS
PLATELET # BLD AUTO: 198 THOUSANDS/UL (ref 149–390)
PMV BLD AUTO: 10.2 FL (ref 8.9–12.7)
RBC # BLD AUTO: 3.55 MILLION/UL (ref 3.81–5.12)
URATE SERPL-MCNC: 4.6 MG/DL (ref 2–7.5)
WBC # BLD AUTO: 3.69 THOUSAND/UL (ref 4.31–10.16)

## 2022-10-14 PROCEDURE — 36415 COLL VENOUS BLD VENIPUNCTURE: CPT

## 2022-10-14 PROCEDURE — 84550 ASSAY OF BLOOD/URIC ACID: CPT

## 2022-10-14 PROCEDURE — 85025 COMPLETE CBC W/AUTO DIFF WBC: CPT

## 2022-10-25 ENCOUNTER — TELEMEDICINE (OUTPATIENT)
Dept: PSYCHIATRY | Facility: CLINIC | Age: 37
End: 2022-10-25
Payer: COMMERCIAL

## 2022-10-25 DIAGNOSIS — F43.21 GRIEF: ICD-10-CM

## 2022-10-25 DIAGNOSIS — F33.40 RECURRENT MAJOR DEPRESSIVE DISORDER IN REMISSION (HCC): Primary | ICD-10-CM

## 2022-10-25 DIAGNOSIS — F41.1 GAD (GENERALIZED ANXIETY DISORDER): ICD-10-CM

## 2022-10-25 PROCEDURE — 90837 PSYTX W PT 60 MINUTES: CPT | Performed by: SOCIAL WORKER

## 2022-10-25 NOTE — PSYCH
Problem List Items Addressed This Visit        Other    JG (generalized anxiety disorder)    Recurrent major depressive disorder in remission [facility]) - Primary      Other Visit Diagnoses     Grief              VIRTUAL VISIT DISCLAIMER  Cristy Bland verbally agrees to participate in [employer]  Pt is aware that [employer] could be limited without vital signs or the ability to perform a full hands-on physical exam   Cristy Edwina understands she or the provider may request at any time to terminate the video visit and request the patient to seek care or treatment in person  Psychotherapy Provided: Individual    Goals addressed in session: Goal 1    Pain: none reported     Current suicide risk: Mechelle Lombardo: low    Data: Met with Cristy Bland for scheduled individual session  In last session Cristy Hoganleticia worked on: continue with self care and identify one strength that she has shown in the two week period  This sessions topics discussed included grief and loss  Cristy Bland reports that her long time partner of 7 years has passed on 10/17/22  Dub Bone processed through the entirety of the session her grief and her sadness  Dub Bone reports that she feels numb and lost and that she cannot seem to get herself to get anywhere near normal   Dub Bone processed through on a daily basis the 9 days prior to his passing identifying that she has turned to her regino and recognized that for some reason god gave her a sign that more time was needed  She identified how she brought hospice in and that she felt responsibility but that she also felt like she got some relief from having someone else there  Dub Bone stated that she could not get the last images of him out of her mind  Dub Bone processed back and forth where there were times she displayed logic and other times where she was consumed by her emotions    Dub Bone stated "I cannot even throw away his soiled clothes or the basin, his items are exactly where I left them"  "People may think that I am crazy but I feel like throwing his items away are like throwing him away and I cannot begin to think about doing this"  Clinician validated to [facility] that she was not crazy and that she was not abnormal holding onto these items and that she is able to move at her own pace  [facility] was receptive of this but was consumed by tears  Clinician spent part of the session holding space for [facility] so that she could allow herself to feel her emotions  [facility] reports that she intends to go back to work tomorrow but that she is fearful of how others will perceive her and what they may ask or say  Clinician went through with [facility] that her and Jasper Fish story were just that and that she was in her own right to say that she did not want to talk at all about  What had been going on  Clinician validated to [facility] that moving forward is allowing herself to feel these emotions and not trying to blunt her grief  Andres Brunner shows evidence of utilizing processing to manage mental health symptoms  During this session, this clinical used the following therapeutic modalities Supportive psychotherapy, Client-centered therapy, CBT techniques and Strength based therapy   Assessment:  Andres Brunner presents with a angry, depressed, sad and greiving mood  Andres Brunner affect is tearful   she exhibits good therapeutic rapport with this clinician  she presented calm and cooperative , adequate hygiene and grooming and poor eye contact   Speech was a normal rate  Thought process was normal thought processes  Thought content was no delusions  Insight was Insight intact  Judgement presents as judgment was intact  Andres Brunner presents with a low risk of suicide, low risk of self-harm and minimal of harm to others  she continues to exhibit willingness to work on treatment goals and objectives    Andres Brunner is showing progress in treatment by utilizing willingness to reach out for support and to process her greif  Bethanie Luke continues to struggle with sudden onset of grief and emotions that feel overwhelming  Plan:  Bethanie Luke will return in 1 week for the next scheduled session  Between sessions, she will utilize self care, set boundaries with others that ask her questions that she does not want to answer and will report back during the next session re: success and barriers  At the next session, this clinical will use Supportive psychotherapy, Client-centered therapy, CBT techniques and Strength based therapy  in effort to assist Bethanie Luke with meeting treatment goals  Behavior Health Treatment Plan [facility]: Diagnosis and treatment plan explained to Bethanie Luke, she relates understanding and is agreeable to Treatment Plan: yes      Virtual Regular Visit    Verification of patient location:    Patient is located in the following state in which I hold an active license [place]      Assessment/Plan:    Problem List Items Addressed This Visit        Other    JG (generalized anxiety disorder)    Recurrent major depressive disorder in remission ([facility] ) - Primary      Other Visit Diagnoses     Grief              Goals addressed in session: Goal 1          Reason for visit is No chief complaint on file  Encounter provider Yazan Ruiz LCSW    Provider located at [address] [place] [id number] [phone]      Recent Visits  No visits were found meeting these conditions  Showing recent visits within past 7 days and meeting all other requirements  Today's Visits  Date Type Provider Dept   10/25/22 Telemedicine Yazan Ruiz LCSW [facility]   Showing today's visits and meeting all other requirements  Future Appointments  No visits were found meeting these conditions    Showing future appointments within next 150 days and meeting all other requirements       The patient was identified by name and date of birth  David Marshall was informed that this is a telemedicine visit and [facility]  She agrees to proceed     My office door was closed  No one else was in the room  She acknowledged consent and understanding of privacy and security of the video platform  The patient has agreed to participate and understands they can discontinue the visit at any time  Patient is aware this is a billable service  Subjective  David Marshall is a 40 y o  female          HPI     Past Medical History:   Diagnosis Date   • Acne    • Anxiety    • Back pain    • Bipolar disorder ([address] )     pt denies   • Carpal tunnel syndrome on right    • Contact lens overwear of both eyes    • CPAP (continuous positive airway pressure) dependence    • Cubital tunnel syndrome on right    • Depression    • Disease of thyroid gland    • GERD (gastroesophageal reflux disease)    • Headache    • Hypothyroidism    • Kidney stone    • Kidney stones    • Lupus (systemic lupus erythematosus) ([facility])    • Nausea    • Obese     gastric KATLYN-EN-Y  today 4/19/2022   • PONV (postoperative nausea and vomiting)    • Sleep apnea    • Wears glasses        Past Surgical History:   Procedure Laterality Date   • CARPAL TUNNEL RELEASE     • CHOLECYSTECTOMY     • EGD     • NV CYSTOURETHROSCOPY N/A 2/3/2020    Procedure: CYSTOSCOPY;  Surgeon: Cortney Juares MD;  [facility];  Service: UroGynecology          • NV LAP GASTRIC BYPASS/KATLYN-EN-Y N/A 4/19/2022    Procedure: LAPAROSCOPIC KATLYN-EN-Y GASTRIC BYPASS & INTRAOPERATIVE EGD ROBOTICALLY ASSISTED;  Surgeon: Burgess Ehsan MD;  [facility];  Service: Bariatrics   • NV LAP,CHOLECYSTECTOMY N/A 9/6/2018    Procedure: CHOLECYSTECTOMY LAPAROSCOPIC W/ ROBOTICS;  Surgeon: Cynthia England MD;  [facility];  Service: General   • NV POST COLPORRHAPHY,RECTUM/VAGINA N/A 2/3/2020    Procedure: POSTERIOR COLPORRHAPHY;  Surgeon: Sharifa Herrera MD;  [facility];  Service: UroGynecology          • DE REVISE MEDIAN N/CARPAL TUNNEL SURG Right 11/27/2020    Procedure: release CARPAL TUNNEL;  Surgeon: Will Plummer MD;  [facility];  Service: Orthopedics   • DE REVISE ULNAR NERVE AT ELBOW Right 11/27/2020    Procedure: Lili Madden;  Surgeon:  Will Plummer MD;  [facility];  Service: Orthopedics   • DE SLING OPER STRES INCONTINENCE N/A 2/3/2020    Procedure: PUBOVAGINAL SLING;  Surgeon: Sharifa Herrera MD;  [facility];  Service: UroGynecology          • TUBAL LIGATION         Current Outpatient Medications   Medication Sig Dispense Refill   • Calcium 600-400 MG-UNIT CHEW Chew daily     • Cholecalciferol (Vitamin D) 50 MCG (2000 UT) tablet Take 1 tablet (2,000 Units total) by mouth daily 90 tablet 1   • clobetasol (TEMOVATE) 0 05 % external solution Apply topically 2 (two) times a day 50 mL 0   • clonazePAM (KlonoPIN) 0 5 mg tablet TAKE 1/2 TO 1 TABLET BY MOUTH 2 TIMES A DAY AS NEEDED FOR ANXIETY 60 tablet 2   • folic acid (FOLVITE) 1 mg tablet Take 800 mcg by mouth daily     • gabapentin (NEURONTIN) 100 mg capsule Take 1 capsule (100 mg total) by mouth 3 (three) times a day 90 capsule 3   • hydroxychloroquine (PLAQUENIL) 200 mg tablet Take 1 5 tablets (300 mg total) by mouth daily 135 tablet 1   • levothyroxine 25 mcg tablet TAKE 1 TABLET (25 MCG TOTAL) BY MOUTH DAILY IN THE EARLY MORNING 90 tablet 1   • metroNIDAZOLE (METROGEL) 0 75 % gel Apply topically twice a day 45 g 3   • modafinil (PROVIGIL) 200 MG tablet Take 1 tablet (200 mg total) by mouth daily 30 tablet 2   • Multiple Vitamins-Minerals (Bariatric Multivitamins/Iron) CAPS Take by mouth daily     • pantoprazole (PROTONIX) 40 mg tablet Take 1 tablet (40 mg total) by mouth daily 90 tablet 1   • Vortioxetine HBr (Trintellix) 20 MG tablet Take 1 tablet (20 mg total) by mouth daily 30 tablet 2     No current facility-administered medications for this visit  Allergies   Allergen Reactions   • Wound Dressing Adhesive Rash     Gets red rash from paper tape relatively quickly after application   • Penicillins Hives     At young age       Review of Systems    Video Exam    There were no vitals filed for this visit      Physical Exam     Visit Time    Visit Start Time: 1:00 om  Visit Stop Time: 1:04 pm  Total Visit Duration: 64 minutes

## 2022-10-30 DIAGNOSIS — F41.1 GAD (GENERALIZED ANXIETY DISORDER): ICD-10-CM

## 2022-10-30 DIAGNOSIS — F40.10 SOCIAL PHOBIA: ICD-10-CM

## 2022-10-30 DIAGNOSIS — F33.2 MAJOR DEPRESSIVE DISORDER, RECURRENT, SEVERE W/O PSYCHOTIC BEHAVIOR (HCC): ICD-10-CM

## 2022-10-30 RX ORDER — VORTIOXETINE 20 MG/1
TABLET, FILM COATED ORAL
Qty: 30 TABLET | Refills: 0 | Status: SHIPPED | OUTPATIENT
Start: 2022-10-30

## 2022-11-09 ENCOUNTER — OFFICE VISIT (OUTPATIENT)
Dept: PSYCHIATRY | Facility: CLINIC | Age: 37
End: 2022-11-09

## 2022-11-09 DIAGNOSIS — F41.1 GAD (GENERALIZED ANXIETY DISORDER): ICD-10-CM

## 2022-11-09 DIAGNOSIS — F40.10 SOCIAL PHOBIA: ICD-10-CM

## 2022-11-09 DIAGNOSIS — F51.13 HYPERSOMNIA DUE TO MENTAL DISORDER: ICD-10-CM

## 2022-11-09 DIAGNOSIS — F33.2 MAJOR DEPRESSIVE DISORDER, RECURRENT, SEVERE W/O PSYCHOTIC BEHAVIOR (HCC): ICD-10-CM

## 2022-11-09 RX ORDER — CLONAZEPAM 0.5 MG/1
TABLET ORAL
Qty: 60 TABLET | Refills: 2 | Status: SHIPPED | OUTPATIENT
Start: 2022-11-09 | End: 2023-03-01

## 2022-11-09 RX ORDER — MODAFINIL 200 MG/1
200 TABLET ORAL DAILY
Qty: 30 TABLET | Refills: 2 | Status: SHIPPED | OUTPATIENT
Start: 2022-11-27 | End: 2023-02-25
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
## 2022-11-14 ENCOUNTER — TELEMEDICINE (OUTPATIENT)
Dept: PSYCHIATRY | Facility: CLINIC | Age: 37
End: 2022-11-14

## 2022-11-14 DIAGNOSIS — F43.21 GRIEF: ICD-10-CM

## 2022-11-14 DIAGNOSIS — F41.1 GAD (GENERALIZED ANXIETY DISORDER): ICD-10-CM

## 2022-11-14 DIAGNOSIS — F33.40 RECURRENT MAJOR DEPRESSIVE DISORDER IN REMISSION (HCC): Primary | ICD-10-CM

## 2022-11-14 NOTE — PSYCH
Problem List Items Addressed This Visit    None         VIRTUAL VISIT DISCLAIMER  Elvia Adam verbally agrees to participate in [employer]  Pt is aware that [employer] could be limited without vital signs or the ability to perform a full hands-on physical exam   Elvia Medina understands she or the provider may request at any time to terminate the video visit and request the patient to seek care or treatment in person  Psychotherapy Provided: Individual    Goals addressed in session: Goal 1    Pain: none reported     Current suicide risk: Isola Prude: low    Data: Met with Elvia Medina for scheduled individual session  In last session Elvia Medina worked on: processing the grief of her spouse  This sessions topics discussed included grief and loss  Elvia Adam reports that she is doing as well as can be expected  She reports that coming home is hard on her because of how quiet it is  Zhao Zapata talked about her two dogs and clinician and her spent some time discussing how this could be an area of strength to both occupy her time but also provide her with a healthy outlet to put her emotions into and to build a relationship with her dogs  Zhao Zapata has shared in the past that she got the two dogs about a year ago and due to Lexi Palma being sick she did not have the time that she planned to have to train the dogs  Zhao Zapata was open but hesitant to this  Zhao Zapata used part of the session to process how some of Brendan's children were handling his passing and a recent event of them coming to the house to take some of his items  Zhao Zapata reports being alright and welcoming them into the home but that after they left with their reaction she reports feeling put off by them and hurt at their comments and feeling angered  "I did not plan to touch his items and they put me into such a panic that I packed everything else that was left of his and took it to my car and to my mothers home"    "I am so angry at this because I never wanted to disturb things from where he left them, that was all I had left of him were he things as he left them"  Clinician validated Desire's emotions and held space and she shared how she is feeling and her thoughts towards Brendan's passing  Devon Kaba shared that her mother continues to be supportive and that Brendan's mother has been the same  "I just don't always want company and I do not want to always talk"  Clinician encouraged Devon Kaba to assure that she does not isolate from others but also assured her that taking time for herself is completely permissible but to maintain a balance  Josephine Rivera shows evidence of utilizing cognitive reframing, self care and distress management to manage mental health symptoms  During this session, this clinical used the following therapeutic modalities Supportive psychotherapy, Client-centered therapy, Mindfulness-based strategies, DBT-informed skills and Acceptance and Commitment therapy  Assessment:  Josephine Rivera presents with a depressed and sad mood  Josephine Rivera affect is tearful   she exhibits good therapeutic rapport with this clinician  she presented calm and cooperative , adequate hygiene and grooming and good eye contact   Speech was decreased volume and slowed  Thought process was normal thought processes  Thought content was no delusions  Insight was Insight intact  Judgement presents as judgment was intact  Josephine Rivera presents with a low risk of suicide, low risk of self-harm and minimal of harm to others  she continues to exhibit willingness to work on treatment goals and objectives  Josephine Rivera is showing progress in treatment by utilizing coping mechanisms and processing of grief  Josephine Rivera continues to struggle with ongoing grief and emotions associated  Plan:  Josephine Rivera will return in 2 weeks for the next scheduled session    Between sessions, she will utilize self care and support from others around her and will report back during the next session re: success and barriers  At the next session, this clinical will use Supportive psychotherapy, Client-centered therapy, DBT-informed skills, Motivations interviewing and Strength based therapy  in effort to assist Gamal Bear with meeting treatment goals  Behavior Health Treatment Plan [facility]: Diagnosis and treatment plan explained to Gamal Bear, she relates understanding and is agreeable to Treatment Plan: yes    Virtual Regular Visit    Verification of patient location:    Patient is located in the following [facility] in which I hold an active license [place]      Assessment/Plan:    Problem List Items Addressed This Visit    None         Goals addressed in session: Goal 1          Reason for visit is No chief complaint on file  Encounter provider Jacy Jacobo LCSW    Provider located at [address] [place] [id number] [phone]      Recent Visits  No visits were found meeting these conditions  Showing recent visits within past 7 days and meeting all other requirements  Today's Visits  Date Type Provider Dept   11/14/22 Telemedicine Jacy Jacobo LCSW [facility]   Showing today's visits and meeting all other requirements  Future Appointments  No visits were found meeting these conditions  Showing future appointments within next 150 days and meeting all other requirements       The patient was identified by name and date of birth  Gamal Bear was informed that this is a telemedicine visit and [facility]  She agrees to proceed     My office door was closed  No one else was in the room  She acknowledged consent and understanding of privacy and security of the video platform   The patient has agreed to participate and understands they can discontinue the visit at any time  Patient is aware this is a billable service  Subjective  Gamal Bear is a 40 y o  female    HPI     Past Medical History:   Diagnosis Date   • Acne    • Anxiety    • Back pain    • Bipolar disorder ([address] )     pt denies   • Carpal tunnel syndrome on right    • Contact lens overwear of both eyes    • CPAP (continuous positive airway pressure) dependence    • Cubital tunnel syndrome on right    • Depression    • Disease of thyroid gland    • GERD (gastroesophageal reflux disease)    • Headache    • Hypothyroidism    • Kidney stone    • Kidney stones    • Lupus (systemic lupus erythematosus) (HCC)    • Nausea    • Obese     gastric KATLYN-EN-Y  today 4/19/2022   • PONV (postoperative nausea and vomiting)    • Sleep apnea    • Wears glasses        Past Surgical History:   Procedure Laterality Date   • CARPAL TUNNEL RELEASE     • CHOLECYSTECTOMY     • EGD     • CA CYSTOURETHROSCOPY N/A 2/3/2020    Procedure: CYSTOSCOPY;  Surgeon: Aldo Desai MD;  [facility];  Service: UroGynecology          • CA LAP GASTRIC BYPASS/KATLYN-EN-Y N/A 4/19/2022    Procedure: LAPAROSCOPIC KATLYN-EN-Y GASTRIC BYPASS & INTRAOPERATIVE EGD ROBOTICALLY ASSISTED;  Surgeon: Elizabeth Koroma MD;  [facility];  Service: Bariatrics   • CA LAP,CHOLECYSTECTOMY N/A 9/6/2018    Procedure: CHOLECYSTECTOMY LAPAROSCOPIC W/ ROBOTICS;  Surgeon: Leroy Low MD;  [facility];  Service: General   • CA POST COLPORRHAPHY,RECTUM/VAGINA N/A 2/3/2020    Procedure: POSTERIOR COLPORRHAPHY;  Surgeon: Aldo Desai MD;  [facility];  Service: UroGynecology          • CA REVISE MEDIAN N/CARPAL TUNNEL SURG Right 11/27/2020    Procedure: release CARPAL TUNNEL;  Surgeon: Vitaliy Gama MD;  [facility];  Service: Orthopedics   • CA REVISE ULNAR NERVE AT ELBOW Right 11/27/2020    Procedure: Raymond Zaidi;  Surgeon:  Vitaliy Gama MD;  [facility];  Service: Orthopedics • MT SLING OPER STRES INCONTINENCE N/A 2/3/2020    Procedure: PUBOVAGINAL SLING;  Surgeon: Ceci Mendes MD;  [facility];  Service: UroGynecology          • TUBAL LIGATION         Current Outpatient Medications   Medication Sig Dispense Refill   • Calcium 600-400 MG-UNIT CHEW Chew daily     • Cholecalciferol (Vitamin D) 50 MCG (2000 UT) tablet Take 1 tablet (2,000 Units total) by mouth daily 90 tablet 1   • clobetasol (TEMOVATE) 0 05 % external solution Apply topically 2 (two) times a day 50 mL 0   • clonazePAM (KlonoPIN) 0 5 mg tablet TAKE 1/2 TO 1 TABLET BY MOUTH 2 TIMES A DAY AS NEEDED FOR ANXIETY 60 tablet 2   • folic acid (FOLVITE) 1 mg tablet Take 800 mcg by mouth daily     • gabapentin (NEURONTIN) 100 mg capsule Take 1 capsule (100 mg total) by mouth 3 (three) times a day 90 capsule 3   • hydroxychloroquine (PLAQUENIL) 200 mg tablet Take 1 5 tablets (300 mg total) by mouth daily 135 tablet 1   • levothyroxine 25 mcg tablet TAKE 1 TABLET (25 MCG TOTAL) BY MOUTH DAILY IN THE EARLY MORNING 90 tablet 1   • metroNIDAZOLE (METROGEL) 0 75 % gel Apply topically twice a day 45 g 3   • [START ON 11/27/2022] modafinil (PROVIGIL) 200 MG tablet Take 1 tablet (200 mg total) by mouth daily Do not start before November 27, 2022  30 tablet 2   • Multiple Vitamins-Minerals (Bariatric Multivitamins/Iron) CAPS Take by mouth daily     • pantoprazole (PROTONIX) 40 mg tablet Take 1 tablet (40 mg total) by mouth daily 90 tablet 1   • Trintellix 20 MG tablet TAKE ONE TABLET BY MOUTH EVERY DAY 30 tablet 0     No current facility-administered medications for this visit  Allergies   Allergen Reactions   • Wound Dressing Adhesive Rash     Gets red rash from paper tape relatively quickly after application   • Penicillins Hives     At young age       Review of Systems    Video Exam    There were no vitals filed for this visit      Physical Exam     Visit Time    11/14/22  Start Time: 1600  Stop Time: 7219  Total Visit Time: 52 minutes

## 2022-11-15 ENCOUNTER — DOCUMENTATION (OUTPATIENT)
Dept: PSYCHIATRY | Facility: CLINIC | Age: 37
End: 2022-11-15

## 2022-11-15 DIAGNOSIS — F40.10 SOCIAL PHOBIA: ICD-10-CM

## 2022-11-15 DIAGNOSIS — F33.2 MAJOR DEPRESSIVE DISORDER, RECURRENT, SEVERE W/O PSYCHOTIC BEHAVIOR (HCC): ICD-10-CM

## 2022-11-15 DIAGNOSIS — F41.1 GAD (GENERALIZED ANXIETY DISORDER): Primary | ICD-10-CM

## 2022-11-15 PROBLEM — F41.8 DEPRESSION WITH ANXIETY: Status: RESOLVED | Noted: 2020-09-22 | Resolved: 2022-11-15

## 2022-11-15 PROBLEM — F33.40 RECURRENT MAJOR DEPRESSIVE DISORDER IN REMISSION (HCC): Status: RESOLVED | Noted: 2022-07-12 | Resolved: 2022-11-15

## 2022-11-15 NOTE — PSYCH
[facility]    Patient Name Joseline Munoz     Date of Birth: 40 y o  1985      MRN: [id number]    Date of Referral: November 15, 2022    Current Diagnosis:     1  JG (generalized anxiety disorder)    2  Major depressive disorder, recurrent, severe w/o psychotic behavior ([facility] [address] )    3  Social phobia        Insurance: Payor: [facility] / Plan: [employer]  / Product Type: [facility] /     Reason for Referral:     • Assist with different diagnosis: ADHD, Innatentive Type      Concerns:    • Inattention in context of other mental health comorbidities  She reports this is longstanding but only now is she seeking to address it  In addition, recent bariatric surgery and s/o just passed but these do not seem related to timing/symptoms  Additional Information Regarding Referrals: Feel free to reach out for further discussion        Que Balbuena

## 2022-11-22 DIAGNOSIS — F40.10 SOCIAL PHOBIA: ICD-10-CM

## 2022-11-22 DIAGNOSIS — F41.1 GAD (GENERALIZED ANXIETY DISORDER): ICD-10-CM

## 2022-11-22 DIAGNOSIS — F33.2 MAJOR DEPRESSIVE DISORDER, RECURRENT, SEVERE W/O PSYCHOTIC BEHAVIOR (HCC): Primary | ICD-10-CM

## 2022-12-08 ENCOUNTER — OFFICE VISIT (OUTPATIENT)
Dept: FAMILY MEDICINE CLINIC | Facility: CLINIC | Age: 37
End: 2022-12-08

## 2022-12-08 VITALS
BODY MASS INDEX: 20.41 KG/M2 | SYSTOLIC BLOOD PRESSURE: 110 MMHG | HEIGHT: 66 IN | DIASTOLIC BLOOD PRESSURE: 68 MMHG | WEIGHT: 127 LBS

## 2022-12-08 DIAGNOSIS — F33.2 MAJOR DEPRESSIVE DISORDER, RECURRENT, SEVERE W/O PSYCHOTIC BEHAVIOR (HCC): Primary | ICD-10-CM

## 2022-12-08 DIAGNOSIS — F41.1 GAD (GENERALIZED ANXIETY DISORDER): ICD-10-CM

## 2022-12-08 DIAGNOSIS — F40.10 SOCIAL PHOBIA: ICD-10-CM

## 2022-12-08 DIAGNOSIS — F33.2 MAJOR DEPRESSIVE DISORDER, RECURRENT, SEVERE W/O PSYCHOTIC BEHAVIOR (HCC): ICD-10-CM

## 2022-12-08 DIAGNOSIS — E55.9 VITAMIN D DEFICIENCY: ICD-10-CM

## 2022-12-08 DIAGNOSIS — K21.9 GASTROESOPHAGEAL REFLUX DISEASE WITHOUT ESOPHAGITIS: Chronic | ICD-10-CM

## 2022-12-08 DIAGNOSIS — E03.9 ACQUIRED HYPOTHYROIDISM: ICD-10-CM

## 2022-12-08 DIAGNOSIS — J01.10 ACUTE NON-RECURRENT FRONTAL SINUSITIS: ICD-10-CM

## 2022-12-08 DIAGNOSIS — Z98.84 STATUS POST GASTRIC BYPASS FOR OBESITY: ICD-10-CM

## 2022-12-08 PROBLEM — L90.5 PAIN IN SURGICAL SCAR: Status: RESOLVED | Noted: 2021-03-15 | Resolved: 2022-12-08

## 2022-12-08 PROBLEM — R20.0 NUMBNESS OF HAND: Status: RESOLVED | Noted: 2021-03-10 | Resolved: 2022-12-08

## 2022-12-08 PROBLEM — M79.641 PAIN IN RIGHT HAND: Status: RESOLVED | Noted: 2021-03-10 | Resolved: 2022-12-08

## 2022-12-08 PROBLEM — R52 PAIN IN SURGICAL SCAR: Status: RESOLVED | Noted: 2021-03-15 | Resolved: 2022-12-08

## 2022-12-08 PROBLEM — R19.8 CHANGE IN BOWEL FUNCTION: Status: RESOLVED | Noted: 2021-10-06 | Resolved: 2022-12-08

## 2022-12-08 RX ORDER — LEVOTHYROXINE SODIUM 0.03 MG/1
25 TABLET ORAL
Qty: 90 TABLET | Refills: 1 | Status: SHIPPED | OUTPATIENT
Start: 2022-12-08

## 2022-12-08 RX ORDER — CEFDINIR 300 MG/1
300 CAPSULE ORAL EVERY 12 HOURS SCHEDULED
Qty: 20 CAPSULE | Refills: 0 | Status: SHIPPED | OUTPATIENT
Start: 2022-12-08 | End: 2022-12-18

## 2022-12-08 RX ORDER — CHOLECALCIFEROL (VITAMIN D3) 50 MCG
2000 TABLET ORAL DAILY
Qty: 90 TABLET | Refills: 1 | Status: SHIPPED | OUTPATIENT
Start: 2022-12-08

## 2022-12-08 NOTE — PROGRESS NOTES
Chief Complaint   Patient presents with   • Hypothyroidism   • Depression     Name: Leonardo Knutson      : 1985      MRN: [id number]  Encounter Provider: Thai Harvey DO  Encounter Date: 2022   Encounter department: [facility] PRIMARY [facility]    Assessment & Plan     1  Major depressive disorder, recurrent, severe w/o psychotic behavior ([address] )  Assessment & Plan:  Follow-up with psychiatry, continue current meds      2  Vitamin D deficiency  -     Cholecalciferol (Vitamin D) 50 MCG ( UT) tablet; Take 1 tablet (2,000 Units total) by mouth daily    3  Acquired hypothyroidism  Assessment & Plan:  TSH is therapeutic, continue current medications  Orders:  -     levothyroxine 25 mcg tablet; Take 1 tablet (25 mcg total) by mouth daily in the early morning    4  Gastroesophageal reflux disease without esophagitis    5  Acute non-recurrent frontal sinusitis  -     cefdinir (OMNICEF) 300 mg capsule; Take 1 capsule (300 mg total) by mouth every 12 (twelve) hours for 10 days    6  Status post gastric bypass for obesity  Assessment & Plan:  Long discussion about diet, explained to patient I think at this point she could increase her diet to 0326-9284 kcal per day  Follow-up with bariatric surgery  Subjective      He presents to the office for follow-up on her hypothyroidism, to update me on her depression/anxiety, and to discuss her gastric bypass surgery  Recent thyroid lab work was therapeutic  She continues to see psychiatry and is doing fairly well on her current medications  She has lost over 100 pounds since her gastric bypass 8 months ago  She would like to start to maintain her weight she does not want to lose anymore  He is still on an 800 kcal diet per day  Review of Systems   Constitutional: Negative for activity change, chills, diaphoresis, fatigue, fever and unexpected weight change     HENT: Negative for congestion, ear pain, hearing loss, nosebleeds, postnasal drip, rhinorrhea, sinus pressure, sore throat and tinnitus  Eyes: Negative for photophobia, pain, discharge, redness and visual disturbance  Respiratory: Negative for cough, chest tightness, shortness of breath and wheezing  Cardiovascular: Negative for chest pain, palpitations and leg swelling  Denies PAZ   Gastrointestinal: Negative for abdominal pain, blood in stool, constipation, diarrhea, nausea and vomiting  Endocrine: Negative  Genitourinary: Negative for difficulty urinating, dysuria, frequency, hematuria and urgency  Musculoskeletal: Negative for arthralgias, joint swelling and myalgias  Skin: Negative for rash and wound  Denies skin lesions, change in birthmarks   Allergic/Immunologic: Negative for environmental allergies, food allergies and immunocompromised state  Neurological: Negative for dizziness, tremors, seizures, syncope, weakness, numbness and headaches  Hematological: Negative  Psychiatric/Behavioral: Negative for behavioral problems, confusion, decreased concentration and sleep disturbance  The patient is not nervous/anxious  Current Outpatient Medications on File Prior to Visit   Medication Sig   • Calcium 600-400 MG-UNIT CHEW Chew daily   • clobetasol (TEMOVATE) 0 05 % external solution Apply topically 2 (two) times a day   • clonazePAM (KlonoPIN) 0 5 mg tablet TAKE 1/2 TO 1 TABLET BY MOUTH 2 TIMES A DAY AS NEEDED FOR ANXIETY   • folic acid (FOLVITE) 1 mg tablet Take 800 mcg by mouth daily   • hydroxychloroquine (PLAQUENIL) 200 mg tablet Take 1 5 tablets (300 mg total) by mouth daily   • modafinil (PROVIGIL) 200 MG tablet Take 1 tablet (200 mg total) by mouth daily Do not start before November 27, 2022     • Multiple Vitamins-Minerals (Bariatric Multivitamins/Iron) CAPS Take by mouth daily   • Trintellix 20 MG tablet TAKE ONE TABLET BY MOUTH EVERY DAY   • [DISCONTINUED] Cholecalciferol (Vitamin D) 50 MCG (2000 UT) tablet Take 1 tablet (2,000 Units total) by mouth daily   • [DISCONTINUED] gabapentin (NEURONTIN) 100 mg capsule Take 1 capsule (100 mg total) by mouth 3 (three) times a day   • [DISCONTINUED] levothyroxine 25 mcg tablet TAKE 1 TABLET (25 MCG TOTAL) BY MOUTH DAILY IN THE EARLY MORNING   • [DISCONTINUED] metroNIDAZOLE (METROGEL) 0 75 % gel Apply topically twice a day   • [DISCONTINUED] pantoprazole (PROTONIX) 40 mg tablet Take 1 tablet (40 mg total) by mouth daily       Objective     /68   Ht 5' 5 5" (1 664 m)   Wt 57 6 kg (127 lb)   BMI 20 81 kg/m²     Physical Exam  Constitutional:       General: She is not in acute distress  Appearance: She is well-developed  HENT:      Head: Normocephalic and atraumatic  Right Ear: Tympanic membrane, ear canal and external ear normal       Left Ear: Tympanic membrane, ear canal and external ear normal       Nose: Nose normal    Eyes:      Conjunctiva/sclera: Conjunctivae normal       Pupils: Pupils are equal, round, and reactive to light  Neck:      Thyroid: No thyromegaly  Vascular: No JVD  Trachea: No tracheal deviation  Cardiovascular:      Rate and Rhythm: Normal rate and regular rhythm  Pulses: Normal pulses  Heart sounds: Normal heart sounds  No murmur heard  Pulmonary:      Effort: Pulmonary effort is normal       Breath sounds: Normal breath sounds  No wheezing or rales  Abdominal:      General: Bowel sounds are normal       Palpations: Abdomen is soft  There is no mass  Tenderness: There is no abdominal tenderness  There is no guarding or rebound  Musculoskeletal:         General: No tenderness  Normal range of motion  Cervical back: Normal range of motion and neck supple  Lymphadenopathy:      Cervical: No cervical adenopathy  Skin:     General: Skin is warm and dry  Findings: No lesion or rash  Neurological:      Mental Status: She is alert and oriented to person, place, and time        Cranial Nerves: No cranial nerve deficit  Deep Tendon Reflexes: Reflexes are normal and symmetric  Psychiatric:         Mood and Affect: Mood normal          Behavior: Behavior normal          Thought Content:  Thought content normal          Judgment: Judgment normal        Annika Mayo DO

## 2022-12-09 RX ORDER — VORTIOXETINE 20 MG/1
TABLET, FILM COATED ORAL
Qty: 30 TABLET | Refills: 0 | Status: SHIPPED | OUTPATIENT
Start: 2022-12-09

## 2022-12-09 NOTE — ASSESSMENT & PLAN NOTE
Long discussion about diet, explained to patient I think at this point she could increase her diet to [phone] kcal per day  Follow-up with bariatric surgery

## 2022-12-09 NOTE — ASSESSMENT & PLAN NOTE
Follow-up with psychiatry, continue current meds Discontinue Regimen: Doxycycline 100mg bid, will restart later Initiate Treatment: Retin-a .06% topical gel at bedtime Detail Level: Zone Continue Regimen: Aczone 7.5% topical gel in the morning \\nOvace facial wash twice daily Otc Regimen: Wear sunscreen daily \\nUse oil free products Otc Regimen: Moisturize daily with cerave cream

## 2022-12-12 ENCOUNTER — TELEPHONE (OUTPATIENT)
Dept: FAMILY MEDICINE CLINIC | Facility: CLINIC | Age: 37
End: 2022-12-12

## 2022-12-12 DIAGNOSIS — J30.1 SEASONAL ALLERGIC RHINITIS DUE TO POLLEN: Primary | ICD-10-CM

## 2022-12-12 RX ORDER — FLUTICASONE PROPIONATE 50 MCG
1 SPRAY, SUSPENSION (ML) NASAL DAILY
Qty: 48 G | Refills: 1 | Status: SHIPPED | OUTPATIENT
Start: 2022-12-12 | End: 2023-08-31 | Stop reason: SDUPTHER

## 2022-12-14 ENCOUNTER — TELEPHONE (OUTPATIENT)
Dept: FAMILY MEDICINE CLINIC | Facility: CLINIC | Age: 37
End: 2022-12-14

## 2022-12-14 DIAGNOSIS — J01.10 ACUTE NON-RECURRENT FRONTAL SINUSITIS: Primary | ICD-10-CM

## 2022-12-14 RX ORDER — PREDNISONE 10 MG/1
TABLET ORAL
Qty: 30 TABLET | Refills: 0 | Status: SHIPPED | OUTPATIENT
Start: 2022-12-14 | End: 2022-12-28

## 2022-12-27 ENCOUNTER — TELEPHONE (OUTPATIENT)
Dept: PSYCHIATRY | Facility: CLINIC | Age: 37
End: 2022-12-27

## 2022-12-27 NOTE — TELEPHONE ENCOUNTER
Clinician called and spoke with patient to rescheduled a follow up session    Appointment is scheduled for 1/9/23

## 2022-12-28 ENCOUNTER — TELEMEDICINE (OUTPATIENT)
Dept: FAMILY MEDICINE CLINIC | Facility: CLINIC | Age: 37
End: 2022-12-28

## 2022-12-28 DIAGNOSIS — U07.1 COVID-19: Primary | ICD-10-CM

## 2022-12-28 RX ORDER — NIRMATRELVIR AND RITONAVIR 300-100 MG
3 KIT ORAL 2 TIMES DAILY
Qty: 30 TABLET | Refills: 0 | Status: SHIPPED | OUTPATIENT
Start: 2022-12-28 | End: 2023-01-02

## 2022-12-28 RX ORDER — DEXAMETHASONE 1 MG
1 TABLET ORAL 2 TIMES DAILY WITH MEALS
Qty: 10 TABLET | Refills: 0 | Status: SHIPPED | OUTPATIENT
Start: 2022-12-28 | End: 2023-01-02

## 2022-12-28 NOTE — PROGRESS NOTES
COVID-19 Outpatient Progress Note    Assessment/Plan:    Problem List Items Addressed This Visit    None  Visit Diagnoses     COVID-19    -  Primary    Relevant Medications    nirmatrelvir & ritonavir (Paxlovid, 300/100,) tablet therapy pack    dexamethasone (DECADRON) 1 mg tablet         Disposition:     Discussed symptom directed medication options with patient  Discussed vitamin D, vitamin C, and/or zinc supplementation with patient  [address]    Your healthcare provider and/or public health staff have evaluated you and have determined that you do not need to remain in the hospital at this time   At this time you can be isolated at home where you will be monitored by staff from your local or state health department  You should carefully follow the prevention and isolation steps below until a healthcare provider or local or state health department says that you can return to your normal activities  Stay home except to get medical care    People who are mildly ill with COVID-19 are able to isolate at home during their illness  You should restrict activities outside your home, except for getting medical care  Do not go to work, school, or public areas  Avoid using public transportation, ride-sharing, or taxis  Separate yourself from other people and animals in your home    People: As much as possible, you should stay in a specific room and away from other people in your home  Also, you should use a separate bathroom, if available  Animals: You should restrict contact with pets and other animals while you are sick with COVID-19, just like you would around other people  Although there have not been reports of pets or other animals becoming sick with COVID-19, it is still recommended that people sick with COVID-19 limit contact with animals until more information is known about the virus  When possible, have another member of your household care for your animals while you are sick   If you are sick with COVID-19, avoid contact with your pet, including petting, snuggling, being kissed or licked, and sharing food  If you must care for your pet or be around animals while you are sick, wash your hands before and after you interact with pets and wear a facemask  See COVID-19 and Animals for more information  Call ahead before visiting your doctor    If you have a medical appointment, call the healthcare provider and tell them that you have or may have COVID-19  This will help the healthcare provider’s office take steps to keep other people from getting infected or exposed  Wear a facemask    You should wear a facemask when you are around other people (e g , sharing a room or vehicle) or pets and before you enter a healthcare provider’s office  If you are not able to wear a facemask (for example, because it causes trouble breathing), then people who live with you should not stay in the same room with you, or they should wear a facemask if they enter your room  Cover your coughs and sneezes    Cover your mouth and nose with a tissue when you cough or sneeze  Throw used tissues in a lined trash can  Immediately wash your hands with soap and water for at least 20 seconds or, if soap and water are not available, clean your hands with an alcohol-based hand  that contains at least 60% alcohol  Clean your hands often    Wash your hands often with soap and water for at least 20 seconds, especially after blowing your nose, coughing, or sneezing; going to the bathroom; and before eating or preparing food  If soap and water are not readily available, use an alcohol-based hand  with at least 60% alcohol, covering all surfaces of your hands and rubbing them together until they feel dry  Soap and water are the best option if hands are visibly dirty  Avoid touching your eyes, nose, and mouth with unwashed hands      Avoid sharing personal household items    You should not share dishes, drinking glasses, cups, eating utensils, towels, or bedding with other people or pets in your home  After using these items, they should be washed thoroughly with soap and water  Clean all “high-touch” surfaces everyday    High touch surfaces include counters, tabletops, doorknobs, bathroom fixtures, toilets, phones, keyboards, tablets, and bedside tables  Also, clean any surfaces that may have blood, stool, or body fluids on them  Use a household cleaning spray or wipe, according to the label instructions  Labels contain instructions for safe and effective use of the cleaning product including precautions you should take when applying the product, such as wearing gloves and making sure you have good ventilation during use of the product  Monitor your symptoms    Seek prompt medical attention if your illness is worsening (e g , difficulty breathing)  Before seeking care, call your healthcare provider and tell them that you have, or are being evaluated for, COVID-19  Put on a facemask before you enter the facility  These steps will help the healthcare provider’s office to keep other people in the office or waiting room from getting infected or exposed  Ask your healthcare provider to call the local or formerly [facility] health department  Persons who are placed under active monitoring or facilitated self-monitoring should follow instructions provided by their local health department or occupational health professionals, as appropriate  If you have a medical emergency and need to call 911, notify the dispatch personnel that you have, or are being evaluated for COVID-19  If possible, put on a facemask before emergency medical services arrive  Discontinuing home isolation    Patients with confirmed COVID-19 should remain under home isolation precautions until the following conditions are met:    They have had no fever for at least 24 hours (that is one full day of no fever without the use medicine that reduces fevers)  AND  other symptoms have improved (for example, when their cough or shortness of breath have improved)  AND  If had mild or moderate illness, at least 10 days have passed since their symptoms first appeared or if severe illness (needed oxygen) or immunosuppressed, at least 20 days have passed since symptoms first appeared  Patients with confirmed COVID-19 should also notify close contacts (including their workplace) and ask that they self-quarantine  Currently, close contact is defined as being within 6 feet for 15 minutes or more from the period 24 hours starting 48 hours before symptom onset to the time at which the patient went into isolation   Close contacts of patients diagnosed with COVID-19 should be instructed by the patient to self-quarantine for 14 days from the last time of their last contact with the patient  Source: [employer]      Patient meets criteria for PAXLOVID and they have been counseled appropriately according to EUA documentation released by the FDA  After discussion, patient agrees to treatment  [place] is an investigational medicine used to treat mild-to-moderate COVID-19 in adults and children (15years of age and older weighing at least 80 pounds (40 kg)) with positive results of direct SARS-CoV-2 viral testing, and who are at high risk for progression to severe COVID-19, including hospitalization or death  PAXLOVID is investigational because it is still being studied  There is limited information about the safety and effectiveness of using PAXLOVID to treat people with mild-to-moderate COVID-19      The FDA has authorized the emergency use of PAXLOVID for the treatment of mild-tomoderate COVID-19 in adults and children (15years of age and older weighing at least 80 pounds (40 kg)) with a positive test for the virus that causes COVID-19, and who are at high risk for progression to severe COVID-19, including hospitalization or death, under an EUA  What should I tell my healthcare provider before I take PAXLOVID? Tell your healthcare provider if you:  - Have any allergies  - Have liver or kidney disease  - Are pregnant or plan to become pregnant  - Are breastfeeding a child  - Have any serious illnesses    Tell your healthcare provider about all the medicines you take, including prescription and over-the-counter medicines, vitamins, and herbal supplements  Some medicines may interact with PAXLOVID and may cause serious side effects  Keep a list of your medicines to show your healthcare provider and pharmacist when you get a new medicine  You can ask your healthcare provider or pharmacist for a list of medicines that interact with PAXLOVID  Do not start taking a new medicine without telling your healthcare provider  Your healthcare provider can tell you if it is safe to take PAXLOVID with other medicines  Tell your healthcare provider if you are taking combined hormonal contraceptive  PAXLOVID may affect how your birth control pills work  Females who are able to become pregnant should use another effective alternative form of contraception or an additional barrier method of contraception  Talk to your healthcare provider if you have any questions about contraceptive methods that might be right for you  How do I take PAXLOVID? PAXLOVID consists of 2 medicines: nirmatrelvir and ritonavir  - Take 2 pink tablets of nirmatrelvir with 1 white tablet of ritonavir by mouth 2 times each day (in the morning and in the evening) for 5 days  For each dose, take all 3 tablets at the same time  - If you have kidney disease, talk to your healthcare provider  You may need a different dose  - Swallow the tablets whole  Do not chew, break, or crush the tablets  - Take PAXLOVID with or without food  - Do not stop taking PAXLOVID without talking to your healthcare provider, even if you feel better    - If you miss a dose of PAXLOVID within 8 hours of the time it is usually taken, take it as soon as you remember  If you miss a dose by more than 8 hours, skip the missed dose and take the next dose at your regular time  Do not take 2 doses of PAXLOVID at the same time  - If you take too much PAXLOVID, call your healthcare provider or go to the nearest hospital emergency room right away  - If you are taking a ritonavir- or cobicistat-containing medicine to treat hepatitis C or Human Immunodeficiency Virus (HIV), you should continue to take your medicine as prescribed by your healthcare provider   - Talk to your healthcare provider if you do not feel better or if you feel worse after 5 days  Who should generally not take PAXLOVID? Do not take PAXLOVID if:  You are allergic to nirmatrelvir, ritonavir, or any of the ingredients in PAXLOVID  You are taking any of the following medicines:  - Alfuzosin  - Pethidine, piroxicam, propoxyphene  - Ranolazine  - Amiodarone, dronedarone, flecainide, propafenone, quinidine  - Colchicine  - Lurasidone, pimozide, clozapine  - Dihydroergotamine, ergotamine, methylergonovine  - Lovastatin, simvastatin  - Sildenafil (Revatio®) for pulmonary arterial hypertension (PAH)  - Triazolam, oral midazolam  - Apalutamide  - Carbamazepine, phenobarbital, phenytoin  - Rifampin  - St  Bigg’s Wort (hypericum perforatum)    What are the important possible side effects of PAXLOVID? Possible side effects of PAXLOVID are:  - Liver Problems  Tell your healthcare provider right away if you have any of these signs and symptoms of liver problems: loss of appetite, yellowing of your skin and the whites of eyes (jaundice), dark-colored urine, pale colored stools and itchy skin, stomach area (abdominal) pain  - Resistance to HIV Medicines  If you have untreated HIV infection, PAXLOVID may lead to some HIV medicines not working as well in the future    - Other possible side effects include: altered sense of taste, diarrhea, high blood pressure, or muscle aches    These are not all the possible side effects of PAXLOVID  Not many people have taken PAXLOVID  Serious and unexpected side effects may happen  Monico Showers is still being studied, so it is possible that all of the risks are not known at this time  What other treatment choices are there? Like Clorinda Castle may allow for the emergency use of other medicines to treat people with COVID-19  Go to https://[employer]/ for information on the emergency use of other medicines that are authorized by FDA to treat people with COVID-19  Your healthcare provider may talk with you about clinical trials for which you may be eligible  It is your choice to be treated or not to be treated with PAXLOVID  Should you decide not to receive it or for your child not to receive it, it will not change your standard medical care  What if I am pregnant or breastfeeding? There is no experience treating pregnant women or breastfeeding mothers with PAXLOVID  For a mother and unborn baby, the benefit of taking PAXLOVID may be greater than the risk from the treatment  If you are pregnant, discuss your options and specific situation with your healthcare provider  It is recommended that you use effective barrier contraception or do not have sexual activity while taking PAXLOVID  If you are breastfeeding, discuss your options and specific situation with your healthcare provider  How do I report side effects with PAXLOVID? Contact your healthcare provider if you have any side effects that bother you or do not go away  Report side effects to FDA MedWatch at www fda gov/medwatch or call 5-643-SWK4163 or you can report side effects to [employer]  at the contact information provided below  Website Fax number Telephone number   [employer] [phone]     How should I store Brianne Erickson? Store PAXLOVID tablets at room temperature between 68°F to 77°F (20°C to 25°C)  Full fact sheet for patients, parents, and caregivers can be found at: [employer] [employer]    I have spent 10 minutes directly with the patient  Greater than 50% of this time was spent in counseling/coordination of care regarding: diagnostic results, prognosis, risks and benefits of treatment options, instructions for management, patient and family education, importance of treatment compliance, risk factor reductions and impressions  Encounter provider: Sj Alatorre DO     Provider located at: [address] [facility] [address] [facility]  [address]  [facility] [place] [facility] [address] [phone]     Recent Visits  No visits were found meeting these conditions  Showing recent visits within past 7 days and meeting all other requirements  Today's Visits  Date Type Provider Dept   12/28/22 [address], [address] Primary Care   Showing today's visits and meeting all other requirements  Future Appointments  No visits were found meeting these conditions  Showing future appointments within next 150 days and meeting all other requirements     This virtual check-in was done via [employer] and patient was informed that this is a secure, HIPAA-compliant platform  She agrees to proceed  Patient agrees to participate in a virtual check in via telephone or video visit instead of presenting to the office to address urgent/immediate medical needs  Patient is aware this is a billable service  She acknowledged consent and understanding of privacy and security of the video platform  The patient has agreed to participate and understands they can discontinue the visit at any time  After connecting through [facility], the patient was identified by name and date of birth   Catarino Delgado was informed that this was a telemedicine visit and that the exam was being conducted confidentially over secure lines  My office door was closed  No one else was in the room  Danitza Camejo acknowledged consent and understanding of privacy and security of the telemedicine visit  I informed the patient that I have reviewed her record in Epic and presented the opportunity for her to ask any questions regarding the visit today  The patient agreed to participate  Verification of patient location:  Patient is located in the following state in which I hold an active license: [place]    Subjective:   Danitza Camejo is a 40 y o  female who is concerned about COVID-19  Patient's symptoms include fatigue, malaise, nasal congestion, rhinorrhea, sore throat, cough, myalgias and headache  Patient denies fever, chills, anosmia, loss of taste, shortness of breath, chest tightness, abdominal pain, nausea, vomiting and diarrhea       - Date of symptom onset: 12/24/2022      COVID-19 vaccination status: Fully vaccinated with booster    Exposure:   Contact with a person who is under investigation (PUI) for or who is positive for COVID-19 within the last 14 days?: No    Hospitalized recently for fever and/or lower respiratory symptoms?: No      Currently a healthcare worker that is involved in direct patient care?: Yes      Works in a special setting where the risk of COVID-19 transmission may be high? (this may include long-term care, correctional and [facility]CI facilities; homeless shelters; assisted-living facilities and group homes ): No      Resident in a special setting where the risk of COVID-19 transmission may be high? (this may include long-term care, correctional and [facility] facilities; homeless shelters; assisted-living facilities and group homes ): No      Lab Results   Component Value Date    SARSCOV2 Negative 01/07/2022    [address] [facility] [address] Not Detected 09/19/2020       Review of Systems   Constitutional: Positive for activity change, appetite change and fatigue  Negative for chills and fever  HENT: Positive for congestion, rhinorrhea and sore throat  Respiratory: Positive for cough  Negative for chest tightness and shortness of breath  Gastrointestinal: Negative for abdominal pain, diarrhea, nausea and vomiting  Musculoskeletal: Positive for myalgias  Neurological: Positive for headaches  Current Outpatient Medications on File Prior to Visit   Medication Sig   • Calcium 600-400 MG-UNIT CHEW Chew daily   • Cholecalciferol (Vitamin D) 50 MCG (2000 UT) tablet Take 1 tablet (2,000 Units total) by mouth daily   • clobetasol (TEMOVATE) 0 05 % external solution Apply topically 2 (two) times a day   • clonazePAM (KlonoPIN) 0 5 mg tablet TAKE 1/2 TO 1 TABLET BY MOUTH 2 TIMES A DAY AS NEEDED FOR ANXIETY   • fluticasone (FLONASE) 50 mcg/act nasal spray 1 spray into each nostril daily   • folic acid (FOLVITE) 1 mg tablet Take 800 mcg by mouth daily   • hydroxychloroquine (PLAQUENIL) 200 mg tablet Take 1 5 tablets (300 mg total) by mouth daily   • levothyroxine 25 mcg tablet Take 1 tablet (25 mcg total) by mouth daily in the early morning   • modafinil (PROVIGIL) 200 MG tablet Take 1 tablet (200 mg total) by mouth daily Do not start before November 27, 2022  • Multiple Vitamins-Minerals (Bariatric Multivitamins/Iron) CAPS Take by mouth daily   • Trintellix 20 MG tablet TAKE ONE TABLET BY MOUTH EVERY DAY   • [DISCONTINUED] predniSONE 10 mg tablet 5 x 2 days, 4 x 2 days, 3 x 2 days,2 x 2 days, 1 x 2 days  Objective: There were no vitals taken for this visit       Physical Exam  Matthew Hendricks DO

## 2023-01-11 ENCOUNTER — TELEPHONE (OUTPATIENT)
Dept: FAMILY MEDICINE CLINIC | Facility: CLINIC | Age: 38
End: 2023-01-11

## 2023-01-11 DIAGNOSIS — F41.1 GAD (GENERALIZED ANXIETY DISORDER): ICD-10-CM

## 2023-01-11 DIAGNOSIS — F33.2 MAJOR DEPRESSIVE DISORDER, RECURRENT, SEVERE W/O PSYCHOTIC BEHAVIOR (HCC): ICD-10-CM

## 2023-01-11 DIAGNOSIS — F40.10 SOCIAL PHOBIA: ICD-10-CM

## 2023-01-11 DIAGNOSIS — G47.33 OSA (OBSTRUCTIVE SLEEP APNEA): Primary | ICD-10-CM

## 2023-01-11 RX ORDER — VORTIOXETINE 20 MG/1
TABLET, FILM COATED ORAL
Qty: 30 TABLET | Refills: 0 | Status: SHIPPED | OUTPATIENT
Start: 2023-01-11

## 2023-01-17 ENCOUNTER — TELEPHONE (OUTPATIENT)
Dept: PSYCHIATRY | Facility: CLINIC | Age: 38
End: 2023-01-17

## 2023-01-17 NOTE — TELEPHONE ENCOUNTER
Fax from [facility] for completion of Prior Authorization for Modafinil 200 MG tablets  Prior Authorization faxed to [facility] [facility] via [facility]  Decision pending

## 2023-01-18 ENCOUNTER — OFFICE VISIT (OUTPATIENT)
Dept: SLEEP CENTER | Facility: CLINIC | Age: 38
End: 2023-01-18

## 2023-01-18 VITALS
SYSTOLIC BLOOD PRESSURE: 115 MMHG | HEART RATE: 61 BPM | HEIGHT: 65 IN | WEIGHT: 122 LBS | DIASTOLIC BLOOD PRESSURE: 58 MMHG | BODY MASS INDEX: 20.33 KG/M2

## 2023-01-18 DIAGNOSIS — G47.33 OSA (OBSTRUCTIVE SLEEP APNEA): ICD-10-CM

## 2023-01-18 NOTE — PATIENT INSTRUCTIONS
Schedule home sleep study  Schedule follow up to review results and plan of treatment    Nursing Support:  When: Monday through Friday 7A-5PM except holidays  Where: Our direct line is [phone]  If you are having a true emergency please call 911  In the event that the line is busy or it is after hours please leave a voice message and we will return your call  Please speak clearly, leaving your full name, birth date, best number to reach you and the reason for your call  Medication refills: We will need the name of the medication, the dosage, the ordering provider, whether you get a 30 or 90 day refill, and the pharmacy name and address  Medications will be ordered by the provider only  Nurses cannot call in prescriptions  Please allow 7 days for medication refills  Physician requested updates: If your provider requested that you call with an update after starting medication, please be ready to provide us the medication and dosage, what time you take your medication, the time you attempt to fall asleep, time you fall asleep, when you wake up, and what time you get out of bed  Sleep Study Results: We will contact you with sleep study results and/or next steps after the physician has reviewed your testing

## 2023-01-18 NOTE — TELEPHONE ENCOUNTER
Prior Authorization unable to be processed by [facility] due to it being a secondary insurance  Prior Authorization for Modafinil 200 MG Tablets faxed to [employer] via [place], [place] # [id number], [address]: [id number], PCN: [id number]  Response returned stating that no PA is needed  Called pharmacy to inform them that medication is covered under [employer] and pharmacist was able to process it

## 2023-01-19 NOTE — PROGRESS NOTES
Progress Note - [address] 40 y o  female   [facility]:[birth year], MRN: [id number]  1/18/2023          Follow Up Evaluation / Problem: Moderate Obstructive Sleep Apnea  S/P bariatric surgery      Thank you for the opportunity of participating in the evaluation and care of this patient in the Sleep Clinic at [facility]  HPI: Aleisha Sue is a 40y o  year old female  The patient presents for follow up of moderate obstructive sleep apnea  She is a prior patient of Dr Donna Steele  She had concern of loud snoring, frequent night time awakenings and daytime sleepiness  A home sleep study was completed on 10/18/21  Testing confirmed moderate CARLOS ENRIQUE with CATIE of 16 2 and oxygen héctor of 86%  She began use of CPAP equipment  She reports that she noticed improvement in her sleep and daytime sleepiness when she was able to use it  Compliance was low, due to her work schedule  She had bariatric surgery on 4/19/22  She has since lost 107 lbs  She is sleeping through the night and denies snoring  She still has daytime sleepiness, but attributes this to work hours  She works some night shift hours  She takes modafinil 200mg daily, as prescribed by psychiatry, which helps with daytime sleepiness        Current Outpatient Medications:   •  Calcium 600-400 MG-UNIT CHEW, Chew daily, Disp: , Rfl:   •  Cholecalciferol (Vitamin D) 50 MCG (2000 UT) tablet, Take 1 tablet (2,000 Units total) by mouth daily, Disp: 90 tablet, Rfl: 1  •  clobetasol (TEMOVATE) 0 05 % external solution, Apply topically 2 (two) times a day, Disp: 50 mL, Rfl: 0  •  clonazePAM (KlonoPIN) 0 5 mg tablet, TAKE 1/2 TO 1 TABLET BY MOUTH 2 TIMES A DAY AS NEEDED FOR ANXIETY, Disp: 60 tablet, Rfl: 2  •  fluticasone (FLONASE) 50 mcg/act nasal spray, 1 spray into each nostril daily, Disp: 48 g, Rfl: 1  •  folic acid (FOLVITE) 1 mg tablet, Take 800 mcg by mouth daily, Disp: , Rfl:   •  hydroxychloroquine (PLAQUENIL) 200 mg tablet, Take 1 5 tablets (300 mg total) by mouth daily, Disp: 135 tablet, Rfl: 1  •  levothyroxine 25 mcg tablet, Take 1 tablet (25 mcg total) by mouth daily in the early morning, Disp: 90 tablet, Rfl: 1  •  modafinil (PROVIGIL) 200 MG tablet, Take 1 tablet (200 mg total) by mouth daily Do not start before November 27, 2022 , Disp: 30 tablet, Rfl: 2  •  Multiple Vitamins-Minerals (Bariatric Multivitamins/Iron) CAPS, Take by mouth daily, Disp: , Rfl:   •  Trintellix 20 MG tablet, TAKE ONE TABLET BY MOUTH EVERY DAY, Disp: 30 tablet, Rfl: 0    [place] Sleepiness Scale  Sitting and reading: Would never doze  Watching TV: Moderate chance of dozing  Sitting, inactive in a public place (e g  a theatre or a meeting): Would never doze  As a passenger in a car for an hour without a break: Slight chance of dozing  Lying down to rest in the afternoon when circumstances permit: Would never doze  Sitting and talking to someone: Would never doze  Sitting quietly after a lunch without alcohol: Would never doze  In a car, while stopped for a few minutes in traffic: Would never doze  Total score: 3              Vitals:    01/18/23 1450   BP: 115/58   BP Location: Left leg   Patient Position: Sitting   Cuff Size: Child   Pulse: 61   Weight: 55 3 kg (122 lb)   Height: 5' 5" (1 651 m)       Body mass index is 20 3 kg/m²  EPWORTH SLEEPINESS SCORE  Total score: 3      Past History Since Last Sleep Center Visit:   As noted in HPI, she feels that symptoms of CARLOS ENRIQUE have resolved with weight loss  She has not used the CPAP equipment since August   She tried using it, but the mask was too large and she had significant air leaks from the mask  The pressure became overwhelming and intolerable          The review of systems and following portions of the patient's history were reviewed and updated as appropriate: allergies, current medications, past family history, past medical history, past social history, past surgical history, and problem list         OBJECTIVE  Equipment set up date:  11/5/21  PAP Pressure: Nasal AutoPAP using a lower limit of 6 cm and an upper limit of 16 cm of water pressure  Type of mask used: full face  DME Provider: [employer]    Physical Exam:     General Appearance:   Alert, cooperative, no distress, appears stated age     Head:   Normocephalic, without obvious abnormality, atraumatic     Eyes:   PERRL, conjunctiva/corneas clear, EOM's intact          Nose:  Nares normal, septum midline, no drainage or sinus tenderness           Throat:  Lips, teeth and gums normal; tongue normal size and midline mucosa moist with minimal excess tissue surrounding a normal uvula, tonsils normal, Mallampati class 2-3       Neck:  Supple, symmetrical, trachea midline, no adenopathy; Thyroid: No enlargement, tenderness or nodules; no carotid bruit or JVD     Lungs:      Clear to auscultation bilaterally, respirations unlabored     Heart:   Regular rate and rhythm, S1 and S2 normal, no murmur, rub or gallop       Extremities:  Extremities normal, atraumatic, no cyanosis or edema       Skin:  Skin color, texture, turgor normal, no rashes or lesions       Neurologic:  No focal deficits noted       ASSESSMENT / PLAN    1  CARLOS ENRIQUE (obstructive sleep apnea)  Ambulatory Referral to Sleep Medicine    Home Study              Counseling / Coordination of Care  Total clinic time spent today 25 minutes  Greater than 50% of total time was spent with the patient and / or family counseling and / or coordination of care  A description of the counseling / coordination of care:     Impressions, Diagnostic results, Prognosis, Instructions for management, Risks and benefits of treatment, Patient and family education, Risk factor reductions and Importance of compliance with treatment    Today I reviewed the patient's compliance data    she has not been able to use the equipment since weight loss of more than 100 lbs  She did derive benefit from use prior to weight loss  We discussed making a pressure change and fitting with an appropriately sized mask, however, she would prefer to not use the equipment if she no longer has sleep apnea  A home sleep study has been ordered to re-evaluate the need for PAP therapy  She would consider restarting PAP therapy, if CARLOS ENRIQUE is still confirmed  The patient will follow up after testing to review results and plan of treatment  The following instructions have been given to the patient today:    Patient Instructions   1  Schedule home sleep study  2  Schedule follow up to review results and plan of treatment    Nursing Support:  When: Monday through Friday 7A-5PM except holidays  Where: Our direct line is [phone]  If you are having a true emergency please call 911  In the event that the line is busy or it is after hours please leave a voice message and we will return your call  Please speak clearly, leaving your full name, birth date, best number to reach you and the reason for your call  Medication refills: We will need the name of the medication, the dosage, the ordering provider, whether you get a 30 or 90 day refill, and the pharmacy name and address  Medications will be ordered by the provider only  Nurses cannot call in prescriptions  Please allow 7 days for medication refills  Physician requested updates: If your provider requested that you call with an update after starting medication, please be ready to provide us the medication and dosage, what time you take your medication, the time you attempt to fall asleep, time you fall asleep, when you wake up, and what time you get out of bed  Sleep Study Results: We will contact you with sleep study results and/or next steps after the physician has reviewed your testing        Elvan Soulier, [address] [place] [address]      Portions of the record may have been created with voice recognition software  Occasional wrong word or "sound a like" substitutions may have occurred due to the inherent limitations of voice recognition software  Read the chart carefully and recognize, using context, where substitutions have occurred

## 2023-01-20 ENCOUNTER — OFFICE VISIT (OUTPATIENT)
Dept: FAMILY MEDICINE CLINIC | Facility: CLINIC | Age: 38
End: 2023-01-20

## 2023-01-20 VITALS
HEIGHT: 65 IN | BODY MASS INDEX: 20.39 KG/M2 | WEIGHT: 122.4 LBS | SYSTOLIC BLOOD PRESSURE: 110 MMHG | DIASTOLIC BLOOD PRESSURE: 58 MMHG

## 2023-01-20 DIAGNOSIS — F33.2 MAJOR DEPRESSIVE DISORDER, RECURRENT, SEVERE W/O PSYCHOTIC BEHAVIOR (HCC): Primary | ICD-10-CM

## 2023-01-20 DIAGNOSIS — F41.1 GAD (GENERALIZED ANXIETY DISORDER): ICD-10-CM

## 2023-01-20 DIAGNOSIS — Z98.84 STATUS POST GASTRIC BYPASS FOR OBESITY: ICD-10-CM

## 2023-01-20 NOTE — PROGRESS NOTES
Chief Complaint   Patient presents with   • Personal Problem     Name: Sabrina Mcfarland      : 1985      MRN: [id number]  Encounter Provider: Claude Irving DO  Encounter Date: 2023   Encounter department: [address]     1  Major depressive disorder, recurrent, severe w/o psychotic behavior ([facility] [address] )    2  Status post gastric bypass for obesity    3  JG (generalized anxiety disorder)           Subjective      She presents to the office to discuss some personal problems  She is quite depressed and anxious, feeling overwhelmed  Her significant other  of rectal cancer in October, and she is never fully grieved  She herself had gastric bypass last , and she continues have some issues with that  Her daughter is going through some health issues at this time and she is worried about her  She states that she is not sleeping well, but when she does fall asleep she sleeps in and has been late to work several times  She is feeling overwhelmed and withdraws  She is tearful in the office today  She sees psychiatry and has follow-up scheduled in the near future  She continues to see a therapist as well  Review of Systems   Constitutional: Negative for activity change, chills, diaphoresis, fatigue, fever and unexpected weight change  HENT: Negative for congestion, ear pain, hearing loss, nosebleeds, postnasal drip, rhinorrhea, sinus pressure, sore throat and tinnitus  Eyes: Negative for photophobia, pain, discharge, redness and visual disturbance  Respiratory: Negative for cough, chest tightness, shortness of breath and wheezing  Cardiovascular: Negative for chest pain, palpitations and leg swelling  Denies PAZ   Gastrointestinal: Negative for abdominal pain, blood in stool, constipation, diarrhea, nausea and vomiting  Endocrine: Negative      Genitourinary: Negative for difficulty urinating, dysuria, frequency, hematuria and urgency  Musculoskeletal: Negative for arthralgias, joint swelling and myalgias  Skin: Negative for rash and wound  Denies skin lesions, change in birthmarks   Allergic/Immunologic: Negative for environmental allergies, food allergies and immunocompromised state  Neurological: Negative for dizziness, tremors, seizures, syncope, weakness, numbness and headaches  Hematological: Negative  Psychiatric/Behavioral: Positive for decreased concentration, dysphoric mood, sleep disturbance and suicidal ideas  Negative for behavioral problems and confusion  The patient is nervous/anxious and is hyperactive  Current Outpatient Medications on File Prior to Visit   Medication Sig   • Calcium 600-400 MG-UNIT CHEW Chew daily   • Cholecalciferol (Vitamin D) 50 MCG (2000 UT) tablet Take 1 tablet (2,000 Units total) by mouth daily   • clobetasol (TEMOVATE) 0 05 % external solution Apply topically 2 (two) times a day   • clonazePAM (KlonoPIN) 0 5 mg tablet TAKE 1/2 TO 1 TABLET BY MOUTH 2 TIMES A DAY AS NEEDED FOR ANXIETY   • fluticasone (FLONASE) 50 mcg/act nasal spray 1 spray into each nostril daily   • folic acid (FOLVITE) 1 mg tablet Take 800 mcg by mouth daily   • hydroxychloroquine (PLAQUENIL) 200 mg tablet Take 1 5 tablets (300 mg total) by mouth daily   • levothyroxine 25 mcg tablet Take 1 tablet (25 mcg total) by mouth daily in the early morning   • modafinil (PROVIGIL) 200 MG tablet Take 1 tablet (200 mg total) by mouth daily Do not start before November 27, 2022  • Multiple Vitamins-Minerals (Bariatric Multivitamins/Iron) CAPS Take by mouth daily   • Trintellix 20 MG tablet TAKE ONE TABLET BY MOUTH EVERY DAY       Objective     /58   Ht 5' 5" (1 651 m)   Wt 55 5 kg (122 lb 6 4 oz)   BMI 20 37 kg/m²     Physical Exam  Vitals and nursing note reviewed  Constitutional:       General: She is not in acute distress  Appearance: Normal appearance  She is well-developed     HENT:      Head: Normocephalic and atraumatic  Eyes:      Conjunctiva/sclera: Conjunctivae normal    Pulmonary:      Effort: Pulmonary effort is normal    Neurological:      Mental Status: She is alert and oriented to person, place, and time  Psychiatric:         Attention and Perception: Attention and perception normal          Mood and Affect: Mood is anxious and depressed  Affect is tearful  Speech: Speech is rapid and pressured and tangential          Behavior: Behavior is withdrawn  Behavior is not agitated or aggressive  Behavior is cooperative  Thought Content:  Thought content normal          Cognition and Memory: Cognition and memory normal          Judgment: Judgment normal        Sarina Lozada, DO

## 2023-01-23 ENCOUNTER — OFFICE VISIT (OUTPATIENT)
Dept: FAMILY MEDICINE CLINIC | Facility: CLINIC | Age: 38
End: 2023-01-23

## 2023-01-23 VITALS
WEIGHT: 123 LBS | TEMPERATURE: 97.3 F | SYSTOLIC BLOOD PRESSURE: 110 MMHG | DIASTOLIC BLOOD PRESSURE: 62 MMHG | BODY MASS INDEX: 20.49 KG/M2 | HEIGHT: 65 IN

## 2023-01-23 DIAGNOSIS — F33.2 MAJOR DEPRESSIVE DISORDER, RECURRENT, SEVERE W/O PSYCHOTIC BEHAVIOR (HCC): ICD-10-CM

## 2023-01-23 DIAGNOSIS — F51.13 HYPERSOMNIA DUE TO MENTAL DISORDER: ICD-10-CM

## 2023-01-23 DIAGNOSIS — H65.191 OTHER NON-RECURRENT ACUTE NONSUPPURATIVE OTITIS MEDIA OF RIGHT EAR: Primary | ICD-10-CM

## 2023-01-23 RX ORDER — CEFUROXIME AXETIL 500 MG/1
500 TABLET ORAL EVERY 12 HOURS SCHEDULED
Qty: 20 TABLET | Refills: 0 | Status: SHIPPED | OUTPATIENT
Start: 2023-01-23 | End: 2023-02-02

## 2023-01-23 NOTE — TELEPHONE ENCOUNTER
Dwayne Walls called to get a refill on her Provigli 200mg  Sunshineaileen Walls has an appointment on 2/13 but she is looking to be seen sooner as she is currently out on FMLA due to personal reasons

## 2023-01-23 NOTE — PROGRESS NOTES
Chief Complaint   Patient presents with   • Earache   • Nasal Congestion     Name: Bob Phillips      : 1985      MRN: [id number]  Encounter Provider: Maldonado Connell DO  Encounter Date: 2023   Encounter department: [facility]    Assessment & Plan     1  Other non-recurrent acute nonsuppurative otitis media of right ear  -     cefuroxime (CEFTIN) 500 mg tablet; Take 1 tablet (500 mg total) by mouth every 12 (twelve) hours for 10 days           Subjective      Patient presents the office complaining of mild sinus congestion for couple of days then waking up yesterday with the worst earache of her life  She got some over-the-counter eardrops for pain which did help somewhat  She denies fever, chills, or being around anybody sick  Review of Systems   Constitutional: Negative for chills and fever  HENT: Positive for congestion, ear pain, hearing loss, postnasal drip and rhinorrhea  Negative for ear discharge, sinus pressure, sinus pain and sore throat  Eyes: Negative for pain and visual disturbance  Respiratory: Negative for cough and shortness of breath  Cardiovascular: Negative for chest pain and palpitations  Gastrointestinal: Negative for abdominal pain and vomiting  Genitourinary: Negative for dysuria and hematuria  Musculoskeletal: Negative for arthralgias and back pain  Skin: Negative for color change and rash  Neurological: Negative for seizures and syncope  Hematological: Negative for adenopathy  All other systems reviewed and are negative        Current Outpatient Medications on File Prior to Visit   Medication Sig   • Calcium 600-400 MG-UNIT CHEW Chew daily   • Cholecalciferol (Vitamin D) 50 MCG (2000 UT) tablet Take 1 tablet (2,000 Units total) by mouth daily   • clobetasol (TEMOVATE) 0 05 % external solution Apply topically 2 (two) times a day   • clonazePAM (KlonoPIN) 0 5 mg tablet TAKE 1/2 TO 1 TABLET BY MOUTH 2 TIMES A DAY AS NEEDED FOR ANXIETY   • fluticasone (FLONASE) 50 mcg/act nasal spray 1 spray into each nostril daily   • folic acid (FOLVITE) 1 mg tablet Take 800 mcg by mouth daily   • hydroxychloroquine (PLAQUENIL) 200 mg tablet Take 1 5 tablets (300 mg total) by mouth daily   • levothyroxine 25 mcg tablet Take 1 tablet (25 mcg total) by mouth daily in the early morning   • modafinil (PROVIGIL) 200 MG tablet Take 1 tablet (200 mg total) by mouth daily Do not start before November 27, 2022  • Multiple Vitamins-Minerals (Bariatric Multivitamins/Iron) CAPS Take by mouth daily   • Trintellix 20 MG tablet TAKE ONE TABLET BY MOUTH EVERY DAY       Objective     /62   Temp (!) 97 3 °F (36 3 °C)   Ht 5' 5" (1 651 m)   Wt 55 8 kg (123 lb)   BMI 20 47 kg/m²     Physical Exam  Vitals and nursing note reviewed  Constitutional:       General: She is not in acute distress  Appearance: Normal appearance  She is well-developed  HENT:      Head: Normocephalic and atraumatic  Right Ear: There is no impacted cerumen  Tympanic membrane is injected and bulging  Left Ear: There is no impacted cerumen  Tympanic membrane is not injected or bulging  Nose: Congestion and rhinorrhea present  Right Turbinates: Swollen  Not enlarged  Left Turbinates: Swollen  Not enlarged  Right Sinus: No maxillary sinus tenderness or frontal sinus tenderness  Left Sinus: No maxillary sinus tenderness or frontal sinus tenderness  Eyes:      Conjunctiva/sclera: Conjunctivae normal    Cardiovascular:      Rate and Rhythm: Normal rate and regular rhythm  Pulses: Normal pulses  Heart sounds: Normal heart sounds  Pulmonary:      Effort: Pulmonary effort is normal  No respiratory distress  Breath sounds: No wheezing or rhonchi  Abdominal:      General: Abdomen is flat  Palpations: Abdomen is soft  Neurological:      Mental Status: She is alert and oriented to person, place, and time     Psychiatric: Judgment: Judgment normal        Jayden Curran, DO

## 2023-01-24 ENCOUNTER — TELEPHONE (OUTPATIENT)
Dept: PSYCHIATRY | Facility: CLINIC | Age: 38
End: 2023-01-24

## 2023-01-24 RX ORDER — MODAFINIL 200 MG/1
200 TABLET ORAL DAILY
Qty: 30 TABLET | Refills: 2 | Status: SHIPPED | OUTPATIENT
Start: 2023-01-24 | End: 2023-04-24

## 2023-01-24 NOTE — TELEPHONE ENCOUNTER
Follow up call to [facility] to inform her that Dr Jerrica Ysot does not have any availability for an appointment any sooner than she is scheduled  Placed her on the wait list in case something opens sooner  Asked if she has any urgent concerns that she needs addressed and confirmed safety  [facility] states it is not urgent and she is ok to wait until her 2/13 appointment

## 2023-02-13 ENCOUNTER — OFFICE VISIT (OUTPATIENT)
Dept: PSYCHIATRY | Facility: CLINIC | Age: 38
End: 2023-02-13

## 2023-02-13 ENCOUNTER — TELEPHONE (OUTPATIENT)
Dept: PSYCHIATRY | Facility: CLINIC | Age: 38
End: 2023-02-13

## 2023-02-13 VITALS — BODY MASS INDEX: 19.97 KG/M2 | WEIGHT: 120 LBS

## 2023-02-13 DIAGNOSIS — F40.10 SOCIAL PHOBIA: ICD-10-CM

## 2023-02-13 DIAGNOSIS — F51.13 HYPERSOMNIA DUE TO MENTAL DISORDER: ICD-10-CM

## 2023-02-13 DIAGNOSIS — F33.2 MAJOR DEPRESSIVE DISORDER, RECURRENT, SEVERE W/O PSYCHOTIC BEHAVIOR (HCC): ICD-10-CM

## 2023-02-13 DIAGNOSIS — F41.1 GAD (GENERALIZED ANXIETY DISORDER): ICD-10-CM

## 2023-02-13 RX ORDER — CLONAZEPAM 0.5 MG/1
TABLET ORAL
Qty: 60 TABLET | Refills: 2 | Status: SHIPPED | OUTPATIENT
Start: 2023-02-13 | End: 2023-06-05

## 2023-02-13 RX ORDER — MODAFINIL 200 MG/1
200 TABLET ORAL DAILY
Qty: 30 TABLET | Refills: 2 | Status: SHIPPED | OUTPATIENT
Start: 2023-03-08 | End: 2023-06-06

## 2023-02-13 NOTE — PSYCH
MEDICATION MANAGEMENT NOTE        [facility]      Name and Date of Birth:  Lacey Pride 40 y o  1985    Date of Visit: February 13, 2023    SUBJECTIVE:  CC: Viry Jason presents today for follow up on "I don't know, I am still in a cloud sometimes"; depression and anxiety     Viry Jason has been out of FMLA  She feels like she is in a cloud sometimes with what she went through and continues to experience with the upending of her life path      "I am just trying to figure everything out  Financially she is in a bad place, and being alone  Stress triggers a lot for her  "one minute I feel like I got it under control and the next, what do I do now"  She saw him die and he said nothing, so a lot of questions in her head  Her kids do not need babysitters, older, and they are helpful  Oldest son 17yo,    "Trying to figure out me" because her whole world is different  "I was a mom most of my life"  Her mom is helping with her rent some right now, so trying to work on car payment  Good coping skills, working on processing, but 'I feel like I am in the clouds"  Using breathing techniques    Also not comfortable with how she looks, "I just feel I am too too skinny", feels she just needs time to adjust      She is eating whatever she prefers, but not to the point of being sick or unhealthy  Thinking about finishing school  Has dogs      Children-  split custody, oldest son (2004), middle (son) (7/2004), youngest girl (2009)    Since our last visit, overall symptoms have been unchanged     Med Compliance: yes    HPI ROS:               ('was' notes: prior visit)  Medication Side Effects:  no     Depression (10 worst):  "I don't feel extremely depressed", but grief and "I know how to carry my depression" (Was moderate, higher relateed to grief)   Anxiety (10 worst):  moderate (Was moderate)   Hallucinations or Psychosis  no (Was no)    Safety concerns (SI, HI, etc):  no (Was no)   Sleep: (NM = Nightmares)  Sleeping  Had sleep med appt in a couple weeks, needs new sleep study (not using CPAP currently) (Was Not using CPAP, does not like mask, does not fit her face anymore  May need retesting  Has appt in January  Sleep ok of late)   Energy:  low (Was low)   Appetite:  some increase (Was decreased)   Weight Change:  minimal change      PHQ-2/9 Depression Screening               Wisdom Sat denies any side effects from medications unless noted above    Review Of Systems as noted above  Otherwise A relevant review of symptoms was otherwise negative    History Review:  The following portions of the patient's history were reviewed and documented: allergies, current medications, past family history, past medical history, past social history and problem list      Lab Review: No new labs or no relevant labs needing review with patient today      OBJECTIVE:     MENTAL STATUS EXAM  Appearance:  age appropriate   Behavior:  pleasant, cooperative, with good eye contact   Speech:  Normal volume, regular rate and rhythm   Mood:  dysphoric, anxious   Affect:  mood congruent, constricted   Language: intact and appropriate for age, education, and intellect   Thought Process:  Linear and goal directed   Associations: intact associations   Thought Content:  normal and appropriate   Perceptual Disturbances: no auditory or visual hallcunations   Risk Potential / Abnormal Thoughts: Suicidal ideation - None  Homicidal ideation - None  Potential for aggression - No       Consciousness:  Alert & Awake   Sensorium:  Grossly oriented   Attention: attention span and concentration are age appropriate       Fund of Knowledge:  Memory: awareness of current events: yes  recent and remote memory grossly intact   Insight:  good   Judgment: good   Muscle Strength Muscle Tone: normal  normal   Gait/Station: normal gait/station with good balance   Motor Activity: no abnormal movements       Risks, Benefits And Possible Side Effects Of Medications:    AGREE: Risks, benefits, and possible side effects of medications explained to [facility] and she (or legal representative) verbalizes understanding and agreement for treatment  Controlled Medication Discussion:     [facility] has been filling controlled prescriptions on time as prescribed according to [facility] program    _____________________________________________________________    Recent labs:  No visits with results within 1 Month(s) from this visit  Latest known visit with results is:   Appointment on 10/14/2022   Component Date Value   • WBC 10/14/2022 3 69 (L)    • RBC 10/14/2022 3 55 (L)    • Hemoglobin 10/14/2022 11 1 (L)    • Hematocrit 10/14/2022 34 9    • MCV 10/14/2022 98    • MCH 10/14/2022 31 3    • MCHC 10/14/2022 31 8    • RDW 10/14/2022 13 5    • MPV 10/14/2022 10 2    • Platelets 76/08/4497 198    • nRBC 10/14/2022 0    • Neutrophils Relative 10/14/2022 57    • Immat GRANS % 10/14/2022 0    • Lymphocytes Relative 10/14/2022 35    • Monocytes Relative 10/14/2022 6    • Eosinophils Relative 10/14/2022 2    • Basophils Relative 10/14/2022 0    • Neutrophils Absolute 10/14/2022 2 10    • Immature Grans Absolute 10/14/2022 0 00    • Lymphocytes Absolute 10/14/2022 1 30    • Monocytes Absolute 10/14/2022 0 22    • Eosinophils Absolute 10/14/2022 0 06    • Basophils Absolute 10/14/2022 0 01    • Uric Acid 71/77/9617 4 6        CLAUDIA    Psychiatric History     Patient has a past diagnoses of major depressive disorder and anxiety and has never been told that she has bipolar disorder  She was seen a psychiatrist for a short period of time and also a therapist at [facility] counseling services  She has never been hospitalized for mental health never had a suicide attempt  PHP 3/2019    Social History:  Patient was raised in [facility] and her parents  when she was young  She was raised by her mother and her boyfriend  Her childhood was "not normal" and there is constantly fighting at home  She denies any physical or sexual abuse  His one brother  She developed normally as far she is aware      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  She has split custody of her 3 children from a 15year-old relationship  She left home and "he took advantage of that"      She is Bahai   No  history no legal issues now or in the past and no weapons       Never had issues with alcohol or drugs, never needed rehabilitation     Medical / Surgical History:    Past Medical History:   Diagnosis Date   • Acne    • Anxiety    • Back pain    • Bipolar disorder ([address] )     pt denies   • Carpal tunnel syndrome on right    • Contact lens overwear of both eyes    • CPAP (continuous positive airway pressure) dependence    • Cubital tunnel syndrome on right    • Depression    • Disease of thyroid gland    • GERD (gastroesophageal reflux disease)    • Headache    • Hypothyroidism    • Kidney stone    • Kidney stones    • Lupus (systemic lupus erythematosus) ([facility])    • Nausea    • Obese     gastric KATLYN-EN-Y  today 4/19/2022   • PONV (postoperative nausea and vomiting)    • Sleep apnea    • Wears glasses      Past Surgical History:   Procedure Laterality Date   • CARPAL TUNNEL RELEASE     • CHOLECYSTECTOMY     • EGD     • OK CYSTOURETHROSCOPY N/A 2/3/2020    Procedure: CYSTOSCOPY;  Surgeon: Xander Quiñones MD;  [facility];  Service: UroGynecology          • OK LAPAROSCOPY SURG CHOLECYSTECTOMY N/A 9/6/2018    Procedure: CHOLECYSTECTOMY LAPAROSCOPIC W/ ROBOTICS;  Surgeon: Ely Silva MD;  [facility];  Service: General   • OK LAPS GSTR RSTCV [address] W/BYP KATLYN-EN-Y LIMB <150 CM N/A 4/19/2022    Procedure: LAPAROSCOPIC KATLYN-EN-Y GASTRIC BYPASS & INTRAOPERATIVE EGD ROBOTICALLY ASSISTED;  Surgeon: Julissa Aguiar MD;  [facility];  Service: Bariatrics   • OK NEUROPLASTY &/TRANSPOS MEDIAN NRV CARPAL Christia Duverney Right 11/27/2020    Procedure: release CARPAL TUNNEL;  Surgeon: Ruba Armenta MD;  [facility];  Service: Orthopedics   • MA NEUROPLASTY &/TRANSPOSITION ULNAR NERVE ELBOW Right 11/27/2020    Procedure: Pedro Luis Kimball;  Surgeon: Ruba Armenta MD;  [facility];  Service: Orthopedics   • MA POST COLPORRHAPHY RECTOCELE W/WO PERINEORRHAPHY N/A 2/3/2020    Procedure: POSTERIOR COLPORRHAPHY;  Surgeon: Negin Jefferson MD;  [facility];  Service: UroGynecology          • MA SLING OPERATION STRESS INCONTINENCE N/A 2/3/2020    Procedure: PUBOVAGINAL SLING;  Surgeon: Negin Jefferson MD;  [facility];  Service: UroGynecology          • TUBAL LIGATION         Family Psychiatric History:     Father    1  Family history of alcohol abuse (V61 41) (Z81 1)   2  Denied: Family history of suicide  Family History    3  Family history of Drug addiction   4  Family history of alcohol abuse (V61 41) (Z81 1)   5  Denied: Family history of bipolar disorder   6  Denied: Family history of paranoid schizophrenia   7  Denied: Family history of suicide   8  Family history of No Significant Family History     Family History   Problem Relation Age of Onset   • No Known Problems Mother    • Alcohol abuse Father    • Drug abuse Family    • Psoriasis Maternal Grandmother    • Colon cancer Maternal Uncle    • Hypertension Maternal Grandfather    • Heart disease Maternal Grandfather    • Diabetes Neg Hx        Confidential Assessment:  Past psychotropic medications include  hydroxyzine,   klonopin,   buspar (low dose, no recall details),   cymbalta 30mg (no recall of details),  zoloft (no issues),   neurontin (no help),   Viibryd 10 mg (x2-3mo, no side effects or benefit noted; was paying out of pocket)  Effexor (irritable)  Wellbutrin (irritable)  Prozac (80mg, was not adequate, even with Nortrip 50mg  Went to trintellix)  Topiramate (no benefit at 100mg, no issues)      Nortriptyline (some benefit at 50mg, dry mouth (did improve), decided to go different way but could reconsider)  Remeron- worked in past and then it did not work  trintellix- more depressed at 20mg, although significant stressors coocurring        Scales:    Assessment/Plan:        Diagnoses and all orders for this visit:    JG (generalized anxiety disorder)  -     clonazePAM (KlonoPIN) 0 5 mg tablet; TAKE 1/2 TO 1 TABLET BY MOUTH 2 TIMES A DAY AS NEEDED FOR ANXIETY  -     Vortioxetine HBr (Trintellix) 20 MG tablet; Take 1 tablet (20 mg total) by mouth daily    Social phobia  -     clonazePAM (KlonoPIN) 0 5 mg tablet; TAKE 1/2 TO 1 TABLET BY MOUTH 2 TIMES A DAY AS NEEDED FOR ANXIETY  -     Vortioxetine HBr (Trintellix) 20 MG tablet; Take 1 tablet (20 mg total) by mouth daily    Major depressive disorder, recurrent, severe w/o psychotic behavior (HCC)  -     modafinil (PROVIGIL) 200 MG tablet; Take 1 tablet (200 mg total) by mouth daily Do not start before March 8, 2023  -     Vortioxetine HBr (Trintellix) 20 MG tablet; Take 1 tablet (20 mg total) by mouth daily    Hypersomnia due to mental disorder  -     modafinil (PROVIGIL) 200 MG tablet; Take 1 tablet (200 mg total) by mouth daily Do not start before March 8, 2023         ______________________________________________________________________    Major depressive disorder, recurrent, severe without psychosis (Highly unlikely bipolar disorder, but h/o diagnosis)  generalized anxiety disorder  social phobia  ---     MDD - not at goal  JG - not at goal  Social phobia - stable     [address]  Bariatric surgery 2022  Will place neuropsych referral (11/2022)  - Will have SOHAIL Feliz reach out to see if additional resources available for her presently (7/92/6676)    Denise Pink does not want medication adjustments today but continues to struggle  She needed to take time off work, which I support due to her mental health symptoms, functional limitations due to being in a cloud       Will pursue ADHD exploration with pscyhological testing (Referral made 2022)  Questioning her distractibility (starts project then goes to another, or in cleaning the house will go from item to item and then the whole house is a mess)    I do not believe that she has bipolar disorder but mood stabilizers for anger and irritability if other paths do not seem to be appropriate or beneficial can be considered  Could consider abilify, retrial nortriptyline, buspar, lamictal, other directions  Klonopin increase has been discussed in past, prefer other directions as reasonable  - sleep study 10/2021 - Has sleep apnea, now uses CPAP     [facility]  She is heterozygous for MTHFR, no cancer history  Safety Risk Assessment: See above  Has had passive SI since ~  She states that she has protective features including her children and that she would never actually carry anything out  Safety risk low         Treatment Plan:      Patient has been educated about their diagnosis and treatment modalities  They voiced understanding and agreement with the following plan:     - GENE SIGHT TESTING initially reviewed 89/3/4849 with [facility]    1) Meds:   - Modafinil 200mg daily   - klonopin 0 5mg BID PRN   - Trintellix 20mg daily  2) Labs:   - : TSH 1 523, Vit D 52 4   - 3/2019: BMP WNL, TSH 2 983; FT3 2 53   - 2018: Vit D 11 8, TSH 3 96 and FT4 0 82   - 2017: TSH 2 76; Vit D 34 1   - 2017: TSH 1 85   - : CBC, CMP unremarkable, TSH 4 38, Vit D 21 6, , HDL 39     3) Therapy:   - Kaylie Monday ([facility]); Was Gissel Childers    4) Medical:  LUPUS; hypothyroidism with pregnancy; history of kidney stones  Tubal ligation      - pt to f/u with other providers PRN     5) Other:   - ex has primary custody of 3 kids   - [facility] Surgery, [facility]       - 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 ()   -  Significant Other (had cancer)  10/2022       6) Follow up, Zoraida Garcia to see NP   - 3mo, but patient to call if issues or concerns  7) Treatment Plan: Enacted 9/1/2017, Renewed 11/13/2017, renewed 3/15/2018, 9/12/2018; managed by therapist      Discussed self monitoring of symptoms, and symptom monitoring tools  Patient has been informed of 24 hours and weekend coverage for urgent situations accessed by calling the main clinic phone number  Psychotherapy in session:  Time spent performing psychotherapy: 18 Minutes supportive therapy related to death of spouse, self care, life goals and direction, identity       Visit Time    Visit Start Time: 965  Visit Stop Time: 502p  Total Visit Duration: 27 minutes

## 2023-02-14 ENCOUNTER — TELEPHONE (OUTPATIENT)
Dept: PSYCHIATRY | Facility: CLINIC | Age: 38
End: 2023-02-14

## 2023-02-14 NOTE — TELEPHONE ENCOUNTER
Case Management received a referral from Dr Edgardo Simental to assist [facility] in resources or possible referrals  Writer outreached to [facility] to discuss possible resources  [facility] stated that she is looking for whatever is available to assist her  Writer will be referring patient to an ICM

## 2023-02-20 ENCOUNTER — OFFICE VISIT (OUTPATIENT)
Dept: FAMILY MEDICINE CLINIC | Facility: CLINIC | Age: 38
End: 2023-02-20

## 2023-02-20 VITALS
BODY MASS INDEX: 20.16 KG/M2 | DIASTOLIC BLOOD PRESSURE: 58 MMHG | SYSTOLIC BLOOD PRESSURE: 110 MMHG | WEIGHT: 121 LBS | HEIGHT: 65 IN

## 2023-02-20 DIAGNOSIS — F51.13 HYPERSOMNIA DUE TO MENTAL DISORDER: ICD-10-CM

## 2023-02-20 DIAGNOSIS — F41.1 GAD (GENERALIZED ANXIETY DISORDER): ICD-10-CM

## 2023-02-20 DIAGNOSIS — F40.10 SOCIAL PHOBIA: ICD-10-CM

## 2023-02-20 DIAGNOSIS — F33.2 MAJOR DEPRESSIVE DISORDER, RECURRENT, SEVERE W/O PSYCHOTIC BEHAVIOR (HCC): Primary | ICD-10-CM

## 2023-02-20 NOTE — PROGRESS NOTES
Chief Complaint   Patient presents with   • Depression     Name: Gamal Bear      : 1985      MRN: [id number]  Encounter Provider: Anastasiia Caldwell DO  Encounter Date: 2023   Encounter department: [facility] PRIMARY CARE    Assessment & Plan     1  Major depressive disorder, recurrent, severe w/o psychotic behavior ([facility] [address] )  Comments: Follow-up with psychiatry, continue current medications  Continue with counseling  I will fill out her short-term disability forms for now  2  Social phobia    3  Hypersomnia due to mental disorder    4  JG (generalized anxiety disorder)           Subjective      Patient reports that she is somewhat improved on current medication and getting some counseling at this time  She is seeing psychiatry  She has been approved for short disability, but needs to get her blood work filled out  States she is somewhat tired  Her weight seems to have stabilized  Review of Systems   Constitutional: Negative for chills and fever  HENT: Negative for ear pain and sore throat  Eyes: Negative for pain and visual disturbance  Respiratory: Negative for cough and shortness of breath  Cardiovascular: Negative for chest pain and palpitations  Gastrointestinal: Negative for abdominal pain and vomiting  Genitourinary: Negative for dysuria and hematuria  Musculoskeletal: Negative for arthralgias and back pain  Skin: Negative for color change and rash  Neurological: Negative for seizures and syncope  Psychiatric/Behavioral: Positive for decreased concentration, dysphoric mood and sleep disturbance  The patient is nervous/anxious  All other systems reviewed and are negative        Current Outpatient Medications on File Prior to Visit   Medication Sig   • Calcium 600-400 MG-UNIT CHEW Chew daily   • Cholecalciferol (Vitamin D) 50 MCG (2000 UT) tablet Take 1 tablet (2,000 Units total) by mouth daily   • clobetasol (TEMOVATE) 0 05 % external solution Apply topically 2 (two) times a day   • clonazePAM (KlonoPIN) 0 5 mg tablet TAKE 1/2 TO 1 TABLET BY MOUTH 2 TIMES A DAY AS NEEDED FOR ANXIETY   • fluticasone (FLONASE) 50 mcg/act nasal spray 1 spray into each nostril daily   • folic acid (FOLVITE) 1 mg tablet Take 800 mcg by mouth daily   • hydroxychloroquine (PLAQUENIL) 200 mg tablet Take 1 5 tablets (300 mg total) by mouth daily   • levothyroxine 25 mcg tablet Take 1 tablet (25 mcg total) by mouth daily in the early morning   • [START ON 3/8/2023] modafinil (PROVIGIL) 200 MG tablet Take 1 tablet (200 mg total) by mouth daily Do not start before March 8, 2023  • Multiple Vitamins-Minerals (Bariatric Multivitamins/Iron) CAPS Take by mouth daily   • Vortioxetine HBr (Trintellix) 20 MG tablet Take 1 tablet (20 mg total) by mouth daily       Objective     /58   Ht 5' 5" (1 651 m)   Wt 54 9 kg (121 lb)   BMI 20 14 kg/m²     Physical Exam  Constitutional:       General: She is not in acute distress  Appearance: She is well-developed  HENT:      Head: Normocephalic and atraumatic  Eyes:      Conjunctiva/sclera: Conjunctivae normal    Cardiovascular:      Rate and Rhythm: Normal rate  Pulses: Normal pulses  Heart sounds: Normal heart sounds  Pulmonary:      Effort: Pulmonary effort is normal       Breath sounds: Normal breath sounds  Abdominal:      General: Abdomen is flat  Neurological:      Mental Status: She is alert and oriented to person, place, and time  Psychiatric:         Attention and Perception: Attention normal          Mood and Affect: Mood is anxious  Affect is tearful  Speech: Speech normal  Speech is not rapid and pressured or tangential          Behavior: Behavior is cooperative  Thought Content:  Thought content normal          Cognition and Memory: Cognition and memory normal          Judgment: Judgment normal        Demi Aly DO

## 2023-02-21 ENCOUNTER — TELEPHONE (OUTPATIENT)
Dept: PSYCHIATRY | Facility: CLINIC | Age: 38
End: 2023-02-21

## 2023-02-21 NOTE — TELEPHONE ENCOUNTER
Case Management received a referral from Dr Quita Thurman to assist in Mun[address]ango 1943 (STD) paperwork  Writer outreached to Lynette jones in regards to a past referral  Lynette jones informed writer that she completed a REGIS at [employer] when she handed in the paperwork to Dr Quita Thurman' office  Lynette jones is hoping to get it done asap, as she is not getting paid for her time off until forms are completed  Writer notified patient that she will take a look at that paperwork tomorrow

## 2023-02-22 NOTE — TELEPHONE ENCOUNTER
Writer completed STD paperwork  Dr Guardado Course' signature is needed  Writer will be at [facility] on 2/23 to obtain signature

## 2023-02-23 ENCOUNTER — TELEMEDICINE (OUTPATIENT)
Dept: PSYCHIATRY | Facility: CLINIC | Age: 38
End: 2023-02-23

## 2023-02-23 DIAGNOSIS — F33.2 MAJOR DEPRESSIVE DISORDER, RECURRENT, SEVERE W/O PSYCHOTIC BEHAVIOR (HCC): Primary | ICD-10-CM

## 2023-02-23 DIAGNOSIS — F41.1 GAD (GENERALIZED ANXIETY DISORDER): ICD-10-CM

## 2023-02-23 NOTE — PSYCH
Behavioral Health Psychotherapy Progress Note    Psychotherapy Provided: Individual Psychotherapy     1  Major depressive disorder, recurrent, severe w/o psychotic behavior ([address] )        2  JG (generalized anxiety disorder)            Goals addressed in session: Goal 1     DATA: "A lot has been going on I just went out on STD and am really stressed with trying to get the paperwork to the doctors"  "I just really was struggling with focusing at work"  "It wasn't any one thing in particular, just all began to add up"  "I don't talk to anyone really, I tell everyone I am fine"  "My mom is negative so I do not talk to her"  "My kids know I am struggling, but I make it look easy"  "I was an only child so I never really learned how to talk, in elementary school my mom never really wanted anyone at the house"  When asked what it would be like for her to open up to others, "If I did I would feel like a burden like I was dumping my problems on someone on someone"  Vince Clark began to share more than she has in past sessions  "I think it is just a cumulation of the past, the past few months, the current events, all of it is just piling up on me"  "Even with my therpaist that I was years I never got into the truamas of my past I would avoid it"   "I would do this intentionally"  "I do not know how to get to the core of me issues"  "You are my 3rd therapist in the past 6 years, it gets hard to just keep repeating it"  "Sometimes life is so busy that it is just hard to go there"  "I know I have to go there if I want to change the way that I think"  "I have this fog, and feel like my thoughts are all over the place"  "Recently been writing things down because I forget so much"  During this session, this clinician used the following therapeutic modalities: Client-centered Therapy    Substance Abuse was not addressed during this session   If the client is diagnosed with a co-occurring substance use disorder, [facility]    Progress Note    Ginna Martel Patient Status:  Inpatient    3/4/1945 MRN [id number]   Location [facility] 4W/SW/SE Attending Dona Jose MD   Hosp Day # 5 PCP Janette Cannon DO        Subjective:    Ginna Martel mg, Oral, Once    •  [COMPLETED] Heparin Sodium (Porcine) 5000 UNIT/ML injection 5,000 Units, 5,000 Units, Subcutaneous, Once    •  [COMPLETED] celecoxib (CeleBREX) cap 200 mg, 200 mg, Oral, Once    •  [COMPLETED] ceFAZolin sodium (ANCEF/KEFZOL) 2 GM/20ML please indicate any changes in the frequency or amount of use: n/a  Stage of change for addressing substance use diagnoses: No substance use/Not applicable    ASSESSMENT:  Pro Nguyen presents with a Anxious and Depressed mood  her affect is Normal range and intensity and Tearful, which is congruent, with her mood and the content of the session  The client has made progress on their goals  Pro Nguyen presents with a low risk of suicide, minimal risk of self-harm, and none risk of harm to others  For any risk assessment that surpasses a "low" rating, a safety plan must be developed  A safety plan was indicated: no  If yes, describe in detail n/a    PLAN: Between sessions, Pro Nguyen will track her emotional lability and attempt to identify if there are triggers that were present that may have caused emotional reaction  At the next session, the therapist will use Client-centered Therapy, Cognitive Processing Therapy and EMDR (or other form of bilateral Stimulation) to address past trauma and increasing openness in therapy  Behavioral Health Treatment Plan and Discharge Planning: Pro Nguyen is aware of and agrees to continue to work on their treatment plan  They have identified and are working toward their discharge goals  yes    Visit start and stop times:    02/23/23  Start Time: 1800  Stop Time: 1852  Total Visit Time: 52 minutes     Virtual Regular Visit    Verification of patient location:    Patient is located in the following state in which I hold an active license [place]      Assessment/Plan:    Problem List Items Addressed This Visit        Other    JG (generalized anxiety disorder)    Major depressive disorder, recurrent, severe w/o psychotic behavior ([facility] [address] ) - Primary       Goals addressed in session: Goal 1          Reason for visit is No chief complaint on file         Encounter provider Eric Dixon LCSW    Provider located at [facility] [address]  [facility] [address]  [phone]      Recent Visits  Date Type Provider Dept   02/20/23 Office Visit Kt Waterman DO [facility]   Showing recent visits within past 7 days and meeting all other requirements  Today's Visits  Date Type Provider Dept   02/23/23 Telemedicine Gordon Richardson LCSW [facility]   Showing today's visits and meeting all other requirements  Future Appointments  No visits were found meeting these conditions  Showing future appointments within next 150 days and meeting all other requirements       The patient was identified by name and date of birth  Concha Salomon was informed that this is a telemedicine visit and that the visit is being conducted throughthe Jymob platform  She agrees to proceed     My office door was closed  No one else was in the room  She acknowledged consent and understanding of privacy and security of the video platform  The patient has agreed to participate and understands they can discontinue the visit at any time  Patient is aware this is a billable service  Subjective  Concha Salomon is a 45 y o  female          HPI     Past Medical History:   Diagnosis Date   • Acne    • Anxiety    • Back pain    • Bipolar disorder ([facility] [address] )     pt denies   • Carpal tunnel syndrome on right    • Contact lens overwear of both eyes    • CPAP (continuous positive airway pressure) dependence    • Cubital tunnel syndrome on right    • Depression    • Disease of thyroid gland    • GERD (gastroesophageal reflux disease)    • Headache    • Hypothyroidism    • Kidney stone    • Kidney stones    • Lupus (systemic lupus erythematosus) ([facility])    • Nausea    • Obese     gastric KATLYN-EN-Y  today 4/19/2022   • PONV (postoperative nausea and vomiting)    • Sleep apnea    • Wears glasses        Past Surgical History:   Procedure Laterality Date   • CARPAL TUNNEL RELEASE     • acute distress. Alert and oriented x 3. HEENT: Moist mucous membranes. EOM-I. PERRL  Neck: No lymphadenopathy. No JVD. No carotid bruits. Respiratory: Clear to auscultation bilaterally. Cardiovascular: S1, S2.  Regular rate and rhythm. Abdomen:  So CHOLECYSTECTOMY     • EGD     • [place] CYSTOURETHROSCOPY N/A 2/3/2020    Procedure: CYSTOSCOPY;  Surgeon: Tomer Watkins MD;  [facility];  Service: UroGynecology          • [place] LAPAROSCOPY SURG CHOLECYSTECTOMY N/A 9/6/2018    Procedure: CHOLECYSTECTOMY LAPAROSCOPIC W/ ROBOTICS;  Surgeon: Shreya Noble MD;  [facility];  Service: General   • [place] [address]CV [address] W/BYP KATLYN-EN-Y LIMB <150 CM N/A 4/19/2022    Procedure: LAPAROSCOPIC KATLYN-EN-Y GASTRIC BYPASS & INTRAOPERATIVE EGD ROBOTICALLY ASSISTED;  Surgeon: Andrea Serrano MD;  [facility];  Service: Bariatrics   • [place] NEUROPLASTY &/TRANSPOS MEDIAN NRV CARPAL Schuyler Gasman Right 11/27/2020    Procedure: release CARPAL TUNNEL;  Surgeon: Bulmaro Rawls MD;  [facility];  Service: Orthopedics   • [place] NEUROPLASTY &/TRANSPOSITION ULNAR NERVE ELBOW Right 11/27/2020    Procedure: Pedro Vivas;  Surgeon:  Bulmaro Rawls MD;  [facility];  Service: Orthopedics   • [place] POST COLPORRHAPHY RECTOCELE W/WO PERINEORRHAPHY N/A 2/3/2020    Procedure: POSTERIOR COLPORRHAPHY;  Surgeon: Tomer Watkins MD;  [facility];  Service: UroGynecology          • [place] SLING OPERATION STRESS INCONTINENCE N/A 2/3/2020    Procedure: PUBOVAGINAL SLING;  Surgeon: Tomer Watkins MD;  [facility];  Service: UroGynecology          • TUBAL LIGATION         Current Outpatient Medications   Medication Sig Dispense Refill   • Calcium 600-400 MG-UNIT CHEW Chew daily     • Cholecalciferol (Vitamin D) 50 MCG (2000 UT) tablet Take 1 tablet (2,000 Units total) by mouth daily 90 tablet 1   • clobetasol (TEMOVATE) 0 05 % external solution Apply topically 2 (two) times a day 50 mL 0   • clonazePAM (KlonoPIN) 0 5 mg tablet TAKE 1/2 TO 1 TABLET BY MOUTH 2 TIMES A DAY AS NEEDED FOR ANXIETY 60 tablet 2   • fluticasone (FLONASE) 50 mcg/act nasal spray 1 spray into each nostril daily 48 g 1   • folic acid (FOLVITE) 1 mg tablet Take 800 mcg by mouth daily     • hydroxychloroquine (PLAQUENIL) 200 mg tablet Take 1 5 tablets (300 mg total) by mouth daily 135 tablet 1   • levothyroxine 25 mcg tablet Take 1 tablet (25 mcg total) by mouth daily in the early morning 90 tablet 1   • [START ON 3/8/2023] modafinil (PROVIGIL) 200 MG tablet Take 1 tablet (200 mg total) by mouth daily Do not start before March 8, 2023  30 tablet 2   • Multiple Vitamins-Minerals (Bariatric Multivitamins/Iron) CAPS Take by mouth daily     • Vortioxetine HBr (Trintellix) 20 MG tablet Take 1 tablet (20 mg total) by mouth daily 30 tablet 0     No current facility-administered medications for this visit  Allergies   Allergen Reactions   • Wound Dressing Adhesive Rash     Gets red rash from paper tape relatively quickly after application   • Penicillins Hives     At young age       Review of Systems    Video Exam    There were no vitals filed for this visit      Physical Exam     Visit Time    02/23/23  Start Time: 1800  Stop Time: 1852  Total Visit Time: 52 minutes

## 2023-02-24 NOTE — BH TREATMENT PLAN
[address] [place] [address]  0/02/2103     Date of Initial Psychotherapy Assessment: 6/28/2022   Date of Current Treatment Plan: 02/23/23  Treatment Plan Target Date: 8/23/2023  Treatment Plan Expiration Date: 8/23/2023    Diagnosis:   1  Major depressive disorder, recurrent, severe w/o psychotic behavior ([address] )        2   JG (generalized anxiety disorder)          Strengths/Personal Resources for Self Care: caring, good listener, people feel comfortable talking to me, supportive family, employed     Area(s) of Need: increase distress tolerance skills, increase emotional distress tolerance, ability to share emotions and identify triggers and process unresolved traumas    Long Term Goal 1 (in the client's own words): "I want to be able to manage my intrusive and distracting thoughts that increase my anxiety and learn to be more open and process my past"    Stage of Change: Preparation    Target Date for completion: 8/23/2023     Anticipated therapeutic modalities: CBT, EMDR, DBT     People identified to complete this goal: Desire and Clinician       Objective 1: (identify the means of measuring success in meeting the objective): Clinician will educate Joseline Munoz on skills to reduce anxiety such as but not limited to: DBT mindfulness practices, self soothing practices, CBT / REBT cognitive reframing, awareness of physical sensations from emotions (emotional intelligence), EMDR Imagery, DBT radical acceptance, Wise Mind, Willingness vs  Willfulness and Turning the Mind  ONGOING      Objective 2: (identify the means of measuring success in meeting the objective): Joseline Munoz will utilize 1 new skill weekly and report barriers and successes to alterations in anxiety and emotional management ONGOING        Objective 3: (identify the means of measuring success in meeting the objective): Clinician will provide Psychoeducational material on diagnosis including: positive and negative symptoms, treatments, signs of relapse or worsening of symptoms  ONGOING      Objective 4: (identify the means of measuring success in meeting the objective): [place] Giron will develop a routine of self care and healthy habits  Clinician will provide education on the importance of healthy self care and provide suggestions of daily habits that are both small and large that Argentina Giron may have interest in ONGOING       Objective 5: (identify the means of measuring success in meeting the objective): Clinician will utilize AIP history taking to engage Melia Sheridan in EMDR to reprocess and desensitize emotional validity of negative cognitions and emotional reactiveness to past traumatic events  Melia Sheridan will report a decline in the emotional reactiveness of triggers from past events  NEW          I am currently under the care of a [facility] psychiatric provider: yes    My [facility] psychiatric provider is: Dr Harris Cleaves am currently taking psychiatric medications: Yes, as prescribed    I feel that I will be ready for discharge from mental health care when I reach the following (measurable goal/objective): "When the way I feel, I have been trying to think of things and change the way I view and the things come back up, I will not feel so strongly about them"  "The emotions and things of my past when they come up will not have such an impact onme"  For children and adults who have a legal guardian:   Has there been any change to custody orders and/or guardianship status? NA  If yes, attach updated documentation  I have updated my Crisis Plan and have been offered a copy of this plan    [address]: Diagnosis and Treatment Plan explained to [facility] acknowledges an understanding of their diagnosis  Argentina Giron agrees to this treatment plan      I have been offered a copy of this Treatment Plan  yes    Argentina Giron, [birth year], actively participated in the review and update of this treatment plan during a virtual session, using the [employer] platform  Rohan Mcfarland  provided verbal consent on 2/23/2023 at 650 PM  The treatment plan was transcribed into the [employer] Record at a later time

## 2023-03-23 ENCOUNTER — TELEMEDICINE (OUTPATIENT)
Dept: PSYCHIATRY | Facility: CLINIC | Age: 38
End: 2023-03-23

## 2023-03-23 DIAGNOSIS — F41.1 GAD (GENERALIZED ANXIETY DISORDER): ICD-10-CM

## 2023-03-23 DIAGNOSIS — F33.2 MAJOR DEPRESSIVE DISORDER, RECURRENT, SEVERE W/O PSYCHOTIC BEHAVIOR (HCC): Primary | ICD-10-CM

## 2023-03-23 NOTE — PSYCH
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 and cannot talk  Estela Booker pulled the car over and was able to get out of the car and talk for 3-4 minutes  She stated "I am so sorry, I have so much going on in my life I do not know if I am coming or going in my life"  "So much has happened and I know that I need to talk, I don't want to but I know that I need to"      Clinician reminded Estela Booker of her cancellations and strongly suggested that the next session that she scheduled needed to be one, foreseeing any emergencies, that she could make   Christian Rivas is struggling with the death of her partner, her pre-existing depression and changes in her own health  For this reason clinician is making all attempts possible to be more lenient with Estela Booker and her missed appointments  Virtual Regular Visit    Verification of patient location:    Patient is located in the following state in which I hold an active license [place]      Assessment/Plan:    Problem List Items Addressed This Visit        Other    JG (generalized anxiety disorder)    Major depressive disorder, recurrent, severe w/o psychotic behavior ([facility] [address] ) - Primary       Goals addressed in session: Goal 1          Reason for visit is   Chief Complaint   Patient presents with   • Virtual Regular Visit        Encounter provider Alva Emery LCSW    Provider located at [address] [place] [id number] [phone]      Recent Visits  No visits were found meeting these conditions    Showing recent visits within past 7 days and meeting all other requirements  Today's Visits  Date Type Provider Dept   03/23/23 Telemedicine Alva Emery LCSW [facility]   Showing today's visits and meeting all other requirements  Future Appointments  No visits were found meeting these conditions  Showing future appointments within next 150 days and meeting all other requirements       The patient was identified by name and date of birth  Joseline Munoz was informed that this is a telemedicine visit and that the visit is being conducted throughthe [employer] platform  She agrees to proceed     My office door was closed  No one else was in the room  She acknowledged consent and understanding of privacy and security of the video platform  The patient has agreed to participate and understands they can discontinue the visit at any time  Patient is aware this is a billable service  Subjective  Joseline Munoz is a 45 y o  female          HPI     Past Medical History:   Diagnosis Date   • Acne    • Anxiety    • Back pain    • Bipolar disorder ([address] )     pt denies   • Carpal tunnel syndrome on right    • Contact lens overwear of both eyes    • CPAP (continuous positive airway pressure) dependence    • Cubital tunnel syndrome on right    • Depression    • Disease of thyroid gland    • GERD (gastroesophageal reflux disease)    • Headache    • Hypothyroidism    • Kidney stone    • Kidney stones    • Lupus (systemic lupus erythematosus) (HCC)    • Nausea    • Obese     gastric KATLYN-EN-Y  today 4/19/2022   • PONV (postoperative nausea and vomiting)    • Sleep apnea    • Wears glasses        Past Surgical History:   Procedure Laterality Date   • CARPAL TUNNEL RELEASE     • CHOLECYSTECTOMY     • EGD     • ID CYSTOURETHROSCOPY N/A 2/3/2020    Procedure: CYSTOSCOPY;  Surgeon: Lorenzo Easton MD;  [facility];  Service: UroGynecology          • ID LAPAROSCOPY SURG CHOLECYSTECTOMY N/A 9/6/2018    Procedure: CHOLECYSTECTOMY LAPAROSCOPIC W/ ROBOTICS;  Surgeon: Brittany Meier MD;  [facility];  Service: General   • ID LAPS GSTR RSTCV [address] W/BYP KATLYN-EN-Y LIMB <150 CM N/A 4/19/2022    Procedure: LAPAROSCOPIC KATLYN-EN-Y GASTRIC BYPASS & INTRAOPERATIVE EGD ROBOTICALLY ASSISTED;  Surgeon: Corona Fournier MD;  [facility];  Service: Bariatrics   • [place] NEUROPLASTY &/TRANSPOS MEDIAN NRV CARPAL Chantel Province Right 11/27/2020    Procedure: release CARPAL TUNNEL;  Surgeon: Abdias Gonzalez MD;  [facility];  Service: Orthopedics   • [place] NEUROPLASTY &/TRANSPOSITION ULNAR NERVE ELBOW Right 11/27/2020    Procedure: Clifton Jackson;  Surgeon:  Abdias Gonzalez MD;  [facility];  Service: Orthopedics   • [place] POST COLPORRHAPHY RECTOCELE W/WO PERINEORRHAPHY N/A 2/3/2020    Procedure: POSTERIOR COLPORRHAPHY;  Surgeon: Hernando Whelan MD;  [facility];  Service: UroGynecology          • [place] SLING OPERATION STRESS INCONTINENCE N/A 2/3/2020    Procedure: PUBOVAGINAL SLING;  Surgeon: Hernando Whelan MD;  [facility];  Service: UroGynecology          • TUBAL LIGATION         Current Outpatient Medications   Medication Sig Dispense Refill   • Calcium 600-400 MG-UNIT CHEW Chew daily     • Cholecalciferol (Vitamin D) 50 MCG (2000 UT) tablet Take 1 tablet (2,000 Units total) by mouth daily 90 tablet 1   • clobetasol (TEMOVATE) 0 05 % external solution Apply topically 2 (two) times a day 50 mL 0   • clonazePAM (KlonoPIN) 0 5 mg tablet TAKE 1/2 TO 1 TABLET BY MOUTH 2 TIMES A DAY AS NEEDED FOR ANXIETY 60 tablet 2   • fluticasone (FLONASE) 50 mcg/act nasal spray 1 spray into each nostril daily 48 g 1   • folic acid (FOLVITE) 1 mg tablet Take 800 mcg by mouth daily     • hydroxychloroquine (PLAQUENIL) 200 mg tablet Take 1 5 tablets (300 mg total) by mouth daily 135 tablet 1   • levothyroxine 25 mcg tablet Take 1 tablet (25 mcg total) by mouth daily in the early morning 90 tablet 1   • modafinil (PROVIGIL) 200 MG tablet Take 1 tablet (200 mg total) by mouth daily Do not start before March 8, 2023  30 tablet 2   • Multiple Vitamins-Minerals (Bariatric Multivitamins/Iron) CAPS Take by mouth daily     • Vortioxetine HBr (Trintellix) 20 MG tablet Take 1 tablet (20 mg total) by mouth daily 30 tablet 0     No current facility-administered medications for this visit  Allergies   Allergen Reactions   • Wound Dressing Adhesive Rash     Gets red rash from paper tape relatively quickly after application   • Penicillins Hives     At young age       Review of Systems    Video Exam    There were no vitals filed for this visit      Physical Exam     Visit Time    03/23/23  Start Time: 1700  Stop Time: 5276  Total Visit Time: 7 minutes

## 2023-03-23 NOTE — PSYCH
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 and cannot talk  Heron Jara pulled the car over and was able to get out of the car and talk for 3-4 minutes  She stated "I am so sorry, I have so much going on in my life I do not know if I am coming or going in my life"  "So much has happened and I know that I need to talk, I don't want to but I know that I need to"  Clinician reminded Heron Jara of her cancellations and strongly suggested that the next session that she scheduled needed to be one, foreseeing any emergencies, that she could make  Heron Jara is struggling with the death of her partner, her pre-existing depression and changes in her own health  For this reason clinician is making all attempts possible to be more lenient with Heron Jara and her missed appointments

## 2023-03-27 ENCOUNTER — TELEMEDICINE (OUTPATIENT)
Dept: PSYCHIATRY | Facility: CLINIC | Age: 38
End: 2023-03-27

## 2023-03-27 DIAGNOSIS — F33.2 MAJOR DEPRESSIVE DISORDER, RECURRENT, SEVERE W/O PSYCHOTIC BEHAVIOR (HCC): Primary | ICD-10-CM

## 2023-03-27 DIAGNOSIS — F41.1 GAD (GENERALIZED ANXIETY DISORDER): ICD-10-CM

## 2023-03-27 NOTE — BH CRISIS PLAN
"Client Name: Loly Crenshaw       Client YOB: 1985  : 1985    Treatment Team (include name and contact information):     Psychotherapist: Danny Greco LCSW    Psychiatrist: Dr Claudean Auer of information completed: yes    \" [facility]   Release of information completed: yes    Other (Specify Role): n/a    Release of information completed: no    Other (Specify Role): n/a   Release of information completed: no    Healthcare Provider  Flory Wong DO  [id number] [facility] [address]  [address] [place] [address]  [phone]    Type of Plan   * Child plans (children 15 yo and younger) must be completed and signed by the child's legal guardian   * Plans for all individuals 15 yo and above must be signed by the client  Plan Type: adolescent/adult (15 and over) Initial      My Personal Strengths are (in the client's own words):  \"caring, good listener, people feel comfortable talking to me, supportive family, dependable, big heart, giving\"     The stressors and triggers that may put me at risk are:  other (describe) finances, some legal issues going on, my way of thinking (overthinking)    Coping skills I can use to keep myself calm and safe: Other (describe) prayer, getting more intuned with god and building relationship, going to [employer], breathing     Coping skills/supports I can use to maintain abstinence from substance use:   n/a    The people that provide me with help and support: (Include name, contact, and how they can help)   Support person #1: Mother -German Garcia     * Phone number: number in cell phone     * How can they help me? Helps financially     Support person #2: Middle son- Stacy Burleson     * Phone number: number in cell     * How can they help me? Support me     Support person #3: Youngest Daughter - May Serrato     * Phone number: number in cell     * How can they help me?  Support me    In the past, the following has helped me in times of crisis:    Being " in a quiet space, Calling a family member, Taking a walk or exercising, Breathing exercises (or other mindfulness-based activities), Praying or meditating, Using de-escalation tools that I have learned and Watching television or a movie      If it is an emergency and you need immediate help, call 9-1-1    If there is a possibility of danger to yourself or others, call the following crisis hotline resources:     Adult Crisis Numbers  Suicide Prevention Hotline - Dial 9-8-8  [facility]: Ju Daniel 13: [address]: [address]: [phone]  [address] [facility] ([facility]): [address] [facility] [address]: [address] [place] [address] Avenue: [address] [place] [address] St: [phone] (daytime)  [phone] (after hours, weekends, holidays)     Child/Adolescent Crisis Numbers   [facility]: Hugo Clark 10: [phone]   Bonnie Both: [phone]   [address] ([facility]): [phone]    Please note: Some [facility] do not have a separate number for Child/Adolescent specific crisis  If your county is not listed under Child/Adolescent, please call the adult number for your county     National Talk to Text Line   [place] - [phone]    In the event your feelings become unmanageable, and you cannot reach your support system, you will call 911 immediately or go to the nearest hospital emergency room

## 2023-03-27 NOTE — PSYCH
"Behavioral Health Psychotherapy Progress Note    Psychotherapy Provided: Individual Psychotherapy     1  Major depressive disorder, recurrent, severe w/o psychotic behavior ([address] )        2  JG (generalized anxiety disorder)          PHQ-9 (15)    Goals addressed in session: Goal 1     DATA: Judy Bashir met for session and presented in her home  Judy Bashir shared there has been so much going on I am not even sure where to start  On March 10th  raided house, Glendy Kahn upstairs with daughter, accused me of selling drugs to CI's\", March 10th until Monday 3/27 was incarcerated  Judy Bashir is currently out on bail  Was supposed to go back to work on March 21st but now is out on medical leave until May 1st   Reports that her STD is completed but that until there is some resolution she wants to wait to go back to work  In the meantime Judy Bashir reports that she is reaching out to her mother and asking for help and 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  I just feel unsafe in my home, I have changed the entrance that I go into, I have avoided cleaning up the mess that they made for me when they raided the house\"  \"My daughter is scared to be in the house, family is questioning my kids about who I am around or who is coming to the house\"  \"I feel like they should be coming to me to ask and questions that they have about what is happening\"  Judy Bashir denies that she was selling drugs or that she had any engagement with a CI or person involved with drugs  Judy Bashir has her preliminary hearing on 4/13  Judy Bashir reports that she has begun packing things up in her home with the intention to move out of the house  When asked about her supports and who she is turing to [facility] assumes things and judges before she knows what is happening\"  \"I try to limit my time around her\"  \"I know she has her own problems that she needs to realize that she does wrong\"    Clinician processed with Judy Bashir rebuilding her safety and comfort in her " "home and small changes she could implement to bring her power and control back into her home  During this session, this clinician used the following therapeutic modalities: Engagement Strategies, Client-centered Therapy and Cognitive Processing Therapy    Substance Abuse was not addressed during this session  If the client is diagnosed with a co-occurring substance use disorder, please indicate any changes in the frequency or amount of use: n/a  Stage of change for addressing substance use diagnoses: No substance use/Not applicable    ASSESSMENT:  Stephanie Brunson presents with a Depressed mood  her affect is Blunted, which is congruent, with her mood and the content of the session  The client has made progress on their goals  Stephanie Brunson presents with a low risk of suicide, low risk of self-harm, and none risk of harm to others  For any risk assessment that surpasses a \"low\" rating, a safety plan must be developed  A safety plan was indicated: no  If yes, describe in detail n/a    PLAN: Between sessions, Stephanie Brunson will attempt to regain her comfort in her home by creating her safe place  Huan Edouard identified several ideas from removing the boxes from her line of sight to purchasing a few items that feel comfortable in her home  At the next session, the therapist will use Cognitive Processing Therapy to address feeling of safety in her home, feeling of being able to be in her home and leave her items in her home  Behavioral Health Treatment Plan and Discharge Planning: Stephanie Brusnon is aware of and agrees to continue to work on their treatment plan  They have identified and are working toward their discharge goals   yes    Visit start and stop times:    03/27/23  Start Time: 0901  Stop Time: 6639  Total Visit Time: 52 minutes     Virtual Regular Visit    Verification of patient location:    Patient is located in the following state in which I hold an active license " [place]      Assessment/Plan:    Problem List Items Addressed This Visit        Other    JG (generalized anxiety disorder)    Major depressive disorder, recurrent, severe w/o psychotic behavior ([facility] [address] ) - Primary       Goals addressed in session: Goal 1          Reason for visit is No chief complaint on file  Encounter provider Quin Trinidad LCSW    Provider located at [address] [place] [id number]  [phone]      Recent Visits  Date Type Provider Dept   03/23/23 Telemedicine Quin Trinidad LCSW [facility]   Showing recent visits within past 7 days and meeting all other requirements  Today's Visits  Date Type Provider Dept   03/27/23 Telemedicine Quin Trinidad LCSW [facility]   Showing today's visits and meeting all other requirements  Future Appointments  No visits were found meeting these conditions  Showing future appointments within next 150 days and meeting all other requirements       The patient was identified by name and date of birth  Shaniqua Collins was informed that this is a telemedicine visit and that the visit is being conducted throughthe [employer] platform  She agrees to proceed     My office door was closed  No one else was in the room  She acknowledged consent and understanding of privacy and security of the video platform  The patient has agreed to participate and understands they can discontinue the visit at any time  Patient is aware this is a billable service  Subjective  Shaniqua Collins is a 45 y o  female          HPI     Past Medical History:   Diagnosis Date   • Acne    • Anxiety    • Back pain    • Bipolar disorder ([facility] [address] )     pt denies   • Carpal tunnel syndrome on right    • Contact lens overwear of both eyes    • CPAP (continuous positive airway pressure) dependence    • Cubital tunnel syndrome on right    • Depression    • Disease of thyroid gland    • GERD (gastroesophageal reflux disease)    • Headache    • Hypothyroidism    • Kidney stone    • Kidney stones    • Lupus (systemic lupus erythematosus) (HCC)    • Nausea    • Obese     gastric KATLYN-EN-Y  today 4/19/2022   • PONV (postoperative nausea and vomiting)    • Sleep apnea    • Wears glasses        Past Surgical History:   Procedure Laterality Date   • CARPAL TUNNEL RELEASE     • CHOLECYSTECTOMY     • EGD     • [place] CYSTOURETHROSCOPY N/A 2/3/2020    Procedure: CYSTOSCOPY;  Surgeon: Masood Hanson MD;  [facility];  Service: UroGynecology          • [place] LAPAROSCOPY SURG CHOLECYSTECTOMY N/A 9/6/2018    Procedure: CHOLECYSTECTOMY LAPAROSCOPIC W/ ROBOTICS;  Surgeon: Fabián Carl MD;  [facility];  Service: General   • [place] LAPS GSTR RSTCV [address] [facility] [facility] [address] W/BYP KATLYN-EN-Y LIMB <150 CM N/A 4/19/2022    Procedure: LAPAROSCOPIC KATLYN-EN-Y GASTRIC BYPASS & INTRAOPERATIVE EGD ROBOTICALLY ASSISTED;  Surgeon: Hermelindo Bishop MD;  [facility];  Service: Bariatrics   • [place] NEUROPLASTY &/TRANSPOS MEDIAN NRV CARPAL Laveta Keepers Right 11/27/2020    Procedure: release CARPAL TUNNEL;  Surgeon: Tanya Mendiola MD;  [facility];  Service: Orthopedics   • [place] NEUROPLASTY &/TRANSPOSITION ULNAR NERVE ELBOW Right 11/27/2020    Procedure: Maudry Sicks;  Surgeon:  Tanya Mendiola MD;  [facility];  Service: Orthopedics   • [place] POST COLPORRHAPHY RECTOCELE W/WO PERINEORRHAPHY N/A 2/3/2020    Procedure: POSTERIOR COLPORRHAPHY;  Surgeon: Masood Hanson MD;  [facility];  Service: UroGynecology          • [place] SLING OPERATION STRESS INCONTINENCE N/A 2/3/2020    Procedure: PUBOVAGINAL SLING;  Surgeon: Masood Hanson MD;  [facility];  Service: UroGynecology          • TUBAL LIGATION         Current Outpatient Medications   Medication Sig Dispense Refill   • Calcium 600-400 MG-UNIT CHEW Chew daily     • Cholecalciferol (Vitamin D) 50 MCG (2000 UT) tablet Take 1 tablet (2,000 Units total) by mouth daily 90 tablet 1   • clobetasol (TEMOVATE) 0 05 % external solution Apply topically 2 (two) times a day 50 mL 0   • clonazePAM (KlonoPIN) 0 5 mg tablet TAKE 1/2 TO 1 TABLET BY MOUTH 2 TIMES A DAY AS NEEDED FOR ANXIETY 60 tablet 2   • fluticasone (FLONASE) 50 mcg/act nasal spray 1 spray into each nostril daily 48 g 1   • folic acid (FOLVITE) 1 mg tablet Take 800 mcg by mouth daily     • hydroxychloroquine (PLAQUENIL) 200 mg tablet Take 1 5 tablets (300 mg total) by mouth daily 135 tablet 1   • levothyroxine 25 mcg tablet Take 1 tablet (25 mcg total) by mouth daily in the early morning 90 tablet 1   • modafinil (PROVIGIL) 200 MG tablet Take 1 tablet (200 mg total) by mouth daily Do not start before March 8, 2023  30 tablet 2   • Multiple Vitamins-Minerals (Bariatric Multivitamins/Iron) CAPS Take by mouth daily     • Vortioxetine HBr (Trintellix) 20 MG tablet Take 1 tablet (20 mg total) by mouth daily 30 tablet 0     No current facility-administered medications for this visit  Allergies   Allergen Reactions   • Wound Dressing Adhesive Rash     Gets red rash from paper tape relatively quickly after application   • Penicillins Hives     At young age       Review of Systems    Video Exam    There were no vitals filed for this visit      Physical Exam     Visit Time    03/27/23  Start Time: 0901  Stop Time: 8267  Total Visit Time: 52 minutes

## 2023-04-20 ENCOUNTER — OFFICE VISIT (OUTPATIENT)
Dept: RHEUMATOLOGY | Facility: CLINIC | Age: 38
End: 2023-04-20

## 2023-04-20 VITALS
HEART RATE: 60 BPM | DIASTOLIC BLOOD PRESSURE: 50 MMHG | BODY MASS INDEX: 19.66 KG/M2 | SYSTOLIC BLOOD PRESSURE: 102 MMHG | HEIGHT: 65 IN | WEIGHT: 118 LBS

## 2023-04-20 DIAGNOSIS — M32.9 LUPUS (HCC): Primary | ICD-10-CM

## 2023-04-20 DIAGNOSIS — R53.83 OTHER FATIGUE: ICD-10-CM

## 2023-04-20 DIAGNOSIS — Z79.899 HIGH RISK MEDICATION USE: ICD-10-CM

## 2023-04-20 DIAGNOSIS — M25.50 ARTHRALGIA OF MULTIPLE JOINTS: ICD-10-CM

## 2023-04-20 RX ORDER — CELECOXIB 100 MG/1
100 CAPSULE ORAL 2 TIMES DAILY PRN
Qty: 60 CAPSULE | Refills: 6 | Status: SHIPPED | OUTPATIENT
Start: 2023-04-20 | End: 2023-05-08

## 2023-04-20 RX ORDER — HYDROXYCHLOROQUINE SULFATE 200 MG/1
200 TABLET, FILM COATED ORAL DAILY
Qty: 90 TABLET | Refills: 1 | Status: SHIPPED | OUTPATIENT
Start: 2023-04-20 | End: 2023-10-17

## 2023-04-20 NOTE — PROGRESS NOTES
Assessment/Plan: Ilir Thomas is a 45 y o  female who presents for follow-up of her SLE (malar rash, arthralgias, dry eyes, hair thinning, equivocal anti-dsDNA, indeterminate anticardiolipin IgM, +MAYUR at 1:160 speckled pattern)  Has continued to lose a lot of weight; has some back pain  Do lupus activity labs; Anti-dsDNA is elevated and C3 is trending down, signifying her lupus is active - prescribed a prednisone course, and will repeat her lupus activity labs in 2 months  Decrease hydroxychlorquine to one tab daily due to weight loss; continue regular eye exams  Can take celecoxib twice a day as needed for joint pain     Return to clinic in 6 months    Problem List Items Addressed This Visit        Other    Arthralgia of multiple joints    Relevant Medications    celecoxib (CeleBREX) 100 mg capsule    High risk medication use   Other Visit Diagnoses     Lupus ([facility] [address] )    -  Primary    Relevant Medications    hydroxychloroquine (PLAQUENIL) 200 mg tablet    celecoxib (CeleBREX) 100 mg capsule    predniSONE 5 mg tablet    Other Relevant Orders    Sedimentation rate, automated (Completed)    Urinalysis with microscopic (Completed)    Protein / creatinine ratio, urine (Completed)    C-reactive protein (Completed)    C4 complement (Completed)    C3 complement (Completed)    Anti-DNA antibody, double-stranded (Completed)    Anti-DNA antibody, double-stranded    C3 complement    C4 complement    Protein / creatinine ratio, urine    Urinalysis with microscopic    Other fatigue          High risk medication use - Benefits and risks of hydroxychloroquine, including but not limited to retinal toxicity, corneal deposits, gastrointestinal side effects, and headaches were previously discussed with the patient  Patient is aware of the need for a regular eye exam to monitor for ocular toxicity while on this medication      Follow-up: RTC in 6 months        Rheumatic Disease Summary  Brett dixon 45 y o  female who originally presented on 9/17/19 as a Rheumatology consult referred by her Patsy Castro DO for evaluation of possible autoimmune disease given positive MAYUR of 1:160 in a speckled pattern   Patient's clinical signs and symptoms were consistent with early lupus given her history of a malar rash and arthralgias  She also had dry eyes, but no dry mouth, and admitted to hair thinning  She denied blood clot or miscarriage history, or Raynaud's symptoms  Admitted to reflux symptoms  Lupus activity labs were ordered, which returned unremarkable; anti-dsDNA returned equivocal at 6 and anti-SSA/SSB were negative  She also had a livedoid-appearing rash on her arms, so antiphospholipid antibody panel was ordered, with anticardiolipin IgM returning indeterminate at 13  Her Vit  D level was low, so she was prescribed ergocalciferol 50,000 units po weekly  Abdomen ultrasound was ordered to work-up her transaminitis, but it returned significant only for splenomegaly  For her arthralgias, had prescribed meloxicam 7 5 mg 1-2 times a day, and for her shooting/neuropathic pain, had prescribed gabapentin 100 mg p o  Q h s      She presented on 10/18/19 for follow-up of likely early SLE (malar rash, arthralgias, dry eyes, hair thinning, equivocal anti-dsDNA, indeterminate anticardiolipin IgM, +MAYUR at 1:160 speckled pattern)  Patient continued to have arthralgias  Had stopped meloxicam since it wasn't helping patient, and started her on diclofenac 75mg po bid instead to help with her arthralgias  For long-term management of her lupus, had initiated weight-based hydroxychloroquine 200mg po bid  Advised patient to get regular eye exams while on this medication to monitor for plaquenil-related retinal toxicity   Since patient has chronic low back pain and right SI joint tenderness, had ordered a HLA-B27 and SI joint x-rays to evaluate for a concurrent seronegative spondyloarthropathy such as ankylosing spondylitis, but these returned unremarkable  Had also ordered a Hepatitis B core Ab to do along with PCP's labs to further work-up her transaminitis, which returned negative      She presented for follow-up on 1/14/20, at which time her main symptoms were arthralgia and fatigue  Lupus activity labs ordered; repeat anticardiolipin Ab ordered to rule-out antiphospholipid syndrome since test was previously indeterminate, patient already is in a hypercoagulable state from being heterozygous for the MTHFR gene mutation  She was to continue hydroxychloroquine 200mg po bid  Stopped oral diclofenac since patient was not finding benefit from it for her joint pain symptoms; instead prescribed celecoxib 100mg po bid  Also prescribed methocarbamol 500mg po bid prn for back muscle spasm  Physical therapy referral also made for low back pain  4/20/20: Patient's arthralgia symptoms have been prominent, some days are better than others  Also has had episodes of hives on arms  Increased her celecoxib to 200mg po bid  She is to continue hydroxychloroquine 200mg twice a day; reminded her to get eye exam  Prescribed hydrocortisone ointment as needed for itchiness/rash  Increased her methocarbamol frequency to 500mg po bid as needed for her back spasms  Recommended patient to make a physical therapy appointment  05/3/4989: Evelio Barr is a 39 y o  female who presented for follow-up of her SLE (malar rash, arthralgias, dry eyes, hair thinning, equivocal anti-dsDNA, indeterminate anticardiolipin IgM, +MAYUR at 1:160 speckled pattern)  She went and got a 2nd opinion from Dr Nithin Montaño since her last clinic visit with me in April 2020, and he did agree with my diagnosis of lupus and her her current medication management  Has some mild lupus symptoms such as malar erythema, mouth sores, and arthralgia    Lupus activity labs ordered, which all returned normal   Since patient was forgetting to take her 2nd hydroxychloroquine tab in a day, asked her to take both at a time if she is able to tolerate it; and is to continue regular eye exams  Prescribed celecoxib 200 mg p o  b i d  as needed for joint pain  Do labs  Continue hydroxychloroquine 400mg daily  Stop celecoxib, start indomethacin once a day as needed, can go up to twice a day as needed  Finish Medrol dose pack  9/13/22: Has lost a lot of weight since gastric bypass surgery in April  Stopped Celebrex  Has been feeling tired lately, which may or may not be due to her recent weight loss surgery  Do labs  Decrease hydroxychlorquine to one and a half tabs daily due to weight loss; continue regular eye exams      Subjective/HPI  Flavia Sharma is a 45 y o  female who presents for follow-up of her likely SLE (malar rash, arthralgias, dry eyes, hair thinning, equivocal anti-dsDNA, indeterminate anticardiolipin IgM, +MAYUR at 1:160 speckled pattern)  Last visit was 9/13/22  Has continued to lose a significant amount of weight s/p gastric bypass  Is now underweight  Her boyfriend passed away in October, which has contributed to a lot of stress      Past Medical History:   Diagnosis Date   • Acne    • Anxiety    • Back pain    • Bipolar disorder ([facility] [address] )     pt denies   • Carpal tunnel syndrome on right    • Contact lens overwear of both eyes    • CPAP (continuous positive airway pressure) dependence    • Cubital tunnel syndrome on right    • Depression    • Disease of thyroid gland    • GERD (gastroesophageal reflux disease)    • Headache    • Hypothyroidism    • Kidney stone    • Kidney stones    • Lupus (systemic lupus erythematosus) (HCC)    • Nausea    • Obese     gastric KATLYN-EN-Y  today 4/19/2022   • PONV (postoperative nausea and vomiting)    • Sleep apnea    • Wears glasses        Past Surgical History:   Procedure Laterality Date   • CARPAL TUNNEL RELEASE     • CHOLECYSTECTOMY     • EGD     • TX CYSTOURETHROSCOPY N/A 2/3/2020    Procedure: CYSTOSCOPY;  Surgeon: Javi Seth MD;  Location: [facility] OR;  Service: UroGynecology          • TN LAPAROSCOPY SURG CHOLECYSTECTOMY N/A 9/6/2018    Procedure: CHOLECYSTECTOMY LAPAROSCOPIC W/ ROBOTICS;  Surgeon: Aggie Sauceda MD;  [facility];  Service: General   • TN LAPS GSTR RSTCV [address] W/BYP KATLYN-EN-Y LIMB <150 CM N/A 4/19/2022    Procedure: LAPAROSCOPIC KATLYN-EN-Y GASTRIC BYPASS & INTRAOPERATIVE EGD ROBOTICALLY ASSISTED;  Surgeon: Kelli Bonilla MD;  Location: [facility];  Service: Bariatrics   • TN NEUROPLASTY &/TRANSPOS MEDIAN NRV CARPAL Sadnee Chimes Right 11/27/2020    Procedure: release CARPAL TUNNEL;  Surgeon: Dori Potter MD;  [facility];  Service: Orthopedics   • TN NEUROPLASTY &/TRANSPOSITION ULNAR NERVE ELBOW Right 11/27/2020    Procedure: Betha [place];  Surgeon: Dori Ptoter MD;  [facility];  Service: Orthopedics   • TN POST COLPORRHAPHY RECTOCELE W/WO PERINEORRHAPHY N/A 2/3/2020    Procedure: POSTERIOR COLPORRHAPHY;  Surgeon: Maureen Mustafa MD;  [facility]: [facility];  Service: UroGynecology          • TN SLING OPERATION STRESS INCONTINENCE N/A 2/3/2020    Procedure: PUBOVAGINAL SLING;  Surgeon: Maureen Mustafa MD;  [facility];  Service: UroGynecology          • TUBAL LIGATION         Current Outpatient Medications   Medication Sig Dispense Refill   • celecoxib (CeleBREX) 100 mg capsule Take 1 capsule (100 mg total) by mouth 2 (two) times a day as needed for moderate pain 60 capsule 6   • hydroxychloroquine (PLAQUENIL) 200 mg tablet Take 1 tablet (200 mg total) by mouth daily 90 tablet 1   • predniSONE 5 mg tablet 4 tabs x7 days, then 3 tabs x7 days, then 2 tabs x7 days, then 1 tab x7 days, then stop   70 tablet 0   • Calcium 600-400 MG-UNIT CHEW Chew daily     • Cholecalciferol (Vitamin D) 50 MCG (2000 UT) tablet Take 1 tablet (2,000 Units total) by mouth daily 90 tablet 1   • clobetasol (TEMOVATE) 0 05 % external solution Apply topically 2 (two) times a day 50 mL 0   • clonazePAM (KlonoPIN) 0 5 mg tablet TAKE 1/2 TO 1 TABLET BY MOUTH 2 TIMES A DAY AS NEEDED FOR ANXIETY 60 tablet 2   • fluticasone (FLONASE) 50 mcg/act nasal spray 1 spray into each nostril daily 48 g 1   • folic acid (FOLVITE) 1 mg tablet Take 800 mcg by mouth daily     • levothyroxine 25 mcg tablet Take 1 tablet (25 mcg total) by mouth daily in the early morning 90 tablet 1   • modafinil (PROVIGIL) 200 MG tablet Take 1 tablet (200 mg total) by mouth daily Do not start before March 8, 2023  30 tablet 2   • Multiple Vitamins-Minerals (Bariatric Multivitamins/Iron) CAPS Take by mouth daily     • omeprazole (PriLOSEC) 20 mg delayed release capsule Take 1 capsule (20 mg total) by mouth daily 90 capsule 1   • Trintellix 20 MG tablet TAKE ONE TABLET BY MOUTH DAILY 30 tablet 1     No current facility-administered medications for this visit  Allergies   Allergen Reactions   • Wound Dressing Adhesive Rash     Gets red rash from paper tape relatively quickly after application   • Penicillins Hives     At young age       Review of Systems:  Review of Systems   Constitutional: Positive for fatigue  HENT: Negative for mouth sores  Eyes: Negative for pain  Respiratory: Negative for shortness of breath  Cardiovascular: Negative for leg swelling  Gastrointestinal: Positive for nausea  Endocrine: Positive for cold intolerance  Musculoskeletal: Positive for arthralgias, back pain, joint swelling, myalgias and neck pain  Skin: Negative for rash  Allergic/Immunologic: Positive for environmental allergies  Neurological: Negative for weakness  Hematological: Negative for adenopathy  Psychiatric/Behavioral: Positive for dysphoric mood  Negative for sleep disturbance  Physical exam:   Physical Exam  Constitutional:       General: She is not in acute distress  HENT:      Head: Normocephalic and atraumatic  Eyes:      Conjunctiva/sclera: Conjunctivae normal    Cardiovascular:      Rate and Rhythm: Normal rate and regular rhythm        Heart sounds: S1 normal and S2 normal      No friction rub  Pulmonary:      Effort: Pulmonary effort is normal  No respiratory distress  Breath sounds: Normal breath sounds  No wheezing, rhonchi or rales  Musculoskeletal:         General: Tenderness present  Cervical back: Neck supple  Comments: Right SI joint tenderness   Skin:     Coloration: Skin is not pale  Neurological:      Mental Status: She is alert  Mental status is at baseline     Psychiatric:         Mood and Affect: Mood normal          Behavior: Behavior normal        Imaging:  EGD 01/31/2022  The esophagus, stomach and 2nd part of the duodenum appeared normal   Performed single biopsy to rule out H  pylori in the antrum  Small sliding hiatal hernia (type I hiatal hernia) without Baldomero Dioni lesions present    SI Joint x-rays 11/9/2019 - unremarkable    Labs:  Office Visit on 04/20/2023   Component Date Value Ref Range Status   • Sed Rate 04/21/2023 <1  0 - 19 mm/hour Final   • Color, UA 04/21/2023 Deja   Final   • Clarity, UA 04/21/2023 Clear   Final   • Specific Gravity, UA 04/21/2023 >=1 030  1 003 - 1 030 Final   • pH, UA 04/21/2023 5 0  4 5, 5 0, 5 5, 6 0, 6 5, 7 0, 7 5, 8 0 Final   • Leukocytes, UA 04/21/2023 Negative  Negative Final   • Nitrite, UA 04/21/2023 Negative  Negative Final   • Protein, UA 04/21/2023 30 (1+) (A)  Negative mg/dl Final   • Glucose, UA 04/21/2023 Negative  Negative mg/dl Final   • Ketones, UA 04/21/2023 Negative  Negative mg/dl Final   • Urobilinogen, UA 04/21/2023 0 2  0 2, 1 0 E U /dl E U /dl Final   • Bilirubin, UA 04/21/2023 Small (A)  Negative Final   • Occult Blood, UA 04/21/2023 Negative  Negative Final   • RBC, UA 04/21/2023 None Seen  None Seen, 2-4 /hpf Final   • WBC, UA 04/21/2023 2-4  None Seen, 2-4, 5-60 /hpf Final   • Epithelial Cells 04/21/2023 Occasional  None Seen, Occasional /hpf Final   • Bacteria, UA 04/21/2023 Occasional  None Seen, Occasional /hpf Final   • MUCUS THREADS 04/21/2023 Innumerable (A) None Seen Final   • Creatinine, Ur 04/21/2023 201 6  mg/dL Final   • Protein Urine Random 04/21/2023 41  mg/dL Final   • Prot/Creat Ratio, Ur 04/21/2023 0 20 (H)  0 00 - 0 10 Final   • CRP 04/21/2023 <1 0  <3 0 mg/L Final   • C4, COMPLEMENT 04/21/2023 22 0  10 0 - 40 0 mg/dL Final   • C3 Complement 04/21/2023 62 8 (L)  90 0 - 180 0 mg/dL Final   • ds DNA Ab 04/21/2023 15 (H)  0 - 9 IU/mL Final                                       Negative      <5                                     Equivocal  5 - 9                                     Positive      >9

## 2023-04-20 NOTE — PATIENT INSTRUCTIONS
Do labs  Decrease hydroxychlorquine to one tab daily due to weight loss; continue regular eye exams  Can take celecoxib twice a day as needed for joint pain     Return to clinic in 6 months

## 2023-04-21 ENCOUNTER — APPOINTMENT (OUTPATIENT)
Dept: LAB | Facility: HOSPITAL | Age: 38
End: 2023-04-21

## 2023-04-21 DIAGNOSIS — E55.9 VITAMIN D DEFICIENCY: ICD-10-CM

## 2023-04-21 DIAGNOSIS — E78.5 DYSLIPIDEMIA: ICD-10-CM

## 2023-04-21 DIAGNOSIS — E03.9 ACQUIRED HYPOTHYROIDISM: ICD-10-CM

## 2023-04-21 DIAGNOSIS — Z13.1 SCREENING FOR DIABETES MELLITUS: ICD-10-CM

## 2023-04-21 LAB
25(OH)D3 SERPL-MCNC: 49.6 NG/ML (ref 30–100)
ALBUMIN SERPL BCP-MCNC: 4.5 G/DL (ref 3.5–5)
ALP SERPL-CCNC: 45 U/L (ref 34–104)
ALT SERPL W P-5'-P-CCNC: 18 U/L (ref 7–52)
ANION GAP SERPL CALCULATED.3IONS-SCNC: 5 MMOL/L (ref 4–13)
AST SERPL W P-5'-P-CCNC: 15 U/L (ref 13–39)
BACTERIA UR QL AUTO: ABNORMAL /HPF
BASOPHILS # BLD AUTO: 0.02 THOUSANDS/ΜL (ref 0–0.1)
BASOPHILS NFR BLD AUTO: 0 % (ref 0–1)
BILIRUB SERPL-MCNC: 0.64 MG/DL (ref 0.2–1)
BILIRUB UR QL STRIP: ABNORMAL
BUN SERPL-MCNC: 24 MG/DL (ref 5–25)
C3 SERPL-MCNC: 62.8 MG/DL (ref 90–180)
C4 SERPL-MCNC: 22 MG/DL (ref 10–40)
CALCIUM SERPL-MCNC: 9.3 MG/DL (ref 8.4–10.2)
CHLORIDE SERPL-SCNC: 104 MMOL/L (ref 96–108)
CHOLEST SERPL-MCNC: 134 MG/DL
CLARITY UR: CLEAR
CO2 SERPL-SCNC: 33 MMOL/L (ref 21–32)
COLOR UR: ABNORMAL
CREAT SERPL-MCNC: 0.58 MG/DL (ref 0.6–1.3)
CREAT UR-MCNC: 201.6 MG/DL
CRP SERPL QL: <1 MG/L
EOSINOPHIL # BLD AUTO: 0.1 THOUSAND/ΜL (ref 0–0.61)
EOSINOPHIL NFR BLD AUTO: 2 % (ref 0–6)
ERYTHROCYTE [DISTWIDTH] IN BLOOD BY AUTOMATED COUNT: 13.2 % (ref 11.6–15.1)
ERYTHROCYTE [SEDIMENTATION RATE] IN BLOOD: <1 MM/HOUR (ref 0–19)
EST. AVERAGE GLUCOSE BLD GHB EST-MCNC: 82 MG/DL
GFR SERPL CREATININE-BSD FRML MDRD: 117 ML/MIN/1.73SQ M
GLUCOSE P FAST SERPL-MCNC: 83 MG/DL (ref 65–99)
GLUCOSE UR STRIP-MCNC: NEGATIVE MG/DL
HBA1C MFR BLD: 4.5 %
HCT VFR BLD AUTO: 42 % (ref 34.8–46.1)
HDLC SERPL-MCNC: 59 MG/DL
HGB BLD-MCNC: 13.6 G/DL (ref 11.5–15.4)
HGB UR QL STRIP.AUTO: NEGATIVE
IMM GRANULOCYTES # BLD AUTO: 0.02 THOUSAND/UL (ref 0–0.2)
IMM GRANULOCYTES NFR BLD AUTO: 0 % (ref 0–2)
KETONES UR STRIP-MCNC: NEGATIVE MG/DL
LDLC SERPL CALC-MCNC: 59 MG/DL (ref 0–100)
LEUKOCYTE ESTERASE UR QL STRIP: NEGATIVE
LYMPHOCYTES # BLD AUTO: 1.46 THOUSANDS/ΜL (ref 0.6–4.47)
LYMPHOCYTES NFR BLD AUTO: 27 % (ref 14–44)
MCH RBC QN AUTO: 31.7 PG (ref 26.8–34.3)
MCHC RBC AUTO-ENTMCNC: 32.4 G/DL (ref 31.4–37.4)
MCV RBC AUTO: 98 FL (ref 82–98)
MONOCYTES # BLD AUTO: 0.38 THOUSAND/ΜL (ref 0.17–1.22)
MONOCYTES NFR BLD AUTO: 7 % (ref 4–12)
MUCOUS THREADS UR QL AUTO: ABNORMAL
NEUTROPHILS # BLD AUTO: 3.49 THOUSANDS/ΜL (ref 1.85–7.62)
NEUTS SEG NFR BLD AUTO: 64 % (ref 43–75)
NITRITE UR QL STRIP: NEGATIVE
NON-SQ EPI CELLS URNS QL MICRO: ABNORMAL /HPF
NONHDLC SERPL-MCNC: 75 MG/DL
NRBC BLD AUTO-RTO: 0 /100 WBCS
PH UR STRIP.AUTO: 5 [PH]
PLATELET # BLD AUTO: 242 THOUSANDS/UL (ref 149–390)
PMV BLD AUTO: 9.8 FL (ref 8.9–12.7)
POTASSIUM SERPL-SCNC: 4.1 MMOL/L (ref 3.5–5.3)
PROT SERPL-MCNC: 6.7 G/DL (ref 6.4–8.4)
PROT UR STRIP-MCNC: ABNORMAL MG/DL
PROT UR-MCNC: 41 MG/DL
PROT/CREAT UR: 0.2 MG/G{CREAT} (ref 0–0.1)
RBC # BLD AUTO: 4.29 MILLION/UL (ref 3.81–5.12)
RBC #/AREA URNS AUTO: ABNORMAL /HPF
SODIUM SERPL-SCNC: 142 MMOL/L (ref 135–147)
SP GR UR STRIP.AUTO: >=1.03 (ref 1–1.03)
T4 SERPL-MCNC: 6.2 UG/DL (ref 4.7–13.3)
TRIGL SERPL-MCNC: 81 MG/DL
TSH SERPL DL<=0.05 MIU/L-ACNC: 1.82 UIU/ML (ref 0.45–4.5)
UROBILINOGEN UR QL STRIP.AUTO: 0.2 E.U./DL
WBC # BLD AUTO: 5.47 THOUSAND/UL (ref 4.31–10.16)
WBC #/AREA URNS AUTO: ABNORMAL /HPF

## 2023-04-22 LAB — DSDNA AB SER-ACNC: 15 IU/ML (ref 0–9)

## 2023-04-24 ENCOUNTER — OFFICE VISIT (OUTPATIENT)
Dept: BARIATRICS | Facility: CLINIC | Age: 38
End: 2023-04-24

## 2023-04-24 ENCOUNTER — TELEPHONE (OUTPATIENT)
Dept: FAMILY MEDICINE CLINIC | Facility: CLINIC | Age: 38
End: 2023-04-24

## 2023-04-24 ENCOUNTER — TELEPHONE (OUTPATIENT)
Dept: SLEEP CENTER | Facility: CLINIC | Age: 38
End: 2023-04-24

## 2023-04-24 VITALS
DIASTOLIC BLOOD PRESSURE: 64 MMHG | HEIGHT: 66 IN | SYSTOLIC BLOOD PRESSURE: 102 MMHG | BODY MASS INDEX: 18.96 KG/M2 | WEIGHT: 118 LBS | TEMPERATURE: 97.5 F | HEART RATE: 55 BPM

## 2023-04-24 DIAGNOSIS — Z98.84 BARIATRIC SURGERY STATUS: ICD-10-CM

## 2023-04-24 DIAGNOSIS — K91.2 POSTSURGICAL MALABSORPTION: ICD-10-CM

## 2023-04-24 DIAGNOSIS — Z48.815 ENCOUNTER FOR SURGICAL AFTERCARE FOLLOWING SURGERY OF DIGESTIVE SYSTEM: Primary | ICD-10-CM

## 2023-04-24 RX ORDER — OMEPRAZOLE 20 MG/1
20 CAPSULE, DELAYED RELEASE ORAL DAILY
Qty: 90 CAPSULE | Refills: 1 | Status: SHIPPED | OUTPATIENT
Start: 2023-04-24

## 2023-04-24 NOTE — TELEPHONE ENCOUNTER
Progress Note      Patient Name: Mainor Momin   Patient ID: [id number]   Sex: Male   YOB: 1990    Referring Provider: Pallavi WALSH    Visit Date: June 1, 2021    Provider: Antony Anderson MD   Location: [facility]   Location Address: [address] [place] [address], [address]  [place], [place]  [id number]   Location Phone: [phone]          Chief Complaint     New pt here today to est care       History Of Present Illness  Mainor Momin is a 30 year old /White male who presents for evaluation and treatment of:      30 years old male with past medical history of GERD, migraine comes to the clinic today to establish care, follow-up on chronic conditions and complaining of new health concerns.    Patient reports sedentary lifestyle; eats spicy and fried food more often.    Patient is complaining of intermittent mild left abdominal discomfort, not associated with any nausea/vomiting/fever/diarrhea/constipation or pain urinary symptoms/rectal bleed.  Patient reports that he has noticed that symptoms getting worse with heavy meals.    Patient also reports history of migraines; may be once a month as per patient    denies Chest pain or shortness of breath on exertion       Past Medical History  Disease Name Date Onset Notes   Allergies --  --    Eczema --  --    Reflux Disease --  --          Past Surgical History  Procedure Name Date Notes   *Denies any surgical procedures --  --          Allergy List  Allergen Name Date Reaction Notes   Codeine Phosphate --  --  --    Codeine Sulfate --  --  --        Allergies Reconciled  Family Medical History  Disease Name Relative/Age Notes   Anemia Mother/   --          Social History  Finding Status Start/Stop Quantity Notes   Alcohol Never --/-- --  --    Tobacco Never --/-- --  --          Review of Systems  · Constitutional  o Denies  o : fatigue, fever  · Eyes  o Denies  o : discharge from eye,  Called patient regarding lab results and she wanted me to make you aware today she is having some pressure when she urinates  She's not sure if she is developing a UTI or if it is related to her lab results? Patient denies frequency, burning, blood  Offered patient an appointment and patient said she is not available to come in for an appointment today  "redness  · HENT  o Denies  o : headaches, congestion  · Cardiovascular  o Denies  o : chest pain, palpitations  · Respiratory  o Denies  o : shortness of breath, wheezing, cough  · Gastrointestinal  o Admits  o : GERD, Left abdo discomfort   o Denies  o : vomiting, diarrhea, constipation  · Genitourinary  o Denies  o : dysuria, hematuria  · Integument  o Denies  o : rash, new skin lesions  · Neurologic  o Denies  o : altered mental status, seizures  · Musculoskeletal  o Denies  o : weakness, joint swelling  · Psychiatric  o Denies  o : anxiety, depression  · Heme-Lymph  o Denies  o : lymph node enlargement, tenderness      Vitals  Date Time BP Position Site L\R Cuff Size HR RR TEMP (F) WT  HT  BMI kg/m2 BSA m2 O2 Sat FR L/min FiO2 HC       06/01/2021 10:13 /82 Sitting    93 - R 16 97.4 198lbs 5oz 5'  10\" 28.45 2.11 98 %  21%          Physical Examination  · Constitutional  o Appearance  o : alert, in no acute distress, well developed, well-nourished  · Head and Face  o Head  o : normocephalic, atraumatic, non tender, no palpable masses or nodules.  o Face  o : no facial lesions  · Eyes  o Vision  o : Acuity: grossly normal at distance, Conjuntivae: Normal, Sclerae white, Pupils: PERRL, Cornea: Clear, no lesions bilateral  · Neck  o Inspection/Palpation  o : Supple, no masses or tenderness, no deformities, Trachea: Midline, ROM: with in normal limits, no neck stiffness  o Thyroid  o : no thyomegaly, no palpabale masses   · Respiratory  o Auscultation of Lungs  o : normal breath sounds throughout  · Cardiovascular  o Heart  o : Regular rate and rhythm, Normal S1,S2 , No cardiac murmers, No S3 or S4 gallop or rubs  · Gastrointestinal  o Abdominal Examination  o : abdomen soft, nontender, non distended, no rigidity, gaurding, rebound tenderness, no ventral or inguinal hernias present  o Liver and spleen  o : no hepatomegaly present, liver nontender to palpation, spleen not " "palpable  · Musculoskeletal  o General  o :   § General Musculoskeletal  § : No joint swelling or deformity., Muscle tone, strength, and development grossly normal.  · Skin and Subcutaneous Tissue  o General Inspection  o : no rashes , or lesions present, normal skin color, warm and dry  o Digits and Nails  o : no clubbing, cyanosis, deformities or edema present, normal appearing nails  · Neurologic  o Mental Status Examination  o : alert and oriented to time, place, and person., Cranial Nerves: grossly intact,   · Psychiatric  o Mood and Affect  o : normal mood and affect          Assessment  · Screening for depression     V79.0/Z13.89  · GERD (gastroesophageal reflux disease)     530.81/K21.9  · Establishing care with new doctor, encounter for     V65.8/Z76.89  · Abdominal discomfort     789.00/R10.9  · Migraine     346.90/G43.909  controlled symptoms,   Improves on Tylenol   migraine diary discussed and will do interventions if nedded   · Screening for diabetes mellitus (DM)     V77.1/Z13.1  · Screening for thyroid disorder     V77.0/Z13.29       30 mins\">After reviewing Patient's abdominal symptoms and physical examination; I did not find any concerning abnormal findings.  We will treat patient with PPI for acid reflux, I have advised patient to eat healthy diet and daily exercise, further lifestyle modifications also discussed; patient is going to call me for further interventions including CT and GI referral if pain gets worse or no improvement with lifestyle modification (patient would like to hold off on CT/GI for now).  My differential diagnosis discusses with patient in great detail.     patient understands and agrees with plan    Total time spent > 30 mins       Plan  · Orders  o Annual depression screening, 15 minutes (56397, ) - V79.0/Z13.89 - 06/01/2021  o ACO-18: Negative screen for clinical depression using a standardized tool () - V79.0/Z13.89 - 06/01/2021  o Hgb A1c [facility] (20027) - " 346.90/G43.909, V77.1/Z13.1 - 06/01/2021  o Physical, Primary Care Panel (CBC, CMP, Lipid, TSH) [facility] ([id number], 56093, 40600, [id number]) - 789.00/R10.9, 530.81/K21.9, 346.90/G43.909, V77.0/Z13.29 - 06/01/2021  o ACO-14: Influenza immunization was not administered for reasons documented [facility] () - - 06/01/2021  o ACO-39: Current medications updated and reviewed (1159F, ) - - 06/01/2021  · Medications  o omeprazole 20 mg oral capsule,delayed release(DR/EC)   SIG: take 1 capsule (20 mg) by oral route once daily before a meal for 60 days   DISP: (60) Capsule with 0 refills  Prescribed on 06/01/2021     o Medications have been Reconciled  o Transition of Care or Provider Policy  · Instructions  o Depression Screen completed and scanned into the EMR under the designated folder within the patient's documents.  o Today's PHQ-9 result is _1__  o Maintain a healthy weight. Avoid tight fitting clothes. Avoid fried, fatty foods, tomato sauce, chocolate, mint, garlic, onion, alcohol. caffeine. Eat smaller meals, dont lie down after a meal, dont smoke. Elevate the head of your bed 6-9 inches.  o Patient was educated/instructed on their diagnosis, treatment and medications prior to discharge from the clinic today.  o Patient was instructed to exercise regularly.  o Patient instructed to seek medical attention urgently for new or worsening symptoms.  o Call the office with any concerns or questions.  o Bring all medicines with their bottles to each office visit.  o Minutes spent with patient including greater than 50% in Education/Counseling/Care Coordination.  o Time spent with the patient was minutes, more than 50% face to face.  o Discussed Covid-19 precautions including, but not limited to, social distancing, avoid touching your face, and hand washing.   · Disposition  o Call or Return if symptoms worsen or persist.  o Follow Up PRN.  o Follow Up in 2 months.            Electronically Signed by: Antony Anderson MD -Author on June  1, 2021 11:47:40 AM

## 2023-04-24 NOTE — TELEPHONE ENCOUNTER
----- Message from Alvino Mohs, Marcianne Seton sent at 4/21/2023  2:44 PM EDT -----  There were a few respiratory events noted during the home sleep study, but not enough to require treatment  Use of CPAP is no longer needed  Recommend maintaining a healthy weight to prevent recurrence of symptoms  No follow up is needed, unless there are other sleep issues she needs to have evaluated

## 2023-04-24 NOTE — PROGRESS NOTES
"Assessment/Plan:     Patient ID: Len James is a 45 y o  female  Bariatric Surgery Status     -s/p Mary Lou-En-Y Gastric Bypass with Dr Bear Villeda on 04/19/2022  Presents to the office today for annual post op visit  Overall doing fair - since last seen, she lost her significant other to rectal CA, she has lost a tremendous amount of weight since last seen of 29 lbs over 6 month period and EWL of 144%  She reports she does not feel hungry often, is currently working on adding more snacks to help with gaining weight  She is following closely with a therapist and psychiatrist  Weight loss has slowed down  She is tolerating a regular diet  Denies having any N/V/D/C, regurgitation, reflux or dysphagia  Taking her MVI daily  She is having abdominal \"contractions\" - occurred twice in the past few months  Denies use of NSAIDs at this time however is prescribed with her rheumatologist      PLAN:     -Emotional support provided to patient  Discussed healthy eating habits, higher caloric dense foods  Encourage dietitian visit to help with weight gain  Food log recommended    -Start on omeprazole once daily in the meantime due to increased stress   - Routine follow up in 6 months for 18-month postop visit  Come in sooner if symptoms worsen  - Continue with healthy lifestyle, adequate protein intake of 60 gm, fluid intake of at least 64 oz  - Continue with MVI daily  - Activity as tolerated  - Labs ordered and will adjust accordingly if any deficiency  - Follow up with RD and SW as needed  Continued/Maintain healthy weight loss with good nutrition intakes  · Adequate hydration with at least 64oz  fluid intake  · Follow diet as discussed  · Follow vitamin and mineral recommendations as reviewed with you  · Exercise as tolerated  · Colonoscopy referral made: N/A  · Mammogram: N/A    · Follow-up in 6 months for 18-month postop   We kindly ask that your arrive 15 minutes before your scheduled " appointment time with your provider to allow our staff to room you, get your vital signs and update your chart  · Get lab work done prior to annual visit  Please call the office if you need a script  It is recommended to check with your insurance BEFORE getting labs done to make sure they are covered by your policy  · Call our office if you have any problems with abdominal pain especially associated with fever, chills, nausea, vomiting or any other concerns  · All  Post-bariatric surgery patients should be aware that very small quantities of any alcohol can cause impairment and it is very possible not to feel the effect  The effect can be in the system for several hours  It is also a stomach irritant  · It is advised to AVOID alcohol, Nonsteroidal antiinflammatory drugs (NSAIDS) and nicotine of all forms   Any of these can cause stomach irritation/pain  · Discussed the effects of alcohol on a bariatric patient and the increased impairment risk  · Keep up the good work! Postsurgical Malabsorption   -At risk for malabsorption of vitamins/minerals secondary to malabsorption and restriction of intake from bariatric surgery  -Currently taking adequate postop bariatric surgery vitamin supplementation  -Last set of bariatric labs completed on 05/2022 and showed low vitamin A- she has completed course of Vitamin A supplements  -Next set of bariatric labs ordered for approximately 2 weeks  -Patient received education about the importance of adhering to a lifelong supplementation regimen to avoid vitamin/mineral deficiencies      Diagnoses and all orders for this visit:    Encounter for surgical aftercare following surgery of digestive system  -     Zinc; Future  -     Cancel: Vitamin D 25 hydroxy; Future  -     Vitamin B12; Future  -     Vitamin B1, whole blood; Future  -     Vitamin A; Future  -     PTH, intact; Future  -     Ferritin; Future  -     Folate;  Future  -     Iron Saturation %; "Future    Bariatric surgery status  -     omeprazole (PriLOSEC) 20 mg delayed release capsule; Take 1 capsule (20 mg total) by mouth daily  -     Zinc; Future  -     Cancel: Vitamin D 25 hydroxy; Future  -     Vitamin B12; Future  -     Vitamin B1, whole blood; Future  -     Vitamin A; Future  -     PTH, intact; Future  -     Ferritin; Future  -     Folate; Future  -     Iron Saturation %; Future    Postsurgical malabsorption  -     omeprazole (PriLOSEC) 20 mg delayed release capsule; Take 1 capsule (20 mg total) by mouth daily  -     Zinc; Future  -     Cancel: Vitamin D 25 hydroxy; Future  -     Vitamin B12; Future  -     Vitamin B1, whole blood; Future  -     Vitamin A; Future  -     PTH, intact; Future  -     Ferritin; Future  -     Folate; Future  -     Iron Saturation %; Future    Body mass index (BMI) of 19 0-19 9 in adult  -     Zinc; Future  -     Cancel: Vitamin D 25 hydroxy; Future  -     Vitamin B12; Future  -     Vitamin B1, whole blood; Future  -     Vitamin A; Future  -     PTH, intact; Future  -     Ferritin; Future  -     Folate; Future  -     Iron Saturation %; Future         Subjective:      Patient ID: Park Ormond is a 45 y o  female        -s/p Mary Lou-En-Y Gastric Bypass with Dr Adrian Gilman on 04/19/2022  Presents to the office today for annual post op visit  Overall doing fair - since last seen, she lost her significant other to rectal CA, she has lost a tremendous amount of weight since last seen of 29 lbs over 6 month period and EWL of 144%  She reports she does not feel hungry often, is currently working on adding more snacks to help with gaining weight  She is following closely with a therapist and psychiatrist  Weight loss has slowed down  She is tolerating a regular diet  Denies having any N/V/D/C, regurgitation, reflux or dysphagia  Taking her MVI daily  She is having abdominal \"contractions\" - occurred twice in the past few months   Denies use of NSAIDs at this time however is prescribed " "with her rheumatologist      Initial: 229 lbs  Current: 118 lbs  EWL: (Weight loss is ahead of schedule at this post surgical period )  Mauro: current   Current BMI is Body mass index is 19 34 kg/m²  · Tolerating a regular diet-yes  · Eating at least 60 grams of protein per day-yes  · Following 30/60 minute rule with liquids-yes  · Drinking at least 64 ounces of fluid per day-yes  · Drinking carbonated beverages-yes - but rarely   · Sufficient exercise-no  · Using NSAIDs regularly-no - is prescribed NSAIDs by rheumatology  Will start on PPI  · Using nicotine-no  · Using alcohol-no  · Supplements: Multivitamins, Calcium  and iron included in MVI, Folic acid    · EWL is 144%, which places the patient ahead of schedule for expected post surgical weight loss at this time  The following portions of the patient's history were reviewed and updated as appropriate: allergies, current medications, past family history, past medical history, past social history, past surgical history and problem list     Review of Systems   Constitutional: Positive for fatigue  Respiratory: Negative  Gastrointestinal: Positive for abdominal pain (cramping) and constipation (at times)  Negative for nausea and vomiting  Musculoskeletal: Positive for arthralgias (chronic with lupus) and back pain  Neurological: Positive for light-headedness  Psychiatric/Behavioral: Positive for dysphoric mood  Negative for self-injury and suicidal ideas  Objective:    /64   Pulse 55   Temp 97 5 °F (36 4 °C) (Tympanic)   Ht 5' 5 5\" (1 664 m)   Wt 53 5 kg (118 lb)   BMI 19 34 kg/m²      Physical Exam  Vitals and nursing note reviewed  Constitutional:       Appearance: Normal appearance  She is normal weight  Cardiovascular:      Rate and Rhythm: Normal rate and regular rhythm  Pulses: Normal pulses  Heart sounds: Normal heart sounds     Pulmonary:      Effort: Pulmonary effort is normal       Breath sounds: Normal " breath sounds  Abdominal:      General: Bowel sounds are normal       Palpations: Abdomen is soft  Tenderness: There is no abdominal tenderness  Musculoskeletal:         General: Normal range of motion  Skin:     General: Skin is warm and dry  Neurological:      General: No focal deficit present  Mental Status: She is alert and oriented to person, place, and time  Psychiatric:         Mood and Affect: Mood normal          Behavior: Behavior normal          Thought Content:  Thought content normal          Judgment: Judgment normal

## 2023-04-25 ENCOUNTER — APPOINTMENT (OUTPATIENT)
Dept: LAB | Facility: HOSPITAL | Age: 38
End: 2023-04-25

## 2023-04-25 DIAGNOSIS — K91.2 POSTSURGICAL MALABSORPTION: ICD-10-CM

## 2023-04-25 DIAGNOSIS — Z48.815 ENCOUNTER FOR SURGICAL AFTERCARE FOLLOWING SURGERY OF DIGESTIVE SYSTEM: ICD-10-CM

## 2023-04-25 DIAGNOSIS — Z98.84 BARIATRIC SURGERY STATUS: ICD-10-CM

## 2023-04-25 LAB
FERRITIN SERPL-MCNC: 59 NG/ML (ref 8–388)
FOLATE SERPL-MCNC: >20 NG/ML (ref 3.1–17.5)
IRON SATN MFR SERPL: 27 % (ref 15–50)
IRON SERPL-MCNC: 71 UG/DL (ref 50–170)
PTH-INTACT SERPL-MCNC: 67.7 PG/ML (ref 18.4–80.1)
TIBC SERPL-MCNC: 263 UG/DL (ref 250–450)
VIT B12 SERPL-MCNC: 421 PG/ML (ref 100–900)

## 2023-04-27 LAB — ZINC SERPL-MCNC: 75 UG/DL (ref 44–115)

## 2023-04-30 LAB
VIT A SERPL-MCNC: 36 UG/DL (ref 18.9–57.3)
VIT B1 BLD-SCNC: 119.8 NMOL/L (ref 66.5–200)

## 2023-05-01 ENCOUNTER — TELEMEDICINE (OUTPATIENT)
Dept: PSYCHIATRY | Facility: CLINIC | Age: 38
End: 2023-05-01

## 2023-05-01 DIAGNOSIS — F41.1 GAD (GENERALIZED ANXIETY DISORDER): ICD-10-CM

## 2023-05-01 DIAGNOSIS — F33.2 MAJOR DEPRESSIVE DISORDER, RECURRENT, SEVERE W/O PSYCHOTIC BEHAVIOR (HCC): Primary | ICD-10-CM

## 2023-05-01 NOTE — PSYCH
"Behavioral Health Psychotherapy Progress Note    Psychotherapy Provided: Individual Psychotherapy     1  Major depressive disorder, recurrent, severe w/o psychotic behavior ([facility] [address] )        2  JG (generalized anxiety disorder)            Goals addressed in session: Goal 1     DATA: Yuri Sanders met for session and presented at her home  \"I am here, I can at least say that\"  Yuri Sanders reports that she will be meeting with her  on 5/4/23 and has a follow up court date on 5/26/23  She reports that her meeting with him is to go over records and prepare for the end of the month court date  \"It was all just weird, I sat there and did nothing a the initial court hearing\"  Yuri Sanders reports that she is struggling with speaking up for herself and asking questions and feels overwhelmed with everything happening  \"I am not ready to go back to work and it is only medical leave that I am on, I have to go back because I need to have a paycheck but mentally I feel like I am still in a fog and this is only going to add more to my plate\"  Clinician validated to Yuri Sanders that she has the ability to speak up for herself and that asking questions or saying what her limits are does not infringe on someone else and that her voice deserves to be heard  Yuri Sanders can hear these words but still struggles with belief and implementation of behaviors  Yuri Sanders is reporting with all the events that have occurred that she is more isolated from others in her life and feels that while she always had a small [place] that it has become even smaller and that she is not trusting of others and their intentions  Clinician reviewed with Yuri Sanders skills that she has learned before in hopes that the refresher would be enough of a reminder to use skills when returning back to work to manage stress levels  \"It is such a weird errie feeling since [place] passed\"    \"I feel like someone is out to get me and people have been coming to my home and knocking " "on my door and someone was looking in my trash\"  Starts back to work tomorrow, 5/2/23  During this session, this clinician used the following therapeutic modalities: Cognitive Processing Therapy    Substance Abuse was not addressed during this session  If the client is diagnosed with a co-occurring substance use disorder, please indicate any changes in the frequency or amount of use: n/a  Stage of change for addressing substance use diagnoses: No substance use/Not applicable    ASSESSMENT:  Agnieszka Lujan presents with a Depressed mood  her affect is Normal range and intensity, which is congruent, with her mood and the content of the session  The client has made progress on their goals  Agnieszka Lujan presents with a low risk of suicide, minimal risk of self-harm, and none risk of harm to others  For any risk assessment that surpasses a \"low\" rating, a safety plan must be developed  A safety plan was indicated: no  If yes, describe in detail n/a    PLAN: Between sessions, Agnieszka Lujan will attempt to self advocate, use stress management skills returning back to work, and assertive communication   At the next session, the therapist will use Cognitive Processing Therapy to address treatment plan  Behavioral Health Treatment Plan and Discharge Planning: Agnieszka Lujan is aware of and agrees to continue to work on their treatment plan  They have identified and are working toward their discharge goals   yes    Visit start and stop times:    05/01/23  Start Time: 1502  Stop Time: 6415  Total Visit Time: 52 minutes     Virtual Regular Visit    Verification of patient location:    Patient is located at Home in the following state in which I hold an active license [place]      Assessment/Plan:    Problem List Items Addressed This Visit        Other    JG (generalized anxiety disorder)    Major depressive disorder, recurrent, severe w/o psychotic behavior ([facility] ) - Primary       Goals addressed in session: " Goal 1          Reason for visit is   Chief Complaint   Patient presents with    Virtual Regular Visit        Encounter provider Fabián Conley LCSW    Provider located at [address] [place] [address] [place] [address] [phone]      Recent Visits  Date Type Provider Dept   05/01/23 Telemedicine Fabián Conley LCSW [facility]   Showing recent visits within past 7 days and meeting all other requirements  Future Appointments  No visits were found meeting these conditions  Showing future appointments within next 150 days and meeting all other requirements       The patient was identified by name and date of birth  Jordan Rosales was informed that this is a telemedicine visit and that the visit is being conducted throughthe [employer] platform  She agrees to proceed     My office door was closed  No one else was in the room  She acknowledged consent and understanding of privacy and security of the video platform  The patient has agreed to participate and understands they can discontinue the visit at any time  Patient is aware this is a billable service  Subjective  Jordan Rosales is a 45 y o  female          HPI     Past Medical History:   Diagnosis Date    Acne     Anxiety     Back pain     Bipolar disorder ([facility] [address] )     pt denies    Carpal tunnel syndrome on right     Contact lens overwear of both eyes     CPAP (continuous positive airway pressure) dependence     Cubital tunnel syndrome on right     Depression     Disease of thyroid gland     GERD (gastroesophageal reflux disease)     Headache     Hypothyroidism     Kidney stone     Kidney stones     Lupus (systemic lupus erythematosus) (HCC)     Nausea     Obese     gastric KATLYN-EN-Y  today 4/19/2022    PONV (postoperative nausea and vomiting)     Sleep apnea     Wears glasses        Past Surgical History:   Procedure Laterality Date    CARPAL TUNNEL RELEASE      CHOLECYSTECTOMY      EGD      OH CYSTOURETHROSCOPY N/A 2/3/2020    Procedure: CYSTOSCOPY;  Surgeon: Rasheeda Dos Santos MD;  [facility];  Service: UroGynecology           OH LAPAROSCOPY SURG CHOLECYSTECTOMY N/A 9/6/2018    Procedure: CHOLECYSTECTOMY LAPAROSCOPIC W/ ROBOTICS;  Surgeon: Jose Villasenor MD;  [facility];  Service: General    OH LAPS [address]STR [address]CV [address] W/BYP KATLYN-EN-Y LIMB <150 CM N/A 4/19/2022    Procedure: LAPAROSCOPIC KATLYN-EN-Y GASTRIC BYPASS & INTRAOPERATIVE EGD ROBOTICALLY ASSISTED;  Surgeon: Amber Read MD;  [facility];  Service: Bariatrics    OH NEUROPLASTY &/TRANSPOS MEDIAN NRV CARPAL Efrain Gins Right 11/27/2020    Procedure: release CARPAL TUNNEL;  Surgeon: Bridgette Bach MD;  [facility];  Service: Orthopedics    OH NEUROPLASTY &/TRANSPOSITION ULNAR NERVE ELBOW Right 11/27/2020    Procedure: Ronald Desir;  Surgeon:  Bridgette Bach MD;  [facility];  Service: Orthopedics    OH POST COLPORRHAPHY RECTOCELE W/WO PERINEORRHAPHY N/A 2/3/2020    Procedure: POSTERIOR COLPORRHAPHY;  Surgeon: Rasheeda Dos Santos MD;  [facility];  Service: UroGynecology           OH SLING OPERATION STRESS INCONTINENCE N/A 2/3/2020    Procedure: PUBOVAGINAL SLING;  Surgeon: Rasheeda Dos Santos MD;  [facility];  Service: UroGynecology           TUBAL LIGATION         Current Outpatient Medications   Medication Sig Dispense Refill    Calcium 600-400 MG-UNIT CHEW Chew daily      celecoxib (CeleBREX) 100 mg capsule Take 1 capsule (100 mg total) by mouth 2 (two) times a day as needed for moderate pain 60 capsule 6    Cholecalciferol (Vitamin D) 50 MCG (2000 UT) tablet Take 1 tablet (2,000 Units total) by mouth daily 90 tablet 1    clobetasol (TEMOVATE) 0 05 % external solution Apply topically 2 (two) times a day 50 mL 0    clonazePAM (KlonoPIN) 0 5 mg tablet TAKE 1/2 TO 1 TABLET BY MOUTH 2 TIMES A DAY AS NEEDED FOR ANXIETY 60 tablet 2    fluticasone (FLONASE) 50 mcg/act nasal spray 1 spray into each nostril daily 48 g 1    folic acid (FOLVITE) 1 mg tablet Take 800 mcg by mouth daily      hydroxychloroquine (PLAQUENIL) 200 mg tablet Take 1 tablet (200 mg total) by mouth daily 90 tablet 1    levothyroxine 25 mcg tablet Take 1 tablet (25 mcg total) by mouth daily in the early morning 90 tablet 1    modafinil (PROVIGIL) 200 MG tablet Take 1 tablet (200 mg total) by mouth daily Do not start before March 8, 2023  30 tablet 2    Multiple Vitamins-Minerals (Bariatric Multivitamins/Iron) CAPS Take by mouth daily      omeprazole (PriLOSEC) 20 mg delayed release capsule Take 1 capsule (20 mg total) by mouth daily 90 capsule 1    Trintellix 20 MG tablet TAKE ONE TABLET BY MOUTH DAILY 30 tablet 1     No current facility-administered medications for this visit  Allergies   Allergen Reactions    Wound Dressing Adhesive Rash     Gets red rash from paper tape relatively quickly after application    Penicillins Hives     At young age       Review of Systems    Video Exam    There were no vitals filed for this visit      Physical Exam     Visit Time    05/01/23  Start Time: 0950  Stop Time: 6994  Total Visit Time: 52 minutes

## 2023-05-02 ENCOUNTER — TELEPHONE (OUTPATIENT)
Dept: PSYCHIATRY | Facility: CLINIC | Age: 38
End: 2023-05-02

## 2023-05-02 NOTE — TELEPHONE ENCOUNTER
Patient called wanting to know if you filled out/ signed paperwork for patient that needs to go to  [facility]

## 2023-05-04 NOTE — PROGRESS NOTES
"Bariatric Follow Up Nutrition Note    Type of surgery  Gastric bypass: laparoscopic  Surgery Date: 4/19/2022  1 years  post-op  Surgeon: Dr Lisa Meneses  45 y o   female  Ht 5' 5\" (1 651 m)   Wt 51 7 kg (114 lb)   BMI 18 97 kg/m²      Pt lost additional 4# in 2 weeks      Wt Readings from Last 3 Encounters:   04/24/23 53 5 kg (118 lb)   04/20/23 53 5 kg (118 lb)   04/17/23 52 2 kg (115 lb)       No calculation needed  Estimated calories for weight maintenance:  1340- 1609 kcal per day 25 kcal /kg bw   )   Estimated calories for weight gain  3060-9964  kcal per day  ( 1/2 to 1#  per wk wt gain - sedentary )  Estimated protein needs 54-81 grams per day (1 0-1 5 gms/kg IBW )   Estimated fluid needs 54-64 ounces per day (30-35 ml/kg IBW )      Weight on Day of Weight Loss Surgery: 217 lbs   Weight in (lb) to have BMI = 25: 151 9#  Post-Op Wt Loss: 111 6#/ 144% EBWL in 1 year(s)    Review of History and Medications   Past Medical History:   Diagnosis Date    Acne     Anxiety     Back pain     Bipolar disorder (HCC)     pt denies    Carpal tunnel syndrome on right     Contact lens overwear of both eyes     CPAP (continuous positive airway pressure) dependence     Cubital tunnel syndrome on right     Depression     Disease of thyroid gland     GERD (gastroesophageal reflux disease)     Headache     Hypothyroidism     Kidney stone     Kidney stones     Lupus (systemic lupus erythematosus) (HCC)     Nausea     Obese     gastric KATLYN-EN-Y  today 4/19/2022    PONV (postoperative nausea and vomiting)     Sleep apnea     Wears glasses      Past Surgical History:   Procedure Laterality Date    CARPAL TUNNEL RELEASE      CHOLECYSTECTOMY      EGD      PA CYSTOURETHROSCOPY N/A 2/3/2020    Procedure: CYSTOSCOPY;  Surgeon: Rasheeda Dos Santos MD;  [facility];  Service: UroGynecology           PA LAPAROSCOPY SURG CHOLECYSTECTOMY N/A 9/6/2018    Procedure: " CHOLECYSTECTOMY LAPAROSCOPIC W/ ROBOTICS;  Surgeon: Kendall Rowan MD;  [facility];  Service: General    MT LAPS GSTR RSTCV [address] W/BYP KATLYN-EN-Y LIMB <150 CM N/A 4/19/2022    Procedure: LAPAROSCOPIC KATLYN-EN-Y GASTRIC BYPASS & INTRAOPERATIVE EGD ROBOTICALLY ASSISTED;  Surgeon: Telma Plunkett MD;  [facility];  Service: Bariatrics    MT NEUROPLASTY &/TRANSPOS MEDIAN NRV CARPAL Elfrieda Regis Right 11/27/2020    Procedure: release CARPAL TUNNEL;  Surgeon: Lola Muniz MD;  [facility];  Service: Orthopedics    MT NEUROPLASTY &/TRANSPOSITION ULNAR NERVE ELBOW Right 11/27/2020    Procedure: Elise Babcock;  Surgeon:  Lola Muniz MD;  [facility];  Service: Orthopedics    MT POST COLPORRHAPHY RECTOCELE W/WO PERINEORRHAPHY N/A 2/3/2020    Procedure: POSTERIOR COLPORRHAPHY;  Surgeon: Gabrielle Peters MD;  [facility];  Service: UroGynecology           MT SLING OPERATION STRESS INCONTINENCE N/A 2/3/2020    Procedure: PUBOVAGINAL SLING;  Surgeon: Gabrielle Peters MD;  [facility];  Service: UroGynecology           TUBAL LIGATION       Social History     Socioeconomic History    Marital status: Single     Spouse name: Not on file    Number of children: 3    Years of education: Not on file    Highest education level: Associate degree: academic program   Occupational History    Occupation: [employer]     Employer: [employer]   Tobacco Use    Smoking status: Never    Smokeless tobacco: Never   Vaping Use    Vaping Use: Never used   Substance and Sexual Activity    Alcohol use: Not Currently    Drug use: No    Sexual activity: Yes     Partners: Male     Birth control/protection: Female Sterilization     Comment: single   Other Topics Concern    Not on file   Social History Narrative    Uses seatbelts    Caffeine use     Social Determinants of Health     Financial Resource Strain: Not on file   Food Insecurity: Not on file   Transportation Needs: Not on file   Physical Activity: Not on file   Stress: Not on file   Social Connections: Not on file   Intimate Partner Violence: Not on file   Housing Stability: Not on file       Current Outpatient Medications:     Calcium 600-400 MG-UNIT CHEW, Chew daily, Disp: , Rfl:     celecoxib (CeleBREX) 100 mg capsule, Take 1 capsule (100 mg total) by mouth 2 (two) times a day as needed for moderate pain, Disp: 60 capsule, Rfl: 6    Cholecalciferol (Vitamin D) 50 MCG (2000 UT) tablet, Take 1 tablet (2,000 Units total) by mouth daily, Disp: 90 tablet, Rfl: 1    clobetasol (TEMOVATE) 0 05 % external solution, Apply topically 2 (two) times a day, Disp: 50 mL, Rfl: 0    clonazePAM (KlonoPIN) 0 5 mg tablet, TAKE 1/2 TO 1 TABLET BY MOUTH 2 TIMES A DAY AS NEEDED FOR ANXIETY, Disp: 60 tablet, Rfl: 2    fluticasone (FLONASE) 50 mcg/act nasal spray, 1 spray into each nostril daily, Disp: 48 g, Rfl: 1    folic acid (FOLVITE) 1 mg tablet, Take 800 mcg by mouth daily, Disp: , Rfl:     hydroxychloroquine (PLAQUENIL) 200 mg tablet, Take 1 tablet (200 mg total) by mouth daily, Disp: 90 tablet, Rfl: 1    levothyroxine 25 mcg tablet, Take 1 tablet (25 mcg total) by mouth daily in the early morning, Disp: 90 tablet, Rfl: 1    modafinil (PROVIGIL) 200 MG tablet, Take 1 tablet (200 mg total) by mouth daily Do not start before March 8, 2023 , Disp: 30 tablet, Rfl: 2    Multiple Vitamins-Minerals (Bariatric Multivitamins/Iron) CAPS, Take by mouth daily, Disp: , Rfl:     omeprazole (PriLOSEC) 20 mg delayed release capsule, Take 1 capsule (20 mg total) by mouth daily, Disp: 90 capsule, Rfl: 1    Trintellix 20 MG tablet, TAKE ONE TABLET BY MOUTH DAILY, Disp: 30 tablet, Rfl: 1    Food Intake and Lifestyle Assessment   Food Intake Assessment completed via usual diet recall  Pt wakes up at 3 am, starts work 4:30 am   Breakfast: oatmeal or toast   Snack: 0   Lunch: 0  Snack: 0  Dinner: small meal - 1-2 ounces chicken   Snack: 0  Beverage intake: "water and coffee/tea  Diet texture/stage: regular  Protein supplement: sometimes, maybe a couple of times per week   Estimated protein intake per day: hard to determine with poor recall   Estimated fluid intake per day: pt feels she is meeting her fluid needs   Meals eaten away from home: not assess   Typical meal pattern: 2 meals per day and 0 snacks per day  Eating Behaviors: pt reports only tolerating about 1/4 of food at one meal     Vitamin and Mineral regimen: she is taking her bariatric vitamins and minerals as recommended  Food allergies or intolerances: difficulty tolerating any volume of food   Cultural or Mandaen considerations: n/a     Physical Assessment  Nutrition Related Findings  Diarrhea, Nausea and Loss of Hunger  Pt reports that she does not have any appetite  She was out on FMLA due to stressful events in her life    Physical Activity  Types of exercise: Walking  Current physical limitations: would not recommend intense exercise until weight loss is stabilized     Psychosocial Assessment   Support systems:    Socioeconomic factors: works full time [employer], has 3 children     Nutrition Diagnosis  Diagnosis: Underweight (NC-3 1) and Inadequate protein intake (NI-5 7  3)  Related to: Altered GI function and Inability or lack of desire to manage self-care  As Evidenced by: Unintentional weight loss     Nutrition Prescription: Recommend the following diet  Small frequent meals 5-6 times per day  One ensure complete per day -350 calories / 30 grams protein     Meal Plan ( Jonas/Pro/Carb)   Breakfast: 200-300/20/30  Snack: 150/>5/20  Lunch: 300/30/30-45  Snack: 150/>5/20  Dinner: 300/30/30-45  Snack: 150/>5/20       Interventions and Teaching   Patient educated on post-op nutrition guidelines  Patient educated and handouts provided    Surgical changes to stomach / GI  Capacity of post-surgery stomach  Adequate hydration  Sugar and fat restriction to decrease \"dumping syndrome\"  Expected " weight loss  Exercise  Nutrition considerations after surgery  Protein supplements  Appropriate carbohydrate, protein, and fat intake, and food/fluid choices to maximize safe weight loss, nutrient intake, and tolerance     Patient is not currently pregnant and doesn't desire to become pregnant a minimum of one year post-op    Education provided to: patient    Barriers to learning: No barriers identified    Readiness to change: preparation and action    Comprehension: verbalizes understanding     Expected Compliance: fair- due to stress in her life     Evaluation/Monitoring   Eating pattern as discussed Tolerance of nutrition prescription Body weight Lab values Physical activity Bowel pattern    Goals  Eat 3 meals per day and plus 3 snacks   Pack an insulated bag, bring to work and eat 5- 6 mini meals per day  Change to higher calorie containing nutritional supplement such as Ensure Complete ( 350 calories/ 30 grams protein / 15 grams of sugar )  Add butter, avocado, oil, cheese , milk to foods to increase calories     Try to avoid drinking with meals to increase volume tolerated at meals  Maintain weight over next 2 weeks - if unable to gain weight may need to schedule EGD to see if she is developing a stricture   As she can only tolerate 1/4 cup of food per meal      F/U in 2 weeks to assess weight      Time Spent:   30 Minutes

## 2023-05-05 ENCOUNTER — OFFICE VISIT (OUTPATIENT)
Dept: BARIATRICS | Facility: CLINIC | Age: 38
End: 2023-05-05

## 2023-05-05 VITALS — BODY MASS INDEX: 18.99 KG/M2 | WEIGHT: 114 LBS | HEIGHT: 65 IN

## 2023-05-05 DIAGNOSIS — R63.6 UNDERWEIGHT DUE TO INADEQUATE CALORIC INTAKE: Primary | ICD-10-CM

## 2023-05-05 DIAGNOSIS — Z98.84 STATUS POST GASTRIC BYPASS FOR OBESITY: ICD-10-CM

## 2023-05-07 RX ORDER — PREDNISONE 5 MG/1
TABLET ORAL
Qty: 70 TABLET | Refills: 0 | Status: SHIPPED | OUTPATIENT
Start: 2023-05-07

## 2023-05-08 DIAGNOSIS — M25.50 ARTHRALGIA OF MULTIPLE JOINTS: ICD-10-CM

## 2023-05-08 RX ORDER — CELECOXIB 100 MG/1
100 CAPSULE ORAL 2 TIMES DAILY PRN
Qty: 60 CAPSULE | Refills: 6
Start: 2023-05-08 | End: 2023-08-31

## 2023-05-10 ENCOUNTER — TELEPHONE (OUTPATIENT)
Dept: OBGYN CLINIC | Facility: HOSPITAL | Age: 38
End: 2023-05-10

## 2023-05-10 NOTE — TELEPHONE ENCOUNTER
Caller: Patient    Doctor: Stephan Zapata    Reason for call: Patient received a missed call and was returning call  Advised of [employer] message sent on 5/7  Patient verbalized understanding and no further questions at this time       Call back#: NA

## 2023-05-15 ENCOUNTER — OFFICE VISIT (OUTPATIENT)
Dept: PSYCHIATRY | Facility: CLINIC | Age: 38
End: 2023-05-15

## 2023-05-15 DIAGNOSIS — F40.10 SOCIAL PHOBIA: ICD-10-CM

## 2023-05-15 DIAGNOSIS — F41.1 GAD (GENERALIZED ANXIETY DISORDER): ICD-10-CM

## 2023-05-15 DIAGNOSIS — F33.2 MAJOR DEPRESSIVE DISORDER, RECURRENT, SEVERE W/O PSYCHOTIC BEHAVIOR (HCC): ICD-10-CM

## 2023-05-15 DIAGNOSIS — F51.13 HYPERSOMNIA DUE TO MENTAL DISORDER: ICD-10-CM

## 2023-05-15 RX ORDER — CLONAZEPAM 0.5 MG/1
TABLET ORAL
Qty: 60 TABLET | Refills: 2 | Status: SHIPPED | OUTPATIENT
Start: 2023-06-09 | End: 2023-09-04

## 2023-05-15 RX ORDER — MODAFINIL 200 MG/1
200 TABLET ORAL DAILY
Qty: 30 TABLET | Refills: 2 | Status: SHIPPED | OUTPATIENT
Start: 2023-05-15 | End: 2023-08-13
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
## 2023-05-18 NOTE — PROGRESS NOTES
"Bariatric Follow Up Nutrition Note    Type of surgery  Gastric bypass: laparoscopic  Surgery Date: 4/19/2022  1 years  post-op  Surgeon: Dr Shiloh Stahl  45 y o   female  Ht 5' 5 25\" (1 657 m)   Wt 53 5 kg (117 lb 15 2 oz)   BMI 19 48 kg/m²      Pt gained  4# in 2 weeks      Wt Readings from Last 3 Encounters:   05/05/23 51 7 kg (114 lb)   04/24/23 53 5 kg (118 lb)   04/20/23 53 5 kg (118 lb)       No calculation needed  Estimated calories for weight maintenance:  1340- kcal per day (25 kcal /kg bw   )   Estimated calories for weight gain  4063-7503  kcal per day  ( 1/2 to 1#  per wk wt gain - sedentary )  Estimated protein needs 54-81 grams per day (1 0-1 5 gms/kg IBW )   Estimated fluid needs 54-64 ounces per day (30-35 ml/kg IBW )      Compared with body comp:  SECA REE = 1280- 95% of expected ( 5% less than expected )       Weight on Day of Weight Loss Surgery: 217 lbs   Weight in (lb) to have BMI = 25: 151 9#  Post-Op Wt Loss: 111 6#/ 144% EBWL in 1 year(s)    Review of History and Medications   Past Medical History:   Diagnosis Date   • Acne    • Anxiety    • Back pain    • Bipolar disorder ([address] )     pt denies   • Carpal tunnel syndrome on right    • Contact lens overwear of both eyes    • CPAP (continuous positive airway pressure) dependence    • Cubital tunnel syndrome on right    • Depression    • Disease of thyroid gland    • GERD (gastroesophageal reflux disease)    • Headache    • Hypothyroidism    • Kidney stone    • Kidney stones    • Lupus (systemic lupus erythematosus) (HCC)    • Nausea    • Obese     gastric KATLYN-EN-Y  today 4/19/2022   • PONV (postoperative nausea and vomiting)    • Sleep apnea    • Wears glasses      Past Surgical History:   Procedure Laterality Date   • CARPAL TUNNEL RELEASE     • CHOLECYSTECTOMY     • EGD     • DC CYSTOURETHROSCOPY N/A 2/3/2020    Procedure: CYSTOSCOPY;  Surgeon: Martina Johnson MD;  [facility];  Service: " UroGynecology          • MO LAPAROSCOPY SURG CHOLECYSTECTOMY N/A 9/6/2018    Procedure: CHOLECYSTECTOMY LAPAROSCOPIC W/ ROBOTICS;  Surgeon: Gamaliel Freeman MD;  [facility];  Service: General   • MO LAPS GSTR RSTCV [address] W/BYP KATLYN-EN-Y LIMB <150 CM N/A 4/19/2022    Procedure: LAPAROSCOPIC KATLYN-EN-Y GASTRIC BYPASS & INTRAOPERATIVE EGD ROBOTICALLY ASSISTED;  Surgeon: Garth Cui MD;  [facility];  Service: Bariatrics   • MO NEUROPLASTY &/TRANSPOS MEDIAN NRV CARPAL Lora Blakes Right 11/27/2020    Procedure: release CARPAL TUNNEL;  Surgeon: Cherie Reece MD;  [facility];  Service: Orthopedics   • MO NEUROPLASTY &/TRANSPOSITION ULNAR NERVE ELBOW Right 11/27/2020    Procedure: Dyllan Luis;  Surgeon:  Cherie Reece MD;  [facility];  Service: Orthopedics   • MO POST COLPORRHAPHY RECTOCELE W/WO PERINEORRHAPHY N/A 2/3/2020    Procedure: POSTERIOR COLPORRHAPHY;  Surgeon: Luis German MD;  [facility];  Service: UroGynecology          • MO SLING OPERATION STRESS INCONTINENCE N/A 2/3/2020    Procedure: PUBOVAGINAL SLING;  Surgeon: Luis German MD;  [facility];  Service: UroGynecology          • TUBAL LIGATION       Social History     Socioeconomic History   • Marital status: Single     Spouse name: Not on file   • Number of children: 3   • Years of education: Not on file   • Highest education level: Associate degree: academic program   Occupational History   • Occupation: [employer]     Employer: [employer]   Tobacco Use   • Smoking status: Never   • Smokeless tobacco: Never   Vaping Use   • Vaping Use: Never used   Substance and Sexual Activity   • Alcohol use: Not Currently   • Drug use: No   • Sexual activity: Yes     Partners: Male     Birth control/protection: Female Sterilization     Comment: single   Other Topics Concern   • Not on file   Social History Narrative    Uses seatbelts    Caffeine use     Social Determinants of Health     Financial Resource Strain: Not on file   Food Insecurity: Not on file   Transportation Needs: Not on file   Physical Activity: Not on file   Stress: Not on file   Social Connections: Not on file   Intimate Partner Violence: Not on file   Housing Stability: Not on file       Current Outpatient Medications:   •  Calcium 600-400 MG-UNIT CHEW, Chew daily, Disp: , Rfl:   •  celecoxib (CeleBREX) 100 mg capsule, Take 1 capsule (100 mg total) by mouth 2 (two) times a day as needed for moderate pain, Disp: 60 capsule, Rfl: 6  •  Cholecalciferol (Vitamin D) 50 MCG (2000 UT) tablet, Take 1 tablet (2,000 Units total) by mouth daily, Disp: 90 tablet, Rfl: 1  •  clobetasol (TEMOVATE) 0 05 % external solution, Apply topically 2 (two) times a day, Disp: 50 mL, Rfl: 0  •  [START ON 6/9/2023] clonazePAM (KlonoPIN) 0 5 mg tablet, TAKE 1/2 TO 1 TABLET BY MOUTH 2 TIMES A DAY AS NEEDED FOR ANXIETY Do not start before June 9, 2023 , Disp: 60 tablet, Rfl: 2  •  fluticasone (FLONASE) 50 mcg/act nasal spray, 1 spray into each nostril daily, Disp: 48 g, Rfl: 1  •  folic acid (FOLVITE) 1 mg tablet, Take 800 mcg by mouth daily, Disp: , Rfl:   •  hydroxychloroquine (PLAQUENIL) 200 mg tablet, Take 1 tablet (200 mg total) by mouth daily, Disp: 90 tablet, Rfl: 1  •  levothyroxine 25 mcg tablet, Take 1 tablet (25 mcg total) by mouth daily in the early morning, Disp: 90 tablet, Rfl: 1  •  modafinil (PROVIGIL) 200 MG tablet, Take 1 tablet (200 mg total) by mouth daily, Disp: 30 tablet, Rfl: 2  •  Multiple Vitamins-Minerals (Bariatric Multivitamins/Iron) CAPS, Take by mouth daily, Disp: , Rfl:   •  omeprazole (PriLOSEC) 20 mg delayed release capsule, Take 1 capsule (20 mg total) by mouth daily, Disp: 90 capsule, Rfl: 1  •  predniSONE 5 mg tablet, 4 tabs x7 days, then 3 tabs x7 days, then 2 tabs x7 days, then 1 tab x7 days, then stop , Disp: 70 tablet, Rfl: 0  •  Vortioxetine HBr (Trintellix) 20 MG tablet, Take 1 tablet (20 mg total) by mouth daily, Disp: 30 tablet, Rfl: 3    Food Intake and Lifestyle Assessment   Food Intake Assessment completed via usual diet recall  Pt wakes up at 3 am, starts work 4:30 am   Breakfast: oatmeal or toast   Snack: 0   Lunch: 0  Snack: 0  Dinner: small meal - 1-2 ounces chicken   Snack: 0  Beverage intake: water and coffee/tea  Diet texture/stage: regular  Protein supplement: sometimes, maybe a couple of times per week   Estimated protein intake per day: hard to determine with poor recall   Estimated fluid intake per day: pt feels she is meeting her fluid needs   Meals eaten away from home: not assess   Typical meal pattern: 2 meals per day and 0 snacks per day  Eating Behaviors: pt reports only tolerating about 1/4 of food at one meal     Vitamin and Mineral regimen: she is taking her bariatric vitamins and minerals as recommended  Food allergies or intolerances: difficulty tolerating any volume of food   Cultural or Buddhism considerations: n/a     Physical Assessment  Nutrition Related Findings  Diarrhea, Nausea and Loss of Hunger  Pt reports that she does not have any appetite  She was out on FMLA due to stressful events in her life    Physical Activity  Types of exercise: Walking  Current physical limitations: would not recommend intense exercise until weight loss is stabilized     Psychosocial Assessment   Support systems:    Socioeconomic factors: works full time [employer], has 3 children     Nutrition Diagnosis  Diagnosis: Underweight (NC-3 1) and Inadequate protein intake (NI-5 7  3)  Related to:  Altered GI function and Inability or lack of desire to manage self-care  As Evidenced by: Unintentional weight loss     Nutrition Prescription: Recommend the following diet  Small frequent meals 5-6 times per day  One ensure complete per day -350 calories / 30 grams protein     Meal Plan ( Jonas/Pro/Carb)   Breakfast: 200-300/20/30  Snack: 150/>5/20  Lunch: 300/30/30-45  Snack: 150/>5/20  Dinner: 300/30/30-45  Snack: 150/>5/20 "      Interventions and Teaching   Patient educated on post-op nutrition guidelines  Patient educated and handouts provided  Surgical changes to stomach / GI  Capacity of post-surgery stomach  Adequate hydration  Sugar and fat restriction to decrease \"dumping syndrome\"  Expected weight loss  Exercise  Nutrition considerations after surgery  Protein supplements  Appropriate carbohydrate, protein, and fat intake, and food/fluid choices to maximize safe weight loss, nutrient intake, and tolerance     Patient is not currently pregnant and doesn't desire to become pregnant a minimum of one year post-op    Education provided to: patient    Barriers to learning: No barriers identified    Readiness to change: preparation and action    Comprehension: verbalizes understanding     Expected Compliance: fair- due to stress in her life     Evaluation/Monitoring   Eating pattern as discussed Tolerance of nutrition prescription Body weight Lab values Physical activity Bowel pattern  Reviewed body comp results with patient  BMI is now in normal limits ( BMI 19 5-24 9 normal )  Body fat is slightly below normal   Muscle mass low normal and VAT low normal   Pt with adequate hydration but increased extra cellular fluid which may be an indicator of decreased protein status  REE tested at 95% of expected  Pt was able to gain 4# over the past 2 weeks, she ate frequently and included more protein foods  Goals- continued   Eat 3 meals per day and plus 3 snacks   Pack an insulated bag, bring to work and eat 5- 6 mini meals per day  Change to higher calorie containing nutritional supplement such as Ensure Complete ( 350 calories/ 30 grams protein / 15 grams of sugar )- plans to purchase today - coupons provided  Add butter, avocado, oil, cheese , milk to foods to increase calories     Try to avoid drinking with meals to increase volume tolerated at meals    Goal is to gain 2-4 pounds over the next month     F/U in 4 weeks to " assess weight      Time Spent:   30 Minutes

## 2023-05-19 ENCOUNTER — OFFICE VISIT (OUTPATIENT)
Dept: BARIATRICS | Facility: CLINIC | Age: 38
End: 2023-05-19

## 2023-05-19 VITALS — BODY MASS INDEX: 19.65 KG/M2 | HEIGHT: 65 IN | WEIGHT: 117.95 LBS

## 2023-05-19 DIAGNOSIS — Z98.84 STATUS POST GASTRIC BYPASS FOR OBESITY: Primary | ICD-10-CM

## 2023-05-19 DIAGNOSIS — R63.6 UNDERWEIGHT DUE TO INADEQUATE CALORIC INTAKE: ICD-10-CM

## 2023-06-06 ENCOUNTER — OFFICE VISIT (OUTPATIENT)
Dept: URGENT CARE | Age: 38
End: 2023-06-06
Payer: COMMERCIAL

## 2023-06-06 VITALS
OXYGEN SATURATION: 98 % | TEMPERATURE: 97.2 F | RESPIRATION RATE: 17 BRPM | HEART RATE: 67 BPM | SYSTOLIC BLOOD PRESSURE: 107 MMHG | HEIGHT: 65 IN | WEIGHT: 117 LBS | BODY MASS INDEX: 19.49 KG/M2 | DIASTOLIC BLOOD PRESSURE: 64 MMHG

## 2023-06-06 DIAGNOSIS — L23.7 POISON IVY: Primary | ICD-10-CM

## 2023-06-06 PROCEDURE — 99213 OFFICE O/P EST LOW 20 MIN: CPT | Performed by: NURSE PRACTITIONER

## 2023-06-06 RX ORDER — TRIAMCINOLONE ACETONIDE 5 MG/G
CREAM TOPICAL 2 TIMES DAILY
Qty: 30 G | Refills: 0 | Status: SHIPPED | OUTPATIENT
Start: 2023-06-06

## 2023-06-06 RX ORDER — HYDROXYZINE HYDROCHLORIDE 25 MG/1
25 TABLET, FILM COATED ORAL EVERY 6 HOURS PRN
Qty: 20 TABLET | Refills: 0 | Status: SHIPPED | OUTPATIENT
Start: 2023-06-06

## 2023-06-06 NOTE — PROGRESS NOTES
[id number]        NAME: Trish Washington is a 45 y o  female  : 1985    MRN: [id number]  DATE: 2023  TIME: 10:20 AM    Assessment and Plan   Poison ivy [L23 7]  1  Poison ivy  hydrOXYzine HCL (ATARAX) 25 mg tablet    triamcinolone (KENALOG) 0 5 % cream            Patient Instructions     Will recommend patient start hydroxyzine 25 mg every 6 hours educated on side effects such as sedation, and start triamcinolone twice daily  Keep area clean and dry-do not scratch  Can use calamine lotion and cool compress  Follow up with PCP in 3-5 days with worsening of symptoms no improvement  Proceed to  ER if symptoms worsen  Chief Complaint     Chief Complaint   Patient presents with   • Rash     Started w/ macular/papular puritic rash over all 4 extremities after pulling weeds   Tried Benadryl cream   Denies SOB, Low grade temp this am  Has Lupus possible flare  History of Present Illness       Male reports with complaints of diffuse rash over bilateral arms and legs  Patient was working outside recently  Does have vesicles and draining  Associated with itchiness  Patient was having a lupus flare has been on steroids for the past couple weeks-continues to do so as prescribed  Has tried Benadryl cream without relief  Review of Systems   Review of Systems   Constitutional: Negative for activity change, appetite change, chills, fatigue and fever  HENT: Negative for congestion, postnasal drip, rhinorrhea, sneezing and sore throat  Respiratory: Negative for cough, chest tightness, shortness of breath and wheezing  Cardiovascular: Negative for chest pain and palpitations  Gastrointestinal: Negative for abdominal pain, constipation, diarrhea, nausea and vomiting  Musculoskeletal: Negative for arthralgias and myalgias  Skin: Positive for rash  Negative for color change and pallor  Neurological: Negative for dizziness, weakness, light-headedness and headaches  Hematological: Negative for adenopathy  Psychiatric/Behavioral: Negative for agitation and confusion           Current Medications       Current Outpatient Medications:   •  Calcium 600-400 MG-UNIT CHEW, Chew daily, Disp: , Rfl:   •  celecoxib (CeleBREX) 100 mg capsule, Take 1 capsule (100 mg total) by mouth 2 (two) times a day as needed for moderate pain, Disp: 60 capsule, Rfl: 6  •  Cholecalciferol (Vitamin D) 50 MCG (2000 UT) tablet, Take 1 tablet (2,000 Units total) by mouth daily, Disp: 90 tablet, Rfl: 1  •  clobetasol (TEMOVATE) 0 05 % external solution, Apply topically 2 (two) times a day, Disp: 50 mL, Rfl: 0  •  [START ON 6/9/2023] clonazePAM (KlonoPIN) 0 5 mg tablet, TAKE 1/2 TO 1 TABLET BY MOUTH 2 TIMES A DAY AS NEEDED FOR ANXIETY Do not start before June 9, 2023 , Disp: 60 tablet, Rfl: 2  •  fluticasone (FLONASE) 50 mcg/act nasal spray, 1 spray into each nostril daily, Disp: 48 g, Rfl: 1  •  folic acid (FOLVITE) 1 mg tablet, Take 800 mcg by mouth daily, Disp: , Rfl:   •  hydroxychloroquine (PLAQUENIL) 200 mg tablet, Take 1 tablet (200 mg total) by mouth daily, Disp: 90 tablet, Rfl: 1  •  hydrOXYzine HCL (ATARAX) 25 mg tablet, Take 1 tablet (25 mg total) by mouth every 6 (six) hours as needed for itching, Disp: 20 tablet, Rfl: 0  •  levothyroxine 25 mcg tablet, Take 1 tablet (25 mcg total) by mouth daily in the early morning, Disp: 90 tablet, Rfl: 1  •  modafinil (PROVIGIL) 200 MG tablet, Take 1 tablet (200 mg total) by mouth daily, Disp: 30 tablet, Rfl: 2  •  Multiple Vitamins-Minerals (Bariatric Multivitamins/Iron) CAPS, Take by mouth daily, Disp: , Rfl:   •  omeprazole (PriLOSEC) 20 mg delayed release capsule, Take 1 capsule (20 mg total) by mouth daily, Disp: 90 capsule, Rfl: 1  •  predniSONE 5 mg tablet, 4 tabs x7 days, then 3 tabs x7 days, then 2 tabs x7 days, then 1 tab x7 days, then stop , Disp: 70 tablet, Rfl: 0  •  triamcinolone (KENALOG) 0 5 % cream, Apply topically 2 (two) times a day, Disp: 30 g, Rfl: 0  •  Vortioxetine HBr (Trintellix) 20 MG tablet, Take 1 tablet (20 mg total) by mouth daily, Disp: 30 tablet, Rfl: 3    Current Allergies     Allergies as of 06/06/2023 - Reviewed 06/06/2023   Allergen Reaction Noted   • Wound dressing adhesive Rash 04/19/2022   • Penicillins Hives 02/25/2008            The following portions of the patient's history were reviewed and updated as appropriate: allergies, current medications, past family history, past medical history, past social history, past surgical history and problem list      Past Medical History:   Diagnosis Date   • Acne    • Anxiety    • Back pain    • Bipolar disorder ([address] )     pt denies   • Carpal tunnel syndrome on right    • Contact lens overwear of both eyes    • CPAP (continuous positive airway pressure) dependence    • Cubital tunnel syndrome on right    • Depression    • Disease of thyroid gland    • GERD (gastroesophageal reflux disease)    • Headache    • Hypothyroidism    • Kidney stone    • Kidney stones    • Lupus (systemic lupus erythematosus) (HCC)    • Nausea    • Obese     gastric KATLYN-EN-Y  today 4/19/2022   • PONV (postoperative nausea and vomiting)    • Sleep apnea    • Wears glasses        Past Surgical History:   Procedure Laterality Date   • CARPAL TUNNEL RELEASE     • CHOLECYSTECTOMY     • EGD     • MI CYSTOURETHROSCOPY N/A 2/3/2020    Procedure: CYSTOSCOPY;  Surgeon: Miracle Rose MD;  [facility];  Service: UroGynecology          • MI LAPAROSCOPY SURG CHOLECYSTECTOMY N/A 9/6/2018    Procedure: CHOLECYSTECTOMY LAPAROSCOPIC W/ ROBOTICS;  Surgeon: Cholo Guzmán MD;  [facility];  Service: General   • MI LAPS GSTR RSTCV [address] W/BYP KATLYN-EN-Y LIMB <150 CM N/A 4/19/2022    Procedure: LAPAROSCOPIC KATLYN-EN-Y GASTRIC BYPASS & INTRAOPERATIVE EGD ROBOTICALLY ASSISTED;  Surgeon: Fran Dennis MD;  [facility];  Service: Bariatrics   • MI NEUROPLASTY &/TRANSPOS MEDIAN NRV CARPAL Pansy Live Right 11/27/2020 "Procedure: release CARPAL TUNNEL;  Surgeon: Saul Gorman MD;  [facility];  Service: Orthopedics   • ME NEUROPLASTY &/TRANSPOSITION ULNAR NERVE ELBOW Right 11/27/2020    Procedure: Bebeto Darby;  Surgeon: Saul Gorman MD;  [facility];  Service: Orthopedics   • ME POST COLPORRHAPHY RECTOCELE W/WO PERINEORRHAPHY N/A 2/3/2020    Procedure: POSTERIOR COLPORRHAPHY;  Surgeon: Ankita Silva MD;  [facility];  Service: UroGynecology          • ME SLING OPERATION STRESS INCONTINENCE N/A 2/3/2020    Procedure: PUBOVAGINAL SLING;  Surgeon: Ankita Silva MD;  [facility];  Service: UroGynecology          • TUBAL LIGATION         Family History   Problem Relation Age of Onset   • No Known Problems Mother    • Alcohol abuse Father    • Drug abuse Family    • Psoriasis Maternal Grandmother    • Colon cancer Maternal Uncle    • Hypertension Maternal Grandfather    • Heart disease Maternal Grandfather    • Diabetes Neg Hx          Medications have been verified  Objective   /64   Pulse 67   Temp (!) 97 2 °F (36 2 °C)   Resp 17   Ht 5' 5\" (1 651 m)   Wt 53 1 kg (117 lb)   LMP 06/04/2023 (Exact Date)   SpO2 98%   BMI 19 47 kg/m²   Patient's last menstrual period was 06/04/2023 (exact date)  Physical Exam     Physical Exam  Vitals and nursing note reviewed  Constitutional:       General: She is not in acute distress  Appearance: Normal appearance  She is not ill-appearing or diaphoretic  HENT:      Head: Normocephalic and atraumatic  Nose: No congestion or rhinorrhea  Eyes:      General: No scleral icterus  Right eye: No discharge  Left eye: No discharge  Pulmonary:      Effort: Pulmonary effort is normal  No respiratory distress  Musculoskeletal:         General: Normal range of motion  Cervical back: Normal range of motion  Skin:     Coloration: Skin is not jaundiced or pale  Findings: Rash present   No bruising " or erythema  Rash is macular, papular and vesicular  Comments: Bilateral arms and legs   Neurological:      General: No focal deficit present  Mental Status: She is alert and oriented to person, place, and time  Psychiatric:         Mood and Affect: Mood normal          Behavior: Behavior normal          Thought Content:  Thought content normal          Judgment: Judgment normal

## 2023-06-06 NOTE — LETTER
June 6, 2187     Patient: Danielle Barboza   YOB: 1985   Date of Visit: 6/6/2023       To Whom it May Concern:    Danielle Barboza was seen in my clinic on 6/6/2023  She may return to work on 6/7/2023   If you have any questions or concerns, please don't hesitate to call           Sincerely,          MARIA ESTHER Aguiar

## 2023-06-13 ENCOUNTER — TELEMEDICINE (OUTPATIENT)
Dept: PSYCHIATRY | Facility: CLINIC | Age: 38
End: 2023-06-13
Payer: COMMERCIAL

## 2023-06-13 DIAGNOSIS — F41.1 GAD (GENERALIZED ANXIETY DISORDER): ICD-10-CM

## 2023-06-13 DIAGNOSIS — F33.2 MAJOR DEPRESSIVE DISORDER, RECURRENT, SEVERE W/O PSYCHOTIC BEHAVIOR (HCC): Primary | ICD-10-CM

## 2023-06-13 PROCEDURE — 90834 PSYTX W PT 45 MINUTES: CPT | Performed by: SOCIAL WORKER

## 2023-06-13 NOTE — PSYCH
"Behavioral Health Psychotherapy Progress Note    Psychotherapy Provided: Individual Psychotherapy     1  Major depressive disorder, recurrent, severe w/o psychotic behavior ([address] )        2  JG (generalized anxiety disorder)            Goals addressed in session: Goal 1     DATA: Aylin Chao met for session and presented at her home  Clinician discussed with Aylin Chao the necessity of continual attendance as a means to obtain her objectives and inquired about her intentions with therapy  \"I want to feel better, I want to feel like myself\"  Aylin Chao stated that she was willing to committ to treatment and consistent attendance  Aylin Chao reports that she is maintaing her weight but is not gaining  Aylin Chao reports that she is still meeting with her nutritionist following her gastro bypass surgery and that she has told her that she is not getting enough calories in and has recommended Boost shakes  Clinician pointed out to Sheldon Ge the importance of maintaining her physical health and asked her to identify what effects her not feeling physically well has on her emotional state  \"I don't have energy and that makes me not be able to do things, and then I feel negative\"  Clinician processed with Lindy Castro the importance of her health and assisting her with identifying barriers that interfere in her ability to achieve what is needed for her physical health  Aylin Chao reports that she has been to the store and can buy the drinks but also reports that she has actively chosen to walk away from picking up the shakes  Madhav Donis reports that she is struggling with a recent occurrence with poison ivy  \"I just cannot stop itching it and I know it will go away but I just feel like now on top of everything else I feel like I look like a junkie and I am scratching up my arms\"  \"Work has been okay so far, I cannot shut off my mind off when I get done work\"    \"When I am at work I am able to get passed most of the thoughts and able to " "just focus and be present\"  \"I still have bouts with tears at work but I am able to go to the bathroom and walk away for a moment and then get back to work\"  \"It does not matter where I am at I get flooded with thoughts\"  \"Sometime me thinking triggers my mood and it can make me noel\"  In this open process Desire seemed to be identifying and making a connection to the influence that her thoughts had on her emotions  Clinician reintroduced CBT thought distortions and thought chain analysis  Jose Moffett related to mind reading and fortune telling as two that she engages in  \"My life stopped in March, I look at others and see them living their life and I am looking at them that it must be nice that they are just living their life like everything is normal\"  Clinician pointed out use of compare and despair  Next court date July  \"I am scared and worried\"  Who do you share this with? \"No one\"  What happens when you suppress this and hold it in? \"I get pissed off I wear a mask to everyone telling them that I am good\"  \"I hold it in and it all comes out at once\"  \"I always grew up with the defense mechansims and people have always thought that I was mean until they get to know me\"  \"It has always caused problems where people will ask me what is wrong\"  \"I know that I am that way\"  Processed with Desire testing her limits of uncomfortability in letting this defense mechanism down  Friends-  I have trust issues  I don't hand out or have anyone that I go anythere with or doing anything with   I do not  my phone for anyone  I have coworker that I have not talked to since mothers day  Text messages- last time sharing     During this session, this clinician used the following therapeutic modalities: Engagement Strategies, Client-centered Therapy and Cognitive Processing Therapy    Substance Abuse was not addressed during this session   If the client is diagnosed with a co-occurring substance " "use disorder, please indicate any changes in the frequency or amount of use: n/a  Stage of change for addressing substance use diagnoses: No substance use/Not applicable    ASSESSMENT:  Trish Washington presents with a Anxious and Depressed mood  her affect is Normal range and intensity, which is congruent, with her mood and the content of the session  The client has made progress on their goals  Trish Washington presents with a minimal risk of suicide, minimal risk of self-harm, and none risk of harm to others  For any risk assessment that surpasses a \"low\" rating, a safety plan must be developed  A safety plan was indicated: no  If yes, describe in detail n/a    PLAN: Between sessions, Trish Washington will increase awareness to her use of \"good, fine okay\" and attempt to reach out to her son to be more open with how she is feeling  At the next session, the therapist will use Client-centered Therapy and Cognitive Processing Therapy to address Treatment plan  Behavioral Health Treatment Plan and Discharge Planning: Trish Washington is aware of and agrees to continue to work on their treatment plan  They have identified and are working toward their discharge goals  yes    Visit start and stop times:    06/13/23  Start Time: 1600  Stop Time: 1652  Total Visit Time: 52 minutes     Virtual Regular Visit    Verification of patient location:    Patient is located at Home in the following state in which I hold an active license [place]      Assessment/Plan:    Problem List Items Addressed This Visit        Other    JG (generalized anxiety disorder)    Major depressive disorder, recurrent, severe w/o psychotic behavior ([facility] [address] ) - Primary       Goals addressed in session: Goal 1          Reason for visit is No chief complaint on file         Encounter provider Rosaura Weathers LCSW    Provider located at [address] [place] [address]  [address] [place] " [id number]  [phone]      Recent Visits  No visits were found meeting these conditions  Showing recent visits within past 7 days and meeting all other requirements  Today's Visits  Date Type Provider Dept   06/13/23 Telemedicine Riaz Vargas LCSW [facility]   Showing today's visits and meeting all other requirements  Future Appointments  No visits were found meeting these conditions  Showing future appointments within next 150 days and meeting all other requirements       The patient was identified by name and date of birth  Stephanie Swift was informed that this is a telemedicine visit and that the visit is being conducted throughthe [employer] platform  She agrees to proceed     My office door was closed  No one else was in the room  She acknowledged consent and understanding of privacy and security of the video platform  The patient has agreed to participate and understands they can discontinue the visit at any time  Patient is aware this is a billable service  Subjective  Stephanie Swift is a 45 y o  female          HPI     Past Medical History:   Diagnosis Date   • Acne    • Anxiety    • Back pain    • Bipolar disorder ([address] )     pt denies   • Carpal tunnel syndrome on right    • Contact lens overwear of both eyes    • CPAP (continuous positive airway pressure) dependence    • Cubital tunnel syndrome on right    • Depression    • Disease of thyroid gland    • GERD (gastroesophageal reflux disease)    • Headache    • Hypothyroidism    • Kidney stone    • Kidney stones    • Lupus (systemic lupus erythematosus) (HCC)    • Nausea    • Obese     gastric KATLYN-EN-Y  today 4/19/2022   • PONV (postoperative nausea and vomiting)    • Sleep apnea    • Wears glasses        Past Surgical History:   Procedure Laterality Date   • CARPAL TUNNEL RELEASE     • CHOLECYSTECTOMY     • EGD     • [place] CYSTOURETHROSCOPY N/A 2/3/2020    Procedure: CYSTOSCOPY;  Surgeon: Connie Bashir MD;  [facility];  Service: UroGynecology          • MT LAPAROSCOPY SURG CHOLECYSTECTOMY N/A 9/6/2018    Procedure: CHOLECYSTECTOMY LAPAROSCOPIC W/ ROBOTICS;  Surgeon: Ingris Silva MD;  [facility];  Service: General   • MT [address] W/BYP KATLYN-EN-Y LIMB <150 CM N/A 4/19/2022    Procedure: LAPAROSCOPIC KATLYN-EN-Y GASTRIC BYPASS & INTRAOPERATIVE EGD ROBOTICALLY ASSISTED;  Surgeon: Lorrie Rivas MD;  [facility];  Service: Bariatrics   • MT NEUROPLASTY &/TRANSPOS MEDIAN NRV CARPAL Jinnie May Right 11/27/2020    Procedure: release CARPAL TUNNEL;  Surgeon: Mireille Pride MD;  [facility];  Service: Orthopedics   • MT NEUROPLASTY &/TRANSPOSITION ULNAR NERVE ELBOW Right 11/27/2020    Procedure: Karilyn Hollow;  Surgeon:  Mireille Pride MD;  [facility];  Service: Orthopedics   • MT POST COLPORRHAPHY RECTOCELE W/WO PERINEORRHAPHY N/A 2/3/2020    Procedure: POSTERIOR COLPORRHAPHY;  Surgeon: Marlyne Curling, MD;  [facility];  Service: UroGynecology          • MT SLING OPERATION STRESS INCONTINENCE N/A 2/3/2020    Procedure: PUBOVAGINAL SLING;  Surgeon: Marlyne Curling, MD;  [facility];  Service: UroGynecology          • TUBAL LIGATION         Current Outpatient Medications   Medication Sig Dispense Refill   • Calcium 600-400 MG-UNIT CHEW Chew daily     • celecoxib (CeleBREX) 100 mg capsule Take 1 capsule (100 mg total) by mouth 2 (two) times a day as needed for moderate pain 60 capsule 6   • Cholecalciferol (Vitamin D) 50 MCG (2000 UT) tablet Take 1 tablet (2,000 Units total) by mouth daily 90 tablet 1   • clobetasol (TEMOVATE) 0 05 % external solution Apply topically 2 (two) times a day 50 mL 0   • clonazePAM (KlonoPIN) 0 5 mg tablet TAKE 1/2 TO 1 TABLET BY MOUTH 2 TIMES A DAY AS NEEDED FOR ANXIETY Do not start before June 9, 2023  60 tablet 2   • fluticasone (FLONASE) 50 mcg/act nasal spray 1 spray into each nostril daily 48 g 1   • folic acid (FOLVITE) 1 mg tablet Take 800 mcg by mouth daily     • hydroxychloroquine (PLAQUENIL) 200 mg tablet Take 1 tablet (200 mg total) by mouth daily 90 tablet 1   • hydrOXYzine HCL (ATARAX) 25 mg tablet Take 1 tablet (25 mg total) by mouth every 6 (six) hours as needed for itching 20 tablet 0   • levothyroxine 25 mcg tablet Take 1 tablet (25 mcg total) by mouth daily in the early morning 90 tablet 1   • modafinil (PROVIGIL) 200 MG tablet Take 1 tablet (200 mg total) by mouth daily 30 tablet 2   • Multiple Vitamins-Minerals (Bariatric Multivitamins/Iron) CAPS Take by mouth daily     • omeprazole (PriLOSEC) 20 mg delayed release capsule Take 1 capsule (20 mg total) by mouth daily 90 capsule 1   • predniSONE 5 mg tablet 4 tabs x7 days, then 3 tabs x7 days, then 2 tabs x7 days, then 1 tab x7 days, then stop  70 tablet 0   • triamcinolone (KENALOG) 0 5 % cream Apply topically 2 (two) times a day 30 g 0   • Vortioxetine HBr (Trintellix) 20 MG tablet Take 1 tablet (20 mg total) by mouth daily 30 tablet 3     No current facility-administered medications for this visit  Allergies   Allergen Reactions   • Wound Dressing Adhesive Rash     Gets red rash from paper tape relatively quickly after application   • Penicillins Hives     At young age       Review of Systems    Video Exam    There were no vitals filed for this visit      Physical Exam     Visit Time    06/13/23  Start Time: 1600  Stop Time: 5970  Total Visit Time: 52 minutes

## 2023-06-22 NOTE — PROGRESS NOTES
"Bariatric Follow Up Nutrition Note    Type of surgery  Gastric bypass: laparoscopic  Surgery Date: 4/19/2022  14 months  post-op  Surgeon: Dr Amarilys Vance  45 y o   female  LMP 06/04/2023 (Exact Date)    Ht 5' 5 25\" (1 657 m)   Wt 55 2 kg (121 lb 11 2 oz)   LMP 06/04/2023 (Exact Date)   BMI 20 10 kg/m²     Pt gained  4# in 4 weeks    Up 7# in 8 weeks     Wt Readings from Last 3 Encounters:   06/06/23 53 1 kg (117 lb)   05/19/23 53 5 kg (117 lb 15 2 oz)   05/05/23 51 7 kg (114 lb)       No calculation needed  Estimated calories for weight maintenance:  1340- kcal per day (25 kcal /kg bw   )   Estimated calories for weight gain  5743-7678  kcal per day  ( 1/2 to 1#  per wk wt gain - sedentary )  Estimated protein needs 54-81 grams per day (1 0-1 5 gms/kg IBW )   Estimated fluid needs 54-64 ounces per day (30-35 ml/kg IBW )      Compared with body comp:  SECA REE = 1294- 97% of expected (      Weight on Day of Weight Loss Surgery: 217 lbs   Weight in (lb) to have BMI = 25: 151 9#  Post-Op Wt Loss: 107 8#/ 139% EBWL in 14 months     Review of History and Medications   Past Medical History:   Diagnosis Date   • Acne    • Anxiety    • Back pain    • Bipolar disorder ([address] )     pt denies   • Carpal tunnel syndrome on right    • Contact lens overwear of both eyes    • CPAP (continuous positive airway pressure) dependence    • Cubital tunnel syndrome on right    • Depression    • Disease of thyroid gland    • GERD (gastroesophageal reflux disease)    • Headache    • Hypothyroidism    • Kidney stone    • Kidney stones    • Lupus (systemic lupus erythematosus) (HCC)    • Nausea    • Obese     gastric KATLYN-EN-Y  today 4/19/2022   • PONV (postoperative nausea and vomiting)    • Sleep apnea    • Wears glasses      Past Surgical History:   Procedure Laterality Date   • CARPAL TUNNEL RELEASE     • CHOLECYSTECTOMY     • EGD     • DC CYSTOURETHROSCOPY N/A 2/3/2020    Procedure: " CYSTOSCOPY;  Surgeon: Quyen Gr MD;  [facility];  Service: UroGynecology          • [place] LAPAROSCOPY SURG CHOLECYSTECTOMY N/A 9/6/2018    Procedure: CHOLECYSTECTOMY LAPAROSCOPIC W/ ROBOTICS;  Surgeon: Sheryle Gong, MD;  [facility];  Service: General   • [place] [address]CV [address] W/BYP KATLYN-EN-Y LIMB <150 CM N/A 4/19/2022    Procedure: LAPAROSCOPIC KATLYN-EN-Y GASTRIC BYPASS & INTRAOPERATIVE EGD ROBOTICALLY ASSISTED;  Surgeon: Johann Olivier MD;  [facility];  Service: Bariatrics   • NJ NEUROPLASTY &/TRANSPOS MEDIAN NRV CARPAL Lonney Paula Right 11/27/2020    Procedure: release CARPAL TUNNEL;  Surgeon: Paty Nash MD;  [facility];  Service: Orthopedics   • NJ NEUROPLASTY &/TRANSPOSITION ULNAR NERVE ELBOW Right 11/27/2020    Procedure: Priscilla Atkinson;  Surgeon:  Paty Nash MD;  [facility];  Service: Orthopedics   • NJ POST COLPORRHAPHY RECTOCELE W/WO PERINEORRHAPHY N/A 2/3/2020    Procedure: POSTERIOR COLPORRHAPHY;  Surgeon: Quyen Gr MD;  [facility];  Service: UroGynecology          • NJ SLING OPERATION STRESS INCONTINENCE N/A 2/3/2020    Procedure: PUBOVAGINAL SLING;  Surgeon: Quyen Gr MD;  [facility];  Service: UroGynecology          • TUBAL LIGATION       Social History     Socioeconomic History   • Marital status: Single     Spouse name: Not on file   • Number of children: 3   • Years of education: Not on file   • Highest education level: Associate degree: academic program   Occupational History   • Occupation: [employer]     Employer: [employer]   Tobacco Use   • Smoking status: Never   • Smokeless tobacco: Never   Vaping Use   • Vaping Use: Never used   Substance and Sexual Activity   • Alcohol use: Not Currently   • Drug use: No   • Sexual activity: Yes     Partners: Male     Birth control/protection: Female Sterilization     Comment: single   Other Topics Concern   • Not on file   Social History Narrative    Uses seatbelts    Caffeine use     Social Determinants of Health     Financial Resource Strain: Not on file   Food Insecurity: Not on file   Transportation Needs: Not on file   Physical Activity: Sufficiently Active (4/19/2021)    Exercise Vital Sign    • Days of Exercise per Week: 3 days    • Minutes of Exercise per Session: 60 min   Stress: No Stress Concern Present (4/19/2021)    [address]    • Feeling of Stress : Not at all   Social Connections: Socially Isolated (4/19/2021)    Social Connection and Isolation Panel [NHANES]    • Frequency of Communication with Friends and Family: Never    • Frequency of Social Gatherings with Friends and Family: Never    • Attends Episcopal Services: Never    • Active Member of Clubs or Organizations: No    • Attends Club or Organization Meetings: Never    • Marital Status: Never    Intimate Partner Violence: Not At Risk (4/19/2021)    Humiliation, Afraid, Rape, and Kick questionnaire    • Fear of Current or Ex-Partner: No    • Emotionally Abused: No    • Physically Abused: No    • Sexually Abused: No   Housing Stability: Not on file       Current Outpatient Medications:   •  Calcium 600-400 MG-UNIT CHEW, Chew daily, Disp: , Rfl:   •  celecoxib (CeleBREX) 100 mg capsule, Take 1 capsule (100 mg total) by mouth 2 (two) times a day as needed for moderate pain, Disp: 60 capsule, Rfl: 6  •  Cholecalciferol (Vitamin D) 50 MCG (2000 UT) tablet, Take 1 tablet (2,000 Units total) by mouth daily, Disp: 90 tablet, Rfl: 1  •  clobetasol (TEMOVATE) 0 05 % external solution, Apply topically 2 (two) times a day, Disp: 50 mL, Rfl: 0  •  clonazePAM (KlonoPIN) 0 5 mg tablet, TAKE 1/2 TO 1 TABLET BY MOUTH 2 TIMES A DAY AS NEEDED FOR ANXIETY Do not start before June 9, 2023 , Disp: 60 tablet, Rfl: 2  •  fluticasone (FLONASE) 50 mcg/act nasal spray, 1 spray into each nostril daily, Disp: 48 g, Rfl: 1  •  folic acid (FOLVITE) 1 mg tablet, Take 800 mcg by mouth daily, Disp: , Rfl:   •  hydroxychloroquine (PLAQUENIL) 200 mg tablet, Take 1 tablet (200 mg total) by mouth daily, Disp: 90 tablet, Rfl: 1  •  hydrOXYzine HCL (ATARAX) 25 mg tablet, Take 1 tablet (25 mg total) by mouth every 6 (six) hours as needed for itching, Disp: 20 tablet, Rfl: 0  •  levothyroxine 25 mcg tablet, Take 1 tablet (25 mcg total) by mouth daily in the early morning, Disp: 90 tablet, Rfl: 1  •  modafinil (PROVIGIL) 200 MG tablet, Take 1 tablet (200 mg total) by mouth daily, Disp: 30 tablet, Rfl: 2  •  Multiple Vitamins-Minerals (Bariatric Multivitamins/Iron) CAPS, Take by mouth daily, Disp: , Rfl:   •  omeprazole (PriLOSEC) 20 mg delayed release capsule, Take 1 capsule (20 mg total) by mouth daily, Disp: 90 capsule, Rfl: 1  •  predniSONE 5 mg tablet, 4 tabs x7 days, then 3 tabs x7 days, then 2 tabs x7 days, then 1 tab x7 days, then stop , Disp: 70 tablet, Rfl: 0  •  triamcinolone (KENALOG) 0 5 % cream, Apply topically 2 (two) times a day, Disp: 30 g, Rfl: 0  •  Vortioxetine HBr (Trintellix) 20 MG tablet, Take 1 tablet (20 mg total) by mouth daily, Disp: 30 tablet, Rfl: 3    Food Intake and Lifestyle Assessment   Food Intake Assessment completed via usual diet recall  Pt wakes up at 3 am, starts work 4:30 am   Breakfast: oatmeal or toast   Snack: 0   Lunch: 0  Snack: 0  Dinner: small meal - 1-2 ounces chicken   Snack: 0  Beverage intake: water and coffee/tea  Diet texture/stage: regular  Protein supplement: sometimes, maybe a couple of times per week   Estimated protein intake per day: hard to determine with poor recall   Estimated fluid intake per day: pt feels she is meeting her fluid needs   Meals eaten away from home: not assess   Typical meal pattern: 2 meals per day and 0 snacks per day  Eating Behaviors: pt reports only tolerating about 1/4 of food at one meal     Vitamin and Mineral regimen: she is taking her bariatric vitamins and minerals as recommended  "    Food allergies or intolerances: difficulty tolerating any volume of food   Cultural or Voodoo considerations: n/a     Physical Assessment  Nutrition Related Findings  Diarrhea, Nausea and Loss of Hunger  Pt reports that she does not have any appetite  She was out on FMLA due to stressful events in her life  Diarrhea has resolved since stress is reduced     Physical Activity  Types of exercise: Walking  Current physical limitations: would not recommend intense exercise until weight loss is stabilized     Psychosocial Assessment   Support systems:    Socioeconomic factors: works full time [employer], has 3 children     Nutrition Diagnosis  Diagnosis: Underweight (NC-3 1) and Inadequate protein intake (NI-5 7  3)  Related to: Altered GI function and Inability or lack of desire to manage self-care  As Evidenced by: Unintentional weight loss     Nutrition Prescription: Recommend the following diet  Small frequent meals 5-6 times per day  One ensure complete per day -350 calories / 30 grams protein     Meal Plan ( Jonas/Pro/Carb)   Breakfast: 200-300/20/30  Snack: 150/>5/20  Lunch: 300/30/30-45  Snack: 150/>5/20  Dinner: 300/30/30-45  Snack: 150/>5/20       Interventions and Teaching   Patient educated on post-op nutrition guidelines  Patient educated and handouts provided    Surgical changes to stomach / GI  Capacity of post-surgery stomach  Adequate hydration  Sugar and fat restriction to decrease \"dumping syndrome\"  Expected weight loss  Exercise  Nutrition considerations after surgery  Protein supplements  Appropriate carbohydrate, protein, and fat intake, and food/fluid choices to maximize safe weight loss, nutrient intake, and tolerance     Patient is not currently pregnant and doesn't desire to become pregnant a minimum of one year post-op    Education provided to: patient    Barriers to learning: No barriers identified    Readiness to change: preparation and action    Comprehension: verbalizes " understanding     Expected Compliance: fair- due to stress in her life     Evaluation/Monitoring   Eating pattern as discussed Tolerance of nutrition prescription Body weight Lab values Physical activity Bowel pattern  Reviewed body comp results with patient  BMI is now in normal limits -20 1  Body fat is slightly below normal   Muscle mass  normal and VAT low normal   Pt with adequate hydration but increased extra cellular fluid which may be an indicator of decreased protein status and flare of her lupus   Pt gained 1 62 pounds of muscle mass and   2# of fat mass        Goals- continued   Eat 3 meals per day and plus 3 snacks   Pack an insulated bag, bring to work and eat 5- 6 mini meals per day  Change to higher calorie containing nutritional supplement such as Ensure Complete ( 350 calories/ 30 grams protein / 15 grams of sugar )- plans to purchase today - coupons provided  Add butter, avocado, oil, cheese , milk to foods to increase calories     Try to avoid drinking with meals to increase volume tolerated at meals    Goal is to gain 2-4 pounds over the next month     F/U in 4 weeks to assess weight /body comp     Time Spent:   30 Minutes

## 2023-06-23 ENCOUNTER — OFFICE VISIT (OUTPATIENT)
Dept: BARIATRICS | Facility: CLINIC | Age: 38
End: 2023-06-23

## 2023-06-23 VITALS — HEIGHT: 65 IN | WEIGHT: 121.7 LBS | BODY MASS INDEX: 20.28 KG/M2

## 2023-06-23 DIAGNOSIS — R63.6 UNDERWEIGHT DUE TO INADEQUATE CALORIC INTAKE: Primary | ICD-10-CM

## 2023-06-23 PROCEDURE — RECHECK: Performed by: DIETITIAN, REGISTERED

## 2023-06-27 NOTE — PSYCH
"  MEDICATION MANAGEMENT NOTE        [facility]      Name and Date of Birth:  Hortencia Young 45 y o  1985    Date of Visit: June 28, 2023    SUBJECTIVE:  CC: Claudette Garcia presents today for follow up on \"its hard but I am hanging in there\"; depression and anxiety     Claudette Garcia is focusing on work, trying not to get distracted by all the legal stuff we discussed  July 17 first hearing  Working extra shifts to help her financial situation  Between rent and car payment her paychecks are almost gone  [employer] helping, but back payments are still heavy  A lot of lupus pain of late  It is flaring  She is going to [employer], trying to make every day count and do best she can  She does have days where she feels depressed but tries not to get into those spirals  Is sending some time with friends, but does not have money to do stuff  May go in summer to the beach to get away, maybe take her daughter if she is not working (first job, [employer], [place])  Dog doing ok    F/U PRN Thinking about finishing school  F/U PRN- Her kids are old enough to not need babysitters/ support;  Children-  split custody, oldest son (2004), middle (son) (7/2004), youngest girl (2009)    Since our last visit, overall symptoms have been slightly improved      Med Compliance: yes    HPI ROS:                      ('was' below is from prior visit)  Medication Side Effects (other than noted):  no     Depression (10 worst):  7 (Was 8)   Anxiety (10 worst):  5-8 (Was 9)   Hallucinations or Psychosis  no (Was no)    Safety concerns (self harm thoughts, suicidal ideation, HI, etc):  no (Was no)   Sleep: (NM = Nightmares)  good (Was ok most nights but mind worried about things recently)   Energy:  low some days (Was low)   Appetite:  a bit better (Was slightly increased)   Weight Change:  no          PHQ-2/9 Depression Screening               Claudette Mcdanielel denies any side effects from medications unless " noted above    Review Of Systems as noted above  Otherwise A relevant review of symptoms was otherwise negative    History Review: The following portions of the patient's history were reviewed and documented: allergies, current medications, past family history, past medical history, past social history and problem list      Lab Review: No new labs or no relevant labs needing review with patient today      OBJECTIVE:     MENTAL STATUS EXAM  Appearance:  age appropriate   Behavior:  pleasant, cooperative, with good eye contact   Speech:  Normal volume, regular rate and rhythm   Mood:  depressed and anxious   Affect:  mood congruent, constricted   Language: intact and appropriate for age, education, and intellect   Thought Process:  Linear and goal directed   Associations: intact associations   Thought Content:  normal and appropriate   Perceptual Disturbances: no auditory or visual hallcunations   Risk Potential / Abnormal Thoughts: Suicidal ideation - None  Homicidal ideation - None  Potential for aggression - No       Consciousness:  Alert & Awake   Sensorium:  Grossly oriented   Attention: attention span and concentration are age appropriate       Fund of Knowledge:  Memory: awareness of current events: yes  recent and remote memory grossly intact   Insight:  good   Judgment: good   Muscle Strength Muscle Tone: normal  normal   Gait/Station: normal gait/station with good balance   Motor Activity: no abnormal movements       Risks, Benefits And Possible Side Effects Of Medications:    AGREE: Risks, benefits, and possible side effects of medications explained to [facility] and she (or legal representative) verbalizes understanding and agreement for treatment  Controlled Medication Discussion:     [facility] has been filling controlled prescriptions on time as prescribed according to [facility] 26 program    _____________________________________________________________      Recent labs:   No "visits with results within 1 Month(s) from this visit  Latest known visit with results is:   Appointment on 04/25/2023   Component Date Value   • Zinc 04/25/2023 75    • Vitamin B-12 04/25/2023 421    • Vitamin B1, Whole Blood 04/25/2023 119 8    • Vitamin A 04/25/2023 36 0    • PTH 04/25/2023 67 7    • Ferritin 04/25/2023 59    • Folate 04/25/2023 >20 0 (H)    • Iron Saturation 04/25/2023 27    • TIBC 04/25/2023 263    • Iron 82/28/5271 [facility] 238 History     Patient has a past diagnoses of major depressive disorder and anxiety and has never been told that she has bipolar disorder  She was seen a psychiatrist for a short period of time and [facility]  She has never been hospitalized for mental health never had a suicide attempt  PHP 3/2019    Social History:  Patient was raised in [facility] and her parents  when she was young  She was raised by her mother and her boyfriend  Her childhood was \"not normal\" and there is constantly fighting at home  She denies any physical or sexual abuse  His one brother  She developed normally as far she is aware      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  She has split custody of her 3 children from a 15year-old relationship  She left home and \"he took advantage of that\"      She is Moravian   No  history no legal issues now or in the past and no weapons       Never had issues with alcohol or drugs, never needed rehabilitation     Medical / Surgical History:    Past Medical History:   Diagnosis Date   • Acne    • Anxiety    • Back pain    • Bipolar disorder ([address] )     pt denies   • Carpal tunnel syndrome on right    • Contact lens overwear of both eyes    • CPAP (continuous positive airway pressure) dependence    • Cubital tunnel syndrome on right    • Depression    • Disease of thyroid gland    • GERD (gastroesophageal reflux disease)    • Headache    • Hypothyroidism  " • Kidney stone    • Kidney stones    • Lupus (systemic lupus erythematosus) (HCC)    • Nausea    • Obese     gastric KATLYN-EN-Y  today 4/19/2022   • PONV (postoperative nausea and vomiting)    • Sleep apnea    • Wears glasses      Past Surgical History:   Procedure Laterality Date   • CARPAL TUNNEL RELEASE     • CHOLECYSTECTOMY     • EGD     • MS CYSTOURETHROSCOPY N/A 2/3/2020    Procedure: CYSTOSCOPY;  Surgeon: Harshil Boyer MD;  [facility];  Service: UroGynecology          • MS LAPAROSCOPY SURG CHOLECYSTECTOMY N/A 9/6/2018    Procedure: CHOLECYSTECTOMY LAPAROSCOPIC W/ ROBOTICS;  Surgeon: Eric Bear MD;  [facility];  Service: General   • MS LAPS GSTR RSTCV [address] W/BYP KATLYN-EN-Y LIMB <150 CM N/A 4/19/2022    Procedure: LAPAROSCOPIC KATLYN-EN-Y GASTRIC BYPASS & INTRAOPERATIVE EGD ROBOTICALLY ASSISTED;  Surgeon: Florentino Lin MD;  [facility];  Service: Bariatrics   • MS NEUROPLASTY &/TRANSPOS MEDIAN NRV CARPAL Rukhsana Cardoso Right 11/27/2020    Procedure: release CARPAL TUNNEL;  Surgeon: Lavonne Machado MD;  [facility];  Service: Orthopedics   • MS NEUROPLASTY &/TRANSPOSITION ULNAR NERVE ELBOW Right 11/27/2020    Procedure: Carolee Gonzalez;  Surgeon: Lavonne Machado MD;  [facility];  Service: Orthopedics   • MS POST COLPORRHAPHY RECTOCELE W/WO PERINEORRHAPHY N/A 2/3/2020    Procedure: POSTERIOR COLPORRHAPHY;  Surgeon: Harshil Boyer MD;  [facility];  Service: UroGynecology          • MS SLING OPERATION STRESS INCONTINENCE N/A 2/3/2020    Procedure: PUBOVAGINAL SLING;  Surgeon: Harshil Boyer MD;  [facility];  Service: UroGynecology          • TUBAL LIGATION         Family Psychiatric History:     Father    1  Family history of alcohol abuse (V61 41) (Z81 1)   2  Denied: Family history of suicide  Family History    3  Family history of Drug addiction   4  Family history of alcohol abuse (V61 41) (Z81 1)   5   Denied: Family history of bipolar disorder   6  Denied: Family history of paranoid schizophrenia   7  Denied: Family history of suicide   8  Family history of No Significant Family History     Family History   Problem Relation Age of Onset   • No Known Problems Mother    • Alcohol abuse Father    • Drug abuse Family    • Psoriasis Maternal Grandmother    • Colon cancer Maternal Uncle    • Hypertension Maternal Grandfather    • Heart disease Maternal Grandfather    • Diabetes Neg Hx        Confidential Assessment:  Past psychotropic medications include  hydroxyzine,   klonopin,   buspar (low dose, no recall details),   cymbalta 30mg (no recall of details),  zoloft (no issues),   neurontin (no help),   Viibryd 10 mg (x2-3mo, no side effects or benefit noted; was paying out of pocket)  Effexor (irritable)  Wellbutrin (irritable)  Prozac (80mg, was not adequate, even with Nortrip 50mg  Went to trintellix)  Topiramate (no benefit at 100mg, no issues)   Nortriptyline (some benefit at 50mg, dry mouth (did improve), decided to go different way but could reconsider)  Remeron- worked in past and then it did not work  trintellix- more depressed at 20mg, although significant stressors coocurring        Scales:    Assessment/Plan:        Diagnoses and all orders for this visit:    JG (generalized anxiety disorder)  -     clonazePAM (KlonoPIN) 0 5 mg tablet; TAKE 1/2 TO 1 TABLET BY MOUTH 2 TIMES A DAY AS NEEDED FOR ANXIETY Do not start before July 7, 2023  -     Vortioxetine HBr (Trintellix) 20 MG tablet; Take 1 tablet (20 mg total) by mouth daily    Social phobia  -     clonazePAM (KlonoPIN) 0 5 mg tablet; TAKE 1/2 TO 1 TABLET BY MOUTH 2 TIMES A DAY AS NEEDED FOR ANXIETY Do not start before July 7, 2023  -     Vortioxetine HBr (Trintellix) 20 MG tablet; Take 1 tablet (20 mg total) by mouth daily    Major depressive disorder, recurrent, severe w/o psychotic behavior (HCC)  -     Vortioxetine HBr (Trintellix) 20 MG tablet;  Take 1 tablet (20 mg total) by mouth daily  -     modafinil (PROVIGIL) 200 MG tablet; Take 1 tablet (200 mg total) by mouth daily Do not start before July 4, 2023  Hypersomnia due to mental disorder  -     modafinil (PROVIGIL) 200 MG tablet; Take 1 tablet (200 mg total) by mouth daily Do not start before July 4, 2023         ______________________________________________________________________    Major depressive disorder, recurrent, severe without psychosis (Highly unlikely bipolar disorder, but h/o diagnosis)  generalized anxiety disorder  social phobia  ---     MDD - not at goal  JG - not at goal  Social phobia - not at goal     Johnny Anne is struggling still, but trying to make the best  Feels meds adequate for now  Ongoing therapy  Has hydroxyzine for itching, discussed trying for anxiety  PARQ discussed about hydroxyzine including arrhythmia/cardiovascular effects (reviewed ECG from cardiology 12/2021), anticholinergic effects, drowsiness, nausea, drug interactions, and others as well as increased arrhythmia risk at over 100mg/day  In February 2023 Val[facility] Laws reviewed situation and a referral to [facility] but they did not help her much, so she discharged in May 2023    F/U at future visit re: ADHD exploration with pscyhological testing (Referral made 11/2022)  Questioning her distractibility (starts project then goes to another, or in cleaning the house will go from item to item and then the whole house is a mess)    I do not believe that she has bipolar disorder but mood stabilizers for anger and irritability if other paths do not seem to be appropriate or beneficial can be considered  Could consider abilify, retrial nortriptyline, buspar, lamictal, other directions  Klonopin increase has been discussed in past, prefer other directions as reasonable  - sleep study 10/2021 - Has sleep apnea, now uses CPAP     [facility]  She is heterozygous for MTHFR, no cancer history  Safety Risk Assessment: See above   Has had passive SI since ~  She states that she has protective features including her children and that she would never actually carry anything out  Safety risk low         Treatment Plan:      Patient has been educated about their diagnosis and treatment modalities  They voiced understanding and agreement with the following plan:     - GENE SIGHT TESTING initially reviewed 9169 with [facility]    1) Meds:   - Modafinil 200mg daily   - klonopin 0 5mg BID PRN   - Trintellix 20mg daily  - has atarax for itching, but may try for anxiety 25mg-50mg daily prn     2) Labs:   - : TSH 1 523, Vit D 52 4   - 2021: ECG (Cardiology) , NS arrhythmia   0 3/2019: BMP WNL, TSH 2 983; FT3 2 53   - 2018: Vit D 11 8, TSH 3 96 and FT4 0 82   - 2017: TSH 2 76; Vit D 34 1   - 2017: TSH 1 85   - : CBC, CMP unremarkable, TSH 4 38, Vit D 21 6, , HDL 39     3) Therapy:   - Wilian Cristobal ([facility]); Was Krystal Berkowitz    4) Medical:  LUPUS; hypothyroidism with pregnancy; history of kidney stones  Tubal ligation  - pt to f/u with other providers PRN     5) Other:   - ex has primary custody of 3 kids   - [facility] Day Surgery, [facility]       - 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 ()       - Significant Other (had cancer)  10/2022     6) Follow up, Allison Jolley to see NP   - 3mo, but patient to call if issues or concerns  7) Treatment Plan: Enacted 2017, Renewed 2017, renewed 3/15/2018, 2018; managed by therapist      Discussed self monitoring of symptoms, and symptom monitoring tools  Patient has been informed of 24 hours and weekend coverage for urgent situations accessed by calling the main clinic phone number          Psychotherapy in session:  Time spent performing psychotherapy: 18 Minutes supportive therapy related to legal issues, finances, work, family, self care, coping, health      Visit Time    Visit Start Time: 411p  Visit Stop Time: 438p  Total Visit Duration: 32 minutes

## 2023-06-28 ENCOUNTER — OFFICE VISIT (OUTPATIENT)
Dept: PSYCHIATRY | Facility: CLINIC | Age: 38
End: 2023-06-28
Payer: COMMERCIAL

## 2023-06-28 VITALS — BODY MASS INDEX: 19.65 KG/M2 | WEIGHT: 119 LBS

## 2023-06-28 DIAGNOSIS — F41.1 GAD (GENERALIZED ANXIETY DISORDER): ICD-10-CM

## 2023-06-28 DIAGNOSIS — F33.2 MAJOR DEPRESSIVE DISORDER, RECURRENT, SEVERE W/O PSYCHOTIC BEHAVIOR (HCC): ICD-10-CM

## 2023-06-28 DIAGNOSIS — F40.10 SOCIAL PHOBIA: ICD-10-CM

## 2023-06-28 DIAGNOSIS — F51.13 HYPERSOMNIA DUE TO MENTAL DISORDER: ICD-10-CM

## 2023-06-28 PROCEDURE — 99214 OFFICE O/P EST MOD 30 MIN: CPT | Performed by: PSYCHIATRY & NEUROLOGY

## 2023-06-28 PROCEDURE — 90833 PSYTX W PT W E/M 30 MIN: CPT | Performed by: PSYCHIATRY & NEUROLOGY

## 2023-06-28 RX ORDER — CLONAZEPAM 0.5 MG/1
TABLET ORAL
Qty: 60 TABLET | Refills: 2 | Status: SHIPPED | OUTPATIENT
Start: 2023-07-07 | End: 2023-09-23

## 2023-06-28 RX ORDER — MODAFINIL 200 MG/1
200 TABLET ORAL DAILY
Qty: 30 TABLET | Refills: 2 | Status: SHIPPED | OUTPATIENT
Start: 2023-07-04 | End: 2023-10-02

## 2023-07-11 ENCOUNTER — TELEPHONE (OUTPATIENT)
Dept: BARIATRICS | Facility: CLINIC | Age: 38
End: 2023-07-11

## 2023-07-11 NOTE — TELEPHONE ENCOUNTER
Called patient and left message on voice mail  Her July 25th appointment at 3 pm needs to be rescheduled as Tuesdays are for new evaluations only. Rescheduled pt to 3:30 pm August 3rd, as I know patient needs later appointment.    Left message that if August 3 at 3:30 pm does not work, to please call me back for another day  Name and contact information left on V/M  Will also send my chart message

## 2023-07-12 ENCOUNTER — TELEPHONE (OUTPATIENT)
Dept: PSYCHIATRY | Facility: CLINIC | Age: 38
End: 2023-07-12

## 2023-08-03 ENCOUNTER — OFFICE VISIT (OUTPATIENT)
Dept: BARIATRICS | Facility: CLINIC | Age: 38
End: 2023-08-03

## 2023-08-03 VITALS — WEIGHT: 115.3 LBS | BODY MASS INDEX: 19.21 KG/M2 | HEIGHT: 65 IN

## 2023-08-03 DIAGNOSIS — R63.6 UNDERWEIGHT DUE TO INADEQUATE CALORIC INTAKE: Primary | ICD-10-CM

## 2023-08-03 PROCEDURE — RECHECK: Performed by: DIETITIAN, REGISTERED

## 2023-08-03 NOTE — PROGRESS NOTES
Bariatric Follow Up Nutrition Note    Type of surgery  Gastric bypass: laparoscopic  Surgery Date: 4/19/2022  16 months  post-op  Surgeon: Dr. Danny Simon  45 y.o.  female  Ht 5' 5.25" (1.657 m)   Wt 52.3 kg (115 lb 4.8 oz)   BMI 19.04 kg/m²      Pt lost 6.4# over past 6 weeks     Wt Readings from Last 3 Encounters:   06/28/23 54 kg (119 lb)   06/23/23 55.2 kg (121 lb 11.2 oz)   06/06/23 53.1 kg (117 lb)       No calculation needed  Estimated calories for weight maintenance:  1340- kcal per day (25 kcal /kg bw   )   Estimated calories for weight gain  3087-5426  kcal per day  ( 1/2 to 1#  per wk wt gain - sedentary )  Estimated protein needs 54-81 grams per day (1.0-1.5 gms/kg IBW )   Estimated fluid needs 54-64 ounces per day (30-35 ml/kg IBW )      Compared with body comp:  SECA REE = 1271  95% of expected   Estimated calories for wt maintenance = 1652  Estimated calories for weight gain =1902 calories per day ( +250)       Weight on Day of Weight Loss Surgery: 217 lbs   Weight in (lb) to have BMI = 25: 151.9#  Post-Op Wt Loss: 114.2#/ 147% EBWL in 16 months     Review of History and Medications   Past Medical History:   Diagnosis Date   • Acne    • Anxiety    • Back pain    • Bipolar disorder ([address])     pt denies   • Carpal tunnel syndrome on right    • Contact lens overwear of both eyes    • CPAP (continuous positive airway pressure) dependence    • Cubital tunnel syndrome on right    • Depression    • Disease of thyroid gland    • GERD (gastroesophageal reflux disease)    • Headache    • Hypothyroidism    • Kidney stone    • Kidney stones    • Lupus (systemic lupus erythematosus) (HCC)    • Nausea    • Obese     gastric KATLYN-EN-Y  today 4/19/2022   • PONV (postoperative nausea and vomiting)    • Sleep apnea    • Wears glasses      Past Surgical History:   Procedure Laterality Date   • CARPAL TUNNEL RELEASE     • CHOLECYSTECTOMY     • EGD     • SD CYSTOURETHROSCOPY N/A 2/3/2020    Procedure: CYSTOSCOPY;  Surgeon: Debbra Goodell, MD;  [facility];  Service: UroGynecology          • IL LAPAROSCOPY SURG CHOLECYSTECTOMY N/A 9/6/2018    Procedure: CHOLECYSTECTOMY LAPAROSCOPIC W/ ROBOTICS;  Surgeon: Mike Gamez MD;  [facility];  Service: General   • IL LAPS GSTR RSTCV [facility] W/BYP KATLYN-EN-Y LIMB <150 CM N/A 4/19/2022    Procedure: LAPAROSCOPIC KATLYN-EN-Y GASTRIC BYPASS & INTRAOPERATIVE EGD ROBOTICALLY ASSISTED;  Surgeon: David Lopez MD;  [facility];  Service: Bariatrics   • IL NEUROPLASTY &/TRANSPOS MEDIAN NRV CARPAL Rozelle Shahid Right 11/27/2020    Procedure: release CARPAL TUNNEL;  Surgeon: Susan Sandoval MD;  [facility];  Service: Orthopedics   • IL NEUROPLASTY &/TRANSPOSITION ULNAR NERVE ELBOW Right 11/27/2020    Procedure: [facility] [employer];  Surgeon:  Susan Sandoval MD;  [facility];  Service: Orthopedics   • IL POST COLPORRHAPHY RECTOCELE W/WO PERINEORRHAPHY N/A 2/3/2020    Procedure: POSTERIOR COLPORRHAPHY;  Surgeon: Debbra Goodell, MD;  [facility];  Service: UroGynecology          • IL SLING OPERATION STRESS INCONTINENCE N/A 2/3/2020    Procedure: PUBOVAGINAL SLING;  Surgeon: Debbra Goodell, MD;  [facility];  Service: UroGynecology          • TUBAL LIGATION       Social History     Socioeconomic History   • Marital status: Single     Spouse name: Not on file   • Number of children: 3   • Years of education: Not on file   • Highest education level: Associate degree: academic program   Occupational History   • Occupation: [email or web address]     Employer: [employer]   Tobacco Use   • Smoking status: Never   • Smokeless tobacco: Never   Vaping Use   • Vaping Use: Never used   Substance and Sexual Activity   • Alcohol use: Not Currently   • Drug use: No   • Sexual activity: Yes     Partners: Male     Birth control/protection: Female Sterilization     Comment: single   Other Topics Concern   • Not on file   Social History Narrative    Uses seatbelts    Caffeine use     Social Determinants of Health     Financial Resource Strain: Not on file   Food Insecurity: Not on file   Transportation Needs: Not on file   Physical Activity: Sufficiently Active (4/19/2021)    Exercise Vital Sign    • Days of Exercise per Week: 3 days    • Minutes of Exercise per Session: 60 min   Stress: No Stress Concern Present (4/19/2021)    [address] [facility]    • Feeling of Stress : Not at all   Social Connections: Socially Isolated (4/19/2021)    Social Connection and Isolation Panel [NHANES]    • Frequency of Communication with Friends and Family: Never    • Frequency of Social Gatherings with Friends and Family: Never    • Attends Bahai Services: Never    • Active Member of Clubs or Organizations: No    • Attends Club or Organization Meetings: Never    • Marital Status: Never    Intimate Partner Violence: Not At Risk (4/19/2021)    Humiliation, Afraid, Rape, and Kick questionnaire    • Fear of Current or Ex-Partner: No    • Emotionally Abused: No    • Physically Abused: No    • Sexually Abused: No   Housing Stability: Not on file       Current Outpatient Medications:   •  Calcium 600-400 MG-UNIT CHEW, Chew daily, Disp: , Rfl:   •  celecoxib (CeleBREX) 100 mg capsule, Take 1 capsule (100 mg total) by mouth 2 (two) times a day as needed for moderate pain, Disp: 60 capsule, Rfl: 6  •  Cholecalciferol (Vitamin D) 50 MCG (2000 UT) tablet, Take 1 tablet (2,000 Units total) by mouth daily, Disp: 90 tablet, Rfl: 1  •  clobetasol (TEMOVATE) 0.05 % external solution, Apply topically 2 (two) times a day, Disp: 50 mL, Rfl: 0  •  clonazePAM (KlonoPIN) 0.5 mg tablet, TAKE 1/2 TO 1 TABLET BY MOUTH 2 TIMES A DAY AS NEEDED FOR ANXIETY Do not start before July 7, 2023., Disp: 60 tablet, Rfl: 2  •  fluticasone (FLONASE) 50 mcg/act nasal spray, 1 spray into each nostril daily, Disp: 48 g, Rfl: 1  • folic acid (FOLVITE) 1 mg tablet, Take 800 mcg by mouth daily, Disp: , Rfl:   •  hydroxychloroquine (PLAQUENIL) 200 mg tablet, Take 1 tablet (200 mg total) by mouth daily, Disp: 90 tablet, Rfl: 1  •  hydrOXYzine HCL (ATARAX) 25 mg tablet, Take 1 tablet (25 mg total) by mouth every 6 (six) hours as needed for itching (Patient taking differently: Take 25-50 mg by mouth daily as needed for itching or anxiety), Disp: 20 tablet, Rfl: 0  •  levothyroxine 25 mcg tablet, Take 1 tablet (25 mcg total) by mouth daily in the early morning, Disp: 90 tablet, Rfl: 1  •  modafinil (PROVIGIL) 200 MG tablet, Take 1 tablet (200 mg total) by mouth daily Do not start before July 4, 2023., Disp: 30 tablet, Rfl: 2  •  Multiple Vitamins-Minerals (Bariatric Multivitamins/Iron) CAPS, Take by mouth daily, Disp: , Rfl:   •  omeprazole (PriLOSEC) 20 mg delayed release capsule, Take 1 capsule (20 mg total) by mouth daily, Disp: 90 capsule, Rfl: 1  •  triamcinolone (KENALOG) 0.5 % cream, Apply topically 2 (two) times a day, Disp: 30 g, Rfl: 0  •  Vortioxetine HBr (Trintellix) 20 MG tablet, Take 1 tablet (20 mg total) by mouth daily, Disp: 30 tablet, Rfl: 3    Food Intake and Lifestyle Assessment   Food Intake Assessment completed via usual diet recall  Pt wakes up at 3 am, starts work 4:30 am   Breakfast: oatmeal or toast   Snack: cheese stick   Lunch: - skipped today but has been eating regularly   Snack: will pack a protein snack   Dinner: small meal - 1-2 ounces chicken   Snack: 0  Beverage intake: water and coffee/tea  Diet texture/stage: regular  Protein supplement: sometimes, maybe a couple of times per week   Estimated protein intake per day: hard to determine with poor recall   Estimated fluid intake per day: pt feels she is meeting her fluid needs   Meals eaten away from home: not assess   Typical meal pattern: 2 meals per day and 0 snacks per day  Eating Behaviors: pt reports only tolerating about 1/4 of food at one meal     Vitamin and Mineral regimen: she is taking her bariatric vitamins and minerals as recommended. Food allergies or intolerances: difficulty tolerating any volume of food- this is improving    Cultural or Oriental orthodox considerations: n/a     Physical Assessment  Nutrition Related Findings  Diarrhea, Nausea and Loss of Hunger  Pt reports that she does not have any appetite  She was out on FMLA due to stressful events in her life  Diarrhea has resolved since stress is reduced     Physical Activity  Types of exercise: Walking  Current physical limitations: would not recommend intense exercise until weight loss is stabilized     Psychosocial Assessment   Support systems:    Socioeconomic factors: works full time [employer], has 3 children     Nutrition Diagnosis- continued   Diagnosis: Underweight (NC-3.1) and Inadequate protein intake (NI-5.7. 3)  Related to: Altered GI function and Inability or lack of desire to manage self-care  As Evidenced by: Unintentional weight loss     Nutrition Prescription: Recommend the following diet  Small frequent meals 5-6 times per day  Pt did not like Ensure Complete  Suggest mixing protein powder with 2% milk and adding 1/2 cup fruit for a total of 330 calories/ 28 grams protein     Meal Plan ( Jonas/Pro/Carb)   Breakfast: 200-300/20/30  Snack: 150/>5/20  Lunch: 300/30/30-45  Snack: 150/>5/20  Dinner: 300/30/30-45  Snack: 150/>5/20       Interventions and Teaching   Patient educated on post-op nutrition guidelines. Patient educated and handouts provided.   Surgical changes to stomach / GI  Capacity of post-surgery stomach  Adequate hydration  Sugar and fat restriction to decrease "dumping syndrome"  Expected weight loss  Exercise  Nutrition considerations after surgery  Protein supplements  Appropriate carbohydrate, protein, and fat intake, and food/fluid choices to maximize safe weight loss, nutrient intake, and tolerance     Patient is not currently pregnant and doesn't desire to become pregnant a minimum of one year post-op    Education provided to: patient    Barriers to learning: No barriers identified    Readiness to change: preparation and action    Comprehension: verbalizes understanding     Expected Compliance: fair- due to stress in her life     Evaluation/Monitoring   Eating pattern as discussed Tolerance of nutrition prescription Body weight Lab values Physical activity Bowel pattern  Reviewed body comp results with patient. Pt lost 6.4#.  Reports increased stress with moving. She is moving in with her mother in 2 weeks. Admits to not being focused on eating. She continues to have no appetite and needs to remind herself to eat. She tried Ensure jComplete and Boost Complete but did not like the taste. .     Goals- continued   Eat 3 meals per day and plus 3 snacks   Pack an insulated bag, bring to work and eat 5- 6 mini meals per day  Change to higher calorie containing nutritional supplement such as a Weight Lori product ( watch sugar content - keep at 15 grams or less ) or make own protein shake with protein powder, 2% milk and 1/2 cup fruit ( 330 calories )    Add butter, avocado, oil, cheese , milk to foods to increase calories.    Try to avoid drinking with meals to increase volume tolerated at meals  Goal is 1800 calories    One 300 calorie shake per day,    5 meals of 300 calories each OR 6 meals of 250 calories each   Goal is to gain 4-5 pounds over the next month     F/U in 4 weeks to assess weight /body comp     Time Spent:   30 Minutes

## 2023-08-24 ENCOUNTER — TELEPHONE (OUTPATIENT)
Dept: PSYCHIATRY | Facility: CLINIC | Age: 38
End: 2023-08-24

## 2023-08-31 ENCOUNTER — OFFICE VISIT (OUTPATIENT)
Dept: FAMILY MEDICINE CLINIC | Facility: CLINIC | Age: 38
End: 2023-08-31
Payer: COMMERCIAL

## 2023-08-31 VITALS
HEIGHT: 65 IN | BODY MASS INDEX: 19.49 KG/M2 | SYSTOLIC BLOOD PRESSURE: 102 MMHG | WEIGHT: 117 LBS | DIASTOLIC BLOOD PRESSURE: 60 MMHG

## 2023-08-31 DIAGNOSIS — M32.9 SYSTEMIC LUPUS ERYTHEMATOSUS, UNSPECIFIED SLE TYPE, UNSPECIFIED ORGAN INVOLVEMENT STATUS (HCC): ICD-10-CM

## 2023-08-31 DIAGNOSIS — E03.9 ACQUIRED HYPOTHYROIDISM: Primary | ICD-10-CM

## 2023-08-31 DIAGNOSIS — F41.1 GAD (GENERALIZED ANXIETY DISORDER): ICD-10-CM

## 2023-08-31 DIAGNOSIS — J30.1 SEASONAL ALLERGIC RHINITIS DUE TO POLLEN: ICD-10-CM

## 2023-08-31 DIAGNOSIS — F33.40 RECURRENT MAJOR DEPRESSIVE DISORDER IN REMISSION (HCC): ICD-10-CM

## 2023-08-31 DIAGNOSIS — G56.21 ENTRAPMENT OF RIGHT ULNAR NERVE: ICD-10-CM

## 2023-08-31 DIAGNOSIS — M25.9 ELBOW DISORDER: ICD-10-CM

## 2023-08-31 DIAGNOSIS — Z98.84 STATUS POST GASTRIC BYPASS FOR OBESITY: ICD-10-CM

## 2023-08-31 DIAGNOSIS — E55.9 VITAMIN D DEFICIENCY: ICD-10-CM

## 2023-08-31 DIAGNOSIS — K21.9 GASTROESOPHAGEAL REFLUX DISEASE WITHOUT ESOPHAGITIS: Chronic | ICD-10-CM

## 2023-08-31 PROCEDURE — 99214 OFFICE O/P EST MOD 30 MIN: CPT | Performed by: FAMILY MEDICINE

## 2023-08-31 RX ORDER — FLUTICASONE PROPIONATE 50 MCG
1 SPRAY, SUSPENSION (ML) NASAL DAILY
Qty: 48 G | Refills: 1 | Status: SHIPPED | OUTPATIENT
Start: 2023-08-31

## 2023-08-31 RX ORDER — CHOLECALCIFEROL (VITAMIN D3) 50 MCG
2000 TABLET ORAL DAILY
Qty: 90 TABLET | Refills: 1 | Status: SHIPPED | OUTPATIENT
Start: 2023-08-31

## 2023-08-31 RX ORDER — LEVOTHYROXINE SODIUM 0.03 MG/1
25 TABLET ORAL
Qty: 90 TABLET | Refills: 1 | Status: SHIPPED | OUTPATIENT
Start: 2023-08-31

## 2023-08-31 NOTE — PROGRESS NOTES
Chief Complaint   Patient presents with   • Depression   • Hypothyroidism     Name: Jessica Miller      : 1985      MRN: [id number]  Encounter Provider: Kalen Langston DO  Encounter Date: 2023   Encounter department: [facility] PRIMARY CARE    Assessment & Plan     1. Acquired hypothyroidism  Comments:  Continue levothyroxine 25 mcg daily. Recheck lab in November  Orders:  -     levothyroxine 25 mcg tablet; Take 1 tablet (25 mcg total) by mouth daily in the early morning  -     TSH, 3rd generation; Future; Expected date: 2023  -     T4; Future; Expected date: 2023    2. Gastroesophageal reflux disease without esophagitis  Comments:  Continue omeprazole recheck 6 months    3. Systemic lupus erythematosus, unspecified SLE type, unspecified organ involvement status ([address])  Comments: Follow-up with rheumatology, continue Plaquenil, Celebrex. Yearly eye exams recommended. 4. Status post gastric bypass for obesity  Comments:  Weight seems to have stabilized. 5. Entrapment of right ulnar nerve  Comments:  Instability of elbow ligament as well. Refer to hand surgery. Orders:  -     Ambulatory Referral to Orthopedic Surgery; Future    6. Seasonal allergic rhinitis due to pollen  -     fluticasone (FLONASE) 50 mcg/act nasal spray; 1 spray into each nostril daily    7. Vitamin D deficiency  -     Cholecalciferol (Vitamin D) 50 MCG (2000 UT) tablet; Take 1 tablet (2,000 Units total) by mouth daily  -     Vitamin D 25 hydroxy; Future; Expected date: 2023    8. Elbow disorder    9. JG (generalized anxiety disorder)  Comments:  Much improved. 10. Recurrent major depressive disorder in remission [facility])  Comments:  Improved, continue with psychiatry. Subjective      She presents to the office for follow-up on her vitamin D, hypothyroidism, and to update me on her psychiatric care, rheumatologic care, and with a couple new complaints.   Overall she seems to be doing much better from a psychiatric standpoint her medications have been adjusted and she no longer seems to be in major depression. Rheumatology seems to have her joint pain under decent control with Plaquenil and Celebrex. She is complaining of right elbow discomfort numbness and tingling into her hand. She did have surgery on her right elbow back in 2020. Currently complaining of some pain at the elbow and snapping of the medial ligament. Review of Systems   Constitutional: Negative for activity change, chills, diaphoresis, fatigue, fever and unexpected weight change. HENT: Negative for congestion, ear pain, hearing loss, nosebleeds, postnasal drip, rhinorrhea, sinus pressure, sore throat and tinnitus. Eyes: Negative for photophobia, pain, discharge, redness and visual disturbance. Respiratory: Negative for cough, chest tightness, shortness of breath and wheezing. Cardiovascular: Negative for chest pain, palpitations and leg swelling. Denies PAZ   Gastrointestinal: Negative for abdominal pain, blood in stool, constipation, diarrhea, nausea and vomiting. Endocrine: Negative. Genitourinary: Negative for difficulty urinating, dysuria, frequency, hematuria and urgency. Musculoskeletal: Positive for arthralgias. Negative for joint swelling and myalgias. Skin: Negative for rash and wound. Denies skin lesions, change in birthmarks   Allergic/Immunologic: Negative for environmental allergies, food allergies and immunocompromised state. Neurological: Positive for numbness. Negative for dizziness, tremors, seizures, syncope, weakness and headaches. Hematological: Negative. Psychiatric/Behavioral: Negative for behavioral problems, confusion, decreased concentration and sleep disturbance. The patient is not nervous/anxious.         Current Outpatient Medications on File Prior to Visit   Medication Sig   • Calcium 600-400 MG-UNIT CHEW Chew daily   • clonazePAM (KlonoPIN) 0.5 mg tablet TAKE 1/2 TO 1 TABLET BY MOUTH 2 TIMES A DAY AS NEEDED FOR ANXIETY Do not start before July 7, 2023. • folic acid (FOLVITE) 1 mg tablet Take 800 mcg by mouth daily   • hydroxychloroquine (PLAQUENIL) 200 mg tablet Take 1 tablet (200 mg total) by mouth daily   • modafinil (PROVIGIL) 200 MG tablet Take 1 tablet (200 mg total) by mouth daily Do not start before July 4, 2023. • Multiple Vitamins-Minerals (Bariatric Multivitamins/Iron) CAPS Take by mouth daily   • omeprazole (PriLOSEC) 20 mg delayed release capsule Take 1 capsule (20 mg total) by mouth daily   • Vortioxetine HBr (Trintellix) 20 MG tablet Take 1 tablet (20 mg total) by mouth daily   • [DISCONTINUED] Cholecalciferol (Vitamin D) 50 MCG (2000 UT) tablet Take 1 tablet (2,000 Units total) by mouth daily   • [DISCONTINUED] levothyroxine 25 mcg tablet Take 1 tablet (25 mcg total) by mouth daily in the early morning   • [DISCONTINUED] celecoxib (CeleBREX) 100 mg capsule Take 1 capsule (100 mg total) by mouth 2 (two) times a day as needed for moderate pain (Patient not taking: Reported on 8/31/2023)   • [DISCONTINUED] clobetasol (TEMOVATE) 0.05 % external solution Apply topically 2 (two) times a day (Patient not taking: Reported on 8/31/2023)   • [DISCONTINUED] fluticasone (FLONASE) 50 mcg/act nasal spray 1 spray into each nostril daily (Patient not taking: Reported on 8/31/2023)   • [DISCONTINUED] hydrOXYzine HCL (ATARAX) 25 mg tablet Take 1 tablet (25 mg total) by mouth every 6 (six) hours as needed for itching (Patient not taking: Reported on 8/31/2023)   • [DISCONTINUED] triamcinolone (KENALOG) 0.5 % cream Apply topically 2 (two) times a day (Patient not taking: Reported on 8/31/2023)       Objective     /60   Ht 5' 5.25" (1.657 m)   Wt 53.1 kg (117 lb)   BMI 19.32 kg/m²     Physical Exam  Vitals and nursing note reviewed. Constitutional:       General: She is not in acute distress. Appearance: She is well-developed.    HENT:      Head: Normocephalic and atraumatic. Right Ear: Tympanic membrane, ear canal and external ear normal.      Left Ear: Tympanic membrane, ear canal and external ear normal.      Nose: Nose normal.   Eyes:      Conjunctiva/sclera: Conjunctivae normal.      Pupils: Pupils are equal, round, and reactive to light. Neck:      Thyroid: No thyromegaly. Vascular: No JVD. Trachea: No tracheal deviation. Cardiovascular:      Rate and Rhythm: Normal rate and regular rhythm. Heart sounds: Normal heart sounds. No murmur heard. Pulmonary:      Effort: Pulmonary effort is normal.      Breath sounds: Normal breath sounds. No wheezing or rales. Abdominal:      General: Bowel sounds are normal.      Palpations: Abdomen is soft. There is no mass. Tenderness: There is no abdominal tenderness. There is no guarding or rebound. Musculoskeletal:         General: Tenderness (Medial right elbow) present. Normal range of motion. Cervical back: Normal range of motion and neck supple. Comments: Medial right elbow with visible and palpable popping of the ligament. Lymphadenopathy:      Cervical: No cervical adenopathy. Skin:     General: Skin is warm and dry. Findings: No lesion or rash. Neurological:      Mental Status: She is alert and oriented to person, place, and time. Cranial Nerves: No cranial nerve deficit. Deep Tendon Reflexes: Reflexes are normal and symmetric.    Psychiatric:         Judgment: Judgment normal.       Kristine Canela DO

## 2023-09-06 ENCOUNTER — DOCUMENTATION (OUTPATIENT)
Dept: PSYCHIATRY | Facility: CLINIC | Age: 38
End: 2023-09-06

## 2023-09-06 DIAGNOSIS — F41.1 GAD (GENERALIZED ANXIETY DISORDER): ICD-10-CM

## 2023-09-06 DIAGNOSIS — F33.2 MAJOR DEPRESSIVE DISORDER, RECURRENT, SEVERE W/O PSYCHOTIC BEHAVIOR (HCC): Primary | ICD-10-CM

## 2023-09-06 NOTE — PROGRESS NOTES
Psychotherapy Discharge Summary    Preferred Name: Tabatha Galaviz  YOB: 1985    Admission date to psychotherapy: 6/28/2023    Referred by: Sterling Ibarra (previous therapist) - case transfer    Presenting Problem: Samuel Hardy has been working on her anxiety and depression symptoms. Samuel Hardy requested to not have to "unload and go over all of her history right away". Samuel Hardy reports that she has been overall stable as of recent and has not experienced a relapse of depressive symptoms for a little while. Course of treatment included : individual therapy     Progress/Outcome of Treatment Goals (brief summary of course of treatment) Samuel Hardy presented for therapy after having been with a previous therapist for three years. Samuel Hardy was therapeutically resistant to engaging in therapy as a result of past hurts and lack of trust.  For the first six months of her therapy Samuel Hardy remained superficial and only providing clinician with surface issues that presented. It was the accumulation of stressors and life events that allowed Samuel Hardy to open up on the extent of past traumas both processed and unprocessed, negative core beliefs, extents of her anxieties and depression. At this time of opening up to provider Desire's stressors had exacerbated her depression and living situation making her attendance inconsistent. Treatment Complications (if any): Samuel Hardy has had several complication that have impacted her course in treatment.   These include: medical health concerns that have both impacted her mental health as well as interfered in her ability to engage in consistent sessions, the death of her long term partner, legal changes, change in her living situation    Treatment Progress: fair    Current [facility] Psychiatric Provider: Clarisa Cain DO    Discharge Medications include:   Current Outpatient Medications on File Prior to Visit   Medication Sig Dispense Refill   • Calcium 600-400 MG-UNIT CHEW Chew daily     • Cholecalciferol (Vitamin D) 50 MCG (2000 UT) tablet Take 1 tablet (2,000 Units total) by mouth daily 90 tablet 1   • clonazePAM (KlonoPIN) 0.5 mg tablet TAKE 1/2 TO 1 TABLET BY MOUTH 2 TIMES A DAY AS NEEDED FOR ANXIETY Do not start before July 7, 2023. 60 tablet 2   • fluticasone (FLONASE) 50 mcg/act nasal spray 1 spray into each nostril daily 48 g 1   • folic acid (FOLVITE) 1 mg tablet Take 800 mcg by mouth daily     • hydroxychloroquine (PLAQUENIL) 200 mg tablet Take 1 tablet (200 mg total) by mouth daily 90 tablet 1   • levothyroxine 25 mcg tablet Take 1 tablet (25 mcg total) by mouth daily in the early morning 90 tablet 1   • modafinil (PROVIGIL) 200 MG tablet Take 1 tablet (200 mg total) by mouth daily Do not start before July 4, 2023. 30 tablet 2   • Multiple Vitamins-Minerals (Bariatric Multivitamins/Iron) CAPS Take by mouth daily     • omeprazole (PriLOSEC) 20 mg delayed release capsule Take 1 capsule (20 mg total) by mouth daily 90 capsule 1   • Vortioxetine HBr (Trintellix) 20 MG tablet Take 1 tablet (20 mg total) by mouth daily 30 tablet 3     No current facility-administered medications on file prior to visit. Discharge Date: 9/6/2023    Discharge Diagnosis:   1. Major depressive disorder, recurrent, severe w/o psychotic behavior ([address])        2. JG (generalized anxiety disorder)            Criteria for Discharge: demonstrated failure to uphold their treatment plan/contract    Aftercare recommendations include (include specific referral names and phone numbers, if appropriate): Madhav Whittington was told when there is a stabilization of her home life and she is able to attend therapy that this provider will take her back into therapy services again. Madhav Whittington is recommended to continue with medication management.      Prognosis: fair

## 2023-09-12 DIAGNOSIS — M32.9 LUPUS (HCC): ICD-10-CM

## 2023-09-12 RX ORDER — HYDROXYCHLOROQUINE SULFATE 200 MG/1
200 TABLET, FILM COATED ORAL DAILY
Qty: 90 TABLET | Refills: 1 | Status: SHIPPED | OUTPATIENT
Start: 2023-09-12 | End: 2024-03-10

## 2023-09-15 ENCOUNTER — TELEPHONE (OUTPATIENT)
Dept: PSYCHIATRY | Facility: CLINIC | Age: 38
End: 2023-09-15

## 2023-09-15 NOTE — TELEPHONE ENCOUNTER
DISCHARGE LETTER for Bill Roblero (certified) placed in outgoing mail on 9/20/2023    Article #:  [id number] Zana    Address:  [address] [place] [id number]
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
## 2023-09-22 ENCOUNTER — OFFICE VISIT (OUTPATIENT)
Dept: BARIATRICS | Facility: CLINIC | Age: 38
End: 2023-09-22

## 2023-09-22 VITALS — BODY MASS INDEX: 19.48 KG/M2 | WEIGHT: 118 LBS

## 2023-09-22 DIAGNOSIS — R63.6 UNDERWEIGHT DUE TO INADEQUATE CALORIC INTAKE: Primary | ICD-10-CM

## 2023-09-22 PROCEDURE — RECHECK: Performed by: DIETITIAN, REGISTERED

## 2023-09-29 ENCOUNTER — OFFICE VISIT (OUTPATIENT)
Dept: PSYCHIATRY | Facility: CLINIC | Age: 38
End: 2023-09-29
Payer: COMMERCIAL

## 2023-09-29 DIAGNOSIS — F40.10 SOCIAL PHOBIA: ICD-10-CM

## 2023-09-29 DIAGNOSIS — F41.1 GAD (GENERALIZED ANXIETY DISORDER): ICD-10-CM

## 2023-09-29 DIAGNOSIS — F51.13 HYPERSOMNIA DUE TO MENTAL DISORDER: ICD-10-CM

## 2023-09-29 DIAGNOSIS — F33.2 MAJOR DEPRESSIVE DISORDER, RECURRENT, SEVERE W/O PSYCHOTIC BEHAVIOR (HCC): ICD-10-CM

## 2023-09-29 PROCEDURE — 90833 PSYTX W PT W E/M 30 MIN: CPT | Performed by: PSYCHIATRY & NEUROLOGY

## 2023-09-29 PROCEDURE — 99214 OFFICE O/P EST MOD 30 MIN: CPT | Performed by: PSYCHIATRY & NEUROLOGY

## 2023-09-29 RX ORDER — MODAFINIL 200 MG/1
200 TABLET ORAL DAILY
Qty: 30 TABLET | Refills: 2 | Status: SHIPPED | OUTPATIENT
Start: 2023-10-01 | End: 2023-12-30

## 2023-09-29 RX ORDER — CLONAZEPAM 0.5 MG/1
TABLET ORAL
Qty: 60 TABLET | Refills: 2 | Status: SHIPPED | OUTPATIENT
Start: 2023-10-01 | End: 2023-12-16

## 2023-09-29 NOTE — BH TREATMENT PLAN
TREATMENT PLAN (Medication Management Only)        [address] [facility]    Name/Date of Birth/MRN:  Zuly Kern 45 y.o. 1985 MRN: [id number]  Date of Treatment Plan: September 29, 2023  Diagnosis/Diagnoses:   1. JG (generalized anxiety disorder)    2. Social phobia    3. Major depressive disorder, recurrent, severe w/o psychotic behavior ([address])    4. 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 Long Term Goal: ""getting back on my feet""   Target Date: 180 days from treatment plan  Person/Persons responsible for completion of goal: Dr. Grace Schroeder and Self  2. Short Term Objective (s) - How will we reach this goal?:   A. Provider new recommended medication/dosage changes and/or continue medication(s): as noted in chart. Guerline Calero a job again  Cha Mcpherson legal avenues to rectify current situation  Target Date: 6 months from treatment plan unless noted otherwise  Person/Persons Responsible for Completion of Goal: Dr. Grace Schroeder and Self   Progress Towards Goals: continuing treatment   Treatment Modality: Medication management and therapy PRN  Review due 180 days from date of this plan: Approximately 6 months from today ( 3/29/2024 )    Expected length of service: ongoing treatment unless revised  My Physician/PA/NP and I have developed this plan together and I agree to work on the goals and objectives. I understand the treatment goals that were developed for my treatment.   Signature:       Date and time:  Signature of parent/guardian if under age of 15 years: Date and time:  Signature of provider:      Date and time:  Signature of Supervising Physician:    Date and time: 9/29/2023      Yesenia Hicks III, DO
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
## 2023-10-12 ENCOUNTER — APPOINTMENT (OUTPATIENT)
Dept: LAB | Facility: HOSPITAL | Age: 38
End: 2023-10-12
Payer: COMMERCIAL

## 2023-10-12 DIAGNOSIS — Z72.51 HIGH RISK HETEROSEXUAL BEHAVIOR: ICD-10-CM

## 2023-10-12 LAB
BACTERIA UR QL AUTO: ABNORMAL /HPF
BILIRUB UR QL STRIP: NEGATIVE
C3 SERPL-MCNC: 84 MG/DL (ref 87–200)
C4 SERPL-MCNC: 25 MG/DL (ref 19–52)
CLARITY UR: CLEAR
COLOR UR: YELLOW
CREAT UR-MCNC: 136.3 MG/DL
GLUCOSE UR STRIP-MCNC: NEGATIVE MG/DL
HBV SURFACE AG SER QL: NORMAL
HCV AB SER QL: NORMAL
HGB UR QL STRIP.AUTO: NEGATIVE
HIV 1+2 AB+HIV1 P24 AG SERPL QL IA: NORMAL
HIV 2 AB SERPL QL IA: NORMAL
HIV1 AB SERPL QL IA: NORMAL
HIV1 P24 AG SERPL QL IA: NORMAL
KETONES UR STRIP-MCNC: NEGATIVE MG/DL
LEUKOCYTE ESTERASE UR QL STRIP: ABNORMAL
MUCOUS THREADS UR QL AUTO: ABNORMAL
NITRITE UR QL STRIP: NEGATIVE
NON-SQ EPI CELLS URNS QL MICRO: ABNORMAL /HPF
PH UR STRIP.AUTO: 6 [PH]
PROT UR STRIP-MCNC: ABNORMAL MG/DL
PROT UR-MCNC: 16 MG/DL
PROT/CREAT UR: 0.12 MG/G{CREAT} (ref 0–0.1)
RBC #/AREA URNS AUTO: ABNORMAL /HPF
SP GR UR STRIP.AUTO: 1.03 (ref 1–1.03)
TREPONEMA PALLIDUM IGG+IGM AB [PRESENCE] IN SERUM OR PLASMA BY IMMUNOASSAY: NORMAL
UROBILINOGEN UR STRIP-ACNC: <2 MG/DL
WBC #/AREA URNS AUTO: ABNORMAL /HPF

## 2023-10-12 PROCEDURE — 86780 TREPONEMA PALLIDUM: CPT

## 2023-10-12 PROCEDURE — 86803 HEPATITIS C AB TEST: CPT

## 2023-10-12 PROCEDURE — 87340 HEPATITIS B SURFACE AG IA: CPT

## 2023-10-12 PROCEDURE — 87389 HIV-1 AG W/HIV-1&-2 AB AG IA: CPT

## 2023-10-13 LAB — DSDNA AB SER-ACNC: 11 IU/ML (ref 0–9)

## 2023-10-24 ENCOUNTER — OFFICE VISIT (OUTPATIENT)
Dept: BARIATRICS | Facility: CLINIC | Age: 38
End: 2023-10-24
Payer: COMMERCIAL

## 2023-10-24 ENCOUNTER — OFFICE VISIT (OUTPATIENT)
Dept: RHEUMATOLOGY | Facility: CLINIC | Age: 38
End: 2023-10-24
Payer: COMMERCIAL

## 2023-10-24 VITALS
SYSTOLIC BLOOD PRESSURE: 132 MMHG | HEART RATE: 81 BPM | DIASTOLIC BLOOD PRESSURE: 64 MMHG | BODY MASS INDEX: 19.29 KG/M2 | HEIGHT: 66 IN | WEIGHT: 120 LBS | TEMPERATURE: 96.5 F

## 2023-10-24 VITALS
WEIGHT: 120 LBS | DIASTOLIC BLOOD PRESSURE: 62 MMHG | HEIGHT: 66 IN | BODY MASS INDEX: 19.29 KG/M2 | SYSTOLIC BLOOD PRESSURE: 94 MMHG

## 2023-10-24 DIAGNOSIS — Z79.899 HIGH RISK MEDICATION USE: ICD-10-CM

## 2023-10-24 DIAGNOSIS — Z98.84 BARIATRIC SURGERY STATUS: ICD-10-CM

## 2023-10-24 DIAGNOSIS — G56.01 RIGHT CARPAL TUNNEL SYNDROME: ICD-10-CM

## 2023-10-24 DIAGNOSIS — Z48.815 ENCOUNTER FOR SURGICAL AFTERCARE FOLLOWING SURGERY OF DIGESTIVE SYSTEM: ICD-10-CM

## 2023-10-24 DIAGNOSIS — G56.21 ULNAR NERVE ENTRAPMENT AT ELBOW, RIGHT: ICD-10-CM

## 2023-10-24 DIAGNOSIS — M32.9 LUPUS (HCC): Primary | ICD-10-CM

## 2023-10-24 DIAGNOSIS — K91.2 POSTSURGICAL MALABSORPTION: ICD-10-CM

## 2023-10-24 DIAGNOSIS — K21.9 GASTROESOPHAGEAL REFLUX DISEASE WITHOUT ESOPHAGITIS: Primary | Chronic | ICD-10-CM

## 2023-10-24 DIAGNOSIS — M25.50 ARTHRALGIA OF MULTIPLE JOINTS: ICD-10-CM

## 2023-10-24 PROCEDURE — 99213 OFFICE O/P EST LOW 20 MIN: CPT | Performed by: NURSE PRACTITIONER

## 2023-10-24 PROCEDURE — 99214 OFFICE O/P EST MOD 30 MIN: CPT | Performed by: INTERNAL MEDICINE

## 2023-10-24 RX ORDER — GABAPENTIN 100 MG/1
100 CAPSULE ORAL
Qty: 30 CAPSULE | Refills: 6 | Status: SHIPPED | OUTPATIENT
Start: 2023-10-24

## 2023-10-24 RX ORDER — HYDROXYCHLOROQUINE SULFATE 200 MG/1
200 TABLET, FILM COATED ORAL DAILY
Qty: 90 TABLET | Refills: 1 | Status: SHIPPED | OUTPATIENT
Start: 2023-10-24 | End: 2024-04-21

## 2023-10-24 NOTE — PROGRESS NOTES
Assessment/Plan:     Patient ID: Brandan Gusman is a 45 y.o. female. Bariatric Surgery Status/GERD    -s/p Mary Lou-En-Y Gastric Bypass with Dr. Diane Gaston on 04/19/2022. Presents to the office today for annual post op visit. Overall doing fair, she had lost a significant amount of weight after she lost her significant other to rectal CA. Working closely with our RD monthly to monitor her weight. Has gained a few lbs since last visit. She is tolerating a regular diet. Denies having any N/V/D/C, regurgitation, reflux or dysphagia. Taking her MVI daily and PPI daily. PLAN:     - emotional support provided. Continue to meet with our RD.   - taper off omeprazole if rheumatologist is not going to place her on any NSAIDs. She is agreeable to this. - Routine follow up in 6 months for annual visit. - Continue with healthy lifestyle, adequate protein intake of 60 gm, fluid intake of at least 64 oz. - Continue with MVI daily. - Activity as tolerated. - Labs ordered and will adjust accordingly if any deficiency. - Follow up with RD and SW as needed. Continued/Maintain healthy weight loss with good nutrition intakes. Adequate hydration with at least 64oz. fluid intake. Follow diet as discussed. Follow vitamin and mineral recommendations as reviewed with you. Exercise as tolerated. Colonoscopy referral made: N/A  Mammogram - N/A    Follow-up in 6 months for annual visit. We kindly ask that your arrive 15 minutes before your scheduled appointment time with your provider to allow our staff to room you, get your vital signs and update your chart. Get lab work done prior to annual visit. Please call the office if you need a script. It is recommended to check with your insurance BEFORE getting labs done to make sure they are covered by your policy.       Call our office if you have any problems with abdominal pain especially associated with fever, chills, nausea, vomiting or any other concerns. All  Post-bariatric surgery patients should be aware that very small quantities of any alcohol can cause impairment and it is very possible not to feel the effect. The effect can be in the system for several hours. It is also a stomach irritant. It is advised to AVOID alcohol, Nonsteroidal antiinflammatory drugs (NSAIDS) and nicotine of all forms . Any of these can cause stomach irritation/pain. Discussed the effects of alcohol on a bariatric patient and the increased impairment risk. Keep up the good work! Postsurgical Malabsorption   -At risk for malabsorption of vitamins/minerals secondary to malabsorption and restriction of intake from bariatric surgery  -Currently taking adequate postop bariatric surgery vitamin supplementation  -Last set of bariatric labs completed on 04/2023 and showed WNL-Patient received education about the importance of adhering to a lifelong supplementation regimen to avoid vitamin/mineral deficiencies      Diagnoses and all orders for this visit:    Gastroesophageal reflux disease without esophagitis    Encounter for surgical aftercare following surgery of digestive system    Bariatric surgery status    Body mass index (BMI) of 19.0 to 19.9 in adult    Postsurgical malabsorption         Subjective:      Patient ID: Radha Lugo is a 45 y.o. female. -s/p Mary Lou-En-Y Gastric Bypass with Dr. Halie Gupta on 04/19/2022. Presents to the office today for annual post op visit. Overall doing fair, she had lost a significant amount of weight after she lost her significant other to rectal CA. Working closely with our RD monthly to monitor her weight. Has gained a few lbs since last visit. She is tolerating a regular diet. Denies having any N/V/D/C, regurgitation, reflux or dysphagia. Taking her MVI daily and PPI daily.      Initial: 229 lbs  Current: 120 lbs  EWL: (Weight loss is ahead of schedule at this post surgical period.)  Mauro: 115 lbs  Current BMI is Body mass index is 19.67 kg/m². Tolerating a regular diet-yes  Eating at least 60 grams of protein per day-yes  Following 30/60 minute rule with liquids-yes  Drinking at least 64 ounces of fluid per day-yes  Drinking carbonated beverages-no   Sufficient exercise-no - but is active  Using NSAIDs regularly-no  Using nicotine-no  Using alcohol-no  Supplements:  Multivitamins, Calcium  and iron included in MVI, Folic acid    EWL is 534%, which places the patient ahead of schedule for expected post surgical weight loss at this time. The following portions of the patient's history were reviewed and updated as appropriate: allergies, current medications, past family history, past medical history, past social history, past surgical history and problem list.    Review of Systems   Constitutional: Negative. Respiratory: Negative. Cardiovascular: Negative. Gastrointestinal: Negative. Musculoskeletal:  Positive for arthralgias and back pain. Psychiatric/Behavioral: Negative. Objective:    /64   Pulse 81   Temp (!) 96.5 °F (35.8 °C) (Tympanic)   Ht 5' 5.5" (1.664 m)   Wt 54.4 kg (120 lb)   BMI 19.67 kg/m²      Physical Exam  Vitals and nursing note reviewed. Constitutional:       Appearance: Normal appearance. She is normal weight. Cardiovascular:      Rate and Rhythm: Normal rate and regular rhythm. Pulses: Normal pulses. Heart sounds: Normal heart sounds. Pulmonary:      Effort: Pulmonary effort is normal.      Breath sounds: Normal breath sounds. Abdominal:      General: Bowel sounds are normal.      Palpations: Abdomen is soft. Tenderness: There is no abdominal tenderness. Musculoskeletal:         General: Normal range of motion. Skin:     General: Skin is warm and dry. Neurological:      General: No focal deficit present. Mental Status: She is alert and oriented to person, place, and time.    Psychiatric:         Mood and Affect: Mood normal. Behavior: Behavior normal.         Thought Content:  Thought content normal.         Judgment: Judgment normal.

## 2023-10-24 NOTE — PATIENT INSTRUCTIONS
Continue hydroxychlorquine 200mg daily; continue regular eye exams  Use diclofenac gel as needed for joint pain  Try gabapentin at night for ulnar neuropathy and carpal tunnel syndrome on right side  Hand surgery referral made  Do lupus activity labs before next visit    Return to clinic in 4 months

## 2023-10-24 NOTE — PROGRESS NOTES
Assessment and Plan:  Jaqui Hall is a 45 y.o.  female who presents for follow-up of her SLE. Her lupus activity labs have improved (anti-dsDNA decreasing and C3 increasing) and she overall feels well. Prednisone course seems to have helped her symptoms and labs after her last lupus activity labs showed some worsening. Her weight has stabilized since weight loss surgery. Systemic lupus  Continue hydroxychloroquine 200 mg daily and continue with regular eye exams  Do lupus activity labs before next visit    Joint pain  Use diclofenac gel as needed for joint pain    Right ulnar neuropathy and carpal tunnel syndrome  Try gabapentin at night for ulnar neuropathy and carpal tunnel syndrome on right side  Hand surgery referral made    Continue hydroxychlorquine 200mg daily; continue regular eye exams  Use diclofenac gel as needed for joint pain  Try gabapentin at night for ulnar neuropathy and carpal tunnel syndrome on right side  Hand surgery referral made  Do lupus activity labs before next visit    Return to clinic in 4 months    Plan:  1. Lupus (HCC)  -     hydroxychloroquine (PLAQUENIL) 200 mg tablet; Take 1 tablet (200 mg total) by mouth daily  -     CBC and differential  -     Comprehensive metabolic panel  -     C-reactive protein  -     Sedimentation rate, automated  -     Urinalysis with microscopic  -     Protein / creatinine ratio, urine  -     C4 complement  -     C3 complement  -     Anti-DNA antibody, double-stranded    2. Arthralgia of multiple joints  -     Diclofenac Sodium (VOLTAREN) 1 %; Apply 2 g topically 4 (four) times a day    3. Ulnar nerve entrapment at elbow, right  -     gabapentin (Neurontin) 100 mg capsule; Take 1 capsule (100 mg total) by mouth daily at bedtime As needed for numbness  -     Ambulatory Referral to Hand Surgery; Future    4. Right carpal tunnel syndrome  -     gabapentin (Neurontin) 100 mg capsule;  Take 1 capsule (100 mg total) by mouth daily at bedtime As needed for numbness  -     Ambulatory Referral to Hand Surgery; Future    5. High risk medication use    High risk medication use - Benefits and risks of hydroxychloroquine, including but not limited to retinal toxicity, corneal deposits, gastrointestinal side effects, and headaches were discussed with the patient. The need for a regular eye exam to monitor for ocular toxicity while on this medication was also explained to the patient. Return to clinic in 4 months      Chief Complaint  Follow-up on SLE    Rheumatic Disease Summary  Last visit 4/20/23: Patient presents for follow-up of her SLE (malar rash, arthralgias, dry eyes, hair thinning, equivocal anti-dsDNA, indeterminate anticardiolipin IgM, +MAYUR at 1:160 speckled pattern). Has continued to lose a lot of weight; has some back pain. Do lupus activity labs; Anti-dsDNA is elevated and C3 is trending down, signifying her lupus is active - prescribed a prednisone course, and will repeat her lupus activity labs in 2 months  Decrease hydroxychlorquine to one tab daily due to weight loss; continue regular eye exams  Can take celecoxib twice a day as needed for joint pain    HPI  Elvi Worrell is a [id number] female who presents with here for follow-up of her lupus. Patient consents to the use of ASHLEY. The following portions of the patient's history were reviewed and updated as appropriate: allergies, current medications, past family history, past medical history, past social history, past surgical history and problem list.    Interval History  She did take the 4-week prednisone course as instructed. She notes aside from helping stabilize her weight, it helped alleviate her symptoms and flare-ups a little. Her lab results look much better than they were before. She denies taking any NSAIDs because she does not want to upset her stomach. She has Celebrex at home but will take it on an as needed basis but needs to stake 2 omeprazole beforehand.  She denies using Voltaren or diclofenac gel. She reports she had ulnar neuropathy on her right hand because of displaced nerve with numbness and tingling sensation at nighttime with swelling. Her right hand carpal tunnel symptoms are also returning. She wants to be re-evaluated by hand surgery. The left hand is fine. She states she had rashes from lupus, but it is not the main symptom. She notices that her flareups improve after she relaxes for a few days but occasionally, she has a burning sensation in her skin. Her knees hurt due to the cold weather conditions, but it is okay now. She denies any specific joint that is bothering her. She denies Raynaud's symptoms or history of blood clots or miscarriages. Review of Systems:   Pertinent ROS positive for fatigue, joint pain, back pain, muscle pain, numbness and environmental allergies. Rest of 14-point ROS reviewed and were negative.     Home Medications:    Current Outpatient Medications:     Calcium 600-400 MG-UNIT CHEW, Chew daily, Disp: , Rfl:     Cholecalciferol (Vitamin D) 50 MCG (2000 UT) tablet, Take 1 tablet (2,000 Units total) by mouth daily, Disp: 90 tablet, Rfl: 1    clonazePAM (KlonoPIN) 0.5 mg tablet, TAKE 1/2 TO 1 TABLET BY MOUTH 2 TIMES A DAY AS NEEDED FOR ANXIETY Do not start before October 1, 2023., Disp: 60 tablet, Rfl: 2    Diclofenac Sodium (VOLTAREN) 1 %, Apply 2 g topically 4 (four) times a day, Disp: 100 g, Rfl: 6    fluticasone (FLONASE) 50 mcg/act nasal spray, 1 spray into each nostril daily, Disp: 48 g, Rfl: 1    folic acid (FOLVITE) 1 mg tablet, Take 800 mcg by mouth daily, Disp: , Rfl:     gabapentin (Neurontin) 100 mg capsule, Take 1 capsule (100 mg total) by mouth daily at bedtime As needed for numbness, Disp: 30 capsule, Rfl: 6    hydroxychloroquine (PLAQUENIL) 200 mg tablet, Take 1 tablet (200 mg total) by mouth daily, Disp: 90 tablet, Rfl: 1    levothyroxine 25 mcg tablet, Take 1 tablet (25 mcg total) by mouth daily in the early morning, Disp: 90 tablet, Rfl: 1    modafinil (PROVIGIL) 200 MG tablet, Take 1 tablet (200 mg total) by mouth daily Do not start before October 1, 2023., Disp: 30 tablet, Rfl: 2    Multiple Vitamins-Minerals (Bariatric Multivitamins/Iron) CAPS, Take by mouth daily, Disp: , Rfl:     omeprazole (PriLOSEC) 20 mg delayed release capsule, Take 1 capsule (20 mg total) by mouth daily, Disp: 90 capsule, Rfl: 1    Vortioxetine HBr (Trintellix) 20 MG tablet, Take 1 tablet (20 mg total) by mouth daily, Disp: 30 tablet, Rfl: 3    Objective:    Vitals:    10/24/23 1603   BP: 94/62   BP Location: Left arm   Patient Position: Sitting   Cuff Size: Adult   Weight: 54.4 kg (120 lb)   Height: 5' 5.5" (1.664 m)       Physical Exam:  Constitutional:    General:   She  is not in acute distress. HENT:   Head: Normocephalic and atraumatic. Eyes:   Conjunctiva/sclera: Conjunctivae normal.   Cardiovascular:   Rate and Rhythm: Normal rate and regular rhythm. Heart sounds: S1 normal and S2 normal.   No friction rub. Pulmonary:   Effort: Pulmonary effort is normal. No respiratory distress. Breath sounds: Normal breath sounds. No wheezing, rhonchi or rales. Musculoskeletal:   Cervical back: Neck supple. Skin:  Coloration: Skin is not pale. Neurological:   Mental Status: She is alert. Mental status is at baseline. Psychiatric:      Mood and Affect: Mood normal.      Behavior: Behavior normal.      I have personally reviewed results with the patient.     Imaging:   EGD 01/31/2022  The esophagus, stomach and 2nd part of the duodenum appeared normal.  Performed single biopsy to rule out H. pylori in the antrum  Small sliding hiatal hernia (type I hiatal hernia) without Nito Cane lesions present     SI Joint x-rays 11/9/2019 - unremarkable    Labs:   Appointment on 10/12/2023   Component Date Value Ref Range Status    HIV-1 p24 Antigen 10/12/2023 Non-Reactive  Non-Reactive Final    HIV-1 Antibody 10/12/2023 Non-Reactive  Non-Reactive Final    HIV-2 Antibody 10/12/2023 Non-Reactive  Non-Reactive Final    HIV Ag-Ab 5th Gen 10/12/2023 Non-Reactive  Non-Reactive Final    A Non-Reactive test result does not preclude the possibility of exposure or infection with HIV-1 and/or HIV-2. Non-Reactive results can occur if the quantity of marker present is below the detection limits or is not present during the stage of disease in which a sample is collected. Repeat testing should be considered where there is clinical suspicion of infection. Hepatitis B Surface Ag 10/12/2023 Non-reactive  Non-Reactive Final    Hepatitis C Ab 10/12/2023 Non-reactive  Non-Reactive Final    Syphilis Total Antibody 10/12/2023 Non-reactive  Non-Reactive Final    No serological evidence of infection with T. pallidum. Early or incubating syphilis infection cannot be excluded. Consider repeat testing based on clinical suspicion. Appointment on 04/25/2023   Component Date Value Ref Range Status    Zinc 04/25/2023 75  44 - 115 ug/dL Final                                    Detection Limit = 5    Vitamin B-12 04/25/2023 421  100 - 900 pg/mL Final    Vitamin B1, Whole Blood 04/25/2023 119.8  66.5 - 200.0 nmol/L Final    Vitamin A 04/25/2023 36.0  18.9 - 57.3 ug/dL Final    Reference intervals for vitamin A determined from [facility] internal  studies. Individuals with vitamin A less than 20 ug/dL are considered  vitamin A deficient and those with serum concentrations less than  10 ug/dL are considered severely deficient. This test was developed and its performance characteristics  determined by [facility]. It has not been cleared or approved  by the Food and Drug Administration.     PTH 04/25/2023 67.7  18.4 - 80.1 pg/mL Final    Ferritin 04/25/2023 59  8 - 388 ng/mL Final    Folate 04/25/2023 >20.0 (H)  3.1 - 17.5 ng/mL Final    E521    Iron Saturation 04/25/2023 27  15 - 50 % Final    TIBC 04/25/2023 263  250 - 450 ug/dL Final    Iron 04/25/2023 71  50 - 170 ug/dL Final    Patients treated with metal-binding drugs (ie. Deferoxamine) may have depressed iron values. Transcribed for Earl Munson MD, by Grace Rubio on 10/25/23 at 6:56 AM. Powered by [employer].

## 2023-11-19 DIAGNOSIS — M32.9 LUPUS (HCC): Primary | ICD-10-CM

## 2023-11-19 RX ORDER — PREDNISONE 5 MG/1
TABLET ORAL
Qty: 70 TABLET | Refills: 0 | Status: SHIPPED | OUTPATIENT
Start: 2023-11-19

## 2023-11-27 ENCOUNTER — OFFICE VISIT (OUTPATIENT)
Dept: PSYCHIATRY | Facility: CLINIC | Age: 38
End: 2023-11-27
Payer: COMMERCIAL

## 2023-11-27 DIAGNOSIS — F51.13 HYPERSOMNIA DUE TO MENTAL DISORDER: ICD-10-CM

## 2023-11-27 DIAGNOSIS — F40.10 SOCIAL PHOBIA: ICD-10-CM

## 2023-11-27 DIAGNOSIS — F33.2 MAJOR DEPRESSIVE DISORDER, RECURRENT, SEVERE W/O PSYCHOTIC BEHAVIOR (HCC): ICD-10-CM

## 2023-11-27 DIAGNOSIS — F41.1 GAD (GENERALIZED ANXIETY DISORDER): ICD-10-CM

## 2023-11-27 PROCEDURE — 99214 OFFICE O/P EST MOD 30 MIN: CPT | Performed by: PSYCHIATRY & NEUROLOGY

## 2023-11-27 RX ORDER — CLONAZEPAM 0.5 MG/1
TABLET ORAL
Qty: 60 TABLET | Refills: 2 | Status: SHIPPED | OUTPATIENT
Start: 2023-12-11 | End: 2024-02-11

## 2023-11-27 RX ORDER — MODAFINIL 200 MG/1
200 TABLET ORAL DAILY
Qty: 30 TABLET | Refills: 2 | Status: SHIPPED | OUTPATIENT
Start: 2023-11-27 | End: 2024-02-25
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
## 2023-11-28 ENCOUNTER — TELEPHONE (OUTPATIENT)
Dept: PSYCHIATRY | Facility: CLINIC | Age: 38
End: 2023-11-28

## 2023-11-28 NOTE — TELEPHONE ENCOUNTER
Called pharmacy to check status of Madafinil. Was informed that it requires a Prior Authorization through [employer]. PA for Modafinil 200 MG tablets faxed to [facility] via [employer]. Decision pending.

## 2023-12-06 NOTE — TELEPHONE ENCOUNTER
PA response still showing other insurance. Called [facility] and they will process as an urgent request to have Desire's primary insurance removed. Representative instructed me to call back in 24 hours to re-open case for prior authorization. Called Desire and updated her with this information and informed her that I will try again tomorrow.

## 2023-12-08 NOTE — TELEPHONE ENCOUNTER
Follow up call to [facility] to check status of Prior Authorization. Per Susana Singh, primary insurance still listed. Informed her that this was supposed to be processed as an urgent request and it should have already been taken care. Susana Singh does not show an urgent request but put through the request again. Will call again Monday to check status. Called Desire and updated her with this information. Instructed her to also call [facility] on Monday to check status.

## 2023-12-11 ENCOUNTER — APPOINTMENT (OUTPATIENT)
Dept: LAB | Facility: HOSPITAL | Age: 38
End: 2023-12-11
Payer: COMMERCIAL

## 2023-12-11 DIAGNOSIS — E55.9 VITAMIN D DEFICIENCY: ICD-10-CM

## 2023-12-11 DIAGNOSIS — E03.9 ACQUIRED HYPOTHYROIDISM: ICD-10-CM

## 2023-12-11 LAB
ALBUMIN SERPL BCP-MCNC: 4.7 G/DL (ref 3.5–5)
ALP SERPL-CCNC: 47 U/L (ref 34–104)
ALT SERPL W P-5'-P-CCNC: 22 U/L (ref 7–52)
ANION GAP SERPL CALCULATED.3IONS-SCNC: 6 MMOL/L
AST SERPL W P-5'-P-CCNC: 20 U/L (ref 13–39)
BACTERIA UR QL AUTO: ABNORMAL /HPF
BASOPHILS # BLD AUTO: 0.02 THOUSANDS/ÂΜL (ref 0–0.1)
BASOPHILS NFR BLD AUTO: 0 % (ref 0–1)
BILIRUB SERPL-MCNC: 0.46 MG/DL (ref 0.2–1)
BILIRUB UR QL STRIP: NEGATIVE
BUN SERPL-MCNC: 13 MG/DL (ref 5–25)
CALCIUM SERPL-MCNC: 9.8 MG/DL (ref 8.4–10.2)
CAOX CRY URNS QL MICRO: ABNORMAL /HPF
CHLORIDE SERPL-SCNC: 105 MMOL/L (ref 96–108)
CLARITY UR: CLEAR
CO2 SERPL-SCNC: 27 MMOL/L (ref 21–32)
COLOR UR: YELLOW
CREAT SERPL-MCNC: 0.61 MG/DL (ref 0.6–1.3)
CREAT UR-MCNC: 145.4 MG/DL
CRP SERPL QL: <1 MG/L
EOSINOPHIL # BLD AUTO: 0.01 THOUSAND/ÂΜL (ref 0–0.61)
EOSINOPHIL NFR BLD AUTO: 0 % (ref 0–6)
ERYTHROCYTE [DISTWIDTH] IN BLOOD BY AUTOMATED COUNT: 13.1 % (ref 11.6–15.1)
ERYTHROCYTE [SEDIMENTATION RATE] IN BLOOD: <1 MM/HOUR (ref 0–19)
GFR SERPL CREATININE-BSD FRML MDRD: 115 ML/MIN/1.73SQ M
GLUCOSE SERPL-MCNC: 88 MG/DL (ref 65–140)
GLUCOSE UR STRIP-MCNC: NEGATIVE MG/DL
HCT VFR BLD AUTO: 40.6 % (ref 34.8–46.1)
HGB BLD-MCNC: 13.5 G/DL (ref 11.5–15.4)
HGB UR QL STRIP.AUTO: ABNORMAL
IMM GRANULOCYTES # BLD AUTO: 0.04 THOUSAND/UL (ref 0–0.2)
IMM GRANULOCYTES NFR BLD AUTO: 0 % (ref 0–2)
KETONES UR STRIP-MCNC: NEGATIVE MG/DL
LEUKOCYTE ESTERASE UR QL STRIP: NEGATIVE
LYMPHOCYTES # BLD AUTO: 0.81 THOUSANDS/ÂΜL (ref 0.6–4.47)
LYMPHOCYTES NFR BLD AUTO: 8 % (ref 14–44)
MCH RBC QN AUTO: 31.9 PG (ref 26.8–34.3)
MCHC RBC AUTO-ENTMCNC: 33.3 G/DL (ref 31.4–37.4)
MCV RBC AUTO: 96 FL (ref 82–98)
MONOCYTES # BLD AUTO: 0.2 THOUSAND/ÂΜL (ref 0.17–1.22)
MONOCYTES NFR BLD AUTO: 2 % (ref 4–12)
MUCOUS THREADS UR QL AUTO: ABNORMAL
NEUTROPHILS # BLD AUTO: 9.58 THOUSANDS/ÂΜL (ref 1.85–7.62)
NEUTS SEG NFR BLD AUTO: 90 % (ref 43–75)
NITRITE UR QL STRIP: NEGATIVE
NON-SQ EPI CELLS URNS QL MICRO: ABNORMAL /HPF
NRBC BLD AUTO-RTO: 0 /100 WBCS
PH UR STRIP.AUTO: 5.5 [PH]
PLATELET # BLD AUTO: 268 THOUSANDS/UL (ref 149–390)
PMV BLD AUTO: 9.6 FL (ref 8.9–12.7)
POTASSIUM SERPL-SCNC: 3.7 MMOL/L (ref 3.5–5.3)
PROT SERPL-MCNC: 7.5 G/DL (ref 6.4–8.4)
PROT UR STRIP-MCNC: ABNORMAL MG/DL
PROT UR-MCNC: 17 MG/DL
PROT/CREAT UR: 0.12 MG/G{CREAT} (ref 0–0.1)
RBC # BLD AUTO: 4.23 MILLION/UL (ref 3.81–5.12)
RBC #/AREA URNS AUTO: ABNORMAL /HPF
SODIUM SERPL-SCNC: 138 MMOL/L (ref 135–147)
SP GR UR STRIP.AUTO: 1.03 (ref 1–1.03)
TSH SERPL DL<=0.05 MIU/L-ACNC: 1.12 UIU/ML (ref 0.45–4.5)
UROBILINOGEN UR STRIP-ACNC: <2 MG/DL
WBC # BLD AUTO: 10.66 THOUSAND/UL (ref 4.31–10.16)
WBC #/AREA URNS AUTO: ABNORMAL /HPF

## 2023-12-11 PROCEDURE — 84436 ASSAY OF TOTAL THYROXINE: CPT

## 2023-12-11 PROCEDURE — 84443 ASSAY THYROID STIM HORMONE: CPT

## 2023-12-11 PROCEDURE — 82306 VITAMIN D 25 HYDROXY: CPT

## 2023-12-12 ENCOUNTER — HOSPITAL ENCOUNTER (EMERGENCY)
Facility: HOSPITAL | Age: 38
Discharge: HOME | End: 2023-12-12
Attending: EMERGENCY MEDICINE | Admitting: EMERGENCY MEDICINE
Payer: COMMERCIAL

## 2023-12-12 VITALS
SYSTOLIC BLOOD PRESSURE: 101 MMHG | OXYGEN SATURATION: 100 % | WEIGHT: 122.36 LBS | DIASTOLIC BLOOD PRESSURE: 57 MMHG | RESPIRATION RATE: 18 BRPM | HEART RATE: 51 BPM | BODY MASS INDEX: 20.05 KG/M2 | TEMPERATURE: 98.2 F

## 2023-12-12 DIAGNOSIS — R53.83 FATIGUE: ICD-10-CM

## 2023-12-12 DIAGNOSIS — M25.50 ARTHRALGIA: Primary | ICD-10-CM

## 2023-12-12 LAB
25(OH)D3 SERPL-MCNC: 36.3 NG/ML (ref 30–100)
ALBUMIN SERPL BCP-MCNC: 4.3 G/DL (ref 3.5–5)
ALP SERPL-CCNC: 39 U/L (ref 34–104)
ALT SERPL W P-5'-P-CCNC: 18 U/L (ref 7–52)
ANION GAP SERPL CALCULATED.3IONS-SCNC: 4 MMOL/L
AST SERPL W P-5'-P-CCNC: 15 U/L (ref 13–39)
BASOPHILS # BLD AUTO: 0.02 THOUSANDS/ÂΜL (ref 0–0.1)
BASOPHILS NFR BLD AUTO: 0 % (ref 0–1)
BILIRUB SERPL-MCNC: 0.41 MG/DL (ref 0.2–1)
BUN SERPL-MCNC: 13 MG/DL (ref 5–25)
C3 SERPL-MCNC: 87 MG/DL (ref 87–200)
C4 SERPL-MCNC: 22 MG/DL (ref 19–52)
CALCIUM SERPL-MCNC: 9.6 MG/DL (ref 8.4–10.2)
CHLORIDE SERPL-SCNC: 106 MMOL/L (ref 96–108)
CO2 SERPL-SCNC: 29 MMOL/L (ref 21–32)
CREAT SERPL-MCNC: 0.64 MG/DL (ref 0.6–1.3)
DSDNA AB SER-ACNC: 9 IU/ML (ref 0–9)
EOSINOPHIL # BLD AUTO: 0.04 THOUSAND/ÂΜL (ref 0–0.61)
EOSINOPHIL NFR BLD AUTO: 1 % (ref 0–6)
ERYTHROCYTE [DISTWIDTH] IN BLOOD BY AUTOMATED COUNT: 13.1 % (ref 11.6–15.1)
GFR SERPL CREATININE-BSD FRML MDRD: 113 ML/MIN/1.73SQ M
GLUCOSE SERPL-MCNC: 99 MG/DL (ref 65–140)
HCT VFR BLD AUTO: 39.4 % (ref 34.8–46.1)
HGB BLD-MCNC: 13 G/DL (ref 11.5–15.4)
IMM GRANULOCYTES # BLD AUTO: 0.01 THOUSAND/UL (ref 0–0.2)
IMM GRANULOCYTES NFR BLD AUTO: 0 % (ref 0–2)
LYMPHOCYTES # BLD AUTO: 1.03 THOUSANDS/ÂΜL (ref 0.6–4.47)
LYMPHOCYTES NFR BLD AUTO: 14 % (ref 14–44)
MCH RBC QN AUTO: 31.6 PG (ref 26.8–34.3)
MCHC RBC AUTO-ENTMCNC: 33 G/DL (ref 31.4–37.4)
MCV RBC AUTO: 96 FL (ref 82–98)
MONOCYTES # BLD AUTO: 0.2 THOUSAND/ÂΜL (ref 0.17–1.22)
MONOCYTES NFR BLD AUTO: 3 % (ref 4–12)
NEUTROPHILS # BLD AUTO: 5.84 THOUSANDS/ÂΜL (ref 1.85–7.62)
NEUTS SEG NFR BLD AUTO: 82 % (ref 43–75)
NRBC BLD AUTO-RTO: 0 /100 WBCS
PLATELET # BLD AUTO: 234 THOUSANDS/UL (ref 149–390)
PMV BLD AUTO: 9.5 FL (ref 8.9–12.7)
POTASSIUM SERPL-SCNC: 4.4 MMOL/L (ref 3.5–5.3)
PROT SERPL-MCNC: 6.4 G/DL (ref 6.4–8.4)
RBC # BLD AUTO: 4.12 MILLION/UL (ref 3.81–5.12)
SODIUM SERPL-SCNC: 139 MMOL/L (ref 135–147)
T4 SERPL-MCNC: 5.08 UG/DL (ref 6.09–12.23)
WBC # BLD AUTO: 7.14 THOUSAND/UL (ref 4.31–10.16)

## 2023-12-12 PROCEDURE — 96374 THER/PROPH/DIAG INJ IV PUSH: CPT

## 2023-12-12 PROCEDURE — 80053 COMPREHEN METABOLIC PANEL: CPT | Performed by: EMERGENCY MEDICINE

## 2023-12-12 PROCEDURE — 36415 COLL VENOUS BLD VENIPUNCTURE: CPT | Performed by: EMERGENCY MEDICINE

## 2023-12-12 PROCEDURE — 99284 EMERGENCY DEPT VISIT MOD MDM: CPT | Performed by: EMERGENCY MEDICINE

## 2023-12-12 PROCEDURE — 99283 EMERGENCY DEPT VISIT LOW MDM: CPT

## 2023-12-12 PROCEDURE — 85025 COMPLETE CBC W/AUTO DIFF WBC: CPT | Performed by: EMERGENCY MEDICINE

## 2023-12-12 PROCEDURE — 96361 HYDRATE IV INFUSION ADD-ON: CPT

## 2023-12-12 RX ORDER — KETOROLAC TROMETHAMINE 30 MG/ML
15 INJECTION, SOLUTION INTRAMUSCULAR; INTRAVENOUS ONCE
Status: COMPLETED | OUTPATIENT
Start: 2023-12-12 | End: 2023-12-12

## 2023-12-12 RX ADMIN — SODIUM CHLORIDE 1000 ML: 0.9 INJECTION, SOLUTION INTRAVENOUS at 13:41

## 2023-12-12 RX ADMIN — KETOROLAC TROMETHAMINE 15 MG: 30 INJECTION, SOLUTION INTRAMUSCULAR; INTRAVENOUS at 13:39

## 2023-12-12 NOTE — DISCHARGE INSTRUCTIONS
ED labwork is stable with essentially normal findings, and nothing requiring intervention.  Follow up with Dr. Palmer for persistent symptoms.  Return as needed for severe increasing pain, fever, or other emergency concerns.

## 2023-12-12 NOTE — ED PROVIDER NOTES
History  Chief Complaint   Patient presents with    Fatigue     Pt reports feeling fatigue, joint pain, for a few weeks, hx of lupus, on steroids at this time. Thinks she is having a flare      37 yo F with SLE, c/o about 2 weeks of fatigue, joint pain, burning skin sensation, which concentrates on her face, which she associates with lupus flare.  She communicated with rheumatologist, and was put on 4 week taper of steroids, which she has been taking, but doesn't feel it has worked like usual . She denies fever, chills, N/V.  She had labs checked yesterday which I have reviewed.  Inflammatory markers are normal, CBC, and CMP are normal.        History provided by:  Patient      Prior to Admission Medications   Prescriptions Last Dose Informant Patient Reported? Taking?   Calcium 600-400 MG-UNIT CHEW   Yes No   Sig: Chew daily   Cholecalciferol (Vitamin D) 50 MCG (2000 UT) tablet   No No   Sig: Take 1 tablet (2,000 Units total) by mouth daily   Diclofenac Sodium (VOLTAREN) 1 %   No No   Sig: Apply 2 g topically 4 (four) times a day   Multiple Vitamins-Minerals (Bariatric Multivitamins/Iron) CAPS   Yes No   Sig: Take by mouth daily   Vortioxetine HBr (Trintellix) 20 MG tablet   No No   Sig: Take 1 tablet (20 mg total) by mouth daily   clonazePAM (KlonoPIN) 0.5 mg tablet   No No   Sig: TAKE 1/2 TO 1 TABLET BY MOUTH 2 TIMES A DAY AS NEEDED FOR ANXIETY Do not start before 2023.   fluticasone (FLONASE) 50 mcg/act nasal spray   No No   Si spray into each nostril daily   gabapentin (Neurontin) 100 mg capsule   No No   Sig: Take 1 capsule (100 mg total) by mouth daily at bedtime As needed for numbness   hydroxychloroquine (PLAQUENIL) 200 mg tablet   No No   Sig: Take 1 tablet (200 mg total) by mouth daily   levothyroxine 25 mcg tablet   No No   Sig: Take 1 tablet (25 mcg total) by mouth daily in the early morning   modafinil (PROVIGIL) 200 MG tablet   No No   Sig: Take 1 tablet (200 mg total) by mouth daily    omeprazole (PriLOSEC) 20 mg delayed release capsule   No No   Sig: Take 1 capsule (20 mg total) by mouth daily   predniSONE 5 mg tablet   No No   Si tabs x7 days, then 3 tabs x7 days, then 2 tabs x7 days, then 1 tab x7 days, then stop.      Facility-Administered Medications: None       Past Medical History:   Diagnosis Date    Acne     Anxiety     Back pain     Bipolar disorder ([facility])     pt denies    Carpal tunnel syndrome on right     Contact lens overwear of both eyes     CPAP (continuous positive airway pressure) dependence     Cubital tunnel syndrome on right     Depression     Disease of thyroid gland     GERD (gastroesophageal reflux disease)     Headache     Hypothyroidism     Kidney stone     Kidney stones     Lupus (systemic lupus erythematosus) ([facility])     Nausea     Obese     gastric KATLYN-EN-Y  today 2022    PONV (postoperative nausea and vomiting)     Sleep apnea     Wears glasses        Past Surgical History:   Procedure Laterality Date    CARPAL TUNNEL RELEASE      CHOLECYSTECTOMY      EGD      [place] CYSTOURETHROSCOPY N/A 2/3/2020    Procedure: CYSTOSCOPY;  Surgeon: Reed Lovell MD;  [facility];  Service: UroGynecology           WV LAPAROSCOPY SURG CHOLECYSTECTOMY N/A 2018    Procedure: CHOLECYSTECTOMY LAPAROSCOPIC W/ ROBOTICS;  Surgeon: Vince Tomas MD;  [facility];  Service: General    WV LAPS GSTR RSTCV PX W/BYP KATLYN-EN-Y LIMB <150 CM N/A 2022    Procedure: LAPAROSCOPIC KATLYN-EN-Y GASTRIC BYPASS & INTRAOPERATIVE EGD ROBOTICALLY ASSISTED;  Surgeon: Adalid Nagel MD;  [facility];  Service: Bariatrics    WV NEUROPLASTY &/TRANSPOS MEDIAN NRV CARPAL TUNNE Right 2020    Procedure: release CARPAL TUNNEL;  Surgeon: Keith Landers MD;  [facility];  Service: Orthopedics    WV NEUROPLASTY &/TRANSPOSITION ULNAR NERVE ELBOW Right 2020    Procedure: CUBITAL TUNNEL;  Surgeon: Keith Landers MD;  [facility];  Service: Orthopedics     SC POST COLPORRHAPHY RECTOCELE W/WO PERINEORRHAPHY N/A 2/3/2020    Procedure: POSTERIOR COLPORRHAPHY;  Surgeon: Reed Lovell MD;  [facility];  Service: UroGynecology           SC SLING OPERATION STRESS INCONTINENCE N/A 2/3/2020    Procedure: PUBOVAGINAL SLING;  Surgeon: Reed Lovell MD;  [facility];  Service: UroGynecology           TUBAL LIGATION         Family History   Problem Relation Age of Onset    No Known Problems Mother     Alcohol abuse Father     Drug abuse Family     Psoriasis Maternal Grandmother     Colon cancer Maternal Uncle     Hypertension Maternal Grandfather     Heart disease Maternal Grandfather     Diabetes Neg Hx      I have reviewed and agree with the history as documented.    E-Cigarette/Vaping    E-Cigarette Use Never User      E-Cigarette/Vaping Substances    Nicotine No     THC No     CBD No     Flavoring No     Other No     Unknown No      Social History     Tobacco Use    Smoking status: Never    Smokeless tobacco: Never   Vaping Use    Vaping Use: Never used   Substance Use Topics    Alcohol use: Not Currently    Drug use: No       Review of Systems    Physical Exam  Physical Exam  Vitals and nursing note reviewed.   Constitutional:       General: She is not in acute distress.     Appearance: She is well-developed. She is not diaphoretic.   HENT:      Head: Normocephalic and atraumatic.      Right Ear: External ear normal.      Left Ear: External ear normal.      Nose: Nose normal.      Mouth/Throat:      Mouth: Mucous membranes are moist.   Eyes:      Conjunctiva/sclera: Conjunctivae normal.      Pupils: Pupils are equal, round, and reactive to light.   Cardiovascular:      Rate and Rhythm: Normal rate and regular rhythm.   Pulmonary:      Effort: Pulmonary effort is normal.   Abdominal:      Palpations: Abdomen is soft.      Tenderness: There is no abdominal tenderness.   Musculoskeletal:         General: Normal range of motion.      Cervical back:  Normal range of motion and neck supple.   Skin:     General: Skin is warm and dry.      Capillary Refill: Capillary refill takes less than 2 seconds.      Findings: No rash.   Neurological:      Mental Status: She is alert and oriented to person, place, and time.      Gait: Gait normal.   Psychiatric:         Behavior: Behavior normal.         Thought Content: Thought content normal.         Judgment: Judgment normal.         Vital Signs  ED Triage Vitals [12/12/23 1128]   Temperature Pulse Respirations Blood Pressure SpO2   98.2 °F (36.8 °C) 77 18 105/61 100 %      Temp Source Heart Rate Source Patient Position - Orthostatic VS BP Location FiO2 (%)   Oral Monitor Sitting Right arm --      Pain Score       7           Vitals:    12/12/23 1128 12/12/23 1344   BP: 105/61 101/57   Pulse: 77 (!) 51   Patient Position - Orthostatic VS: Sitting Sitting         Visual Acuity      ED Medications  Medications   ketorolac (TORADOL) injection 15 mg (15 mg Intravenous Given 12/12/23 1339)   sodium chloride 0.9 % bolus 1,000 mL (0 mL Intravenous Stopped 12/12/23 1441)       Diagnostic Studies  Results Reviewed       Procedure Component Value Units Date/Time    Comprehensive metabolic panel [[id number]] Collected: 12/12/23 1341    Lab Status: Final result Specimen: Blood from Arm, Right Updated: 12/12/23 1408     Sodium 139 mmol/L      Potassium 4.4 mmol/L      Chloride 106 mmol/L      CO2 29 mmol/L      ANION GAP 4 mmol/L      BUN 13 mg/dL      Creatinine 0.64 mg/dL      Glucose 99 mg/dL      Calcium 9.6 mg/dL      AST 15 U/L      ALT 18 U/L      Alkaline Phosphatase 39 U/L      Total Protein 6.4 g/dL      Albumin 4.3 g/dL      Total Bilirubin 0.41 mg/dL      eGFR 113 ml/min/1.73sq m     Narrative:      National Kidney Disease Foundation guidelines for Chronic Kidney Disease (CKD):     Stage 1 with normal or high GFR (GFR > 90 mL/min/1.73 square meters)    Stage 2 Mild CKD (GFR = 60-89 mL/min/1.73 square meters)    Stage 3A  Moderate CKD (GFR = 45-59 mL/min/1.73 square meters)    Stage 3B Moderate CKD (GFR = 30-44 mL/min/1.73 square meters)    Stage 4 Severe CKD (GFR = 15-29 mL/min/1.73 square meters)    Stage 5 End Stage CKD (GFR <15 mL/min/1.73 square meters)  Note: GFR calculation is accurate only with a steady state creatinine    CBC and differential [[id number]]  (Abnormal) Collected: 12/12/23 1341    Lab Status: Final result Specimen: Blood from Arm, Right Updated: 12/12/23 1350     WBC 7.14 Thousand/uL      RBC 4.12 Million/uL      Hemoglobin 13.0 g/dL      Hematocrit 39.4 %      MCV 96 fL      MCH 31.6 pg      MCHC 33.0 g/dL      RDW 13.1 %      MPV 9.5 fL      Platelets 234 Thousands/uL      nRBC 0 /100 WBCs      Neutrophils Relative 82 %      Immat GRANS % 0 %      Lymphocytes Relative 14 %      Monocytes Relative 3 %      Eosinophils Relative 1 %      Basophils Relative 0 %      Neutrophils Absolute 5.84 Thousands/µL      Immature Grans Absolute 0.01 Thousand/uL      Lymphocytes Absolute 1.03 Thousands/µL      Monocytes Absolute 0.20 Thousand/µL      Eosinophils Absolute 0.04 Thousand/µL      Basophils Absolute 0.02 Thousands/µL                    No orders to display              Procedures  Procedures         ED Course                               SBIRT 20yo+      Flowsheet Row Most Recent Value   Initial Alcohol Screen: US AUDIT-C     1. How often do you have a drink containing alcohol? 0 Filed at: 12/12/2023 1129   2. How many drinks containing alcohol do you have on a typical day you are drinking?  0 Filed at: 12/12/2023 1129   3b. FEMALE Any Age, or MALE 65+: How often do you have 4 or more drinks on one occassion? 0 Filed at: 12/12/2023 1129   Audit-C Score 0 Filed at: 12/12/2023 1129   JHONATHAN: How many times in the past year have you...    Used an illegal drug or used a prescription medication for non-medical reasons? Never Filed at: 12/12/2023 1129                      Medical Decision Making  I agree with labs, but  since they were checked yesterday, and were unremarkable, I offered to confirm no changes, and treat symptomatically.  She used to use NSAIDs but after getting R&Y gastric bypass, she was instructed to avoid them.  I think a single of toradol is reasonable, with some IVF and she can follow up with Rheumatology, which she is comfortable with.      Amount and/or Complexity of Data Reviewed  Labs: ordered.    Risk  Prescription drug management.             Disposition  Final diagnoses:   Arthralgia   Fatigue     Time reflects when diagnosis was documented in both MDM as applicable and the Disposition within this note       Time User Action Codes Description Comment    12/12/2023  2:59 PM Jose Loredo Add [M25.50] Arthralgia     12/12/2023  2:59 PM Jose Loredo Add [R53.83] Fatigue           ED Disposition       ED Disposition   Discharge    Condition   Good    Date/Time   Tue Dec 12, 2023 1459    Comment   Desire Patorajiv discharge to home/self care.                   Follow-up Information       Follow up With Specialties Details Why Contact Info    Cami Palmer MD Rheumatology Call  If symptoms worsen [address]  [address]  [facility] [address]  [phone]              Discharge Medication List as of 12/12/2023  3:01 PM        CONTINUE these medications which have NOT CHANGED    Details   Calcium 600-400 MG-UNIT CHEW Chew daily, Historical Med      Cholecalciferol (Vitamin D) 50 MCG (2000 UT) tablet Take 1 tablet (2,000 Units total) by mouth daily, Starting Thu 8/31/2023, Normal      clonazePAM (KlonoPIN) 0.5 mg tablet TAKE 1/2 TO 1 TABLET BY MOUTH 2 TIMES A DAY AS NEEDED FOR ANXIETY Do not start before December 11, 2023., Normal      Diclofenac Sodium (VOLTAREN) 1 % Apply 2 g topically 4 (four) times a day, Starting Tue 10/24/2023, Normal      fluticasone (FLONASE) 50 mcg/act nasal spray 1 spray into each nostril daily, Starting Thu 8/31/2023, Normal      gabapentin (Neurontin) 100 mg capsule Take  1 capsule (100 mg total) by mouth daily at bedtime As needed for numbness, Starting Tue 10/24/2023, Normal      hydroxychloroquine (PLAQUENIL) 200 mg tablet Take 1 tablet (200 mg total) by mouth daily, Starting Tue 10/24/2023, Until Sun 4/21/2024, Normal      levothyroxine 25 mcg tablet Take 1 tablet (25 mcg total) by mouth daily in the early morning, Starting Thu 8/31/2023, Normal      modafinil (PROVIGIL) 200 MG tablet Take 1 tablet (200 mg total) by mouth daily, Starting Mon 11/27/2023, Until Sun 2/25/2024, Normal      Multiple Vitamins-Minerals (Bariatric Multivitamins/Iron) CAPS Take by mouth daily, Historical Med      omeprazole (PriLOSEC) 20 mg delayed release capsule Take 1 capsule (20 mg total) by mouth daily, Starting Mon 4/24/2023, Normal      predniSONE 5 mg tablet 4 tabs x7 days, then 3 tabs x7 days, then 2 tabs x7 days, then 1 tab x7 days, then stop., Normal      Vortioxetine HBr (Trintellix) 20 MG tablet Take 1 tablet (20 mg total) by mouth daily, Starting Fri 9/29/2023, Normal             No discharge procedures on file.    PDMP Review         Value Time User    PDMP Reviewed  Yes 11/27/2023  4:59 PM Cliff Coronado III, DO            ED Provider  Electronically Signed by             Jose Loredo MD  12/12/23 9465

## 2023-12-20 ENCOUNTER — OFFICE VISIT (OUTPATIENT)
Dept: OBGYN CLINIC | Facility: HOSPITAL | Age: 38
End: 2023-12-20
Payer: COMMERCIAL

## 2023-12-20 VITALS
DIASTOLIC BLOOD PRESSURE: 71 MMHG | BODY MASS INDEX: 19.8 KG/M2 | HEART RATE: 72 BPM | WEIGHT: 123.2 LBS | SYSTOLIC BLOOD PRESSURE: 116 MMHG | HEIGHT: 66 IN

## 2023-12-20 DIAGNOSIS — G56.21 ULNAR NERVE ENTRAPMENT AT ELBOW, RIGHT: ICD-10-CM

## 2023-12-20 DIAGNOSIS — G56.01 RIGHT CARPAL TUNNEL SYNDROME: ICD-10-CM

## 2023-12-20 PROCEDURE — 99214 OFFICE O/P EST MOD 30 MIN: CPT | Performed by: ORTHOPAEDIC SURGERY

## 2023-12-20 NOTE — PROGRESS NOTES
ASSESSMENT/PLAN:    Assessment:   Carpal Tunnel Syndrome  right  Cubital Tunnel Syndrome  right    Plan:   Patient has an examination and US consistent with ulnar nerve subluxation.  There is a concern that she has recurrent carpal tunnel syndrome.   Recommend US right wrist evaluate for CTS vs scar tissue     Follow Up:  After Testing    To Do Next Visit:  Discuss US    General Discussions:  Carpal Tunnel Syndrome: The anatomy and physiology of carpal tunnel syndrome was discussed with the patient today.  Increase pressure localized under the transverse carpal ligament can cause pain, numbness, tingling, or dysesthesias within the median nerve distribution as well as feelings of fatigue, clumsiness, or awkwardness.  These symptoms typically occur at night and worse in the morning upon waking.  Eventually, untreated carpal tunnel syndrome can result in weakness and permanent loss of muscle within the thenar compartment of the hand.  Treatment options were discussed with the patient.  Conservative treatment includes nocturnal resting splints to keep the nerve in a neutral position, ergonomic changes within the work or home environment, activity modification, and tendon gliding exercises.  Steroid injections within the carpal canal can help a majority of patients, however this is often self-limited in a majority of patients.  Surgical intervention to divide the transverse carpal ligament typically results in a long-lasting relief of the patient's complaints, with the recurrence rate of less than 1%.   Cubital Tunnel Syndrome: The anatomy and physiology of cubital tunnel syndrome were discussed with the patient today in the office.  Typically, increased elbow flexion activities decrease blood flow within the intraneural spaces, resulting in a feeling of numbness, tingling, weakness, or clumsiness within the hand and fingers.  Occasionally, anatomic structures such as medial elbow osteophytes, the medial head of the  triceps, were subluxing ulnar nerve may result in increased pressure or aggravation at the cubital tunnel.  Typical signs and symptoms usually include numbness and tingling within the ring and small finger, weakness with , and weakness with pinch.  Conservative treatment and includes nocturnal bracing to keep the elbow in a semi-extended position, activity modification, therapy, and avoiding excessive elbow flexion activities.  A majority of patients typically respond to conservative treatment over a period of approximately 3-6 months.  EMG/NCV testing of the ulnar nerve at the elbow is not as reliable as carpal tunnel syndrome.  Surgical intervention in the form of in situ release of the ulnar nerve at the elbow or ulnar nerve transposition may be required in up to 20% of patients.       _____________________________________________________  CHIEF COMPLAINT:  Chief Complaint   Patient presents with    Right Elbow - Pain     [facility] US 3/3/21 cubital tunnel release on 11/27/2020 previous patient of Dr Landers          SUBJECTIVE:  Desire العلي is a 38 y.o. female who presents with constant numbness and tingling in the right hand.  She is s/p right carpal and cubital tunnel release on 11/27/2020 by Dr. Landers. Patient reports persistent numbness and tingling in the right ring and small following surgery.  Numbness and tinging started to reoccur in the index and long finger over the past year.  Symptoms wake her from sleep at night.       PAST MEDICAL HISTORY:  Past Medical History:   Diagnosis Date    Acne     Anxiety     Back pain     Bipolar disorder ([facility])     pt denies    Carpal tunnel syndrome on right     Contact lens overwear of both eyes     CPAP (continuous positive airway pressure) dependence     Cubital tunnel syndrome on right     Depression     Disease of thyroid gland     GERD (gastroesophageal reflux disease)     Headache     Hypothyroidism     Kidney stone     Kidney stones     Lupus (systemic lupus  erythematosus) ([facility])     Nausea     Obese     gastric KATLYN-EN-Y  today 4/19/2022    PONV (postoperative nausea and vomiting)     Sleep apnea     Wears glasses        PAST SURGICAL HISTORY:  Past Surgical History:   Procedure Laterality Date    CARPAL TUNNEL RELEASE      CHOLECYSTECTOMY      EGD      CT CYSTOURETHROSCOPY N/A 2/3/2020    Procedure: CYSTOSCOPY;  Surgeon: Reed Lovell MD;  [facility];  Service: UroGynecology           CT LAPAROSCOPY SURG CHOLECYSTECTOMY N/A 9/6/2018    Procedure: CHOLECYSTECTOMY LAPAROSCOPIC W/ ROBOTICS;  Surgeon: Vince Tomas MD;  [facility];  Service: General    CT LAPS GSTR RSTCV PX W/BYP KATLYN-EN-Y LIMB <150 CM N/A 4/19/2022    Procedure: LAPAROSCOPIC KATLYN-EN-Y GASTRIC BYPASS & INTRAOPERATIVE EGD ROBOTICALLY ASSISTED;  Surgeon: Adalid Nagel MD;  [facility];  Service: Bariatrics    CT NEUROPLASTY &/TRANSPOS MEDIAN NRV CARPAL TUNNE Right 11/27/2020    Procedure: release CARPAL TUNNEL;  Surgeon: Keith Landers MD;  [facility];  Service: Orthopedics    CT NEUROPLASTY &/TRANSPOSITION ULNAR NERVE ELBOW Right 11/27/2020    Procedure: CUBITAL TUNNEL;  Surgeon: Keith Landers MD;  [facility];  Service: Orthopedics    CT POST COLPORRHAPHY RECTOCELE W/WO PERINEORRHAPHY N/A 2/3/2020    Procedure: POSTERIOR COLPORRHAPHY;  Surgeon: Reed Lovell MD;  [facility];  Service: UroGynecology           CT SLING OPERATION STRESS INCONTINENCE N/A 2/3/2020    Procedure: PUBOVAGINAL SLING;  Surgeon: Reed Lovell MD;  [facility];  Service: UroGynecology           TUBAL LIGATION         FAMILY HISTORY:  Family History   Problem Relation Age of Onset    No Known Problems Mother     Alcohol abuse Father     Drug abuse Family     Psoriasis Maternal Grandmother     Colon cancer Maternal Uncle     Hypertension Maternal Grandfather     Heart disease Maternal Grandfather     Diabetes Neg Hx        SOCIAL HISTORY:  Social History      Tobacco Use    Smoking status: Never    Smokeless tobacco: Never   Vaping Use    Vaping status: Never Used   Substance Use Topics    Alcohol use: Not Currently    Drug use: No       MEDICATIONS:    Current Outpatient Medications:     Calcium 600-400 MG-UNIT CHEW, Chew daily, Disp: , Rfl:     Cholecalciferol (Vitamin D) 50 MCG (2000 UT) tablet, Take 1 tablet (2,000 Units total) by mouth daily, Disp: 90 tablet, Rfl: 1    clonazePAM (KlonoPIN) 0.5 mg tablet, TAKE 1/2 TO 1 TABLET BY MOUTH 2 TIMES A DAY AS NEEDED FOR ANXIETY Do not start before December 11, 2023., Disp: 60 tablet, Rfl: 2    Diclofenac Sodium (VOLTAREN) 1 %, Apply 2 g topically 4 (four) times a day, Disp: 100 g, Rfl: 6    fluticasone (FLONASE) 50 mcg/act nasal spray, 1 spray into each nostril daily, Disp: 48 g, Rfl: 1    gabapentin (Neurontin) 100 mg capsule, Take 1 capsule (100 mg total) by mouth daily at bedtime As needed for numbness, Disp: 30 capsule, Rfl: 6    hydroxychloroquine (PLAQUENIL) 200 mg tablet, Take 1 tablet (200 mg total) by mouth daily, Disp: 90 tablet, Rfl: 1    levothyroxine 25 mcg tablet, Take 1 tablet (25 mcg total) by mouth daily in the early morning, Disp: 90 tablet, Rfl: 1    modafinil (PROVIGIL) 200 MG tablet, Take 1 tablet (200 mg total) by mouth daily, Disp: 30 tablet, Rfl: 2    Multiple Vitamins-Minerals (Bariatric Multivitamins/Iron) CAPS, Take by mouth daily, Disp: , Rfl:     omeprazole (PriLOSEC) 20 mg delayed release capsule, Take 1 capsule (20 mg total) by mouth daily, Disp: 90 capsule, Rfl: 1    predniSONE 5 mg tablet, 4 tabs x7 days, then 3 tabs x7 days, then 2 tabs x7 days, then 1 tab x7 days, then stop., Disp: 70 tablet, Rfl: 0    Vortioxetine HBr (Trintellix) 20 MG tablet, Take 1 tablet (20 mg total) by mouth daily, Disp: 30 tablet, Rfl: 3    ALLERGIES:  Allergies   Allergen Reactions    Wound Dressing Adhesive Rash     Gets red rash from paper tape relatively quickly after application    Penicillins Hives     At  "young age       REVIEW OF SYSTEMS:  Pertinent items are noted in HPI.  A comprehensive review of systems was negative.    LABS:  HgA1c:   Lab Results   Component Value Date    HGBA1C 4.5 04/21/2023     BMP:   Lab Results   Component Value Date    GLUCOSE 91 03/10/2015    CALCIUM 9.6 12/12/2023     03/10/2015    K 4.4 12/12/2023    CO2 29 12/12/2023     12/12/2023    BUN 13 12/12/2023    CREATININE 0.64 12/12/2023         _____________________________________________________  PHYSICAL EXAMINATION:  Vital signs: /71   Pulse 72   Ht 5' 5.5\" (1.664 m)   Wt 55.9 kg (123 lb 3.2 oz)   BMI 20.19 kg/m²   General: well developed and well nourished, alert, oriented times 3, and appears comfortable  Psychiatric: Normal  HEENT: Trachea Midline, No torticollis  Cardiovascular: No discernable arrhythmia  Pulmonary: No wheezing or stridor  Abdomen: No rebound or guarding  Extremities: No peripheral edema  Skin: No masses, erythema, lacerations, fluctation, ulcerations  Neurovascular: Sensation Intact to the Median, Ulnar, Radial Nerve, Motor Intact to the Median, Ulnar, Radial Nerve, and Pulses Intact    MUSCULOSKELETAL EXAMINATION:  Right Ulnar Nerve Exam:    Negative intrinsic atrophy.  Negative  deformity at the elbow.   Full range of motion with flexion and extension of the elbow.   Positive ulnar nerve compression test at the elbow. Positive tinels over the ulnar nerve at the elbow.   Positive ulnar nerve subluxation.  Positive tinel's wrist, positive Durkan compression   AIN 5/5, APB 4/5, Intrinsics 4/5, FDP 5/5, wrist flexion 5/5, wrist flexion, 5/5, elbow flexion 5/5, elbow extension 5/5  Healed incision prior carpal tunnel     _____________________________________________________  STUDIES REVIEWED:  US right elbow Ulnar nerve instability and findings suggestive of cubital tunnel syndrome.  No significant scar tissue in the surgical bed.       PROCEDURES PERFORMED:  Procedures  No Procedures performed " today  Scribe Attestation      I,:  Viktoriya Elder am acting as a scribe while in the presence of the attending physician.:       I,:  Joshua Wu MD personally performed the services described in this documentation    as scribed in my presence.:

## 2023-12-28 ENCOUNTER — OFFICE VISIT (OUTPATIENT)
Dept: BARIATRICS | Facility: CLINIC | Age: 38
End: 2023-12-28

## 2023-12-28 VITALS — BODY MASS INDEX: 20.06 KG/M2 | HEIGHT: 65 IN | WEIGHT: 120.37 LBS

## 2023-12-28 DIAGNOSIS — Z98.84 STATUS POST GASTRIC BYPASS FOR OBESITY: Primary | ICD-10-CM

## 2023-12-28 PROCEDURE — RECHECK: Performed by: DIETITIAN, REGISTERED

## 2023-12-28 PROCEDURE — WEIGHT: Performed by: DIETITIAN, REGISTERED

## 2023-12-28 NOTE — PROGRESS NOTES
"Bariatric Follow Up Nutrition Note    Type of surgery  Gastric bypass: laparoscopic  Surgery Date: 4/19/2022  17 months  post-op  Surgeon: Dr. Adalid Nagel    Nutrition Assessment   Desire العلي  38 y.o.  female  Ht 5' 5.25\" (1.657 m)   Wt 54.6 kg (120 lb 5.9 oz)   BMI 19.88 kg/m²     +2# x 3 months  + 5# x 4 months     Wt Readings from Last 3 Encounters:   12/20/23 55.9 kg (123 lb 3.2 oz)   12/12/23 55.5 kg (122 lb 5.7 oz)   10/24/23 54.4 kg (120 lb)       No calculation needed  Estimated calories for weight maintenance:  1368- kcal per day (25 kcal /kg bw   )   Estimated calories for weight gain  6174-7295  kcal per day  ( 1/2 to 1#  per wk wt gain - sedentary )  Estimated protein needs 54-81 grams per day (1.0-1.5 gms/kg IBW )   Estimated fluid needs 54-64 ounces per day (30-35 ml/kg IBW )      Compared with body comp:  SECA REE = 1289  94% of expected   Estimated calories for wt maintenance = 1547  Estimated calories for weight gain =1797 calories per day ( +250)     Weight on Day of Weight Loss Surgery: 217 lbs   Weight in (lb) to have BMI = 25: 151.9#  Post-Op Wt Loss: 109.1#/ 141% EBWL in 18 months     Review of History and Medications   Past Medical History:   Diagnosis Date    Acne     Anxiety     Back pain     Bipolar disorder (HCC)     pt denies    Carpal tunnel syndrome on right     Contact lens overwear of both eyes     CPAP (continuous positive airway pressure) dependence     Cubital tunnel syndrome on right     Depression     Disease of thyroid gland     GERD (gastroesophageal reflux disease)     Headache     Hypothyroidism     Kidney stone     Kidney stones     Lupus (systemic lupus erythematosus) (HCC)     Nausea     Obese     gastric KATLYN-EN-Y  today 4/19/2022    PONV (postoperative nausea and vomiting)     Sleep apnea     Wears glasses      Past Surgical History:   Procedure Laterality Date    CARPAL TUNNEL RELEASE      CHOLECYSTECTOMY      EGD      IA CYSTOURETHROSCOPY N/A 2/3/2020    " Procedure: CYSTOSCOPY;  Surgeon: Reed Lovell MD;  [facility];  Service: UroGynecology           GA LAPAROSCOPY SURG CHOLECYSTECTOMY N/A 9/6/2018    Procedure: CHOLECYSTECTOMY LAPAROSCOPIC W/ ROBOTICS;  Surgeon: Vince Tomas MD;  [facility];  Service: General    GA LAPS GSTR RSTCV PX W/BYP KATLYN-EN-Y LIMB <150 CM N/A 4/19/2022    Procedure: LAPAROSCOPIC KATLYN-EN-Y GASTRIC BYPASS & INTRAOPERATIVE EGD ROBOTICALLY ASSISTED;  Surgeon: Adalid Nagel MD;  [facility];  Service: Bariatrics    GA NEUROPLASTY &/TRANSPOS MEDIAN NRV CARPAL TUNNE Right 11/27/2020    Procedure: release CARPAL TUNNEL;  Surgeon: Keith Landers MD;  [facility];  Service: Orthopedics    GA NEUROPLASTY &/TRANSPOSITION ULNAR NERVE ELBOW Right 11/27/2020    Procedure: CUBITAL TUNNEL;  Surgeon: Keith Landers MD;  [facility];  Service: Orthopedics    GA POST COLPORRHAPHY RECTOCELE W/WO PERINEORRHAPHY N/A 2/3/2020    Procedure: POSTERIOR COLPORRHAPHY;  Surgeon: Reed Lovell MD;  [facility];  Service: UroGynecology           GA SLING OPERATION STRESS INCONTINENCE N/A 2/3/2020    Procedure: PUBOVAGINAL SLING;  Surgeon: Reed Lovell MD;  [facility];  Service: UroGynecology           TUBAL LIGATION       Social History     Socioeconomic History    Marital status: Single     Spouse name: Not on file    Number of children: 3    Years of education: Not on file    Highest education level: Associate degree: academic program   Occupational History    Occupation: [employer]     Employer: [employer] EMPLOYEES   Tobacco Use    Smoking status: Never    Smokeless tobacco: Never   Vaping Use    Vaping status: Never Used   Substance and Sexual Activity    Alcohol use: Not Currently    Drug use: No    Sexual activity: Yes     Partners: Male     Birth control/protection: Female Sterilization     Comment: single   Other Topics Concern    Not on file   Social History Narrative    Uses  seatbelts    Caffeine use     Social Determinants of Health     Financial Resource Strain: Not on file   Food Insecurity: Not on file   Transportation Needs: Not on file   Physical Activity: Sufficiently Active (4/19/2021)    Exercise Vital Sign     Days of Exercise per Week: 3 days     Minutes of Exercise per Session: 60 min   Stress: No Stress Concern Present (4/19/2021)    [place] [employer] - Occupational Stress Questionnaire     Feeling of Stress : Not at all   Social Connections: Socially Isolated (4/19/2021)    Social Connection and Isolation Panel [NHANES]     Frequency of Communication with Friends and Family: Never     Frequency of Social Gatherings with Friends and Family: Never     Attends Sikhism Services: Never     Active Member of Clubs or Organizations: No     Attends Club or Organization Meetings: Never     Marital Status: Never    Intimate Partner Violence: Not At Risk (4/19/2021)    Humiliation, Afraid, Rape, and Kick questionnaire     Fear of Current or Ex-Partner: No     Emotionally Abused: No     Physically Abused: No     Sexually Abused: No   Housing Stability: Not on file       Current Outpatient Medications:     Calcium 600-400 MG-UNIT CHEW, Chew daily, Disp: , Rfl:     Cholecalciferol (Vitamin D) 50 MCG (2000 UT) tablet, Take 1 tablet (2,000 Units total) by mouth daily, Disp: 90 tablet, Rfl: 1    clonazePAM (KlonoPIN) 0.5 mg tablet, TAKE 1/2 TO 1 TABLET BY MOUTH 2 TIMES A DAY AS NEEDED FOR ANXIETY Do not start before December 11, 2023., Disp: 60 tablet, Rfl: 2    Diclofenac Sodium (VOLTAREN) 1 %, Apply 2 g topically 4 (four) times a day, Disp: 100 g, Rfl: 6    fluticasone (FLONASE) 50 mcg/act nasal spray, 1 spray into each nostril daily, Disp: 48 g, Rfl: 1    gabapentin (Neurontin) 100 mg capsule, Take 1 capsule (100 mg total) by mouth daily at bedtime As needed for numbness, Disp: 30 capsule, Rfl: 6    hydroxychloroquine (PLAQUENIL) 200 mg tablet, Take 1  tablet (200 mg total) by mouth daily, Disp: 90 tablet, Rfl: 1    levothyroxine 25 mcg tablet, Take 1 tablet (25 mcg total) by mouth daily in the early morning, Disp: 90 tablet, Rfl: 1    modafinil (PROVIGIL) 200 MG tablet, Take 1 tablet (200 mg total) by mouth daily, Disp: 30 tablet, Rfl: 2    Multiple Vitamins-Minerals (Bariatric Multivitamins/Iron) CAPS, Take by mouth daily, Disp: , Rfl:     omeprazole (PriLOSEC) 20 mg delayed release capsule, Take 1 capsule (20 mg total) by mouth daily, Disp: 90 capsule, Rfl: 1    predniSONE 5 mg tablet, 4 tabs x7 days, then 3 tabs x7 days, then 2 tabs x7 days, then 1 tab x7 days, then stop., Disp: 70 tablet, Rfl: 0    Vortioxetine HBr (Trintellix) 20 MG tablet, Take 1 tablet (20 mg total) by mouth daily, Disp: 30 tablet, Rfl: 3    Food Intake and Lifestyle Assessment   Food Intake Assessment completed via usual diet recall  Pt has taken a break from work so eats every 2-3 hours   Breakfast: oatmeal or toast   Snack: cheese stick   Lunch: - lean protein /veggies   Snack:  protein snack   Dinner: 1-2 ounces chicken & veggies   Snack: protein shake   Beverage intake: water and coffee/tea  Diet texture/stage: regular  Protein supplement: tries to include one per day    Estimated protein intake per day: ~50 grams with protein shake   Estimated fluid intake per day: pt feels she is meeting her fluid needs   Meals eaten away from home: not currently   Typical meal pattern: 3 meals per day and 3 snacks per day  Eating Behaviors: pt reports that she is able to eat more volume at a time - usually 1/2 cup  She eats every 2-3 hours to increase her calorie intake     Vitamin and Mineral regimen: she is taking her bariatric vitamins and minerals as recommended.     Food allergies or intolerances: resolving with decreased life stressors   Cultural or Quaker considerations: n/a     Physical Assessment  Nutrition Related Findings  Hunger returned and nausea resolved diarrhea resolved.  "    Physical Activity  Types of exercise: Walking- walks the dog   Current physical limitations: would not recommend intense exercise until weight loss is stabilized     Psychosocial Assessment   Support systems:    Socioeconomic factors has 3 children - home with her daughter ( 15 yrs old )  Sons are adults     Nutrition Diagnosis- improving   Diagnosis: Underweight (NC-3.1) and Inadequate protein intake (NI-5.7.3)  Related to: Altered GI function and Inability or lack of desire to manage self-care  As Evidenced by: Unintentional weight loss     Nutrition Prescription: Recommend the following diet  Small frequent meals 5-6 times per day  Include one protein shake per day     Meal Plan ( Jonas/Pro/Carb)   Breakfast: 200-300/20/30  Snack: 150/>5/20  Lunch: 300/30/30-45  Snack: 150/>5/20  Dinner: 300/30/30-45  Snack: 150/>5/20       Interventions and Teaching   Patient educated on post-op nutrition guidelines.       Patient educated and handouts provided.  Surgical changes to stomach / GI  Capacity of post-surgery stomach  Adequate hydration  Sugar and fat restriction to decrease \"dumping syndrome\"  Expected weight loss  Exercise  Nutrition considerations after surgery  Protein supplements  Appropriate carbohydrate, protein, and fat intake, and food/fluid choices to maximize safe weight loss, nutrient intake, and tolerance     Patient is not currently pregnant and doesn't desire to become pregnant a minimum of one year post-op    Education provided to: patient    Barriers to learning: No barriers identified    Readiness to change: preparation and action    Comprehension: verbalizes understanding     Expected Compliance: fair- due to stress in her life     Evaluation/Monitoring   Eating pattern as discussed Tolerance of nutrition prescription Body weight Lab values Physical activity Bowel pattern  Patient has decided to take time off from work and is living with her mother and teen age daughter.  Her stress has " decreased with taking time off from work.  Her appetite has returned and diarrhea and nausea has resolved.  She is able to eat more volume and eat more frequently.  She has been focused on improving her mental health.  Continues to treat food as medicine.  Is mindful to eat frequentlty.    Weight loss has stabilized and she has gained 5# over the past 4 months.Reviewed SECA results Pt gained 4# of fat mass and 1.5 pounds of LBM  Currently is in a lupus flare which can skew results  Goal is to gain 5-10# .        Goals- continued   Eat 3 meals per day and plus 3 snacks    Goal is 1600 calories    One 300 calorie shake per day,    5 meals of 250 calories each   Goal is to gain 3-5 pounds over next 4  months    F/U in 3 months for accountability     Time Spent:   30 Minutes

## 2023-12-29 ENCOUNTER — TELEPHONE (OUTPATIENT)
Dept: RHEUMATOLOGY | Facility: CLINIC | Age: 38
End: 2023-12-29

## 2023-12-29 NOTE — TELEPHONE ENCOUNTER
"Reached out to patient and relayed the message, patient states that she feels like she\" still can't move\" and she is almost out of the prednisone but is unsure if it is helping her.   "

## 2024-01-04 ENCOUNTER — HOSPITAL ENCOUNTER (OUTPATIENT)
Dept: ULTRASOUND IMAGING | Facility: HOSPITAL | Age: 39
Discharge: HOME | End: 2024-01-04
Attending: ORTHOPAEDIC SURGERY
Payer: COMMERCIAL

## 2024-01-04 DIAGNOSIS — G56.01 RIGHT CARPAL TUNNEL SYNDROME: ICD-10-CM

## 2024-01-04 PROCEDURE — 76882 US LMTD JT/FCL EVL NVASC XTR: CPT

## 2024-01-10 ENCOUNTER — OFFICE VISIT (OUTPATIENT)
Dept: OBGYN CLINIC | Facility: HOSPITAL | Age: 39
End: 2024-01-10
Payer: COMMERCIAL

## 2024-01-10 VITALS
DIASTOLIC BLOOD PRESSURE: 60 MMHG | SYSTOLIC BLOOD PRESSURE: 92 MMHG | HEART RATE: 52 BPM | BODY MASS INDEX: 20.23 KG/M2 | HEIGHT: 65 IN | WEIGHT: 121.4 LBS

## 2024-01-10 DIAGNOSIS — G56.01 CARPAL TUNNEL SYNDROME ON RIGHT: Primary | ICD-10-CM

## 2024-01-10 DIAGNOSIS — G56.21 CUBITAL TUNNEL SYNDROME ON RIGHT: ICD-10-CM

## 2024-01-10 PROCEDURE — 99214 OFFICE O/P EST MOD 30 MIN: CPT | Performed by: ORTHOPAEDIC SURGERY

## 2024-01-10 RX ORDER — CEFAZOLIN SODIUM 2 G/50ML
2000 SOLUTION INTRAVENOUS ONCE
OUTPATIENT
Start: 2024-01-10 | End: 2024-01-10

## 2024-01-10 NOTE — H&P (VIEW-ONLY)
ASSESSMENT/PLAN:    Assessment:   Recurrent right carpal tunnel syndrome and recurrent right cubital tunnel syndrome with ulnar nerve subluxation     Plan:   Desire has physical examination and image findings consistent with recurrent right carpal tunnel syndrome and right ulnar nerve subluxation at the elbow. She will undergo open revision right carpal tunnel release and revision cubital tunnel release with ulnar nerve transposition.     To Do Next Visit:  Suture removal     General Discussions:       Operative Discussions:     Cubital Tunnel Release:   The anatomy and physiology of cubital tunnel syndrome were discussed with the patient today in the office.  Typically, increased elbow flexion activities decrease blood flow within the intraneural spaces, resulting in a feeling of numbness, tingling, weakness, or clumsiness within the hand and fingers.  Occasionally, anatomic structures such as medial elbow osteophytes, the medial head of the triceps, were subluxing ulnar nerve may result in increased pressure or aggravation at the cubital tunnel.  Typical signs and symptoms usually include numbness and tingling within the ring and small finger, weakness with , and weakness with pinch.  Conservative treatment and includes nocturnal bracing to keep the elbow in a semi-extended position, activity modification, therapy, and avoiding excessive elbow flexion activities.  A majority of patients typically respond to conservative treatment over a period of approximately 3-6 months.  EMG/NCV testing of the ulnar nerve at the elbow is not as reliable as carpal tunnel syndrome.  Surgical intervention in the form of in situ release of the ulnar nerve at the elbow or ulnar nerve transposition may be required in up to 20% of patients. The patient has elected to undergo an cubital tunnel release.  The possibility of converting to an open procedure, or a subcutaneous or submuscular ulnar nerve transposition depending on the  nerve stability was discussed with the patient.  Typically, in the postoperative period, light activities are allowed immediately, driving is allowed when narcotic medications have stopped, and the incision may get wet after 5 days.  Heavy activities will be allowed after follow up appointment in 1-2 weeks.  Anti-inflammatory medications should be held for approximately 5 days postoperative.  While the pain within the ring and small finger of the hands generally improves rapidly, the numbness and tingling, as well as the strength, will slowly improve over a period of weeks to months.  Total recovery can take up to 18 months from the time of surgery.  Numbness and tingling near the incision, or near the medial aspect of the forearm was discussed with the patient.  The patient has an understanding of the above mentioned discussion.The risks and benefits of the procedure were explained to the patient, which include, but are not limited to: Bleeding, infection, recurrence, pain, scar, damage to tendons, damage to nerves, and damage to blood vessels, failure to give desired results and complications related to anesthesia.  These risks, along with alternative conservative treatment options, and postoperative protocols were voiced back and understood by the patient.  All questions were answered to the patient's satisfaction.  The patient agrees to comply with a standard postoperative protocol, and is willing to proceed.  Education was provided via written and auditory forms.  There were no barriers to learning. Written handouts regarding wound care, incision and scar care, and general preoperative information was provided to the patient.  Prior to surgery, the patient may be requested to stop all anti-inflammatory medications.  Prophylactic aspirin, Plavix, and Coumadin may be allowed to be continued.  Medications including vitamin E., ginkgo, and fish oil are requested to be stopped approximately one week prior to  surgery.  Hypertensive medications and beta blockers, if taken, should be continued.  Standard Consent: The risks and benefits of the procedure were explained to the patient, which include, but are not limited to: Bleeding, infection, recurrence, pain, scar, damage to tendons, damage to nerves, and damage to blood vessels, failure to give desired results and complications related to anesthesia.  These risks, along with alternative conservative treatment options, and postoperative protocols were voiced back and understood by the patient.  All questions were answered to the patient's satisfaction.  The patient agrees to comply with a standard postoperative protocol, and is willing to proceed.  Education was provided via written and auditory forms.  There were no barriers to learning. Written handouts regarding wound care, incision and scar care, and general preoperative information was provided to the patient.  Prior to surgery, the patient may be requested to stop all anti-inflammatory medications.  Prophylactic aspirin, Plavix, and Coumadin may be allowed to be continued.  Medications including vitamin E., ginkgo, and fish oil are requested to be stopped approximately one week prior to surgery.  Hypertensive medications and beta blockers, if taken, should be continued.      _____________________________________________________  CHIEF COMPLAINT:  Chief Complaint   Patient presents with    Right Wrist - Follow-up     - 1/4/24         SUBJECTIVE:  Desire العلي is a 38 y.o. female who presents for a re-evaluation for her right wrist and elbow. In 2020, she underwent right cubital tunnel release and right mini open carpal tunnel release without resolution of her symptoms. She reports over the last 2 years, she can feel her right ulnar nerve snapping as she flexes and extends her elbow. She reports persistent numbness and tingling in her hand involving the ring, small, long, index and thumb digits. The numbness and  tingling wakes her up at night, causing her sleep disturbances. She reports decreased  strength and her symptoms interfere with her ability to perform her activities of daily living. She had a right wrist ultrasound to assess for recurrent carpal tunnel syndrome and she comes with goals to discuss that result and discuss possible surgery to address her ailments.     PAST MEDICAL HISTORY:  Past Medical History:   Diagnosis Date    Acne     Anxiety     Back pain     Bipolar disorder (HCC)     pt denies    Carpal tunnel syndrome on right     Contact lens overwear of both eyes     CPAP (continuous positive airway pressure) dependence     Cubital tunnel syndrome on right     Depression     Disease of thyroid gland     GERD (gastroesophageal reflux disease)     Headache     Hypothyroidism     Kidney stone     Kidney stones     Lupus (systemic lupus erythematosus) (HCC)     Nausea     Obese     gastric KATLYN-EN-Y  today 4/19/2022    PONV (postoperative nausea and vomiting)     Sleep apnea     Wears glasses        PAST SURGICAL HISTORY:  Past Surgical History:   Procedure Laterality Date    CARPAL TUNNEL RELEASE      CHOLECYSTECTOMY      EGD      RI CYSTOURETHROSCOPY N/A 2/3/2020    Procedure: CYSTOSCOPY;  Surgeon: Reed Lovell MD;  [facility];  Service: UroGynecology           RI LAPAROSCOPY SURG CHOLECYSTECTOMY N/A 9/6/2018    Procedure: CHOLECYSTECTOMY LAPAROSCOPIC W/ ROBOTICS;  Surgeon: Vince Tomas MD;  [facility];  Service: General    RI LAPS GSTR RSTCV PX W/BYP KATLYN-EN-Y LIMB <150 CM N/A 4/19/2022    Procedure: LAPAROSCOPIC KATLYN-EN-Y GASTRIC BYPASS & INTRAOPERATIVE EGD ROBOTICALLY ASSISTED;  Surgeon: Adalid Nagel MD;  [facility];  Service: Bariatrics    RI NEUROPLASTY &/TRANSPOS MEDIAN NRV CARPAL TUNNE Right 11/27/2020    Procedure: release CARPAL TUNNEL;  Surgeon: Keith Landers MD;  [facility];  Service: Orthopedics    RI NEUROPLASTY &/TRANSPOSITION ULNAR NERVE  ELBOW Right 11/27/2020    Procedure: CUBITAL TUNNEL;  Surgeon: Keith Landers MD;  [facility];  Service: Orthopedics    AL POST COLPORRHAPHY RECTOCELE W/WO PERINEORRHAPHY N/A 2/3/2020    Procedure: POSTERIOR COLPORRHAPHY;  Surgeon: Reed Lovell MD;  [facility];  Service: UroGynecology           AL SLING OPERATION STRESS INCONTINENCE N/A 2/3/2020    Procedure: PUBOVAGINAL SLING;  Surgeon: Reed Lovell MD;  [facility];  Service: UroGynecology           TUBAL LIGATION         FAMILY HISTORY:  Family History   Problem Relation Age of Onset    No Known Problems Mother     Alcohol abuse Father     Drug abuse Family     Psoriasis Maternal Grandmother     Colon cancer Maternal Uncle     Hypertension Maternal Grandfather     Heart disease Maternal Grandfather     Diabetes Neg Hx        SOCIAL HISTORY:  Social History     Tobacco Use    Smoking status: Never    Smokeless tobacco: Never   Vaping Use    Vaping status: Never Used   Substance Use Topics    Alcohol use: Not Currently    Drug use: No       MEDICATIONS:    Current Outpatient Medications:     Calcium 600-400 MG-UNIT CHEW, Chew daily, Disp: , Rfl:     Cholecalciferol (Vitamin D) 50 MCG (2000 UT) tablet, Take 1 tablet (2,000 Units total) by mouth daily, Disp: 90 tablet, Rfl: 1    clonazePAM (KlonoPIN) 0.5 mg tablet, TAKE 1/2 TO 1 TABLET BY MOUTH 2 TIMES A DAY AS NEEDED FOR ANXIETY Do not start before December 11, 2023., Disp: 60 tablet, Rfl: 2    Diclofenac Sodium (VOLTAREN) 1 %, Apply 2 g topically 4 (four) times a day, Disp: 100 g, Rfl: 6    fluticasone (FLONASE) 50 mcg/act nasal spray, 1 spray into each nostril daily, Disp: 48 g, Rfl: 1    gabapentin (Neurontin) 100 mg capsule, Take 1 capsule (100 mg total) by mouth daily at bedtime As needed for numbness, Disp: 30 capsule, Rfl: 6    hydroxychloroquine (PLAQUENIL) 200 mg tablet, Take 1 tablet (200 mg total) by mouth daily, Disp: 90 tablet, Rfl: 1    levothyroxine 25 mcg tablet,  "Take 1 tablet (25 mcg total) by mouth daily in the early morning, Disp: 90 tablet, Rfl: 1    modafinil (PROVIGIL) 200 MG tablet, Take 1 tablet (200 mg total) by mouth daily, Disp: 30 tablet, Rfl: 2    Multiple Vitamins-Minerals (Bariatric Multivitamins/Iron) CAPS, Take by mouth daily, Disp: , Rfl:     omeprazole (PriLOSEC) 20 mg delayed release capsule, Take 1 capsule (20 mg total) by mouth daily, Disp: 90 capsule, Rfl: 1    predniSONE 5 mg tablet, 4 tabs x7 days, then 3 tabs x7 days, then 2 tabs x7 days, then 1 tab x7 days, then stop., Disp: 70 tablet, Rfl: 0    Vortioxetine HBr (Trintellix) 20 MG tablet, Take 1 tablet (20 mg total) by mouth daily, Disp: 30 tablet, Rfl: 3    ALLERGIES:  Allergies   Allergen Reactions    Wound Dressing Adhesive Rash     Gets red rash from paper tape relatively quickly after application    Penicillins Hives     At young age       REVIEW OF SYSTEMS:  Pertinent items are noted in HPI.  A comprehensive review of systems was negative.    LABS:  HgA1c:   Lab Results   Component Value Date    HGBA1C 4.5 04/21/2023     BMP:   Lab Results   Component Value Date    GLUCOSE 91 03/10/2015    CALCIUM 9.6 12/12/2023     03/10/2015    K 4.4 12/12/2023    CO2 29 12/12/2023     12/12/2023    BUN 13 12/12/2023    CREATININE 0.64 12/12/2023       _____________________________________________________  PHYSICAL EXAMINATION:  Vital signs: BP 92/60   Pulse (!) 52   Ht 5' 5\" (1.651 m)   Wt 55.1 kg (121 lb 6.4 oz)   BMI 20.20 kg/m²   General: well developed and well nourished, alert, oriented times 3, and appears comfortable  Psychiatric: Normal  HEENT: Trachea Midline, No torticollis  Cardiovascular: No discernable arrhythmia  Pulmonary: No wheezing or stridor  Abdomen: No rebound or guarding  Extremities: No peripheral edema  Skin: No masses, erythema, lacerations, fluctation, ulcerations  Neurovascular: Sensation Intact to the Median, Ulnar, Radial Nerve, Motor Intact to the Median, " Ulnar, Radial Nerve, and Pulses Intact    MUSCULOSKELETAL EXAMINATION:  Extremities:    Right upper extremity:   Skin intact at elbow and wrist. There is a medial elbow surgical scar between the olecranon and medial epicondyle with appropriate healing. There is a surgical scar at the volar proximal palm with appropriate healing.     At the elbow there is notable ulnar nerve subluxation with flexion and extension. Positive Tinel's sign.     At the hand there is positive Tinel sign and positive Durkan test. No small finger escape. There is 4/5 strength with finger abduction. Mild 1st dorsal webspace atrophy. Negative Froment's sign. 4/5 with abductor pollicis brevis and opponens pollicis. No clawing, no froment's sign, no hypothenar or thenar atrophy.  No fasiculations.        _____________________________________________________  STUDIES REVIEWED:  Images were reviewed in PACS by Dr. Wu and demonstrate: MSK ultrasound at the carpal tunnel demonstrates a median nerve CSA of 13.4 at the carpal tunnel. Distal forearm median nerve CSA is 3.1 sq mm. The delta sq mm is 10.3.       PROCEDURES PERFORMED:  Procedures  No Procedures performed today

## 2024-01-10 NOTE — PROGRESS NOTES
ASSESSMENT/PLAN:    Assessment:   Recurrent right carpal tunnel syndrome and recurrent right cubital tunnel syndrome with ulnar nerve subluxation     Plan:   Desire has physical examination and image findings consistent with recurrent right carpal tunnel syndrome and right ulnar nerve subluxation at the elbow. She will undergo open revision right carpal tunnel release and revision cubital tunnel release with ulnar nerve transposition.     To Do Next Visit:  Suture removal     General Discussions:       Operative Discussions:     Cubital Tunnel Release:   The anatomy and physiology of cubital tunnel syndrome were discussed with the patient today in the office.  Typically, increased elbow flexion activities decrease blood flow within the intraneural spaces, resulting in a feeling of numbness, tingling, weakness, or clumsiness within the hand and fingers.  Occasionally, anatomic structures such as medial elbow osteophytes, the medial head of the triceps, were subluxing ulnar nerve may result in increased pressure or aggravation at the cubital tunnel.  Typical signs and symptoms usually include numbness and tingling within the ring and small finger, weakness with , and weakness with pinch.  Conservative treatment and includes nocturnal bracing to keep the elbow in a semi-extended position, activity modification, therapy, and avoiding excessive elbow flexion activities.  A majority of patients typically respond to conservative treatment over a period of approximately 3-6 months.  EMG/NCV testing of the ulnar nerve at the elbow is not as reliable as carpal tunnel syndrome.  Surgical intervention in the form of in situ release of the ulnar nerve at the elbow or ulnar nerve transposition may be required in up to 20% of patients. The patient has elected to undergo an cubital tunnel release.  The possibility of converting to an open procedure, or a subcutaneous or submuscular ulnar nerve transposition depending on the  nerve stability was discussed with the patient.  Typically, in the postoperative period, light activities are allowed immediately, driving is allowed when narcotic medications have stopped, and the incision may get wet after 5 days.  Heavy activities will be allowed after follow up appointment in 1-2 weeks.  Anti-inflammatory medications should be held for approximately 5 days postoperative.  While the pain within the ring and small finger of the hands generally improves rapidly, the numbness and tingling, as well as the strength, will slowly improve over a period of weeks to months.  Total recovery can take up to 18 months from the time of surgery.  Numbness and tingling near the incision, or near the medial aspect of the forearm was discussed with the patient.  The patient has an understanding of the above mentioned discussion.The risks and benefits of the procedure were explained to the patient, which include, but are not limited to: Bleeding, infection, recurrence, pain, scar, damage to tendons, damage to nerves, and damage to blood vessels, failure to give desired results and complications related to anesthesia.  These risks, along with alternative conservative treatment options, and postoperative protocols were voiced back and understood by the patient.  All questions were answered to the patient's satisfaction.  The patient agrees to comply with a standard postoperative protocol, and is willing to proceed.  Education was provided via written and auditory forms.  There were no barriers to learning. Written handouts regarding wound care, incision and scar care, and general preoperative information was provided to the patient.  Prior to surgery, the patient may be requested to stop all anti-inflammatory medications.  Prophylactic aspirin, Plavix, and Coumadin may be allowed to be continued.  Medications including vitamin E., ginkgo, and fish oil are requested to be stopped approximately one week prior to  surgery.  Hypertensive medications and beta blockers, if taken, should be continued.  Standard Consent: The risks and benefits of the procedure were explained to the patient, which include, but are not limited to: Bleeding, infection, recurrence, pain, scar, damage to tendons, damage to nerves, and damage to blood vessels, failure to give desired results and complications related to anesthesia.  These risks, along with alternative conservative treatment options, and postoperative protocols were voiced back and understood by the patient.  All questions were answered to the patient's satisfaction.  The patient agrees to comply with a standard postoperative protocol, and is willing to proceed.  Education was provided via written and auditory forms.  There were no barriers to learning. Written handouts regarding wound care, incision and scar care, and general preoperative information was provided to the patient.  Prior to surgery, the patient may be requested to stop all anti-inflammatory medications.  Prophylactic aspirin, Plavix, and Coumadin may be allowed to be continued.  Medications including vitamin E., ginkgo, and fish oil are requested to be stopped approximately one week prior to surgery.  Hypertensive medications and beta blockers, if taken, should be continued.      _____________________________________________________  CHIEF COMPLAINT:  Chief Complaint   Patient presents with    Right Wrist - Follow-up     - 1/4/24         SUBJECTIVE:  Desire العلي is a 38 y.o. female who presents for a re-evaluation for her right wrist and elbow. In 2020, she underwent right cubital tunnel release and right mini open carpal tunnel release without resolution of her symptoms. She reports over the last 2 years, she can feel her right ulnar nerve snapping as she flexes and extends her elbow. She reports persistent numbness and tingling in her hand involving the ring, small, long, index and thumb digits. The numbness and  tingling wakes her up at night, causing her sleep disturbances. She reports decreased  strength and her symptoms interfere with her ability to perform her activities of daily living. She had a right wrist ultrasound to assess for recurrent carpal tunnel syndrome and she comes with goals to discuss that result and discuss possible surgery to address her ailments.     PAST MEDICAL HISTORY:  Past Medical History:   Diagnosis Date    Acne     Anxiety     Back pain     Bipolar disorder (HCC)     pt denies    Carpal tunnel syndrome on right     Contact lens overwear of both eyes     CPAP (continuous positive airway pressure) dependence     Cubital tunnel syndrome on right     Depression     Disease of thyroid gland     GERD (gastroesophageal reflux disease)     Headache     Hypothyroidism     Kidney stone     Kidney stones     Lupus (systemic lupus erythematosus) (HCC)     Nausea     Obese     gastric KATLYN-EN-Y  today 4/19/2022    PONV (postoperative nausea and vomiting)     Sleep apnea     Wears glasses        PAST SURGICAL HISTORY:  Past Surgical History:   Procedure Laterality Date    CARPAL TUNNEL RELEASE      CHOLECYSTECTOMY      EGD      KS CYSTOURETHROSCOPY N/A 2/3/2020    Procedure: CYSTOSCOPY;  Surgeon: Reed Lovell MD;  Location: [facility];  Service: UroGynecology           KS LAPAROSCOPY SURG CHOLECYSTECTOMY N/A 9/6/2018    Procedure: CHOLECYSTECTOMY LAPAROSCOPIC W/ ROBOTICS;  Surgeon: Vince Tomas MD;  Location: [facility];  Service: General    KS LAPS GSTR RSTCV PX W/BYP KATLYN-EN-Y LIMB <150 CM N/A 4/19/2022    Procedure: LAPAROSCOPIC KATLYN-EN-Y GASTRIC BYPASS & INTRAOPERATIVE EGD ROBOTICALLY ASSISTED;  Surgeon: Adalid Nagel MD;  Location: [facility];  Service: Bariatrics    KS NEUROPLASTY &/TRANSPOS MEDIAN NRV CARPAL TUNNE Right 11/27/2020    Procedure: release CARPAL TUNNEL;  Surgeon: Keith Landers MD;  Location[facility];  Service: Orthopedics    KS NEUROPLASTY &/TRANSPOSITION ULNAR NERVE  ELBOW Right 11/27/2020    Procedure: CUBITAL TUNNEL;  Surgeon: Keith Landers MD;  [facility];  Service: Orthopedics    AK POST COLPORRHAPHY RECTOCELE W/WO PERINEORRHAPHY N/A 2/3/2020    Procedure: POSTERIOR COLPORRHAPHY;  Surgeon: Reed Lovell MD;  [facility];  Service: UroGynecology           AK SLING OPERATION STRESS INCONTINENCE N/A 2/3/2020    Procedure: PUBOVAGINAL SLING;  Surgeon: Reed Lovell MD;  Location: [facility];  Service: UroGynecology           TUBAL LIGATION         FAMILY HISTORY:  Family History   Problem Relation Age of Onset    No Known Problems Mother     Alcohol abuse Father     Drug abuse Family     Psoriasis Maternal Grandmother     Colon cancer Maternal Uncle     Hypertension Maternal Grandfather     Heart disease Maternal Grandfather     Diabetes Neg Hx        SOCIAL HISTORY:  Social History     Tobacco Use    Smoking status: Never    Smokeless tobacco: Never   Vaping Use    Vaping status: Never Used   Substance Use Topics    Alcohol use: Not Currently    Drug use: No       MEDICATIONS:    Current Outpatient Medications:     Calcium 600-400 MG-UNIT CHEW, Chew daily, Disp: , Rfl:     Cholecalciferol (Vitamin D) 50 MCG (2000 UT) tablet, Take 1 tablet (2,000 Units total) by mouth daily, Disp: 90 tablet, Rfl: 1    clonazePAM (KlonoPIN) 0.5 mg tablet, TAKE 1/2 TO 1 TABLET BY MOUTH 2 TIMES A DAY AS NEEDED FOR ANXIETY Do not start before December 11, 2023., Disp: 60 tablet, Rfl: 2    Diclofenac Sodium (VOLTAREN) 1 %, Apply 2 g topically 4 (four) times a day, Disp: 100 g, Rfl: 6    fluticasone (FLONASE) 50 mcg/act nasal spray, 1 spray into each nostril daily, Disp: 48 g, Rfl: 1    gabapentin (Neurontin) 100 mg capsule, Take 1 capsule (100 mg total) by mouth daily at bedtime As needed for numbness, Disp: 30 capsule, Rfl: 6    hydroxychloroquine (PLAQUENIL) 200 mg tablet, Take 1 tablet (200 mg total) by mouth daily, Disp: 90 tablet, Rfl: 1    levothyroxine 25 mcg tablet,  "Take 1 tablet (25 mcg total) by mouth daily in the early morning, Disp: 90 tablet, Rfl: 1    modafinil (PROVIGIL) 200 MG tablet, Take 1 tablet (200 mg total) by mouth daily, Disp: 30 tablet, Rfl: 2    Multiple Vitamins-Minerals (Bariatric Multivitamins/Iron) CAPS, Take by mouth daily, Disp: , Rfl:     omeprazole (PriLOSEC) 20 mg delayed release capsule, Take 1 capsule (20 mg total) by mouth daily, Disp: 90 capsule, Rfl: 1    predniSONE 5 mg tablet, 4 tabs x7 days, then 3 tabs x7 days, then 2 tabs x7 days, then 1 tab x7 days, then stop., Disp: 70 tablet, Rfl: 0    Vortioxetine HBr (Trintellix) 20 MG tablet, Take 1 tablet (20 mg total) by mouth daily, Disp: 30 tablet, Rfl: 3    ALLERGIES:  Allergies   Allergen Reactions    Wound Dressing Adhesive Rash     Gets red rash from paper tape relatively quickly after application    Penicillins Hives     At young age       REVIEW OF SYSTEMS:  Pertinent items are noted in HPI.  A comprehensive review of systems was negative.    LABS:  HgA1c:   Lab Results   Component Value Date    HGBA1C 4.5 04/21/2023     BMP:   Lab Results   Component Value Date    GLUCOSE 91 03/10/2015    CALCIUM 9.6 12/12/2023     03/10/2015    K 4.4 12/12/2023    CO2 29 12/12/2023     12/12/2023    BUN 13 12/12/2023    CREATININE 0.64 12/12/2023       _____________________________________________________  PHYSICAL EXAMINATION:  Vital signs: BP 92/60   Pulse (!) 52   Ht 5' 5\" (1.651 m)   Wt 55.1 kg (121 lb 6.4 oz)   BMI 20.20 kg/m²   General: well developed and well nourished, alert, oriented times 3, and appears comfortable  Psychiatric: Normal  HEENT: Trachea Midline, No torticollis  Cardiovascular: No discernable arrhythmia  Pulmonary: No wheezing or stridor  Abdomen: No rebound or guarding  Extremities: No peripheral edema  Skin: No masses, erythema, lacerations, fluctation, ulcerations  Neurovascular: Sensation Intact to the Median, Ulnar, Radial Nerve, Motor Intact to the Median, " Ulnar, Radial Nerve, and Pulses Intact    MUSCULOSKELETAL EXAMINATION:  Extremities:    Right upper extremity:   Skin intact at elbow and wrist. There is a medial elbow surgical scar between the olecranon and medial epicondyle with appropriate healing. There is a surgical scar at the volar proximal palm with appropriate healing.     At the elbow there is notable ulnar nerve subluxation with flexion and extension. Positive Tinel's sign.     At the hand there is positive Tinel sign and positive Durkan test. No small finger escape. There is 4/5 strength with finger abduction. Mild 1st dorsal webspace atrophy. Negative Froment's sign. 4/5 with abductor pollicis brevis and opponens pollicis. No clawing, no froment's sign, no hypothenar or thenar atrophy.  No fasiculations.        _____________________________________________________  STUDIES REVIEWED:  Images were reviewed in PACS by Dr. Wu and demonstrate: MSK ultrasound at the carpal tunnel demonstrates a median nerve CSA of 13.4 at the carpal tunnel. Distal forearm median nerve CSA is 3.1 sq mm. The delta sq mm is 10.3.       PROCEDURES PERFORMED:  Procedures  No Procedures performed today

## 2024-01-22 NOTE — PRE-PROCEDURE INSTRUCTIONS
Pre-Surgery Instructions:   Medication Instructions    Calcium 600-400 MG-UNIT CHEW Stop taking 7 days prior to surgery.    Cholecalciferol (Vitamin D) 50 MCG (2000 UT) tablet Stop taking 7 days prior to surgery.    clonazePAM (KlonoPIN) 0.5 mg tablet Uses PRN- OK to take day of surgery    Diclofenac Sodium (VOLTAREN) 1 % Stop taking 3 days prior to surgery.    hydroxychloroquine (PLAQUENIL) 200 mg tablet Take day of surgery.    levothyroxine 25 mcg tablet Take day of surgery.    modafinil (PROVIGIL) 200 MG tablet Hold day of surgery.    Multiple Vitamins-Minerals (Bariatric Multivitamins/Iron) CAPS Stop taking 7 days prior to surgery.    omeprazole (PriLOSEC) 20 mg delayed release capsule Uses PRN- OK to take day of surgery    Probiotic Product (PROBIOTIC BLEND PO) Stop taking 7 days prior to surgery.    Vortioxetine HBr (Trintellix) 20 MG tablet Take day of surgery.    Medication instructions for day surgery reviewed. Please use only a sip of water to take your instructed medications. Avoid all over the counter vitamins, supplements and NSAIDS for one week prior to surgery per anesthesia guidelines. Tylenol is ok to take as needed.     You will receive a call one business day prior to surgery with an arrival time and hospital directions. If your surgery is scheduled on a Monday, the hospital will be calling you on the Friday prior to your surgery. If you have not heard from anyone by 8pm, please call the hospital supervisor through the hospital  at [phone]. (Michael [phone]).    Do not eat or drink anything after midnight the night before your surgery, including candy, mints, lifesavers, or chewing gum. Do not drink alcohol 24hrs before your surgery. Try not to smoke at least 24hrs before your surgery.       Follow the pre surgery showering instructions as listed in the “My Surgical Experience Booklet” or otherwise provided by your surgeon's office. Do not use a blade to shave the surgical area  1 week before surgery. It is okay to use a clean electric clippers up to 24 hours before surgery. Do not apply any lotions, creams, including makeup, cologne, deodorant, or perfumes after showering on the day of your surgery. Do not use dry shampoo, hair spray, hair gel, or any type of hair products.     No contact lenses, eye make-up, or artificial eyelashes. Remove nail polish, including gel polish, and any artificial, gel, or acrylic nails if possible. Remove all jewelry including rings and body piercing jewelry.     Wear causal clothing that is easy to take on and off. Consider your type of surgery.    Keep any valuables, jewelry, piercings at home. Please bring any specially ordered equipment (sling, braces) if indicated.    Arrange for a responsible person to drive you to and from the hospital on the day of your surgery. Visitor Guidelines discussed.     Call the surgeon's office with any new illnesses, exposures, or additional questions prior to surgery.    Please reference your “My Surgical Experience Booklet” for additional information to prepare for your upcoming surgery.

## 2024-01-25 ENCOUNTER — ANESTHESIA (OUTPATIENT)
Dept: PERIOP | Facility: HOSPITAL | Age: 39
End: 2024-01-25
Payer: COMMERCIAL

## 2024-01-25 ENCOUNTER — ANESTHESIA EVENT (OUTPATIENT)
Dept: PERIOP | Facility: HOSPITAL | Age: 39
End: 2024-01-25
Payer: COMMERCIAL

## 2024-01-25 ENCOUNTER — HOSPITAL ENCOUNTER (OUTPATIENT)
Facility: HOSPITAL | Age: 39
Setting detail: OUTPATIENT SURGERY
Discharge: HOME | End: 2024-01-25
Attending: ORTHOPAEDIC SURGERY | Admitting: ORTHOPAEDIC SURGERY
Payer: COMMERCIAL

## 2024-01-25 VITALS
DIASTOLIC BLOOD PRESSURE: 58 MMHG | BODY MASS INDEX: 20.16 KG/M2 | OXYGEN SATURATION: 100 % | RESPIRATION RATE: 14 BRPM | HEART RATE: 88 BPM | WEIGHT: 121 LBS | SYSTOLIC BLOOD PRESSURE: 125 MMHG | HEIGHT: 65 IN | TEMPERATURE: 98.5 F

## 2024-01-25 DIAGNOSIS — M25.50 ARTHRALGIA OF MULTIPLE JOINTS: ICD-10-CM

## 2024-01-25 DIAGNOSIS — G56.21 CUBITAL TUNNEL SYNDROME ON RIGHT: Primary | ICD-10-CM

## 2024-01-25 PROCEDURE — 64721 CARPAL TUNNEL SURGERY: CPT | Performed by: PHYSICIAN ASSISTANT

## 2024-01-25 PROCEDURE — 64721 CARPAL TUNNEL SURGERY: CPT | Performed by: ORTHOPAEDIC SURGERY

## 2024-01-25 PROCEDURE — 64718 REVISE ULNAR NERVE AT ELBOW: CPT | Performed by: ORTHOPAEDIC SURGERY

## 2024-01-25 PROCEDURE — 64718 REVISE ULNAR NERVE AT ELBOW: CPT | Performed by: PHYSICIAN ASSISTANT

## 2024-01-25 PROCEDURE — C1713 ANCHOR/SCREW BN/BN,TIS/BN: HCPCS | Performed by: ORTHOPAEDIC SURGERY

## 2024-01-25 DEVICE — MINI QUICKANCHOR PLUS (NUMBER 2/0 SUTURE) SIZE 2/0 (3 METRIC) ORTHOCORD BRAIDED COMPOSITE SUTURE, 18 INCHES (45CM), DOUBLE-ARMED V-5 NEEDLES AND 2.0 X 9.7MM DRILL BIT, WITH DISPOSABLE INSERTER.
Type: IMPLANTABLE DEVICE | Site: ELBOW | Status: FUNCTIONAL
Brand: QUICKANCHOR ORTHOCORD

## 2024-01-25 RX ORDER — ACETAMINOPHEN 325 MG/1
650 TABLET ORAL EVERY 6 HOURS PRN
Status: DISCONTINUED | OUTPATIENT
Start: 2024-01-25 | End: 2024-01-25 | Stop reason: HOSPADM

## 2024-01-25 RX ORDER — ONDANSETRON 2 MG/ML
4 INJECTION INTRAMUSCULAR; INTRAVENOUS EVERY 6 HOURS PRN
Status: DISCONTINUED | OUTPATIENT
Start: 2024-01-25 | End: 2024-01-25 | Stop reason: HOSPADM

## 2024-01-25 RX ORDER — TRAMADOL HYDROCHLORIDE 50 MG/1
50 TABLET ORAL EVERY 6 HOURS PRN
Status: DISCONTINUED | OUTPATIENT
Start: 2024-01-25 | End: 2024-01-25 | Stop reason: HOSPADM

## 2024-01-25 RX ORDER — LIDOCAINE HYDROCHLORIDE AND EPINEPHRINE 10; 10 MG/ML; UG/ML
INJECTION, SOLUTION INFILTRATION; PERINEURAL AS NEEDED
Status: DISCONTINUED | OUTPATIENT
Start: 2024-01-25 | End: 2024-01-25 | Stop reason: HOSPADM

## 2024-01-25 RX ORDER — HYDROMORPHONE HCL/PF 1 MG/ML
0.5 SYRINGE (ML) INJECTION
Status: DISCONTINUED | OUTPATIENT
Start: 2024-01-25 | End: 2024-01-25 | Stop reason: HOSPADM

## 2024-01-25 RX ORDER — SENNOSIDES 8.6 MG
650 CAPSULE ORAL EVERY 8 HOURS PRN
Qty: 30 TABLET | Refills: 0 | Status: SHIPPED | OUTPATIENT
Start: 2024-01-25

## 2024-01-25 RX ORDER — SCOLOPAMINE TRANSDERMAL SYSTEM 1 MG/1
1 PATCH, EXTENDED RELEASE TRANSDERMAL ONCE
Status: DISCONTINUED | OUTPATIENT
Start: 2024-01-25 | End: 2024-01-25 | Stop reason: HOSPADM

## 2024-01-25 RX ORDER — ONDANSETRON 2 MG/ML
INJECTION INTRAMUSCULAR; INTRAVENOUS AS NEEDED
Status: DISCONTINUED | OUTPATIENT
Start: 2024-01-25 | End: 2024-01-25

## 2024-01-25 RX ORDER — TRAMADOL HYDROCHLORIDE 50 MG/1
50 TABLET ORAL EVERY 6 HOURS PRN
Qty: 7 TABLET | Refills: 0 | Status: SHIPPED | OUTPATIENT
Start: 2024-01-25

## 2024-01-25 RX ORDER — PROPOFOL 10 MG/ML
INJECTION, EMULSION INTRAVENOUS AS NEEDED
Status: DISCONTINUED | OUTPATIENT
Start: 2024-01-25 | End: 2024-01-25

## 2024-01-25 RX ORDER — MIDAZOLAM HYDROCHLORIDE 2 MG/2ML
INJECTION, SOLUTION INTRAMUSCULAR; INTRAVENOUS AS NEEDED
Status: DISCONTINUED | OUTPATIENT
Start: 2024-01-25 | End: 2024-01-25

## 2024-01-25 RX ORDER — CEFAZOLIN SODIUM 2 G/50ML
2000 SOLUTION INTRAVENOUS ONCE
Status: COMPLETED | OUTPATIENT
Start: 2024-01-25 | End: 2024-01-25

## 2024-01-25 RX ORDER — KETAMINE HYDROCHLORIDE 100 MG/ML
INJECTION, SOLUTION INTRAMUSCULAR; INTRAVENOUS AS NEEDED
Status: DISCONTINUED | OUTPATIENT
Start: 2024-01-25 | End: 2024-01-25

## 2024-01-25 RX ORDER — SODIUM CHLORIDE, SODIUM LACTATE, POTASSIUM CHLORIDE, CALCIUM CHLORIDE 600; 310; 30; 20 MG/100ML; MG/100ML; MG/100ML; MG/100ML
125 INJECTION, SOLUTION INTRAVENOUS CONTINUOUS
Status: DISCONTINUED | OUTPATIENT
Start: 2024-01-25 | End: 2024-01-25 | Stop reason: HOSPADM

## 2024-01-25 RX ORDER — LIDOCAINE HYDROCHLORIDE 10 MG/ML
0.5 INJECTION, SOLUTION EPIDURAL; INFILTRATION; INTRACAUDAL; PERINEURAL ONCE AS NEEDED
Status: DISCONTINUED | OUTPATIENT
Start: 2024-01-25 | End: 2024-01-25 | Stop reason: HOSPADM

## 2024-01-25 RX ORDER — DEXAMETHASONE SODIUM PHOSPHATE 10 MG/ML
INJECTION, SOLUTION INTRAMUSCULAR; INTRAVENOUS AS NEEDED
Status: DISCONTINUED | OUTPATIENT
Start: 2024-01-25 | End: 2024-01-25

## 2024-01-25 RX ORDER — FENTANYL CITRATE 50 UG/ML
INJECTION, SOLUTION INTRAMUSCULAR; INTRAVENOUS AS NEEDED
Status: DISCONTINUED | OUTPATIENT
Start: 2024-01-25 | End: 2024-01-25

## 2024-01-25 RX ORDER — SODIUM CHLORIDE, SODIUM LACTATE, POTASSIUM CHLORIDE, CALCIUM CHLORIDE 600; 310; 30; 20 MG/100ML; MG/100ML; MG/100ML; MG/100ML
INJECTION, SOLUTION INTRAVENOUS CONTINUOUS PRN
Status: DISCONTINUED | OUTPATIENT
Start: 2024-01-25 | End: 2024-01-25

## 2024-01-25 RX ORDER — ONDANSETRON 2 MG/ML
4 INJECTION INTRAMUSCULAR; INTRAVENOUS ONCE AS NEEDED
Status: DISCONTINUED | OUTPATIENT
Start: 2024-01-25 | End: 2024-01-25 | Stop reason: HOSPADM

## 2024-01-25 RX ORDER — FENTANYL CITRATE/PF 50 MCG/ML
25 SYRINGE (ML) INJECTION
Status: COMPLETED | OUTPATIENT
Start: 2024-01-25 | End: 2024-01-25

## 2024-01-25 RX ORDER — LIDOCAINE HYDROCHLORIDE 10 MG/ML
INJECTION, SOLUTION EPIDURAL; INFILTRATION; INTRACAUDAL; PERINEURAL AS NEEDED
Status: DISCONTINUED | OUTPATIENT
Start: 2024-01-25 | End: 2024-01-25

## 2024-01-25 RX ADMIN — FENTANYL CITRATE 50 MCG: 50 INJECTION INTRAMUSCULAR; INTRAVENOUS at 14:33

## 2024-01-25 RX ADMIN — FENTANYL CITRATE 50 MCG: 50 INJECTION INTRAMUSCULAR; INTRAVENOUS at 12:40

## 2024-01-25 RX ADMIN — FENTANYL CITRATE 25 MCG: 50 INJECTION INTRAMUSCULAR; INTRAVENOUS at 15:21

## 2024-01-25 RX ADMIN — KETAMINE HYDROCHLORIDE 10 MG: 100 INJECTION, SOLUTION, CONCENTRATE INTRAMUSCULAR; INTRAVENOUS at 13:02

## 2024-01-25 RX ADMIN — KETAMINE HYDROCHLORIDE 10 MG: 100 INJECTION, SOLUTION, CONCENTRATE INTRAMUSCULAR; INTRAVENOUS at 13:08

## 2024-01-25 RX ADMIN — DEXAMETHASONE SODIUM PHOSPHATE 10 MG: 10 INJECTION, SOLUTION INTRAMUSCULAR; INTRAVENOUS at 12:40

## 2024-01-25 RX ADMIN — ONDANSETRON 4 MG: 2 INJECTION INTRAMUSCULAR; INTRAVENOUS at 14:00

## 2024-01-25 RX ADMIN — KETAMINE HYDROCHLORIDE 20 MG: 100 INJECTION, SOLUTION, CONCENTRATE INTRAMUSCULAR; INTRAVENOUS at 13:00

## 2024-01-25 RX ADMIN — KETAMINE HYDROCHLORIDE 10 MG: 100 INJECTION, SOLUTION, CONCENTRATE INTRAMUSCULAR; INTRAVENOUS at 13:16

## 2024-01-25 RX ADMIN — PROPOFOL 200 MG: 10 INJECTION, EMULSION INTRAVENOUS at 12:40

## 2024-01-25 RX ADMIN — FENTANYL CITRATE 25 MCG: 50 INJECTION INTRAMUSCULAR; INTRAVENOUS at 14:58

## 2024-01-25 RX ADMIN — FENTANYL CITRATE 25 MCG: 50 INJECTION INTRAMUSCULAR; INTRAVENOUS at 15:07

## 2024-01-25 RX ADMIN — LIDOCAINE HYDROCHLORIDE 50 MG: 10 INJECTION, SOLUTION EPIDURAL; INFILTRATION; INTRACAUDAL; PERINEURAL at 12:40

## 2024-01-25 RX ADMIN — SODIUM CHLORIDE, SODIUM LACTATE, POTASSIUM CHLORIDE, AND CALCIUM CHLORIDE: .6; .31; .03; .02 INJECTION, SOLUTION INTRAVENOUS at 12:39

## 2024-01-25 RX ADMIN — MIDAZOLAM 2 MG: 1 INJECTION INTRAMUSCULAR; INTRAVENOUS at 12:39

## 2024-01-25 RX ADMIN — CEFAZOLIN SODIUM 2000 MG: 2 SOLUTION INTRAVENOUS at 12:40

## 2024-01-25 RX ADMIN — FENTANYL CITRATE 25 MCG: 50 INJECTION INTRAMUSCULAR; INTRAVENOUS at 14:51

## 2024-01-25 RX ADMIN — SCOPALAMINE 1 PATCH: 1 PATCH, EXTENDED RELEASE TRANSDERMAL at 12:24

## 2024-01-25 RX ADMIN — SODIUM CHLORIDE, SODIUM LACTATE, POTASSIUM CHLORIDE, AND CALCIUM CHLORIDE: .6; .31; .03; .02 INJECTION, SOLUTION INTRAVENOUS at 13:59

## 2024-01-25 NOTE — ANESTHESIA POSTPROCEDURE EVALUATION
Post-Op Assessment Note            No anethesia notable event occurred.                BP      Temp      Pulse     Resp      SpO2

## 2024-01-25 NOTE — OP NOTE
OPERATIVE REPORT  PATIENT NAME: Desire العلي  :  1985  MRN: [id number]  Pt Location: [facility]    SURGERY DATE: 24    Surgeons and Role:     * Joshua Wu MD - Primary     * Manny Ott PA-C - Assisting    Pre-Op Diagnosis:  Carpal tunnel syndrome on right [G56.01]  Cubital tunnel syndrome on right [G56.21]    Post-Op Diagnosis:  .Carpal tunnel syndrome on right [G56.01]  Cubital tunnel syndrome on right [G56.21]    Procedure(s) (LRB):  RIGHT OPEN REVISION CARPAL TUNNEL RELEASE (Right)  REVISION CUBITAL TUNNEL WITH SUBMUSCULAR ULNAR NERVE TRANSPOSITION (Right)  APPLICATION OF LONG ARM SPLINT (Right)    Specimen(s):  No specimens collected during this procedure.    Estimated Blood Loss:   Minimal      Anesthesia Type:   General    Operative Indications:  The patient has a history of right recurrent carpal tunnel syndrome and cubital tunnel syndrome with subluxing ulnar nerve that was recalcitrant to conservative management.  The decision was made to bring the patient to the operating room for right revision carpal tunnel release and cubital tunnel release with submuscular ulnar nerve transposition.  Risks of the procedure were explained which include, but are not limited to bleeding; infection; damage to nerves, arteries,veins, tendons; scar; pain; need for reoperation; failure to give desired result; and risks of anaesthesia.  All questions were answered to satisfaction and they were willing to proceed.         Operative Findings:  Right carpal tunnel syndrome with 3 formation transverse carpal ligament  Significant scarring around the ulnar nerve with subluxing ulnar nerve and successful ulnar nerve transposition    Complications:   None    Procedure and Technique:  After the patient, site, and procedure were identified, the patient was brought into the operating room in a supine position.  General anaesthesia and local medication were provided.  A well padded tourniquet was applied to  the extremity, set at 250 mmHg.  The  right upper extremity was then prepped and drapped in a normal, sterile, orthopedic fashion.    After the patient, site, and procedure were once again identified, attention was turned towards the right carpal tunnel.  A linear incision was then made radial to the hyperthenar eminence of the hand in line with the webspace between the long and ring finger.  This is extended in a Primo type incision towards the wrist flexion crease.  With care taken to identify and protect superficial neurovascular structures, the skin, subcutaneous fat, and palmar fascia were divided in line with the incision.  Median nerve was visualized as we divided through the transverse carpal ligament.  Under direct visualization, the transverse carpal ligament was divided from proximal to distal.  We confirmed complete release under direct visualization and released distally to the level of the palmar fat.  Care was taken to protect the recurrent branch of the median nerve.  We were able to identify the median nerve which was intact in its entirety as well as the flexor tendons within the canal.  We then divided the antebrachial fascia and a proximal and ulnar based direction for approximately 2 cm.    After the patient, site, and procedure were once again identified, attention was turned to the right elbow.  A small curvilinear incision was made near the posteromedial aspect of the arm and care was taken to carefully dissect down to the fascia overlying the Triceps, FCU, and cubital tunnel.  The branches of the medial antebrachial cutaneous nerve identified in the area and were protected throughout the procedure.  There was significant scarring around the ulnar nerve and meticulous dissection techniques were utilized.  With flexion and extension of the elbow, there was significant subluxation of the ulnar nerve over the medial epicondyle.  Utilizing careful dissection, the fascial overlying the FCU muscle  was split in line with the incision and the FCU muscle fibers were split longitudinally.  The ulnar nerve was identified.  The deep fascia was also split with care taken not to damage the ulnar nerve.  The motor branches to the FCU were also identified and protected throughout the procedure.  The fascia overlying the ulnar nerve was released from the cubital tunnel and the nerve was circumferentially freed.  A Penrose drain was placed around the nerve.  The nerve was then freed proximally around the triceps muscle and the medial intermuscular septum.  There was significant scarring around the ulnar nerve and thickening of the medial intermuscular septum.  Care was taken to protect the longitudinal vessels running with the ulnar nerve.  The large vascular plexus near the posterior portion of the intermuscular septum was identified and protected while a portion of the septum was excised.  The ulnar nerve was now freed from all entrapment points. The flexor pronator muscular group was elevated anteriorly with care taken to avoid injury to the ulnar collateral ligament.  After elevating the muscle group to the level of the median nerve, we verified that no other entrapment points existed to the ulnar nerve, and the remaining septum was not pushing on the nerve after transposing it anteriorly, the nerve was placed over the medial condyle and under the flexor/pronator wad.  2 suture anchors were placed in the medial epicondyle.  The flexor pronator mass was repaired in an interrupted fashion, avoiding tension on the ulnar nerve.  The nerve was found to be well localized with no migration or subluxation back over the condyle.  The Penrose was removed.  The nerve was again verified to be free from all entrapment points and able to be moved freely.  There was significant laxity around the ulnar nerve.  Tourniquet was deflated.      At the completion of the procedure, hemostasis was obtained with cautery and direct pressure.   The wounds were copiously irrigated with sterile solution.  The wounds were closed with Vicryl and Prolene.  Sterile dressings were applied, including Xeroform, gauze, tweeners, webril, ACE and Posterior splint.  Please note, all sponge, needle, and instrument counts were correct prior to closure.  Loupe magnification was utilized.  The patient tolerated the procedure well.     I was present for all critical portions of the procedure., A qualified resident physician was not available., and A physician assistant was required during the procedure for retraction, tissue handling, dissection and suturing.    Patient Disposition:  PACU , hemodynamically stable, and extubated and stable    SIGNATURE: Joshua Wu MD  DATE: 01/25/24  TIME: 2:23 PM

## 2024-01-25 NOTE — INTERVAL H&P NOTE
H&P reviewed. After examining the patient I find no changes in the patients condition since the H&P had been written.    Vitals:    01/25/24 1143   BP: 104/58   Pulse: 55   Resp: 16   Temp: 98.2 °F (36.8 °C)   SpO2: 100%

## 2024-01-25 NOTE — ANESTHESIA PREPROCEDURE EVALUATION
Procedure:  RIGHT OPEN CARPAL TUNNEL RELEASE WITH POSSIBLE NERVE WRAP (Right: Hand)  REVISION CUBITAL TUNNEL WITH TRANSPOSITION NERVE ULNAR (Right: Elbow)  RELEASE CUBITAL TUNNEL (Right: Elbow)  PONV Scopolamine Patch ordered  Relevant Problems   ENDO   (+) Acquired hypothyroidism      GI/HEPATIC   (+) GERD (gastroesophageal reflux disease)      MUSCULOSKELETAL   (+) Right-sided low back pain with right-sided sciatica      NEURO/PSYCH   (+) JG (generalized anxiety disorder)   (+) Major depressive disorder, recurrent, severe w/o psychotic behavior (HCC)   (+) Social phobia      PULMONARY   (+) CARLOS ENRIQUE (obstructive sleep apnea)   Prior to  Gastric Bypass 2 years ago     Physical Exam    Airway    Mallampati score: II  TM Distance: >3 FB  Neck ROM: full     Dental   No notable dental hx     Cardiovascular      Pulmonary      Other Findings  post-pubertal.      Anesthesia Plan  ASA Score- 2     Anesthesia Type- general with ASA Monitors.         Additional Monitors:     Airway Plan: LMA.           Plan Factors-Exercise tolerance (METS): >4 METS.    Chart reviewed.        Patient is not a current smoker.              Induction- intravenous.    Postoperative Plan-     Informed Consent- Anesthetic plan and risks discussed with patient.  I personally reviewed this patient with the CRNA. Discussed and agreed on the Anesthesia Plan with the CRNA..

## 2024-01-25 NOTE — DISCHARGE INSTR - AVS FIRST PAGE
Post Operative Instructions    You have had surgery on your arm today, please read and follow the information below:  Elevate your hand above your elbow during the next 24-48 hours to help with swelling.  Place your hand and arm over your head with motion at your shoulder three times a day.  Do not apply any cream/ointment/oil to your incisions including antibiotics.  Do not soak your hands in standing water (dishwater, tubs, Jacuzzi's, pools, etc.) until given permission (typically 2-3 weeks after injury)    Call the office if you notice any:  Increased numbness or tingling of your hand or fingers that is not relieved with elevation.  Increasing pain that is not controlled with medication.  Difficulty chewing, breathing, swallowing.  Chest pains or shortness of breath.  Fever over 101.4 degrees.    Bandage: Do NOT remove bandage until follow-up appointment.    Motion: Move fingers into a fist 5 times a day, DO NOT move any splinted fingers.    Weight bearing status: The operated extremity should be non-weight bearing until further notice.    Ice: Ice for 10 minutes every hour as needed for swelling x 24 hours.    Sling: Please use your sling as needed for comfort. While using the sling, make sure to move your shoulder throughout the day to prevent stiffness here.     Medications:   Tylenol Extended Release 650 mg every 8 hours  Tramadol 1 tab every 6 hours AS needed for pain    After surgery, we would like you to take Tylenol 650 mg one tablet by mouth every 8 hours  (at breakfast, lunch and dinner) for 3-5 days after your surgery.  Please take this medication EVERYDAY after surgery for 3-5 days, and not just as needed. Taking this medication after surgery will limit your need for prescription pain medication.      We will also prescribe a narcotic pain medication for a limited time after surgery that you can take as needed for moderate or severe pain.          Follow-up Appointment: 7-10 days.      Please call the  office if you have any questions or concerns regarding your post-operative care.

## 2024-01-26 NOTE — ANESTHESIA PROCEDURE NOTES
Anesthesia Notable Event    Date/Time: 1/25/2024 3:04 PM    Performed by: Jace Sarkar MD  Authorized by: Jace Sarkar MD

## 2024-02-02 ENCOUNTER — TELEPHONE (OUTPATIENT)
Dept: OBGYN CLINIC | Facility: HOSPITAL | Age: 39
End: 2024-02-02

## 2024-02-02 ENCOUNTER — OFFICE VISIT (OUTPATIENT)
Dept: OBGYN CLINIC | Facility: HOSPITAL | Age: 39
End: 2024-02-02

## 2024-02-02 ENCOUNTER — OFFICE VISIT (OUTPATIENT)
Dept: PHYSICAL THERAPY | Facility: MEDICAL CENTER | Age: 39
End: 2024-02-02
Payer: COMMERCIAL

## 2024-02-02 DIAGNOSIS — G56.21 ULNAR NERVE ENTRAPMENT AT ELBOW, RIGHT: Primary | ICD-10-CM

## 2024-02-02 PROCEDURE — 97760 ORTHOTIC MGMT&TRAING 1ST ENC: CPT | Performed by: PHYSICAL THERAPIST

## 2024-02-02 PROCEDURE — 99024 POSTOP FOLLOW-UP VISIT: CPT | Performed by: PHYSICIAN ASSISTANT

## 2024-02-02 NOTE — PROGRESS NOTES
Orthosis    Diagnosis:   1. Ulnar nerve entrapment at elbow, right  Ambulatory Referral to PT/OT Hand Therapy        Indication: Motion Blocking    Location: Right  elbow and wrist  Supplies: Custom Fit Orthotic and Skin coverage   Orthosis type:  long arm splint  Wearing Schedule: Remove for hygiene only and Remove for HEP  Describe Position: Elbow flexion to 90 degrees, wrist in neutral    Precautions: Universal (skin contact/breakdown) and nerve transposition    Patient or Caregiver expresses understanding of wearing Schedule and Precautions? Yes  Patient or Caregiver able to don/doff orthotic independently?Yes    Written orders provided to patient? Yes  Orders Obtained: Written  Orders Obtained from: Manny Ott PA-C    Return for evaluation and treatment Yes- 2/13/24

## 2024-02-02 NOTE — TELEPHONE ENCOUNTER
Patient saw Monico today.    5 week follow up recommended    Po R cubital and carpal (Sx 1/25-4/24/24)    Thank you.

## 2024-02-02 NOTE — PROGRESS NOTES
Assessment:   S/P Right Open Revision Carpal Tunnel Release - Right, Revision Cubital Tunnel With Submuscular Ulnar Nerve Transposition - Right, and Application Of Long Arm Splint - Right on 1/25/2024    Plan:   Patient was advised to obtain a custom long-arm splint from occupational therapy  She is to wear this at all times except for hygiene purposes  She was advised that we will keep the splint on for about 5 weeks  She will follow-up in 5 weeks for reevaluation of her symptoms and to transition out of the brace and into a cock up wrist brace    Follow Up:  5 weeks    To Do Next Visit:  Reevaluation, transition to cock up wrist brace      CHIEF COMPLAINT:  Chief Complaint   Patient presents with    Right Elbow - Post-op, Suture / Staple Removal     Cubital tunnel release- 1/25/24    Right Wrist - Post-op, Suture / Staple Removal     Revision carpal tunnel- 1/25/24         SUBJECTIVE:  Desire العلي is a 38 y.o. female who presents for follow up after Right Open Revision Carpal Tunnel Release - Right, Revision Cubital Tunnel With Submuscular Ulnar Nerve Transposition - Right, and Application Of Long Arm Splint - Right on 1/25/2024.  Today patient has noted some numbness and tingling into her hand still.       PHYSICAL EXAMINATION:  Vital signs: LMP 01/08/2024 (Approximate)   General: well developed and well nourished, alert, oriented times 3, and appears comfortable  Psychiatric: Normal    MUSCULOSKELETAL EXAMINATION:  Incision: Clean, dry, intact  Range of Motion: As expected and Limited due to stiffness  Neurovascular status: numbness note in the hand  Activity Restrictions: No restrictions  Done today: Sutures out      STUDIES REVIEWED:  No Studies to review      PROCEDURES PERFORMED:  Procedures  No Procedures performed today

## 2024-02-06 ENCOUNTER — OFFICE VISIT (OUTPATIENT)
Dept: PHYSICAL THERAPY | Facility: MEDICAL CENTER | Age: 39
End: 2024-02-06
Payer: COMMERCIAL

## 2024-02-06 DIAGNOSIS — G56.21 ULNAR NERVE ENTRAPMENT AT ELBOW, RIGHT: Primary | ICD-10-CM

## 2024-02-06 NOTE — PROGRESS NOTES
Patient presents today for splint adjustment. Long arm splint seams at elbow came apart and were resealed. Material at medial elbow adjusted for comfort over incision. Patient happy with fit. Initial evaluation scheduled for tomorrow morning, 2/7. Will continue to make adjustments on splint as needed.

## 2024-02-07 ENCOUNTER — EVALUATION (OUTPATIENT)
Dept: PHYSICAL THERAPY | Facility: MEDICAL CENTER | Age: 39
End: 2024-02-07
Payer: COMMERCIAL

## 2024-02-07 DIAGNOSIS — G56.21 ULNAR NERVE ENTRAPMENT AT ELBOW, RIGHT: Primary | ICD-10-CM

## 2024-02-07 DIAGNOSIS — Z47.89 ORTHOPEDIC AFTERCARE: ICD-10-CM

## 2024-02-07 PROCEDURE — 97110 THERAPEUTIC EXERCISES: CPT | Performed by: PHYSICAL THERAPIST

## 2024-02-07 PROCEDURE — 97161 PT EVAL LOW COMPLEX 20 MIN: CPT | Performed by: PHYSICAL THERAPIST

## 2024-02-07 NOTE — PROGRESS NOTES
PT Evaluation     Today's date: 2024  Patient name: Desire العلي  : 1985  MRN: [id number]  Referring provider: Manny Ott PA-C  Dx:   Encounter Diagnosis     ICD-10-CM    1. Ulnar nerve entrapment at elbow, right  G56.21       2. Orthopedic aftercare  Z47.89                      Assessment  Assessment details: Ms. العلي is a pleasant 38 y.o. RHD female who presents today for physical therapy evaluation 2 week s/p right open carpal tunnel release, s/p right ulnar nerve transposition submuscular. No further referral appears necessary at this time based upon examination findings. Patient presents with post-surgical incisions that are healing well with no evidence of infection, moderate swelling around the medial elbow, decreased ROM, and decreased strength as expected following post-surgical procedure. She has diminished protective sensation at the ulnar nerve distribution in the R SF, RF sensation WNL. Patient is compliant with long arm splint. Patient would benefit from outpatient physical therapy services to address the observed impairments to regain ROM and progress per post-operative protocol. Please contact me with any questions. Thank you for the referral.   Impairments: abnormal or restricted ROM, activity intolerance, impaired physical strength, lacks appropriate home exercise program and pain with function  Barriers to therapy: Revision surgery, lupus  Understanding of Dx/Px/POC: good   Prognosis: good  Prognosis details: Motivated to improve    Goals  1. Patient will maintain compliance with post-operative splinting.  2. Patient will be independent with individualized HEP.   3. Patient will achieve right elbow AROM 0-140 degrees.  4. Patient will achieve sensation in R SF to at least 0.2g with monofilament testing.  5. Patient will be able to DC long arm splint and transition to wrist cock up splint in 5 weeks.  6. Patient will achieve right wrist AROM WNL in all planes to assist with  ADL performance.  7. Patient will be able to progress to strengthening.  8. Patient will achieve functional strength in the right UE.  9. Patient will achieve score on FOTO by [facility].     Plan  Patient would benefit from: skilled physical therapy and custom splinting  Planned therapy interventions: orthotic fitting/training, neuromuscular re-education, manual therapy, home exercise program, therapeutic activities, therapeutic exercise, stretching, strengthening, patient education, functional ROM exercises, graded activity, graded exercise and fine motor coordination training  Other planned therapy interventions: scar management  Frequency: 1x week  Duration in weeks: 8  Plan of Care beginning date: 2/7/2024  Plan of Care expiration date: 3/29/2024  Treatment plan discussed with: patient        Subjective Evaluation    History of Present Illness  Date of surgery: 1/25/2024  Mechanism of injury: surgery  Mechanism of injury: Ms. العلي is a 38 y.o. female who presents today 2 weeks s/p right open carpal tunnel release, s/p right ulnar nerve transposition submuscular. Patient previously had a surgery in 2020 in which she had a right carpal tunnel release and cubital tunnel release and elected for further surgical intervention after continued radiating arm pain and subluxation of the ulnar nerve. Patient denies any complications with her recent surgery and was discharged home same day. Long arm splint was fabricated on 2/4 and patient has been compliant with wearing. She reports numbness around the medial elbow incision and reports an ache in her palm. She does have tingling in the 4th-5th digits that she states is constant. Patient is managing with self care and ADLs/IADLs with modifications. Patient reports concerns for her ulnar nerve subluxating again. Patient's goals are to return to strength training that she was unable to do previously because of the subluxation and associated symptoms.   Patient Goals  Patient  goals for therapy: increased strength, decreased pain, return to sport/leisure activities and increased motion    Pain  Current pain ratin  At best pain ratin  At worst pain ratin  Pain location: medial elbow.  Quality: burning and dull ache  Alleviating factors: tylenol prn, ice.  Exacerbated by: n/a.    Social Support    Employment status: not working  Hand dominance: right    Treatments  Previous treatment: physical therapy (surgery)      Objective     Observations     Right Elbow   Positive for incision. Negative for drainage.     Right Wrist/Hand   Positive for incision. Negative for drainage.     Additional Observation Details  Incision at medial elbow closed and healing well, no evidence of infection  Moderate swelling right elbow and Palmar incision at carpal tunnel closed and healing well, no evidence of infection  Capillary refill WNL    Neurological Testing     Additional Neurological Details  Monofilament test: 0.07 g median and radial nerve distribution and ulnar nerve distribution of right finger  2 g( diminished protective sensation) right small finger (ulnar nerve)    Active Range of Motion     Right Elbow   Flexion: 110 degrees   Extension: 60 degrees     Right Thumb   Opposition: WNL    Additional Active Range of Motion Details  Not tested  Active composite flexion, extension, abduction, adduction WNL right hand    Passive Range of Motion     Right Elbow   Flexion: 120 degrees   Extension: 30 degrees     Strength/Myotome Testing     Additional Strength Details  Not tested  Not tested      Flowsheet Rows      Flowsheet Row Most Recent Value   PT/OT G-Codes    Current Score 28   Projected Score 58   FOTO information reviewed Yes   Assessment Type Evaluation               Precautions: post-surgical (follow physician orders), lupus, adhesive sensitivity   DOS: 24- s/p right open carpal tunnel release, s/p right ulnar nerve transposition submuscular   MD orders: Custom long-arm splint  to extend from the wrist up past the elbow   Gentle range of motion of the elbow only, no wrist flexion or extension, range of motion of the digits     HEP: elbow flex/ext, TGE's (in splint), opposition  Manuals 2/7            Scar massage nv                                                   Neuro Re-Ed                                                                                                        Ther Ex             PROM/AAROM elbow flex/ext (no wrist motion) x10            Towel bunches                                                                                            Ther Activity                                       Gait Training                                       Modalities

## 2024-02-13 ENCOUNTER — APPOINTMENT (OUTPATIENT)
Dept: PHYSICAL THERAPY | Facility: MEDICAL CENTER | Age: 39
End: 2024-02-13
Payer: COMMERCIAL

## 2024-02-14 ENCOUNTER — OFFICE VISIT (OUTPATIENT)
Dept: PHYSICAL THERAPY | Facility: MEDICAL CENTER | Age: 39
End: 2024-02-14
Payer: COMMERCIAL

## 2024-02-14 DIAGNOSIS — G56.21 ULNAR NERVE ENTRAPMENT AT ELBOW, RIGHT: Primary | ICD-10-CM

## 2024-02-14 DIAGNOSIS — Z47.89 ORTHOPEDIC AFTERCARE: ICD-10-CM

## 2024-02-14 PROCEDURE — 97110 THERAPEUTIC EXERCISES: CPT | Performed by: PHYSICAL THERAPIST

## 2024-02-14 PROCEDURE — 97140 MANUAL THERAPY 1/> REGIONS: CPT | Performed by: PHYSICAL THERAPIST

## 2024-02-14 NOTE — PROGRESS NOTES
Daily Note     Today's date: 2024  Patient name: Desire العلي  : 1985  MRN: [id number]  Referring provider: Manny Ott PA-C  Dx:   Encounter Diagnosis     ICD-10-CM    1. Ulnar nerve entrapment at elbow, right  G56.21       2. Orthopedic aftercare  Z47.89                      Subjective: Patient states she needs her splint adjusted secondary to reduction in swelling.       Objective: See treatment diary below      Assessment: Patient is 3 weeks s/p right CTR and ulnar nerve transposition. Splint adjusted for reduction in swelling at wrist and hand. Incisions are healing well and fully closed, was able to initiate scar massage. Patient has stiffness in the right elbow but able to achieve about 10-15 degrees of extension with gentle passive motion. Patient should continue with splinting until post op visit with physician. She should take off splint for home exercises to move the elbow and finger. She may start scar massage at home.       Plan: Continue per plan of care.  Progress treament per protocol.      Precautions: post-surgical (follow physician orders), lupus, adhesive sensitivity   DOS: 24- s/p right open carpal tunnel release, s/p right ulnar nerve transposition submuscular   MD orders: Custom long-arm splint to extend from the wrist up past the elbow   Gentle range of motion of the elbow only, no wrist flexion or extension, range of motion of the digits     HEP: elbow flex/ext, TGE's (in splint), opposition  Manuals            Scar massage nv x8'                                                  Neuro Re-Ed                                                                                                        Ther Ex             PROM/AAROM elbow flex/ext (no wrist motion) x10 x10'           Towel bunches   x3'           AROM elbow flexion/ext on table  x30                                                                            Ther Activity                                        Gait Training                                       Modalities

## 2024-02-19 ENCOUNTER — OFFICE VISIT (OUTPATIENT)
Dept: PSYCHIATRY | Facility: CLINIC | Age: 39
End: 2024-02-19
Payer: COMMERCIAL

## 2024-02-19 ENCOUNTER — TELEPHONE (OUTPATIENT)
Age: 39
End: 2024-02-19

## 2024-02-19 DIAGNOSIS — F33.2 MAJOR DEPRESSIVE DISORDER, RECURRENT, SEVERE W/O PSYCHOTIC BEHAVIOR (HCC): Primary | ICD-10-CM

## 2024-02-19 DIAGNOSIS — F41.1 GAD (GENERALIZED ANXIETY DISORDER): ICD-10-CM

## 2024-02-19 DIAGNOSIS — Z79.899 HIGH RISK MEDICATION USE: ICD-10-CM

## 2024-02-19 DIAGNOSIS — F40.10 SOCIAL PHOBIA: ICD-10-CM

## 2024-02-19 PROCEDURE — 99214 OFFICE O/P EST MOD 30 MIN: CPT | Performed by: PSYCHIATRY & NEUROLOGY

## 2024-02-19 PROCEDURE — 90833 PSYTX W PT W E/M 30 MIN: CPT | Performed by: PSYCHIATRY & NEUROLOGY

## 2024-02-19 RX ORDER — ARIPIPRAZOLE 5 MG/1
TABLET ORAL
Qty: 30 TABLET | Refills: 1 | Status: SHIPPED | OUTPATIENT
Start: 2024-02-19

## 2024-02-19 RX ORDER — CLONAZEPAM 0.5 MG/1
TABLET ORAL
Qty: 60 TABLET | Refills: 2 | Status: SHIPPED | OUTPATIENT
Start: 2024-02-19 | End: 2024-04-21

## 2024-02-19 NOTE — TELEPHONE ENCOUNTER
Called and spoke w/pt and relayed ALEN Ott's msg to pt in detail. Pt states she understands.  Will try to remove suture.  If she has any problems w/callback.

## 2024-02-19 NOTE — PSYCH
"  MEDICATION MANAGEMENT NOTE        [facility]      Name and Date of Birth:  Desire العلي 39 y.o. 1985    Date of Visit: February 21, 2024    SUBJECTIVE:  CC: Desire presents today for follow up on \"its been hard\"; depression and anxiety     Desire had arm surgery, so cannot look for a job right now.  Still living with mom. Legal issues finalized, so nothing new there. Fighting with  to get her documents. May file a PCRA.  Talked to another  to help but that will take a lot of money she does not have. Talked about advocacy groups/legal supports that she may want to contact. Focused on past as well, her wrongful arrest, charges, and  that she felt was not invested in the truth or her as well as her vulnerable, grieving, confused state that led her to trust in him more that she wished she had.     A lot of conversation about her legal, financial, employment situation.     Amotivated with all she is dealing with and amotivation without modafinil. She will reach out for psych testing again to see if ADHD present. Not working and cannot afford modafinil without insurance. Worked for shift work issues she had before. does not want to f/u with sleep medicine for further evaluation or support for modafinil        F/U prn- lupus pain  - Going to [place]  - Friends  F/U PRN Thinking about finishing school.  F/U PRN- Her kids are old enough to not need babysitters/ support; Children-  split custody, oldest son (2004), middle (son) (7/2004), youngest girl (2009). - daughter (first job, [employer], 15yo).        Since our last visit, overall symptoms have been worsening.      Med Compliance: yes    HPI ROS:                      ('was' below is from prior visit)  Medication Side Effects (other than noted):  no     Depression (10 worst):  Moderate high (Was moderate)   Anxiety (10 worst):  moderate (Was moderate)   Hallucinations or Psychosis  no (Was " no)    Safety concerns (self harm thoughts, suicidal ideation, HI, etc):  no (Was no)   Sleep: (NM = Nightmares)  good (Was good)   Energy:  low (Was low)   Appetite:  ok (Was )   Weight Change:            PHQ-2/9 Depression Screening                 Desire denies any side effects from medications unless noted above    Review Of Systems as noted above. Otherwise A relevant review of symptoms was otherwise negative    History Review: The following portions of the patient's history were reviewed and documented: allergies, current medications, past family history, past medical history, past social history, and problem list.     Lab Review: No new labs or no relevant labs needing review with patient today      OBJECTIVE:     MENTAL STATUS EXAM  Appearance:  age appropriate   Behavior:  pleasant, cooperative, with good eye contact   Speech:  Normal volume, regular rate and rhythm   Mood:  depressed and anxious   Affect:  mood congruent   Language: intact and appropriate for age, education, and intellect   Thought Process:  Linear and goal directed   Associations: intact associations   Thought Content:  normal and appropriate   Perceptual Disturbances: no auditory or visual hallcunations   Risk Potential / Abnormal Thoughts: Suicidal ideation - None  Homicidal ideation - None  Potential for aggression - No       Consciousness:  Alert & Awake   Sensorium:  Grossly oriented   Attention: attention span and concentration are age appropriate       Fund of Knowledge:  Memory: awareness of current events: yes  recent and remote memory grossly intact   Insight:  good   Judgment: fair   Muscle Strength Muscle Tone: normal  normal   Gait/Station: normal gait/station with good balance   Motor Activity: no abnormal movements       Risks, Benefits And Possible Side Effects Of Medications:    AGREE: Risks, benefits, and possible side effects of medications explained to Desire and she (or legal representative) verbalizes understanding  "and agreement for treatment.    Controlled Medication Discussion:     Not applicable  _____________________________________________________________      Psychiatric History     Patient has a past diagnoses of major depressive disorder and anxiety and has never been told that she has bipolar disorder. She was seen a psychiatrist for a short period of time and [place] [facility] [place]     She has never been hospitalized for mental health never had a suicide attempt.    [facility] 3/2019    Social History:  Patient was raised in [place] and her parents  when she was young. She was raised by her mother and her boyfriend. Her childhood was \"not normal\" and there is constantly fighting at home. She denies any physical or sexual abuse. His one brother. She developed normally as far she is aware.     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 She has split custody of her 3 children from a 14-year-old relationship. She left home and \"he took advantage of that\".     She is [employer]. No  history no legal issues now or in the past and no weapons.      Never had issues with alcohol or drugs, never needed rehabilitation       Family Psychiatric History:     Father    1. Family history of alcohol abuse (V61.41) (Z81.1)   2. Denied: Family history of suicide  Family History    3. Family history of Drug addiction   4. Family history of alcohol abuse (V61.41) (Z81.1)   5. Denied: Family history of bipolar disorder   6. Denied: Family history of paranoid schizophrenia   7. Denied: Family history of suicide   8. Family history of No Significant Family History         Assessment/Plan:        Diagnoses and all orders for this visit:    Major depressive disorder, recurrent, severe w/o psychotic behavior (HCC)  -     Vortioxetine HBr (Trintellix) 20 MG tablet; Take 1 tablet (20 mg total) by mouth daily  -     ARIPiprazole (ABILIFY) 5 mg tablet; Take 1/2 tab daily for 1 week, " then either continue 2.5mg daily or increase to 5mg daily    Social phobia  -     clonazePAM (KlonoPIN) 0.5 mg tablet; TAKE 1/2 TO 1 TABLET BY MOUTH 2 TIMES A DAY AS NEEDED FOR ANXIETY  -     Vortioxetine HBr (Trintellix) 20 MG tablet; Take 1 tablet (20 mg total) by mouth daily    JG (generalized anxiety disorder)  -     clonazePAM (KlonoPIN) 0.5 mg tablet; TAKE 1/2 TO 1 TABLET BY MOUTH 2 TIMES A DAY AS NEEDED FOR ANXIETY  -     Vortioxetine HBr (Trintellix) 20 MG tablet; Take 1 tablet (20 mg total) by mouth daily    High risk medication use  -     A1c w/GlycoMark(R) Reflex; Future  -     Lipid panel; Future            ______________________________________________________________________    Major depressive disorder, recurrent, severe without psychosis (Highly unlikely bipolar disorder, but h/o diagnosis)  generalized anxiety disorder  social phobia.     ---     MDD - not at goal  JG - not at goal  Social phobia - not at goal.     AQUILES Phipps is struggling a bit more. At this point modafinil would need to be off label, which I am not opposed to due to the appropriate use, tolerability, and benefit. However, she cannot afford it presently.     She will call about psych testing again (dropped off but wants to explore ADHD). (Referral made 11/2022 but she did not follow through, 2/2024 she was going to reach out again). Questioning her distractibility (starts project then goes to another, or in cleaning the house will go from item to item and then the whole house is a mess)    In February 2023 [facility] reviewed situation and a referral to [facility] but they did not help her much, so she discharged in May 2023    I do not believe that she has bipolar disorder but mood stabilizers for anger and irritability if other paths do not seem to be appropriate or beneficial can be considered. Could consider abilify, retrial nortriptyline, buspar, lamictal, other directions. Klonopin increase has been discussed in  past, prefer other directions as reasonable.    - sleep study 10/2021 - Has sleep apnea, now uses CPAP     [facility]  She is heterozygous for MTHFR, no cancer history.    Safety Risk Assessment: See above. Has had passive SI since ~2015. She states that she has protective features including her children and that she would never actually carry anything out. Safety risk low.    Confidential Assessment:  Past psychotropic medications include  hydroxyzine,   klonopin,   buspar (low dose, no recall details),   cymbalta 30mg (no recall of details),  zoloft (no issues),   neurontin (no help),   Viibryd 10 mg (x2-3mo, no side effects or benefit noted; was paying out of pocket).  Effexor (irritable)  Wellbutrin (irritable).   Prozac (80mg, was not adequate, even with Nortrip 50mg. Went to trintellix)  Topiramate (no benefit at 100mg, no issues) .   Nortriptyline (some benefit at 50mg, dry mouth (did improve), decided to go different way but could reconsider)  Remeron- worked in past and then it did not work  trintellix- more depressed at 20mg, although significant stressors coocurring        Scales:          Treatment Plan:      Patient has been educated about their diagnosis and treatment modalities. They voiced understanding and agreement with the following plan:     - GENE SIGHT TESTING initially reviewed 12/3/2018 with Desire    1) Meds:   - (STOPPED, cannot afford and insurance denied) Modafinil 200mg daily   - Klonopin 0.5mg BID PRN   - Trintellix 20mg daily.    - START Abilify 2.5mg then 5mg daily. PARQ completed including dizziness, seizures, drug interactions, GI and cardiovascular risks, metabolic syndrome, NMS, TD, EPS, akathisia, and others   - has atarax for itching, but may try for anxiety 25mg-50mg daily prn.    2) Labs: lipid panel, A1c   - 2/22: TSH 1.523, Vit D 52.4   - 12/2021: ECG (Cardiology) , NS arrhythmia   0 3/2019: BMP WNL, TSH 2.983; FT3 2.53   - 12/2018: Vit D 11.8, TSH 3.96 and FT4 0.82    - 2017: TSH 2.76; Vit D 34.1   - 2017: TSH 1.85   - : CBC, CMP unremarkable, TSH 4.38, Vit D 21.6, , HDL 39     3) Therapy:   - NO LONGER Janny Wolfgang ([facility]); Was [facility]    4) Medical:  LUPUS; hypothyroidism with pregnancy; history of kidney stones. Tubal ligation.    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        7) Treatment Plan: Enacted 2017, Renewed 2017, renewed 3/15/2018, 2018; 2023, 2024    8) Crisis Plan: 3/27/23 (PRIOR therapist)    Discussed self monitoring of symptoms, and symptom monitoring tools.    Patient has been informed of 24 hours and weekend coverage for urgent situations accessed by calling the main clinic phone number.        Psychotherapy in session:  Time spent performing psychotherapy: 16 minutes supportive therapy related to legal, financial issues and barriers to employement, frustrations.    Visit Time    Visit Start Time: 440  Visit Stop Time: 506  Total Visit Duration:  24 minutes

## 2024-02-19 NOTE — TELEPHONE ENCOUNTER
----- Message from Manny Ott PA-C sent at 2/19/2024  9:21 AM EST -----  Regarding: FW: Stitch left in incision   Contact: [phone]  Please advise the patient that this is a Vicryl stitch.  She can try to remove it with a pair of tweezers, if the knot is still in place, she can cut the suture end.  Thank you.  ----- Message -----  From: Chanda Patten  Sent: 2/19/2024   9:12 AM EST  To: Joshua Wu MD; Manny Ott PA-C  Subject: FW: Stitch left in incision                        ----- Message -----  From: Desire العلي  Sent: 2/19/2024   8:45 AM EST  To: [facility]  Subject: Stitch left in incision                          Here is a picture

## 2024-02-19 NOTE — BH TREATMENT PLAN
"    TREATMENT PLAN (Medication Management Only)        [facility]    Name/Date of Birth/MRN:  Desire العلي 39 y.o. 1985 MRN: [id number]  Date of Treatment Plan: February 19, 2024  Diagnosis/Diagnoses:   1. Major depressive disorder, recurrent, severe w/o psychotic behavior (HCC)    2. Social phobia    3. JG (generalized anxiety disorder)        Strengths/Personal Resources for Self-Care: caring, good listener, people feel comfortable talking to me, supportive family  Area/Areas of need (in own words): increase distress tolerance skills, increase emotional distress tolerance, ability to share emotions and identify triggers and process unresolved traumas  1. Long Term Goal: \"getting back on my feet\"\"   Target Date: 180 days from treatment plan  Person/Persons responsible for completion of goal: Dr. Coronado and Self  2.  Short Term Objective (s) - How will we reach this goal?:   A.  Provider new recommended medication/dosage changes and/or continue medication(s): as noted in chart.  B.  Reach out and get legal advise about PCRA (Post conviction relief act)  C.  Seek advise on job and felony situation  Target Date: 6 months from treatment plan unless noted otherwise  Person/Persons Responsible for Completion of Goal: Dr. Coronado and Self   Progress Towards Goals: continuing treatment   Treatment Modality: Medication management and therapy PRN  Review due 180 days from date of this plan: Approximately 6 months from today ( 8/19/2024 )    Expected length of service: ongoing treatment unless revised  My Physician/PA/NP and I have developed this plan together and I agree to work on the goals and objectives. I understand the treatment goals that were developed for my treatment.  Signature:       Date and time:  Signature of parent/guardian if under age of 14 years: Date and time:  Signature of provider:      Date and time:  Signature of Supervising Physician:    Date and " time: 2/19/2024      Cliff Coronado III, DO

## 2024-02-20 ENCOUNTER — TELEPHONE (OUTPATIENT)
Dept: PSYCHIATRY | Facility: CLINIC | Age: 39
End: 2024-02-20

## 2024-02-20 ENCOUNTER — TELEPHONE (OUTPATIENT)
Dept: RHEUMATOLOGY | Facility: CLINIC | Age: 39
End: 2024-02-20

## 2024-02-20 ENCOUNTER — OFFICE VISIT (OUTPATIENT)
Dept: PHYSICAL THERAPY | Facility: MEDICAL CENTER | Age: 39
End: 2024-02-20
Payer: COMMERCIAL

## 2024-02-20 DIAGNOSIS — G56.21 ULNAR NERVE ENTRAPMENT AT ELBOW, RIGHT: Primary | ICD-10-CM

## 2024-02-20 DIAGNOSIS — Z47.89 ORTHOPEDIC AFTERCARE: ICD-10-CM

## 2024-02-20 PROCEDURE — 97110 THERAPEUTIC EXERCISES: CPT | Performed by: PHYSICAL THERAPIST

## 2024-02-20 PROCEDURE — 97140 MANUAL THERAPY 1/> REGIONS: CPT | Performed by: PHYSICAL THERAPIST

## 2024-02-20 NOTE — TELEPHONE ENCOUNTER
Patient has been added to the [facility] wait list without a referral.    Insurance: [employer]  Insurance Type:    Commercial []   Medicaid []   [facility] (if applicable)   Medicare [x]  Location Preference: [place]  Provider Preference: Dr. Johnny Wong  Virtual: Yes [x] No []  Were outside resources sent: Yes [] No [x]

## 2024-02-20 NOTE — PROGRESS NOTES
"Daily Note     Today's date: 2024  Patient name: Desire العلي  : 1985  MRN: [id number]  Referring provider: Manny Ott PA-C  Dx:   Encounter Diagnosis     ICD-10-CM    1. Ulnar nerve entrapment at elbow, right  G56.21       2. Orthopedic aftercare  Z47.89                      Subjective: Patient states she is nervous about straightening her elbow because she doesn't want it to \"pop\" like it used to prior to the surgery.      Objective: See treatment diary below      Assessment: Patient is 4 weeks s/p right CTR and ulnar nerve transposition. Splint adjusted at seams and new MCP strap.   Patient presents with right elbow stiffness. Improved to about 5-10 degrees from terminal extension with gentle passive motion. Flexion 150 degrees.   Worked on gentle AROM with grasping pegs and reaching for placement to encourage elbow extension.   Patient would benefit from continued PT to restore right elbow mobility and minimize risk of developing elbow stiffness post surgery.       Plan: Continue per plan of care.  Progress treament per protocol.      Precautions: post-surgical (follow physician orders), lupus, adhesive sensitivity   DOS: 24- s/p right open carpal tunnel release, s/p right ulnar nerve transposition submuscular   MD orders: Custom long-arm splint to extend from the wrist up past the elbow   Gentle range of motion of the elbow only, no wrist flexion or extension, range of motion of the digits     HEP: elbow flex/ext, TGE's (in splint), opposition  Manuals           Scar massage nv x8' x8'                                                 Neuro Re-Ed                                                                                                        Ther Ex             PROM/AAROM elbow flex/ext (no wrist motion) x10 x10' x15'          Towel bunches   x3' x3'          AROM elbow flexion/ext on table  x30 x30          Pegs    x3'                                                   "            Ther Activity                                       Gait Training                                       Modalities

## 2024-02-24 ENCOUNTER — APPOINTMENT (OUTPATIENT)
Dept: LAB | Facility: HOSPITAL | Age: 39
End: 2024-02-24
Payer: COMMERCIAL

## 2024-02-24 DIAGNOSIS — Z79.899 HIGH RISK MEDICATION USE: ICD-10-CM

## 2024-02-27 ENCOUNTER — TELEPHONE (OUTPATIENT)
Age: 39
End: 2024-02-27

## 2024-02-27 ENCOUNTER — OFFICE VISIT (OUTPATIENT)
Dept: PHYSICAL THERAPY | Facility: MEDICAL CENTER | Age: 39
End: 2024-02-27
Payer: COMMERCIAL

## 2024-02-27 DIAGNOSIS — G56.21 ULNAR NERVE ENTRAPMENT AT ELBOW, RIGHT: Primary | ICD-10-CM

## 2024-02-27 DIAGNOSIS — Z47.89 ORTHOPEDIC AFTERCARE: ICD-10-CM

## 2024-02-27 PROCEDURE — 97140 MANUAL THERAPY 1/> REGIONS: CPT | Performed by: PHYSICAL THERAPIST

## 2024-02-27 PROCEDURE — 97110 THERAPEUTIC EXERCISES: CPT | Performed by: PHYSICAL THERAPIST

## 2024-02-27 NOTE — TELEPHONE ENCOUNTER
That appointment was no longer available, patient scheduled for Monday 3/4/24, patient is aware that the appointment is in our [facility] location.

## 2024-02-27 NOTE — TELEPHONE ENCOUNTER
Caller: patient    Doctor: alfred    Reason for call: patient would like to be seen earlier she is in pain, appointment available for 2/28/2024 at 10:00 am, patient would like to know if that is okay being that she needed to follow up in 5 weeks after splint. 02/28/24 would only be 4 weeks   Please advise       Call back#: [phone]

## 2024-02-27 NOTE — PROGRESS NOTES
Complete abdomen ultrasound study



History: Fever. Elevated liver function tests.



Findings: The pancreas is homogeneous without focal enlargement. No focal

aneurysmal dilatation of the abdominal aorta is seen. The intrahepatic portion

of the IVC is unremarkable. The gallbladder is normal and no gallstones are

seen. The extrahepatic bile duct measures 3.5 mm in caliber which is normal.

The liver is homogeneous and measures 14.9 cm in length. The length of the

right kidney is 10.8 cm in length and the left kidney is 10.6 cm in length. No

hydronephrosis or renal mass or perinephric fluid collection is seen on either

side. The spleen is homogeneous and measures 10.3 cm in length. No ascites is

seen.



IMPRESSION: Unremarkable study. Daily Note     Today's date: 2024  Patient name: Desire العلي  : 1985  MRN: [id number]  Referring provider: Manny Ott PA-C  Dx:   Encounter Diagnosis     ICD-10-CM    1. Ulnar nerve entrapment at elbow, right  G56.21       2. Orthopedic aftercare  Z47.89                      Subjective: Patient states she feels the elbow is getting stiff and she is fearful of straightening the elbow because of pulling and potential popping.       Objective: See treatment diary below      Assessment: Patient presents today with increased stiffness of the right elbow. Passively I was able to get her to about 30 degrees of extension but actively she is only able to extend to 45 degrees which is large increase in stiffness from previous treatment session.    Worked on gentle AROM with grasping pegs and reaching for placement to encourage elbow extension.   Also worked on AAROM with wand to encourage extension with careful attention to not extend wrist.  Patient educated in coming out of brace every hour to work to work on straightening the elbow.   Patient would benefit from continued PT to restore right elbow mobility and minimize elbow stiffness post surgery.       Plan: Continue per plan of care.  Progress treament per protocol.      Precautions: post-surgical (follow physician orders), lupus, adhesive sensitivity   DOS: 24- s/p right open carpal tunnel release, s/p right ulnar nerve transposition submuscular   MD orders: Custom long-arm splint to extend from the wrist up past the elbow   Gentle range of motion of the elbow only, no wrist flexion or extension, range of motion of the digits     HEP: elbow flex/ext, TGE's (in splint), opposition  Manuals          Scar massage nv x8' x8' x8'                                                Neuro Re-Ed                                                                                                        Ther Ex             PROM/AAROM elbow  flex/ext (no wrist motion) x10 x10' x15' x15'         Towel bunches   x3' x3' x3'         AROM elbow flexion/ext on table  x30 x30 x30         Pegs    x3' x3'         Supine wand chest press    x20         Wand elbow ext standing    x20                                   Ther Activity                                       Gait Training                                       Modalities             MHP right elbow    x10'

## 2024-02-29 ENCOUNTER — OFFICE VISIT (OUTPATIENT)
Dept: PHYSICAL THERAPY | Facility: MEDICAL CENTER | Age: 39
End: 2024-02-29
Payer: COMMERCIAL

## 2024-02-29 DIAGNOSIS — Z47.89 ORTHOPEDIC AFTERCARE: ICD-10-CM

## 2024-02-29 DIAGNOSIS — G56.21 ULNAR NERVE ENTRAPMENT AT ELBOW, RIGHT: Primary | ICD-10-CM

## 2024-02-29 PROCEDURE — 97140 MANUAL THERAPY 1/> REGIONS: CPT | Performed by: PHYSICAL THERAPIST

## 2024-02-29 PROCEDURE — 97110 THERAPEUTIC EXERCISES: CPT | Performed by: PHYSICAL THERAPIST

## 2024-02-29 NOTE — PROGRESS NOTES
Daily Note     Today's date: 2024  Patient name: Desire العلي  : 1985  MRN: [id number]  Referring provider: Manny Ott PA-C  Dx:   Encounter Diagnosis     ICD-10-CM    1. Ulnar nerve entrapment at elbow, right  G56.21       2. Orthopedic aftercare  Z47.89                      Subjective: Patient RS her follow up appointment with ortho to get her in sooner, Monday 3/4. She states she was working on her scar massage last night and was in tears.       Objective: See treatment diary below      Assessment: Patient continues to present with increased sensitivity at the medial elbow and stiffness and guarding.   She tolerated scar massage well. Passive elbow extension in supine to about 20 degrees, actively patient only extending to about 30 degrees. She is very fearful to stretching into pulling   Worked on gentle AROM with grasping pegs and reaching for placement to encourage elbow extension. .  Patient educated in coming out of brace every hour to work on straightening the elbow.   Patient would benefit from continued PT to restore right elbow mobility and minimize elbow stiffness post surgery.       Plan: Continue per plan of care.  Progress treament per protocol.      Precautions: post-surgical (follow physician orders), lupus, adhesive sensitivity   DOS: 24- s/p right open carpal tunnel release, s/p right ulnar nerve transposition submuscular   MD orders: Custom long-arm splint to extend from the wrist up past the elbow   Gentle range of motion of the elbow only, no wrist flexion or extension, range of motion of the digits     HEP: elbow flex/ext, TGE's (in splint), opposition  Manuals         Scar massage nv x8' x8' x8' x8'                                               Neuro Re-Ed                                                                                                        Ther Ex             PROM/AAROM elbow flex/ext (no wrist motion) x10 x10' x15' x15' x10'         Towel bunches   x3' x3' x3' np        AROM elbow flexion/ext on table  x30 x30 x30 x30        Pegs  extended reach   x3' x3' x3'        Supine wand chest press    x20 x20        Wand elbow ext standing    x20 x20        Supine elbow ext     x20                     Ther Activity                                       Gait Training                                       Modalities             MHP right elbow    x10' X10'

## 2024-03-04 ENCOUNTER — OFFICE VISIT (OUTPATIENT)
Dept: OBGYN CLINIC | Facility: CLINIC | Age: 39
End: 2024-03-04

## 2024-03-04 VITALS
BODY MASS INDEX: 20.16 KG/M2 | HEIGHT: 65 IN | WEIGHT: 121 LBS | SYSTOLIC BLOOD PRESSURE: 100 MMHG | DIASTOLIC BLOOD PRESSURE: 60 MMHG

## 2024-03-04 DIAGNOSIS — G56.21 CUBITAL TUNNEL SYNDROME ON RIGHT: ICD-10-CM

## 2024-03-04 DIAGNOSIS — G56.01 CARPAL TUNNEL SYNDROME ON RIGHT: Primary | ICD-10-CM

## 2024-03-04 PROCEDURE — 99024 POSTOP FOLLOW-UP VISIT: CPT | Performed by: ORTHOPAEDIC SURGERY

## 2024-03-04 NOTE — PROGRESS NOTES
"Assessment:   S/P Right Open Revision Carpal Tunnel Release - Right, Revision Cubital Tunnel With Submuscular Ulnar Nerve Transposition - Right, and Application Of Long Arm Splint - Right on 1/25/2024    Plan:   This discussed with the patient to work with therapy to work on her elbow range of motion  She will transition to a cock up wrist brace  She will follow-up in 6 weeks for reevaluation    Follow Up:  6 weeks    To Do Next Visit:  Reevaluation      CHIEF COMPLAINT:  Chief Complaint   Patient presents with    Right Elbow - Post-op         SUBJECTIVE:  Desire العلي is a 39 y.o. female who presents for follow up after Right Open Revision Carpal Tunnel Release - Right, Revision Cubital Tunnel With Submuscular Ulnar Nerve Transposition - Right, and Application Of Long Arm Splint - Right on 1/25/2024.  Today patient has noted some numbness and tingling into her hand still.  However this is significantly improved.  She has some pain and discomfort in her elbow with regards to range of motion.  She has been compliant with her splint utilization.      PHYSICAL EXAMINATION:  Vital signs: /60   Ht 5' 5\" (1.651 m)   Wt 54.9 kg (121 lb)   BMI 20.14 kg/m²   General: well developed and well nourished, alert, oriented times 3, and appears comfortable  Psychiatric: Normal    MUSCULOSKELETAL EXAMINATION:  Incision: Clean, dry, intact  Range of Motion: As expected and Limited due to stiffness  Neurovascular status: numbness note in the hand  Activity Restrictions: No restrictions  Done today: Sutures out    Patient's today strength into the intrinsics 5/5, abductor pollicis brevis 5/5.  No signs of infection.      STUDIES REVIEWED:  No Studies to review      PROCEDURES PERFORMED:  Procedures  No Procedures performed today    "

## 2024-03-05 ENCOUNTER — OFFICE VISIT (OUTPATIENT)
Dept: PHYSICAL THERAPY | Facility: MEDICAL CENTER | Age: 39
End: 2024-03-05
Payer: COMMERCIAL

## 2024-03-05 DIAGNOSIS — Z47.89 ORTHOPEDIC AFTERCARE: ICD-10-CM

## 2024-03-05 DIAGNOSIS — G56.21 ULNAR NERVE ENTRAPMENT AT ELBOW, RIGHT: Primary | ICD-10-CM

## 2024-03-05 PROCEDURE — 97110 THERAPEUTIC EXERCISES: CPT

## 2024-03-05 PROCEDURE — 97140 MANUAL THERAPY 1/> REGIONS: CPT

## 2024-03-05 NOTE — PROGRESS NOTES
Daily Note     Today's date: 3/5/2024  Patient name: Desire العلي  : 1985  MRN: [id number]  Referring provider: Manny Ott PA-C  Dx:   Encounter Diagnosis     ICD-10-CM    1. Ulnar nerve entrapment at elbow, right  G56.21       2. Orthopedic aftercare  Z47.89                      Subjective: Patient noted that she went to MD appointment yesterday. According to MD note yesterday continue with physical therapy to work on elbow ROM, also as per discussion with patient.       Objective: See treatment diary below      Assessment: Tolerated treatment fair. Patient exhibited good technique with therapeutic exercises and would benefit from continued PT. Scar massage helped to decrease fascia restrictions.       Plan: Continue per plan of care.      Precautions: post-surgical (follow physician orders), lupus, adhesive sensitivity   DOS: 24- s/p right open carpal tunnel release, s/p right ulnar nerve transposition submuscular   MD orders: Custom long-arm splint to extend from the wrist up past the elbow   Gentle range of motion of the elbow only, no wrist flexion or extension, range of motion of the digits     HEP: elbow flex/ext, TGE's (in splint), opposition  Manuals 2/7 2/14 2/20 2/27 2/29 3/5       Scar massage nv x8' x8' x8' x8' x8'                                              Neuro Re-Ed                                                                                                        Ther Ex             PROM/AAROM elbow flex/ext (no wrist motion) x10 x10' x15' x15' x10' X 10'       Towel bunches   x3' x3' x3' np        AROM elbow flexion/ext on table  x30 x30 x30 x30 X 30       Pegs  extended reach   x3' x3' x3' X 3'       Supine wand chest press    x20 x20 X 20       Wand elbow ext standing    x20 x20 X 20       Supine elbow ext     x20 X 20                     Ther Activity                                       Gait Training                                       Modalities             P  right elbow    x10' X10' 10'

## 2024-03-06 ENCOUNTER — APPOINTMENT (OUTPATIENT)
Dept: LAB | Facility: HOSPITAL | Age: 39
End: 2024-03-06
Payer: COMMERCIAL

## 2024-03-06 LAB
CHOLEST SERPL-MCNC: 122 MG/DL
EST. AVERAGE GLUCOSE BLD GHB EST-MCNC: 85 MG/DL
HBA1C MFR BLD: 4.6 %
HDLC SERPL-MCNC: 49 MG/DL
LDLC SERPL CALC-MCNC: 52 MG/DL (ref 0–100)
NONHDLC SERPL-MCNC: 73 MG/DL
TRIGL SERPL-MCNC: 107 MG/DL

## 2024-03-06 PROCEDURE — 80061 LIPID PANEL: CPT

## 2024-03-06 PROCEDURE — 83036 HEMOGLOBIN GLYCOSYLATED A1C: CPT

## 2024-03-06 PROCEDURE — 36415 COLL VENOUS BLD VENIPUNCTURE: CPT

## 2024-03-08 ENCOUNTER — OFFICE VISIT (OUTPATIENT)
Dept: PHYSICAL THERAPY | Facility: MEDICAL CENTER | Age: 39
End: 2024-03-08
Payer: COMMERCIAL

## 2024-03-08 DIAGNOSIS — G56.21 ULNAR NERVE ENTRAPMENT AT ELBOW, RIGHT: Primary | ICD-10-CM

## 2024-03-08 DIAGNOSIS — Z47.89 ORTHOPEDIC AFTERCARE: ICD-10-CM

## 2024-03-08 PROCEDURE — 97110 THERAPEUTIC EXERCISES: CPT

## 2024-03-08 PROCEDURE — 97140 MANUAL THERAPY 1/> REGIONS: CPT

## 2024-03-08 NOTE — PROGRESS NOTES
Daily Note     Today's date: 3/8/2024  Patient name: Desire العلي  : 1985  MRN: [id number]  Referring provider: Manny Ott PA-C  Dx:   Encounter Diagnosis     ICD-10-CM    1. Ulnar nerve entrapment at elbow, right  G56.21       2. Orthopedic aftercare  Z47.89           Start Time: 0930  Stop Time: 1015  Total time in clinic (min): 45 minutes    Subjective: Desire has no new complaints today. Repots no issues post last session and cont R elbow soreness. States she feels like she still can not fully extend her elbow and feels the scar tissue is limiting her as well.       Objective: See treatment diary below      Assessment: Desire tolerated treatment well today. She continues to make steady gains towards goals. Demonstrates decreased R elbow ext with AROM ex's. Required occ vc's t/o session for proper positioning with ex's. Desire would benefit from continued PT and compliance with HEP.       Plan: Continue per plan of care.      Precautions: post-surgical (follow physician orders), lupus, adhesive sensitivity   DOS: 24- s/p right open carpal tunnel release, s/p right ulnar nerve transposition submuscular   MD orders: Custom long-arm splint to extend from the wrist up past the elbow   Gentle range of motion of the elbow only, no wrist flexion or extension, range of motion of the digits     HEP: elbow flex/ext, TGE's (in splint), opposition  Manuals 2/7 2/14 2/20 2/27 2/29 3/5 3/8      Scar massage nv x8' x8' x8' x8' x8' X8'                                             Neuro Re-Ed                                                                                                        Ther Ex             PROM/AAROM elbow flex/ext (no wrist motion) x10 x10' x15' x15' x10' X 10' X 10      Towel bunches   x3' x3' x3' np        AROM elbow flexion/ext on table  x30 x30 x30 x30 X 30 x30      Pegs  extended reach   x3' x3' x3' X 3' X3'      Supine wand chest press    x20 x20 X 20 X20       Wand elbow ext  standing    x20 x20 X 20 X20       Supine elbow ext     x20 X 20  X20                    Ther Activity                                       Gait Training                                       Modalities             MHP right elbow    x10' X10' 10'  x10'

## 2024-03-12 ENCOUNTER — APPOINTMENT (OUTPATIENT)
Dept: PHYSICAL THERAPY | Facility: MEDICAL CENTER | Age: 39
End: 2024-03-12
Payer: COMMERCIAL

## 2024-03-13 ENCOUNTER — OFFICE VISIT (OUTPATIENT)
Dept: PHYSICAL THERAPY | Facility: MEDICAL CENTER | Age: 39
End: 2024-03-13
Payer: COMMERCIAL

## 2024-03-13 DIAGNOSIS — Z47.89 ORTHOPEDIC AFTERCARE: ICD-10-CM

## 2024-03-13 DIAGNOSIS — G56.21 ULNAR NERVE ENTRAPMENT AT ELBOW, RIGHT: Primary | ICD-10-CM

## 2024-03-13 PROCEDURE — 97140 MANUAL THERAPY 1/> REGIONS: CPT | Performed by: PHYSICAL THERAPIST

## 2024-03-13 PROCEDURE — 97110 THERAPEUTIC EXERCISES: CPT | Performed by: PHYSICAL THERAPIST

## 2024-03-13 NOTE — PROGRESS NOTES
Daily Note     Today's date: 3/13/2024  Patient name: Desire العلي  : 1985  MRN: [id number]  Referring provider: Manny Ott PA-C  Dx:   Encounter Diagnosis     ICD-10-CM    1. Ulnar nerve entrapment at elbow, right  G56.21       2. Orthopedic aftercare  Z47.89                      Subjective: Patient states she followed up with ortho and was transitioned to a wrist cock up as needed. She is still having difficulty with extending her right elbow fully and is concerned she is not going to get this motion back.       Objective: See treatment diary below      Assessment: Patients with roughly 30 degrees of loss of extension at start of treatment session, this did improve to 5-10 degrees passively with manual therapy interventions. Followed up with muscular activation and mobility exercises to work through full available range. She was lacking 20-25 degrees actively, but overall improved compared to start of the session. Patient educated in self mobility exercises for home to continue to work on extension ROM. Patient would benefit from continued PT to address right elbow mobility impairments to restore extension ROM.       Plan: Continue per plan of care.      Precautions: post-surgical (follow physician orders), lupus, adhesive sensitivity   DOS: 24- s/p right open carpal tunnel release, s/p right ulnar nerve transposition submuscular   MD orders: no activity restrictions. Wrist cock up as needed.    HEP: elbow flex/ext, TGE's (in splint), opposition  Manuals 2/7 2/14 2/20 2/27 2/29 3/5 3/8 3/13     Scar massage nv x8' x8' x8' x8' x8' X8' x5'     STM biceps on stretch        x5'                               Neuro Re-Ed                                                                                                        Ther Ex             LLLD elbow ext stretch         2# cuff x10' with MHP     PROM/AAROM elbow flex/ext (no wrist motion) x10 x10' x15' x15' x10' X 10' X 10 x5'     AROM elbow  "flexion/ext on table  x30 x30 x30 x30 X 30 x30 Foam roller seated x20     Pegs  extended reach   x3' x3' x3' X 3' X3' Finger web ylw x20     Supine wand chest press    x20 x20 X 20 X20  X20     Wand elbow ext standing    x20 x20 X 20 X20  DC     Supine elbow ext     x20 X 20  X20  x20     Tricep set        5\"x20     Wrist AROM        Cone x20 ea     Ther Activity                                       Gait Training                                       Modalities             MHP right elbow    x10' X10' 10'  x10' TE                           "

## 2024-03-14 ENCOUNTER — OFFICE VISIT (OUTPATIENT)
Dept: PHYSICAL THERAPY | Facility: MEDICAL CENTER | Age: 39
End: 2024-03-14
Payer: COMMERCIAL

## 2024-03-14 DIAGNOSIS — G56.21 ULNAR NERVE ENTRAPMENT AT ELBOW, RIGHT: Primary | ICD-10-CM

## 2024-03-14 DIAGNOSIS — Z47.89 ORTHOPEDIC AFTERCARE: ICD-10-CM

## 2024-03-14 PROCEDURE — 97140 MANUAL THERAPY 1/> REGIONS: CPT | Performed by: PHYSICAL THERAPIST

## 2024-03-14 PROCEDURE — 97110 THERAPEUTIC EXERCISES: CPT | Performed by: PHYSICAL THERAPIST

## 2024-03-14 NOTE — PROGRESS NOTES
Daily Note     Today's date: 3/14/2024  Patient name: Desire العلي  : 1985  MRN: [id number]  Referring provider: Manny Ott PA-C  Dx:   Encounter Diagnosis     ICD-10-CM    1. Ulnar nerve entrapment at elbow, right  G56.21       2. Orthopedic aftercare  Z47.89                      Subjective: Patient denies any soreness from yesterdays treatment session with more aggressive stretching.       Objective: See treatment diary below      Assessment: Desire presents today with improved ROM of the right elbow. Passive roughly 5 degrees of extension (springy end feel), actively able to achieve up to about 10 degrees of extension. She had decreased guarding today with stretching and active motion exercises. She  Patient would benefit from continued PT to address right elbow mobility impairments to restore extension ROM.       Plan: Continue per plan of care.      Precautions: post-surgical (follow physician orders), lupus, adhesive sensitivity   DOS: 24- s/p right open carpal tunnel release, s/p right ulnar nerve transposition submuscular   MD orders: no activity restrictions. Wrist cock up as needed.    HEP: elbow flex/ext, TGE's (in splint), opposition  Manuals 2/7 2/14 2/20 2/27 2/29 3/5 3/8 3/13 3/14    Scar massage nv x8' x8' x8' x8' x8' X8' x5' x5'    STM biceps on stretch        x5' x5'                              Neuro Re-Ed                                                                                                        Ther Ex             LLLD elbow ext stretch         2# cuff x10' with MHP 2# cuff x10' with MHP    PROM/AAROM elbow flex/ext (no wrist motion) x10 x10' x15' x15' x10' X 10' X 10 x5' x5'    AROM elbow flexion/ext on table  x30 x30 x30 x30 X 30 x30 Foam roller seated x20 x20    Pegs  extended reach   x3' x3' x3' X 3' X3' Finger web ylw x20 Ylw x30    Supine wand chest press    x20 x20 X 20 X20  X20 x20    Wand elbow ext standing    x20 x20 X 20 X20  DC     Supine elbow ext     " x20 X 20  X20  x20 x20    Tricep set        5\"x20 Prone with ball 3\"x30    Wrist AROM        Cone x20 ea 1# x30 ea    Ther Activity                                       Gait Training                                       Modalities             MHP right elbow    x10' X10' 10'  x10' TE TE                          "

## 2024-03-19 ENCOUNTER — OFFICE VISIT (OUTPATIENT)
Dept: PHYSICAL THERAPY | Facility: MEDICAL CENTER | Age: 39
End: 2024-03-19
Payer: COMMERCIAL

## 2024-03-19 DIAGNOSIS — Z47.89 ORTHOPEDIC AFTERCARE: ICD-10-CM

## 2024-03-19 DIAGNOSIS — G56.21 ULNAR NERVE ENTRAPMENT AT ELBOW, RIGHT: Primary | ICD-10-CM

## 2024-03-19 PROCEDURE — 97110 THERAPEUTIC EXERCISES: CPT | Performed by: PHYSICAL THERAPIST

## 2024-03-19 PROCEDURE — 97140 MANUAL THERAPY 1/> REGIONS: CPT | Performed by: PHYSICAL THERAPIST

## 2024-03-19 NOTE — PROGRESS NOTES
Daily Note     Today's date: 3/19/2024  Patient name: Desire العلي  : 1985  MRN: [id number]  Referring provider: Manny Ott PA-C  Dx:   Encounter Diagnosis     ICD-10-CM    1. Ulnar nerve entrapment at elbow, right  G56.21       2. Orthopedic aftercare  Z47.89                      Subjective: Patient reports concern for how the angle of her arm is changing.       Objective: See treatment diary below      Assessment: Desire presents with increased carrying angle of the right UE. She does have a slight increased carrying angle on the left with medial epicondyle prominence but this is not as significant as compared to her right. Passively we are able to achieve about 5 degrees of extension, but she does guard and actively restricts herself to about 10-15 degrees of extension. Added in more light resistance for triceps to help with tricep pull into terminal elbow extension. Patient would benefit from continued PT to address right elbow mobility impairments to restore extension ROM.       Plan: Continue per plan of care.      Precautions: post-surgical (follow physician orders), lupus, adhesive sensitivity   DOS: 24- s/p right open carpal tunnel release, s/p right ulnar nerve transposition submuscular   MD orders: no activity restrictions. Wrist cock up as needed.    HEP: elbow flex/ext, TGE's (in splint), opposition  Manuals 2/7 2/14 2/20 2/27 2/29 3/5 3/8 3/13 3/14 3/19   Scar massage nv x8' x8' x8' x8' x8' X8' x5' x5' x5'   STM biceps on stretch        x5' x5' x5'                             Neuro Re-Ed                                                                                                        Ther Ex             LLLD elbow ext stretch         2# cuff x10' with MHP 2# cuff x10' with MHP 2# cuff x10' with MHP   PROM/AAROM elbow flex/ext (no wrist motion) x10 x10' x15' x15' x10' X 10' X 10 x5' x5' x5'   AROM elbow flexion/ext on table  x30 x30 x30 x30 X 30 x30 Foam roller seated x20 x20  "X20   Pegs  extended reach   x3' x3' x3' X 3' X3' Finger web ylw x20 Ylw x30 Red x30   Supine wand chest press    x20 x20 X 20 X20  X20 x20 2# x20   Wand elbow ext standing    x20 x20 X 20 X20  DC     Supine elbow ext     x20 X 20  X20  x20 x20 1# x20   Tricep set        5\"x20 Prone with ball 3\"x30 Prone with ball 3\"x30   Wrist AROM        Cone x20 ea 1# x30 ea 1# x30 ea   Ther Activity                                       Gait Training                                       Modalities             MHP right elbow    x10' X10' 10'  x10' TE TE                          "

## 2024-03-20 ENCOUNTER — OFFICE VISIT (OUTPATIENT)
Dept: FAMILY MEDICINE CLINIC | Facility: CLINIC | Age: 39
End: 2024-03-20
Payer: COMMERCIAL

## 2024-03-20 ENCOUNTER — OFFICE VISIT (OUTPATIENT)
Dept: DERMATOLOGY | Facility: CLINIC | Age: 39
End: 2024-03-20
Payer: COMMERCIAL

## 2024-03-20 VITALS — HEIGHT: 65 IN | WEIGHT: 121 LBS | BODY MASS INDEX: 20.16 KG/M2

## 2024-03-20 VITALS
WEIGHT: 130 LBS | HEIGHT: 65 IN | HEART RATE: 78 BPM | TEMPERATURE: 97.4 F | OXYGEN SATURATION: 98 % | SYSTOLIC BLOOD PRESSURE: 102 MMHG | DIASTOLIC BLOOD PRESSURE: 68 MMHG | BODY MASS INDEX: 21.66 KG/M2

## 2024-03-20 DIAGNOSIS — F51.13 HYPERSOMNIA DUE TO MENTAL DISORDER: ICD-10-CM

## 2024-03-20 DIAGNOSIS — E03.9 ACQUIRED HYPOTHYROIDISM: Primary | ICD-10-CM

## 2024-03-20 DIAGNOSIS — F41.1 GAD (GENERALIZED ANXIETY DISORDER): ICD-10-CM

## 2024-03-20 DIAGNOSIS — E55.9 VITAMIN D DEFICIENCY: ICD-10-CM

## 2024-03-20 DIAGNOSIS — F33.2 MAJOR DEPRESSIVE DISORDER, RECURRENT, SEVERE W/O PSYCHOTIC BEHAVIOR (HCC): ICD-10-CM

## 2024-03-20 DIAGNOSIS — L70.0 ACNE VULGARIS: ICD-10-CM

## 2024-03-20 DIAGNOSIS — L70.0 ACNE VULGARIS: Primary | ICD-10-CM

## 2024-03-20 DIAGNOSIS — B36.0 TINEA VERSICOLOR: ICD-10-CM

## 2024-03-20 PROCEDURE — 99214 OFFICE O/P EST MOD 30 MIN: CPT | Performed by: FAMILY MEDICINE

## 2024-03-20 PROCEDURE — 99214 OFFICE O/P EST MOD 30 MIN: CPT | Performed by: STUDENT IN AN ORGANIZED HEALTH CARE EDUCATION/TRAINING PROGRAM

## 2024-03-20 RX ORDER — SPIRONOLACTONE 25 MG/1
TABLET ORAL
Qty: 60 TABLET | Refills: 1 | Status: CANCELLED | OUTPATIENT
Start: 2024-03-20

## 2024-03-20 NOTE — PROGRESS NOTES
Chief Complaint   Patient presents with   • Anxiety   • Depression   • Hypothyroidism     Name: Desire العلي      : 1985      MRN: [id number]  Encounter Provider: Ritu Reaves MD  Encounter Date: 3/20/2024   Encounter department: [facility]    Assessment & Plan     Continue levothyroxine as prescribed, will recheck thyroid function as below.  She has been compliant with her Trintellix however she reports that she has not been taking her Abilify.  I recommend she follow-up with psychiatry in regards to this.  Recheck a vitamin D level due to history of vitamin D deficiency.  Follow-up with dermatology as scheduled for acne treatment.  Will refer patient over to sleep medicine for history of hypersomnia.  RTC in 6 months for follow-up and annual physical exam.    1. Acquired hypothyroidism  -     TSH, 3rd generation; Future  -     T4, free; Future    2. JG (generalized anxiety disorder)    3. Major depressive disorder, recurrent, severe w/o psychotic behavior (HCC)    4. Vitamin D deficiency  -     Vitamin D 25 hydroxy; Future    5. Acne vulgaris    6. Hypersomnia due to mental disorder  -     Ambulatory Referral to Sleep Medicine; Future           Subjective      She presents today for a follow-up on depression, anxiety, hypothyroidism.  Follows up with psychiatry.  States that she continues to have hypersomnia and is no longer on modafinil due to recent insurance change.  Reports that her psychiatrist recommended patient be seen by sleep medicine.  Recently seen by dermatology for acne vulgaris and started on retinol.      Review of Systems   Constitutional:  Negative for activity change, appetite change, chills, fatigue and fever.   HENT:  Negative for congestion, rhinorrhea, sneezing and sore throat.    Eyes:  Negative for pain, discharge, redness and itching.   Respiratory:  Negative for cough, chest tightness, shortness of breath and wheezing.    Cardiovascular:  Negative  for chest pain and palpitations.   Gastrointestinal:  Negative for abdominal distention, abdominal pain, constipation, diarrhea, nausea and vomiting.   Musculoskeletal:  Negative for arthralgias, back pain, joint swelling and myalgias.   Skin:  Negative for rash.   Neurological:  Negative for dizziness, weakness, numbness and headaches.   Hematological:  Negative for adenopathy.   Psychiatric/Behavioral:  Positive for sleep disturbance. Negative for confusion and dysphoric mood.    All other systems reviewed and are negative.      Current Outpatient Medications on File Prior to Visit   Medication Sig   • acetaminophen (TYLENOL) 650 mg CR tablet Take 1 tablet (650 mg total) by mouth every 8 (eight) hours as needed for mild pain   • ARIPiprazole (ABILIFY) 5 mg tablet Take 1/2 tab daily for 1 week, then either continue 2.5mg daily or increase to 5mg daily   • Calcium 600-400 MG-UNIT CHEW Chew daily   • Cholecalciferol (Vitamin D) 50 MCG (2000 UT) tablet Take 1 tablet (2,000 Units total) by mouth daily   • clonazePAM (KlonoPIN) 0.5 mg tablet TAKE 1/2 TO 1 TABLET BY MOUTH 2 TIMES A DAY AS NEEDED FOR ANXIETY   • Diclofenac Sodium (VOLTAREN) 1 % Apply 2 g topically 4 (four) times a day (Patient taking differently: Apply 2 g topically if needed)   • hydroxychloroquine (PLAQUENIL) 200 mg tablet Take 1 tablet (200 mg total) by mouth daily   • levothyroxine 25 mcg tablet Take 1 tablet (25 mcg total) by mouth daily in the early morning   • Multiple Vitamins-Minerals (Bariatric Multivitamins/Iron) CAPS Take by mouth daily   • omeprazole (PriLOSEC) 20 mg delayed release capsule Take 1 capsule (20 mg total) by mouth daily (Patient taking differently: Take 20 mg by mouth if needed)   • Probiotic Product (PROBIOTIC BLEND PO) Take 1 capsule by mouth in the morning   • Vortioxetine HBr (Trintellix) 20 MG tablet Take 1 tablet (20 mg total) by mouth daily       Objective     /68   Pulse 78   Temp (!) 97.4 °F (36.3 °C)   Ht 5'  "5\" (1.651 m)   Wt 59 kg (130 lb)   SpO2 98%   BMI 21.63 kg/m²     Physical Exam  Vitals reviewed.   Constitutional:       General: She is not in acute distress.     Appearance: Normal appearance. She is well-developed. She is not toxic-appearing or diaphoretic.   HENT:      Head: Normocephalic and atraumatic.      Right Ear: External ear normal.      Left Ear: External ear normal.      Nose: Nose normal.      Mouth/Throat:      Mouth: Mucous membranes are moist.   Eyes:      General: No scleral icterus.        Right eye: No discharge.         Left eye: No discharge.      Conjunctiva/sclera: Conjunctivae normal.   Cardiovascular:      Rate and Rhythm: Normal rate and regular rhythm.      Pulses: Normal pulses.      Heart sounds: Normal heart sounds. No murmur heard.  Pulmonary:      Effort: Pulmonary effort is normal. No respiratory distress.      Breath sounds: Normal breath sounds. No wheezing.   Abdominal:      General: Abdomen is flat. There is no distension.      Palpations: Abdomen is soft. There is no mass.      Tenderness: There is no abdominal tenderness.      Hernia: No hernia is present.   Musculoskeletal:         General: No swelling, tenderness, deformity or signs of injury.      Cervical back: Normal range of motion.   Skin:     General: Skin is warm and dry.      Capillary Refill: Capillary refill takes less than 2 seconds.      Coloration: Skin is not pale.      Findings: No erythema.   Neurological:      General: No focal deficit present.      Mental Status: She is alert.      Cranial Nerves: No cranial nerve deficit.      Motor: No weakness.      Gait: Gait normal.   Psychiatric:         Mood and Affect: Mood normal.         Behavior: Behavior normal.       Ritu Reaves MD    "

## 2024-03-20 NOTE — PATIENT INSTRUCTIONS
"Acne  Discussed that treatment is directed at improving skin appearance and reducing the likelihood of scarring. Discussed theraputic ladder including topical OTC treatments, topical prescriptions, and oral medications. Discussed side effects as noted below.     Plan today:     AM:  -   - SPF 30 or greater (can be a lotion with SPF like CeraVe AM)  - Start non-comedogenic moisturizer such as CeraVe, Cetaphil or Vanicream.      PM:  - Gentle cleanser, such as CeraVe, Cetaphil or La Roche Posay  - Start Tretinoin 0.025%) qHS. Educated that this medication can be drying and irritating. Start by applying a pea-sized amount of product 2 nights per week, then increase to nightly as tolerated. Written instructions provided.  - Start non-comedogenic moisturizer such as CeraVe, Cetaphil or Vanicream. May use on top of retinoid. If retinoid is too drying, may employ the \"sandwich method.\" To do this, apply layer of non-comedogenic moisturizer, followed by layer of retinoid, followed by another layer of non-comedogenic moisturizer.   - Start Spirolactone 25 mg daily. Get Potassium blood work done 1 week after taking risks discussed     Call with any questions or concerns before next follow-up visit; do not stop medications abruptly without consulting provider.    COMMON POSSIBLE SIDE EFFECTS OF MEDICATIONS    Retinoids - dryness, redness, increased sun sensitivity.  Benzoyl peroxide - drying, redness, bleaching of clothes, towels and sheets, allergy.  Doxycycline - headaches; dizziness; irritation of the throat; nail changes; discoloration of teeth.  Sun sensitivity - even if you have dark skin, this medicine can make you burn more easily.  Make sure you protect yourself from the sun, either by avoiding being outside between 11 AM and 3 PM, wearing and reapplying sunscreen/sunblock, or wearing sun protective clothing  Nausea/vomiting - if you experience nausea with this medication, take it with food.    WHEN AND WHERE TO CALL " WITH CONCERNS  We are here to help!  If you experience any unusual symptoms, then stop taking or using the medication and call our office at [phone] (SKIN).  It is better to be safe than to be sorry!     TINEA VERSICOLOR       Assessment and Plan:  - Most consistent with recurrent tinea versicolor  - Educated that tinea versicolor is a type of fungal infection on the skin due to an overgrowth of a yeast that lives on all of us that  thrives in warm, humid environments.   - Educated that this is not thought to represent an infectious condition, but multiple family members are often affected.   - Educated that after treatment, maintenance therapy is needed to prevent recurrence  - Educated that areas will likely remain hypopigmented for several months after treatment as it takes time for the skin to re-pigment. Emphasized that this does not indicate treatment failure.  - Based on a thorough discussion of this condition and the management approach to it (including a comprehensive discussion of the known risks, side effects and potential benefits of treatment), the patient (family) agrees to implement the following specific plan:    Start to use Ketoconazole 2% shampoo. Use daily for 2 weeks, then transition to maintenance therapy at 1-4 x per month. Let the shampoo sit on the skin for at least 5 minutes before rinsing off.

## 2024-03-20 NOTE — PROGRESS NOTES
"[facility] Note     Patient Name: Desire العلي  Encounter Date: 3/20/24     Have you been cared for by a [facility] Dermatologist in the last 3 years and, if so, which description applies to you?    Yes.  I have been here within the last 3 years, and my medical history has NOT changed since that time.  I am FEMALE/of child-bearing potential.    REVIEW OF SYSTEMS:  Have you recently had or currently have any of the following? No changes in my recent health.   PAST MEDICAL HISTORY:  Have you personally ever had or currently have any of the following?  If \"YES,\" then please provide more detail. No changes in my medical history.   HISTORY OF IMMUNOSUPPRESSION: Do you have a history of any of the following:  Systemic Immunosuppression such as Diabetes, Biologic or Immunotherapy, Chemotherapy, Organ Transplantation, Bone Marrow Transplantation?  YES, LUPUS     Answering \"YES\" requires the addition of the dotphrase \"IMMUNOSUPPRESSED\" as the first diagnosis of the patient's visit.   FAMILY HISTORY:  Any \"first degree relatives\" (parent, brother, sister, or child) with the following?    No changes in my family's known health.   PATIENT EXPERIENCE:    Do you want the Dermatologist to perform a COMPLETE skin exam today including a clinical examination under the \"bra and underwear\" areas?  No  If necessary, do we have your permission to call and leave a detailed message on your Preferred Phone number that includes your specific medical information?  Yes      Allergies   Allergen Reactions    Wound Dressing Adhesive Rash     Gets red rash from paper tape relatively quickly after application    Nsaids Other (See Comments)     Cannot have due to bariatric surgery in past     Penicillins Hives     At young age      Current Outpatient Medications:     acetaminophen (TYLENOL) 650 mg CR tablet, Take 1 tablet (650 mg total) by mouth every 8 (eight) hours as needed for mild pain, Disp: 30 tablet, Rfl: 0    " "ARIPiprazole (ABILIFY) 5 mg tablet, Take 1/2 tab daily for 1 week, then either continue 2.5mg daily or increase to 5mg daily, Disp: 30 tablet, Rfl: 1    Calcium 600-400 MG-UNIT CHEW, Chew daily, Disp: , Rfl:     Cholecalciferol (Vitamin D) 50 MCG (2000 UT) tablet, Take 1 tablet (2,000 Units total) by mouth daily, Disp: 90 tablet, Rfl: 1    clonazePAM (KlonoPIN) 0.5 mg tablet, TAKE 1/2 TO 1 TABLET BY MOUTH 2 TIMES A DAY AS NEEDED FOR ANXIETY, Disp: 60 tablet, Rfl: 2    Diclofenac Sodium (VOLTAREN) 1 %, Apply 2 g topically 4 (four) times a day (Patient taking differently: Apply 2 g topically if needed), Disp: 100 g, Rfl: 6    hydroxychloroquine (PLAQUENIL) 200 mg tablet, Take 1 tablet (200 mg total) by mouth daily, Disp: 90 tablet, Rfl: 1    levothyroxine 25 mcg tablet, Take 1 tablet (25 mcg total) by mouth daily in the early morning, Disp: 90 tablet, Rfl: 1    Multiple Vitamins-Minerals (Bariatric Multivitamins/Iron) CAPS, Take by mouth daily, Disp: , Rfl:     omeprazole (PriLOSEC) 20 mg delayed release capsule, Take 1 capsule (20 mg total) by mouth daily (Patient taking differently: Take 20 mg by mouth if needed), Disp: 90 capsule, Rfl: 1    Probiotic Product (PROBIOTIC BLEND PO), Take 1 capsule by mouth in the morning, Disp: , Rfl:     Vortioxetine HBr (Trintellix) 20 MG tablet, Take 1 tablet (20 mg total) by mouth daily, Disp: 30 tablet, Rfl: 3          Whom besides the patient is providing clinical information about today's encounter?   NO ADDITIONAL HISTORIAN (patient alone provided history)    Physical Exam and Assessment/Plan by Diagnosis:    ACNE VULGARIS (\"COMMON ACNE\")    Physical Exam:  Anatomic Location Affected:  face, chin, jaw line  Morphological Description: Scattered pink papules and open/closed comedones  Pertinent Positives:  Pertinent Negatives:    Additional History of Present Condition:  Previously on spironolactone for short period of time which was stopped after patient was diagnosed with SLE. " "Patient does have reported history of proteinuria.         Discussed that history and physical are consistent with hormonal acne   Educated that hormonal acne does particularly well with medications that target hormones, including spironolactone and OCP.  Denies family history of breast cancer, personal history of low blood pressure, liver disease, kidney disease, hyperkalemia  Educated that I will reach out to rheumatologist given history of SLE with proteinuria to confirm that she is OK to proceed with spironolactone. Educated that I would plan to monitor her potassium levels and start her at a low dose of spironolactone to ensure tolerating well.    The potential link to estrogen-sensitive cancers in high dose animal studies such as breast cancer was discussed and the patient was counseled that the most recent meta analyses on spironolactone in humans on typical dosing has not demonstrated this risk.       Discussed that treatment is directed at improving skin appearance and reducing the likelihood of scarring. Discussed theraputic ladder including topical OTC treatments, topical prescriptions, and oral medications. Discussed side effects as noted below.     Plan today:     AM:  - Start sulfur based wash  - SPF 30 or greater (can be a lotion with SPF like CeraVe AM)  - Start non-comedogenic moisturizer such as CeraVe, Cetaphil or Vanicream.      PM:  - Gentle cleanser, such as CeraVe, Cetaphil or La Roche Posay  - Start Tretinoin 0.025% qHS. Educated that this medication can be drying and irritating. Start by applying a pea-sized amount of product 2 nights per week, then increase to nightly as tolerated. Written instructions provided.  - Start non-comedogenic moisturizer such as CeraVe, Cetaphil or Vanicream. May use on top of retinoid. If retinoid is too drying, may employ the \"sandwich method.\" To do this, apply layer of non-comedogenic moisturizer, followed by layer of retinoid, followed by another layer of " non-comedogenic moisturizer.       ORAL:   - Start Spirolactone 25 mg daily PENDING DISCUSSION WITH RHEUMATOLOGY (message sent). Get Potassium blood work done 1 week after starting medication (baseline CMP in system). She has undergone tubal ligation. S/E reviewed including menstrual irregularity, breast tenderness, headaches, GI upset, K+ retention, palpitations, teratogenicity/feminization of male fetus.     Call with any questions or concerns before next follow-up visit; do not stop medications abruptly without consulting provider.    WHEN AND WHERE TO CALL WITH CONCERNS  We are here to help!  If you experience any unusual symptoms, then stop taking or using the medication and call our office at [phone] (SKIN).  It is better to be safe than to be sorry!     TINEA VERSICOLOR     Physical Exam:  Anatomic Location Affected:  abdoomen and back  Morphological Description:  Hypopigmented macules with mild scaling         Assessment and Plan:  - Most consistent with recurrent tinea versicolor  - Educated that tinea versicolor is a type of fungal infection on the skin due to an overgrowth of a yeast that lives on all of us that  thrives in warm, humid environments.   - Educated that this is not thought to represent an infectious condition, but multiple family members are often affected.   - Educated that after treatment, maintenance therapy is needed to prevent recurrence  - Educated that areas will likely remain hypopigmented for several months after treatment as it takes time for the skin to re-pigment. Emphasized that this does not indicate treatment failure.  - Based on a thorough discussion of this condition and the management approach to it (including a comprehensive discussion of the known risks, side effects and potential benefits of treatment), the patient (family) agrees to implement the following specific plan:    Start to use Ketoconazole 2% shampoo. Use daily for 2 weeks, then transition to  maintenance therapy at 1-4 x per month. Let the shampoo sit on the skin for at least 5 minutes before rinsing off.      Scribe Attestation      I,:  Anjana Okeefe MA am acting as a scribe while in the presence of the attending physician.:       I,:  Raymond Khan MD personally performed the services described in this documentation    as scribed in my presence.:

## 2024-03-21 ENCOUNTER — TELEPHONE (OUTPATIENT)
Age: 39
End: 2024-03-21

## 2024-03-21 DIAGNOSIS — B36.0 TINEA VERSICOLOR: ICD-10-CM

## 2024-03-21 DIAGNOSIS — L70.0 ACNE VULGARIS: Primary | ICD-10-CM

## 2024-03-21 RX ORDER — KETOCONAZOLE 20 MG/ML
1 SHAMPOO TOPICAL DAILY
Qty: 1 ML | Refills: 0 | Status: SHIPPED | OUTPATIENT
Start: 2024-03-21

## 2024-03-21 RX ORDER — SPIRONOLACTONE 50 MG/1
25 TABLET, FILM COATED ORAL DAILY
Qty: 30 TABLET | Refills: 3 | Status: SHIPPED | OUTPATIENT
Start: 2024-03-21

## 2024-03-21 NOTE — TELEPHONE ENCOUNTER
Patient called because pharmacy never got script orders from yesterday's visit.    Please send medications over to [facility] - [address]

## 2024-03-21 NOTE — TELEPHONE ENCOUNTER
Called patient no answer. LVM to make her aware of medications being sent to [facility] pharmacy.

## 2024-03-22 ENCOUNTER — OFFICE VISIT (OUTPATIENT)
Dept: PHYSICAL THERAPY | Facility: MEDICAL CENTER | Age: 39
End: 2024-03-22
Payer: COMMERCIAL

## 2024-03-22 DIAGNOSIS — Z47.89 ORTHOPEDIC AFTERCARE: ICD-10-CM

## 2024-03-22 DIAGNOSIS — G56.21 ULNAR NERVE ENTRAPMENT AT ELBOW, RIGHT: Primary | ICD-10-CM

## 2024-03-22 PROCEDURE — 97140 MANUAL THERAPY 1/> REGIONS: CPT | Performed by: PHYSICAL THERAPIST

## 2024-03-22 PROCEDURE — 97110 THERAPEUTIC EXERCISES: CPT | Performed by: PHYSICAL THERAPIST

## 2024-03-22 RX ORDER — SULFACETAMIDE SODIUM, SULFUR 90; 45 MG/G; MG/G
LIQUID TOPICAL
Qty: 454 G | Refills: 2 | Status: SHIPPED | OUTPATIENT
Start: 2024-03-22

## 2024-03-22 RX ORDER — KETOCONAZOLE 20 MG/ML
SHAMPOO TOPICAL
Qty: 120 ML | Refills: 6 | Status: SHIPPED | OUTPATIENT
Start: 2024-03-22

## 2024-03-22 NOTE — PROGRESS NOTES
Daily Note     Today's date: 3/22/2024  Patient name: Desire العلي  : 1985  MRN: [id number]  Referring provider: Manny Ott PA-C  Dx:   Encounter Diagnosis     ICD-10-CM    1. Ulnar nerve entrapment at elbow, right  G56.21       2. Orthopedic aftercare  Z47.89                      Subjective: Patient states she continues to stretch diligently at home. She does continue to have concerns for the way her elbow appears now.       Objective: See treatment diary below      Assessment: Educated patient on the new path of the nerve and how ongoing swelling and change in anatomy could be causing the appearance. She was able to achieve nearly full extension today both passively and actively. Added light bands for further tricep activation to pull into extension. She was provided with tubigrip to help assist with remainder of swelling in the elbow. She was instructed to start with one hour at a tie and building up her wearing tolerance gradually. Patient would benefit from continued PT to address right elbow mobility impairments to restore extension ROM.       Plan: Continue per plan of care.      Precautions: post-surgical (follow physician orders), lupus, adhesive sensitivity   DOS: 24- s/p right open carpal tunnel release, s/p right ulnar nerve transposition submuscular   MD orders: no activity restrictions. Wrist cock up as needed.    HEP: elbow flex/ext, TGE's (in splint), opposition  Manuals 2/14 2/20 2/27 2/29 3/5 3/8 3/13 3/14 3/19 3/22   Scar massage x8' x8' x8' x8' x8' X8' x5' x5' x5' x5'   STM biceps on stretch       x5' x5' x5' x5'                             Neuro Re-Ed                                                                                                        Ther Ex             LLLD elbow ext stretch        2# cuff x10' with MHP 2# cuff x10' with MHP 2# cuff x10' with MHP 2# cuff x10' with MHP   PROM/AAROM elbow flex/ext (no wrist motion) x10' x15' x15' x10' X 10' X 10 x5' x5' x5'  "x5'   AROM elbow flexion/ext on table x30 x30 x30 x30 X 30 x30 Foam roller seated x20 x20 X20 x30   Pegs  extended reach  x3' x3' x3' X 3' X3' Finger web ylw x20 Ylw x30 Red x30 Red x30   Supine wand chest press   x20 x20 X 20 X20  X20 x20 2# x20 2# x30   Wand elbow ext standing   x20 x20 X 20 X20  DC      Supine elbow ext    x20 X 20  X20  x20 x20 1# x20 1# x30   Tricep set       5\"x20 Prone with ball 3\"x30 Prone with ball 3\"x30 Prone with ball 3\"x30   Wrist AROM       Cone x20 ea 1# x30 ea 1# x30 ea 1# x30 ea   TB tricep ext          YTB x20   Ther Activity                                       Gait Training                                       Modalities             MHP right elbow   x10' X10' 10'  x10' TE TE                           "

## 2024-03-25 NOTE — TELEPHONE ENCOUNTER
PA for Tretinoin 0.025% cream    Submitted via    [x]CMM-KEY [id number]  []SurescriMzinga-Case ID #   []Faxed to plan   []Other website   []Phone call Case ID #     Office notes sent, clinical questions answered. Awaiting determination    Turnaround time for your insurance to make a decision on your Prior Authorization can take 7-21 business days.

## 2024-03-25 NOTE — TELEPHONE ENCOUNTER
Received fax from [facility] needing Prior Authorization for Tretinoin 0.025% Cream. Form scanned into  for review.    Key: [id number]

## 2024-03-26 ENCOUNTER — OFFICE VISIT (OUTPATIENT)
Dept: PHYSICAL THERAPY | Facility: MEDICAL CENTER | Age: 39
End: 2024-03-26
Payer: COMMERCIAL

## 2024-03-26 DIAGNOSIS — Z47.89 ORTHOPEDIC AFTERCARE: ICD-10-CM

## 2024-03-26 DIAGNOSIS — G56.21 ULNAR NERVE ENTRAPMENT AT ELBOW, RIGHT: Primary | ICD-10-CM

## 2024-03-26 PROCEDURE — 97140 MANUAL THERAPY 1/> REGIONS: CPT | Performed by: PHYSICAL THERAPIST

## 2024-03-26 PROCEDURE — 97110 THERAPEUTIC EXERCISES: CPT | Performed by: PHYSICAL THERAPIST

## 2024-03-26 NOTE — PROGRESS NOTES
Daily Note     Today's date: 3/26/2024  Patient name: Desire العلي  : 1985  MRN: [id number]  Referring provider: Manny Ott PA-C  Dx:   Encounter Diagnosis     ICD-10-CM    1. Ulnar nerve entrapment at elbow, right  G56.21       2. Orthopedic aftercare  Z47.89                      Subjective: Desire states her elbow was more sore over the weekend and feels it was starting to tighten up on her again.       Objective: See treatment diary below      Assessment: Patient has full passive motion into right elbow extension, mild stiffness initially but loosens. She has mild restriction actively into end range extension but this is likely from guarding as her passive motion is full. She demonstrated improved tricep activation to help pull into extension. Able to progress light wrist and hand strengthening with good tolerance. Patient would benefit from continued PT to address right elbow mobility impairments to restore extension ROM.       Plan: Continue per plan of care.      Precautions: post-surgical (follow physician orders), lupus, adhesive sensitivity   DOS: 24- s/p right open carpal tunnel release, s/p right ulnar nerve transposition submuscular   MD orders: no activity restrictions. Wrist cock up as needed.    HEP: elbow flex/ext, TGE's (in splint), opposition  Manuals 2/20 2/27 2/29 3/5 3/8 3/13 3/14 3/19 3/22 3/26   Scar massage x8' x8' x8' x8' X8' x5' x5' x5' x5' x5'   STM biceps on stretch      x5' x5' x5' x5' x5'                             Neuro Re-Ed                                                                                                        Ther Ex             LLLD elbow ext stretch       2# cuff x10' with MHP 2# cuff x10' with MHP 2# cuff x10' with MHP 2# cuff x10' with MHP 2# cuff x10' with MHP   PROM/AAROM elbow flex/ext (no wrist motion) x15' x15' x10' X 10' X 10 x5' x5' x5' x5' x5'   AROM elbow flexion/ext on table x30 x30 x30 X 30 x30 Foam roller seated x20 x20 X20 x30  "x30   Finger web x3' x3' x3' X 3' X3' Finger web ylw x20 Ylw x30 Red x30 Red x30 Red x30   Supine wand chest press  x20 x20 X 20 X20  X20 x20 2# x20 2# x30 2# x30   Wand elbow ext standing  x20 x20 X 20 X20  DC       Supine elbow ext   x20 X 20  X20  x20 x20 1# x20 1# x30 1# x30   Tricep set      5\"x20 Prone with ball 3\"x30 Prone with ball 3\"x30 Prone with ball 3\"x30 Prone with ball 3\"x30   Wrist AROM      Cone x20 ea 1# x30 ea 1# x30 ea 1# x30 ea 2# x20 ea   TB tricep ext         YTB x20 YTB x20   Ther Activity                                       Gait Training                                       Modalities             MHP right elbow  x10' X10' 10'  x10' TE TE                            "

## 2024-03-27 ENCOUNTER — CLINICAL SUPPORT (OUTPATIENT)
Dept: BARIATRICS | Facility: CLINIC | Age: 39
End: 2024-03-27

## 2024-03-27 VITALS — BODY MASS INDEX: 21.65 KG/M2 | HEIGHT: 65 IN | WEIGHT: 129.96 LBS

## 2024-03-27 DIAGNOSIS — Z98.84 STATUS POST GASTRIC BYPASS FOR OBESITY: Primary | ICD-10-CM

## 2024-03-27 PROCEDURE — WEIGHT: Performed by: DIETITIAN, REGISTERED

## 2024-03-27 PROCEDURE — RECHECK: Performed by: DIETITIAN, REGISTERED

## 2024-03-27 NOTE — PROGRESS NOTES
"Bariatric Follow Up Nutrition Note    Type of surgery  Gastric bypass: laparoscopic  Surgery Date: 4/19/2022  23  months  post-op  Surgeon: Dr. Adalid Nagel    Nutrition Assessment   Desire العلي  39 y.o.  female  Ht 5' 5.25\" (1.657 m)   Wt 58.9 kg (129 lb 15.4 oz)   BMI 21.46 kg/m²        + 9# x 3 month     + 16 # x 10 months     Wt Readings from Last 3 Encounters:   03/20/24 59 kg (130 lb)   03/20/24 54.9 kg (121 lb)   03/04/24 54.9 kg (121 lb)     Compared with body comp:  SECA REE = 1325  90% of expected    Estimated calories for wt maintenance = 1245-3668      Estimated calories for weight maintenance:  1476- 1722 kcal per day (25-30 kcal /kg bw   )     Estimated protein needs 54-81 grams per day (1.0-1.5 gms/kg IBW )   Estimated fluid needs 54-64 ounces per day (30-35 ml/kg IBW )      Weight on Day of Weight Loss Surgery: 217 lbs   Weight in (lb) to have BMI = 25: 151.9#  Post-Op Wt Loss: 109.1#/ 141% EBWL in 18 months     Review of History and Medications   Past Medical History:   Diagnosis Date    Acne     Anxiety     Back pain     Carpal tunnel syndrome on right     Contact lens overwear of both eyes     Cubital tunnel syndrome on right     Depression     GERD (gastroesophageal reflux disease)     Headache     History of sleep apnea     resolved after bariatric sx    Hypothyroidism     Kidney stones     Lupus (systemic lupus erythematosus) (HCC)     Nausea     PONV (postoperative nausea and vomiting)     Wears glasses      Past Surgical History:   Procedure Laterality Date    CARPAL TUNNEL RELEASE      EGD      HI APPLICATION LONG ARM SPLINT SHOULDER HAND Right 1/25/2024    Procedure: APPLICATION OF LONG ARM SPLINT;  Surgeon: Joshua Wu MD;  Location: [facility];  Service: Orthopedics    HI CYSTOURETHROSCOPY N/A 02/03/2020    Procedure: CYSTOSCOPY;  Surgeon: Reed Lovell MD;  [facility];  Service: UroGynecology           HI LAPAROSCOPY SURG CHOLECYSTECTOMY N/A 09/06/2018    " Procedure: CHOLECYSTECTOMY LAPAROSCOPIC W/ ROBOTICS;  Surgeon: Vince Tomas MD;  [facility];  Service: General    TX LAPS GSTR RSTCV PX W/BYP KATLYN-EN-Y LIMB <150 CM N/A 04/19/2022    Procedure: LAPAROSCOPIC KATLYN-EN-Y GASTRIC BYPASS & INTRAOPERATIVE EGD ROBOTICALLY ASSISTED;  Surgeon: Adalid Nagel MD;  [facility];  Service: Bariatrics    TX NEUROPLASTY &/TRANSPOS MEDIAN NRV CARPAL TUNNE Right 11/27/2020    Procedure: release CARPAL TUNNEL;  Surgeon: Keith Landers MD;  [facility];  Service: Orthopedics    TX NEUROPLASTY &/TRANSPOS MEDIAN NRV CARPAL TUNNE Right 1/25/2024    Procedure: RIGHT OPEN REVISION CARPAL TUNNEL RELEASE;  Surgeon: Joshua Wu MD;  Location: [facility];  Service: Orthopedics    TX NEUROPLASTY &/TRANSPOSITION ULNAR NERVE ELBOW Right 11/27/2020    Procedure: CUBITAL TUNNEL;  Surgeon: Keith Landers MD;  [facility];  Service: Orthopedics    TX NEUROPLASTY &/TRANSPOSITION ULNAR NERVE ELBOW Right 1/25/2024    Procedure: REVISION CUBITAL TUNNEL WITH SUBMUSCULAR ULNAR NERVE TRANSPOSITION;  Surgeon: Joshua Wu MD;  Location: [facility];  Service: Orthopedics    TX POST COLPORRHAPHY RECTOCELE W/WO PERINEORRHAPHY N/A 02/03/2020    Procedure: POSTERIOR COLPORRHAPHY;  Surgeon: Reed Lovell MD;  [facility];  Service: UroGynecology           TX SLING OPERATION STRESS INCONTINENCE N/A 02/03/2020    Procedure: PUBOVAGINAL SLING;  Surgeon: Reed Lovell MD;  [facility];  Service: UroGynecology           TUBAL LIGATION       Social History     Socioeconomic History    Marital status: Single     Spouse name: Not on file    Number of children: 3    Years of education: Not on file    Highest education level: Associate degree: academic program   Occupational History    Occupation: [facility]     Employer: [employer]   Tobacco Use    Smoking status: Never    Smokeless tobacco: Never   Vaping Use    Vaping status: Never Used    Substance and Sexual Activity    Alcohol use: Yes     Comment: rare    Drug use: Never    Sexual activity: Yes     Partners: Male     Birth control/protection: Female Sterilization     Comment: single   Other Topics Concern    Not on file   Social History Narrative    Uses seatbelts    Caffeine use     Social Determinants of Health     Financial Resource Strain: Not on file   Food Insecurity: Not on file   Transportation Needs: Not on file   Physical Activity: Sufficiently Active (4/19/2021)    Exercise Vital Sign     Days of Exercise per Week: 3 days     Minutes of Exercise per Session: 60 min   Stress: No Stress Concern Present (4/19/2021)    [place] [employer] - Occupational Stress Questionnaire     Feeling of Stress : Not at all   Social Connections: Socially Isolated (4/19/2021)    Social Connection and Isolation Panel [NHANES]     Frequency of Communication with Friends and Family: Never     Frequency of Social Gatherings with Friends and Family: Never     Attends Taoist Services: Never     Active Member of Clubs or Organizations: No     Attends Club or Organization Meetings: Never     Marital Status: Never    Intimate Partner Violence: Not At Risk (4/19/2021)    Humiliation, Afraid, Rape, and Kick questionnaire     Fear of Current or Ex-Partner: No     Emotionally Abused: No     Physically Abused: No     Sexually Abused: No   Housing Stability: Not on file       Current Outpatient Medications:     acetaminophen (TYLENOL) 650 mg CR tablet, Take 1 tablet (650 mg total) by mouth every 8 (eight) hours as needed for mild pain, Disp: 30 tablet, Rfl: 0    ARIPiprazole (ABILIFY) 5 mg tablet, Take 1/2 tab daily for 1 week, then either continue 2.5mg daily or increase to 5mg daily, Disp: 30 tablet, Rfl: 1    Calcium 600-400 MG-UNIT CHEW, Chew daily, Disp: , Rfl:     Cholecalciferol (Vitamin D) 50 MCG (2000 UT) tablet, Take 1 tablet (2,000 Units total) by mouth daily, Disp: 90 tablet,  Rfl: 1    clonazePAM (KlonoPIN) 0.5 mg tablet, TAKE 1/2 TO 1 TABLET BY MOUTH 2 TIMES A DAY AS NEEDED FOR ANXIETY, Disp: 60 tablet, Rfl: 2    Diclofenac Sodium (VOLTAREN) 1 %, Apply 2 g topically 4 (four) times a day (Patient taking differently: Apply 2 g topically if needed), Disp: 100 g, Rfl: 6    hydroxychloroquine (PLAQUENIL) 200 mg tablet, Take 1 tablet (200 mg total) by mouth daily, Disp: 90 tablet, Rfl: 1    ketoconazole (NIZORAL) 2 % shampoo, Use daily for 2 weeks, then transition to maintenance therapy at 1-4 x per month. Let the shampoo sit on the skin for at least 5 minutes before rinsing off., Disp: 120 mL, Rfl: 6    ketoconazole (NIZORAL) 2 % shampoo, Apply 1 Application topically daily Let sit for 5 minutes before rinsing clear, Disp: 1 mL, Rfl: 0    levothyroxine 25 mcg tablet, Take 1 tablet (25 mcg total) by mouth daily in the early morning, Disp: 90 tablet, Rfl: 1    Multiple Vitamins-Minerals (Bariatric Multivitamins/Iron) CAPS, Take by mouth daily, Disp: , Rfl:     omeprazole (PriLOSEC) 20 mg delayed release capsule, Take 1 capsule (20 mg total) by mouth daily (Patient taking differently: Take 20 mg by mouth if needed), Disp: 90 capsule, Rfl: 1    Probiotic Product (PROBIOTIC BLEND PO), Take 1 capsule by mouth in the morning, Disp: , Rfl:     spironolactone (ALDACTONE) 50 mg tablet, Take 0.5 tablets (25 mg total) by mouth daily, Disp: 30 tablet, Rfl: 3    Sulfacetamide Sodium-Sulfur (Sulfacetamide Sod-Sulfur Wash) 9-4.5 % LIQD, Apply topically daily, Disp: 454 g, Rfl: 2    tretinoin (RETIN-A) 0.025 % cream, Apply pea sized amount to dry face at night (inactivated by sunlight). Start 2-3 times a week and increase to nightly as tolerated. If too drying, apply layer of non-comedogenic lotion before and after application (sandwich method of application).  If using benzoyl peroxide containing wash, wait 30 minutes before applying tretinoin, Disp: 45 g, Rfl: 2    tretinoin (RETIN-A) 0.025 % cream, Apply  topically daily at bedtime Pea sized amount to entire face, do not spot treat, Disp: 45 g, Rfl: 3    Vortioxetine HBr (Trintellix) 20 MG tablet, Take 1 tablet (20 mg total) by mouth daily, Disp: 30 tablet, Rfl: 3    Food Intake and Lifestyle Assessment   Food Intake Assessment completed via usual diet recall  Pt has taken a break from work so eats every 2-3 hours   Breakfast: oatmeal or toast   Snack: cheese stick   Lunch: - lean protein /veggies   Snack:  protein snack   Dinner: 1-2 ounces chicken & veggies   Snack: protein shake   Beverage intake: water and coffee/tea  Diet texture/stage: regular  Protein supplement: tries to include one per day    Estimated protein intake per day: ~50 grams with protein shake   Estimated fluid intake per day: pt feels she is meeting her fluid needs   Meals eaten away from home: not currently   Typical meal pattern: 3 meals per day and 3 snacks per day  Eating Behaviors: pt reports that she is able to eat more volume at a time - usually 1/2 cup  She eats every 2-3 hours to increase her calorie intake     Vitamin and Mineral regimen: she is taking her bariatric vitamins and minerals as recommended.     Food allergies or intolerances: resolving with decreased life stressors   Cultural or Gnosticism considerations: n/a     Physical Assessment  Nutrition Related Findings  Hunger returned and nausea resolved diarrhea resolved.     Physical Activity  Types of exercise: Walking- walks the dog   [facility] 15 [facility] cardio and 45 minutes strength training - 5 days per week   Current physical limitations: recent redo of carpal tunnel surgery - released from PT     Psychosocial Assessment   Support systems:    Socioeconomic factors has 3 children - home with her daughter ( 15 yrs old )  Sons are adults   Lives with her mother currently , not working     Nutrition Diagnosis- resolved   Diagnosis: Underweight (NC-3.1) and Inadequate protein intake (NI-5.7.3)  Related to:  "Altered GI function and Inability or lack of desire to manage self-care  As Evidenced by: Unintentional weight loss     Nutrition Prescription: Recommend the following diet  Small frequent meals 5-6 times per day  Include one protein shake per day   1500 calories per day /75-80 grams protein   Can flex down on active rest days ( 9812-3398) and flex up on exercise days ( 3650-8784)     Meal Plan ( Jonas/Pro/Carb)   Breakfast: 300/20/30  Snack: 150/>5/20  Lunch: 300/30/30-45  Snack: 150/>5/20  Dinner: 300/30/30-45  Snack: 150/>5/20       Interventions and Teaching   Patient educated on post-op nutrition guidelines.       Patient educated and handouts provided.  Surgical changes to stomach / GI  Capacity of post-surgery stomach  Adequate hydration  Sugar and fat restriction to decrease \"dumping syndrome\"  Expected weight loss  Exercise  Nutrition considerations after surgery  Protein supplements  Appropriate carbohydrate, protein, and fat intake, and food/fluid choices to maximize safe weight loss, nutrient intake, and tolerance     Patient is not currently pregnant and doesn't desire to become pregnant a minimum of one year post-op    Education provided to: patient    Barriers to learning: No barriers identified    Readiness to change: preparation and action    Comprehension: verbalizes understanding     Expected Compliance: good     Evaluation/Monitoring   Eating pattern as discussed Tolerance of nutrition prescription Body weight Lab values Physical activity Bowel pattern  Patient has decided to take time off from work and is living with her mother and teen age daughter.  Her stress has decreased with taking time off from work.  Her appetite has returned and diarrhea and nausea has resolved.  She is able to eat more volume and eat more frequently. Is mindful to eat frequentlty.    Completed a body composition using SECA scale and reviewed results with patient.  Increase in 9.89# of fat mass and loss of 1.03# muscle  " mass. Patient fat % is now in normal range at 26% ( normal 21-32%)  Fat % previously was 20%.  Patient will focus on weight maintenance and increasing muscle mass.      Goals- continued   Eat 3 meals per day and plus 3 snacks    Goal is 8202-1637 calories    3 meals of 300 calories each   3 snacks 200 calories each     F/U in 3 months for assessment of muscle mass      Time Spent:   30 Minutes

## 2024-03-28 NOTE — TELEPHONE ENCOUNTER
PA for Tretinoin 0.025% cream Approved   Date(s) approved 03/25/2024 - 03/25/2025  Case #[id number]    Patient advised by [] [email or web address] Message                      [x] Phone call       Pharmacy advised by [x]Fax                                     []Phone call    Approval letter scanned into Media Yes

## 2024-03-28 NOTE — TELEPHONE ENCOUNTER
Called pt to advise Medication tretinoin has been approved by the insurance. Your pharmacy has been made aware of your approval, please call them to see when your medication will be available for .    [x]Spoke with pt. Verbal understanding  []LMOM   []L/M to call office back. Communication consent not updated in chart  []Other

## 2024-03-29 ENCOUNTER — OFFICE VISIT (OUTPATIENT)
Dept: PHYSICAL THERAPY | Facility: MEDICAL CENTER | Age: 39
End: 2024-03-29
Payer: COMMERCIAL

## 2024-03-29 DIAGNOSIS — G56.21 ULNAR NERVE ENTRAPMENT AT ELBOW, RIGHT: Primary | ICD-10-CM

## 2024-03-29 DIAGNOSIS — Z47.89 ORTHOPEDIC AFTERCARE: ICD-10-CM

## 2024-03-29 PROCEDURE — 97140 MANUAL THERAPY 1/> REGIONS: CPT | Performed by: PHYSICAL THERAPIST

## 2024-03-29 PROCEDURE — 97110 THERAPEUTIC EXERCISES: CPT | Performed by: PHYSICAL THERAPIST

## 2024-03-29 NOTE — PROGRESS NOTES
Daily Note     Today's date: 3/29/2024  Patient name: Desire العلي  : 1985  MRN: [id number]  Referring provider: Manny Ott PA-C  Dx:   Encounter Diagnosis     ICD-10-CM    1. Ulnar nerve entrapment at elbow, right  G56.21       2. Orthopedic aftercare  Z47.89                      Subjective: Desire states she has been really working on her elbow motion at home. She is slowly returning to her previous exercise routine.       Objective: See treatment diary below    Outcome measures; FOTO = 76 (improved from 28 at intake)    Assessment: Patient has much improved mobility today with full ROM and minimal soft tissue restrictions. She is overall less guarded with her right arm and using it more freely. She is continuing to progress with light strengthening.  She shows objective improvements on functional outcome measures.  Patient would benefit from continued PT to address right elbow mobility impairments to restore extension ROM.       Plan: Continue per plan of care.      Precautions: post-surgical (follow physician orders), lupus, adhesive sensitivity   DOS: 24- s/p right open carpal tunnel release, s/p right ulnar nerve transposition submuscular   MD orders: no activity restrictions. Wrist cock up as needed.    HEP: elbow flex/ext, TGE's (in splint), opposition  Manuals 2/27 2/29 3/5 3/8 3/13 3/14 3/19 3/22 3/26 3/29   Scar massage x8' x8' x8' X8' x5' x5' x5' x5' x5' x5'   STM biceps on stretch     x5' x5' x5' x5' x5' x5'                             Neuro Re-Ed                                                                                                        Ther Ex             LLLD elbow ext stretch      2# cuff x10' with MHP 2# cuff x10' with MHP 2# cuff x10' with MHP 2# cuff x10' with MHP 2# cuff x10' with MHP 2# cuff x10' with MHP   PROM/AAROM elbow flex/ext (no wrist motion) x15' x10' X 10' X 10 x5' x5' x5' x5' x5' x5'   AROM elbow flexion/ext on table x30 x30 X 30 x30 Foam roller  "seated x20 x20 X20 x30 x30 x30   Finger web x3' x3' X 3' X3' Finger web ylw x20 Ylw x30 Red x30 Red x30 Red x30 Green x30   Supine wand chest press x20 x20 X 20 X20  X20 x20 2# x20 2# x30 2# x30 3# x30   Wand elbow ext standing x20 x20 X 20 X20  DC        Supine elbow ext  x20 X 20  X20  x20 x20 1# x20 1# x30 1# x30 2# x20   Tricep set     5\"x20 Prone with ball 3\"x30 Prone with ball 3\"x30 Prone with ball 3\"x30 Prone with ball 3\"x30 Prone with ball 3\"x30   Wrist AROM     Cone x20 ea 1# x30 ea 1# x30 ea 1# x30 ea 2# x20 ea 2# x30 ea   TB tricep ext        YTB x20 YTB x20 YTB x30   Ther Activity                                       Gait Training                                       Modalities             MHP right elbow x10' X10' 10'  x10' TE TE                             "

## 2024-04-02 ENCOUNTER — OFFICE VISIT (OUTPATIENT)
Dept: PHYSICAL THERAPY | Facility: MEDICAL CENTER | Age: 39
End: 2024-04-02
Payer: COMMERCIAL

## 2024-04-02 DIAGNOSIS — Z47.89 ORTHOPEDIC AFTERCARE: ICD-10-CM

## 2024-04-02 DIAGNOSIS — G56.21 ULNAR NERVE ENTRAPMENT AT ELBOW, RIGHT: Primary | ICD-10-CM

## 2024-04-02 PROCEDURE — 97140 MANUAL THERAPY 1/> REGIONS: CPT | Performed by: PHYSICAL THERAPIST

## 2024-04-02 PROCEDURE — 97110 THERAPEUTIC EXERCISES: CPT | Performed by: PHYSICAL THERAPIST

## 2024-04-02 NOTE — PROGRESS NOTES
Daily Note     Today's date: 2024  Patient name: Desire العلي  : 1985  MRN: [id number]  Referring provider: Manny Ott PA-C  Dx:   Encounter Diagnosis     ICD-10-CM    1. Ulnar nerve entrapment at elbow, right  G56.21       2. Orthopedic aftercare  Z47.89                      Subjective: Desire states she has been back at the gym. She is continuing to stretch the elbow daily but finds it is still tightening up on her between.       Objective: See treatment diary below      Assessment: Patient presents today with increased tightness in the biceps and brachioradialis, full ROM achieved with passive stretching.   Prominent medial epicondyle with mild-moderate swelling today.   No pain with exercises but did have reports of puling in the arm with stretches.  Patient would benefit from continued PT to address right elbow mobility impairments to restore extension ROM.       Plan: Continue per plan of care.      Precautions: post-surgical (follow physician orders), lupus, adhesive sensitivity   DOS: 24- s/p right open carpal tunnel release, s/p right ulnar nerve transposition submuscular   MD orders: no activity restrictions. Wrist cock up as needed.    HEP: elbow flex/ext, TGE's (in splint), opposition  Manuals 2/29 3/5 3/8 3/13 3/14 3/19 3/22 3/26 3/29 4/2   Scar massage x8' x8' X8' x5' x5' x5' x5' x5' x5' x5'   STM biceps on stretch    x5' x5' x5' x5' x5' x5' x5'                             Neuro Re-Ed                                                                                                        Ther Ex             LLLD elbow ext stretch     2# cuff x10' with MHP 2# cuff x10' with MHP 2# cuff x10' with MHP 2# cuff x10' with MHP 2# cuff x10' with MHP 2# cuff x10' with MHP 2# cuff x10' with MHP   PROM/AAROM elbow flex/ext (no wrist motion) x10' X 10' X 10 x5' x5' x5' x5' x5' x5' x5'   AROM elbow flexion/ext on table x30 X 30 x30 Foam roller seated x20 x20 X20 x30 x30 x30 x30   Finger web  "x3' X 3' X3' Finger web ylw x20 Ylw x30 Red x30 Red x30 Red x30 Green x30 Green x30   Supine wand chest press x20 X 20 X20  X20 x20 2# x20 2# x30 2# x30 3# x30 3# x30   Wand elbow ext standing x20 X 20 X20  DC         Supine elbow ext x20 X 20  X20  x20 x20 1# x20 1# x30 1# x30 2# x20 2# x30   Tricep set    5\"x20 Prone with ball 3\"x30 Prone with ball 3\"x30 Prone with ball 3\"x30 Prone with ball 3\"x30 Prone with ball 3\"x30 Prone with ball 3\"x30   Wrist AROM    Cone x20 ea 1# x30 ea 1# x30 ea 1# x30 ea 2# x20 ea 2# x30 ea 2# x30   TB tricep ext       YTB x20 YTB x20 YTB x30 YTB x30   Ther Activity                                       Gait Training                                       Modalities             MHP right elbow X10' 10'  x10' TE TE                              "

## 2024-04-05 ENCOUNTER — OFFICE VISIT (OUTPATIENT)
Dept: PHYSICAL THERAPY | Facility: MEDICAL CENTER | Age: 39
End: 2024-04-05
Payer: COMMERCIAL

## 2024-04-05 DIAGNOSIS — G56.21 ULNAR NERVE ENTRAPMENT AT ELBOW, RIGHT: Primary | ICD-10-CM

## 2024-04-05 DIAGNOSIS — Z47.89 ORTHOPEDIC AFTERCARE: ICD-10-CM

## 2024-04-05 PROCEDURE — 97110 THERAPEUTIC EXERCISES: CPT | Performed by: PHYSICAL THERAPIST

## 2024-04-05 PROCEDURE — 97140 MANUAL THERAPY 1/> REGIONS: CPT | Performed by: PHYSICAL THERAPIST

## 2024-04-05 NOTE — PROGRESS NOTES
Daily Note     Today's date: 2024  Patient name: Desire العلي  : 1985  MRN: [id number]  Referring provider: Manny Ott PA-C  Dx:   Encounter Diagnosis     ICD-10-CM    1. Ulnar nerve entrapment at elbow, right  G56.21       2. Orthopedic aftercare  Z47.89                      Subjective: Desire states she has been back at the gym. She is continuing to stretch the elbow daily but finds it is still tightening up on her between.       Objective: See treatment diary below      Assessment: Patient has only slight stiffness of the right elbow today that completely resolved with manual therapy and PROM. She was able to progress with resistance with strengthening today with good tolerance.  Patient progressing towards to goals appropriately and gradually returning to PLOF.  Patient would benefit from continued PT to address right elbow mobility impairments to restore extension ROM.       Plan: Continue per plan of care.      Precautions: post-surgical (follow physician orders), lupus, adhesive sensitivity   DOS: 24- s/p right open carpal tunnel release, s/p right ulnar nerve transposition submuscular   MD orders: no activity restrictions. Wrist cock up as needed.    HEP: elbow flex/ext, TGE's (in splint), opposition  Manuals 3/5 3/8 3/13 3/14 3/19 3/22 3/26 3/29 4/2 4/5   Scar massage x8' X8' x5' x5' x5' x5' x5' x5' x5' x5'   STM biceps on stretch   x5' x5' x5' x5' x5' x5' x5' x5'                             Neuro Re-Ed                                                                                                        Ther Ex             LLLD elbow ext stretch    2# cuff x10' with MHP 2# cuff x10' with MHP 2# cuff x10' with MHP 2# cuff x10' with MHP 2# cuff x10' with MHP 2# cuff x10' with MHP 2# cuff x10' with MHP 2# cuff x10' with MHP   PROM/AAROM elbow flex/ext (no wrist motion) X 10' X 10 x5' x5' x5' x5' x5' x5' x5' x5'   AROM elbow flexion/ext on table X 30 x30 Foam roller seated x20 x20 X20  "x30 x30 x30 x30 x30   Finger web X 3' X3' Finger web ylw x20 Ylw x30 Red x30 Red x30 Red x30 Green x30 Green x30 np   Supine wand chest press X 20 X20  X20 x20 2# x20 2# x30 2# x30 3# x30 3# x30 4# x30   Wand elbow ext standing X 20 X20  DC          Supine elbow ext X 20  X20  x20 x20 1# x20 1# x30 1# x30 2# x20 2# x30 3# x30   Tricep set   5\"x20 Prone with ball 3\"x30 Prone with ball 3\"x30 Prone with ball 3\"x30 Prone with ball 3\"x30 Prone with ball 3\"x30 Prone with ball 3\"x30 Prone with ball 3\"x30   Wrist AROM   Cone x20 ea 1# x30 ea 1# x30 ea 1# x30 ea 2# x20 ea 2# x30 ea 2# x30 3# x30 ea   TB tricep ext      YTB x20 YTB x20 YTB x30 YTB x30 RTB x30   Ther Activity                                       Gait Training                                       Modalities             MHP right elbow 10'  x10' TE TE                               "

## 2024-04-09 ENCOUNTER — APPOINTMENT (OUTPATIENT)
Dept: PHYSICAL THERAPY | Facility: MEDICAL CENTER | Age: 39
End: 2024-04-09
Payer: COMMERCIAL

## 2024-04-09 ENCOUNTER — TELEPHONE (OUTPATIENT)
Dept: OBGYN CLINIC | Facility: HOSPITAL | Age: 39
End: 2024-04-09

## 2024-04-09 NOTE — TELEPHONE ENCOUNTER
Called patient to reschedule appointment with Dr. Wu scheduled for 1 pm on 4/15/24, no answer, left message asking patient to call back. Patient currently schedule during lunch break. Patient can be moved to any other open time slot for the same day

## 2024-04-11 ENCOUNTER — OFFICE VISIT (OUTPATIENT)
Dept: PHYSICAL THERAPY | Facility: MEDICAL CENTER | Age: 39
End: 2024-04-11
Payer: COMMERCIAL

## 2024-04-11 DIAGNOSIS — Z47.89 ORTHOPEDIC AFTERCARE: ICD-10-CM

## 2024-04-11 DIAGNOSIS — G56.21 ULNAR NERVE ENTRAPMENT AT ELBOW, RIGHT: Primary | ICD-10-CM

## 2024-04-11 PROCEDURE — 97140 MANUAL THERAPY 1/> REGIONS: CPT | Performed by: PHYSICAL THERAPIST

## 2024-04-11 PROCEDURE — 97110 THERAPEUTIC EXERCISES: CPT | Performed by: PHYSICAL THERAPIST

## 2024-04-11 NOTE — PROGRESS NOTES
Daily Note     Today's date: 2024  Patient name: Desire العلي  : 1985  MRN: [id number]  Referring provider: Manny Ott PA-C  Dx:   Encounter Diagnosis     ICD-10-CM    1. Ulnar nerve entrapment at elbow, right  G56.21       2. Orthopedic aftercare  Z47.89                      Subjective: Desire  states she is using her arm more freely. She does find her elbow is stiff in the morning.       Objective: See treatment diary below      Assessment: Patient has slight stiffness of the right elbow today that resolves with manual therapy and PROM. She is able to perform all strengthening exercises without issue.  I did explain she may experience feelings of stiffness for a few months as this is the second procedure at the elbow.   Patient progressing towards to goals appropriately and gradually returning to PLOF.  Patient would benefit from continued PT to address right elbow mobility impairments to maximize extension ROM.   Follow up with ortho on 4/15.       Plan: One more week pending follow up with surgeon.      Precautions: post-surgical (follow physician orders), lupus, adhesive sensitivity   DOS: 24- s/p right open carpal tunnel release, s/p right ulnar nerve transposition submuscular   MD orders: no activity restrictions. Wrist cock up as needed.    HEP: elbow flex/ext, TGE's (in splint), opposition  Manuals 3/13 3/14 3/19 3/22 3/26 3/29 4/2 4/5 4/11   Scar massage x5' x5' x5' x5' x5' x5' x5' x5' x5'   STM biceps on stretch x5' x5' x5' x5' x5' x5' x5' x5' x5'                           Neuro Re-Ed                                                                                                Ther Ex            LLLD elbow ext stretch  2# cuff x10' with MHP 2# cuff x10' with MHP 2# cuff x10' with MHP 2# cuff x10' with MHP 2# cuff x10' with MHP 2# cuff x10' with MHP 2# cuff x10' with MHP 2# cuff x10' with MHP 2# cuff x10' with MHP   PROM/AAROM elbow flex/ext (no wrist motion) x5' x5' x5' x5' x5'  "x5' x5' x5' x5'   AROM elbow flexion/ext on table Foam roller seated x20 x20 X20 x30 x30 x30 x30 x30 x30   Finger web Finger web ylw x20 Ylw x30 Red x30 Red x30 Red x30 Green x30 Green x30 np Blue x30   Supine wand chest press X20 x20 2# x20 2# x30 2# x30 3# x30 3# x30 4# x30 4# x30   Wand elbow ext standing DC           Supine elbow ext x20 x20 1# x20 1# x30 1# x30 2# x20 2# x30 3# x30 3# x30   Tricep set 5\"x20 Prone with ball 3\"x30 Prone with ball 3\"x30 Prone with ball 3\"x30 Prone with ball 3\"x30 Prone with ball 3\"x30 Prone with ball 3\"x30 Prone with ball 3\"x30 Prone with ball 3\"x30   Wrist AROM Cone x20 ea 1# x30 ea 1# x30 ea 1# x30 ea 2# x20 ea 2# x30 ea 2# x30 3# x30 ea 3# x30 ea   TB tricep ext    YTB x20 YTB x20 YTB x30 YTB x30 RTB x30 RTB x30   Ther Activity                                    Gait Training                                    Modalities            MHP right elbow TE TE                               "

## 2024-04-12 ENCOUNTER — APPOINTMENT (OUTPATIENT)
Dept: PHYSICAL THERAPY | Facility: MEDICAL CENTER | Age: 39
End: 2024-04-12
Payer: COMMERCIAL

## 2024-04-15 ENCOUNTER — OFFICE VISIT (OUTPATIENT)
Dept: RHEUMATOLOGY | Facility: CLINIC | Age: 39
End: 2024-04-15
Payer: COMMERCIAL

## 2024-04-15 ENCOUNTER — OFFICE VISIT (OUTPATIENT)
Dept: OBGYN CLINIC | Facility: CLINIC | Age: 39
End: 2024-04-15

## 2024-04-15 VITALS
SYSTOLIC BLOOD PRESSURE: 98 MMHG | HEIGHT: 65 IN | OXYGEN SATURATION: 98 % | HEART RATE: 64 BPM | BODY MASS INDEX: 21.99 KG/M2 | DIASTOLIC BLOOD PRESSURE: 52 MMHG | WEIGHT: 132 LBS

## 2024-04-15 VITALS — BODY MASS INDEX: 21.83 KG/M2 | HEIGHT: 65 IN | WEIGHT: 131 LBS

## 2024-04-15 DIAGNOSIS — G56.21 ULNAR NERVE ENTRAPMENT AT ELBOW, RIGHT: Primary | ICD-10-CM

## 2024-04-15 DIAGNOSIS — M25.50 ARTHRALGIA OF MULTIPLE JOINTS: ICD-10-CM

## 2024-04-15 DIAGNOSIS — Z79.899 HIGH RISK MEDICATION USE: ICD-10-CM

## 2024-04-15 DIAGNOSIS — M54.16 LUMBAR RADICULOPATHY: ICD-10-CM

## 2024-04-15 DIAGNOSIS — M32.9 LUPUS (HCC): Primary | ICD-10-CM

## 2024-04-15 PROCEDURE — 99024 POSTOP FOLLOW-UP VISIT: CPT | Performed by: ORTHOPAEDIC SURGERY

## 2024-04-15 PROCEDURE — 99214 OFFICE O/P EST MOD 30 MIN: CPT | Performed by: INTERNAL MEDICINE

## 2024-04-15 RX ORDER — GABAPENTIN 100 MG/1
100 CAPSULE ORAL
Qty: 30 CAPSULE | Refills: 6 | Status: SHIPPED | OUTPATIENT
Start: 2024-04-15

## 2024-04-15 RX ORDER — HYDROXYCHLOROQUINE SULFATE 200 MG/1
300 TABLET, FILM COATED ORAL DAILY
Qty: 135 TABLET | Refills: 1 | Status: SHIPPED | OUTPATIENT
Start: 2024-04-15 | End: 2024-10-12

## 2024-04-15 NOTE — LETTER
April 15, 2024     Patient: Desire العلي  YOB: 1985  Date of Visit: 4/15/2024      To Whom it May Concern:    Desire العلي is under my professional care. Desire was seen in my office on 4/15/2024.     If you have any questions or concerns, please don't hesitate to call.         Sincerely,          Cami Palmer MD        CC: No Recipients

## 2024-04-15 NOTE — PROGRESS NOTES
"Assessment:   S/P Right Open Revision Carpal Tunnel Release - Right, Revision Cubital Tunnel With Submuscular Ulnar Nerve Transposition - Right, and Application Of Long Arm Splint - Right on 1/25/2024    Plan:   This discussed with the patient to continue to work with therapy to work on her range of motion as well as strengthening  She can discontinue the cock-up wrist brace at this time  She was advised that it can take some time for the nerves to completely break up and that there may be some decrease sensation noted  She will follow-up in 6 weeks for reevaluation      Follow Up:  6 weeks    To Do Next Visit:  Reevaluation      CHIEF COMPLAINT:  Chief Complaint   Patient presents with    Right Elbow - Post-op, Numbness     Open Carpal Tunnel Release - 1/25/24    Right Wrist - Post-op     Cubital Tunnel Release - 1/25/24         SUBJECTIVE:  Desire العلي is a 39 y.o. female who presents for follow up after Right Open Revision Carpal Tunnel Release - Right, Revision Cubital Tunnel With Submuscular Ulnar Nerve Transposition - Right, and Application Of Long Arm Splint - Right on 1/25/2024.  Today patient has noted some numbness and tingling into her hand still however this is improving with time.  She has also noted improvement in her range of motion.       PHYSICAL EXAMINATION:  Vital signs: Ht 5' 5\" (1.651 m)   Wt 59.4 kg (131 lb)   BMI 21.80 kg/m²   General: well developed and well nourished, alert, oriented times 3, and appears comfortable  Psychiatric: Normal    MUSCULOSKELETAL EXAMINATION:  Incision: Clean, dry, intact  Range of Motion: As expected and Limited due to stiffness  Neurovascular status: numbness note in the hand  Activity Restrictions: No restrictions  Done today: Sutures out    Patient's today strength into the intrinsics 5/5, abductor pollicis brevis 5/5.  No signs of infection.      STUDIES REVIEWED:  No Studies to review      PROCEDURES PERFORMED:  Procedures  No Procedures performed " today

## 2024-04-15 NOTE — PATIENT INSTRUCTIONS
Increase hydroxychloroquine to 300mg daily; continue regular eye exams  Continue diclofenac gel as needed for joint pain  Continue gabapentin at night as needed for ulnar neuropathy and carpal tunnel syndrome on right side  Physical therapy referral made  Pain management referral made  Do labs before next visit     Return to clinic in 6 months

## 2024-04-15 NOTE — PROGRESS NOTES
Assessment and Plan:  Desire العلي is a 39 y.o.  female who presents for follow-up of her lupus, which seems to be better controlled. Her complements and dsDNA have normalized. Has right carpal tunnel syndrome and ulnar neuropathy.    Increase hydroxychloroquine to 300mg daily; continue regular eye exams  Continue diclofenac gel as needed for joint pain  Continue gabapentin at night as needed for ulnar neuropathy and carpal tunnel syndrome on right side  Physical therapy referral made  Pain management referral made  Do labs before next visit     Return to clinic in 6 months    Plan:  1. Lupus (HCC)  -     hydroxychloroquine (PLAQUENIL) 200 mg tablet; Take 1.5 tablets (300 mg total) by mouth daily  -     CBC and differential  -     C4 complement  -     C3 complement  -     Comprehensive metabolic panel  -     C-reactive protein  -     Sedimentation rate, automated  -     Urinalysis with microscopic; Future  -     Protein / creatinine ratio, urine; Future  -     Anti-DNA antibody, double-stranded    2. Arthralgia of multiple joints  -     Diclofenac Sodium (VOLTAREN) 1 %; Apply 2 g topically 4 (four) times a day    3. Lumbar radiculopathy  -     Ambulatory referral to Spine & Pain Management; Future  -     Ambulatory referral to Physical Therapy; Future  -     gabapentin (Neurontin) 100 mg capsule; Take 1 capsule (100 mg total) by mouth daily at bedtime As [id number] for numbness    4. High risk medication use    High risk medication use - Benefits and risks of hydroxychloroquine, including but not limited to retinal toxicity, corneal deposits, gastrointestinal side effects, and headaches were discussed with the patient. The need for a regular eye exam to monitor for ocular toxicity while on this medication was also explained to the patient.     RTC in 6 months      Chief Complaint  Follow-up visit for lupus    Rheumatic Disease Summary    HPI  Desire العلي is a 39-year-old female who is here for a follow-up visit.  She consents to the use of ASHLEY.     The following portions of the patient's history were reviewed and updated as appropriate: allergies, current medications, past family history, past medical history, past social history, past surgical history, and problem list.    Interval History:  She underwent a carpal tunnel and cubital tunnel revision of the left elbow in 01/2024 due to a dislocated elbow. The nerves were reverted into place, resulting in a full range of motion. Currently, she experiences pain in her lower back and left knee, which occasionally impedes her mobility and daily activities. Initially, she anticipated that weight loss would alleviate her pain, however, she perceives a worsening of her condition. Occasionally, she experiences a sensation of fluid in her knee. A few weeks ago, she experienced knee pain, which has since resolved. She has never received injections in her left knee or back, nor has she undergone x-rays of her knee. Post-surgery, she underwent an ultrasound. She underwent bariatric surgery on 04/19/2022. Her back pain, which began both pre- and post-surgery, remains unchanged. The most severe pain is localized to her right lower back, occasionally radiates to the other side, preventing her from rising from a supine position. This pain, which has been present for several years, occasionally radiates down her left leg, a condition previously attributed to sciatic nerve issues. She was referred to pain management by Dr. Matthews in 08/2021 and underwent therapy for her back approximately 3 to 4 years ago but has not received any follow-up communication. She has not recently undergone physical therapy for her back. She sustained a fall several years ago and has been experiencing pain since then. She continues to take hydroxychloroquine once daily and undergoes regular eye exams. She is attempting to regain muscle mass. She took 100 mg of gabapentin needed for nerve pain in her hand and knee,  but it induced grogginess upon waking. She uses Voltaren gel as needed, which provides minimal relief. She uses sunscreen and takes vitamin D. She has been unemployed for the past 6 months due to personal issues and is considering filing for disability. She used to work in [employer]. Her last lupus flare-up in 11/2023 was managed with steroids. She occasionally experiences fatigue but denies experiencing dry eyes or dry mouth. Her last eye examination was in 2023.    Review of Systems:   Per HPI    Home Medications:    Current Outpatient Medications:     acetaminophen (TYLENOL) 650 mg CR tablet, Take 1 tablet (650 mg total) by mouth every 8 (eight) hours as needed for mild pain, Disp: 30 tablet, Rfl: 0    Calcium 600-400 MG-UNIT CHEW, Chew daily, Disp: , Rfl:     Cholecalciferol (Vitamin D) 50 MCG (2000 UT) tablet, Take 1 tablet (2,000 Units total) by mouth daily, Disp: 90 tablet, Rfl: 1    Diclofenac Sodium (VOLTAREN) 1 %, Apply 2 g topically 4 (four) times a day, Disp: 100 g, Rfl: 6    gabapentin (Neurontin) 100 mg capsule, Take 1 capsule (100 mg total) by mouth daily at bedtime As [id number] for numbness, Disp: 30 capsule, Rfl: 6    hydroxychloroquine (PLAQUENIL) 200 mg tablet, Take 1.5 tablets (300 mg total) by mouth daily, Disp: 135 tablet, Rfl: 1    ketoconazole (NIZORAL) 2 % shampoo, Use daily for 2 weeks, then transition to maintenance therapy at 1-4 x per month. Let the shampoo sit on the skin for at least 5 minutes before rinsing off., Disp: 120 mL, Rfl: 6    ketoconazole (NIZORAL) 2 % shampoo, Apply 1 Application topically daily Let sit for 5 minutes before rinsing clear, Disp: 1 mL, Rfl: 0    Multiple Vitamins-Minerals (Bariatric Multivitamins/Iron) CAPS, Take by mouth daily, Disp: , Rfl:     omeprazole (PriLOSEC) 20 mg delayed release capsule, Take 1 capsule (20 mg total) by mouth daily (Patient taking differently: Take 20 mg by mouth if needed), Disp: 90 capsule, Rfl: 1    Probiotic Product  "(PROBIOTIC BLEND PO), Take 1 capsule by mouth in the morning, Disp: , Rfl:     spironolactone (ALDACTONE) 50 mg tablet, Take 0.5 tablets (25 mg total) by mouth daily, Disp: 30 tablet, Rfl: 3    Sulfacetamide Sodium-Sulfur (Sulfacetamide Sod-Sulfur Wash) 9-4.5 % LIQD, Apply topically daily, Disp: 454 g, Rfl: 2    tretinoin (RETIN-A) 0.025 % cream, Apply pea sized amount to dry face at night (inactivated by sunlight). Start 2-3 times a week and increase to nightly as tolerated. If too drying, apply layer of non-comedogenic lotion before and after application (sandwich method of application).  If using benzoyl peroxide containing wash, wait 30 minutes before applying tretinoin, Disp: 45 g, Rfl: 2    tretinoin (RETIN-A) 0.025 % cream, Apply topically daily at bedtime Pea sized amount to entire face, do not spot treat, Disp: 45 g, Rfl: 3    Vortioxetine HBr (Trintellix) 20 MG tablet, Take 1 tablet (20 mg total) by mouth daily, Disp: 30 tablet, Rfl: 3    ARIPiprazole (ABILIFY) 5 mg tablet, Take 1/2 tab daily for 1 week, then either continue 2.5mg daily or increase to 5mg daily (Patient not taking: Reported on 4/15/2024), Disp: 30 tablet, Rfl: 1    clonazePAM (KlonoPIN) 0.5 mg tablet, TAKE 1/2 TO 1 TABLET BY MOUTH 2 TIMES A DAY AS NEEDED FOR ANXIETY. Please call office to make an appointment for additional refills., Disp: 60 tablet, Rfl: 0    levothyroxine 25 mcg tablet, Take 1 tablet (25 mcg total) by mouth daily in the early morning, Disp: 90 tablet, Rfl: 1    Objective:    Vitals:    04/15/24 0816   BP: 98/52   Pulse: 64   SpO2: 98%   Weight: 59.9 kg (132 lb)   Height: 5' 5.25\" (1.657 m)       Physical Exam:  Constitutional:    General: She is not in acute distress.  HENT:   Head: Normocephalic and atraumatic.   Eyes:   Conjunctiva/sclera: Conjunctivae normal.   Cardiovascular:   Rate and Rhythm: Normal rate and regular rhythm.   Heart sounds: S1 normal and S2 normal.   No friction rub.   Pulmonary:   Effort: Pulmonary " effort is normal. No respiratory distress.   Breath sounds: Normal breath sounds. No wheezing, rhonchi or rales.   Musculoskeletal:   Cervical back: Neck supple.   Skin:  Coloration: Skin is not pale.   Neurological:   Mental Status: She is alert. Mental status is at baseline.   Psychiatric:      Mood and Affect: Mood normal.      Behavior: Behavior normal.      I have personally reviewed results with the patient.    Imaging:   No results found.    Labs:   Appointment on 02/24/2024   Component Date Value Ref Range Status    Cholesterol 03/06/2024 122  See Comment mg/dL Final    Cholesterol:         Pediatric <18 Years        Desirable          <170 mg/dL      Borderline High    170-199 mg/dL      High               >=200 mg/dL        Adult >=18 Years            Desirable         <200 mg/dL      Borderline High   200-239 mg/dL      High              >239 mg/dL      Triglycerides 03/06/2024 107  See Comment mg/dL Final    Triglyceride:     0-9Y            <75mg/dL     10Y-17Y         <90 mg/dL       >=18Y     Normal          <150 mg/dL     Borderline High 150-199 mg/dL     High            200-499 mg/dL        Very High       >499 mg/dL    Specimen collection should occur prior to Metamizole administration due to the potential for falsely depressed results.    HDL, Direct 03/06/2024 49 (L)  >=50 mg/dL Final    LDL Calculated 03/06/2024 52  0 - 100 mg/dL Final    LDL Cholesterol:     Optimal           <100 mg/dl     Near Optimal      100-129 mg/dl     Above Optimal       Borderline High 130-159 mg/dl       High            160-189 mg/dl       Very High       >189 mg/dl         This screening LDL is a calculated result.   It does not have the accuracy of the Direct Measured LDL in the monitoring of patients with hyperlipidemia and/or statin therapy.   Direct Measure LDL ([id number]) must be ordered separately in these patients.    Non-HDL-Chol (CHOL-HDL) 03/06/2024 73  mg/dl Final    Hemoglobin A1C 03/06/2024 4.6  Normal  4.0-5.6%; PreDiabetic 5.7-6.4%; Diabetic >=6.5%; Glycemic control for adults with diabetes <7.0% % Final    EAG 03/06/2024 85  mg/dl Final   Admission on 12/12/2023, Discharged on 12/12/2023   Component Date Value Ref Range Status    WBC 12/12/2023 7.14  4.31 - 10.16 Thousand/uL Final    RBC 12/12/2023 4.12  3.81 - 5.12 Million/uL Final    Hemoglobin 12/12/2023 13.0  11.5 - 15.4 g/dL Final    Hematocrit 12/12/2023 39.4  34.8 - 46.1 % Final    MCV 12/12/2023 96  82 - 98 fL Final    MCH 12/12/2023 31.6  26.8 - 34.3 pg Final    MCHC 12/12/2023 33.0  31.4 - 37.4 g/dL Final    RDW 12/12/2023 13.1  11.6 - 15.1 % Final    MPV 12/12/2023 9.5  8.9 - 12.7 fL Final    Platelets 12/12/2023 234  149 - 390 Thousands/uL Final    nRBC 12/12/2023 0  /100 WBCs Final    Segmented % 12/12/2023 82 (H)  43 - 75 % Final    Immature Grans % 12/12/2023 0  0 - 2 % Final    Lymphocytes % 12/12/2023 14  14 - 44 % Final    Monocytes % 12/12/2023 3 (L)  4 - 12 % Final    Eosinophils Relative 12/12/2023 1  0 - 6 % Final    Basophils Relative 12/12/2023 0  0 - 1 % Final    Absolute Neutrophils 12/12/2023 5.84  1.85 - 7.62 Thousands/µL Final    Absolute Immature Grans 12/12/2023 0.01  0.00 - 0.20 Thousand/uL Final    Absolute Lymphocytes 12/12/2023 1.03  0.60 - 4.47 Thousands/µL Final    Absolute Monocytes 12/12/2023 0.20  0.17 - 1.22 Thousand/µL Final    Eosinophils Absolute 12/12/2023 0.04  0.00 - 0.61 Thousand/µL Final    Basophils Absolute 12/12/2023 0.02  0.00 - 0.10 Thousands/µL Final    Sodium 12/12/2023 139  135 - 147 mmol/L Final    Potassium 12/12/2023 4.4  3.5 - 5.3 mmol/L Final    Chloride 12/12/2023 106  96 - 108 mmol/L Final    CO2 12/12/2023 29  21 - 32 mmol/L Final    ANION GAP 12/12/2023 4  mmol/L Final    BUN 12/12/2023 13  5 - 25 mg/dL Final    Creatinine 12/12/2023 0.64  0.60 - 1.30 mg/dL Final    Standardized to IDMS reference method    Glucose 12/12/2023 99  65 - 140 mg/dL Final    If the patient is fasting, the ADA then  defines impaired fasting glucose as > 100 mg/dL and diabetes as > or equal to 123 mg/dL.    Calcium 12/12/2023 9.6  8.4 - 10.2 mg/dL Final    AST 12/12/2023 15  13 - 39 U/L Final    ALT 12/12/2023 18  7 - 52 U/L Final    Specimen collection should occur prior to Sulfasalazine administration due to the potential for falsely depressed results.     Alkaline Phosphatase 12/12/2023 39  34 - 104 U/L Final    Total Protein 12/12/2023 6.4  6.4 - 8.4 g/dL Final    Albumin 12/12/2023 4.3  3.5 - 5.0 g/dL Final    Total Bilirubin 12/12/2023 0.41  0.20 - 1.00 mg/dL Final    Use of this assay is not recommended for patients undergoing treatment with eltrombopag due to the potential for falsely elevated results.  N-acetyl-p-benzoquinone imine (metabolite of Acetaminophen) will generate erroneously low results in samples for patients that have taken an overdose of Acetaminophen.    eGFR 12/12/2023 113  ml/min/1.73sq m Final   Appointment on 12/11/2023   Component Date Value Ref Range Status    TSH 3RD GENERATON 12/11/2023 1.119  0.450 - 4.500 uIU/mL Final    The recommended reference ranges for TSH during pregnancy are as follows:   First trimester 0.100 to 2.500 uIU/mL   Second trimester  0.200 to 3.000 uIU/mL   Third trimester 0.300 to 3.000 uIU/m    Note: Normal ranges may not apply to patients who are transgender, non-binary, or whose legal sex, sex at birth, and gender identity differ.  Adult TSH (3rd generation) reference range follows the recommended guidelines of the American Thyroid Association, January, 2020.    T4 TOTAL 12/11/2023 5.08 (L)  6.09 - 12.23 ug/dL Final    Vit D, 25-Hydroxy 12/11/2023 36.3  30.0 - 100.0 ng/mL Final    Vitamin D guidelines established by Clinical Guidelines Subcommittee  of the Endocrine Society Task Force, 2011    Deficiency <20ng/ml   Insufficiency 20-30ng/ml   Sufficient  ng/ml    Office Visit on 10/24/2023   Component Date Value Ref Range Status    WBC 12/11/2023 10.66 (H)  4.31 -  10.16 Thousand/uL Final    RBC 12/11/2023 4.23  3.81 - 5.12 Million/uL Final    Hemoglobin 12/11/2023 13.5  11.5 - 15.4 g/dL Final    Hematocrit 12/11/2023 40.6  34.8 - 46.1 % Final    MCV 12/11/2023 96  82 - 98 fL Final    MCH 12/11/2023 31.9  26.8 - 34.3 pg Final    MCHC 12/11/2023 33.3  31.4 - 37.4 g/dL Final    RDW 12/11/2023 13.1  11.6 - 15.1 % Final    MPV 12/11/2023 9.6  8.9 - 12.7 fL Final    Platelets 12/11/2023 268  149 - 390 Thousands/uL Final    nRBC 12/11/2023 0  /100 WBCs Final    This is an appended report.  These results have been appended to a previously preliminary verified report.    Segmented % 12/11/2023 90 (H)  43 - 75 % Final    Immature Grans % 12/11/2023 0  0 - 2 % Final    Lymphocytes % 12/11/2023 8 (L)  14 - 44 % Final    Monocytes % 12/11/2023 2 (L)  4 - 12 % Final    Eosinophils Relative 12/11/2023 0  0 - 6 % Final    Basophils Relative 12/11/2023 0  0 - 1 % Final    Absolute Neutrophils 12/11/2023 9.58 (H)  1.85 - 7.62 Thousands/µL Final    Absolute Immature Grans 12/11/2023 0.04  0.00 - 0.20 Thousand/uL Final    Absolute Lymphocytes 12/11/2023 0.81  0.60 - 4.47 Thousands/µL Final    Absolute Monocytes 12/11/2023 0.20  0.17 - 1.22 Thousand/µL Final    Eosinophils Absolute 12/11/2023 0.01  0.00 - 0.61 Thousand/µL Final    Basophils Absolute 12/11/2023 0.02  0.00 - 0.10 Thousands/µL Final    Sodium 12/11/2023 138  135 - 147 mmol/L Final    Potassium 12/11/2023 3.7  3.5 - 5.3 mmol/L Final    Chloride 12/11/2023 105  96 - 108 mmol/L Final    CO2 12/11/2023 27  21 - 32 mmol/L Final    ANION GAP 12/11/2023 6  mmol/L Final    BUN 12/11/2023 13  5 - 25 mg/dL Final    Creatinine 12/11/2023 0.61  0.60 - 1.30 mg/dL Final    Standardized to IDMS reference method    Glucose 12/11/2023 88  65 - 140 mg/dL Final    If the patient is fasting, the ADA then defines impaired fasting glucose as > 100 mg/dL and diabetes as > or equal to 123 mg/dL.    Calcium 12/11/2023 9.8  8.4 - 10.2 mg/dL Final    AST  12/11/2023 20  13 - 39 U/L Final    ALT 12/11/2023 22  7 - 52 U/L Final    Specimen collection should occur prior to Sulfasalazine administration due to the potential for falsely depressed results.     Alkaline Phosphatase 12/11/2023 47  34 - 104 U/L Final    Total Protein 12/11/2023 7.5  6.4 - 8.4 g/dL Final    Albumin 12/11/2023 4.7  3.5 - 5.0 g/dL Final    Total Bilirubin 12/11/2023 0.46  0.20 - 1.00 mg/dL Final    Use of this assay is not recommended for patients undergoing treatment with eltrombopag due to the potential for falsely elevated results.  N-acetyl-p-benzoquinone imine (metabolite of Acetaminophen) will generate erroneously low results in samples for patients that have taken an overdose of Acetaminophen.    eGFR 12/11/2023 115  ml/min/1.73sq m Final    CRP 12/11/2023 <1.0  <3.0 mg/L Final    Sed Rate 12/11/2023 <1  0 - 19 mm/hour Final    Color, UA 12/11/2023 Yellow   Final    Clarity, UA 12/11/2023 Clear   Final    Specific Gravity, UA 12/11/2023 1.031 (H)  1.003 - 1.030 Final    pH, UA 12/11/2023 5.5  4.5, 5.0, 5.5, 6.0, 6.5, 7.0, 7.5, 8.0 Final    Leukocytes, UA 12/11/2023 Negative  Negative Final    Nitrite, UA 12/11/2023 Negative  Negative Final    Protein, UA 12/11/2023 Trace (A)  Negative mg/dl Final    Glucose, UA 12/11/2023 Negative  Negative mg/dl Final    Ketones, UA 12/11/2023 Negative  Negative mg/dl Final    Urobilinogen, UA 12/11/2023 <2.0  <2.0 mg/dl mg/dl Final    Bilirubin, UA 12/11/2023 Negative  Negative Final    Occult Blood, UA 12/11/2023 Trace (A)  Negative Final    RBC, UA 12/11/2023 1-2  None Seen, 1-2 /hpf Final    WBC, UA 12/11/2023 2-4 (A)  None Seen, 1-2 /hpf Final    Epithelial Cells 12/11/2023 Occasional  None Seen, Occasional /hpf Final    Bacteria, UA 12/11/2023 None Seen  None Seen, Occasional /hpf Final    MUCUS THREADS 12/11/2023 Moderate (A)  None Seen Final    Ca Oxalate Marissa, UA 12/11/2023 Innumerable (A)  None Seen /hpf Final    Creatinine, Ur 12/11/2023 145.4   Reference range not established. mg/dL Final    Protein Urine Random 12/11/2023 17  Reference range not established. mg/dL Final    Prot/Creat Ratio, Ur 12/11/2023 0.12 (H)  0.00 - 0.10 Final    C4, COMPLEMENT 12/11/2023 22  19 - 52 mg/dL Final    C3 Complement 12/11/2023 87  87 - 200 mg/dL Final    ds DNA Ab 12/11/2023 9  0 - 9 IU/mL Final                                       Negative      <5                                     Equivocal  5 - 9                                     Positive      >9       Transcribed for Cami Palmer MD, by Riana Harris on 04/29/24 at 2:55 AM. Powered by [employer].

## 2024-04-16 ENCOUNTER — OFFICE VISIT (OUTPATIENT)
Dept: PHYSICAL THERAPY | Facility: MEDICAL CENTER | Age: 39
End: 2024-04-16
Payer: COMMERCIAL

## 2024-04-16 DIAGNOSIS — Z47.89 ORTHOPEDIC AFTERCARE: ICD-10-CM

## 2024-04-16 DIAGNOSIS — G56.21 ULNAR NERVE ENTRAPMENT AT ELBOW, RIGHT: Primary | ICD-10-CM

## 2024-04-16 PROCEDURE — 97110 THERAPEUTIC EXERCISES: CPT | Performed by: PHYSICAL THERAPIST

## 2024-04-16 PROCEDURE — 97140 MANUAL THERAPY 1/> REGIONS: CPT | Performed by: PHYSICAL THERAPIST

## 2024-04-16 NOTE — PROGRESS NOTES
Daily Note     Today's date: 2024  Patient name: Desire العلي  : 1985  MRN: [id number]  Referring provider: Manny Ott PA-C  Dx:   Encounter Diagnosis     ICD-10-CM    1. Ulnar nerve entrapment at elbow, right  G56.21       2. Orthopedic aftercare  Z47.89                      Subjective: Desire followed up with ortho. They would like her to continue working in physical therapy on elbow ROM to reduce the stiffness she continues to experience. She has been working and she goes to the gym.        Objective: See treatment diary below      Assessment: Patient has mild stiffness of the right elbow today that resolves with manual therapy and PROM. She is able to perform all strengthening exercises without issue.  She is going to try a towel roll at night time to keep the elbow as straight as possible to see if this helps with the stiffness she experiences after sleeping with her elbow tucked into flexion.   She was provided with more tubigrip so that she can wear during work activities to minimize sensitivities.       Plan: Continue for 2 more weeks after this week and then transition to HEP.      Precautions: post-surgical (follow physician orders), lupus, adhesive sensitivity   DOS: 24- s/p right open carpal tunnel release, s/p right ulnar nerve transposition submuscular   MD orders: no activity restrictions. Wrist cock up as needed.    HEP: elbow flex/ext, TGE's (in splint), opposition  Manuals 3/14 3/19 3/22 3/26 3/29 4/2 4/5 4/11 4/16   Scar massage x5' x5' x5' x5' x5' x5' x5' x5' x5'   STM biceps on stretch x5' x5' x5' x5' x5' x5' x5' x5' x5'                           Neuro Re-Ed                                                                                                Ther Ex            LLLD elbow ext stretch  2# cuff x10' with MHP 2# cuff x10' with MHP 2# cuff x10' with MHP 2# cuff x10' with MHP 2# cuff x10' with MHP 2# cuff x10' with MHP 2# cuff x10' with MHP 2# cuff x10' with MHP 2#  "cuff x10' with MHP   PROM/AAROM elbow flex/ext (no wrist motion) x5' x5' x5' x5' x5' x5' x5' x5' x5'   AROM elbow flexion/ext on table x20 X20 x30 x30 x30 x30 x30 x30 x30   Finger web Ylw x30 Red x30 Red x30 Red x30 Green x30 Green x30 np Blue x30 Blue x30   Supine wand chest press x20 2# x20 2# x30 2# x30 3# x30 3# x30 4# x30 4# x30 4# x30   Wand elbow ext standing            Supine elbow ext x20 1# x20 1# x30 1# x30 2# x20 2# x30 3# x30 3# x30 3#   Tricep set Prone with ball 3\"x30 Prone with ball 3\"x30 Prone with ball 3\"x30 Prone with ball 3\"x30 Prone with ball 3\"x30 Prone with ball 3\"x30 Prone with ball 3\"x30 Prone with ball 3\"x30 Prone with ball 3\"x30   Wrist AROM 1# x30 ea 1# x30 ea 1# x30 ea 2# x20 ea 2# x30 ea 2# x30 3# x30 ea 3# x30 ea 3# x3 ea   TB tricep ext   YTB x20 YTB x20 YTB x30 YTB x30 RTB x30 RTB x30 RTB x30   Ther Activity                                    Gait Training                                    Modalities            MHP right elbow TE                                "

## 2024-04-18 ENCOUNTER — OFFICE VISIT (OUTPATIENT)
Dept: PHYSICAL THERAPY | Facility: MEDICAL CENTER | Age: 39
End: 2024-04-18
Payer: COMMERCIAL

## 2024-04-18 DIAGNOSIS — G56.21 ULNAR NERVE ENTRAPMENT AT ELBOW, RIGHT: Primary | ICD-10-CM

## 2024-04-18 DIAGNOSIS — Z47.89 ORTHOPEDIC AFTERCARE: ICD-10-CM

## 2024-04-18 PROCEDURE — 97140 MANUAL THERAPY 1/> REGIONS: CPT | Performed by: PHYSICAL THERAPIST

## 2024-04-18 PROCEDURE — 97110 THERAPEUTIC EXERCISES: CPT | Performed by: PHYSICAL THERAPIST

## 2024-04-18 NOTE — PROGRESS NOTES
Daily Note     Today's date: 2024  Patient name: Desire العلي  : 1985  MRN: [id number]  Referring provider: Manny Ott PA-C  Dx:   Encounter Diagnosis     ICD-10-CM    1. Ulnar nerve entrapment at elbow, right  G56.21       2. Orthopedic aftercare  Z47.89                      Subjective: Desire followed up with ortho. They would like her to continue working in physical therapy on elbow ROM to reduce the stiffness she continues to experience. She has been working and she goes to the gym.        Objective: See treatment diary below      Assessment: Patient came in today with excellent elbow mobility with no stiffness noted.   Increased tricep strengthening with good tolerance.   Added in arm bike today to work on pushing/pulling with elbow extension.   Patient would benefit from continued PT to maximize right elbow mobility and minimize risk of stiffening.         Plan: 2 more weeks then transition to HEP.      Precautions: post-surgical (follow physician orders), lupus, adhesive sensitivity   DOS: 24- s/p right open carpal tunnel release, s/p right ulnar nerve transposition submuscular   MD orders: no activity restrictions. Wrist cock up as needed.    HEP: elbow flex/ext, TGE's (in splint), opposition  Manuals 3/19 3/22 3/26 3/29 4/2 4/5 4/11 4/16 4/18   Scar massage x5' x5' x5' x5' x5' x5' x5' x5' x3'   STM biceps on stretch x5' x5' x5' x5' x5' x5' x5' x5' x5'                           Neuro Re-Ed                                                                                                Ther Ex            LLLD elbow ext stretch  2# cuff x10' with MHP 2# cuff x10' with MHP 2# cuff x10' with MHP 2# cuff x10' with MHP 2# cuff x10' with MHP 2# cuff x10' with MHP 2# cuff x10' with MHP 2# cuff x10' with MHP 2# cuff x10' with MHP   UBE         3'/3'   PROM/AAROM elbow flex/ext (no wrist motion) x5' x5' x5' x5' x5' x5' x5' x5' x5'   AROM elbow flexion/ext on table X20 x30 x30 x30 x30 x30 x30  "x30 x30   Finger web Red x30 Red x30 Red x30 Green x30 Green x30 np Blue x30 Blue x30 Blue x30   Supine wand chest press 2# x20 2# x30 2# x30 3# x30 3# x30 4# x30 4# x30 4# x30 4# x30   Wand elbow ext standing            Supine elbow ext 1# x20 1# x30 1# x30 2# x20 2# x30 3# x30 3# x30 3# 4# x30   Tricep set Prone with ball 3\"x30 Prone with ball 3\"x30 Prone with ball 3\"x30 Prone with ball 3\"x30 Prone with ball 3\"x30 Prone with ball 3\"x30 Prone with ball 3\"x30 Prone with ball 3\"x30 Prone with ball 3\"x30   Wrist AROM 1# x30 ea 1# x30 ea 2# x20 ea 2# x30 ea 2# x30 3# x30 ea 3# x30 ea 3# x3 ea 4# x30 ea   TB tricep ext  YTB x20 YTB x20 YTB x30 YTB x30 RTB x30 RTB x30 RTB x30 GTB x30   Ther Activity                                    Gait Training                                    Modalities            MHP right elbow                                 "

## 2024-04-19 ENCOUNTER — APPOINTMENT (OUTPATIENT)
Dept: PHYSICAL THERAPY | Facility: MEDICAL CENTER | Age: 39
End: 2024-04-19
Payer: COMMERCIAL

## 2024-04-20 ENCOUNTER — LAB (OUTPATIENT)
Dept: LAB | Facility: HOSPITAL | Age: 39
End: 2024-04-20
Payer: COMMERCIAL

## 2024-04-20 DIAGNOSIS — E03.9 ACQUIRED HYPOTHYROIDISM: ICD-10-CM

## 2024-04-20 DIAGNOSIS — E55.9 VITAMIN D DEFICIENCY: ICD-10-CM

## 2024-04-20 DIAGNOSIS — L70.0 ACNE VULGARIS: ICD-10-CM

## 2024-04-20 LAB
25(OH)D3 SERPL-MCNC: 39.6 NG/ML (ref 30–100)
ANION GAP SERPL CALCULATED.3IONS-SCNC: 2 MMOL/L (ref 4–13)
BUN SERPL-MCNC: 13 MG/DL (ref 5–25)
CALCIUM SERPL-MCNC: 9.3 MG/DL (ref 8.4–10.2)
CHLORIDE SERPL-SCNC: 104 MMOL/L (ref 96–108)
CO2 SERPL-SCNC: 32 MMOL/L (ref 21–32)
CREAT SERPL-MCNC: 0.62 MG/DL (ref 0.6–1.3)
GFR SERPL CREATININE-BSD FRML MDRD: 113 ML/MIN/1.73SQ M
GLUCOSE P FAST SERPL-MCNC: 85 MG/DL (ref 65–99)
POTASSIUM SERPL-SCNC: 4 MMOL/L (ref 3.5–5.3)
SODIUM SERPL-SCNC: 138 MMOL/L (ref 135–147)
T4 FREE SERPL-MCNC: 0.56 NG/DL (ref 0.61–1.12)
TSH SERPL DL<=0.05 MIU/L-ACNC: 2.15 UIU/ML (ref 0.45–4.5)

## 2024-04-20 PROCEDURE — 84439 ASSAY OF FREE THYROXINE: CPT

## 2024-04-20 PROCEDURE — 82306 VITAMIN D 25 HYDROXY: CPT

## 2024-04-20 PROCEDURE — 84443 ASSAY THYROID STIM HORMONE: CPT

## 2024-04-20 PROCEDURE — 80048 BASIC METABOLIC PNL TOTAL CA: CPT

## 2024-04-20 PROCEDURE — 36415 COLL VENOUS BLD VENIPUNCTURE: CPT

## 2024-04-23 ENCOUNTER — OFFICE VISIT (OUTPATIENT)
Dept: PHYSICAL THERAPY | Facility: MEDICAL CENTER | Age: 39
End: 2024-04-23
Payer: COMMERCIAL

## 2024-04-23 DIAGNOSIS — Z47.89 ORTHOPEDIC AFTERCARE: ICD-10-CM

## 2024-04-23 DIAGNOSIS — G56.21 ULNAR NERVE ENTRAPMENT AT ELBOW, RIGHT: Primary | ICD-10-CM

## 2024-04-23 PROCEDURE — 97140 MANUAL THERAPY 1/> REGIONS: CPT | Performed by: PHYSICAL THERAPIST

## 2024-04-23 PROCEDURE — 97110 THERAPEUTIC EXERCISES: CPT | Performed by: PHYSICAL THERAPIST

## 2024-04-23 NOTE — PROGRESS NOTES
Daily Note     Today's date: 2024  Patient name: Desire العلي  : 1985  MRN: [id number]  Referring provider: Manny Ott PA-C  Dx:   Encounter Diagnosis     ICD-10-CM    1. Ulnar nerve entrapment at elbow, right  G56.21       2. Orthopedic aftercare  Z47.89                      Subjective: Desire states she still experiences stiffness in the elbow in the morning that improves throughout the day.       Objective: See treatment diary below      Assessment: Patient presents today with good elbow mobility to start. She did have some increased irration around her medial elbow scar. She tolerates strengthening of the dynamic elbow stabilizers well with no elbow pain reported. Strength is appropriate at this time. She has 3 more formal PT visits scheduled for her elbow and then at that time she will transition to an HEP which was mutually agreed.   Patient would benefit from continued PT to maximize right elbow mobility and minimize risk of stiffening.         Plan: Transition to HEP over next 3 visits. Start physical therapy for LBP.      Precautions: post-surgical (follow physician orders), lupus, adhesive sensitivity   DOS: 24- s/p right open carpal tunnel release, s/p right ulnar nerve transposition submuscular   MD orders: no activity restrictions. Wrist cock up as needed.    HEP: elbow flex/ext, TGE's (in splint), opposition  Manuals 3/22 3/26 3/29 4/2 4/5 4/11 4/16 4/18 4/23   Scar massage x5' x5' x5' x5' x5' x5' x5' x3' x3'   STM biceps on stretch x5' x5' x5' x5' x5' x5' x5' x5' x5'                           Neuro Re-Ed                                                                                                Ther Ex            LLLD elbow ext stretch  2# cuff x10' with MHP 2# cuff x10' with MHP 2# cuff x10' with MHP 2# cuff x10' with MHP 2# cuff x10' with MHP 2# cuff x10' with MHP 2# cuff x10' with MHP 2# cuff x10' with MHP 2# cuff x10' with MHP   UBE        3'/3' 3'/3'   PROM/AAROM  "elbow flex/ext x5' x5' x5' x5' x5' x5' x5' x5' x5'   AROM elbow flexion/ext on table x30 x30 x30 x30 x30 x30 x30 x30 x30   Finger web Red x30 Red x30 Green x30 Green x30 np Blue x30 Blue x30 Blue x30 Black x30   Supine wand chest press 2# x30 2# x30 3# x30 3# x30 4# x30 4# x30 4# x30 4# x30 5# x30   Wand elbow ext standing            Supine elbow ext 1# x30 1# x30 2# x20 2# x30 3# x30 3# x30 3# 4# x30 4# x30   Tricep set Prone with ball 3\"x30 Prone with ball 3\"x30 Prone with ball 3\"x30 Prone with ball 3\"x30 Prone with ball 3\"x30 Prone with ball 3\"x30 Prone with ball 3\"x30 Prone with ball 3\"x30 Prone with ball 3\"x30   Wrist AROM 1# x30 ea 2# x20 ea 2# x30 ea 2# x30 3# x30 ea 3# x30 ea 3# x3 ea 4# x30 ea 4# x30 ea   TB tricep ext YTB x20 YTB x20 YTB x30 YTB x30 RTB x30 RTB x30 RTB x30 GTB x30 GTB x30   Ther Activity                                    Gait Training                                    Modalities            MHP right elbow                                 "

## 2024-04-25 DIAGNOSIS — E03.9 ACQUIRED HYPOTHYROIDISM: ICD-10-CM

## 2024-04-25 DIAGNOSIS — F40.10 SOCIAL PHOBIA: ICD-10-CM

## 2024-04-25 DIAGNOSIS — F41.1 GAD (GENERALIZED ANXIETY DISORDER): ICD-10-CM

## 2024-04-26 RX ORDER — CLONAZEPAM 0.5 MG/1
TABLET ORAL
Qty: 60 TABLET | Refills: 0 | Status: SHIPPED | OUTPATIENT
Start: 2024-04-26

## 2024-04-26 RX ORDER — LEVOTHYROXINE SODIUM 0.03 MG/1
25 TABLET ORAL
Qty: 90 TABLET | Refills: 1 | Status: SHIPPED | OUTPATIENT
Start: 2024-04-26

## 2024-04-30 ENCOUNTER — OFFICE VISIT (OUTPATIENT)
Dept: PHYSICAL THERAPY | Facility: MEDICAL CENTER | Age: 39
End: 2024-04-30
Payer: COMMERCIAL

## 2024-04-30 DIAGNOSIS — G56.21 ULNAR NERVE ENTRAPMENT AT ELBOW, RIGHT: Primary | ICD-10-CM

## 2024-04-30 DIAGNOSIS — Z47.89 ORTHOPEDIC AFTERCARE: ICD-10-CM

## 2024-04-30 PROCEDURE — 97110 THERAPEUTIC EXERCISES: CPT | Performed by: PHYSICAL THERAPIST

## 2024-04-30 PROCEDURE — 97140 MANUAL THERAPY 1/> REGIONS: CPT | Performed by: PHYSICAL THERAPIST

## 2024-04-30 NOTE — PROGRESS NOTES
Daily Note     Today's date: 2024  Patient name: Desire العلي  : 1985  MRN: [id number]  Referring provider: Manny Ott PA-C  Dx:   Encounter Diagnosis     ICD-10-CM    1. Ulnar nerve entrapment at elbow, right  G56.21       2. Orthopedic aftercare  Z47.89                      Subjective: Desire states her elbow and hand incisions are still sore. She does notice her hand strength is not 100% in her right hand.       Objective: See treatment diary below      Assessment: Full elbow ROM. Strength continues to improve  Added in further hand/digit strengthening. Most challenged with clothespins using RF and SF.   Provided her with heavy foam to work on  and pinch strength.       Plan: Transition to HEP over next 2 visits. Start physical therapy for LBP.      Precautions: post-surgical (follow physician orders), lupus, adhesive sensitivity   DOS: 24- s/p right open carpal tunnel release, s/p right ulnar nerve transposition submuscular   MD orders: no activity restrictions. Wrist cock up as needed.    HEP: elbow flex/ext, TGE's (in splint), opposition  Manuals 3/26 3/29 4/2 4/5 4/11 4/16 4/18 4/23 4/30   Scar massage x5' x5' x5' x5' x5' x5' x3' x3' x3'   STM biceps on stretch x5' x5' x5' x5' x5' x5' x5' x5' x5'                           Neuro Re-Ed                                                                                                Ther Ex            LLLD elbow ext stretch  2# cuff x10' with MHP 2# cuff x10' with MHP 2# cuff x10' with MHP 2# cuff x10' with MHP 2# cuff x10' with MHP 2# cuff x10' with MHP 2# cuff x10' with MHP 2# cuff x10' with MHP 2# cuff x10' with MHP   UBE       3'/3' 3'/3' 3'/3'   PROM/AAROM elbow flex/ext x5' x5' x5' x5' x5' x5' x5' x5' x5'   AROM elbow flexion/ext on table x30 x30 x30 x30 x30 x30 x30 x30 np   Finger web Red x30 Green x30 Green x30 np Blue x30 Blue x30 Blue x30 Black x30 Black x30   Supine wand chest press 2# x30 3# x30 3# x30 4# x30 4# x30 4# x30  "4# x30 5# x30 5# x30   Supine elbow ext 1# x30 2# x20 2# x30 3# x30 3# x30 3# 4# x30 4# x30 4# x30   Tricep set Prone with ball 3\"x30 Prone with ball 3\"x30 Prone with ball 3\"x30 Prone with ball 3\"x30 Prone with ball 3\"x30 Prone with ball 3\"x30 Prone with ball 3\"x30 Prone with ball 3\"x30 Prone with ball 3\"x30   Wrist AROM 2# x20 ea 2# x30 ea 2# x30 3# x30 ea 3# x30 ea 3# x3 ea 4# x30 ea 4# x30 ea 4# x30 ea   TB tricep ext YTB x20 YTB x30 YTB x30 RTB x30 RTB x30 RTB x30 GTB x30 GTB x30 GTB x30   Hand helper         1 red green 1 red x30   Clothespins         Green x30                           Ther Activity                                    Gait Training                                    Modalities            MHP right elbow                                 "

## 2024-05-02 ENCOUNTER — APPOINTMENT (OUTPATIENT)
Dept: PHYSICAL THERAPY | Facility: MEDICAL CENTER | Age: 39
End: 2024-05-02
Payer: COMMERCIAL

## 2024-05-07 ENCOUNTER — OFFICE VISIT (OUTPATIENT)
Dept: PHYSICAL THERAPY | Facility: MEDICAL CENTER | Age: 39
End: 2024-05-07
Payer: COMMERCIAL

## 2024-05-07 DIAGNOSIS — G56.21 ULNAR NERVE ENTRAPMENT AT ELBOW, RIGHT: Primary | ICD-10-CM

## 2024-05-07 DIAGNOSIS — Z47.89 ORTHOPEDIC AFTERCARE: ICD-10-CM

## 2024-05-07 PROCEDURE — 97140 MANUAL THERAPY 1/> REGIONS: CPT | Performed by: PHYSICAL THERAPIST

## 2024-05-07 PROCEDURE — 97110 THERAPEUTIC EXERCISES: CPT | Performed by: PHYSICAL THERAPIST

## 2024-05-07 NOTE — PROGRESS NOTES
Daily Note     Today's date: 2024  Patient name: Desire العلي  : 1985  MRN: [id number]  Referring provider: Manny Ott PA-C  Dx:   Encounter Diagnosis     ICD-10-CM    1. Ulnar nerve entrapment at elbow, right  G56.21       2. Orthopedic aftercare  Z47.89                      Subjective: Desire states she is a little sore today. She has been trying to use her right UE normally.       Objective: See treatment diary below      Right elbow AROM WNL flexion and extension  5/5 elbow strength   strength:  R = 65#, R = 80#    Assessment: Patient has full elbow mobility, scars are minimally sensitive. She has regained functional strength. She was educated in expected soreness that she may continue to experience in the palm over the next few months. She has reached maximal benefit for her right elbow at this time and we will transition to treatment for her low back at next visit.       Plan: DC with HEP. Evaluation for low back next visit.     Precautions: post-surgical (follow physician orders), lupus, adhesive sensitivity   DOS: 24- s/p right open carpal tunnel release, s/p right ulnar nerve transposition submuscular   MD orders: no activity restrictions. Wrist cock up as needed.    HEP: elbow flex/ext, TGE's (in splint), opposition  Manuals 3/29 4/2 4/5 4/11 4/16 4/18 4/23 4/30 5/7   Scar massage x5' x5' x5' x5' x5' x3' x3' x3' x2'   STM biceps on stretch x5' x5' x5' x5' x5' x5' x5' x5' x3'                           Neuro Re-Ed                                                                                                Ther Ex            LLLD elbow ext stretch  2# cuff x10' with MHP 2# cuff x10' with MHP 2# cuff x10' with MHP 2# cuff x10' with MHP 2# cuff x10' with MHP 2# cuff x10' with MHP 2# cuff x10' with MHP 2# cuff x10' with MHP 2# cuff x10' with MHP   UBE      3'/3' 3'/3' 3'/3' 3'/3'   PROM/AAROM elbow flex/ext x5' x5' x5' x5' x5' x5' x5' x5' x5'   AROM elbow flexion/ext on table x30  "x30 x30 x30 x30 x30 x30 np    Finger web Green x30 Green x30 np Blue x30 Blue x30 Blue x30 Black x30 Black x30 Black x30   Supine wand chest press 3# x30 3# x30 4# x30 4# x30 4# x30 4# x30 5# x30 5# x30 5# x30   Supine elbow ext 2# x20 2# x30 3# x30 3# x30 3# 4# x30 4# x30 4# x30 4# x30   Tricep set Prone with ball 3\"x30 Prone with ball 3\"x30 Prone with ball 3\"x30 Prone with ball 3\"x30 Prone with ball 3\"x30 Prone with ball 3\"x30 Prone with ball 3\"x30 Prone with ball 3\"x30 Prone with ball 3\"x30   Wrist AROM 2# x30 ea 2# x30 3# x30 ea 3# x30 ea 3# x3 ea 4# x30 ea 4# x30 ea 4# x30 ea 4# x30 ea   TB tricep ext YTB x30 YTB x30 RTB x30 RTB x30 RTB x30 GTB x30 GTB x30 GTB x30 BTB x30   Hand helper        1 red green 1 red x30 1 green 1 red x30   Clothespins        Green x30 Green/b;ue x3'                           Ther Activity                                    Gait Training                                    Modalities            MHP right elbow                                 "

## 2024-05-08 ENCOUNTER — OFFICE VISIT (OUTPATIENT)
Dept: BARIATRICS | Facility: CLINIC | Age: 39
End: 2024-05-08
Payer: COMMERCIAL

## 2024-05-08 ENCOUNTER — TELEPHONE (OUTPATIENT)
Dept: PSYCHIATRY | Facility: CLINIC | Age: 39
End: 2024-05-08

## 2024-05-08 VITALS
WEIGHT: 135 LBS | DIASTOLIC BLOOD PRESSURE: 64 MMHG | TEMPERATURE: 97.6 F | SYSTOLIC BLOOD PRESSURE: 108 MMHG | HEIGHT: 66 IN | HEART RATE: 66 BPM | BODY MASS INDEX: 21.69 KG/M2

## 2024-05-08 DIAGNOSIS — K91.2 POSTSURGICAL MALABSORPTION: Primary | ICD-10-CM

## 2024-05-08 PROCEDURE — 99213 OFFICE O/P EST LOW 20 MIN: CPT | Performed by: SURGERY

## 2024-05-08 NOTE — PROGRESS NOTES
ANNUAL OFFICE VISIT - BARIATRIC SURGERY  Desire العلي 39 y.o. female MRN: [id number]  Unit/Bed#:  Encounter: [id number]      HPI:  Desire العلي is a 39 y.o. female status post Robotic Mary Lou-en-Y gastric bypass by Dr. Naren Nagel on 4/2022 presents to office for annual follow-up.    Subjective     She reports satisfied with her weight loss and in fact she thinks she is losing too much weight.   Sometimes reports sharp pain in the epigastric region , not reflux.  She has experienced heartburn prior to the bypass and does not feel like it.  She denies smoking or NSAID use but reports drinking 3 cups of coffee a day.      Patient presents to the office for annual follow-up visit.  Tolerating regular diet: yes    Nausea/Vomiting/Constipation/Diarrhea: mild nausea  Eating at least 60 g of protein: no  Following 30/60 minute role with liquids: yes  Drinking at least 64 oz of fluid: yes  Taking vitamin/mineral supplements:yes  Taking omeprazole: no  Reviewed and advised patient on vitamins and supplements.  Exercising: Yes, exercise encouraged.    Current weight:  135  BMI:  22.1  Weight lost since surgery: 229   Excess body weight loss thus far since surgery discussed with patient.       Review of Systems   Constitutional:  Negative for chills and fever.   HENT:  Negative for ear pain and sore throat.    Eyes:  Negative for pain and visual disturbance.   Respiratory:  Negative for cough and shortness of breath.    Cardiovascular:  Negative for chest pain and palpitations.   Gastrointestinal:  Negative for abdominal pain and vomiting.   Genitourinary:  Negative for dysuria and hematuria.   Musculoskeletal:  Negative for arthralgias and back pain.   Skin:  Negative for color change and rash.   Neurological:  Negative for seizures and syncope.   All other systems reviewed and are negative.      Historical Information   Past Medical History:   Diagnosis Date    Acne     Anxiety     Back pain     Carpal tunnel syndrome on right      Contact lens overwear of both eyes     Cubital tunnel syndrome on right     Depression     GERD (gastroesophageal reflux disease)     Headache     History of sleep apnea     resolved after bariatric sx    Hypothyroidism     Kidney stones     Lupus (systemic lupus erythematosus) (HCC)     Nausea     PONV (postoperative nausea and vomiting)     Wears glasses      Past Surgical History:   Procedure Laterality Date    CARPAL TUNNEL RELEASE      EGD      ME APPLICATION LONG ARM SPLINT SHOULDER HAND Right 1/25/2024    Procedure: APPLICATION OF LONG ARM SPLINT;  Surgeon: Joshua Wu MD;  Location: [facility];  Service: Orthopedics    ME CYSTOURETHROSCOPY N/A 02/03/2020    Procedure: CYSTOSCOPY;  Surgeon: Reed Lovell MD;  Location: [facility];  Service: UroGynecology           ME LAPAROSCOPY SURG CHOLECYSTECTOMY N/A 09/06/2018    Procedure: CHOLECYSTECTOMY LAPAROSCOPIC W/ ROBOTICS;  Surgeon: Vince Tomas MD;  Location[facility];  Service: General    ME LAPS GSTR RSTCV PX W/BYP KATLYN-EN-Y LIMB <150 CM N/A 04/19/2022    Procedure: LAPAROSCOPIC KATLYN-EN-Y GASTRIC BYPASS & INTRAOPERATIVE EGD ROBOTICALLY ASSISTED;  Surgeon: Adalid Nagel MD;  Location: [facility];  Service: Bariatrics    ME NEUROPLASTY &/TRANSPOS MEDIAN NRV CARPAL TUNNE Right 11/27/2020    Procedure: release CARPAL TUNNEL;  Surgeon: Keith Landers MD;  [facility];  Service: Orthopedics    ME NEUROPLASTY &/TRANSPOS MEDIAN NRV CARPAL TUNNE Right 1/25/2024    Procedure: RIGHT OPEN REVISION CARPAL TUNNEL RELEASE;  Surgeon: Joshua Wu MD;  Location: [facility];  Service: Orthopedics    ME NEUROPLASTY &/TRANSPOSITION ULNAR NERVE ELBOW Right 11/27/2020    Procedure: CUBITAL TUNNEL;  Surgeon: Keith Landers MD;  Location[facility];  Service: Orthopedics    ME NEUROPLASTY &/TRANSPOSITION ULNAR NERVE ELBOW Right 1/25/2024    Procedure: REVISION CUBITAL TUNNEL WITH SUBMUSCULAR ULNAR NERVE TRANSPOSITION;  Surgeon: Joshua Wu  "MD;  Location: [facility];  Service: Orthopedics    VT POST COLPORRHAPHY RECTOCELE W/WO PERINEORRHAPHY N/A 02/03/2020    Procedure: POSTERIOR COLPORRHAPHY;  Surgeon: Reed Lovell MD;  [facility];  Service: UroGynecology           VT SLING OPERATION STRESS INCONTINENCE N/A 02/03/2020    Procedure: PUBOVAGINAL SLING;  Surgeon: Reed Lovell MD;  Location[facility];  Service: UroGynecology           TUBAL LIGATION       Social History   Social History     Substance and Sexual Activity   Alcohol Use Yes    Comment: rare     Social History     Substance and Sexual Activity   Drug Use Never     Social History     Tobacco Use   Smoking Status Never   Smokeless Tobacco Never       Objective       Current Vitals:   Blood Pressure: 108/64 (05/08/24 1516)  Pulse: 66 (05/08/24 1516)  Temperature: 97.6 °F (36.4 °C) (05/08/24 1516)  Temp Source: Tympanic (05/08/24 1516)  Height: 5' 5.5\" (166.4 cm) (05/08/24 1516)  Weight - Scale: 61.2 kg (135 lb) (05/08/24 1516)    Invasive Devices       None                   Physical Exam  Vitals reviewed.   Constitutional:       General: She is not in acute distress.     Appearance: Normal appearance. She is not ill-appearing.   HENT:      Head: Normocephalic.      Nose: Nose normal.      Mouth/Throat:      Mouth: Mucous membranes are moist.      Pharynx: Oropharynx is clear.   Eyes:      General: No scleral icterus.  Cardiovascular:      Rate and Rhythm: Normal rate.   Pulmonary:      Effort: Pulmonary effort is normal. No respiratory distress.   Abdominal:      Palpations: Abdomen is soft.      Tenderness: There is no abdominal tenderness.      Hernia: No hernia is present.      Comments: Laparoscopic port sites well healed.   Musculoskeletal:         General: Normal range of motion.      Cervical back: Normal range of motion.   Skin:     General: Skin is warm and dry.      Capillary Refill: Capillary refill takes less than 2 seconds.   Neurological:      General: No focal " "deficit present.      Mental Status: She is alert. Mental status is at baseline.   Psychiatric:         Mood and Affect: Mood normal.             Assessment/PLAN:    Desire العلي is a 39 y.o. female status post Robotic Mary Lou-en-Y gastric bypass with Dr. Naren Nagel in 2022 returning for annual visit.          Follow diet as discussed.      Labs ordered for next visit.  Get lab work done prior to next appointment.  It is recommended to check with your insurance BEFORE getting labs done to make sure they are covered by your policy. Make sure to HOLD any multivitamins that may contain biotin and any biotin supplements FOR 5 DAYS before any labs since it can affect the results. IMPORTANT: if you have a [facility]\" account, you will receive a letter of your vitamin/mineral results through the computer. Please watch for an update to your chart since recommendations for supplement adjustments will be sent to you this way.    Follow vitamin and mineral recommendations as reviewed with you. Bariatric vitamins are highly recommended. Vitamins are important for a life-time to avoid low levels which can lead to other medical problems.  Exercise as tolerated.  Call the office if you have any problems with abdominal pain especially associated with fever, chills, nausea, vomiting or any other concerns.    All Post-bariatric surgery patients should be aware that very small quantities of any alcohol can cause impairment and it is very possible not to feel the effect. The effect can be in the system for several hours and it is also a stomach irritant.  It is advised to AVOID alcohol, Nonsteroidal anti-inflammatory drugs (NSAIDS) and nicotine of all forms. Any of these can cause stomach irritation/pain.    Most, if not all, post-gastric surgery patients would benefit from having a regular counselor for at least 2 years post-op to help with need for multiple life-style changes. If you are interested in this and need help finding " a regular counselor, you can also make a follow-up with our  to help you find one.    Follow-up in 12 MONTHS or as needed if you experience persistent or worsening epigastric pain.  We kindly ask that you arrive 15 minutes before appointment time to allow for our staff to room you and check your vital signs and update your chart. We thank you for your patience at your visit.      Keith Mireles MD  Bariatric Surgery  5/8/2024  3:26 PM

## 2024-05-08 NOTE — TELEPHONE ENCOUNTER
Desire العلي and/or patient requested a call back to discuss needing a f/u appt.    They can be reached at P# [phone].       Thank you.

## 2024-05-11 ENCOUNTER — APPOINTMENT (OUTPATIENT)
Dept: LAB | Facility: HOSPITAL | Age: 39
End: 2024-05-11
Payer: COMMERCIAL

## 2024-05-11 DIAGNOSIS — K91.2 POSTSURGICAL MALABSORPTION: ICD-10-CM

## 2024-05-11 LAB
25(OH)D3 SERPL-MCNC: 38.5 NG/ML (ref 30–100)
ALBUMIN SERPL BCP-MCNC: 4.3 G/DL (ref 3.5–5)
ALP SERPL-CCNC: 59 U/L (ref 34–104)
ALT SERPL W P-5'-P-CCNC: 19 U/L (ref 7–52)
ANION GAP SERPL CALCULATED.3IONS-SCNC: 3 MMOL/L (ref 4–13)
AST SERPL W P-5'-P-CCNC: 18 U/L (ref 13–39)
BASOPHILS # BLD AUTO: 0.02 THOUSANDS/ÂΜL (ref 0–0.1)
BASOPHILS NFR BLD AUTO: 1 % (ref 0–1)
BILIRUB SERPL-MCNC: 0.58 MG/DL (ref 0.2–1)
BUN SERPL-MCNC: 11 MG/DL (ref 5–25)
CALCIUM SERPL-MCNC: 9.4 MG/DL (ref 8.4–10.2)
CHLORIDE SERPL-SCNC: 104 MMOL/L (ref 96–108)
CO2 SERPL-SCNC: 32 MMOL/L (ref 21–32)
CREAT SERPL-MCNC: 0.59 MG/DL (ref 0.6–1.3)
EOSINOPHIL # BLD AUTO: 0.12 THOUSAND/ÂΜL (ref 0–0.61)
EOSINOPHIL NFR BLD AUTO: 3 % (ref 0–6)
ERYTHROCYTE [DISTWIDTH] IN BLOOD BY AUTOMATED COUNT: 13 % (ref 11.6–15.1)
FERRITIN SERPL-MCNC: 20 NG/ML (ref 11–307)
GFR SERPL CREATININE-BSD FRML MDRD: 115 ML/MIN/1.73SQ M
GLUCOSE SERPL-MCNC: 72 MG/DL (ref 65–140)
HCT VFR BLD AUTO: 39.4 % (ref 34.8–46.1)
HGB BLD-MCNC: 12.8 G/DL (ref 11.5–15.4)
IMM GRANULOCYTES # BLD AUTO: 0.01 THOUSAND/UL (ref 0–0.2)
IMM GRANULOCYTES NFR BLD AUTO: 0 % (ref 0–2)
IRON SATN MFR SERPL: 21 % (ref 15–50)
IRON SERPL-MCNC: 69 UG/DL (ref 50–212)
LYMPHOCYTES # BLD AUTO: 1.1 THOUSANDS/ÂΜL (ref 0.6–4.47)
LYMPHOCYTES NFR BLD AUTO: 30 % (ref 14–44)
MCH RBC QN AUTO: 31.5 PG (ref 26.8–34.3)
MCHC RBC AUTO-ENTMCNC: 32.5 G/DL (ref 31.4–37.4)
MCV RBC AUTO: 97 FL (ref 82–98)
MONOCYTES # BLD AUTO: 0.18 THOUSAND/ÂΜL (ref 0.17–1.22)
MONOCYTES NFR BLD AUTO: 5 % (ref 4–12)
NEUTROPHILS # BLD AUTO: 2.28 THOUSANDS/ÂΜL (ref 1.85–7.62)
NEUTS SEG NFR BLD AUTO: 61 % (ref 43–75)
NRBC BLD AUTO-RTO: 0 /100 WBCS
PLATELET # BLD AUTO: 196 THOUSANDS/UL (ref 149–390)
PMV BLD AUTO: 9.9 FL (ref 8.9–12.7)
POTASSIUM SERPL-SCNC: 4.1 MMOL/L (ref 3.5–5.3)
PROT SERPL-MCNC: 6.8 G/DL (ref 6.4–8.4)
PTH-INTACT SERPL-MCNC: 36.7 PG/ML (ref 12–88)
RBC # BLD AUTO: 4.06 MILLION/UL (ref 3.81–5.12)
SODIUM SERPL-SCNC: 139 MMOL/L (ref 135–147)
TIBC SERPL-MCNC: 328 UG/DL (ref 250–450)
UIBC SERPL-MCNC: 259 UG/DL (ref 155–355)
VIT B12 SERPL-MCNC: 329 PG/ML (ref 180–914)
WBC # BLD AUTO: 3.71 THOUSAND/UL (ref 4.31–10.16)

## 2024-05-11 PROCEDURE — 82607 VITAMIN B-12: CPT

## 2024-05-11 PROCEDURE — 83970 ASSAY OF PARATHORMONE: CPT

## 2024-05-11 PROCEDURE — 85025 COMPLETE CBC W/AUTO DIFF WBC: CPT

## 2024-05-11 PROCEDURE — 83550 IRON BINDING TEST: CPT

## 2024-05-11 PROCEDURE — 84630 ASSAY OF ZINC: CPT

## 2024-05-11 PROCEDURE — 82306 VITAMIN D 25 HYDROXY: CPT

## 2024-05-11 PROCEDURE — 84590 ASSAY OF VITAMIN A: CPT

## 2024-05-11 PROCEDURE — 36415 COLL VENOUS BLD VENIPUNCTURE: CPT

## 2024-05-11 PROCEDURE — 82728 ASSAY OF FERRITIN: CPT

## 2024-05-11 PROCEDURE — 80053 COMPREHEN METABOLIC PANEL: CPT

## 2024-05-11 PROCEDURE — 83540 ASSAY OF IRON: CPT

## 2024-05-11 PROCEDURE — 84425 ASSAY OF VITAMIN B-1: CPT

## 2024-05-14 ENCOUNTER — APPOINTMENT (OUTPATIENT)
Dept: PHYSICAL THERAPY | Facility: MEDICAL CENTER | Age: 39
End: 2024-05-14
Payer: COMMERCIAL

## 2024-05-14 LAB — ZINC SERPL-MCNC: 77 UG/DL (ref 44–115)

## 2024-05-15 LAB — VIT B1 BLD-SCNC: 158.6 NMOL/L (ref 66.5–200)

## 2024-05-16 ENCOUNTER — APPOINTMENT (OUTPATIENT)
Dept: PHYSICAL THERAPY | Facility: MEDICAL CENTER | Age: 39
End: 2024-05-16
Payer: COMMERCIAL

## 2024-05-17 LAB — VIT A SERPL-MCNC: 32.9 UG/DL (ref 18.9–57.3)

## 2024-05-21 ENCOUNTER — APPOINTMENT (OUTPATIENT)
Dept: PHYSICAL THERAPY | Facility: MEDICAL CENTER | Age: 39
End: 2024-05-21
Payer: COMMERCIAL

## 2024-05-23 ENCOUNTER — APPOINTMENT (OUTPATIENT)
Dept: PHYSICAL THERAPY | Facility: MEDICAL CENTER | Age: 39
End: 2024-05-23
Payer: COMMERCIAL

## 2024-05-23 ENCOUNTER — EVALUATION (OUTPATIENT)
Dept: PHYSICAL THERAPY | Facility: MEDICAL CENTER | Age: 39
End: 2024-05-23
Payer: COMMERCIAL

## 2024-05-23 DIAGNOSIS — G89.29 CHRONIC RIGHT-SIDED LOW BACK PAIN WITHOUT SCIATICA: Primary | ICD-10-CM

## 2024-05-23 DIAGNOSIS — M54.50 CHRONIC RIGHT-SIDED LOW BACK PAIN WITHOUT SCIATICA: Primary | ICD-10-CM

## 2024-05-23 PROCEDURE — 97110 THERAPEUTIC EXERCISES: CPT | Performed by: PHYSICAL THERAPIST

## 2024-05-23 PROCEDURE — 97140 MANUAL THERAPY 1/> REGIONS: CPT | Performed by: PHYSICAL THERAPIST

## 2024-05-23 PROCEDURE — 97161 PT EVAL LOW COMPLEX 20 MIN: CPT | Performed by: PHYSICAL THERAPIST

## 2024-05-23 NOTE — PROGRESS NOTES
PT Evaluation     Today's date: 2024  Patient name: Desire العلي  : 1985  MRN: [id number]  Referring provider: Cami Palmer MD  Dx:   Encounter Diagnosis     ICD-10-CM    1. Chronic right-sided low back pain without sciatica  M54.50 Ambulatory referral to Physical Therapy    G89.29                      Assessment  Impairments: abnormal muscle firing, abnormal or restricted ROM, activity intolerance, impaired physical strength, lacks appropriate home exercise program, pain with function, poor body mechanics, participation limitations and activity limitations  Symptom irritability: moderate    Assessment details: Ms. العلي is a pleasant 39 y.o. female who presents today for physical therapy evaluation for chronic right sided low back pain. No further referral appears necessary at this time based upon examination findings. Patient presents with primary movement impairment of SIJ dysfunction with mid-low thoracic hypomobility. She presents with secondary movement impairments of poor lumbo pelvic motor control. These impairments are limiting patient's ability to tolerate sitting and walking for extended periods of time and causing her overall discomfort thus reducing overall quality of life. Patient would benefit from outpatient physical therapy services to address the observed impairments to reduce symptoms and improve management of chronic symptoms to improve quality of life. Prognosis is good given compliance with PT attendance and HEP performance. Please contact me with any questions. Thank you for the referral.   Barriers to therapy: Chronic symptoms, lupus  Understanding of Dx/Px/POC: good     Prognosis: good    Goals  1. Patient will be independent in individualized HEP.  2. Patient will report decreased pain by 50%.  3. Patient will report improved tolerance to sitting for work day.  4. Patient will be able to manage symptoms independently long term.    Plan  Patient would benefit from: skilled  physical therapy  Planned modality interventions: thermotherapy: hydrocollator packs    Planned therapy interventions: manual therapy, massage, joint mobilization, neuromuscular re-education, patient/caregiver education, therapeutic activities, therapeutic exercise, strengthening, home exercise program, graded exercise, functional ROM exercises and abdominal trunk stabilization    Frequency: 1x week  Duration in weeks: 8 (patient away week of )  Plan of Care beginning date: 2024  Plan of Care expiration date: 2024  Treatment plan discussed with: patient      Subjective Evaluation    History of Present Illness  Mechanism of injury: Ms. العلي is a 39 y.o. female who presents today with reports of chronic low back pain. Patient states she has had pain for several years () after a fall landing on her left knee and right buttock. She states she has had constant pain since that fluctuates in intensity. She states she does not reach the point of debilitation but it is uncomfortable and frustrating. She did go to physical therapy and the chiropractor but stopped going after not seeing any further relief. She states as the years have passed the symptoms are progressively getting worse. Patient denies any radiating pain down the leg. Denies numbness/tingling. Denies any bowel/bladder changes. She denies any saddle anesthesia. She report radiating pain from the right buttock to the groin. She does notice symptoms increase around her menstrual cycle. Patient reports concerns for worsening of symptoms as she continues to get older limiting her ability to function.   Patient Goals  Patient goals for therapy: decreased pain  Patient's goals regarding treatment: managable symptoms.  Pain  Current pain ratin  At best pain ratin  At worst pain ratin  Quality: radiating and dull ache  Alleviating factors: n/a.  Exacerbated by: prolonged sitting/standing.  Progression: worsening    Social  Support    Employment status: working ([email or web address] ())    Diagnostic Tests  No diagnostic tests performed  Treatments  Previous treatment: chiropractic and physical therapy      Objective     Concurrent Complaints  Negative for night pain, disturbed sleep, bladder dysfunction, bowel dysfunction and saddle (S4) numbness    Postural Observations    Additional Postural Observation Details  Increased lumbar lordosis    Palpation     Right   Hypertonic in the erector spinae, lumbar paraspinals and quadratus lumborum.   Tenderness of the erector spinae, lumbar paraspinals and quadratus lumborum.     Tenderness     Lumbar Spine  Tenderness in the facet joint.     Additional Tenderness Details  (+) TTP L2-5 facet joints on right    Active Range of Motion     Lumbar   Flexion:  WFL and with pain  Extension:  WFL and with pain  Left lateral flexion:  WFL and with pain  Right lateral flexion:  WFL and with pain    Joint Play     Hypomobile: T8, T9, T10, T11 and T12     Pain: L2, L3, L4 and L5     Strength/Myotome Testing     Left Hip   Planes of Motion   Flexion: 5  Abduction: 4  External rotation: 4  Internal rotation: 4    Right Hip   Planes of Motion   Flexion: 5  Abduction: 4-  External rotation: 4-  Internal rotation: 4-    Left Knee   Flexion: 5  Extension: 5    Right Knee   Flexion: 5  Extension: 5    Left Ankle/Foot   Dorsiflexion: 5  Plantar flexion: 5    Right Ankle/Foot   Dorsiflexion: 5  Plantar flexion: 5    Tests     Lumbar   Positive prone instability , SIJ compression, Gaenslen's, sacral thrust and sacral spring .   Negative sacroiliac distraction.     Right   Positive quadrant.     Right Hip   Positive TONY, FADIR and long sit.     Functional Assessment      Squat    Pain, sitting toward left side, left tibial anterior translation beyond toes and right tibial anterior translation beyond toes.              Precautions: lupus, adhesive sensitivity      Manuals 5/23            RICO CHAN Gr. V           "  Mid-low thoracic PA joint mobs Gr. III            Theragun/percussion R paraspinals/glute region CK                                      Neuro Re-Ed             Bird dog nv            Multifidus press nv                                                                             Ther Ex             T/s extension (mid-low) x10            Bridge with ball squeeze 5\"x30            Supine band abd nv            Hip extension over EOT nv                                                                Ther Activity                                       Gait Training                                       Modalities                                            "

## 2024-05-28 ENCOUNTER — APPOINTMENT (OUTPATIENT)
Dept: PHYSICAL THERAPY | Facility: MEDICAL CENTER | Age: 39
End: 2024-05-28
Payer: COMMERCIAL

## 2024-05-30 ENCOUNTER — APPOINTMENT (OUTPATIENT)
Dept: PHYSICAL THERAPY | Facility: MEDICAL CENTER | Age: 39
End: 2024-05-30
Payer: COMMERCIAL

## 2024-06-03 ENCOUNTER — OFFICE VISIT (OUTPATIENT)
Dept: OBGYN CLINIC | Facility: CLINIC | Age: 39
End: 2024-06-03
Payer: COMMERCIAL

## 2024-06-03 VITALS
SYSTOLIC BLOOD PRESSURE: 96 MMHG | BODY MASS INDEX: 21.21 KG/M2 | WEIGHT: 132 LBS | DIASTOLIC BLOOD PRESSURE: 57 MMHG | HEIGHT: 66 IN

## 2024-06-03 DIAGNOSIS — G56.21 CUBITAL TUNNEL SYNDROME ON RIGHT: Primary | ICD-10-CM

## 2024-06-03 PROCEDURE — 99213 OFFICE O/P EST LOW 20 MIN: CPT | Performed by: ORTHOPAEDIC SURGERY

## 2024-06-03 NOTE — PROGRESS NOTES
"Assessment:   S/P Right Open Revision Carpal Tunnel Release - Right, Revision Cubital Tunnel With Submuscular Ulnar Nerve Transposition - Right, and Application Of Long Arm Splint - Right on 1/25/2024    Plan:   Patient has successfully completed physical therapy as of 5/7/24. Discussed with the patient that the sensation and proprioception should continue to improve. She is encouraged to continue scar care to improve sensation and decreasing the bulk of the scar. She will follow-up as needed.      Follow Up:  PRN    To Do Next Visit:  N/a      CHIEF COMPLAINT:  Chief Complaint   Patient presents with    Right Wrist - Post-op     S/p Open Revision Carpal Tunnel Release 1-25-24    Right Elbow - Post-op     S/p Revision Cubital Tunnel With Submuscular Ulnar Nerve Transposition 1-25-24         SUBJECTIVE:  Desire العلي is a 39 y.o. female who presents for follow up after Right Open Revision Carpal Tunnel Release - Right, Revision Cubital Tunnel With Submuscular Ulnar Nerve Transposition - Right, and Application Of Long Arm Splint - Right on 1/25/2024.  Today patient has noted some decreased sensation at the medial posterior aspect of the right elbow.  She has also noted improvement in her range of motion as well as her pain and numbness/tingling.      PHYSICAL EXAMINATION:  Vital signs: BP 96/57   Ht 5' 5.5\" (1.664 m)   Wt 59.9 kg (132 lb)   BMI 21.63 kg/m²   General: well developed and well nourished, alert, oriented times 3, and appears comfortable  Psychiatric: Normal    MUSCULOSKELETAL EXAMINATION:  Incision: Clean, dry, intact  Range of Motion: As expected and Limited due to stiffness  Neurovascular status: numbness note in the hand  Activity Restrictions: No restrictions  Intrinsics 5/5  AIN - 5/5  APB - 5/5  Numbness improved   95% improved overall        STUDIES REVIEWED:  No Studies to review      PROCEDURES PERFORMED:  Procedures  No Procedures performed today    Scribe Attestation      I,:  Em " Hitesh am acting as a scribe while in the presence of the attending physician.:       I,:  Joshua Wu MD personally performed the services described in this documentation    as scribed in my presence.:

## 2024-06-04 ENCOUNTER — OFFICE VISIT (OUTPATIENT)
Dept: PHYSICAL THERAPY | Facility: MEDICAL CENTER | Age: 39
End: 2024-06-04
Payer: COMMERCIAL

## 2024-06-04 DIAGNOSIS — G89.29 CHRONIC RIGHT-SIDED LOW BACK PAIN WITHOUT SCIATICA: Primary | ICD-10-CM

## 2024-06-04 DIAGNOSIS — Z47.89 ORTHOPEDIC AFTERCARE: ICD-10-CM

## 2024-06-04 DIAGNOSIS — G56.21 ULNAR NERVE ENTRAPMENT AT ELBOW, RIGHT: ICD-10-CM

## 2024-06-04 DIAGNOSIS — M54.50 CHRONIC RIGHT-SIDED LOW BACK PAIN WITHOUT SCIATICA: Primary | ICD-10-CM

## 2024-06-04 PROCEDURE — 97140 MANUAL THERAPY 1/> REGIONS: CPT

## 2024-06-04 PROCEDURE — 97112 NEUROMUSCULAR REEDUCATION: CPT

## 2024-06-04 PROCEDURE — 97110 THERAPEUTIC EXERCISES: CPT

## 2024-06-04 NOTE — PROGRESS NOTES
"Daily Note     Today's date: 2024  Patient name: Desire العلي  : 1985  MRN: [id number]  Referring provider: Cami Palmer MD  Dx:   Encounter Diagnosis     ICD-10-CM    1. Chronic right-sided low back pain without sciatica  M54.50     G89.29       2. Ulnar nerve entrapment at elbow, right  G56.21       3. Orthopedic aftercare  Z47.89                      Subjective:  Patient reports that her symptoms are the same as her initial eval.  Desire notes that she was on vacation the past week and didn't do her exercises.  She presents with reports of R sided low back pain and tightness.       Objective: See treatment diary below      Assessment: Patient tolerated treatment well. Patient performed ex and received manual therapy as noted.  Provided verbal cues as needed to ensure good ex technique and benefit.  Patient reported difficulty in quadriped position due to a recent R arm surgery.  Performed bird dogs standing at wall with alt lifts.  Patient responded well to treatment noting decreased pain and improved mobility post treatment.  Patient would benefit from continued PT intervention to address deficits and attain set goals.     Plan: Continue per plan of care.      Precautions: lupus, adhesive sensitivity      Manuals            LAD  L Gr. V CC           Mid-low thoracic PA joint mobs Gr. III Glides  CC           Theragun/percussion R paraspinals/glute region CK CC                                     Neuro Re-Ed             Bird dog nv X10 ea  Standing at wall           Multifidus press nv Green  5\"x10                                                                            Ther Ex             T/s extension (mid-low) x10 X10  Seated w/ half roll           Bridge with ball squeeze 5\"x30 5\"  2x10           Supine band abd nv Green  5\"  2x10           Hip extension over EOT nv 30\"x3                                                               Ther Activity                                     "   Gait Training                                       Modalities

## 2024-06-06 ENCOUNTER — TELEPHONE (OUTPATIENT)
Dept: PSYCHIATRY | Facility: CLINIC | Age: 39
End: 2024-06-06

## 2024-06-06 NOTE — TELEPHONE ENCOUNTER
Patient came into office requesting a letter for her two dogs to be permitted in the apartment for emotional support.

## 2024-06-13 ENCOUNTER — OFFICE VISIT (OUTPATIENT)
Dept: PHYSICAL THERAPY | Facility: MEDICAL CENTER | Age: 39
End: 2024-06-13
Payer: COMMERCIAL

## 2024-06-13 DIAGNOSIS — M54.50 CHRONIC RIGHT-SIDED LOW BACK PAIN WITHOUT SCIATICA: Primary | ICD-10-CM

## 2024-06-13 DIAGNOSIS — G89.29 CHRONIC RIGHT-SIDED LOW BACK PAIN WITHOUT SCIATICA: Primary | ICD-10-CM

## 2024-06-13 PROCEDURE — 97110 THERAPEUTIC EXERCISES: CPT | Performed by: PHYSICAL THERAPIST

## 2024-06-13 PROCEDURE — 97140 MANUAL THERAPY 1/> REGIONS: CPT | Performed by: PHYSICAL THERAPIST

## 2024-06-13 PROCEDURE — 97112 NEUROMUSCULAR REEDUCATION: CPT | Performed by: PHYSICAL THERAPIST

## 2024-06-13 NOTE — PROGRESS NOTES
"Daily Note     Today's date: 2024  Patient name: Desire العلي  : 1985  MRN: [id number]  Referring provider: Cami Palmer MD  Dx:   Encounter Diagnosis     ICD-10-CM    1. Chronic right-sided low back pain without sciatica  M54.50     G89.29                      Subjective:  Patient reports no significant changes at this time. She feels her symptoms are very muscular and she feels \"tight and tense\". She does admit poor compliance with HEP while traveling.       Objective: See treatment diary below      Assessment: Patient responding well to manual therapy interventions with decreased tightness noted. Followed up with self mobility and muscular activation. Worked on isolated paraspinal and glute activation with frequent verbal and manual cueing provided to correct pelvic drop and excessive trunk rotation. We also utilized the mirror for visual feedback. Motor control improved with increased reps. Patient provided with updated HEP. Patient would benefit from continued PT to address mobility, motor control, and strength impairments to improve functional movement patterns and decrease pain with activity.     Plan: Continue per plan of care.      Precautions: lupus, adhesive sensitivity    HEP: t/s extension, bridge with ball, bridge with band, bird dogs  Manuals           LAD  L Gr. V CC L Gr V          Mid-low thoracic PA joint mobs Gr. III Glides  CC Gr III          Theragun/percussion R paraspinals/glute region CK CC CK                                    Neuro Re-Ed             Bird dog nv X10 ea  Standing at wall 2x10 quadruped on mat let only           Multifidus press nv Green  5\"x10 Green x30 ea          Unilateral  farmer carry (mirror)   GKB X2 laps ea                                                              Ther Ex             T/s extension (mid-low) x10 X10  Seated w/ half roll Supine with full roll 5\" x10          Bridge with ball squeeze 5\"x30 5\"  2x10 5\" x30          Supine " "band abd with bridge nv Green  5\"  2x10 Green 5\" 3x10          Hip extension/glute kick back over EOT nv 30\"x3 3x10 ea                                                              Ther Activity                                       Gait Training                                       Modalities                                            "

## 2024-06-20 ENCOUNTER — OFFICE VISIT (OUTPATIENT)
Dept: PHYSICAL THERAPY | Facility: MEDICAL CENTER | Age: 39
End: 2024-06-20
Payer: COMMERCIAL

## 2024-06-20 DIAGNOSIS — M54.50 CHRONIC RIGHT-SIDED LOW BACK PAIN WITHOUT SCIATICA: Primary | ICD-10-CM

## 2024-06-20 DIAGNOSIS — G89.29 CHRONIC RIGHT-SIDED LOW BACK PAIN WITHOUT SCIATICA: Primary | ICD-10-CM

## 2024-06-20 PROCEDURE — 97140 MANUAL THERAPY 1/> REGIONS: CPT | Performed by: PHYSICAL THERAPIST

## 2024-06-20 PROCEDURE — 97112 NEUROMUSCULAR REEDUCATION: CPT | Performed by: PHYSICAL THERAPIST

## 2024-06-20 PROCEDURE — 97110 THERAPEUTIC EXERCISES: CPT | Performed by: PHYSICAL THERAPIST

## 2024-06-20 NOTE — PROGRESS NOTES
"Daily Note     Today's date: 2024  Patient name: Desire العلي  : 1985  MRN: [id number]  Referring provider: Cami Palmer MD  Dx:   Encounter Diagnosis     ICD-10-CM    1. Chronic right-sided low back pain without sciatica  M54.50     G89.29                      Subjective:  Patient states she was able to feel the muscles she worked after last visit and has a better understanding of how to activate certain muscles.       Objective: See treatment diary below      Assessment: Patient responding well to manual therapy interventions with decreased tightness noted. She had no pelvic obliquities present today which demonstrated improved lumbopelvic stabilization. Decreased cueing throughout treatment session with lumbopelvic and posterior chain activation. Most challenged with quadruped and modified quadruped activity for isolated glute activation with difficulty maintaining neutral pelvic. Cueing provided for anti trunk rotation. Continued to use mirror for feedback. Patient would benefit from continued PT to address mobility, motor control, and strength impairments to improve functional movement patterns and decrease pain with activity.     Plan: Continue per plan of care.      Precautions: lupus, adhesive sensitivity    HEP: t/s extension, bridge with ball, bridge with band, bird dogs  Manuals          LAD  L Gr. V CC L Gr V np         Mid-low thoracic PA joint mobs Gr. III Glides  CC Gr III Gr. III         Theragun/percussion R paraspinals/glute region CK CC CK CK                                   Neuro Re-Ed             Bird dog nv X10 ea  Standing at wall 2x10 quadruped on mat leg only  2x10 quadruped on mat leg only          Multifidus press nv Green  5\"x10 Green x30 ea Green x30 ea         Unilateral  farmer carry (mirror)   GKB X2 laps ea GKB x2 laps ea                                                             Ther Ex             T/s extension (mid-low) x10 X10  Seated w/ half " "roll Supine with full roll 5\" x10 5\"x10         Bridge with ball squeeze 5\"x30 5\"  2x10 5\" x30 5\"x30         Supine band abd with bridge nv Green  5\"  2x10 Green 5\" 3x10 Green 5\" x30         Hip extension/glute kick back over EOT nv 30\"x3 3x10 ea 3x10 ea                                                             Ther Activity                                       Gait Training                                       Modalities                                            "

## 2024-06-24 ENCOUNTER — TELEPHONE (OUTPATIENT)
Dept: PSYCHIATRY | Facility: CLINIC | Age: 39
End: 2024-06-24

## 2024-06-25 ENCOUNTER — TELEMEDICINE (OUTPATIENT)
Dept: DERMATOLOGY | Facility: CLINIC | Age: 39
End: 2024-06-25
Payer: COMMERCIAL

## 2024-06-25 DIAGNOSIS — L70.0 ACNE VULGARIS: Primary | ICD-10-CM

## 2024-06-25 DIAGNOSIS — Z79.899 HIGH RISK MEDICATION USE: ICD-10-CM

## 2024-06-25 PROCEDURE — 99214 OFFICE O/P EST MOD 30 MIN: CPT | Performed by: STUDENT IN AN ORGANIZED HEALTH CARE EDUCATION/TRAINING PROGRAM

## 2024-06-25 RX ORDER — DAPSONE 75 MG/G
GEL TOPICAL
Qty: 60 G | Refills: 1 | Status: SHIPPED | OUTPATIENT
Start: 2024-06-25

## 2024-06-25 RX ORDER — SPIRONOLACTONE 50 MG/1
TABLET, FILM COATED ORAL
Qty: 30 TABLET | Refills: 3 | Status: SHIPPED | OUTPATIENT
Start: 2024-06-25

## 2024-06-25 NOTE — PROGRESS NOTES
"[facility] Dermatology Clinic Note     Patient Name: Desire العلي  Encounter Date: 06/25/24     Have you been cared for by a [facility] Dermatologist in the last 3 years and, if so, which description applies to you?    Yes.  I have been here within the last 3 years, and my medical history has NOT changed since that time.  I am FEMALE/of child-bearing potential.    REVIEW OF SYSTEMS:  Have you recently had or currently have any of the following? No changes in my recent health.   PAST MEDICAL HISTORY:  Have you personally ever had or currently have any of the following?  If \"YES,\" then please provide more detail. No changes in my medical history.   HISTORY OF IMMUNOSUPPRESSION: Do you have a history of any of the following:  Systemic Immunosuppression such as Diabetes, Biologic or Immunotherapy, Chemotherapy, Organ Transplantation, Bone Marrow Transplantation?  No     Answering \"YES\" requires the addition of the dotphrase \"IMMUNOSUPPRESSED\" as the first diagnosis of the patient's visit.   FAMILY HISTORY:  Any \"first degree relatives\" (parent, brother, sister, or child) with the following?    No changes in my family's known health.   PATIENT EXPERIENCE:    Do you want the Dermatologist to perform a COMPLETE skin exam today including a clinical examination under the \"bra and underwear\" areas?  NO  If necessary, do we have your permission to call and leave a detailed message on your Preferred Phone number that includes your specific medical information?  NO      Allergies   Allergen Reactions    Wound Dressing Adhesive Rash     Gets red rash from paper tape relatively quickly after application    Nsaids Other (See Comments)     Cannot have due to bariatric surgery in past     Penicillins Hives     At young age      Current Outpatient Medications:     acetaminophen (TYLENOL) 650 mg CR tablet, Take 1 tablet (650 mg total) by mouth every 8 (eight) hours as needed for mild pain, Disp: 30 tablet, Rfl: 0    ARIPiprazole " (ABILIFY) 5 mg tablet, Take 1/2 tab daily for 1 week, then either continue 2.5mg daily or increase to 5mg daily (Patient not taking: Reported on 4/15/2024), Disp: 30 tablet, Rfl: 1    Calcium 600-400 MG-UNIT CHEW, Chew daily, Disp: , Rfl:     Cholecalciferol (Vitamin D) 50 MCG (2000 UT) tablet, Take 1 tablet (2,000 Units total) by mouth daily, Disp: 90 tablet, Rfl: 1    clonazePAM (KlonoPIN) 0.5 mg tablet, TAKE 1/2 TO 1 TABLET BY MOUTH 2 TIMES A DAY AS NEEDED FOR ANXIETY. Please call office to make an appointment for additional refills., Disp: 60 tablet, Rfl: 0    Diclofenac Sodium (VOLTAREN) 1 %, Apply 2 g topically 4 (four) times a day, Disp: 100 g, Rfl: 6    gabapentin (Neurontin) 100 mg capsule, Take 1 capsule (100 mg total) by mouth daily at bedtime As [id number] for numbness, Disp: 30 capsule, Rfl: 6    hydroxychloroquine (PLAQUENIL) 200 mg tablet, Take 1.5 tablets (300 mg total) by mouth daily, Disp: 135 tablet, Rfl: 1    ketoconazole (NIZORAL) 2 % shampoo, Use daily for 2 weeks, then transition to maintenance therapy at 1-4 x per month. Let the shampoo sit on the skin for at least 5 minutes before rinsing off., Disp: 120 mL, Rfl: 6    ketoconazole (NIZORAL) 2 % shampoo, Apply 1 Application topically daily Let sit for 5 minutes before rinsing clear, Disp: 1 mL, Rfl: 0    levothyroxine 25 mcg tablet, Take 1 tablet (25 mcg total) by mouth daily in the early morning, Disp: 90 tablet, Rfl: 1    Multiple Vitamins-Minerals (Bariatric Multivitamins/Iron) CAPS, Take by mouth daily, Disp: , Rfl:     omeprazole (PriLOSEC) 20 mg delayed release capsule, Take 1 capsule (20 mg total) by mouth daily (Patient taking differently: Take 20 mg by mouth if needed), Disp: 90 capsule, Rfl: 1    Probiotic Product (PROBIOTIC BLEND PO), Take 1 capsule by mouth in the morning (Patient not taking: Reported on 5/8/2024), Disp: , Rfl:     spironolactone (ALDACTONE) 50 mg tablet, Take 0.5 tablets (25 mg total) by mouth daily, Disp: 30  tablet, Rfl: 3    Sulfacetamide Sodium-Sulfur (Sulfacetamide Sod-Sulfur Wash) 9-4.5 % LIQD, Apply topically daily, Disp: 454 g, Rfl: 2    tretinoin (RETIN-A) 0.025 % cream, Apply pea sized amount to dry face at night (inactivated by sunlight). Start 2-3 times a week and increase to nightly as tolerated. If too drying, apply layer of non-comedogenic lotion before and after application (sandwich method of application).  If using benzoyl peroxide containing wash, wait 30 minutes before applying tretinoin, Disp: 45 g, Rfl: 2    tretinoin (RETIN-A) 0.025 % cream, Apply topically daily at bedtime Pea sized amount to entire face, do not spot treat, Disp: 45 g, Rfl: 3    Vortioxetine HBr (Trintellix) 20 MG tablet, Take 1 tablet (20 mg total) by mouth daily, Disp: 30 tablet, Rfl: 3          Whom besides the patient is providing clinical information about today's encounter?   NO ADDITIONAL HISTORIAN (patient alone provided history)    Physical Exam and Assessment/Plan by Diagnosis:  Virtual Regular Visit    Verification of patient location:    Patient is located at Home in the following state in which I hold an active license [place]      Assessment/Plan:    Problem List Items Addressed This Visit    None           Reason for visit is   Chief Complaint   Patient presents with    Virtual Regular Visit          Encounter provider Raymond Khan MD      Recent Visits  No visits were found meeting these conditions.  Showing recent visits within past 7 days and meeting all other requirements  Today's Visits  Date Type Provider Dept   06/25/24 Telemedicine Raymond Khan MD  Dermatology [facility]   Showing today's visits and meeting all other requirements  Future Appointments  No visits were found meeting these conditions.  Showing future appointments within next 150 days and meeting all other requirements       The patient was identified by name and date of birth. Desire العلي was informed that this is a  telemedicine visit and that the visit is being conducted through the Epic Embedded platform. She agrees to proceed..  My office door was closed. No one else was in the room.  She acknowledged consent and understanding of privacy and security of the video platform. The patient has agreed to participate and understands they can discontinue the visit at any time.    Patient is aware this is a billable service.     Subjective  Desire العلي is a 39 y.o. female presenting for acne follow up .      HPI     Past Medical History:   Diagnosis Date    Acne     Anxiety     Back pain     Carpal tunnel syndrome on right     Contact lens overwear of both eyes     Cubital tunnel syndrome on right     Depression     GERD (gastroesophageal reflux disease)     Headache     History of sleep apnea     resolved after bariatric sx    Hypothyroidism     Kidney stones     Lupus (systemic lupus erythematosus) (HCC)     Nausea     PONV (postoperative nausea and vomiting)     Wears glasses        Past Surgical History:   Procedure Laterality Date    CARPAL TUNNEL RELEASE      EGD      CO APPLICATION LONG ARM SPLINT SHOULDER HAND Right 1/25/2024    Procedure: APPLICATION OF LONG ARM SPLINT;  Surgeon: Joshua Wu MD;  Location: [facility];  Service: Orthopedics    CO CYSTOURETHROSCOPY N/A 02/03/2020    Procedure: CYSTOSCOPY;  Surgeon: eRed Lovell MD;  [facility];  Service: UroGynecology           CO LAPAROSCOPY SURG CHOLECYSTECTOMY N/A 09/06/2018    Procedure: CHOLECYSTECTOMY LAPAROSCOPIC W/ ROBOTICS;  Surgeon: Vince Tomas MD;  [facility];  Service: General    CO LAPS GSTR RSTCV PX W/BYP KATLYN-EN-Y LIMB <150 CM N/A 04/19/2022    Procedure: LAPAROSCOPIC KATLYN-EN-Y GASTRIC BYPASS & INTRAOPERATIVE EGD ROBOTICALLY ASSISTED;  Surgeon: Adalid Nagel MD;  [facility];  Service: Bariatrics    CO NEUROPLASTY &/TRANSPOS MEDIAN NRV CARPAL TUNNE Right 11/27/2020    Procedure: release CARPAL TUNNEL;  Surgeon:  Keith Landers MD;  [facility];  Service: Orthopedics    MO NEUROPLASTY &/TRANSPOS MEDIAN NRV CARPAL TUNNE Right 1/25/2024    Procedure: RIGHT OPEN REVISION CARPAL TUNNEL RELEASE;  Surgeon: Joshua Wu MD;  Location: [facility];  Service: Orthopedics    MO NEUROPLASTY &/TRANSPOSITION ULNAR NERVE ELBOW Right 11/27/2020    Procedure: CUBITAL TUNNEL;  Surgeon: Keith Landers MD;  [facility];  Service: Orthopedics    MO NEUROPLASTY &/TRANSPOSITION ULNAR NERVE ELBOW Right 1/25/2024    Procedure: REVISION CUBITAL TUNNEL WITH SUBMUSCULAR ULNAR NERVE TRANSPOSITION;  Surgeon: Joshua Wu MD;  Location: [facility];  Service: Orthopedics    MO POST COLPORRHAPHY RECTOCELE W/WO PERINEORRHAPHY N/A 02/03/2020    Procedure: POSTERIOR COLPORRHAPHY;  Surgeon: Reed Lovell MD;  [facility];  Service: UroGynecology           MO SLING OPERATION STRESS INCONTINENCE N/A 02/03/2020    Procedure: PUBOVAGINAL SLING;  Surgeon: Reed Lovell MD;  [facility];  Service: UroGynecology           TUBAL LIGATION         Current Outpatient Medications   Medication Sig Dispense Refill    acetaminophen (TYLENOL) 650 mg CR tablet Take 1 tablet (650 mg total) by mouth every 8 (eight) hours as needed for mild pain 30 tablet 0    ARIPiprazole (ABILIFY) 5 mg tablet Take 1/2 tab daily for 1 week, then either continue 2.5mg daily or increase to 5mg daily (Patient not taking: Reported on 4/15/2024) 30 tablet 1    Calcium 600-400 MG-UNIT CHEW Chew daily      Cholecalciferol (Vitamin D) 50 MCG (2000 UT) tablet Take 1 tablet (2,000 Units total) by mouth daily 90 tablet 1    clonazePAM (KlonoPIN) 0.5 mg tablet TAKE 1/2 TO 1 TABLET BY MOUTH 2 TIMES A DAY AS NEEDED FOR ANXIETY. Please call office to make an appointment for additional refills. 60 tablet 0    Diclofenac Sodium (VOLTAREN) 1 % Apply 2 g topically 4 (four) times a day 100 g 6    gabapentin (Neurontin) 100 mg capsule Take 1 capsule (100 mg  total) by mouth daily at bedtime As [id number] for numbness 30 capsule 6    hydroxychloroquine (PLAQUENIL) 200 mg tablet Take 1.5 tablets (300 mg total) by mouth daily 135 tablet 1    ketoconazole (NIZORAL) 2 % shampoo Use daily for 2 weeks, then transition to maintenance therapy at 1-4 x per month. Let the shampoo sit on the skin for at least 5 minutes before rinsing off. 120 mL 6    ketoconazole (NIZORAL) 2 % shampoo Apply 1 Application topically daily Let sit for 5 minutes before rinsing clear 1 mL 0    levothyroxine 25 mcg tablet Take 1 tablet (25 mcg total) by mouth daily in the early morning 90 tablet 1    Multiple Vitamins-Minerals (Bariatric Multivitamins/Iron) CAPS Take by mouth daily      omeprazole (PriLOSEC) 20 mg delayed release capsule Take 1 capsule (20 mg total) by mouth daily (Patient taking differently: Take 20 mg by mouth if needed) 90 capsule 1    Probiotic Product (PROBIOTIC BLEND PO) Take 1 capsule by mouth in the morning (Patient not taking: Reported on 5/8/2024)      spironolactone (ALDACTONE) 50 mg tablet Take 0.5 tablets (25 mg total) by mouth daily 30 tablet 3    Sulfacetamide Sodium-Sulfur (Sulfacetamide Sod-Sulfur Wash) 9-4.5 % LIQD Apply topically daily 454 g 2    tretinoin (RETIN-A) 0.025 % cream Apply pea sized amount to dry face at night (inactivated by sunlight). Start 2-3 times a week and increase to nightly as tolerated. If too drying, apply layer of non-comedogenic lotion before and after application (sandwich method of application).  If using benzoyl peroxide containing wash, wait 30 minutes before applying tretinoin 45 g 2    tretinoin (RETIN-A) 0.025 % cream Apply topically daily at bedtime Pea sized amount to entire face, do not spot treat 45 g 3    Vortioxetine HBr (Trintellix) 20 MG tablet Take 1 tablet (20 mg total) by mouth daily 30 tablet 3     No current facility-administered medications for this visit.        Allergies   Allergen Reactions    Wound Dressing Adhesive Rash  "    Gets red rash from paper tape relatively quickly after application    Nsaids Other (See Comments)     Cannot have due to bariatric surgery in past     Penicillins Hives     At young age       Review of Systems    Video Exam    There were no vitals filed for this visit.    Physical Exam     Visit Time  Total Visit Duration: 15 mins      ACNE VULGARIS (\"COMMON ACNE\")     Physical Exam:  Anatomic Location Affected:  face, chin, jaw line  Morphological Description: Scattered pink papules and open/closed comedones  Pertinent Positives:  Pertinent Negatives:     Additional History of Present Condition: patient here today for acne follow up ,patient reports that she is still using tretinoin, spirolactone orally. She has not seen a lot of change. She does have a history of proteinuria in the setting of her recent SLE diagnosis. For this reason, initiation of spironolactone was done after discussion with rheumatology who was comfortable with this decision.         Discussed that history and physical are consistent with hormonal acne that is likely incompletely treated on low dose spironolactone.   Educated that hormonal acne does particularly well with medications that target hormones, including spironolactone and OCP.  Denies family history of breast cancer, personal history of low blood pressure, liver disease, kidney disease, hyperkalemia  Educated that I will reach out to rheumatologist given history of SLE with proteinuria to confirm that she is OK to proceed with spironolactone. Educated that I would plan to monitor her potassium levels and start her at a low dose of spironolactone to ensure tolerating well.    The potential link to estrogen-sensitive cancers in high dose animal studies such as breast cancer was discussed and the patient was counseled that the most recent meta analyses on spironolactone in humans on typical dosing has not demonstrated this risk.         Discussed that treatment is directed at " "improving skin appearance and reducing the likelihood of scarring. Discussed theraputic ladder including topical OTC treatments, topical prescriptions, and oral medications. Discussed side effects as noted below.     Plan today:     AM:  - Start gentle cleanser  - Start Dapsone gel to dace  - SPF 30 or greater (can be a lotion with SPF like CeraVe AM)  - Start non-comedogenic moisturizer such as CeraVe, Cetaphil or Vanicream.      PM:  - Gentle cleanser, such as CeraVe, Cetaphil or La Roche Posay  - Continue Tretinoin 0.025% qHS.   - Continue non-comedogenic moisturizer such as CeraVe, Cetaphil or Vanicream. May use on top of retinoid. If retinoid is too drying, may employ the \"sandwich method.\" To do this, apply layer of non-comedogenic moisturizer, followed by layer of retinoid, followed by another layer of non-comedogenic moisturizer.         ORAL:   - INCRESE Spirolactone 25 mg daily---->twice daily (50 mg total).   Get Potassium blood work done 1 week after increasing dose of medication.. She has undergone tubal ligation. S/E reviewed including menstrual irregularity, breast tenderness, headaches, GI upset, K+ retention, palpitations, teratogenicity/feminization of male fetus.      Call with any questions or concerns before next follow-up visit; do not stop medications abruptly without consulting provider.     WHEN AND WHERE TO CALL WITH CONCERNS  We are here to help!  If you experience any unusual symptoms, then stop taking or using the medication and call our office at [phone] ([phone]).  It is better to be safe than to be sorry!       Note: we discussed accutane but given that the distribution of acne is strongly consistent with hormonal acne we will plan to slowly increase the spironolactone     Scribe Attestation      I,:  Yenni Charlton am acting as a scribe while in the presence of the attending physician.:       I,:  Raymond Khan MD personally performed the services described in this " documentation    as scribed in my presence.:

## 2024-06-27 ENCOUNTER — OFFICE VISIT (OUTPATIENT)
Dept: PHYSICAL THERAPY | Facility: MEDICAL CENTER | Age: 39
End: 2024-06-27
Payer: COMMERCIAL

## 2024-06-27 DIAGNOSIS — Z47.89 ORTHOPEDIC AFTERCARE: ICD-10-CM

## 2024-06-27 DIAGNOSIS — M54.50 CHRONIC RIGHT-SIDED LOW BACK PAIN WITHOUT SCIATICA: Primary | ICD-10-CM

## 2024-06-27 DIAGNOSIS — G89.29 CHRONIC RIGHT-SIDED LOW BACK PAIN WITHOUT SCIATICA: Primary | ICD-10-CM

## 2024-06-27 DIAGNOSIS — G56.21 ULNAR NERVE ENTRAPMENT AT ELBOW, RIGHT: ICD-10-CM

## 2024-06-27 PROCEDURE — 97112 NEUROMUSCULAR REEDUCATION: CPT | Performed by: PHYSICAL THERAPIST

## 2024-06-27 PROCEDURE — 97140 MANUAL THERAPY 1/> REGIONS: CPT | Performed by: PHYSICAL THERAPIST

## 2024-06-27 NOTE — PROGRESS NOTES
"Daily Note     Today's date: 2024  Patient name: Desire العلي  : 1985  MRN: [id number]  Referring provider: Cami Palmer MD  Dx:   Encounter Diagnosis     ICD-10-CM    1. Chronic right-sided low back pain without sciatica  M54.50     G89.29       2. Ulnar nerve entrapment at elbow, right  G56.21       3. Orthopedic aftercare  Z47.89                      Subjective:  Patient states she has been getting relief in low back after PT and with her exercises but finds it does not last long. She did wake up with increased right lower back pain today. She does admit she is close to her cycle.       Objective: See treatment diary below      Assessment: Patient reported decreased pain after manual therapy interventions. She did respond to LAD. Followed up with lumbopelvic stabilization and functional core stabilization in standing.   Interventions were limited secondary to time today as patient had to leave early.  Emphasized importance of consistency with home program to maintain effect of mobilizations and manage symptoms.   Patient would benefit from continued PT to address mobility, motor control, and strength impairments to improve functional movement patterns and decrease pain with activity.     Plan: Continue per plan of care.      Precautions: lupus, adhesive sensitivity    HEP: t/s extension, bridge with ball, bridge with band, bird dogs  Manuals         LAD  L Gr. V CC L Gr V np L Gr V        Mid-low thoracic PA joint mobs Gr. III Glides  CC Gr III Gr. III Gr. III        Theragun/percussion R paraspinals/glute region CK CC CK CK CK                                  Neuro Re-Ed             Bird dog nv X10 ea  Standing at wall 2x10 quadruped on mat leg only  2x10 quadruped on mat leg only  nv        Multifidus press nv Green  5\"x10 Green x30 ea Green x30 ea Green x30 ea        Unilateral  farmer carry (mirror)   GKB X2 laps ea GKB x2 laps ea BKB 2 laps ea                           " "                                 Ther Ex             T/s extension (mid-low) x10 X10  Seated w/ half roll Supine with full roll 5\" x10 5\"x10 nv        Bridge with ball squeeze 5\"x30 5\"  2x10 5\" x30 5\"x30 5\"x30        Supine band abd with bridge nv Green  5\"  2x10 Green 5\" 3x10 Green 5\" x30 Green 5\"x30        Hip extension/glute kick back over EOT nv 30\"x3 3x10 ea 3x10 ea nv                                                            Ther Activity                                       Gait Training                                       Modalities                                            "

## 2024-06-28 NOTE — TELEPHONE ENCOUNTER
PT called office to speak with provider about paperwork and message that was left for her.   Pt is requesting a call back from provider.

## 2024-07-02 ENCOUNTER — OFFICE VISIT (OUTPATIENT)
Dept: PHYSICAL THERAPY | Facility: MEDICAL CENTER | Age: 39
End: 2024-07-02
Payer: COMMERCIAL

## 2024-07-02 DIAGNOSIS — G89.29 CHRONIC RIGHT-SIDED LOW BACK PAIN WITHOUT SCIATICA: Primary | ICD-10-CM

## 2024-07-02 DIAGNOSIS — M54.50 CHRONIC RIGHT-SIDED LOW BACK PAIN WITHOUT SCIATICA: Primary | ICD-10-CM

## 2024-07-02 PROCEDURE — 97110 THERAPEUTIC EXERCISES: CPT | Performed by: PHYSICAL THERAPIST

## 2024-07-02 PROCEDURE — 97112 NEUROMUSCULAR REEDUCATION: CPT | Performed by: PHYSICAL THERAPIST

## 2024-07-02 PROCEDURE — 97140 MANUAL THERAPY 1/> REGIONS: CPT | Performed by: PHYSICAL THERAPIST

## 2024-07-02 NOTE — PROGRESS NOTES
"Daily Note     Today's date: 2024  Patient name: Desire العلي  : 1985  MRN: [id number]  Referring provider: Cami Palmer MD  Dx:   Encounter Diagnosis     ICD-10-CM    1. Chronic right-sided low back pain without sciatica  M54.50     G89.29                      Subjective:  Patient reports increased left lower back with radiating glute pain today. She is unable to identify any precipitating factors.    Objective: See treatment diary below      Assessment: Patient presents with soft tissue tension throughout right lower thoracic paraspinals and right glute region. She responded positively to manual therapy with decreased pain.  Cueing provided for glute strengthening to reduce paraspinal activity over glute activation.   Gradually improving in lumbopelvic motor control and coordination.   Patient would benefit from continued PT to address mobility, motor control, and strength impairments to improve functional movement patterns and decrease pain with activity.     Plan: Continue per plan of care.      Precautions: lupus, adhesive sensitivity    HEP: t/s extension, bridge with ball, bridge with band, bird dogs  Manuals  7       LAD  L Gr. V CC L Gr V np L Gr V L Gr V       Mid-low thoracic PA joint mobs Gr. III Glides  CC Gr III Gr. III Gr. III Gr. III       Theragun/percussion R paraspinals/glute region CK CC CK CK CK CK + STM                                 Neuro Re-Ed             Bird dog nv X10 ea  Standing at wall 2x10 quadruped on mat leg only  2x10 quadruped on mat leg only  nv np       Multifidus press nv Green  5\"x10 Green x30 ea Green x30 ea Green x30 ea Green x30 ea       Unilateral  farmer carry (mirror)   GKB X2 laps ea GKB x2 laps ea BKB 2 laps ea BKB 2 laps ea                                                           Ther Ex             T/s extension (mid-low) x10 X10  Seated w/ half roll Supine with full roll 5\" x10 5\"x10 nv  np       Bridge with ball squeeze " "5\"x30 5\"  2x10 5\" x30 5\"x30 5\"x30 5\"x30       Supine band abd with bridge nv Green  5\"  2x10 Green 5\" 3x10 Green 5\" x30 Green 5\"x30 Green 5\"x30       Hip extension/glute kick back over EOT nv 30\"x3 3x10 ea 3x10 ea nv 3x10 ea       Foam roller glute/piriformis      Reviewed- HEP                                              Ther Activity                                       Gait Training                                       Modalities                                            "

## 2024-07-09 NOTE — PSYCH
"  MEDICATION MANAGEMENT NOTE        [facility]      Name and Date of Birth:  Desire العلي 39 y.o. 1985    Date of Visit: July 10, 2024    SUBJECTIVE:  CC: Desire presents today for follow up on \"ok\"; depression and anxiety     Desire notes day by day, started work in April. 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 Rather be with animals. Pay less than hospital, but happy to have a job. Also animals have the cleaning aspect that she did not like. Would prefer vet clinic of all but fully staffed right.     Just moved in 6/27, car and rent will consume her income. Rent is income based. Almost made too little for the housing. . \"I have come a long way\" and she is often needing to reflect, remind herself. \"What can I do about it\" so she does not dwell on the wrongs of her life or the outcomes she cannot change. Then discussed her plans to seek legal advice/ with legal advocacy/education group for post conviction relief act and expungement information    She is struggling without her dogs in her home, hopes to have approval for them to move in soon. Right now with her mom.     Discussed medications, she is ok right now    F/U prn- lupus pain  - Going to Jainism  - Friends  F/U PRN Thinking about finishing school.  F/U PRN- Her kids are old enough to not need babysitters/ support; Children-  split custody, oldest son (2004), middle (son) (7/2004), youngest girl (2009). - daughter (first job, Gabriella Moffett, 13yo).    Since our last visit, overall symptoms have been improving.      Med Compliance: yes    HPI ROS:                      ('was' below is from prior visit)  Medication Side Effects (other than noted):  no     Depression (10 worst):  5-6 (Was moderate to high)   Anxiety (10 worst):  7-8 (Was moderate)   Hallucinations or Psychosis  no (Was no)    Safety concerns (self harm thoughts, suicidal ideation, HI, etc):  no (Was no) "   Sleep: (NM = Nightmares)  Ok, some awakenings (Was good)   Energy:  Low, other times good (Was low)   Appetite:  ok (Was ok)   Weight Change:  no          PHQ-2/9 Depression Screening                 Desire denies any side effects from medications unless noted above    Review Of Systems as noted above. Otherwise A relevant review of symptoms was otherwise negative    History Review: The following portions of the patient's history were reviewed and documented: allergies, current medications, past family history, past medical history, past social history, and problem list.     Lab Review: Labs were reviewed      OBJECTIVE:     MENTAL STATUS EXAM  Appearance:  age appropriate   Behavior:  pleasant, cooperative, with good eye contact   Speech:  Normal volume, regular rate and rhythm   Mood:  depressed and anxious   Affect:  brighter with some improvement   Language: intact and appropriate for age, education, and intellect   Thought Process:  Linear and goal directed   Associations: intact associations   Thought Content:  normal and appropriate   Perceptual Disturbances: no auditory or visual hallcunations   Risk Potential / Abnormal Thoughts: Suicidal ideation - None  Homicidal ideation - None  Potential for aggression - No       Consciousness:  Alert & Awake   Sensorium:  Grossly oriented   Attention: attention span and concentration are age appropriate       Fund of Knowledge:  Memory: awareness of current events: yes  recent and remote memory grossly intact   Insight:  good   Judgment: good   Muscle Strength Muscle Tone: normal  normal   Gait/Station: normal gait/station with good balance   Motor Activity: no abnormal movements       Risks, Benefits And Possible Side Effects Of Medications:    AGREE: Risks, benefits, and possible side effects of medications explained to Desire and she (or legal representative) verbalizes understanding and agreement for treatment.    Controlled Medication Discussion:     Desire  "has been filling controlled prescriptions on time as prescribed according to [place] Prescription Drug Monitoring program.   _____________________________________________________________      Psychiatric History     Patient has a past diagnoses of major depressive disorder and anxiety and has never been told that she has bipolar disorder. She was seen a psychiatrist for a short period of time and also a therapist at [facility].     She has never been hospitalized for mental health never had a suicide attempt.    [facility] 3/2019    Social History:  Patient was raised in [place] and her parents  when she was young. She was raised by her mother and her boyfriend. Her childhood was \"not normal\" and there is constantly fighting at home. She denies any physical or sexual abuse. His one brother. She developed normally as far she is aware.     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 She has split custody of her 3 children from a 14-year-old relationship. She left home and \"he took advantage of that\".     She is Protestant. No  history no legal issues now or in the past and no weapons.      Never had issues with alcohol or drugs, never needed rehabilitation       Family Psychiatric History:     Father    1. Family history of alcohol abuse (V61.41) (Z81.1)   2. Denied: Family history of suicide  Family History    3. Family history of Drug addiction   4. Family history of alcohol abuse (V61.41) (Z81.1)   5. Denied: Family history of bipolar disorder   6. Denied: Family history of paranoid schizophrenia   7. Denied: Family history of suicide   8. Family history of No Significant Family History         Assessment/Plan:        Diagnoses and all orders for this visit:    Social phobia  -     clonazePAM (KlonoPIN) 0.5 mg tablet; TAKE 1/2 TO 1 TABLET BY MOUTH 2 TIMES A DAY AS NEEDED FOR ANXIETY.  -     Vortioxetine HBr (Trintellix) 20 MG tablet; Take 1 tablet (20 mg " total) by mouth daily    JG (generalized anxiety disorder)  -     clonazePAM (KlonoPIN) 0.5 mg tablet; TAKE 1/2 TO 1 TABLET BY MOUTH 2 TIMES A DAY AS NEEDED FOR ANXIETY.  -     Vortioxetine HBr (Trintellix) 20 MG tablet; Take 1 tablet (20 mg total) by mouth daily    Major depressive disorder, recurrent, severe w/o psychotic behavior (HCC)  -     Vortioxetine HBr (Trintellix) 20 MG tablet; Take 1 tablet (20 mg total) by mouth daily              ______________________________________________________________________    Major depressive disorder, recurrent, severe without psychosis (Highly unlikely bipolar disorder, but h/o diagnosis)  generalized anxiety disorder  social phobia.     ---     MDD - not at goal  JG - not at goal  Social phobia - not at goal.     AQUILES Phipps is making progress. Discussed medications and she prefers to stay current course. Atarax was for itching, never took for anxiety but we could explore in the future. Took Abilify only for a few days (sedation) and stopped. Could revisit. At this point modafinil would need to be off label, which I am not opposed to due to the appropriate use, tolerability, and benefit. However, she cannot afford and we will not pursue at this time    Revisit at future visit - psych testing (Referral made 11/2022 but she did not follow through, she was going to reach out again 2/24). Questioning her distractibility (starts project then goes to another, or in cleaning the house will go from item to item and then the whole house is a mess)    In February 2023 [facility] Elise reviewed situation and a referral to [facility] but they did not help her much, so she discharged in May 2023    I do not believe that she has bipolar disorder but mood stabilizers for anger and irritability if other paths do not seem to be appropriate or beneficial can be considered. Could consider abilify, retrial nortriptyline, buspar, lamictal, other directions. Klonopin increase has been  discussed in past, prefer other directions as reasonable.    - sleep study 10/2021 - Has sleep apnea, now uses CPAP     [facility]  She is heterozygous for MTHFR, no cancer history.    Safety Risk Assessment: See above. Has had passive SI since ~2015. She states that she has protective features including her children and that she would never actually carry anything out. Safety risk low.    Confidential Assessment:  Past psychotropic medications include  hydroxyzine,   klonopin,   buspar (low dose, no recall details),   cymbalta 30mg (no recall of details),  zoloft (no issues),   neurontin (no help),   Viibryd 10 mg (x2-3mo, no side effects or benefit noted; was paying out of pocket).  Effexor (irritable)  Wellbutrin (irritable).   Prozac (80mg, was not adequate, even with Nortrip 50mg. Went to trintellix)  Topiramate (no benefit at 100mg, no issues) .   Nortriptyline (some benefit at 50mg, dry mouth (did improve), decided to go different way but could reconsider)  Remeron- worked in past and then it did not work  trintellix- more depressed at 20mg, although significant stressors coocurring  Abilify 2.5mg - made groggy (4/2024), only a few days   - (STOPPED, cannot afford and insurance denied) Modafinil 200mg daily ~2/2024   - has atarax for itching, but may try for anxiety 25mg-50mg daily prn. (Stopped taking, never took it for anxiety) 2024    Scales:          Treatment Plan:      Patient has been educated about their diagnosis and treatment modalities. They voiced understanding and agreement with the following plan:     - [facility] TESTING initially reviewed 12/3/2018 with Desire    1) Meds:   - Klonopin 0.5mg BID PRN   - Trintellix 20mg daily.    - NO LONGER Abilify 2.5mg    2) Labs:   - 2/22: TSH 1.523, Vit D 52.4   - 12/2021: ECG (Cardiology) , NS arrhythmia   0 3/2019: BMP WNL, TSH 2.983; FT3 2.53   - 12/2018: Vit D 11.8, TSH 3.96 and FT4 0.82   - 6/2017: TSH 2.76; Vit D 34.1   - 4/2017: TSH 1.85   -  : CBC, CMP unremarkable, TSH 4.38, Vit D 21.6, , HDL 39     3) Therapy:   - NO LONGER Janny Goss ([facility]); Was [facility] Reyes    4) Medical:  LUPUS; hypothyroidism with pregnancy; history of kidney stones. Tubal ligation.    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        7) Treatment Plan: Enacted 2017, Renewed 2017, renewed 3/15/2018, 2018; 2023, 2024, 7/10/2024    8) Crisis Plan: 3/27/23 (PRIOR therapist), 7/10/2024    Discussed self monitoring of symptoms, and symptom monitoring tools.    Patient has been informed of 24 hours and weekend coverage for urgent situations accessed by calling the main clinic phone number.        Psychotherapy in session:  Time spent performing psychotherapy: 17 minutes supportive therapy related to legal, financial, housing, self care and other matters    Visit Time    Visit Start Time: 415  Visit Stop Time: 447  Total Visit Duration:  32 minutes

## 2024-07-10 ENCOUNTER — OFFICE VISIT (OUTPATIENT)
Dept: PSYCHIATRY | Facility: CLINIC | Age: 39
End: 2024-07-10
Payer: COMMERCIAL

## 2024-07-10 DIAGNOSIS — F41.1 GAD (GENERALIZED ANXIETY DISORDER): ICD-10-CM

## 2024-07-10 DIAGNOSIS — F33.2 MAJOR DEPRESSIVE DISORDER, RECURRENT, SEVERE W/O PSYCHOTIC BEHAVIOR (HCC): ICD-10-CM

## 2024-07-10 DIAGNOSIS — F40.10 SOCIAL PHOBIA: ICD-10-CM

## 2024-07-10 PROCEDURE — 99214 OFFICE O/P EST MOD 30 MIN: CPT | Performed by: PSYCHIATRY & NEUROLOGY

## 2024-07-10 PROCEDURE — 90833 PSYTX W PT W E/M 30 MIN: CPT | Performed by: PSYCHIATRY & NEUROLOGY

## 2024-07-10 RX ORDER — CLONAZEPAM 0.5 MG/1
TABLET ORAL
Qty: 60 TABLET | Refills: 2 | Status: SHIPPED | OUTPATIENT
Start: 2024-07-10

## 2024-07-10 NOTE — BH CRISIS PLAN
JoseEthan Safety Plan    Creation Date: 7/10/24    Created By: lCiff Coronado III, DO       Step 1: Warning Signs:   Warning Signs   i just close down, don't want to be bothered by anybody   isolate            Step 2: Internal Coping Strategies:   Internal Coping Strategies   be with my dogs   work with animals   breathing, calming   naps            Step 3: People and social settings that provide distraction:   Name Contact Information   daughter, Jennifer cell   Coworker, Ana Paula cell   Friend, Marjorie cell    Places   car         Step 4: People whom I can ask for help during a crisis:    Name Contact Information    Daughter, Jennifer cell    coworker, Ana Paula cell    Friend, Marjorie cell      Step 5: Professionals or agencies I can contact during a crisis:    Clinican/Agency Name Phone Emergency Contact    Dr. Coronado [phone]     [facility] Emergency Department Emergency Department Phone Emergency   Department Address    [facility]        Crisis Phone Numbers:   Suicide Prevention Lifeline: Call or Text  989 Crisis Text Line: Text HOME   to 798-440   Please note: Some [facility] do not have a separate number for   Child/Adolescent specific crisis. If your county is not listed under   Child/Adolescent, please call the adult number for your county      Adult Crisis Numbers: Child/Adolescent Crisis Numbers   [facility]: [phone] [facility]: [phone]   [facility]: [phone] [facility]: [phone]   [facility]: [phone] [place]: [phone]   [facility]: [phone] [facility]: [phone]   [facility]: [phone]   [facility]: [phone]   [facility]: [phone]   [facility]: [phone] (daytime) [phone]   (after hours, weekends, holidays)      Step 6: Making the environment safer (plan for lethal means safety):   Patient did not identify any lethal methods: Yes     Optional: What is most important  to me and worth living for?      Emely Safety Plan. Qing Garcia and Jeffery Long. Used with   permission of the authors.

## 2024-07-10 NOTE — BH TREATMENT PLAN
"    TREATMENT PLAN (Medication Management Only)        [facility]    Name/Date of Birth/MRN:  Desire العلي 39 y.o. 1985 MRN: [id number]  Date of Treatment Plan: July 10, 2024  Diagnosis/Diagnoses:   1. Social phobia    2. JG (generalized anxiety disorder)        Strengths/Personal Resources for Self-Care: caring, good listener, people feel comfortable talking to me, supportive family  Area/Areas of need (in own words): increase distress tolerance skills, increase emotional distress tolerance, ability to share emotions and identify triggers and process unresolved traumas  1. Long Term Goal: \"continuing getting back on my feet\"\"   Target Date: 180 days from treatment plan  Person/Persons responsible for completion of goal: Dr. Coronado and Self  2.  Short Term Objective (s) - How will we reach this goal?:   A.  Provider new recommended medication/dosage changes and/or continue medication(s): as noted in chart.  B.  Reach out and get legal advise about PCRA (Post conviction relief act)  C.  Get back into therapy.   Target Date: 6 months from treatment plan unless noted otherwise  Person/Persons Responsible for Completion of Goal: Dr. Coronado and Self   Progress Towards Goals: continuing treatment   Treatment Modality: Medication management and therapy PRN  Review due 180 days from date of this plan: Approximately 6 months from today ( 1/10/2025 )    Expected length of service: ongoing treatment unless revised  My Physician/PA/NP and I have developed this plan together and I agree to work on the goals and objectives. I understand the treatment goals that were developed for my treatment.  Signature:       Date and time:  Signature of parent/guardian if under age of 14 years: Date and time:  Signature of provider:      Date and time:  Signature of Supervising Physician:    Date and time: 7/10/2024      Cliff Coronado III, DO                    "

## 2024-07-11 ENCOUNTER — OFFICE VISIT (OUTPATIENT)
Dept: PHYSICAL THERAPY | Facility: MEDICAL CENTER | Age: 39
End: 2024-07-11
Payer: COMMERCIAL

## 2024-07-11 ENCOUNTER — TELEPHONE (OUTPATIENT)
Dept: PSYCHIATRY | Facility: CLINIC | Age: 39
End: 2024-07-11

## 2024-07-11 DIAGNOSIS — M54.50 CHRONIC RIGHT-SIDED LOW BACK PAIN WITHOUT SCIATICA: Primary | ICD-10-CM

## 2024-07-11 DIAGNOSIS — Z47.89 ORTHOPEDIC AFTERCARE: ICD-10-CM

## 2024-07-11 DIAGNOSIS — G89.29 CHRONIC RIGHT-SIDED LOW BACK PAIN WITHOUT SCIATICA: Primary | ICD-10-CM

## 2024-07-11 DIAGNOSIS — G56.21 ULNAR NERVE ENTRAPMENT AT ELBOW, RIGHT: ICD-10-CM

## 2024-07-11 PROCEDURE — 97140 MANUAL THERAPY 1/> REGIONS: CPT | Performed by: PHYSICAL THERAPIST

## 2024-07-11 PROCEDURE — 97110 THERAPEUTIC EXERCISES: CPT | Performed by: PHYSICAL THERAPIST

## 2024-07-11 PROCEDURE — 97112 NEUROMUSCULAR REEDUCATION: CPT | Performed by: PHYSICAL THERAPIST

## 2024-07-11 NOTE — TELEPHONE ENCOUNTER
----- Message from Cliff Coronado DO sent at 7/11/2024 10:18 AM EDT -----  Hello,    Sounds like there was a crossing of wires. She was to see Dr. Wong for psych testing, not therapy. And she wants therapy too.    GILBERT Calles  ----- Message -----  From: Whitney Sinclair  Sent: 7/11/2024   8:04 AM EDT  To: Cliff Coronado III, DO    Hi Dr. Coronado!    Pt is on wait list but under only Dr. Wong for therapy. I can open up pt to more therapist options if you'd like. In addition, due to pt's medicaid insurance, we are unable to schedule with a virtual provider (Janny).    Thanks!  Whitney  ----- Message -----  From: Cliff Coronado III, DO  Sent: 7/10/2024   4:33 PM EDT  To: Psychiatric Assoc Intake    Desire notes she called and has not heard back.just making sure she is on the waitlist. With prior therapist she had no money but wants to come back. Ok with Janny or other therapist is fine.    ThanksGILBERT

## 2024-07-11 NOTE — PROGRESS NOTES
"Daily Note     Today's date: 2024  Patient name: Desire العلي  : 1985  MRN: [id number]  Referring provider: Cami Palmer MD  Dx:   Encounter Diagnosis     ICD-10-CM    1. Chronic right-sided low back pain without sciatica  M54.50     G89.29       2. Ulnar nerve entrapment at elbow, right  G56.21       3. Orthopedic aftercare  Z47.89                      Subjective:  Patient states her increased symptoms from last visit are much improved. Her menstrual cycle has passed at this point.     Objective: See treatment diary below      Assessment: Patient presents with decreased soft tissue tension throughout right lower thoracic paraspinals and right glute region.   She is improving in lumbopelvic and core motor control. Progressed interventions to include dynamic functional positions that carry over into every day activities. Mirror was used for visual feedback for self correction of the trunk.   Patient would benefit from continued PT to address mobility, motor control, and strength impairments to improve functional movement patterns and decrease pain with activity.     Plan: Continue per plan of care.      Precautions: lupus, adhesive sensitivity    HEP: t/s extension, bridge with ball, bridge with band, bird dogs  Manuals  7/      LAD  L Gr. V CC L Gr V np L Gr V L Gr V np      Mid-low thoracic PA joint mobs Gr. III Glides  CC Gr III Gr. III Gr. III Gr. III Gr. III      Theragun/percussion R paraspinals/glute region CK CC CK CK CK CK + STM CK + STM                                Neuro Re-Ed             Bird dog nv X10 ea  Standing at wall 2x10 quadruped on mat leg only  2x10 quadruped on mat leg only  nv np       Multifidus press nv Green  5\"x10 Green x30 ea Green x30 ea Green x30 ea Green x30 ea Blue x30 ea      Unilateral  farmer carry (mirror)   GKB X2 laps ea GKB x2 laps ea BKB 2 laps ea BKB 2 laps ea BKB 3 laps ea                                                        " "  Ther Ex             T/s extension (mid-low) x10 X10  Seated w/ half roll Supine with full roll 5\" x10 5\"x10 nv  np       Bridge with ball squeeze 5\"x30 5\"  2x10 5\" x30 5\"x30 5\"x30 5\"x30 5\"x30      Supine band abd with bridge nv Green  5\"  2x10 Green 5\" 3x10 Green 5\" x30 Green 5\"x30 Green 5\"x30 Blue 5\"x30      Hip extension/glute kick back over EOT nv 30\"x3 3x10 ea 3x10 ea nv 3x10 ea 3x10 ea      Foam roller glute/piriformis      Reviewed- HEP       Lateral band walks       BTB x2 laps      Monster walks       BTB x2 laps                   Ther Activity                                       Gait Training                                       Modalities                                            "

## 2024-07-17 ENCOUNTER — TELEPHONE (OUTPATIENT)
Dept: PSYCHIATRY | Facility: CLINIC | Age: 39
End: 2024-07-17

## 2024-07-17 NOTE — TELEPHONE ENCOUNTER
Left voicemail informing patient and/or parent/guardian of the Psych Encounter form needing to be signed as a requirement from the insurance company for billing purposes. Patient can access form via [employer] and sign electronically.     Please make patient aware this form must be signed for each visit as a requirement to continue future visits with provider.

## 2024-07-18 ENCOUNTER — APPOINTMENT (OUTPATIENT)
Dept: PHYSICAL THERAPY | Facility: MEDICAL CENTER | Age: 39
End: 2024-07-18
Payer: COMMERCIAL

## 2024-07-24 ENCOUNTER — TELEPHONE (OUTPATIENT)
Dept: PSYCHIATRY | Facility: CLINIC | Age: 39
End: 2024-07-24

## 2024-07-25 ENCOUNTER — APPOINTMENT (OUTPATIENT)
Dept: PHYSICAL THERAPY | Facility: MEDICAL CENTER | Age: 39
End: 2024-07-25
Payer: COMMERCIAL

## 2024-08-01 ENCOUNTER — OFFICE VISIT (OUTPATIENT)
Dept: PHYSICAL THERAPY | Facility: MEDICAL CENTER | Age: 39
End: 2024-08-01
Payer: COMMERCIAL

## 2024-08-01 DIAGNOSIS — G89.29 CHRONIC RIGHT-SIDED LOW BACK PAIN WITHOUT SCIATICA: Primary | ICD-10-CM

## 2024-08-01 DIAGNOSIS — M54.50 CHRONIC RIGHT-SIDED LOW BACK PAIN WITHOUT SCIATICA: Primary | ICD-10-CM

## 2024-08-01 PROCEDURE — 97140 MANUAL THERAPY 1/> REGIONS: CPT | Performed by: PHYSICAL THERAPIST

## 2024-08-01 PROCEDURE — 97112 NEUROMUSCULAR REEDUCATION: CPT | Performed by: PHYSICAL THERAPIST

## 2024-08-01 PROCEDURE — 97110 THERAPEUTIC EXERCISES: CPT | Performed by: PHYSICAL THERAPIST

## 2024-08-01 NOTE — PROGRESS NOTES
Daily Note     Today's date: 2024  Patient name: Desire العلي  : 1985  MRN: [id number]  Referring provider: Cami Palmer MD  Dx:   Encounter Diagnosis     ICD-10-CM    1. Chronic right-sided low back pain without sciatica  M54.50     G89.29                      Subjective:  Patient states her chronic right sided low back pain is better managed but she still has frequent tightness and discomfort. She does find temporary relief with home exercises but finds the symptoms do return with standing, sitting, and walking. She is waiting to hear back from pain management.    Objective: See treatment diary below      Assessment: Patient has been compliant with PT attendance and HEP performance. Her chronic right low sided back pain is better managed at this point. She has no functional limitations at this point. She does still have right SIJ and lower lumber and mid thoracic mobility impairments that she is working ln with self mobilization and functional mobility exercises. She has improved lumbopelvic motor control and coordination and improved posterior chain strength. We spent time discussing long term management of symptoms through a comprehensive home program and returning to formal PT if symptoms are no longer managed. Patient verbalized understanding. Comprehensive home program was reviewed and patient demonstrated understanding.     Plan: DC with comprehensive HEP.      Precautions: lupus, adhesive sensitivity    HEP: t/s extension, bridge with ball, bridge with band, bird dogs  Manuals  7 8     LAD  L Gr. V CC L Gr V np L Gr V L Gr V np np     Mid-low thoracic PA joint mobs Gr. III Glides  CC Gr III Gr. III Gr. III Gr. III Gr. III Gr. III     Theragun/percussion R paraspinals/glute region CK CC CK CK CK CK + STM CK + STM CK + STM                               Neuro Re-Ed             Bird dog nv X10 ea  Standing at wall 2x10 quadruped on mat leg only  2x10 quadruped on  "mat leg only  nv np       Multifidus press nv Green  5\"x10 Green x30 ea Green x30 ea Green x30 ea Green x30 ea Blue x30 ea Blue x30 ea     Unilateral  farmer carry (mirror)   GKB X2 laps ea GKB x2 laps ea BKB 2 laps ea BKB 2 laps ea BKB 3 laps ea BKB 3 laps ea                                                         Ther Ex             T/s extension (mid-low) x10 X10  Seated w/ half roll Supine with full roll 5\" x10 5\"x10 nv  np       Bridge with ball squeeze 5\"x30 5\"  2x10 5\" x30 5\"x30 5\"x30 5\"x30 5\"x30 5\"x30     Supine band abd with bridge nv Green  5\"  2x10 Green 5\" 3x10 Green 5\" x30 Green 5\"x30 Green 5\"x30 Blue 5\"x30 Blue 5\"x30     Hip extension/glute kick back over EOT nv 30\"x3 3x10 ea 3x10 ea nv 3x10 ea 3x10 ea 3x10 ea     Foam roller glute/piriformis      Reviewed- HEP       Lateral band walks       BTB x2 laps BTB x2 laps     Monster walks       BTB x2 laps BTB x2 laps                  Ther Activity                                       Gait Training                                       Modalities                                            "

## 2024-09-27 NOTE — TELEPHONE ENCOUNTER
PSA is normal for age group at 0.93.  Thyroid function is normal at 1.029.  CMP is unremarkable.  Lipid panel is unremarkable.  CBC is normal. Yes please call and review this option for good Rx with patient    Thank you very much

## 2024-10-06 NOTE — PSYCH
"  MEDICATION MANAGEMENT NOTE        [facility] - [facility]      Name and Date of Birth:  Desire العلي 39 y.o. 1985    Date of Visit: October 6, 2024    SUBJECTIVE:  CC: Desire presents today for follow up on \"doing pretty good\"; depression and anxiety     Desire is now in the clinic (was  and kennels before that). Easy in a sense but mentally stressful (people are just the staf talks a lot, negative and has their own problems.     She has 1 more year on probation (1 yr behind her)    'i have not been feeling myself lately\", was doing fine but feels she is a bit distracted, thinking about future tasks, and feeling in a rush.. \"I feel like every moment must be accounted for\", \"I just feel rushed\"    Has her dogs living with her.     She is under financial pressure, so that is certainly an element.     She has increase klonopin to regular use when she goes to work.    She has been more irrtable toward others when the carin 'slowing me down\".    Not eating much and also not taking supplements or getting protein.    Daughter 16 not staying with her (stays with dad) since the raid, boys 22 and 20.       Still settling in to her home. Cannot afford storage unit, so plans to go there after the appointment.     F/U prn- lupus pain  - Going to [employer]  - Friends  F/U PRN Thinking about finishing school.  F/U PRN- Her kids are old enough to not need babysitters/ support; Children-  split custody, oldest son (2004), middle (son) (7/2004), youngest girl (2009). - daughter (first job, Gab[employer] Moffett, 13yo).    Med Compliance: yes  HPI ROS:                      ('was' below is from prior visit)  Medication Side Effects (other than noted):  no    Depression (10 worst):  7.5    Anxiety (10 worst):  9 for the past 2 weeks    Hallucinations or Psychosis  no    Safety concerns (self harm thoughts, suicidal ideation, HI, etc): no    Sleep: (NM = Nightmares)  Good, 7-8    Energy:  low  "   Appetite:  low    Weight Change:  Stable.      Since our last visit, overall symptoms have been worsening.          PHQ-2/9 Depression Screening                 Desire denies any side effects from medications unless noted above    Review Of Systems as noted above. Otherwise A relevant review of symptoms was otherwise negative    History Review: The following portions of the patient's history were reviewed and documented: allergies, current medications, past family history, past medical history, past social history, and problem list.     Lab Review: Labs were reviewed      OBJECTIVE:     MENTAL STATUS EXAM  Appearance:  age appropriate   Behavior:  pleasant, cooperative, with good eye contact   Speech:  Normal volume, regular rate and rhythm   Mood:  depressed and anxious   Affect:  mood congruent   Language: intact and appropriate for age, education, and intellect   Thought Process:  Linear and goal directed   Associations: intact associations   Thought Content:  normal and appropriate, negative thinking and cognitive distortions   Perceptual Disturbances: no auditory or visual hallcunations   Risk Potential / Abnormal Thoughts: Suicidal ideation - None  Homicidal ideation - None  Potential for aggression - No       Consciousness:  Alert & Awake   Sensorium:  Grossly oriented   Attention: attention span and concentration are age appropriate       Fund of Knowledge:  Memory: awareness of current events: yes  recent and remote memory grossly intact   Insight:  good   Judgment: good   Muscle Strength Muscle Tone: normal  normal   Gait/Station: normal gait/station with good balance   Motor Activity: no abnormal movements       Risks, Benefits And Possible Side Effects Of Medications:    AGREE: Risks, benefits, and possible side effects of medications explained to Desire and she (or legal representative) verbalizes understanding and agreement for treatment.    Controlled Medication Discussion:     Desire has been  "filling controlled prescriptions on time as prescribed according to [place] Prescription Drug Monitoring program.   _____________________________________________________________      Psychiatric History     Patient has a past diagnoses of major depressive disorder and anxiety and has never been told that she has bipolar disorder. She was seen a psychiatrist for a short period of time and also a therapist at [facility].     She has never been hospitalized for mental health never had a suicide attempt.    [facility] 3/2019    Social History:  Patient was raised in [place] and her parents  when she was young. She was raised by her mother and her boyfriend. Her childhood was \"not normal\" and there is constantly fighting at home. She denies any physical or sexual abuse. His one brother. She developed normally as far she is aware.     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 She has split custody of her 3 children from a 14-year-old relationship. She left home and \"he took advantage of that\".     She is [employer]. No  history no legal issues now or in the past and no weapons.      Never had issues with alcohol or drugs, never needed rehabilitation       Family Psychiatric History:     Father    1. Family history of alcohol abuse (V61.41) (Z81.1)   2. Denied: Family history of suicide  Family History    3. Family history of Drug addiction   4. Family history of alcohol abuse (V61.41) (Z81.1)   5. Denied: Family history of bipolar disorder   6. Denied: Family history of paranoid schizophrenia   7. Denied: Family history of suicide   8. Family history of No Significant Family History         Assessment/Plan:        Assessment & Plan  Social phobia  Not at goal but not focal issue  Orders:    clonazePAM (KlonoPIN) 0.5 mg tablet; TAKE 1/2 TO 1 TABLET BY MOUTH 2 TIMES A DAY AS NEEDED FOR ANXIETY.    Vortioxetine HBr (Trintellix) 20 MG tablet; Take 1 tablet (20 mg " total) by mouth daily    busPIRone (BUSPAR) 15 mg tablet; Take 1/2 tab BID. After 1 week may increase to 1 tab BID    JG (generalized anxiety disorder)  Not at goal, will add buspar. She is using klonopin more frequently (appropriately)   Orders:    clonazePAM (KlonoPIN) 0.5 mg tablet; TAKE 1/2 TO 1 TABLET BY MOUTH 2 TIMES A DAY AS NEEDED FOR ANXIETY.    Vortioxetine HBr (Trintellix) 20 MG tablet; Take 1 tablet (20 mg total) by mouth daily    busPIRone (BUSPAR) 15 mg tablet; Take 1/2 tab BID. After 1 week may increase to 1 tab BID    Major depressive disorder, recurrent, severe w/o psychotic behavior (HCC)  Not at goal, seems driving by situational factors and could be off label benefited from buspar  Orders:    Vortioxetine HBr (Trintellix) 20 MG tablet; Take 1 tablet (20 mg total) by mouth daily    busPIRone (BUSPAR) 15 mg tablet; Take 1/2 tab BID. After 1 week may increase to 1 tab BID        ______________________________________________________________________    Major depressive disorder, recurrent, severe without psychosis (Highly unlikely bipolar disorder, but h/o diagnosis)  generalized anxiety disorder  social phobia.     ---     MDD - not at goal  JG - not at goal  Social phobia - not at goal.     AQUILES Phipps is doing a bit worse, seems likely due to stress, anxiety and this is leading to depression and concentration/attention issues. She is interested in buspar    She feels trintellix has been good for her, does not want to take it away     Atarax was for itching, never took for anxiety but we could explore in the future. Took Abilify only for a few days (sedation) and stopped. Could revisit. At this point modafinil would need to be off label, which I am not opposed to due to the appropriate use, tolerability, and benefit. Cost an issue    Revisit at future visit - psych testing (Referral made 11/2022 but she did not follow through, she was going to reach out again 2/24). Questioning her  distractibility (starts project then goes to another, or in cleaning the house will go from item to item and then the whole house is a mess)    In February 2023 Bolivar Elise reviewed situation and a referral to MONIQUE Jenkins but they did not help her much, so she discharged in May 2023    I do not believe that she has bipolar disorder but mood stabilizers for anger and irritability if other paths do not seem to be appropriate or beneficial can be considered. Could consider abilify, retrial nortriptyline, buspar, lamictal, other directions. Klonopin increase has been discussed in past, prefer other directions as reasonable.    - sleep study 10/2021 - Has sleep apnea, now uses CPAP     [facility]  She is heterozygous for MTHFR, no cancer history.    Safety Risk Assessment: See above. Has had passive SI since ~2015. She states that she has protective features including her children and that she would never actually carry anything out. Safety risk low.    Confidential Assessment:  Past psychotropic medications include  hydroxyzine,   klonopin,   buspar (low dose, no recall details),   cymbalta 30mg (no recall of details),  zoloft (no issues),   neurontin (no help),   Viibryd 10 mg (x2-3mo, no side effects or benefit noted; was paying out of pocket).  Effexor (irritable)  Wellbutrin (irritable).   Prozac (80mg, was not adequate, even with Nortrip 50mg. Went to trintellix)  Topiramate (no benefit at 100mg, no issues) .   Nortriptyline (some benefit at 50mg, dry mouth (did improve), decided to go different way but could reconsider)  Remeron- worked in past and then it did not work  trintellix- more depressed at 20mg, although significant stressors coocurring  Abilify 2.5mg - made groggy (4/2024), only a few days   - (STOPPED, cannot afford and insurance denied) Modafinil 200mg daily ~2/2024   - has atarax for itching, but may try for anxiety 25mg-50mg daily prn. (Stopped taking, never took it for anxiety) 2024  Buspar  10/7/2024    Scales:          Treatment Plan:      Patient has been educated about their diagnosis and treatment modalities. They voiced understanding and agreement with the following plan:     - GENE S[facility] TESTING initially reviewed 12/3/2018 with Desire    1) Meds:   - Klonopin 0.5mg BID PRN   - Trintellix 20mg daily.    - START Buspar 7.5mg BID. After 1wk may increase to 15mg BID. PARQ completed including serotonin syndrome, dizziness, sedation, GI distress, confusion, possible mood changes, visual disturbances, drug interactions, and others.      2) Labs:   - : TSH 1.523, Vit D 52.4   - 2021: ECG (Cardiology) , NS arrhythmia   0 3/2019: BMP WNL, TSH 2.983; FT3 2.53   - 2018: Vit D 11.8, TSH 3.96 and FT4 0.82   - 2017: TSH 2.76; Vit D 34.1   - 2017: TSH 1.85   - : CBC, CMP unremarkable, TSH 4.38, Vit D 21.6, , HDL 39     3) Therapy:   - NO LONGER Janny Goss ([facility]); Was [facility]    4) Medical:  LUPUS; hypothyroidism with pregnancy; history of kidney stones. Tubal ligation.    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        7) Treatment Plan: Enacted 2017, Renewed 2017, renewed 3/15/2018, 2018; 2023, 2024, 7/10/2024, 10/7/2024    8) Crisis Plan: 3/27/23 (PRIOR therapist), 7/10/2024, 10/7/2024    Discussed self monitoring of symptoms, and symptom monitoring tools.    Patient has been informed of 24 hours and weekend coverage for urgent situations accessed by calling the main clinic phone number.        Psychotherapy in session:  Time spent performing psychotherapy: 18 minutes supportive therapy related to finances, work, self care, family, legal frustrations and other  matters    Visit Time    Visit Start Time:  504   Visit Stop Time: 536  Total Visit Duration:  34 minutes

## 2024-10-07 ENCOUNTER — OFFICE VISIT (OUTPATIENT)
Dept: PSYCHIATRY | Facility: CLINIC | Age: 39
End: 2024-10-07
Payer: COMMERCIAL

## 2024-10-07 DIAGNOSIS — F40.10 SOCIAL PHOBIA: ICD-10-CM

## 2024-10-07 DIAGNOSIS — F41.1 GAD (GENERALIZED ANXIETY DISORDER): Primary | ICD-10-CM

## 2024-10-07 DIAGNOSIS — F33.2 MAJOR DEPRESSIVE DISORDER, RECURRENT, SEVERE W/O PSYCHOTIC BEHAVIOR (HCC): ICD-10-CM

## 2024-10-07 PROCEDURE — 99214 OFFICE O/P EST MOD 30 MIN: CPT | Performed by: PSYCHIATRY & NEUROLOGY

## 2024-10-07 PROCEDURE — 90833 PSYTX W PT W E/M 30 MIN: CPT | Performed by: PSYCHIATRY & NEUROLOGY

## 2024-10-07 RX ORDER — BUSPIRONE HYDROCHLORIDE 15 MG/1
TABLET ORAL
Qty: 60 TABLET | Refills: 1 | Status: SHIPPED | OUTPATIENT
Start: 2024-10-07

## 2024-10-07 RX ORDER — CLONAZEPAM 0.5 MG/1
TABLET ORAL
Qty: 60 TABLET | Refills: 2 | Status: SHIPPED | OUTPATIENT
Start: 2024-10-07

## 2024-10-07 NOTE — ASSESSMENT & PLAN NOTE
Not at goal, seems driving by situational factors and could be off label benefited from buspar  Orders:    Vortioxetine HBr (Trintellix) 20 MG tablet; Take 1 tablet (20 mg total) by mouth daily    busPIRone (BUSPAR) 15 mg tablet; Take 1/2 tab BID. After 1 week may increase to 1 tab BID

## 2024-10-07 NOTE — BH TREATMENT PLAN
"    TREATMENT PLAN (Medication Management Only)        [facility] - [facility]YCHIATRIC [facility]    Name/Date of Birth/MRN:  Desire العلي 39 y.o. 1985 MRN: [id number]  Date of Treatment Plan: October 7, 2024  Diagnosis/Diagnoses:   1. Social phobia    2. JG (generalized anxiety disorder)    3. Major depressive disorder, recurrent, severe w/o psychotic behavior (HCC)      Strengths/Personal Resources for Self-Care: caring, good listener, people feel comfortable talking to me, supportive family  Area/Areas of need (in own words): increase distress tolerance skills, increase emotional distress tolerance, ability to share emotions and identify triggers and process unresolved traumas  1. Long Term Goal: \"continuing getting back on my feet\"\"   Target Date: 180 days from treatment plan  Person/Persons responsible for completion of goal: Dr. Coronado and Self  2.  Short Term Objective (s) - How will we reach this goal?:   A.  Provider new recommended medication/dosage changes and/or continue medication(s): as noted in chart.  B.  Get back to eating more regular, taking vitamins.  C.  Get back into therapy.   Target Date: 6 months from treatment plan unless noted otherwise  Person/Persons Responsible for Completion of Goal: Dr. Coronado and Self   Progress Towards Goals: continuing treatment   Treatment Modality: Medication management and therapy PRN  Review due 180 days from date of this plan: Approximately 6 months from today ( 4/7/2025 )    Expected length of service: ongoing treatment unless revised  My Physician/PA/NP and I have developed this plan together and I agree to work on the goals and objectives. I understand the treatment goals that were developed for my treatment.  Signature:       Date and time:  Signature of parent/guardian if under age of 14 years: Date and time:  Signature of provider:      Date and time:  Signature of Supervising Physician:    Date and time: 10/7/2024      Cliff TIRADO" Cliff ESPINOZA, DO

## 2024-10-07 NOTE — ASSESSMENT & PLAN NOTE
Not at goal but not focal issue  Orders:    clonazePAM (KlonoPIN) 0.5 mg tablet; TAKE 1/2 TO 1 TABLET BY MOUTH 2 TIMES A DAY AS NEEDED FOR ANXIETY.    Vortioxetine HBr (Trintellix) 20 MG tablet; Take 1 tablet (20 mg total) by mouth daily    busPIRone (BUSPAR) 15 mg tablet; Take 1/2 tab BID. After 1 week may increase to 1 tab BID

## 2024-10-07 NOTE — BH CRISIS PLAN
[facility] Safety Plan    Creation Date: 7/10/24    Created By: Clfif Coronado III, DO       Step 1: Warning Signs:   Warning Signs   i just close down, don't want to be bothered by anybody   isolate            Step 2: Internal Coping Strategies:   Internal Coping Strategies   be with my dogs   work with animals   breathing, calming   naps            Step 3: People and social settings that provide distraction:   Name Contact Information   daughter, Jennifer cell   Coworker, Ana Paula cell   Friend, Marjorie cell    Places   car         Step 4: People whom I can ask for help during a crisis:    Name Contact Information    Daughter, Jennifer cell    coworker, Ana Paula cell    Friend, Marjorie cell      Step 5: Professionals or agencies I can contact during a crisis:    Clinican/Agency Name Phone Emergency Contact    Dr. Coronado [phone]     [facility] Emergency Department Emergency Department Phone Emergency   Department Address    [facility]        Crisis Phone Numbers:   Suicide Prevention Lifeline: Call or Text  980 Crisis Text Line: Text HOME   to 656-120   Please note: Some [facility] do not have a separate number for   Child/Adolescent specific crisis. If your county is not listed under   Child/Adolescent, please call the adult number for your county      Adult Crisis Numbers: Child/Adolescent Crisis Numbers   [facility]: [phone] [facility]: [phone]   [facility]: [phone] [facility]: [phone]   [facility]: [phone] [place]: [phone]   [facility]: [phone] [place]: [phone]   [facility]: [phone]   [facility]: [phone]   [facility]: [phone]   [facility]: [phone] (daytime) [phone]   (after hours, weekends, holidays)      Step 6: Making the environment safer (plan for lethal means safety):   Patient did not identify any lethal methods: Yes     Optional: What is most important  to me and worth living for?      [place] Safety Plan. Qing Garcia and Jeffery Long. Used with   permission of the authors.

## 2024-10-07 NOTE — ASSESSMENT & PLAN NOTE
Not at goal, will add buspar. She is using klonopin more frequently (appropriately)   Orders:    clonazePAM (KlonoPIN) 0.5 mg tablet; TAKE 1/2 TO 1 TABLET BY MOUTH 2 TIMES A DAY AS NEEDED FOR ANXIETY.    Vortioxetine HBr (Trintellix) 20 MG tablet; Take 1 tablet (20 mg total) by mouth daily    busPIRone (BUSPAR) 15 mg tablet; Take 1/2 tab BID. After 1 week may increase to 1 tab BID

## 2024-10-11 DIAGNOSIS — E03.9 ACQUIRED HYPOTHYROIDISM: ICD-10-CM

## 2024-10-12 RX ORDER — LEVOTHYROXINE SODIUM 25 UG/1
25 TABLET ORAL
Qty: 90 TABLET | Refills: 0 | Status: SHIPPED | OUTPATIENT
Start: 2024-10-12

## 2024-10-17 ENCOUNTER — TELEPHONE (OUTPATIENT)
Age: 39
End: 2024-10-17

## 2024-10-17 NOTE — TELEPHONE ENCOUNTER
Contacted patient for Talk Therapy  to verify needs of services in attempts to offer patient an appointment at Available Office. Writer verified N/A - NO ANSWER. Writer KRISTANM and left callback number [phone]; option 3.    1st call attempt

## 2024-10-19 ENCOUNTER — APPOINTMENT (OUTPATIENT)
Dept: LAB | Facility: HOSPITAL | Age: 39
End: 2024-10-19
Payer: COMMERCIAL

## 2024-10-19 DIAGNOSIS — IMO0002 LUPUS: ICD-10-CM

## 2024-10-19 DIAGNOSIS — Z79.899 HIGH RISK MEDICATION USE: ICD-10-CM

## 2024-10-19 LAB
ALBUMIN SERPL BCG-MCNC: 4.1 G/DL (ref 3.5–5)
ALP SERPL-CCNC: 43 U/L (ref 34–104)
ALT SERPL W P-5'-P-CCNC: 11 U/L (ref 7–52)
ANION GAP SERPL CALCULATED.3IONS-SCNC: 2 MMOL/L (ref 4–13)
AST SERPL W P-5'-P-CCNC: 13 U/L (ref 13–39)
BACTERIA UR QL AUTO: ABNORMAL /HPF
BASOPHILS # BLD AUTO: 0.02 THOUSANDS/ΜL (ref 0–0.1)
BASOPHILS NFR BLD AUTO: 0 % (ref 0–1)
BILIRUB SERPL-MCNC: 0.72 MG/DL (ref 0.2–1)
BILIRUB UR QL STRIP: NEGATIVE
BUN SERPL-MCNC: 10 MG/DL (ref 5–25)
C3 SERPL-MCNC: 65 MG/DL (ref 87–200)
C4 SERPL-MCNC: 19 MG/DL (ref 19–52)
CALCIUM SERPL-MCNC: 8.9 MG/DL (ref 8.4–10.2)
CHLORIDE SERPL-SCNC: 105 MMOL/L (ref 96–108)
CLARITY UR: CLEAR
CO2 SERPL-SCNC: 31 MMOL/L (ref 21–32)
COLOR UR: YELLOW
CREAT SERPL-MCNC: 0.59 MG/DL (ref 0.6–1.3)
CREAT UR-MCNC: 148.2 MG/DL
CRP SERPL QL: <1 MG/L
EOSINOPHIL # BLD AUTO: 0.14 THOUSAND/ΜL (ref 0–0.61)
EOSINOPHIL NFR BLD AUTO: 3 % (ref 0–6)
ERYTHROCYTE [DISTWIDTH] IN BLOOD BY AUTOMATED COUNT: 13 % (ref 11.6–15.1)
ERYTHROCYTE [SEDIMENTATION RATE] IN BLOOD: <1 MM/HOUR (ref 0–19)
GFR SERPL CREATININE-BSD FRML MDRD: 115 ML/MIN/1.73SQ M
GLUCOSE P FAST SERPL-MCNC: 88 MG/DL (ref 65–99)
GLUCOSE UR STRIP-MCNC: NEGATIVE MG/DL
HCT VFR BLD AUTO: 37.6 % (ref 34.8–46.1)
HGB BLD-MCNC: 12.6 G/DL (ref 11.5–15.4)
HGB UR QL STRIP.AUTO: NEGATIVE
IMM GRANULOCYTES # BLD AUTO: 0.01 THOUSAND/UL (ref 0–0.2)
IMM GRANULOCYTES NFR BLD AUTO: 0 % (ref 0–2)
KETONES UR STRIP-MCNC: ABNORMAL MG/DL
LEUKOCYTE ESTERASE UR QL STRIP: NEGATIVE
LYMPHOCYTES # BLD AUTO: 1.34 THOUSANDS/ΜL (ref 0.6–4.47)
LYMPHOCYTES NFR BLD AUTO: 28 % (ref 14–44)
MCH RBC QN AUTO: 30.8 PG (ref 26.8–34.3)
MCHC RBC AUTO-ENTMCNC: 33.5 G/DL (ref 31.4–37.4)
MCV RBC AUTO: 92 FL (ref 82–98)
MONOCYTES # BLD AUTO: 0.29 THOUSAND/ΜL (ref 0.17–1.22)
MONOCYTES NFR BLD AUTO: 6 % (ref 4–12)
MUCOUS THREADS UR QL AUTO: ABNORMAL
NEUTROPHILS # BLD AUTO: 3.08 THOUSANDS/ΜL (ref 1.85–7.62)
NEUTS SEG NFR BLD AUTO: 63 % (ref 43–75)
NITRITE UR QL STRIP: NEGATIVE
NON-SQ EPI CELLS URNS QL MICRO: ABNORMAL /HPF
NRBC BLD AUTO-RTO: 0 /100 WBCS
PH UR STRIP.AUTO: 7 [PH]
PLATELET # BLD AUTO: 194 THOUSANDS/UL (ref 149–390)
PMV BLD AUTO: 10 FL (ref 8.9–12.7)
POTASSIUM SERPL-SCNC: 4 MMOL/L (ref 3.5–5.3)
PROT SERPL-MCNC: 6.4 G/DL (ref 6.4–8.4)
PROT UR STRIP-MCNC: ABNORMAL MG/DL
PROT UR-MCNC: 11.2 MG/DL
PROT/CREAT UR: 0.1 MG/G{CREAT} (ref 0–0.1)
RBC # BLD AUTO: 4.09 MILLION/UL (ref 3.81–5.12)
RBC #/AREA URNS AUTO: ABNORMAL /HPF
SODIUM SERPL-SCNC: 138 MMOL/L (ref 135–147)
SP GR UR STRIP.AUTO: 1.02 (ref 1–1.03)
UROBILINOGEN UR STRIP-ACNC: 2 MG/DL
WBC # BLD AUTO: 4.88 THOUSAND/UL (ref 4.31–10.16)
WBC #/AREA URNS AUTO: ABNORMAL /HPF

## 2024-10-19 PROCEDURE — 84156 ASSAY OF PROTEIN URINE: CPT

## 2024-10-19 PROCEDURE — 81001 URINALYSIS AUTO W/SCOPE: CPT

## 2024-10-19 PROCEDURE — 82570 ASSAY OF URINE CREATININE: CPT

## 2024-10-21 LAB — DSDNA AB SER-ACNC: 7 IU/ML (ref 0–9)

## 2024-10-23 ENCOUNTER — OFFICE VISIT (OUTPATIENT)
Dept: RHEUMATOLOGY | Facility: CLINIC | Age: 39
End: 2024-10-23
Payer: COMMERCIAL

## 2024-10-23 VITALS
SYSTOLIC BLOOD PRESSURE: 100 MMHG | HEART RATE: 67 BPM | BODY MASS INDEX: 21.53 KG/M2 | HEIGHT: 66 IN | WEIGHT: 134 LBS | OXYGEN SATURATION: 98 % | DIASTOLIC BLOOD PRESSURE: 56 MMHG

## 2024-10-23 DIAGNOSIS — M25.50 ARTHRALGIA OF MULTIPLE JOINTS: ICD-10-CM

## 2024-10-23 DIAGNOSIS — Z79.899 HIGH RISK MEDICATION USE: ICD-10-CM

## 2024-10-23 DIAGNOSIS — M32.9 SYSTEMIC LUPUS ERYTHEMATOSUS, UNSPECIFIED SLE TYPE, UNSPECIFIED ORGAN INVOLVEMENT STATUS (HCC): Primary | ICD-10-CM

## 2024-10-23 DIAGNOSIS — M54.16 LUMBAR RADICULOPATHY: ICD-10-CM

## 2024-10-23 PROCEDURE — 99214 OFFICE O/P EST MOD 30 MIN: CPT | Performed by: INTERNAL MEDICINE

## 2024-10-23 PROCEDURE — 96372 THER/PROPH/DIAG INJ SC/IM: CPT | Performed by: INTERNAL MEDICINE

## 2024-10-23 RX ORDER — HYDROXYCHLOROQUINE SULFATE 200 MG/1
300 TABLET, FILM COATED ORAL DAILY
Qty: 135 TABLET | Refills: 2 | Status: SHIPPED | OUTPATIENT
Start: 2024-10-23 | End: 2025-07-20

## 2024-10-23 RX ORDER — TRIAMCINOLONE ACETONIDE 40 MG/ML
60 INJECTION, SUSPENSION INTRA-ARTICULAR; INTRAMUSCULAR ONCE
Status: COMPLETED | OUTPATIENT
Start: 2024-10-23 | End: 2024-10-23

## 2024-10-23 RX ORDER — GABAPENTIN 100 MG/1
100 CAPSULE ORAL
Qty: 30 CAPSULE | Refills: 6 | Status: SHIPPED | OUTPATIENT
Start: 2024-10-23

## 2024-10-23 RX ADMIN — TRIAMCINOLONE ACETONIDE 60 MG: 40 INJECTION, SUSPENSION INTRA-ARTICULAR; INTRAMUSCULAR at 17:15

## 2024-10-23 NOTE — PATIENT INSTRUCTIONS
Continue hydroxychloroquine 300mg daily; continue regular eye exams  Continue diclofenac gel as needed for joint pain  Continue gabapentin at night as needed for numbness  See pain management  Do labs in 3 months  60mg IM Kenalog steroid injection given in right arm     Return to clinic in 6 months

## 2024-10-23 NOTE — PROGRESS NOTES
RHEUMATOLOGY FOLLOW-UP NOTE    Assessment and Plan:   Desire العلي is a 39 y.o.  female who presents for follow-up of her lupus. She feels that she's starting to have a flare currently, which usually occurs at the beginning of winter time. Her C3 lupus activity lab is low. Also has some bilateral hand numbness, worse at night. Patient's rheumatologic disease(s) threaten long-term function if not appropriately managed.    Continue hydroxychloroquine 300mg daily; continue regular eye exams  Continue diclofenac gel as needed for joint pain  Continue gabapentin at night as needed for numbness  See pain management  Do labs in 3 months  60mg IM Kenalog steroid injection given in right arm     Return to clinic in 6 months  Assessment & Plan  1. Systemic Lupus Erythematosus (SLE).  She reports body aches and a feeling of an impending flare, which typically occurs around this time of year. Laboratory results show normal urine protein levels and a complement level of 65, close to her lowest recorded level of 62.8. Inflammatory markers are normal, but double-stranded DNA was elevated previously. She has been managing well with Plaquenil. A 4-week course of prednisone was suggested, with a tapering dose of 20 mg for the first week and 10 mg for the second week. She opted for a 60 mg IM injection instead of oral prednisone. Lupus activity labs will be repeated in 3 months. She will continue on the maximum dose of Plaquenil for her weight. Her hydroxychloroquine prescription was renewed.    2. Ulnar Neuropathy and Carpal Tunnel Syndrome.  She reports no tingling or weakness in the right hand and no issues in the left hand during the day. Gabapentin will be taken at night as needed for numbness.    3. Chronic Back Pain.  She completed a month of physical therapy for her back. She has been advised to see pain management for further evaluation and potential injections. An MRI of the lumbar spine may be  required.    Follow-up  Return in 6 to 9 months, or sooner if a major flare occurs.    Plan:  Diagnoses and all orders for this visit:    Systemic lupus erythematosus, unspecified SLE type, unspecified organ involvement status (HCC)  -     CBC and differential  -     C4 complement  -     C3 complement  -     Anti-DNA antibody, double-stranded  -     Comprehensive metabolic panel  -     C-reactive protein  -     Urinalysis with microscopic; Future  -     Sedimentation rate, automated  -     Protein / creatinine ratio, urine; Future  -     hydroxychloroquine (PLAQUENIL) 200 mg tablet; Take 1.5 tablets (300 mg total) by mouth daily  -     triamcinolone acetonide (KENALOG-40) 40 mg/mL injection 60 mg    Arthralgia of multiple joints    Lumbar radiculopathy  -     gabapentin (Neurontin) 100 mg capsule; Take 1 capsule (100 mg total) by mouth daily at bedtime As needed for numbness    High risk medication use    High risk medication use - Benefits and risks of hydroxychloroquine, including but not limited to retinal toxicity, corneal deposits, gastrointestinal side effects, and headaches were discussed with the patient. The need for a regular eye exam to monitor for ocular toxicity while on this medication was also explained to the patient.     Follow-up plan: RTC in 6 months         Rheumatic Disease Summary      Chief Complaint  No chief complaint on file.      BLAIRE العلي is a 39 y.o.  female who presents for follow-up.    History of Present Illness  The patient is a 39-year-old female who presents for follow-up of her lupus.    She reports that her lupus symptoms typically worsen in the winter, particularly around November. She recalls requesting steroids last year due to severe body pain. Recently, she has been experiencing similar pain and anticipates a flare-up. She has noticed a pattern of feeling unwell around this time of year, which she attributes to weather changes. Despite these symptoms, she has not  experienced a full flare-up. She is currently taking 1.5 tablets of hydroxychloroquine daily and is up-to-date with her eye exams. She reports that her skin feels hot, which she associates with the onset of a flare-up. She has previously received an intramuscular injection and is interested in receiving another.    She underwent weight loss surgery in April 2022 and has consistently had high protein levels in her urine.    She also takes gabapentin as needed for ulnar neuropathy and carpal tunnel syndrome but notes that it makes her feel groggy. She reports no tingling or weakness in her right hand and no issues with her left hand.    She has been attending physical therapy for her back pain and is seeking an appointment with pain management. She has been using Voltaren gel as needed, which she finds more effective than oral medication.    She has also been taking spironolactone for acne but has stopped as she feels it is not effective. She has found some relief from Retin-A.    SOCIAL HISTORY  She works at [employer].    The following portions of the patient's history were reviewed and updated as appropriate: allergies, current medications, past family history, past medical history, past social history, past surgical history and problem list.    Review of Systems:   See HPI    Home Medications:    Current Outpatient Medications:     gabapentin (Neurontin) 100 mg capsule, Take 1 capsule (100 mg total) by mouth daily at bedtime As needed for numbness, Disp: 30 capsule, Rfl: 6    hydroxychloroquine (PLAQUENIL) 200 mg tablet, Take 1.5 tablets (300 mg total) by mouth daily, Disp: 135 tablet, Rfl: 2    busPIRone (BUSPAR) 15 mg tablet, Take 1/2 tab BID. After 1 week may increase to 1 tab BID, Disp: 60 tablet, Rfl: 1    Calcium 600-400 MG-UNIT CHEW, Chew daily, Disp: , Rfl:     Cholecalciferol (Vitamin D) 50 MCG (2000 UT) tablet, Take 1 tablet (2,000 Units total) by mouth daily, Disp: 90 tablet, Rfl: 1    clonazePAM  (KlonoPIN) 0.5 mg tablet, TAKE 1/2 TO 1 TABLET BY MOUTH 2 TIMES A DAY AS NEEDED FOR ANXIETY., Disp: 60 tablet, Rfl: 2    Dapsone 7.5 % GEL, Apply to face in the morning., Disp: 60 g, Rfl: 1    Diclofenac Sodium (VOLTAREN) 1 %, Apply 2 g topically 4 (four) times a day, Disp: 100 g, Rfl: 6    ketoconazole (NIZORAL) 2 % shampoo, Use daily for 2 weeks, then transition to maintenance therapy at 1-4 x per month. Let the shampoo sit on the skin for at least 5 minutes before rinsing off., Disp: 120 mL, Rfl: 6    ketoconazole (NIZORAL) 2 % shampoo, Apply 1 Application topically daily Let sit for 5 minutes before rinsing clear, Disp: 1 mL, Rfl: 0    levothyroxine 25 mcg tablet, Take 1 tablet (25 mcg total) by mouth daily in the early morning, Disp: 90 tablet, Rfl: 0    Multiple Vitamins-Minerals (Bariatric Multivitamins/Iron) CAPS, Take by mouth daily, Disp: , Rfl:     omeprazole (PriLOSEC) 20 mg delayed release capsule, Take 1 capsule (20 mg total) by mouth daily (Patient taking differently: Take 20 mg by mouth if needed), Disp: 90 capsule, Rfl: 1    spironolactone (ALDACTONE) 50 mg tablet, Take 1 pill (50 mg) once daily with large glass of water. Get your blood work 1 week after starting this medication. STOP IMMEDIATELY IF YOU BECOME UNINTENTIONALLY PREGNANT or if you decide to plan for pregnancy. Avoid large amounts of high potassium food or beverages while taking this medication., Disp: 30 tablet, Rfl: 3    Sulfacetamide Sodium-Sulfur (Sulfacetamide Sod-Sulfur Wash) 9-4.5 % LIQD, Apply topically daily, Disp: 454 g, Rfl: 2    tretinoin (RETIN-A) 0.025 % cream, Apply pea sized amount to dry face at night (inactivated by sunlight). Start 2-3 times a week and increase to nightly as tolerated. If too drying, apply layer of non-comedogenic lotion before and after application (sandwich method of application).  If using benzoyl peroxide containing wash, wait 30 minutes before applying tretinoin, Disp: 45 g, Rfl: 2    tretinoin  "(RETIN-A) 0.025 % cream, Apply topically daily at bedtime Pea sized amount to entire face, do not spot treat, Disp: 45 g, Rfl: 3    Vortioxetine HBr (Trintellix) 20 MG tablet, Take 1 tablet (20 mg total) by mouth daily, Disp: 90 tablet, Rfl: 1    Current Facility-Administered Medications:     triamcinolone acetonide (KENALOG-40) 40 mg/mL injection 60 mg, 60 mg, Intramuscular, Once,     Objective:    Vitals:    10/23/24 1621   BP: 100/56   Pulse: 67   SpO2: 98%   Weight: 60.8 kg (134 lb)   Height: 5' 5.5\" (1.664 m)       Physical Exam  Constitutional:       General: She is not in acute distress.  HENT:      Head: Normocephalic and atraumatic.   Eyes:      Conjunctiva/sclera: Conjunctivae normal.   Cardiovascular:      Rate and Rhythm: Normal rate and regular rhythm.      Heart sounds: S1 normal and S2 normal.      No friction rub.   Pulmonary:      Effort: Pulmonary effort is normal. No respiratory distress.      Breath sounds: Normal breath sounds. No wheezing, rhonchi or rales.   Musculoskeletal:         General: Tenderness present.      Cervical back: Neck supple.   Skin:     Coloration: Skin is not pale.   Neurological:      Mental Status: She is alert. Mental status is at baseline.   Psychiatric:         Mood and Affect: Mood normal.         Behavior: Behavior normal.       Physical Exam  Tenderness noted in bilateral wrists and both shoulders.    Reviewed labs and imaging.    Imaging:   No results found.    Labs:   Appointment on 10/19/2024   Component Date Value Ref Range Status    Color, UA 10/19/2024 Yellow   Final    Clarity, UA 10/19/2024 Clear   Final    Specific Gravity, UA 10/19/2024 1.020  1.003 - 1.030 Final    pH, UA 10/19/2024 7.0  4.5, 5.0, 5.5, 6.0, 6.5, 7.0, 7.5, 8.0 Final    Leukocytes, UA 10/19/2024 Negative  Negative Final    Nitrite, UA 10/19/2024 Negative  Negative Final    Protein, UA 10/19/2024 Trace (A)  Negative mg/dl Final    Glucose, UA 10/19/2024 Negative  Negative mg/dl Final    " Ketones, UA 10/19/2024 Trace (A)  Negative mg/dl Final    Urobilinogen, UA 10/19/2024 2.0 (A)  <2.0 mg/dl mg/dl Final    Bilirubin, UA 10/19/2024 Negative  Negative Final    Occult Blood, UA 10/19/2024 Negative  Negative Final    RBC, UA 10/19/2024 None Seen  None Seen, 1-2 /hpf Final    WBC, UA 10/19/2024 1-2  None Seen, 1-2 /hpf Final    Epithelial Cells 10/19/2024 Moderate (A)  None Seen, Occasional /hpf Final    Bacteria, UA 10/19/2024 None Seen  None Seen, Occasional /hpf Final    MUCUS THREADS 10/19/2024 Occasional (A)  None Seen Final    Creatinine, Ur 10/19/2024 148.2  Reference range not established. mg/dL Final    Protein Urine Random 10/19/2024 11.2  Reference range not established. mg/dL Final    Prot/Creat Ratio, Ur 10/19/2024 0.1  0.0 - 0.1 Final   Appointment on 05/11/2024   Component Date Value Ref Range Status    WBC 05/11/2024 3.71 (L)  4.31 - 10.16 Thousand/uL Final    RBC 05/11/2024 4.06  3.81 - 5.12 Million/uL Final    Hemoglobin 05/11/2024 12.8  11.5 - 15.4 g/dL Final    Hematocrit 05/11/2024 39.4  34.8 - 46.1 % Final    MCV 05/11/2024 97  82 - 98 fL Final    MCH 05/11/2024 31.5  26.8 - 34.3 pg Final    MCHC 05/11/2024 32.5  31.4 - 37.4 g/dL Final    RDW 05/11/2024 13.0  11.6 - 15.1 % Final    MPV 05/11/2024 9.9  8.9 - 12.7 fL Final    Platelets 05/11/2024 196  149 - 390 Thousands/uL Final    nRBC 05/11/2024 0  /100 WBCs Final    Segmented % 05/11/2024 61  43 - 75 % Final    Immature Grans % 05/11/2024 0  0 - 2 % Final    Lymphocytes % 05/11/2024 30  14 - 44 % Final    Monocytes % 05/11/2024 5  4 - 12 % Final    Eosinophils Relative 05/11/2024 3  0 - 6 % Final    Basophils Relative 05/11/2024 1  0 - 1 % Final    Absolute Neutrophils 05/11/2024 2.28  1.85 - 7.62 Thousands/µL Final    Absolute Immature Grans 05/11/2024 0.01  0.00 - 0.20 Thousand/uL Final    Absolute Lymphocytes 05/11/2024 1.10  0.60 - 4.47 Thousands/µL Final    Absolute Monocytes 05/11/2024 0.18  0.17 - 1.22 Thousand/µL Final     Eosinophils Absolute 05/11/2024 0.12  0.00 - 0.61 Thousand/µL Final    Basophils Absolute 05/11/2024 0.02  0.00 - 0.10 Thousands/µL Final    Sodium 05/11/2024 139  135 - 147 mmol/L Final    Potassium 05/11/2024 4.1  3.5 - 5.3 mmol/L Final    Chloride 05/11/2024 104  96 - 108 mmol/L Final    CO2 05/11/2024 32  21 - 32 mmol/L Final    ANION GAP 05/11/2024 3 (L)  4 - 13 mmol/L Final    BUN 05/11/2024 11  5 - 25 mg/dL Final    Creatinine 05/11/2024 0.59 (L)  0.60 - 1.30 mg/dL Final    Standardized to IDMS reference method    Glucose 05/11/2024 72  65 - 140 mg/dL Final    If the patient is fasting, the ADA then defines impaired fasting glucose as > 100 mg/dL and diabetes as > or equal to 123 mg/dL.    Calcium 05/11/2024 9.4  8.4 - 10.2 mg/dL Final    AST 05/11/2024 18  13 - 39 U/L Final    ALT 05/11/2024 19  7 - 52 U/L Final    Specimen collection should occur prior to Sulfasalazine administration due to the potential for falsely depressed results.     Alkaline Phosphatase 05/11/2024 59  34 - 104 U/L Final    Total Protein 05/11/2024 6.8  6.4 - 8.4 g/dL Final    Albumin 05/11/2024 4.3  3.5 - 5.0 g/dL Final    Total Bilirubin 05/11/2024 0.58  0.20 - 1.00 mg/dL Final    Use of this assay is not recommended for patients undergoing treatment with eltrombopag due to the potential for falsely elevated results.  N-acetyl-p-benzoquinone imine (metabolite of Acetaminophen) will generate erroneously low results in samples for patients that have taken an overdose of Acetaminophen.    eGFR 05/11/2024 115  ml/min/1.73sq m Final    Ferritin 05/11/2024 20  11 - 307 ng/mL Final    Iron Saturation 05/11/2024 21  15 - 50 % Final    TIBC 05/11/2024 328  250 - 450 ug/dL Final    Iron 05/11/2024 69  50 - 212 ug/dL Final    Patients treated with metal-binding drugs (ie. Deferoxamine) may have depressed iron values.    UIBC 05/11/2024 259  155 - 355 ug/dL Final    PTH 05/11/2024 36.7  12.0 - 88.0 pg/mL Final    Vitamin A 05/11/2024 32.9  18.9  - 57.3 ug/dL Final    Reference intervals for vitamin A determined from [facility] internal  studies. Individuals with vitamin A less than 20 ug/dL are considered  vitamin A deficient and those with serum concentrations less than  10 ug/dL are considered severely deficient.  This test was developed and its performance characteristics  determined by [facility]. It has not been cleared or approved  by the Food and Drug Administration.    Vitamin B1, Whole Blood 05/11/2024 158.6  66.5 - 200.0 nmol/L Final    Vitamin B-12 05/11/2024 329  180 - 914 pg/mL Final    Zinc 05/11/2024 77  44 - 115 ug/dL Final                                    Detection Limit = 5    Vit D, 25-Hydroxy 05/11/2024 38.5  30.0 - 100.0 ng/mL Final    Vitamin D guidelines established by Clinical Guidelines Subcommittee  of the [employer] Task Force, 2011    Deficiency <20ng/ml   Insufficiency 20-30ng/ml   Sufficient  ng/ml

## 2024-10-24 NOTE — TELEPHONE ENCOUNTER
Contacted patient for Talk Therapy  to verify needs of services in attempts to offer patient an appointment at [facility]. Writer verified N/A - NO ANSWER. Writer KRISTANM and left callback number [phone]; option 3.    2nd call attempt, pt removed from wait list, if pt returns call please offer to schedule or add back to wait list, if needed

## 2024-12-30 ENCOUNTER — OFFICE VISIT (OUTPATIENT)
Dept: PSYCHIATRY | Facility: CLINIC | Age: 39
End: 2024-12-30
Payer: COMMERCIAL

## 2024-12-30 DIAGNOSIS — F41.1 GAD (GENERALIZED ANXIETY DISORDER): Primary | ICD-10-CM

## 2024-12-30 DIAGNOSIS — F33.2 MAJOR DEPRESSIVE DISORDER, RECURRENT, SEVERE W/O PSYCHOTIC BEHAVIOR (HCC): ICD-10-CM

## 2024-12-30 DIAGNOSIS — F40.10 SOCIAL PHOBIA: ICD-10-CM

## 2024-12-30 PROCEDURE — 99214 OFFICE O/P EST MOD 30 MIN: CPT | Performed by: PSYCHIATRY & NEUROLOGY

## 2024-12-30 RX ORDER — BUSPIRONE HYDROCHLORIDE 15 MG/1
7.5 TABLET ORAL 2 TIMES DAILY
Qty: 30 TABLET | Refills: 1 | Status: SHIPPED | OUTPATIENT
Start: 2024-12-30

## 2024-12-30 RX ORDER — CLONAZEPAM 0.5 MG/1
TABLET ORAL
Qty: 60 TABLET | Refills: 2 | Status: SHIPPED | OUTPATIENT
Start: 2024-12-30

## 2024-12-30 RX ORDER — DULOXETIN HYDROCHLORIDE 30 MG/1
CAPSULE, DELAYED RELEASE ORAL
Qty: 60 CAPSULE | Refills: 1 | Status: SHIPPED | OUTPATIENT
Start: 2024-12-30

## 2024-12-30 NOTE — ASSESSMENT & PLAN NOTE
Not at goal, start cymbalta  Orders:    busPIRone (BUSPAR) 15 mg tablet; Take 0.5 tablets (7.5 mg total) by mouth 2 (two) times a day    clonazePAM (KlonoPIN) 0.5 mg tablet; TAKE 1/2 TO 1 TABLET BY MOUTH 2 TIMES A DAY AS NEEDED FOR ANXIETY.    DULoxetine (Cymbalta) 30 mg delayed release capsule; Take 30mg daily. After 1-2 weeks if well tolerated increase to 60mg daily

## 2024-12-30 NOTE — PSYCH
"  MEDICATION MANAGEMENT NOTE        [facility]      Name and Date of Birth:  Desire العلي 39 y.o. 1985    Date of Visit: December 30, 2024    SUBJECTIVE:  CC: Desire presents today for follow up on \"I am ok, getting through the days\"; depression and anxiety     Desire notes some days better than others.    Was getting state insurance, now does not have. She is out of trintellix, last was Saturday.    Still working at [employer], but insurance costs too much through them    Addition of buspar did help some, but felt 'spaced out' with a full pill.    Reviewed [employer], she is interested in Cymbalta.     Less than 1 year on probation left.    Has her dogs living with her.         F/U prn- lupus pain  F/U PRN Thinking about finishing school.  F/U PRN- Children-  split custody, oldest son (2004), middle (son) (7/2004), youngest is a daughter  (2009)      Med Compliance: yes  HPI ROS:                      ('was' below is from prior visit)  Medication Side Effects (other than noted):  no     Depression (10 worst):  6-7 (Was 7.5)   Anxiety (10 worst):  7-8 (Was 9 for the past 2 weeks)   Hallucinations or Psychosis  no (Was no)    Safety concerns (self harm thoughts, suicidal ideation, HI, etc):  no (Was no)   Sleep: (NM = Nightmares)  good (Was Good, 7-8)   Energy:  Low, but enough (Was low)   Appetite:  A bit better (increased) (Was low)   Weight Change:  no      Since our last visit, overall symptoms have been unchanged.          PHQ-2/9 Depression Screening                 Desire denies any side effects from medications unless noted above    Review Of Systems as noted above. Otherwise A relevant review of symptoms was otherwise negative    History Review: The following portions of the patient's history were reviewed and documented: allergies, current medications, past family history, past medical history, past social history, and problem list.     Lab " Review: No new labs or no relevant labs needing review with patient today      OBJECTIVE:     MENTAL STATUS EXAM  Appearance:  age appropriate   Behavior:  pleasant, cooperative, with good eye contact   Speech:  Normal volume, regular rate and rhythm   Mood:  depressed and anxious   Affect:  mood congruent   Language: intact and appropriate for age, education, and intellect   Thought Process:  Linear and goal directed   Associations: intact associations   Thought Content:  normal and appropriate   Perceptual Disturbances: no auditory or visual hallcunations   Risk Potential / Abnormal Thoughts: Suicidal ideation - None  Homicidal ideation - None  Potential for aggression - No       Consciousness:  Alert & Awake   Sensorium:  Grossly oriented   Attention: attention span and concentration are age appropriate       Fund of Knowledge:  Memory: awareness of current events: yes  recent and remote memory grossly intact   Insight:  good   Judgment: good   Muscle Strength Muscle Tone: normal  normal   Gait/Station: normal gait/station with good balance   Motor Activity: no abnormal movements       Risks, Benefits And Possible Side Effects Of Medications:    AGREE: Risks, benefits, and possible side effects of medications explained to Desire and she (or legal representative) verbalizes understanding and agreement for treatment.    Controlled Medication Discussion:     Desire has been filling controlled prescriptions on time as prescribed according to [place] Prescription Drug Monitoring program.   _____________________________________________________________      Psychiatric History     Patient has a past diagnoses of major depressive disorder and anxiety and has never been told that she has bipolar disorder. She was seen a psychiatrist for a short period of time and also a therapist at [facility].     She has never been hospitalized for mental health never had a suicide attempt.    PHP  "3/2019    Social History:  Patient was raised in [place] and her parents  when she was young. She was raised by her mother and her boyfriend. Her childhood was \"not normal\" and there is constantly fighting at home. She denies any physical or sexual abuse. His one brother. She developed normally as far she is aware.     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 She has split custody of her 3 children from a 14-year-old relationship. She left home and \"he took advantage of that\".     She is [employer]. No  history no legal issues now or in the past and no weapons.      Never had issues with alcohol or drugs, never needed rehabilitation       Family Psychiatric History:     Father    1. Family history of alcohol abuse (V61.41) (Z81.1)   2. Denied: Family history of suicide  Family History    3. Family history of Drug addiction   4. Family history of alcohol abuse (V61.41) (Z81.1)   5. Denied: Family history of bipolar disorder   6. Denied: Family history of paranoid schizophrenia   7. Denied: Family history of suicide   8. Family history of No Significant Family History         Assessment/Plan:        Assessment & Plan  Social phobia  Not at goal, start cymbalta  Orders:    busPIRone (BUSPAR) 15 mg tablet; Take 0.5 tablets (7.5 mg total) by mouth 2 (two) times a day    clonazePAM (KlonoPIN) 0.5 mg tablet; TAKE 1/2 TO 1 TABLET BY MOUTH 2 TIMES A DAY AS NEEDED FOR ANXIETY.    DULoxetine (Cymbalta) 30 mg delayed release capsule; Take 30mg daily. After 1-2 weeks if well tolerated increase to 60mg daily    JG (generalized anxiety disorder)  Not at goal, start cymbalta  Orders:    busPIRone (BUSPAR) 15 mg tablet; Take 0.5 tablets (7.5 mg total) by mouth 2 (two) times a day    clonazePAM (KlonoPIN) 0.5 mg tablet; TAKE 1/2 TO 1 TABLET BY MOUTH 2 TIMES A DAY AS NEEDED FOR ANXIETY.    DULoxetine (Cymbalta) 30 mg delayed release capsule; Take 30mg daily. After 1-2 weeks if well tolerated " increase to 60mg daily    Major depressive disorder, recurrent, severe w/o psychotic behavior (HCC)  Cannot afford trintellix without insurance. She is interested in starting cymbalta  Orders:    busPIRone (BUSPAR) 15 mg tablet; Take 0.5 tablets (7.5 mg total) by mouth 2 (two) times a day    DULoxetine (Cymbalta) 30 mg delayed release capsule; Take 30mg daily. After 1-2 weeks if well tolerated increase to 60mg daily        ______________________________________________________________________    MDD, R (Highly unlikely bipolar disorder, but h/o diagnosis)  JG  social phobia.          TUBES OPAL Phipps feels buspar has helped some, but due to insurance loss needs to change trintellix. Wants Cymbalta    Atarax was for itching, never took for anxiety but we could explore in the future. Took Abilify only for a few days (sedation) and stopped. Could revisit. At this point modafinil would need to be off label, which I am not opposed to due to the appropriate use, tolerability, and benefit. Cost an issue    Revisit at future visit - psych testing (Referral made 11/2022 but she did not follow through, she was going to reach out again 2/24). Questioning her distractibility (starts project then goes to another, or in cleaning the house will go from item to item and then the whole house is a mess)    In February 2023 [facility] Elise reviewed situation and a referral to [facility] [place] but they did not help her much, so she discharged in May 2023    I do not believe that she has bipolar disorder but mood stabilizers for anger and irritability if other paths do not seem to be appropriate or beneficial can be considered. Could consider abilify, retrial nortriptyline, buspar, lamictal, other directions. Klonopin increase has been discussed in past, prefer other directions as reasonable.    - sleep study 10/2021 - Has sleep apnea, now uses CPAP     [facility]  She is heterozygous for MTHFR, no cancer history.    Safety Risk Assessment:  See above. Has had passive SI since ~2015. She states that she has protective features including her children and that she would never actually carry anything out. Safety risk low.    Confidential Assessment:  Past psychotropic medications include  hydroxyzine,   klonopin,   buspar (low dose, no recall details),   cymbalta 30mg (no recall of details),  zoloft (no issues),   neurontin (no help),   Viibryd 10 mg (x2-3mo, no side effects or benefit noted; was paying out of pocket).  Effexor (irritable)  Wellbutrin (irritable).   Prozac (80mg, was not adequate, even with Nortrip 50mg. Went to trintellix)  Topiramate (no benefit at 100mg, no issues) .   Nortriptyline (some benefit at 50mg, dry mouth (did improve), decided to go different way but could reconsider)  Remeron- worked in past and then it did not work  trintellix- more depressed at 20mg, although significant stressors coocurring  Abilify 2.5mg - made groggy (4/2024), only a few days   - (STOPPED, cannot afford and insurance denied) Modafinil 200mg daily ~2/2024   - has atarax for itching, but may try for anxiety 25mg-50mg daily prn. (Stopped taking, never took it for anxiety) 2024  Buspar 10/7/2024    Scales:          Treatment Plan:      Patient has been educated about their diagnosis and treatment modalities. They voiced understanding and agreement with the following plan:     - GENE SIGHT TESTING initially reviewed 12/3/2018 with Desire    1) Meds:   - Klonopin 0.5mg BID PRN   - STOP Trintellix 20mg daily. (Worked, but cannot afford without insurance)   - Buspar 7.5mg BID (Felt spacy when she tried 15mg. Could revisit) (12/30/204)   - START Cymbalta 30mg daily. After 1-2 weeks increase to 60mg daily (12/30/2024) PARQ completed including serotonin syndrome, SIADH, worsened depression/suicidality, induction of lauryn, blood pressure changes and GI distress, weight gain, sexual side effects, insomnia, sedation, potential for drug interactions, and others.        2) Labs:   - : TSH 1.523, Vit D 52.4   - 2021: ECG (Cardiology) , NS arrhythmia   0 3/2019: BMP WNL, TSH 2.983; FT3 2.53   - 2018: Vit D 11.8, TSH 3.96 and FT4 0.82   - 2017: TSH 2.76; Vit D 34.1   - 2017: TSH 1.85   - : CBC, CMP unremarkable, TSH 4.38, Vit D 21.6, , HDL 39     3) Therapy:   - NO LONGER Janny Goss ([facility]); Was [facility]    4) Medical:  LUPUS; hypothyroidism with pregnancy; history of kidney stones. Tubal ligation.    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        7) Treatment Plan: Enacted 2017, Renewed 2017, renewed 3/15/2018, 2018; 2023, 2024, 7/10/2024, 10/7/2024    8) Crisis Plan: 3/27/23 (PRIOR therapist), 7/10/2024, 10/7/2024    Discussed self monitoring of symptoms, and symptom monitoring tools.    Patient has been informed of 24 hours and weekend coverage for urgent situations accessed by calling the main clinic phone number.        Psychotherapy in session:  Time spent performing psychotherapy:     Visit Time    Visit Start Time: 533  Visit Stop Time:  555  Total Visit Duration:  22 minutes

## 2024-12-30 NOTE — ASSESSMENT & PLAN NOTE
Cannot afford trintellix without insurance. She is interested in starting cymbalta  Orders:    busPIRone (BUSPAR) 15 mg tablet; Take 0.5 tablets (7.5 mg total) by mouth 2 (two) times a day    DULoxetine (Cymbalta) 30 mg delayed release capsule; Take 30mg daily. After 1-2 weeks if well tolerated increase to 60mg daily

## 2025-02-18 ENCOUNTER — TELEPHONE (OUTPATIENT)
Age: 40
End: 2025-02-18

## 2025-02-18 NOTE — TELEPHONE ENCOUNTER
Patient called requesting a lab order for thyroid testing and any other tests that should be completed before her follow up appointment on 2/25/2025 at 6:30 pm.

## 2025-02-19 DIAGNOSIS — E55.9 VITAMIN D DEFICIENCY: Primary | ICD-10-CM

## 2025-02-19 DIAGNOSIS — E03.9 ACQUIRED HYPOTHYROIDISM: ICD-10-CM

## 2025-02-24 ENCOUNTER — LAB (OUTPATIENT)
Dept: LAB | Facility: HOSPITAL | Age: 40
End: 2025-02-24
Payer: COMMERCIAL

## 2025-02-24 DIAGNOSIS — E03.9 ACQUIRED HYPOTHYROIDISM: ICD-10-CM

## 2025-02-24 DIAGNOSIS — E55.9 VITAMIN D DEFICIENCY: ICD-10-CM

## 2025-02-24 LAB
25(OH)D3 SERPL-MCNC: 28.5 NG/ML (ref 30–100)
ALBUMIN SERPL BCG-MCNC: 4.3 G/DL (ref 3.5–5)
ALP SERPL-CCNC: 48 U/L (ref 34–104)
ALT SERPL W P-5'-P-CCNC: 30 U/L (ref 7–52)
ANION GAP SERPL CALCULATED.3IONS-SCNC: 6 MMOL/L (ref 4–13)
AST SERPL W P-5'-P-CCNC: 36 U/L (ref 13–39)
BILIRUB SERPL-MCNC: 0.46 MG/DL (ref 0.2–1)
BUN SERPL-MCNC: 15 MG/DL (ref 5–25)
CALCIUM SERPL-MCNC: 9 MG/DL (ref 8.4–10.2)
CHLORIDE SERPL-SCNC: 103 MMOL/L (ref 96–108)
CHOLEST SERPL-MCNC: 124 MG/DL (ref ?–200)
CO2 SERPL-SCNC: 28 MMOL/L (ref 21–32)
CREAT SERPL-MCNC: 0.68 MG/DL (ref 0.6–1.3)
GFR SERPL CREATININE-BSD FRML MDRD: 109 ML/MIN/1.73SQ M
GLUCOSE SERPL-MCNC: 139 MG/DL (ref 65–140)
HDLC SERPL-MCNC: 49 MG/DL
LDLC SERPL CALC-MCNC: 64 MG/DL (ref 0–100)
NONHDLC SERPL-MCNC: 75 MG/DL
POTASSIUM SERPL-SCNC: 4.1 MMOL/L (ref 3.5–5.3)
PROT SERPL-MCNC: 7 G/DL (ref 6.4–8.4)
SODIUM SERPL-SCNC: 137 MMOL/L (ref 135–147)
TRIGL SERPL-MCNC: 53 MG/DL (ref ?–150)
TSH SERPL DL<=0.05 MIU/L-ACNC: 8.46 UIU/ML (ref 0.45–4.5)

## 2025-02-24 PROCEDURE — 80061 LIPID PANEL: CPT

## 2025-02-24 PROCEDURE — 36415 COLL VENOUS BLD VENIPUNCTURE: CPT

## 2025-02-24 PROCEDURE — 84443 ASSAY THYROID STIM HORMONE: CPT

## 2025-02-24 PROCEDURE — 84439 ASSAY OF FREE THYROXINE: CPT

## 2025-02-24 PROCEDURE — 82306 VITAMIN D 25 HYDROXY: CPT

## 2025-02-24 PROCEDURE — 80053 COMPREHEN METABOLIC PANEL: CPT

## 2025-02-25 ENCOUNTER — OFFICE VISIT (OUTPATIENT)
Dept: FAMILY MEDICINE CLINIC | Facility: CLINIC | Age: 40
End: 2025-02-25
Payer: COMMERCIAL

## 2025-02-25 VITALS
SYSTOLIC BLOOD PRESSURE: 100 MMHG | HEIGHT: 66 IN | BODY MASS INDEX: 21.38 KG/M2 | TEMPERATURE: 97.2 F | OXYGEN SATURATION: 98 % | HEART RATE: 62 BPM | DIASTOLIC BLOOD PRESSURE: 70 MMHG | WEIGHT: 133 LBS

## 2025-02-25 DIAGNOSIS — M54.41 CHRONIC RIGHT-SIDED LOW BACK PAIN WITH RIGHT-SIDED SCIATICA: ICD-10-CM

## 2025-02-25 DIAGNOSIS — K21.9 GASTROESOPHAGEAL REFLUX DISEASE WITHOUT ESOPHAGITIS: Chronic | ICD-10-CM

## 2025-02-25 DIAGNOSIS — M32.9 SYSTEMIC LUPUS ERYTHEMATOSUS, UNSPECIFIED SLE TYPE, UNSPECIFIED ORGAN INVOLVEMENT STATUS (HCC): ICD-10-CM

## 2025-02-25 DIAGNOSIS — E55.9 VITAMIN D DEFICIENCY: ICD-10-CM

## 2025-02-25 DIAGNOSIS — E03.9 ACQUIRED HYPOTHYROIDISM: ICD-10-CM

## 2025-02-25 DIAGNOSIS — Z00.00 HEALTH CARE MAINTENANCE: Primary | ICD-10-CM

## 2025-02-25 DIAGNOSIS — F33.2 MAJOR DEPRESSIVE DISORDER, RECURRENT, SEVERE W/O PSYCHOTIC BEHAVIOR (HCC): ICD-10-CM

## 2025-02-25 DIAGNOSIS — F41.1 GAD (GENERALIZED ANXIETY DISORDER): ICD-10-CM

## 2025-02-25 DIAGNOSIS — G89.29 CHRONIC RIGHT-SIDED LOW BACK PAIN WITH RIGHT-SIDED SCIATICA: ICD-10-CM

## 2025-02-25 DIAGNOSIS — Z12.31 ENCOUNTER FOR SCREENING MAMMOGRAM FOR BREAST CANCER: ICD-10-CM

## 2025-02-25 LAB — T4 FREE SERPL-MCNC: 0.82 NG/DL (ref 0.61–1.12)

## 2025-02-25 PROCEDURE — 99396 PREV VISIT EST AGE 40-64: CPT | Performed by: FAMILY MEDICINE

## 2025-02-25 RX ORDER — LEVOTHYROXINE SODIUM 50 UG/1
50 TABLET ORAL
Qty: 90 TABLET | Refills: 1 | Status: SHIPPED | OUTPATIENT
Start: 2025-02-25

## 2025-02-25 NOTE — ASSESSMENT & PLAN NOTE
Increase levothyroxine to 50 mcg daily  Orders:    levothyroxine 50 mcg tablet; Take 1 tablet (50 mcg total) by mouth daily in the early morning    TSH, 3rd generation; Future    T4, free; Future

## 2025-02-25 NOTE — PROGRESS NOTES
Adult Annual Physical  Name: Desire العلي      : 1985      MRN: [id number]  Encounter Provider: Enoch Elaine DO  Encounter Date: 2025   Encounter department: [facility]  Chief Complaint   Patient presents with    Physical Exam     Patient Instructions   Here for General PE and reviewed labs and shows low vitamin D3 and also elevated tsh and will need to restart levothyroxine and increase to 50 mcg daily. Recheck tsh and t4 free in 8 weeks. See Rheumatologist for hx of SLE and psychiatrist as directed for anxiety and depression. Gerd stable. Uses aGabapentin prn back pain. See Gyn as directed and get mammograms as directed.     Assessment & Plan  Health care maintenance         Systemic lupus erythematosus, unspecified SLE type, unspecified organ involvement status (HCC)  Stable and sees rheumatology       JG (generalized anxiety disorder)  Sees Psychiatry as directed       Major depressive disorder, recurrent, severe w/o psychotic behavior (HCC)    Sees psychiatry as directed and is stable       Vitamin D deficiency  Take Vitamin D3 as directed       Acquired hypothyroidism  Increase levothyroxine to 50 mcg daily  Orders:    levothyroxine 50 mcg tablet; Take 1 tablet (50 mcg total) by mouth daily in the early morning    TSH, 3rd generation; Future    T4, free; Future    Chronic right-sided low back pain with right-sided sciatica  On gabapentin        Gastroesophageal reflux disease without esophagitis  stable       Acquired hypothyroidism  Follow tsh and t4 free and take levothyroxine 50 mcg daily as directed.   Orders:    levothyroxine 50 mcg tablet; Take 1 tablet (50 mcg total) by mouth daily in the early morning    TSH, 3rd generation; Future    T4, free; Future    Encounter for screening mammogram for breast cancer    Orders:    Mammo screening bilateral w cad; Future    Immunizations and preventive care screenings were discussed with patient today. Appropriate  education was printed on patient's after visit summary.    Counseling:  Alcohol/drug use: discussed moderation in alcohol intake, the recommendations for healthy alcohol use, and avoidance of illicit drug use.  Dental Health: discussed importance of regular tooth brushing, flossing, and dental visits.  Injury prevention: discussed safety/seat belts, safety helmets, smoke detectors, carbon monoxide detectors, and smoking near bedding or upholstery.  Sexual health: discussed sexually transmitted diseases, partner selection, use of condoms, avoidance of unintended pregnancy, and contraceptive alternatives.  Exercise: the importance of regular exercise/physical activity was discussed. Recommend exercise 3-5 times per week for at least 30 minutes.       Depression Screening and Follow-up Plan: Patient was screened for depression during today's encounter. They screened negative with a PHQ-9 score of 0.          History of Present Illness     Adult Annual Physical:  Patient presents for annual physical. Here fro general PE and does see GYN and needs mammogram as directed. 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 Patient is single in a relationship and has 3 children ages 22, 20 boys and 15 yo. Patient does exercise and also tries to eat healthy. Sleps 8 hours of sleep per night. Patient had CARLOS ENRIQUE and did use CPAP in past and did lose 100 pounds after gastric bypass surgery. Non smoker and does not drink alcohol and uses sunscreena nd monitors for ticks. .     Diet and Physical Activity:  - Diet/Nutrition: well balanced diet.  - Exercise: no formal exercise.    Depression Screening:    - PHQ-9 Score: 0    General Health:  - Sleep: > 8 hours of sleep on average.  - Hearing: normal hearing right ear and normal hearing left ear.  - Vision: goes for regular eye exams, wears glasses and wears contacts.  - Dental: regular dental visits.    /GYN Health:  - Follows with GYN: yes.   - Menopause: premenopausal.    - History of STDs: no    Review of Systems   Constitutional: Negative.    HENT: Negative.     Eyes: Negative.    Respiratory: Negative.     Cardiovascular: Negative.    Gastrointestinal: Negative.    Endocrine: Negative.    Genitourinary: Negative.    Musculoskeletal: Negative.    Skin: Negative.    Allergic/Immunologic: Negative.    Neurological: Negative.    Hematological: Negative.    Psychiatric/Behavioral: Negative.       Current Outpatient Medications on File Prior to Visit   Medication Sig Dispense Refill    busPIRone (BUSPAR) 15 mg tablet Take 0.5 tablets (7.5 mg total) by mouth 2 (two) times a day 30 tablet 1    Calcium 600-400 MG-UNIT CHEW Chew daily      Cholecalciferol (Vitamin D) 50 MCG (2000 UT) tablet Take 1 tablet (2,000 Units total) by mouth daily 90 tablet 1    clonazePAM (KlonoPIN) 0.5 mg tablet TAKE 1/2 TO 1 TABLET BY MOUTH 2 TIMES A DAY AS NEEDED FOR ANXIETY. 60 tablet 2    Dapsone 7.5 % GEL Apply to face in the morning. 60 g 1    Diclofenac Sodium (VOLTAREN) 1 % Apply 2 g topically 4 (four) times a day 100 g 6    DULoxetine (Cymbalta) 30 mg delayed release capsule Take 30mg daily. After 1-2 weeks if well tolerated increase to 60mg daily 60 capsule 1    gabapentin (Neurontin) 100 mg capsule Take 1 capsule (100 mg total) by mouth daily at bedtime As needed for numbness 30 capsule 6    hydroxychloroquine (PLAQUENIL) 200 mg tablet Take 1.5 tablets (300 mg total) by mouth daily 135 tablet 2    ketoconazole (NIZORAL) 2 % shampoo Use daily for 2 weeks, then transition to maintenance therapy at 1-4 x per month. Let the shampoo sit on the skin for at least 5 minutes before rinsing off. 120 mL 6    ketoconazole (NIZORAL) 2 % shampoo Apply 1 Application topically daily Let sit for 5 minutes before rinsing clear 1 mL 0    Multiple Vitamins-Minerals (Bariatric Multivitamins/Iron) CAPS Take by mouth daily      Sulfacetamide Sodium-Sulfur (Sulfacetamide Sod-Sulfur Wash) 9-4.5 % LIQD Apply topically daily  "454 g 2    tretinoin (RETIN-A) 0.025 % cream Apply pea sized amount to dry face at night (inactivated by sunlight). Start 2-3 times a week and increase to nightly as tolerated. If too drying, apply layer of non-comedogenic lotion before and after application (sandwich method of application).  If using benzoyl peroxide containing wash, wait 30 minutes before applying tretinoin 45 g 2    [DISCONTINUED] levothyroxine 25 mcg tablet Take 1 tablet (25 mcg total) by mouth daily in the early morning 90 tablet 0    omeprazole (PriLOSEC) 20 mg delayed release capsule Take 1 capsule (20 mg total) by mouth daily (Patient taking differently: Take 20 mg by mouth if needed) 90 capsule 1     No current facility-administered medications on file prior to visit.        Objective   /70   Pulse 62   Temp (!) 97.2 °F (36.2 °C)   Ht 5' 5.5\" (1.664 m)   Wt 60.3 kg (133 lb)   SpO2 98%   BMI 21.80 kg/m²     Physical Exam  Constitutional:       Appearance: Normal appearance. She is well-developed.   HENT:      Head: Normocephalic and atraumatic.      Right Ear: External ear normal.      Left Ear: External ear normal.      Nose: Nose normal.      Mouth/Throat:      Mouth: Mucous membranes are moist.   Eyes:      Conjunctiva/sclera: Conjunctivae normal.      Pupils: Pupils are equal, round, and reactive to light.   Cardiovascular:      Rate and Rhythm: Normal rate and regular rhythm.      Pulses: Normal pulses.      Heart sounds: Normal heart sounds.   Pulmonary:      Effort: Pulmonary effort is normal.      Breath sounds: Normal breath sounds.   Abdominal:      General: Abdomen is flat. Bowel sounds are normal.      Palpations: Abdomen is soft.   Musculoskeletal:         General: Normal range of motion.      Cervical back: Normal range of motion and neck supple.   Skin:     General: Skin is warm and dry.      Capillary Refill: Capillary refill takes less than 2 seconds.   Neurological:      General: No focal deficit present.      " Mental Status: She is alert and oriented to person, place, and time. Mental status is at baseline.      Deep Tendon Reflexes: Reflexes are normal and symmetric.   Psychiatric:         Mood and Affect: Mood normal.         Behavior: Behavior normal.         Thought Content: Thought content normal.         Judgment: Judgment normal.       Administrative Statements   I have spent a total time of 35 minutes in caring for this patient on the day of the visit/encounter including Diagnostic results, Prognosis, Risks and benefits of tx options, Instructions for management, Patient and family education, Importance of tx compliance, Risk factor reductions, Impressions, Counseling / Coordination of care, Documenting in the medical record, Reviewing/placing orders in the medical record (including tests, medications, and/or procedures), and Obtaining or reviewing history  .

## 2025-02-25 NOTE — PATIENT INSTRUCTIONS
Here for General PE and reviewed labs and shows low vitamin D3 and also elevated tsh and will need to restart levothyroxine and increase to 50 mcg daily. Recheck tsh and t4 free in 8 weeks. See Rheumatologist for hx of SLE and psychiatrist as directed for anxiety and depression. Gerd stable. Uses aGabapentin prn back pain. See Gyn as directed and get mammograms as directed.

## 2025-02-25 NOTE — ASSESSMENT & PLAN NOTE
Follow tsh and t4 free and take levothyroxine 50 mcg daily as directed.   Orders:    levothyroxine 50 mcg tablet; Take 1 tablet (50 mcg total) by mouth daily in the early morning    TSH, 3rd generation; Future    T4, free; Future     Cibinqo Counseling: I discussed with the patient the risks of Cibinqo therapy including but not limited to common cold, nausea, headache, cold sores, increased blood CPK levels, dizziness, UTIs, fatigue, acne, and vomitting. Live vaccines should be avoided.  This medication has been linked to serious infections; higher rate of mortality; malignancy and lymphoproliferative disorders; major adverse cardiovascular events; thrombosis; thrombocytopenia and lymphopenia; lipid elevations; and retinal detachment.

## 2025-02-27 ENCOUNTER — NURSE TRIAGE (OUTPATIENT)
Dept: RHEUMATOLOGY | Facility: CLINIC | Age: 40
End: 2025-02-27

## 2025-03-05 DIAGNOSIS — M32.9 SYSTEMIC LUPUS ERYTHEMATOSUS, UNSPECIFIED SLE TYPE, UNSPECIFIED ORGAN INVOLVEMENT STATUS (HCC): Primary | ICD-10-CM

## 2025-03-05 RX ORDER — PREDNISONE 5 MG/1
TABLET ORAL
Qty: 70 TABLET | Refills: 0 | Status: SHIPPED | OUTPATIENT
Start: 2025-03-05

## 2025-03-19 ENCOUNTER — OFFICE VISIT (OUTPATIENT)
Dept: BARIATRICS | Facility: CLINIC | Age: 40
End: 2025-03-19
Payer: COMMERCIAL

## 2025-03-19 ENCOUNTER — PREP FOR PROCEDURE (OUTPATIENT)
Dept: BARIATRICS | Facility: CLINIC | Age: 40
End: 2025-03-19

## 2025-03-19 VITALS
DIASTOLIC BLOOD PRESSURE: 72 MMHG | OXYGEN SATURATION: 99 % | BODY MASS INDEX: 21.62 KG/M2 | SYSTOLIC BLOOD PRESSURE: 114 MMHG | TEMPERATURE: 97.9 F | HEART RATE: 59 BPM | HEIGHT: 66 IN | WEIGHT: 134.5 LBS

## 2025-03-19 DIAGNOSIS — Z98.84 BARIATRIC SURGERY STATUS: Primary | ICD-10-CM

## 2025-03-19 DIAGNOSIS — R10.13 EPIGASTRIC PAIN: Primary | ICD-10-CM

## 2025-03-19 PROCEDURE — 99214 OFFICE O/P EST MOD 30 MIN: CPT | Performed by: SURGERY

## 2025-03-19 NOTE — PROGRESS NOTES
OFFICE VISIT - BARIATRIC SURGERY  Desire العلي 40 y.o. female MRN: [id number]  Unit/Bed#:  Encounter: [id number]      HPI:  Desire العلي is a 40 y.o. female status post Robotic Mary Lou-en-Y gastric bypass by Dr. Naren Nagel on 4/2022. Comes to the office today with complaints of epigastric pain.    Subjective     Patient states that she has experienced intermittent epigastric pain for several weeks. She denies associated nausea or dysphagia. She describes the pain as gnawing and twisting. She denies use of tobacco, NSAIDs. She does drink about 3 cups of coffee per day. She is currently not taking any NSAID medications.    Review of Systems   Constitutional:  Negative for chills and fever.   HENT:  Negative for ear pain and sore throat.    Eyes:  Negative for pain and visual disturbance.   Respiratory:  Negative for cough and shortness of breath.    Cardiovascular:  Negative for chest pain and palpitations.   Gastrointestinal:  Positive for abdominal pain. Negative for constipation, nausea and vomiting.   Genitourinary:  Negative for dysuria and hematuria.   Musculoskeletal:  Negative for arthralgias and back pain.   Skin:  Negative for color change and rash.   Neurological:  Negative for seizures and syncope.   All other systems reviewed and are negative.      Historical Information   Past Medical History:   Diagnosis Date    Acne     Anxiety     Back pain     Carpal tunnel syndrome on right     Contact lens overwear of both eyes     Cubital tunnel syndrome on right     Depression     Disease of thyroid gland     GERD (gastroesophageal reflux disease)     Headache     History of sleep apnea     resolved after bariatric sx    Hypothyroidism     Kidney stones     Lupus (systemic lupus erythematosus) (HCC)     Nausea     PONV (postoperative nausea and vomiting)     Wears glasses      Past Surgical History:   Procedure Laterality Date    CARPAL TUNNEL RELEASE      CHOLECYSTECTOMY      EGD      WY APPLICATION LONG ARM  SPLINT SHOULDER HAND Right 01/25/2024    Procedure: APPLICATION OF LONG ARM SPLINT;  Surgeon: Joshua Wu MD;  Location: [facility];  Service: Orthopedics    AR CYSTOURETHROSCOPY N/A 02/03/2020    Procedure: CYSTOSCOPY;  Surgeon: Reed Lovell MD;  [facility];  Service: UroGynecology           AR LAPAROSCOPY SURG CHOLECYSTECTOMY N/A 09/06/2018    Procedure: CHOLECYSTECTOMY LAPAROSCOPIC W/ ROBOTICS;  Surgeon: Vince Tomas MD;  [facility];  Service: General    AR LAPS GSTR RSTCV PX W/BYP KATLYN-EN-Y LIMB <150 CM N/A 04/19/2022    Procedure: LAPAROSCOPIC KATLYN-EN-Y GASTRIC BYPASS & INTRAOPERATIVE EGD ROBOTICALLY ASSISTED;  Surgeon: Adalid Nagel MD;  [facility];  Service: Bariatrics    AR NEUROPLASTY &/TRANSPOS MEDIAN NRV CARPAL TUNNE Right 11/27/2020    Procedure: release CARPAL TUNNEL;  Surgeon: Keith Landers MD;  [facility];  Service: Orthopedics    AR NEUROPLASTY &/TRANSPOS MEDIAN NRV CARPAL TUNNE Right 01/25/2024    Procedure: RIGHT OPEN REVISION CARPAL TUNNEL RELEASE;  Surgeon: Joshua Wu MD;  Location: [facility];  Service: Orthopedics    AR NEUROPLASTY &/TRANSPOSITION ULNAR NERVE ELBOW Right 11/27/2020    Procedure: CUBITAL TUNNEL;  Surgeon: Keith Landers MD;  [facility];  Service: Orthopedics    AR NEUROPLASTY &/TRANSPOSITION ULNAR NERVE ELBOW Right 01/25/2024    Procedure: REVISION CUBITAL TUNNEL WITH SUBMUSCULAR ULNAR NERVE TRANSPOSITION;  Surgeon: Joshua Wu MD;  Location: [facility];  Service: Orthopedics    AR POST COLPORRHAPHY RECTOCELE W/WO PERINEORRHAPHY N/A 02/03/2020    Procedure: POSTERIOR COLPORRHAPHY;  Surgeon: Reed Lovell MD;  [facility];  Service: UroGynecology           AR SLING OPERATION STRESS INCONTINENCE N/A 02/03/2020    Procedure: PUBOVAGINAL SLING;  Surgeon: Reed Lovell MD;  [facility];  Service: UroGynecology           SLEEVE GASTROPLASTY      Gastric Bypass RNY    TUBAL  "LIGATION       Social History   Social History     Substance and Sexual Activity   Alcohol Use Not Currently    Comment: rare     Social History     Substance and Sexual Activity   Drug Use No     Social History     Tobacco Use   Smoking Status Never   Smokeless Tobacco Never       Objective       Current Vitals:   Blood Pressure: 114/72 (03/19/25 1532)  Pulse: 59 (03/19/25 1532)  Temperature: 97.9 °F (36.6 °C) (03/19/25 1532)  Temp Source: Tympanic (03/19/25 1532)  Height: 5' 5.5\" (166.4 cm) (03/19/25 1532)  Weight - Scale: 61 kg (134 lb 8 oz) (03/19/25 1532)  SpO2: 99 % (03/19/25 1532)    Invasive Devices       None                   Physical Exam  Vitals reviewed.   Constitutional:       General: She is not in acute distress.     Appearance: Normal appearance. She is not ill-appearing.   HENT:      Head: Normocephalic and atraumatic.      Nose: Nose normal.      Mouth/Throat:      Mouth: Mucous membranes are moist.      Pharynx: Oropharynx is clear.   Eyes:      Extraocular Movements: Extraocular movements intact.      Conjunctiva/sclera: Conjunctivae normal.   Cardiovascular:      Rate and Rhythm: Normal rate.   Pulmonary:      Effort: Pulmonary effort is normal. No respiratory distress.   Abdominal:      General: There is no distension.      Palpations: Abdomen is soft.      Tenderness: There is no abdominal tenderness. There is no guarding or rebound.   Musculoskeletal:         General: No deformity. Normal range of motion.      Cervical back: Normal range of motion and neck supple.   Skin:     General: Skin is warm and dry.      Coloration: Skin is not jaundiced.   Neurological:      General: No focal deficit present.      Mental Status: She is alert and oriented to person, place, and time.   Psychiatric:         Mood and Affect: Mood normal.         Thought Content: Thought content normal.           Pathology, and Other Studies: Results Review Statement: No pertinent imaging studies " reviewed.      Assessment/PLAN:    Desire العلي is a 40 y.o. female status post Robotic Mary Lou-en-Y gastric bypass by Dr. Naren Nagel on 4/2022. Comes to the office today with complaints of epigastric pain..    --------------------------------------------------------------------  Discussed with the patient that her symptoms raise concern for a potential marginal ulcer or internal hernia. Recommend working this up further with EGD/UGI. If these tests return as negative, will get a CT of the abdomen/pelvis for further evaluation.     Plan:  - EGD/UGI  - Recommend taking Omeprazole daily  - Follow up in clinic after these have resulted              Davie Mccray MD  Bariatric Surgery  3/19/2025  3:58 PM

## 2025-03-19 NOTE — H&P (VIEW-ONLY)
OFFICE VISIT - BARIATRIC SURGERY  Desire العلي 40 y.o. female MRN: [id number]  Unit/Bed#:  Encounter: [id number]      HPI:  Desire العلي is a 40 y.o. female status post Robotic Mary Lou-en-Y gastric bypass by Dr. Naren Nagel on 4/2022. Comes to the office today with complaints of epigastric pain.    Subjective     Patient states that she has experienced intermittent epigastric pain for several weeks. She denies associated nausea or dysphagia. She describes the pain as gnawing and twisting. She denies use of tobacco, NSAIDs. She does drink about 3 cups of coffee per day. She is currently not taking any NSAID medications.    Review of Systems   Constitutional:  Negative for chills and fever.   HENT:  Negative for ear pain and sore throat.    Eyes:  Negative for pain and visual disturbance.   Respiratory:  Negative for cough and shortness of breath.    Cardiovascular:  Negative for chest pain and palpitations.   Gastrointestinal:  Positive for abdominal pain. Negative for constipation, nausea and vomiting.   Genitourinary:  Negative for dysuria and hematuria.   Musculoskeletal:  Negative for arthralgias and back pain.   Skin:  Negative for color change and rash.   Neurological:  Negative for seizures and syncope.   All other systems reviewed and are negative.      Historical Information   Past Medical History:   Diagnosis Date    Acne     Anxiety     Back pain     Carpal tunnel syndrome on right     Contact lens overwear of both eyes     Cubital tunnel syndrome on right     Depression     Disease of thyroid gland     GERD (gastroesophageal reflux disease)     Headache     History of sleep apnea     resolved after bariatric sx    Hypothyroidism     Kidney stones     Lupus (systemic lupus erythematosus) (HCC)     Nausea     PONV (postoperative nausea and vomiting)     Wears glasses      Past Surgical History:   Procedure Laterality Date    CARPAL TUNNEL RELEASE      CHOLECYSTECTOMY      EGD      LA APPLICATION LONG ARM  SPLINT SHOULDER HAND Right 01/25/2024    Procedure: APPLICATION OF LONG ARM SPLINT;  Surgeon: Joshua Wu MD;  Location: [facility];  Service: Orthopedics    NE CYSTOURETHROSCOPY N/A 02/03/2020    Procedure: CYSTOSCOPY;  Surgeon: Reed Lovell MD;  [facility];  Service: UroGynecology           NE LAPAROSCOPY SURG CHOLECYSTECTOMY N/A 09/06/2018    Procedure: CHOLECYSTECTOMY LAPAROSCOPIC W/ ROBOTICS;  Surgeon: Vince Tomas MD;  [facility];  Service: General    NE LAPS GSTR RSTCV PX W/BYP KATLYN-EN-Y LIMB <150 CM N/A 04/19/2022    Procedure: LAPAROSCOPIC KATLYN-EN-Y GASTRIC BYPASS & INTRAOPERATIVE EGD ROBOTICALLY ASSISTED;  Surgeon: Adalid Nagel MD;  [facility];  Service: Bariatrics    NE NEUROPLASTY &/TRANSPOS MEDIAN NRV CARPAL TUNNE Right 11/27/2020    Procedure: release CARPAL TUNNEL;  Surgeon: Keith Landers MD;  [facility];  Service: Orthopedics    NE NEUROPLASTY &/TRANSPOS MEDIAN NRV CARPAL TUNNE Right 01/25/2024    Procedure: RIGHT OPEN REVISION CARPAL TUNNEL RELEASE;  Surgeon: Joshua Wu MD;  Location: [facility];  Service: Orthopedics    NE NEUROPLASTY &/TRANSPOSITION ULNAR NERVE ELBOW Right 11/27/2020    Procedure: CUBITAL TUNNEL;  Surgeon: Keith Landers MD;  [facility];  Service: Orthopedics    NE NEUROPLASTY &/TRANSPOSITION ULNAR NERVE ELBOW Right 01/25/2024    Procedure: REVISION CUBITAL TUNNEL WITH SUBMUSCULAR ULNAR NERVE TRANSPOSITION;  Surgeon: Joshua Wu MD;  Location: [facility];  Service: Orthopedics    NE POST COLPORRHAPHY RECTOCELE W/WO PERINEORRHAPHY N/A 02/03/2020    Procedure: POSTERIOR COLPORRHAPHY;  Surgeon: Reed Lovell MD;  [facility];  Service: UroGynecology           NE SLING OPERATION STRESS INCONTINENCE N/A 02/03/2020    Procedure: PUBOVAGINAL SLING;  Surgeon: Reed Lovell MD;  [facility];  Service: UroGynecology           SLEEVE GASTROPLASTY      Gastric Bypass RNY    TUBAL  "LIGATION       Social History   Social History     Substance and Sexual Activity   Alcohol Use Not Currently    Comment: rare     Social History     Substance and Sexual Activity   Drug Use No     Social History     Tobacco Use   Smoking Status Never   Smokeless Tobacco Never       Objective       Current Vitals:   Blood Pressure: 114/72 (03/19/25 1532)  Pulse: 59 (03/19/25 1532)  Temperature: 97.9 °F (36.6 °C) (03/19/25 1532)  Temp Source: Tympanic (03/19/25 1532)  Height: 5' 5.5\" (166.4 cm) (03/19/25 1532)  Weight - Scale: 61 kg (134 lb 8 oz) (03/19/25 1532)  SpO2: 99 % (03/19/25 1532)    Invasive Devices       None                   Physical Exam  Vitals reviewed.   Constitutional:       General: She is not in acute distress.     Appearance: Normal appearance. She is not ill-appearing.   HENT:      Head: Normocephalic and atraumatic.      Nose: Nose normal.      Mouth/Throat:      Mouth: Mucous membranes are moist.      Pharynx: Oropharynx is clear.   Eyes:      Extraocular Movements: Extraocular movements intact.      Conjunctiva/sclera: Conjunctivae normal.   Cardiovascular:      Rate and Rhythm: Normal rate.   Pulmonary:      Effort: Pulmonary effort is normal. No respiratory distress.   Abdominal:      General: There is no distension.      Palpations: Abdomen is soft.      Tenderness: There is no abdominal tenderness. There is no guarding or rebound.   Musculoskeletal:         General: No deformity. Normal range of motion.      Cervical back: Normal range of motion and neck supple.   Skin:     General: Skin is warm and dry.      Coloration: Skin is not jaundiced.   Neurological:      General: No focal deficit present.      Mental Status: She is alert and oriented to person, place, and time.   Psychiatric:         Mood and Affect: Mood normal.         Thought Content: Thought content normal.           Pathology, and Other Studies: Results Review Statement: No pertinent imaging studies " reviewed.      Assessment/PLAN:    Desire العلي is a 40 y.o. female status post Robotic Mary Lou-en-Y gastric bypass by Dr. Naren Nagel on 4/2022. Comes to the office today with complaints of epigastric pain..    --------------------------------------------------------------------  Discussed with the patient that her symptoms raise concern for a potential marginal ulcer or internal hernia. Recommend working this up further with EGD/UGI. If these tests return as negative, will get a CT of the abdomen/pelvis for further evaluation.     Plan:  - EGD/UGI  - Recommend taking Omeprazole daily  - Follow up in clinic after these have resulted              Davie Mccray MD  Bariatric Surgery  3/19/2025  3:58 PM

## 2025-03-19 NOTE — PROGRESS NOTES
Date of surgery: 04/19/22  Procedure: RNY  Performing surgeon:    Initial Weight - 229.5lb  Current Weight - 134.5lb  Mauro Weight - 114lb  Total Body Weight Loss (EWL)- 95  EWL% - 122%  TWB % - 14%

## 2025-03-20 NOTE — PSYCH
"  MEDICATION MANAGEMENT NOTE        [facility] - [facility]      Name and Date of Birth:  Desire العلي 40 y.o. 1985    Date of Visit: March 25, 2025    SUBJECTIVE:  CC: Desire presents today for follow up on \"I don't know\"; depression and anxiety     Desire feels it has 'been a struggle' with cymbalta, not sure how it is going compaired to trintellix. Feels more depression and anxiety.    She has been on cymbalta 60mg for a couple months now. Possibly not being fully absorbed and she is agreeable to increase    Did have THS 8.5 in Feb, and levothyroxine increased. She notes she has had issues with lupus, physical health since last visit.     She has GI upset, not sure if it is ulcer or other issue. This was before the cymbalta    Work ha milli going ok, [employer] ok.     Has not looked into more her legal situation, considering a pardon or other approaches. No longer on probation after 1  year (so not since October)     Has her dogs living with her.     No particular worse stressors other than noted.      F/U prn- lupus pain  F/U PRN Thinking about finishing school.  F/U PRN- Children-  split custody, oldest son (2004), middle (son) (7/2004), youngest is a daughter  (2009)      Med Compliance: yes  HPI ROS:                      ('was' below is from prior visit)  Medication Side Effects (other than noted):  no     Depression (10 worst):  9 (Was 6-7)   Anxiety (10 worst):  9 (Was 7-8)   Hallucinations or Psychosis  no (Was no)    Safety concerns (self harm thoughts, suicidal ideation, HI, etc):  no (Was no)   Sleep: (NM = Nightmares)  good (Was good)   Energy:  low (Was low, but enough)   Appetite:  ok (Was a bit better (increased))   Weight Change:  no      Since our last visit, overall symptoms have been worsening.          PHQ-2/9 Depression Screening    Little interest or pleasure in doing things: 2 - more than half the days  Feeling down, depressed, or hopeless: " 2 - more than half the days  Trouble falling or staying asleep, or sleeping too much: 2 - more than half the days  Feeling tired or having little energy: 2 - more than half the days  Poor appetite or overeatin - several days  Feeling bad about yourself - or that you are a failure or have let yourself or your family down: 1 - several days  Trouble concentrating on things, such as reading the newspaper or watching television: 2 - more than half the days  Moving or speaking so slowly that other people could have noticed. Or the opposite - being so fidgety or restless that you have been moving around a lot more than usual: 0 - not at all           JG-7 Flowsheet Screening      Flowsheet Row Most Recent Value   Over the last two weeks, how often have you been bothered by the following problems?     Feeling nervous, anxious, or on edge 2    Not being able to stop or control worrying 2    Worrying too much about different things 2    Trouble relaxing  1    Being so restless that it's hard to sit still 0    Becoming easily annoyed or irritable  1    Feeling afraid as if something awful might happen 0    How difficult have these problems made it for you to do your work, take care of things at home, or get along with other people?  Somewhat difficult    JG Score  8             Desire denies any side effects from medications unless noted above    Review Of Systems as noted above. Otherwise A relevant review of symptoms was otherwise negative    History Review: The following portions of the patient's history were reviewed and documented: allergies, current medications, past family history, past medical history, past social history, and problem list.     Lab Review: Labs were reviewed and discussed with patient      OBJECTIVE:     MENTAL STATUS EXAM  Appearance:  age appropriate   Behavior:  pleasant, cooperative, with good eye contact   Speech:  Normal volume, regular rate and rhythm   Mood:  depressed and anxious  "  Affect:  mood congruent   Language: intact and appropriate for age, education, and intellect   Thought Process:  Linear and goal directed   Associations: intact associations   Thought Content:  normal and appropriate   Perceptual Disturbances: no auditory or visual hallcunations   Risk Potential / Abnormal Thoughts: Suicidal ideation - None  Homicidal ideation - None  Potential for aggression - No       Consciousness:  Alert & Awake   Sensorium:  Grossly oriented   Attention: attention span and concentration are age appropriate       Fund of Knowledge:  Memory: awareness of current events: yes  recent and remote memory grossly intact   Insight:  good   Judgment: good   Muscle Strength Muscle Tone: normal  normal   Gait/Station: normal gait/station with good balance   Motor Activity: no abnormal movements       Risks, Benefits And Possible Side Effects Of Medications:    AGREE: Risks, benefits, and possible side effects of medications explained to Desire and she (or legal representative) verbalizes understanding and agreement for treatment.    Controlled Medication Discussion:     Patient using medication appropriately  _____________________________________________________________      Psychiatric History     Patient has a past diagnoses of major depressive disorder and anxiety and has never been told that she has bipolar disorder. She was seen a psychiatrist for a short period of time and also a therapist at [facility].     She has never been hospitalized for mental health never had a suicide attempt.    [facility] 3/2019    Social History:  Patient was raised in [place] and her parents  when she was young. She was raised by her mother and her boyfriend. Her childhood was \"not normal\" and there is constantly fighting at home. She denies any physical or sexual abuse. His one brother. She developed normally as far she is aware.     She graduated high school and has AA and healthcare " "administration. She has split custody of her 3 children from a 14-year-old relationship. She left home and \"he took advantage of that\".     She is [employer]. No  history no legal issues now or in the past and no weapons.      Never had issues with alcohol or drugs, never needed rehabilitation       Family Psychiatric History:     Father    1. Family history of alcohol abuse (V61.41) (Z81.1)   2. Denied: Family history of suicide  Family History    3. Family history of Drug addiction   4. Family history of alcohol abuse (V61.41) (Z81.1)   5. Denied: Family history of bipolar disorder   6. Denied: Family history of paranoid schizophrenia   7. Denied: Family history of suicide   8. Family history of No Significant Family History         Assessment/Plan:        Assessment & Plan  Social phobia  Not at goal, increase cymbalta       JG (generalized anxiety disorder)  Not at goal, increase cymbalta and continue other treatments       Major depressive disorder, recurrent, severe w/o psychotic behavior (HCC)  Not at goal, increase cymbalta and continue other treatments           ______________________________________________________________________    MDD, R (Highly unlikely bipolar disorder, but h/o diagnosis)  JG  social phobia.          TUBES OPAL Phipps feels buspar has helped some, she is interested in cymbalta increase. Trintellix was stopped due to insurance change but perhaps needs to be revisited or alternatives considered if cymbalta ineffective.     Atarax was for itching, never took for anxiety but we could explore in the future. Took Abilify only for a few days (sedation) and stopped. Could revisit. At this point modafinil would need to be off label, which I am not opposed to due to the appropriate use, tolerability, and benefit. Cost an issue    Revisit at future visit - psych testing (Referral made 11/2022 but she did not follow through, she was going to reach out again 2/24). Questioning her " distractibility (starts project then goes to another, or in cleaning the house will go from item to item and then the whole house is a mess)    In February 2023 [facility] Elise reviewed situation and a referral to [facility] Jenkins but they did not help her much, so she discharged in May 2023    I do not believe that she has bipolar disorder but mood stabilizers for anger and irritability if other paths do not seem to be appropriate or beneficial can be considered. Could consider abilify, retrial nortriptyline, buspar, lamictal, other directions. Klonopin increase has been discussed in past, prefer other directions as reasonable.    - sleep study 10/2021 - Has sleep apnea, now uses CPAP     [facility]  She is heterozygous for MTHFR, no cancer history.    Safety Risk Assessment: See above. Has had passive SI since ~2015. She states that she has protective features including her children and that she would never actually carry anything out. Safety risk low.    Confidential Assessment:  Past psychotropic medications include  hydroxyzine,   klonopin,   buspar (low dose, no recall details),   cymbalta 30mg (no recall of details),  zoloft (no issues),   neurontin (no help),   Viibryd 10 mg (x2-3mo, no side effects or benefit noted; was paying out of pocket).  Effexor (irritable)  Wellbutrin (irritable).   Prozac (80mg, was not adequate, even with Nortrip 50mg. Went to trintellix)  Topiramate (no benefit at 100mg, no issues) .   Nortriptyline (some benefit at 50mg, dry mouth (did improve), decided to go different way but could reconsider)  Remeron- worked in past and then it did not work  trintellix- more depressed at 20mg, although significant stressors coocurring  Abilify 2.5mg - made groggy (4/2024), only a few days   - (STOPPED, cannot afford and insurance denied) Modafinil 200mg daily ~2/2024   - has atarax for itching, but may try for anxiety 25mg-50mg daily prn. (Stopped taking, never took it for anxiety) 2024  Buspar  10/7/2024    Scales:          Treatment Plan:      Patient has been educated about their diagnosis and treatment modalities. They voiced understanding and agreement with the following plan:     - GENE SIGHT TESTING initially reviewed 12/3/2018 with Desire    1) Meds:   - Klonopin 0.5mg BID PRN   - Buspar 7.5mg BID (Felt spacy when she tried 15mg. Could revisit) ()   - INCREASE Cymbalta to 90mg daily x1-2 weeks then 120mg daily. Discussed serotonin syndrome, bypass and absorption and other side effects and risks     2) Labs:   - : TSH 1.523, Vit D 52.4   - 2021: ECG (Cardiology) , NS arrhythmia   0 3/2019: BMP WNL, TSH 2.983; FT3 2.53   - 2018: Vit D 11.8, TSH 3.96 and FT4 0.82   - 2017: TSH 2.76; Vit D 34.1   - 2017: TSH 1.85   - : CBC, CMP unremarkable, TSH 4.38, Vit D 21.6, , HDL 39     3) Therapy:   - NO LONGER Janny Goss ([facility]); Was [facility] Reyes    4) Medical:  LUPUS; hypothyroidism with pregnancy; history of kidney stones. Tubal ligation.    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        7) Treatment Plan: Enacted 2017, Renewed 2017, renewed 3/15/2018, 2018; 2023, 2024, 7/10/2024, 10/7/2024, 3/24/2025    8) Crisis Plan: 3/27/23 (PRIOR therapist), 7/10/2024, 10/7/2024    Discussed self monitoring of symptoms, and symptom monitoring tools.    Patient has been informed of 24 hours and weekend coverage for urgent situations accessed by calling the main clinic phone number.        Psychotherapy in session:  Time spent performing psychotherapy:     Visit Time    Visit Start Time: 430  Visit Stop Time: 450  Total Visit Duration:  20 minutes

## 2025-03-24 ENCOUNTER — OFFICE VISIT (OUTPATIENT)
Dept: PSYCHIATRY | Facility: CLINIC | Age: 40
End: 2025-03-24
Payer: COMMERCIAL

## 2025-03-24 DIAGNOSIS — F41.1 GAD (GENERALIZED ANXIETY DISORDER): ICD-10-CM

## 2025-03-24 DIAGNOSIS — F33.2 MAJOR DEPRESSIVE DISORDER, RECURRENT, SEVERE W/O PSYCHOTIC BEHAVIOR (HCC): ICD-10-CM

## 2025-03-24 DIAGNOSIS — F40.10 SOCIAL PHOBIA: ICD-10-CM

## 2025-03-24 PROCEDURE — 99214 OFFICE O/P EST MOD 30 MIN: CPT | Performed by: PSYCHIATRY & NEUROLOGY

## 2025-03-24 NOTE — BH TREATMENT PLAN
"    TREATMENT PLAN (Medication Management Only)        [facility] - [facility]    Name/Date of Birth/MRN:  Desire العلي 40 y.o. 1985 MRN: [id number]  Date of Treatment Plan: March 24, 2025  Diagnosis/Diagnoses:   1. Social phobia    2. JG (generalized anxiety disorder)    3. Major depressive disorder, recurrent, severe w/o psychotic behavior (HCC)      Strengths/Personal Resources for Self-Care: caring, good listener, people feel comfortable talking to me, supportive family  Area/Areas of need (in own words): increase distress tolerance skills, increase emotional distress tolerance, ability to share emotions and identify triggers and process unresolved traumas  1. Long Term Goal: \"continuing getting back on my feet\"\"   Target Date: 180 days from treatment plan  Person/Persons responsible for completion of goal: Dr. Coronado and Self  2.  Short Term Objective (s) - How will we reach this goal?:   A.  Provider new recommended medication/dosage changes and/or continue medication(s): as noted in chart.  B.  Continue to eat regular, get back into taking vitamins  C.  Get back into therapy.   Target Date: 6 months from treatment plan unless noted otherwise  Person/Persons Responsible for Completion of Goal: Dr. Coronado and Self   Progress Towards Goals: continuing treatment   Treatment Modality: Medication management and therapy PRN  Review due 180 days from date of this plan: Approximately 6 months from today ( 9/24/2025 )    Expected length of service: ongoing treatment unless revised  My Physician/PA/NP and I have developed this plan together and I agree to work on the goals and objectives. I understand the treatment goals that were developed for my treatment.  Signature:       Date and time:  Signature of parent/guardian if under age of 14 years: Date and time:  Signature of provider:      Date and time:  Signature of Supervising Physician:    Date and time: " 3/24/2025      Cliff Coronado III, DO

## 2025-03-25 RX ORDER — DULOXETIN HYDROCHLORIDE 60 MG/1
CAPSULE, DELAYED RELEASE ORAL
Qty: 60 CAPSULE | Refills: 1 | Status: SHIPPED | OUTPATIENT
Start: 2025-03-25

## 2025-03-25 RX ORDER — CLONAZEPAM 0.5 MG/1
TABLET ORAL
Qty: 60 TABLET | Refills: 2 | Status: SHIPPED | OUTPATIENT
Start: 2025-03-25

## 2025-03-25 RX ORDER — BUSPIRONE HYDROCHLORIDE 15 MG/1
7.5 TABLET ORAL 2 TIMES DAILY
Qty: 30 TABLET | Refills: 1 | Status: SHIPPED | OUTPATIENT
Start: 2025-03-25

## 2025-03-31 ENCOUNTER — TELEPHONE (OUTPATIENT)
Dept: BARIATRICS | Facility: CLINIC | Age: 40
End: 2025-03-31

## 2025-03-31 NOTE — TELEPHONE ENCOUNTER
LVM for pt discussing EGD instructions for a procedure on 4/9/25 with Dr. Naren Nagel. Pt advised to call office if any questions or concerns. Pt also sent MyChart of instructions.

## 2025-04-02 ENCOUNTER — HOSPITAL ENCOUNTER (OUTPATIENT)
Dept: MAMMOGRAPHY | Facility: MEDICAL CENTER | Age: 40
Discharge: HOME | End: 2025-04-02
Payer: COMMERCIAL

## 2025-04-02 VITALS — HEIGHT: 66 IN | BODY MASS INDEX: 21.53 KG/M2 | WEIGHT: 134 LBS

## 2025-04-02 DIAGNOSIS — Z12.31 ENCOUNTER FOR SCREENING MAMMOGRAM FOR BREAST CANCER: ICD-10-CM

## 2025-04-02 PROCEDURE — 77067 SCR MAMMO BI INCL CAD: CPT

## 2025-04-02 PROCEDURE — 77063 BREAST TOMOSYNTHESIS BI: CPT

## 2025-04-06 ENCOUNTER — RESULTS FOLLOW-UP (OUTPATIENT)
Dept: FAMILY MEDICINE CLINIC | Facility: CLINIC | Age: 40
End: 2025-04-06

## 2025-04-08 RX ORDER — ONDANSETRON 2 MG/ML
4 INJECTION INTRAMUSCULAR; INTRAVENOUS ONCE AS NEEDED
Status: CANCELLED | OUTPATIENT
Start: 2025-04-08

## 2025-04-08 RX ORDER — SODIUM CHLORIDE, SODIUM LACTATE, POTASSIUM CHLORIDE, CALCIUM CHLORIDE 600; 310; 30; 20 MG/100ML; MG/100ML; MG/100ML; MG/100ML
125 INJECTION, SOLUTION INTRAVENOUS CONTINUOUS
Status: CANCELLED | OUTPATIENT
Start: 2025-04-08

## 2025-04-09 ENCOUNTER — ANESTHESIA EVENT (OUTPATIENT)
Dept: GASTROENTEROLOGY | Facility: HOSPITAL | Age: 40
End: 2025-04-09
Payer: COMMERCIAL

## 2025-04-09 ENCOUNTER — ANESTHESIA (OUTPATIENT)
Dept: GASTROENTEROLOGY | Facility: HOSPITAL | Age: 40
End: 2025-04-09
Payer: COMMERCIAL

## 2025-04-09 ENCOUNTER — HOSPITAL ENCOUNTER (OUTPATIENT)
Dept: GASTROENTEROLOGY | Facility: HOSPITAL | Age: 40
Setting detail: OUTPATIENT SURGERY
Discharge: HOME | End: 2025-04-09
Attending: SURGERY
Payer: COMMERCIAL

## 2025-04-09 VITALS
SYSTOLIC BLOOD PRESSURE: 93 MMHG | HEART RATE: 63 BPM | RESPIRATION RATE: 14 BRPM | TEMPERATURE: 98.3 F | OXYGEN SATURATION: 99 % | DIASTOLIC BLOOD PRESSURE: 60 MMHG

## 2025-04-09 DIAGNOSIS — Z98.84 BARIATRIC SURGERY STATUS: ICD-10-CM

## 2025-04-09 LAB
EXT PREGNANCY TEST URINE: NEGATIVE
EXT. CONTROL: NORMAL

## 2025-04-09 PROCEDURE — 88305 TISSUE EXAM BY PATHOLOGIST: CPT | Performed by: PATHOLOGY

## 2025-04-09 PROCEDURE — 43239 EGD BIOPSY SINGLE/MULTIPLE: CPT | Performed by: SURGERY

## 2025-04-09 PROCEDURE — 81025 URINE PREGNANCY TEST: CPT | Performed by: ANESTHESIOLOGY

## 2025-04-09 RX ORDER — PHENYLEPHRINE HCL IN 0.9% NACL 1 MG/10 ML
SYRINGE (ML) INTRAVENOUS AS NEEDED
Status: DISCONTINUED | OUTPATIENT
Start: 2025-04-09 | End: 2025-04-09

## 2025-04-09 RX ORDER — SODIUM CHLORIDE, SODIUM LACTATE, POTASSIUM CHLORIDE, CALCIUM CHLORIDE 600; 310; 30; 20 MG/100ML; MG/100ML; MG/100ML; MG/100ML
125 INJECTION, SOLUTION INTRAVENOUS CONTINUOUS
Status: DISCONTINUED | OUTPATIENT
Start: 2025-04-09 | End: 2025-04-13 | Stop reason: HOSPADM

## 2025-04-09 RX ORDER — PROPOFOL 10 MG/ML
INJECTION, EMULSION INTRAVENOUS AS NEEDED
Status: DISCONTINUED | OUTPATIENT
Start: 2025-04-09 | End: 2025-04-09

## 2025-04-09 RX ADMIN — Medication 100 MCG: at 09:43

## 2025-04-09 RX ADMIN — PROPOFOL 150 MG: 10 INJECTION, EMULSION INTRAVENOUS at 09:43

## 2025-04-09 RX ADMIN — SODIUM CHLORIDE, SODIUM LACTATE, POTASSIUM CHLORIDE, AND CALCIUM CHLORIDE 125 ML/HR: .6; .31; .03; .02 INJECTION, SOLUTION INTRAVENOUS at 09:05

## 2025-04-09 NOTE — ANESTHESIA POSTPROCEDURE EVALUATION
Post-Op Assessment Note    CV Status:  Stable    Pain management: adequate       Mental Status:  Awake   Hydration Status:  Stable   PONV Controlled:  Controlled   Airway Patency:  Patent     Post Op Vitals Reviewed: Yes    No anethesia notable event occurred.    Staff: Anesthesiologist           Last Filed PACU Vitals:  Vitals Value Taken Time   Temp     Pulse 53 04/09/25 0948   BP 82/40 04/09/25 0948   Resp 16 04/09/25 0948   SpO2 100 % 04/09/25 0948       Modified Bandar:     Vitals Value Taken Time   Activity 0 04/09/25 0948   Respiration 2 04/09/25 0948   Circulation 1 04/09/25 0948   Consciousness 0 04/09/25 0948   Oxygen Saturation 2 04/09/25 0948     Modified Bandar Score: 5

## 2025-04-09 NOTE — INTERVAL H&P NOTE
H&P reviewed. After examining the patient I find no changes in the patients condition since the H&P had been written.    Vitals:    04/09/25 0855   BP: 103/55   Pulse: 68   Resp: 15   Temp: 98.3 °F (36.8 °C)   SpO2: 100%

## 2025-04-09 NOTE — ANESTHESIA PREPROCEDURE EVALUATION
Procedure:  EGD    Relevant Problems   ANESTHESIA   (+) PONV (postoperative nausea and vomiting) (Resolved)      ENDO   (+) Acquired hypothyroidism      GI/HEPATIC   (+) GERD (gastroesophageal reflux disease)      MUSCULOSKELETAL   (+) Right-sided low back pain with right-sided sciatica      NEURO/PSYCH   (+) JG (generalized anxiety disorder)   (+) Major depressive disorder, recurrent, severe w/o psychotic behavior (HCC)   (+) Social phobia      PULMONARY   (+) CARLOS ENRIQUE (obstructive sleep apnea)      Care Coordination   (+) High risk medication use      Rheumatology   (+) Lupus (systemic lupus erythematosus) (HCC)      Surgery/Wound/Pain   (+) Status post gastric bypass for obesity      Orthopedic/Musculoskeletal   (+) Arthralgia of multiple joints      Other   (+) MAYUR positive   (+) Abnormal nerve conduction studies        Physical Exam    Airway    Mallampati score: I  TM Distance: >3 FB       Dental   No notable dental hx     Cardiovascular  Rhythm: regular, Rate: normal    Pulmonary   Breath sounds clear to auscultation    Other Findings  post-pubertal.      Anesthesia Plan  ASA Score- 2     Anesthesia Type- IV sedation with anesthesia with ASA Monitors.         Additional Monitors:     Airway Plan:     Comment: GA PRN, nose ring in place.       Plan Factors-Exercise tolerance (METS): >4 METS.    Chart reviewed.   Existing labs reviewed.     Patient is not a current smoker.      Obstructive sleep apnea risk education given perioperatively.        Induction- intravenous.    Postoperative Plan-         Informed Consent- Anesthetic plan and risks discussed with patient.        NPO Status:  Vitals Value Taken Time   Date of last liquid 04/08/25 04/09/25 0856   Time of last liquid 2100 04/09/25 0856   Date of last solid 04/08/25 04/09/25 0856   Time of last solid 2100 04/09/25 0856

## 2025-04-15 PROCEDURE — 88305 TISSUE EXAM BY PATHOLOGIST: CPT | Performed by: PATHOLOGY

## 2025-04-24 ENCOUNTER — APPOINTMENT (OUTPATIENT)
Dept: LAB | Facility: HOSPITAL | Age: 40
End: 2025-04-24
Payer: COMMERCIAL

## 2025-04-24 ENCOUNTER — HOSPITAL ENCOUNTER (OUTPATIENT)
Dept: RADIOLOGY | Facility: HOSPITAL | Age: 40
Discharge: HOME | End: 2025-04-24
Attending: STUDENT IN AN ORGANIZED HEALTH CARE EDUCATION/TRAINING PROGRAM
Payer: COMMERCIAL

## 2025-04-24 ENCOUNTER — RESULTS FOLLOW-UP (OUTPATIENT)
Dept: FAMILY MEDICINE CLINIC | Facility: CLINIC | Age: 40
End: 2025-04-24

## 2025-04-24 DIAGNOSIS — E03.9 ACQUIRED HYPOTHYROIDISM: ICD-10-CM

## 2025-04-24 DIAGNOSIS — R10.13 EPIGASTRIC PAIN: ICD-10-CM

## 2025-04-24 LAB
25(OH)D3 SERPL-MCNC: 24 NG/ML (ref 30–100)
ALBUMIN SERPL BCG-MCNC: 4.7 G/DL (ref 3.5–5)
ALP SERPL-CCNC: 46 U/L (ref 34–104)
ALT SERPL W P-5'-P-CCNC: 13 U/L (ref 7–52)
ANION GAP SERPL CALCULATED.3IONS-SCNC: 5 MMOL/L (ref 4–13)
AST SERPL W P-5'-P-CCNC: 15 U/L (ref 13–39)
BASOPHILS # BLD AUTO: 0.02 THOUSANDS/ÂΜL (ref 0–0.1)
BASOPHILS NFR BLD AUTO: 0 % (ref 0–1)
BILIRUB SERPL-MCNC: 0.8 MG/DL (ref 0.2–1)
BUN SERPL-MCNC: 9 MG/DL (ref 5–25)
CALCIUM SERPL-MCNC: 9.2 MG/DL (ref 8.4–10.2)
CHLORIDE SERPL-SCNC: 105 MMOL/L (ref 96–108)
CO2 SERPL-SCNC: 29 MMOL/L (ref 21–32)
CREAT SERPL-MCNC: 0.65 MG/DL (ref 0.6–1.3)
EOSINOPHIL # BLD AUTO: 0.12 THOUSAND/ÂΜL (ref 0–0.61)
EOSINOPHIL NFR BLD AUTO: 2 % (ref 0–6)
ERYTHROCYTE [DISTWIDTH] IN BLOOD BY AUTOMATED COUNT: 12.9 % (ref 11.6–15.1)
FERRITIN SERPL-MCNC: 23 NG/ML (ref 30–307)
FOLATE SERPL-MCNC: 18.4 NG/ML
GFR SERPL CREATININE-BSD FRML MDRD: 111 ML/MIN/1.73SQ M
GLUCOSE SERPL-MCNC: 84 MG/DL (ref 65–140)
HCT VFR BLD AUTO: 40.3 % (ref 34.8–46.1)
HGB BLD-MCNC: 13.2 G/DL (ref 11.5–15.4)
IMM GRANULOCYTES # BLD AUTO: 0.02 THOUSAND/UL (ref 0–0.2)
IMM GRANULOCYTES NFR BLD AUTO: 0 % (ref 0–2)
IRON SATN MFR SERPL: 33 % (ref 15–50)
IRON SERPL-MCNC: 111 UG/DL (ref 50–212)
LYMPHOCYTES # BLD AUTO: 1.54 THOUSANDS/ÂΜL (ref 0.6–4.47)
LYMPHOCYTES NFR BLD AUTO: 30 % (ref 14–44)
MCH RBC QN AUTO: 31.1 PG (ref 26.8–34.3)
MCHC RBC AUTO-ENTMCNC: 32.8 G/DL (ref 31.4–37.4)
MCV RBC AUTO: 95 FL (ref 82–98)
MONOCYTES # BLD AUTO: 0.25 THOUSAND/ÂΜL (ref 0.17–1.22)
MONOCYTES NFR BLD AUTO: 5 % (ref 4–12)
NEUTROPHILS # BLD AUTO: 3.11 THOUSANDS/ÂΜL (ref 1.85–7.62)
NEUTS SEG NFR BLD AUTO: 63 % (ref 43–75)
NRBC BLD AUTO-RTO: 0 /100 WBCS
PLATELET # BLD AUTO: 185 THOUSANDS/UL (ref 149–390)
PMV BLD AUTO: 10.1 FL (ref 8.9–12.7)
POTASSIUM SERPL-SCNC: 3.9 MMOL/L (ref 3.5–5.3)
PROT SERPL-MCNC: 7 G/DL (ref 6.4–8.4)
PTH-INTACT SERPL-MCNC: 51.3 PG/ML (ref 12–88)
RBC # BLD AUTO: 4.25 MILLION/UL (ref 3.81–5.12)
SODIUM SERPL-SCNC: 139 MMOL/L (ref 135–147)
T4 FREE SERPL-MCNC: 0.79 NG/DL (ref 0.61–1.12)
TIBC SERPL-MCNC: 336 UG/DL (ref 250–450)
TRANSFERRIN SERPL-MCNC: 240 MG/DL (ref 203–362)
TSH SERPL DL<=0.05 MIU/L-ACNC: 2.04 UIU/ML (ref 0.45–4.5)
UIBC SERPL-MCNC: 225 UG/DL (ref 155–355)
VIT B12 SERPL-MCNC: 503 PG/ML (ref 180–914)
WBC # BLD AUTO: 5.06 THOUSAND/UL (ref 4.31–10.16)

## 2025-04-24 PROCEDURE — 84590 ASSAY OF VITAMIN A: CPT

## 2025-04-24 PROCEDURE — 84439 ASSAY OF FREE THYROXINE: CPT

## 2025-04-24 PROCEDURE — 74240 X-RAY XM UPR GI TRC 1CNTRST: CPT

## 2025-04-24 PROCEDURE — 82728 ASSAY OF FERRITIN: CPT

## 2025-04-24 PROCEDURE — 82525 ASSAY OF COPPER: CPT

## 2025-04-24 PROCEDURE — 83970 ASSAY OF PARATHORMONE: CPT

## 2025-04-24 PROCEDURE — 84425 ASSAY OF VITAMIN B-1: CPT

## 2025-04-24 PROCEDURE — 82746 ASSAY OF FOLIC ACID SERUM: CPT

## 2025-04-24 PROCEDURE — 83540 ASSAY OF IRON: CPT

## 2025-04-24 PROCEDURE — 84630 ASSAY OF ZINC: CPT

## 2025-04-24 PROCEDURE — 82306 VITAMIN D 25 HYDROXY: CPT

## 2025-04-24 PROCEDURE — 83550 IRON BINDING TEST: CPT

## 2025-04-24 PROCEDURE — 82607 VITAMIN B-12: CPT

## 2025-04-24 PROCEDURE — 85025 COMPLETE CBC W/AUTO DIFF WBC: CPT

## 2025-04-24 PROCEDURE — 80053 COMPREHEN METABOLIC PANEL: CPT

## 2025-04-24 PROCEDURE — 84443 ASSAY THYROID STIM HORMONE: CPT

## 2025-04-24 PROCEDURE — 36415 COLL VENOUS BLD VENIPUNCTURE: CPT

## 2025-04-27 ENCOUNTER — RESULTS FOLLOW-UP (OUTPATIENT)
Dept: FAMILY MEDICINE CLINIC | Facility: CLINIC | Age: 40
End: 2025-04-27

## 2025-04-27 LAB
VIT A SERPL-MCNC: 29.1 UG/DL (ref 20.1–62)
VIT B1 BLD-SCNC: 121.2 NMOL/L (ref 66.5–200)

## 2025-04-28 LAB — COPPER SERPL-MCNC: 72 UG/DL (ref 80–158)

## 2025-04-29 LAB — ZINC SERPL-MCNC: 69 UG/DL (ref 44–115)

## 2025-05-07 ENCOUNTER — OFFICE VISIT (OUTPATIENT)
Dept: BARIATRICS | Facility: CLINIC | Age: 40
End: 2025-05-07
Payer: COMMERCIAL

## 2025-05-07 VITALS
DIASTOLIC BLOOD PRESSURE: 64 MMHG | BODY MASS INDEX: 20.81 KG/M2 | SYSTOLIC BLOOD PRESSURE: 104 MMHG | TEMPERATURE: 97.5 F | HEIGHT: 66 IN | WEIGHT: 129.5 LBS | HEART RATE: 62 BPM

## 2025-05-07 DIAGNOSIS — Z98.84 BARIATRIC SURGERY STATUS: ICD-10-CM

## 2025-05-07 DIAGNOSIS — K91.2 POSTSURGICAL MALABSORPTION: ICD-10-CM

## 2025-05-07 DIAGNOSIS — Z48.815 ENCOUNTER FOR SURGICAL AFTERCARE FOLLOWING SURGERY OF DIGESTIVE SYSTEM: Primary | ICD-10-CM

## 2025-05-07 DIAGNOSIS — R10.13 EPIGASTRIC PAIN: ICD-10-CM

## 2025-05-07 PROCEDURE — 99214 OFFICE O/P EST MOD 30 MIN: CPT | Performed by: NURSE PRACTITIONER

## 2025-05-07 RX ORDER — OMEPRAZOLE 20 MG/1
20 CAPSULE, DELAYED RELEASE ORAL DAILY
Qty: 90 CAPSULE | Refills: 1 | Status: SHIPPED | OUTPATIENT
Start: 2025-05-07

## 2025-05-07 NOTE — PROGRESS NOTES
Date of surgery:4/19/2022  Procedure:RNY  Performing surgeon:Dr. NAREN Nagel    Initial Weight - 229 lb  Current Weight -129.5 lb  Mauro Weight - 113 lb  Total Body Weight Loss (EWL)- 100.1%  EWL% - 129%  TWB % -44%

## 2025-05-07 NOTE — PROGRESS NOTES
Assessment/Plan:     Patient ID: Desire العلي is a 40 y.o. female.     Bariatric Surgery Status/Epigastric pain/BMI 21    -s/p Mary Lou-En-Y Gastric Bypass with Dr. Naren Nagel on 04/19/2022. Presents to the office today for annual visit. Overall doing fair - experiencing epigastric abdominal pain - describes as twisting sensation. Pain is intermittent, comes and goes. Last episode was this morning. May radiate to the shoulder. - she denies any NSAIDs, steroids, smoking or ETOH use. Had a UGI done showing reflux. Had an EGD as well which was unremarkable. Not on any omeprazole or any antacids at this time. Otherwise she is tolerating a regular diet. Denies having any N/V/D/C, regurgitation, reflux or dysphagia.     PLAN:     - f/u with Dr. Naren Nagel as scheduled.   - recommended further work up - obtain CT scan of abdomen with IV/PO contrast  - start on omeprazole for now until further work up completed.   - Routine follow up in 1 year for annual  - Continue with healthy lifestyle, adequate protein intake of 60 gm, fluid intake of at least 64 oz.   - Continue with MVI daily.   - Activity as tolerated.   - Labs ordered and will adjust accordingly if any deficiency.   - Follow up with RD and SW as needed.   .    Continued/Maintain healthy weight loss with good nutrition intakes.  Adequate hydration with at least 64oz. fluid intake.  Follow diet as discussed.  Follow vitamin and mineral recommendations as reviewed with you.  Exercise as tolerated.    Colonoscopy referral made: n/a  Mammogram - UTD    Follow-up in 1 YEAR FOR ANNUAL' f/u with surgeon after CT Scan. We kindly ask that your arrive 15 minutes before your scheduled appointment time with your provider to allow our staff to room you, get your vital signs and update your chart.    Get lab work done prior to annual visit. Please call the office if you need a script.  It is recommended to check with your insurance BEFORE getting labs done to make sure they are  covered by your policy.      Call our office if you have any problems with abdominal pain especially associated with fever, chills, nausea, vomiting or any other concerns.    All  Post-bariatric surgery patients should be aware that very small quantities of any alcohol can cause impairment and it is very possible not to feel the effect. The effect can be in the system for several hours.  It is also a stomach irritant.     It is advised to AVOID alcohol, Nonsteroidal antiinflammatory drugs (NSAIDS) and nicotine of all forms . Any of these can cause stomach irritation/pain.    Discussed the effects of alcohol on a bariatric patient and the increased impairment risk.     Keep up the good work!     Postsurgical Malabsorption   -At risk for malabsorption of vitamins/minerals secondary to malabsorption and restriction of intake from bariatric surgery  -NOT Currently taking adequate postop bariatric surgery vitamin supplementation  -Last set of bariatric labs completed on 04/2025 and showed low vitamin D, low ferritin and copper. - she reports she has intolerance to ana multivitamins. Advised to start on regular and double the dose for now; add calcium. Repeat labs in 3 months.   -Next set of bariatric labs ordered for approximately 3 months  -Patient received education about the importance of adhering to a lifelong supplementation regimen to avoid vitamin/mineral deficiencies      Diagnoses and all orders for this visit:    Encounter for surgical aftercare following surgery of digestive system    Bariatric surgery status  -     Iron Panel (Includes Ferritin, Iron Sat%, Iron, and TIBC); Future  -     Vitamin B12; Future  -     Vitamin D 25 hydroxy; Future  -     Methylmalonic acid, serum; Future  -     Copper Level; Future  -     Cancel: CT abdomen with contrast; Future  -     CT abdomen with contrast; Future  -     omeprazole (PriLOSEC) 20 mg delayed release capsule; Take 1 capsule (20 mg total) by mouth  daily    Postsurgical malabsorption  -     Iron Panel (Includes Ferritin, Iron Sat%, Iron, and TIBC); Future  -     Vitamin B12; Future  -     Vitamin D 25 hydroxy; Future  -     Methylmalonic acid, serum; Future  -     Copper Level; Future    BMI 21.0-21.9, adult    Epigastric pain  -     Cancel: CT abdomen with contrast; Future  -     CT abdomen with contrast; Future  -     omeprazole (PriLOSEC) 20 mg delayed release capsule; Take 1 capsule (20 mg total) by mouth daily         Subjective:      Patient ID: Desire العلي is a 40 y.o. female.    -s/p Mary Lou-En-Y Gastric Bypass with Dr. Naren Nagel on 04/19/2022. Presents to the office today for annual visit. Overall doing fair - experiencing epigastric abdominal pain - describes as twisting sensation. Pain is intermittent, comes and goes. Last episode was this morning. May radiate to the shoulder. - she denies any NSAIDs, steroids, smoking or ETOH use. Had a UGI done showing reflux. Had an EGD as well which was unremarkable. Not on any omeprazole or any antacids at this time. Otherwise she is tolerating a regular diet. Denies having any N/V/D/C, regurgitation, reflux or dysphagia.     Initial: 229 lbs  Current: 129.5 LBS   EWL: (Weight loss is ahead of schedule at this post surgical period.)  Mauro 115 lbs  Current BMI is Body mass index is 21.22 kg/m².    Tolerating a regular diet-yes  Eating at least 60 grams of protein per day-yes  Following 30/60 minute rule with liquids-yes  Drinking at least 64 ounces of fluid per day-yes  Drinking carbonated beverages-yes  Sufficient exercise-yes - walking; strength training.   Using NSAIDs regularly-no  Using nicotine-no  Using alcohol-no  Supplements:  NONE - ENCOURAGED TO GO ON REGULAR MULTIVITAMIN AND DOUBLE THE DOSE, ADD CALCIUM.    EWL is 129%, which places the patient ahead of schedule for expected post surgical weight loss at this time.     The following portions of the patient's history were reviewed and updated as  "appropriate: allergies, current medications, past family history, past medical history, past social history, past surgical history and problem list.    Review of Systems   Constitutional: Negative.    Respiratory: Negative.     Cardiovascular: Negative.    Gastrointestinal: Negative.    Musculoskeletal: Negative.    Neurological: Negative.    Psychiatric/Behavioral: Negative.           Objective:    /64 (Patient Position: Sitting, Cuff Size: Standard)   Pulse 62   Temp 97.5 °F (36.4 °C) (Tympanic)   Ht 5' 5.5\" (1.664 m)   Wt 58.7 kg (129 lb 8 oz)   BMI 21.22 kg/m²      Physical Exam  Vitals and nursing note reviewed.   Constitutional:       Appearance: Normal appearance. She is obese.   Cardiovascular:      Rate and Rhythm: Normal rate and regular rhythm.      Pulses: Normal pulses.      Heart sounds: Normal heart sounds.   Pulmonary:      Effort: Pulmonary effort is normal.      Breath sounds: Normal breath sounds.   Abdominal:      General: Bowel sounds are normal.      Palpations: Abdomen is soft.      Tenderness: There is no abdominal tenderness.   Musculoskeletal:         General: Normal range of motion.   Skin:     General: Skin is warm and dry.   Neurological:      General: No focal deficit present.      Mental Status: She is alert and oriented to person, place, and time.   Psychiatric:         Mood and Affect: Mood normal.         Behavior: Behavior normal.         Thought Content: Thought content normal.         Judgment: Judgment normal.             "

## 2025-05-12 ENCOUNTER — HOSPITAL ENCOUNTER (OUTPATIENT)
Dept: CT IMAGING | Facility: HOSPITAL | Age: 40
Discharge: HOME | End: 2025-05-12
Attending: NURSE PRACTITIONER
Payer: COMMERCIAL

## 2025-05-12 DIAGNOSIS — R10.13 EPIGASTRIC PAIN: ICD-10-CM

## 2025-05-12 DIAGNOSIS — Z98.84 BARIATRIC SURGERY STATUS: ICD-10-CM

## 2025-05-12 PROCEDURE — 74160 CT ABDOMEN W/CONTRAST: CPT

## 2025-05-12 RX ADMIN — IOHEXOL 100 ML: 350 INJECTION, SOLUTION INTRAVENOUS at 15:49

## 2025-05-13 ENCOUNTER — RESULTS FOLLOW-UP (OUTPATIENT)
Dept: BARIATRICS | Facility: CLINIC | Age: 40
End: 2025-05-13

## 2025-05-19 ENCOUNTER — TELEPHONE (OUTPATIENT)
Dept: PSYCHIATRY | Facility: CLINIC | Age: 40
End: 2025-05-19

## 2025-05-19 NOTE — TELEPHONE ENCOUNTER
Writer called stating that the [facility] Consent is missing. Writer mention in order for their visit to be virtual this consent has to be signed. [facility] Consent has been sent to [employer].

## 2025-05-22 ENCOUNTER — OFFICE VISIT (OUTPATIENT)
Dept: BARIATRICS | Facility: CLINIC | Age: 40
End: 2025-05-22

## 2025-05-22 VITALS
HEIGHT: 66 IN | BODY MASS INDEX: 20.73 KG/M2 | OXYGEN SATURATION: 98 % | TEMPERATURE: 97.8 F | SYSTOLIC BLOOD PRESSURE: 126 MMHG | DIASTOLIC BLOOD PRESSURE: 74 MMHG | HEART RATE: 65 BPM | WEIGHT: 129 LBS

## 2025-05-22 DIAGNOSIS — R10.13 EPIGASTRIC PAIN: Primary | ICD-10-CM

## 2025-05-22 NOTE — PROGRESS NOTES
"OFFICE VISIT - BARIATRIC SURGERY  Desire العلي 40 y.o. female MRN: [id number]  Unit/Bed#:  Encounter: [id number]      HPI:  Desire لاعلي is a 40 y.o. female status post Mary Lou-En-Y Gastric Bypass with Dr. Naren Nagel on 04/19/2022.  Patient presenting with abdominal pain.  She is here for review of her workup thus far.    Subjective     Patient having epgastric abd pain which started last year. Pain is intermittent.  Described as a chest tightness that radiates to her right shoulder.  She was restarted on omeprazole 20 mg 2 weeks ago by our YAIMA.  Her symptoms are improved on the PPI.  Her epigastric pain/cramping lasts approximately 1 to 2 minutes.  It occurs approximately 1-4 times a month.    She denies drinking smoking.      Review of Systems   Constitutional:  Negative for chills and fever.   HENT:  Negative for sore throat.    Eyes:  Negative for visual disturbance.   Respiratory:  Negative for cough and shortness of breath.    Gastrointestinal:  Negative for abdominal pain and vomiting.        Epigastric abdominal pain that radiates to her shoulder described as a cramping.   Musculoskeletal:  Negative for arthralgias.   Skin:  Negative for color change and rash.   Neurological:  Negative for seizures and syncope.   All other systems reviewed and are negative.      Historical Information   Past Medical History[1]  Past Surgical History[2]  Social History   Social History     Substance and Sexual Activity   Alcohol Use Not Currently    Comment: rare     Social History     Substance and Sexual Activity   Drug Use No     Tobacco Use History[3]    Objective       Current Vitals:   Blood Pressure: 126/74 (05/22/25 1629)  Pulse: 65 (05/22/25 1629)  Temperature: 97.8 °F (36.6 °C) (05/22/25 1629)  Height: 5' 5.5\" (166.4 cm) (05/22/25 1629)  Weight - Scale: 58.5 kg (129 lb) (05/22/25 1629)  SpO2: 98 % (05/22/25 1629)    Invasive Devices       Peripheral Intravenous Line  Duration             Peripheral IV 05/12/25 " Proximal;Right;Ventral (anterior) Forearm 10 days                    Physical Exam  Vitals reviewed.   Constitutional:       Appearance: Normal appearance.   HENT:      Head: Atraumatic.      Nose: Nose normal.     Cardiovascular:      Pulses: Normal pulses.   Pulmonary:      Effort: Pulmonary effort is normal.   Abdominal:      Comments: Some discomfort to epigastric palpation.     Musculoskeletal:         General: Normal range of motion.     Neurological:      General: No focal deficit present.     Psychiatric:         Behavior: Behavior normal.           Pathology, and Other Studies:     Workup includes:     UGI    UPPER GI SERIES -SINGLE CONTRAST     INDICATION: R10.13: Epigastric pain.     COMPARISON: None     TECHNIQUE: The patient was given barium by mouth and images of the esophagus, stomach, and small bowel were obtained.     IMAGES: 25     FLUOROSCOPY TIME: 1.4 minutes     FINDINGS:     The esophagus is normal in caliber. Esophageal motility is normal and emptying of contrast from the esophagus is prompt. There is no mucosal mass, ulceration or fold thickening identified.     Expected postoperative changes in the stomach status post gastric bypass surgery. The gastrojejunal anastomosis is normal without ulceration. The gastric mucosa is normal. No penetrating ulcers or masses.     Contrast empties promptly into the jejunum. The jejunum is normal in caliber.     Gastroesophageal reflux was observed into the mid to distal esophagus.     There is no hiatal hernia.     IMPRESSION:     Gastroesophageal reflux.       EGD    [facility]  [address] [address]  [phone]        DATE OF SERVICE:  4/09/25     PHYSICIAN(S):  Attending:   Adalid Nagel MD      Fellow:   No Staff Documented         INDICATION:  Bariatric surgery status     POST-OP DIAGNOSIS:  See the impression below.     PREPROCEDURE:  Informed consent was obtained for the procedure, including sedation.   Risks of perforation, hemorrhage, adverse drug reaction and aspiration were discussed. The patient was placed in the left lateral decubitus position.     Patient was explained about the risks and benefits of the procedure. Risks including but not limited to bleeding, infection, and perforation were explained in detail. Also explained about less than 100% sensitivity with the exam and other alternatives.     PROCEDURE: EGD     DETAILS OF PROCEDURE:   Patient was taken to the procedure room where a time out was performed to confirm correct patient and correct procedure. The patient underwent monitored anesthesia care, which was administered by an anesthesia professional. The patient's blood pressure, heart rate, level of consciousness, respirations, oxygen, ECG and ETCO2 were monitored throughout the procedure. The scope was introduced through the mouth and advanced to the second part of the duodenum. Retroflexion was performed in the fundus. The patient experienced no blood loss. The procedure was not difficult. The patient tolerated the procedure well. There were no apparent adverse events.      ANESTHESIA INFORMATION:  ASA: II  Anesthesia Type: IV Sedation with Anesthesia     MEDICATIONS:  No administrations occurring from 0939 to 0945 on 04/09/25         FINDINGS:  Regular Z-line 40 cm from the incisors; performed cold forceps biopsy  Healthy previous Mary Lou-en-Y gastric bypass in the stomach  All observed locations appeared normal.        SPECIMENS:  ID Type Source Tests Collected by Time Destination   1 : cold bx r/o H.pylori Tissue Stomach TISSUE EXAM Fortino Vaughan MD 4/9/2025 0943              IMPRESSION:  Healthy previous Mary Lou-en-Y gastric bypass in the stomach  Normal.        RECOMMENDATION:  There is no recommended follow-up for this procedure.       Pathology from EGD     A. Stomach, cold bx r/o H.pylori:  Mild chronic inactive gastritis  No H. pylori identified on H&E stained slide      MANOMETRY/pH  Study        GASTRIC EMPTYING STUDY    CT ABDOMEN WITH IV CONTRAST     INDICATION: Z98.84: Bariatric surgery status  R10.13: Epigastric pain.     COMPARISON: CT abdomen/pelvis 7/1/2021.     TECHNIQUE: CT examination of the abdomen was performed after the administration of intravenous contrast. Multiplanar 2D reformatted images were created from the source data.     This examination, like all CT scans performed in the [facility], was performed utilizing techniques to minimize radiation dose exposure, including the use of iterative reconstruction and automated exposure control. Radiation dose length   product (DLP) for this visit: 124 mGy-cm.     IV Contrast: 100 mL of iohexol (OMNIPAQUE)  Enteric Contrast: Administered.     FINDINGS:     LOWER CHEST: No clinically significant abnormality in the visualized lower chest.     LIVER/BILIARY TREE: Unremarkable.     GALLBLADDER: Post cholecystectomy.     SPLEEN: Unremarkable.     PANCREAS: Unremarkable.     ADRENAL GLANDS: Unremarkable.     KIDNEYS/VISUALIZED URETERS: Unremarkable. No hydronephrosis.     STOMACH AND VISUALIZED BOWEL: Postsurgical changes of Mary Lou-en-Y gastric bypass without evidence of internal hernia, obstruction or other complication.     ABDOMINAL CAVITY: No ascites. No pneumoperitoneum. No lymphadenopathy.     VESSELS: Unremarkable for patient's age.     ABDOMINAL WALL: Unremarkable.     BONES: No acute fracture or suspicious osseous lesion.     IMPRESSION:     Postsurgical changes of Mary Lou-en-Y gastric bypass without evidence of complication or additional acute abnormality in the abdomen.     --------------------------------------------------------------------    Assessment/PLAN:    Desire العلي is a 40 y.o. female status post Mary Lou-En-Y Gastric Bypass with Dr. Naren Nagel on 04/19/2022.  Patient presenting with abdominal pain.  She is here for review of her workup thus far.  Of note, Petersons defect was closed during her index  operation.    We discussed that her symptoms are atypical.  Her workup thus far has not yielded the cause of her pain.  Will plan to evaluate her anatomy as she having atypical abdominal pain.  Will plan for diagnostic lap for intermittent abdominal pain.          Fortino Vaughan  Bariatric Surgery  5/22/2025  8:58 PM         [1]   Past Medical History:  Diagnosis Date    Acne     ADHD (attention deficit hyperactivity disorder)     Adjustment disorder     Anxiety     Back pain     Carpal tunnel syndrome on right     Chronic pain disorder     Contact lens overwear of both eyes     Cubital tunnel syndrome on right     Depression     Disease of thyroid gland     GERD (gastroesophageal reflux disease)     Headache     Headache(784.0)     History of sleep apnea     resolved after bariatric sx    Hyperthyroidism     Hypothyroidism     Impulse control disorder     Kidney stones     Lupus (systemic lupus erythematosus) (HCC)     Memory loss     Nausea     Panic attack     Panic disorder     PONV (postoperative nausea and vomiting)     PTSD (post-traumatic stress disorder)     Wears glasses    [2]   Past Surgical History:  Procedure Laterality Date    CARPAL TUNNEL RELEASE      CHOLECYSTECTOMY      EGD      ELBOW SURGERY  11/27/2020    Need to have a revision surgery    HAND SURGERY  11/27/2020    MI APPLICATION LONG ARM SPLINT SHOULDER HAND Right 01/25/2024    Procedure: APPLICATION OF LONG ARM SPLINT;  Surgeon: Joshua Wu MD;  Location: [facility];  Service: Orthopedics    MI CYSTOURETHROSCOPY N/A 02/03/2020    Procedure: CYSTOSCOPY;  Surgeon: Reed Lovell MD;  [facility];  Service: UroGynecology           MI LAPAROSCOPY SURG CHOLECYSTECTOMY N/A 09/06/2018    Procedure: CHOLECYSTECTOMY LAPAROSCOPIC W/ ROBOTICS;  Surgeon: Vince Tomas MD;  [facility];  Service: General    MI LAPS GSTR RSTCV PX W/BYP KATLYN-EN-Y LIMB <150 CM N/A 04/19/2022    Procedure: LAPAROSCOPIC KATLYN-EN-Y GASTRIC BYPASS &  INTRAOPERATIVE EGD ROBOTICALLY ASSISTED;  Surgeon: Adalid Nagel MD;  [facility];  Service: Bariatrics    OK NEUROPLASTY &/TRANSPOS MEDIAN NRV CARPAL TUNNE Right 11/27/2020    Procedure: release CARPAL TUNNEL;  Surgeon: Keith Landers MD;  [facility];  Service: Orthopedics    OK NEUROPLASTY &/TRANSPOS MEDIAN NRV CARPAL TUNNE Right 01/25/2024    Procedure: RIGHT OPEN REVISION CARPAL TUNNEL RELEASE;  Surgeon: Joshua Wu MD;  Location: [facility];  Service: Orthopedics    OK NEUROPLASTY &/TRANSPOSITION ULNAR NERVE ELBOW Right 11/27/2020    Procedure: CUBITAL TUNNEL;  Surgeon: Keith Landers MD;  [facility];  Service: Orthopedics    OK NEUROPLASTY &/TRANSPOSITION ULNAR NERVE ELBOW Right 01/25/2024    Procedure: REVISION CUBITAL TUNNEL WITH SUBMUSCULAR ULNAR NERVE TRANSPOSITION;  Surgeon: Joshua Wu MD;  Location: [facility];  Service: Orthopedics    OK POST COLPORRHAPHY RECTOCELE W/WO PERINEORRHAPHY N/A 02/03/2020    Procedure: POSTERIOR COLPORRHAPHY;  Surgeon: Reed Lovell MD;  [facility];  Service: UroGynecology           OK SLING OPERATION STRESS INCONTINENCE N/A 02/03/2020    Procedure: PUBOVAGINAL SLING;  Surgeon: Reed Lovell MD;  [facility];  Service: UroGynecology           SLEEVE GASTROPLASTY      Gastric Bypass RNY    TUBAL LIGATION     [3]   Social History  Tobacco Use   Smoking Status Never   Smokeless Tobacco Never

## 2025-06-03 DIAGNOSIS — Z01.810 PREOP CARDIOVASCULAR EXAM: Primary | ICD-10-CM

## 2025-06-04 NOTE — ASSESSMENT & PLAN NOTE
Not at goal, see JG    Orders:    busPIRone (BUSPAR) 15 mg tablet; Take 0.5 tablets (7.5 mg total) by mouth in the morning and 0.5 tablets (7.5 mg total) before bedtime.    DULoxetine (CYMBALTA) 60 mg delayed release capsule; Take 60mg daily, in addition to 30mg pill. After 1-2 weeks, increase to 120mg daily.    ARIPiprazole (ABILIFY) 2 mg tablet; Take 1mg daily. After 1-2 weeks increase to 2mg daily

## 2025-06-04 NOTE — PSYCH
MEDICATION MANAGEMENT NOTE    Name: Desire العلي      : 1985      MRN: [id number]  Encounter Provider: Cliff Coronado III, DO  Encounter Date: 2025   Encounter department: [facility]    Insurance: Payor: BLUE CROSS / Plan: CAPITAL BC PLAN 361 / Product Type: Blue Fee for Service /      Reason for Visit: No chief complaint on file.    :  Assessment & Plan  JG (generalized anxiety disorder)  MDD, R (Highly unlikely bipolar disorder, but h/o diagnosis)  JG  social phobia.             TUBES TIED  [facility] [place] depression/anxiety 2025  Psych testing referral (eval for ADHD) 2025    Desire is a little better with Cymbalta, interested in abilify. Feels buspar has helped some and some tolerability issues before at higher dose but could revisit.  Trintellix was stopped due to insurance change but perhaps needs to be revisited or alternatives considered if cymbalta ineffective. Atarax was for itching, never took for anxiety but we could explore in the future.     At this point modafinil would need to be off label, which I am not opposed to due to the appropriate use, tolerability, and benefit. Cost an issue     Psych testing referral 2025 - dropped off her radar (Referral made 2022 but she did not follow through, she was going to reach out again ). Questioning her distractibility (starts project then goes to another, or in cleaning the house will go from item to item and then the whole house is a mess)     In 2023 [place] reviewed situation and a referral to [facility] [place] but they did not help her much, so she discharged in May 2023     I do not believe that she has bipolar disorder but mood stabilizers for anger and irritability if other paths do not seem to be appropriate or beneficial can be considered. Could consider abilify, retrial nortriptyline, buspar, lamictal, other directions. Klonopin increase has been discussed in past, prefer other  directions as reasonable.     - sleep study 10/2021 - Has sleep apnea, now uses CPAP      [facility]  She is heterozygous for MTHFR, no cancer history.     Safety Risk Assessment: See above. Has had passive SI since ~2015. She states that she has protective features including her children and that she would never actually carry anything out. Safety risk low.     Confidential Assessment:  Past psychotropic medications include  hydroxyzine,   klonopin,   buspar (low dose, no recall details),   zoloft (no issues),   neurontin (no help),   Viibryd 10 mg (x2-3mo, no side effects or benefit noted; was paying out of pocket).  Effexor (irritable)  Wellbutrin (irritable).   Prozac (80mg, was not adequate, even with Nortrip 50mg. Went to trintellix)  Topiramate (no benefit at 100mg, no issues) .   Nortriptyline (some benefit at 50mg, dry mouth (did improve), decided to go different way but could reconsider)  Remeron- worked in past and then it did not work  trintellix- more depressed at 20mg, although significant stressors coocurring  Abilify 2.5mg - made groggy (4/2024), only a few days   - (STOPPED, cannot afford and insurance denied) Modafinil 200mg daily ~2/2024   - has atarax for itching, but may try for anxiety 25mg-50mg daily prn. (Stopped taking, never took it for anxiety) 2024  Buspar 10/7/2024  Cymbalta     Scales:            Treatment Plan:  Plan  Patient has been educated about their diagnosis and treatment modalities. They voiced understanding and agreement with the following plan:      - G[facility] S[facility] TESTING initially reviewed 12/3/2018 with Desire     1) Meds:   - Klonopin 0.5mg BID PRN   - Buspar 7.5mg BID (Felt spacy when she tried 15mg. Could revisit) (12/30/204)   - Cymbalta 120mg daily (~4/2025)   - START Abilify 1mg daily. After 1 week increase to 2mg daily.      2) Labs: Likely  order ~September 2025   - 2/2025: TG 53; HDL 49, LDL 64; Glucose 84 (non fasting)   - 2/22: TSH 1.523, Vit D 52.4   - 12/2021:  ECG (Cardiology) , NS arrhythmia   0 3/2019: BMP WNL, TSH 2.983; FT3 2.53   - 2018: Vit D 11.8, TSH 3.96 and FT4 0.82   - 2017: TSH 2.76; Vit D 34.1   - 2017: TSH 1.85   - : CBC, CMP unremarkable, TSH 4.38, Vit D 21.6, , HDL 39     3) Therapy:  - Waitlist for [facility]   - [facility] [place] (2025)       - NO LONGER Janny Goss ([facility]); Was [facility] Re[facility]     4) Medical:  LUPUS; hypothyroidism with pregnancy; history of kidney stones. Tubal ligation.    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         7) Treatment Plan: Enacted 2017, Renewed 2017, renewed 3/15/2018, 2018; 2023, 2024, 7/10/2024, 10/7/2024, 3/24/2025     8) Crisis Plan: 3/27/23 (PRIOR therapist), 7/10/2024, 10/7/2024  Orders:    busPIRone (BUSPAR) 15 mg tablet; Take 0.5 tablets (7.5 mg total) by mouth in the morning and 0.5 tablets (7.5 mg total) before bedtime.    clonazePAM (KlonoPIN) 0.5 mg tablet; TAKE 1/2 TO 1 TABLET BY MOUTH 2 TIMES A DAY AS NEEDED FOR ANXIETY.    DULoxetine (CYMBALTA) 60 mg delayed release capsule; Take 60mg daily, in addition to 30mg pill. After 1-2 weeks, increase to 120mg daily.    Ambulatory referral to Psych Services; Future    Social phobia  Not at goal, see JG      Orders:    busPIRone (BUSPAR) 15 mg tablet; Take 0.5 tablets (7.5 mg total) by mouth in the morning and 0.5 tablets (7.5 mg total) before bedtime.    clonazePAM (KlonoPIN) 0.5 mg tablet; TAKE 1/2 TO 1 TABLET BY MOUTH 2 TIMES A DAY AS NEEDED FOR ANXIETY.    DULoxetine (CYMBALTA) 60 mg delayed release capsule; Take 60mg daily, in addition to 30mg pill. After 1-2 weeks, increase to 120mg daily.    Major depressive  "disorder, recurrent, severe w/o psychotic behavior (HCC)  Not at goal, see JG    Orders:    busPIRone (BUSPAR) 15 mg tablet; Take 0.5 tablets (7.5 mg total) by mouth in the morning and 0.5 tablets (7.5 mg total) before bedtime.    DULoxetine (CYMBALTA) 60 mg delayed release capsule; Take 60mg daily, in addition to 30mg pill. After 1-2 weeks, increase to 120mg daily.    ARIPiprazole (ABILIFY) 2 mg tablet; Take 1mg daily. After 1-2 weeks increase to 2mg daily        Treatment Recommendations:    Educated about diagnosis and treatment modalities. Verbalizes understanding and agreement with the treatment plan.  Discussed self monitoring of symptoms, and symptom monitoring tools.  Discussed medications and if treatment adjustment was needed or desired.  Aware of 24 hour and weekend coverage for urgent situations accessed by calling [facility] main practice number  I am scheduling this patient out for greater than 3 months: No    Medications Risks/Benefits:      Risks, Benefits And Possible Side Effects Of Medications:    Risks, benefits, and possible side effects of medications explained to Desire and she (or legal representative) verbalizes understanding and agreement for treatment.    Controlled Medication Discussion:     Desire has been filling controlled prescriptions on time as prescribed according to [place] Prescription Drug Monitoring Program.      History of Present Illness       _____________________________________________________________    Desire MORA Severiano presents for follow up to address her mental health and illnesses. Desire notes it has been better. She has noticed a little difference with the cymbalta. Depression and anxiety have both improved a bit. Situational factors, and 'I get in my thoughts\" and negative thinking.     Continues to notice focus issues.    Still at [employer]. Checking people in.    7 years look back (stays on record but less a concern) for " employers with her felony. She is not sure if she will do anything to pursue a pardon / removal.     Living situtaion good at her appt, dogs doing well.    Psych testing- 'that was when life started lifeing' about 2 years ago. She feels even in childhood she had an issue.    ADHD Evaluation:  Inattentive (6): 1) Careless mistakes/poor attention, 2) Cannot sustain attention, 5) Organizational challenges, 6) Avoids / Dislikes tasks requiring sustained mental effort, 8) Easily distracted, 9) Forgetful in daily activities  Present before age of 12? Possibly  Symptoms interfere with 2 or more settings? yes    F/U prn- lupus pain  F/U PRN Thinking about finishing school.  F/U PRN- Children-  split custody, oldest son (2004), middle (son) (7/2004), youngest is a daughter  (2009)        Med Compliance: yes  HPI ROS:                      ('was' below is from prior visit)  Medication Side Effects (other than noted):  no     Depression (10 worst):  7 (Was 9)   Anxiety (10 worst):  7-8 (Was 9)   Hallucinations or Psychosis  no (Was no)    Safety concerns (self harm thoughts, suicidal ideation, HI, etc):  no (Was no)   Sleep: (NM = Nightmares)  good (Was good)   Energy:  low (Was low)   Appetite:  ok (Was ok)   Weight Change:  no      Since our last visit, overall symptoms have been improving.          PHQ-2/9 Depression Screening                 Desire denies any side effects from medications unless noted above    Review Of Systems as noted above. Otherwise A relevant review of symptoms was otherwise negative    History Review: The following portions of the patient's history were reviewed and documented: allergies, current medications, past family history, past medical history, past social history, and problem list.     Lab Review: No new labs or no relevant labs needing review with patient today      OBJECTIVE:     MENTAL STATUS EXAM  Appearance:  age appropriate   Behavior:  Pleasant & cooperative   Speech:  Normal volume,  regular rate and rhythm   Mood:  depressed and anxious   Affect:  brighter with some improvement   Language: intact and appropriate for age, education, and intellect   Thought Process:  Linear and goal directed   Associations: intact associations   Thought Content:  normal and appropriate   Perceptual Disturbances: no auditory or visual hallcunations   Risk Potential / Abnormal Thoughts: Suicidal ideation - None  Homicidal ideation - None  Potential for aggression - No       Consciousness:  Alert & Awake   Sensorium:  Grossly oriented   Attention: attention span and concentration are age appropriate       Fund of Knowledge:  Memory: awareness of current events: yes  recent and remote memory grossly intact   Insight:  good   Judgment: good   Muscle Strength Muscle Tone:      Gait/Station:    Motor Activity:        Risks, Benefits And Possible Side Effects Of Medications:    AGREE: Risks, benefits, and possible side effects of medications explained to Desire and she (or legal representative) verbalizes understanding and agreement for treatment.    Controlled Medication Discussion:     Patient using medication appropriately  _____________________________________________________________      Review Of Systems: A review of systems is obtained and is negative except for the pertinent positives listed in HPI/Subjective above.          Areas of Improvement: reviewed in HPI/Subjective Section and reviewed in Assessment and Plan Section      Past Medical History:   Diagnosis Date    Acne     ADHD (attention deficit hyperactivity disorder)     Adjustment disorder     Anxiety     Back pain     Carpal tunnel syndrome on right     Chronic pain disorder     Contact lens overwear of both eyes     Cubital tunnel syndrome on right     Depression     Disease of thyroid gland     GERD (gastroesophageal reflux disease)     Headache     Headache(784.0)     History of sleep apnea     resolved after bariatric sx    Hyperthyroidism      Hypothyroidism     Impulse control disorder     Kidney stones     Lupus (systemic lupus erythematosus) (HCC)     Memory loss     Nausea     Panic attack     Panic disorder     PONV (postoperative nausea and vomiting)     PTSD (post-traumatic stress disorder)     Wears glasses      Past Surgical History:   Procedure Laterality Date    CARPAL TUNNEL RELEASE      CHOLECYSTECTOMY      EGD      ELBOW SURGERY  11/27/2020    Need to have a revision surgery    HAND SURGERY  11/27/2020    CA APPLICATION LONG ARM SPLINT SHOULDER HAND Right 01/25/2024    Procedure: APPLICATION OF LONG ARM SPLINT;  Surgeon: Joshua Wu MD;  Location: [facility];  Service: Orthopedics    CA CYSTOURETHROSCOPY N/A 02/03/2020    Procedure: CYSTOSCOPY;  Surgeon: Reed Lovell MD;  [facility];  Service: UroGynecology           CA LAPAROSCOPY SURG CHOLECYSTECTOMY N/A 09/06/2018    Procedure: CHOLECYSTECTOMY LAPAROSCOPIC W/ ROBOTICS;  Surgeon: Vince Tomas MD;  [facility];  Service: General    CA LAPS GSTR RSTCV PX W/BYP KATLYN-EN-Y LIMB <150 CM N/A 04/19/2022    Procedure: LAPAROSCOPIC KATLYN-EN-Y GASTRIC BYPASS & INTRAOPERATIVE EGD ROBOTICALLY ASSISTED;  Surgeon: Adalid Nagel MD;  [facility];  Service: Bariatrics    CA NEUROPLASTY &/TRANSPOS MEDIAN NRV CARPAL TUNNE Right 11/27/2020    Procedure: release CARPAL TUNNEL;  Surgeon: Keith Landers MD;  [facility];  Service: Orthopedics    CA NEUROPLASTY &/TRANSPOS MEDIAN NRV CARPAL TUNNE Right 01/25/2024    Procedure: RIGHT OPEN REVISION CARPAL TUNNEL RELEASE;  Surgeon: Joshua Wu MD;  Location: [facility];  Service: Orthopedics    CA NEUROPLASTY &/TRANSPOSITION ULNAR NERVE ELBOW Right 11/27/2020    Procedure: CUBITAL TUNNEL;  Surgeon: Keith Landers MD;  [facility];  Service: Orthopedics    CA NEUROPLASTY &/TRANSPOSITION ULNAR NERVE ELBOW Right 01/25/2024    Procedure: REVISION CUBITAL TUNNEL WITH SUBMUSCULAR ULNAR NERVE  TRANSPOSITION;  Surgeon: Joshua Wu MD;  Location: [facility];  Service: Orthopedics    DE POST COLPORRHAPHY RECTOCELE W/WO PERINEORRHAPHY N/A 02/03/2020    Procedure: POSTERIOR COLPORRHAPHY;  Surgeon: Reed Lovell MD;  [facility];  Service: UroGynecology           DE SLING OPERATION STRESS INCONTINENCE N/A 02/03/2020    Procedure: PUBOVAGINAL SLING;  Surgeon: Reed Lovell MD;  [facility];  Service: UroGynecology           SLEEVE GASTROPLASTY      Gastric Bypass RNY    TUBAL LIGATION       Allergies:   Allergies   Allergen Reactions    Wound Dressing Adhesive Rash     Gets red rash from paper tape relatively quickly after application    Nsaids Other (See Comments)     Cannot have due to bariatric surgery in past     Penicillins Hives     At young age       Current Outpatient Medications   Medication Instructions    busPIRone (BUSPAR) 7.5 mg, Oral, 2 times daily    Calcium 600-400 MG-UNIT CHEW Daily    clonazePAM (KlonoPIN) 0.5 mg tablet TAKE 1/2 TO 1 TABLET BY MOUTH 2 TIMES A DAY AS NEEDED FOR ANXIETY.    Diclofenac Sodium (VOLTAREN) 2 g, Topical, 4 times daily    DULoxetine (CYMBALTA) 60 mg delayed release capsule Take 60mg daily, in addition to 30mg pill. After 1-2 weeks, increase to 120mg daily.    gabapentin (NEURONTIN) 100 mg, Oral, Daily at bedtime, As needed for numbness    hydroxychloroquine (PLAQUENIL) 300 mg, Oral, Daily    ketoconazole (NIZORAL) 2 % shampoo Use daily for 2 weeks, then transition to maintenance therapy at 1-4 x per month. Let the shampoo sit on the skin for at least 5 minutes before rinsing off.    ketoconazole (NIZORAL) 2 % shampoo 1 Application, Topical, Daily, Let sit for 5 minutes before rinsing clear    levothyroxine 50 mcg, Oral, Daily (early morning)    Multiple Vitamins-Minerals (Bariatric Multivitamins/Iron) CAPS Daily    omeprazole (PRILOSEC) 20 mg, Oral, Daily    predniSONE 5 mg tablet 4 tabs x7 days, then 3 tabs x7 days, then 2 tabs x7 days,  then 1 tab x7 days, then stop.    Sulfacetamide Sodium-Sulfur (Sulfacetamide Sod-Sulfur Wash) 9-4.5 % LIQD Apply topically daily    tretinoin (RETIN-A) 0.025 % cream Apply pea sized amount to dry face at night (inactivated by sunlight). Start 2-3 times a week and increase to nightly as tolerated. If too drying, apply layer of non-comedogenic lotion before and after application (sandwich method of application).  If using benzoyl peroxide containing wash, wait 30 minutes before applying tretinoin    Vitamin D 2,000 Units, Oral, Daily        Substance Abuse History:    Tobacco, Alcohol and Drug Use History     Tobacco Use    Smoking status: Never    Smokeless tobacco: Never   Vaping Use    Vaping status: Never Used   Substance Use Topics    Alcohol use: Not Currently     Comment: rare    Drug use: No     Alcohol Use: Not At Risk (4/16/2019)    AUDIT-C     Frequency of Alcohol Consumption: Monthly or less     Average Number of Drinks: 1 or 2     Frequency of Binge Drinking: Never       Social History:    Social History     Socioeconomic History    Marital status: Single     Spouse name: Not on file    Number of children: 3    Years of education: Not on file    Highest education level: Associate degree: academic program   Occupational History    Occupation: [employer]     Employer: [employer]   Other Topics Concern    Not on file   Social History Narrative    Uses seatbelts    Caffeine use        Family Psychiatric History:     Family History   Problem Relation Name Age of Onset    Anxiety disorder Mother mom     Depression Mother mom     Drug abuse Mother mom     Alcohol abuse Father father     Drug abuse Father father     Drug abuse Family      Psoriasis Maternal Grandmother Anny     Colon cancer Maternal Uncle Uncle     Hypertension Maternal Grandfather Grandfather     Heart disease Maternal Grandfather Grandfather     Dementia Maternal Grandfather Grandfather     Hearing loss Maternal Grandfather Grandfather      Cancer Maternal Uncle Ky     Diabetes Neg Hx         Medical History Reviewed by provider this encounter:          Objective   There were no vitals taken for this visit.         Suicide/Homicide Risk Assessment:    Risk of Harm to Self:  The following ratings are based on assessment at the time of the interview    Risk of Harm to Others:  The following ratings are based on assessment at the time of the interview    The following interventions are recommended: Continue medication management. No other intervention changes indicated at this time.    Psychotherapy Provided:     Individual psychotherapy provided: yes, 16 minutes supportive therapy related to self care, finances, legal issues, housing, and other matters    Treatment Plan:    Completed and signed during the session: Not applicable - Treatment Plan not due at this session.    Goals: Progress towards Treatment Plan goals - Yes, progressing, as evidenced by subjective findings in HPI/Subjective Section and in Assessment and Plan Section    Depression Follow-up Plan Completed: Yes    Note Share:    This note was shared with patient.    Administrative Statements   Administrative Statements   Encounter provider Cliff Coronado III, DO    The Patient is located at Home and in the following state in which I hold an active license [place].    The patient was identified by name and date of birth. Desire MORGAN Whyterajiv was informed that this is a telemedicine visit and that the visit is being conducted through the Epic Embedded platform. She agrees to proceed..  My office door was closed. No one else was in the room.  She acknowledged consent and understanding of privacy and security of the video platform. The patient has agreed to participate and understands they can discontinue the visit at any time.    I have spent a total time of 24 minutes in caring for this patient on the day of the visit/encounter including therapy, not including the time spent for establishing the  audio/video connection.    Visit Time  Visit Start Time: 0938  Visit Stop Time: 1002  Total Visit Duration: 24 minutes        Cliff Coronado III,  06/04/25

## 2025-06-04 NOTE — ASSESSMENT & PLAN NOTE
MDD, R (Highly unlikely bipolar disorder, but h/o diagnosis)  JG  social phobia.             TUBES TIED  [facility] depression/anxiety 6/5/2025  Psych testing referral (eval for ADHD) 6/5/2025    Desire is a little better with Cymbalta, interested in abilify. Feels buspar has helped some and some tolerability issues before at higher dose but could revisit.  Trintellix was stopped due to insurance change but perhaps needs to be revisited or alternatives considered if cymbalta ineffective. Atarax was for itching, never took for anxiety but we could explore in the future.     At this point modafinil would need to be off label, which I am not opposed to due to the appropriate use, tolerability, and benefit. Cost an issue     Psych testing referral 6/5/2025 - dropped off her radar (Referral made 11/2022 but she did not follow through, she was going to reach out again 2/24). Questioning her distractibility (starts project then goes to another, or in cleaning the house will go from item to item and then the whole house is a mess)     In February 2023 [place] Elise reviewed situation and a referral to [facility] [place] but they did not help her much, so she discharged in May 2023     I do not believe that she has bipolar disorder but mood stabilizers for anger and irritability if other paths do not seem to be appropriate or beneficial can be considered. Could consider abilify, retrial nortriptyline, buspar, lamictal, other directions. Klonopin increase has been discussed in past, prefer other directions as reasonable.     - sleep study 10/2021 - Has sleep apnea, now uses CPAP      [facility]  She is heterozygous for MTHFR, no cancer history.     Safety Risk Assessment: See above. Has had passive SI since ~2015. She states that she has protective features including her children and that she would never actually carry anything out. Safety risk low.     Confidential Assessment:  Past psychotropic medications include  hydroxyzine,    klonopin,   buspar (low dose, no recall details),   zoloft (no issues),   neurontin (no help),   Viibryd 10 mg (x2-3mo, no side effects or benefit noted; was paying out of pocket).  Effexor (irritable)  Wellbutrin (irritable).   Prozac (80mg, was not adequate, even with Nortrip 50mg. Went to trintellix)  Topiramate (no benefit at 100mg, no issues) .   Nortriptyline (some benefit at 50mg, dry mouth (did improve), decided to go different way but could reconsider)  Remeron- worked in past and then it did not work  trintellix- more depressed at 20mg, although significant stressors coocurring  Abilify 2.5mg - made groggy (4/2024), only a few days   - (STOPPED, cannot afford and insurance denied) Modafinil 200mg daily ~2/2024   - has atarax for itching, but may try for anxiety 25mg-50mg daily prn. (Stopped taking, never took it for anxiety) 2024  Buspar 10/7/2024  Cymbalta     Scales:            Treatment Plan:  Plan  Patient has been educated about their diagnosis and treatment modalities. They voiced understanding and agreement with the following plan:      - [facility] TESTING initially reviewed 12/3/2018 with Desire     1) Meds:   - Klonopin 0.5mg BID PRN   - Buspar 7.5mg BID (Felt spacy when she tried 15mg. Could revisit) (12/30/204)   - Cymbalta 120mg daily (~4/2025)   - START Abilify 1mg daily. After 1 week increase to 2mg daily.      2) Labs: Likely  order ~September 2025   - 2/2025: TG 53; HDL 49, LDL 64; Glucose 84 (non fasting)   - 2/22: TSH 1.523, Vit D 52.4   - 12/2021: ECG (Cardiology) , NS arrhythmia   0 3/2019: BMP WNL, TSH 2.983; FT3 2.53   - 12/2018: Vit D 11.8, TSH 3.96 and FT4 0.82   - 6/2017: TSH 2.76; Vit D 34.1   - 4/2017: TSH 1.85   - 12/16: CBC, CMP unremarkable, TSH 4.38, Vit D 21.6, , HDL 39     3) Therapy:  - Waitlist for [facility]   - [facility] (6/5/2025)       - NO LONGER Janny Goss ([facility]); Was [facility]     4) Medical:  LUPUS; hypothyroidism with pregnancy;  history of kidney stones. Tubal ligation.    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         7) Treatment Plan: Enacted 2017, Renewed 2017, renewed 3/15/2018, 2018; 2023, 2024, 7/10/2024, 10/7/2024, 3/24/2025     8) Crisis Plan: 3/27/23 (PRIOR therapist), 7/10/2024, 10/7/2024  Orders:    busPIRone (BUSPAR) 15 mg tablet; Take 0.5 tablets (7.5 mg total) by mouth in the morning and 0.5 tablets (7.5 mg total) before bedtime.    clonazePAM (KlonoPIN) 0.5 mg tablet; TAKE 1/2 TO 1 TABLET BY MOUTH 2 TIMES A DAY AS NEEDED FOR ANXIETY.    DULoxetine (CYMBALTA) 60 mg delayed release capsule; Take 60mg daily, in addition to 30mg pill. After 1-2 weeks, increase to 120mg daily.    Ambulatory referral to Psych Services; Future

## 2025-06-04 NOTE — ASSESSMENT & PLAN NOTE
Not at goal, see JG      Orders:    busPIRone (BUSPAR) 15 mg tablet; Take 0.5 tablets (7.5 mg total) by mouth in the morning and 0.5 tablets (7.5 mg total) before bedtime.    clonazePAM (KlonoPIN) 0.5 mg tablet; TAKE 1/2 TO 1 TABLET BY MOUTH 2 TIMES A DAY AS NEEDED FOR ANXIETY.    DULoxetine (CYMBALTA) 60 mg delayed release capsule; Take 60mg daily, in addition to 30mg pill. After 1-2 weeks, increase to 120mg daily.

## 2025-06-05 ENCOUNTER — TELEPHONE (OUTPATIENT)
Dept: PSYCHIATRY | Facility: CLINIC | Age: 40
End: 2025-06-05

## 2025-06-05 ENCOUNTER — TELEMEDICINE (OUTPATIENT)
Dept: PSYCHIATRY | Facility: CLINIC | Age: 40
End: 2025-06-05
Payer: COMMERCIAL

## 2025-06-05 ENCOUNTER — TELEPHONE (OUTPATIENT)
Age: 40
End: 2025-06-05

## 2025-06-05 DIAGNOSIS — F40.10 SOCIAL PHOBIA: Chronic | ICD-10-CM

## 2025-06-05 DIAGNOSIS — F33.2 MAJOR DEPRESSIVE DISORDER, RECURRENT, SEVERE W/O PSYCHOTIC BEHAVIOR (HCC): Chronic | ICD-10-CM

## 2025-06-05 DIAGNOSIS — F41.1 GAD (GENERALIZED ANXIETY DISORDER): Primary | Chronic | ICD-10-CM

## 2025-06-05 PROCEDURE — 99214 OFFICE O/P EST MOD 30 MIN: CPT | Performed by: PSYCHIATRY & NEUROLOGY

## 2025-06-05 RX ORDER — DULOXETIN HYDROCHLORIDE 60 MG/1
CAPSULE, DELAYED RELEASE ORAL
Qty: 180 CAPSULE | Refills: 1 | Status: SHIPPED | OUTPATIENT
Start: 2025-06-05

## 2025-06-05 RX ORDER — ARIPIPRAZOLE 2 MG/1
TABLET ORAL
Qty: 30 TABLET | Refills: 2 | Status: SHIPPED | OUTPATIENT
Start: 2025-06-05

## 2025-06-05 RX ORDER — BUSPIRONE HYDROCHLORIDE 15 MG/1
7.5 TABLET ORAL 2 TIMES DAILY
Qty: 90 TABLET | Refills: 1 | Status: SHIPPED | OUTPATIENT
Start: 2025-06-05

## 2025-06-05 RX ORDER — CLONAZEPAM 0.5 MG/1
TABLET ORAL
Qty: 60 TABLET | Refills: 2 | Status: SHIPPED | OUTPATIENT
Start: 2025-06-05

## 2025-06-05 NOTE — TELEPHONE ENCOUNTER
----- Message from Cliff Coronado DO sent at 6/5/2025  9:45 AM EDT -----  Hello,Is she on therapy waitlist? If not please add back.ThanksJJ

## 2025-06-05 NOTE — TELEPHONE ENCOUNTER
Writer called stating that the Virtual [facility] Consent is missing. Writer mention that in order to keep their visit virtual this form needs to be signed. Writer sent Consent to patients mychart.

## 2025-07-29 ENCOUNTER — TELEPHONE (OUTPATIENT)
Age: 40
End: 2025-07-29

## (undated) DEVICE — TUBING SUCTION 5MM X 12 FT

## (undated) DEVICE — PMI DISPOSABLE PUNCTURE CLOSURE DEVICE / SUTURE GRASPER: Brand: PMI

## (undated) DEVICE — CUFF TOURNIQUET 18 X 4 IN QUICK CONNECT DISP 1 BLADDER

## (undated) DEVICE — CAST PADDING 4 IN UNSTERILE

## (undated) DEVICE — INTENDED FOR TISSUE SEPARATION, AND OTHER PROCEDURES THAT REQUIRE A SHARP SURGICAL BLADE TO PUNCTURE OR CUT.: Brand: BARD-PARKER ® CARBON RIB-BACK BLADES

## (undated) DEVICE — PAD CAST 4 IN COTTON NON STERILE

## (undated) DEVICE — PLUMEPEN PRO 10FT

## (undated) DEVICE — SCD SEQUENTIAL COMPRESSION COMFORT SLEEVE MEDIUM KNEE LENGTH: Brand: KENDALL SCD

## (undated) DEVICE — TIP COVER ACCESSORY

## (undated) DEVICE — STAPLER 60 RELOAD GREEN: Brand: SUREFORM

## (undated) DEVICE — BULB SYRINGE,IRRIGATION WITH PROTECTIVE CAP: Brand: DOVER

## (undated) DEVICE — SEAL

## (undated) DEVICE — GLOVE INDICATOR PI UNDERGLOVE SZ 6.5 BLUE

## (undated) DEVICE — ENDOPATH XCEL BLADELESS TROCARS WITH STABILITY SLEEVES: Brand: ENDOPATH XCEL

## (undated) DEVICE — MEDIUM-LARGE CLIP APPLIER: Brand: ENDOWRIST;DAVINCI SI

## (undated) DEVICE — SPONGE 4 X 4 XRAY 16 PLY STRL LF RFD

## (undated) DEVICE — 10 MM BABCOCKS WITH RATCHET HANDLES: Brand: ENDOPATH

## (undated) DEVICE — GLOVE INDICATOR PI UNDERGLOVE SZ 8 BLUE

## (undated) DEVICE — DRAPE SHEET THREE QUARTER

## (undated) DEVICE — BOWL: 16OZ PEELPOUCH 75/CS 16/PLT: Brand: MEDEGEN MEDICAL PRODUCTS, LLC

## (undated) DEVICE — URETERAL CATHETER ADAPTOR TIP

## (undated) DEVICE — CATH FOLEY 18FR 5ML 2 WAY UNCOATED SILICONE

## (undated) DEVICE — GLOVE SRG BIOGEL 7.5

## (undated) DEVICE — CADIERE FORCEPS: Brand: ENDOWRIST

## (undated) DEVICE — CHLORAPREP HI-LITE 26ML ORANGE

## (undated) DEVICE — ABSORBABLE WOUND CLOSURE DEVICE: Brand: V-LOC 90

## (undated) DEVICE — ABDOMINAL PAD: Brand: DERMACEA

## (undated) DEVICE — U-DRAPE: Brand: CONVERTORS

## (undated) DEVICE — ASTOUND STANDARD SURGICAL GOWN, XL: Brand: CONVERTORS

## (undated) DEVICE — 2000CC GUARDIAN II: Brand: GUARDIAN

## (undated) DEVICE — SUT VICRYL 2-0 SH 27 IN UNDYED J417H

## (undated) DEVICE — STAPLER 60 RELOAD BLUE: Brand: SUREFORM

## (undated) DEVICE — REDUCER: Brand: ENDOWRIST

## (undated) DEVICE — NEEDLE 25G X 1 1/2

## (undated) DEVICE — GAUZE SPONGES,16 PLY: Brand: CURITY

## (undated) DEVICE — PREMIUM DRY TRAY LF: Brand: MEDLINE INDUSTRIES, INC.

## (undated) DEVICE — NEEDLE HYPO 22G X 1-1/2 IN

## (undated) DEVICE — HEAVY DUTY TABLE COVER: Brand: CONVERTORS

## (undated) DEVICE — WEBRIL 6 IN UNSTERILE

## (undated) DEVICE — SUT ETHIBOND 0 CT-1 30 IN X424H

## (undated) DEVICE — ELECTRODE LAP J HOOK E-Z CLEAN 33CM-0021

## (undated) DEVICE — CADIERE FORCEPS: Brand: ENDOWRIST;DAVINCI SI

## (undated) DEVICE — SUT ETHILON 4-0 PS-2 18 IN 1667H

## (undated) DEVICE — MEGA SUTURECUT ND: Brand: ENDOWRIST

## (undated) DEVICE — SUT MONOCRYL 4-0 PS-2 27 IN Y426H

## (undated) DEVICE — SUT VICRYL 0 CT-1 36 IN J946H

## (undated) DEVICE — ACE WRAP 3 IN UNSTERILE

## (undated) DEVICE — SUT ETHILON 3-0 PS-1 18 IN 1663G

## (undated) DEVICE — DRAPE FLUID WARMER (BIRD BATH)

## (undated) DEVICE — INTENDED FOR TISSUE SEPARATION, AND OTHER PROCEDURES THAT REQUIRE A SHARP SURGICAL BLADE TO PUNCTURE OR CUT.: Brand: BARD-PARKER SAFETY BLADES SIZE 11, STERILE

## (undated) DEVICE — GLOVE PI ULTRA TOUCH SZ.6.5

## (undated) DEVICE — [HIGH FLOW INSUFFLATOR,  DO NOT USE IF PACKAGE IS DAMAGED,  KEEP DRY,  KEEP AWAY FROM SUNLIGHT,  PROTECT FROM HEAT AND RADIOACTIVE SOURCES.]: Brand: PNEUMOSURE

## (undated) DEVICE — DRAPE LAPAROTOMY W/POUCHES

## (undated) DEVICE — ELECTROSURGICAL DEVICE HOLSTER;FOR USE WITH MAXIMUM PEAK VOLTAGE OF 4000 V: Brand: FORCE TRIVERSE

## (undated) DEVICE — PREP PAD BNS: Brand: CONVERTORS

## (undated) DEVICE — COLUMN DRAPE

## (undated) DEVICE — VIAL DECANTER

## (undated) DEVICE — STAPLER 60 RELOAD WHITE: Brand: SUREFORM

## (undated) DEVICE — CURITY NON-ADHERENT STRIPS: Brand: CURITY

## (undated) DEVICE — 3000CC GUARDIAN II: Brand: GUARDIAN

## (undated) DEVICE — CAST PADDING 4 IN SYNTHETIC STRL

## (undated) DEVICE — BLADELESS OBTURATOR: Brand: WECK VISTA

## (undated) DEVICE — SUT PROLENE 4-0 PC-3 18 IN 8634G

## (undated) DEVICE — CAST PADDING 4 IN SYNTHETIC NON-STRL

## (undated) DEVICE — KIT, ROBOTIC BARIATRIC: Brand: CARDINAL HEALTH

## (undated) DEVICE — IRRIG ENDO FLO TUBING

## (undated) DEVICE — GLOVE PI ULTRA TOUCH SZ.8.0

## (undated) DEVICE — NEEDLE COUNTER LG W/RULER

## (undated) DEVICE — IV FLUSH NSS 10ML POSIFLUSH

## (undated) DEVICE — MEDI-VAC YANKAUER SUCTION HANDLE W/BULBOUS AND CONTROL VENT: Brand: CARDINAL HEALTH

## (undated) DEVICE — ENDOPOUCH RETRIEVER SPECIMEN RETRIEVAL BAGS: Brand: ENDOPOUCH RETRIEVER

## (undated) DEVICE — TIBURON HAND DRAPE: Brand: CONVERTORS

## (undated) DEVICE — PACK PBDS LAP CHOLE RF

## (undated) DEVICE — TROCAR: Brand: KII FIOS FIRST ENTRY

## (undated) DEVICE — ADHESIVE SKN CLSR HISTOACRYL FLEX 0.5ML LF

## (undated) DEVICE — ARM DRAPE

## (undated) DEVICE — PBT NON ABSORBABLE WOUND CLOSURE DEVICE: Brand: V-LOC

## (undated) DEVICE — STERILE BETHLEHEM PLASTIC HAND: Brand: CARDINAL HEALTH

## (undated) DEVICE — GLOVE SRG BIOGEL 6.5

## (undated) DEVICE — OCCLUSIVE GAUZE STRIP,3% BISMUTH TRIBROMOPHENATE IN PETROLATUM BLEND: Brand: XEROFORM

## (undated) DEVICE — VIOLET BRAIDED (POLYGLACTIN 910), SYNTHETIC ABSORBABLE SUTURE: Brand: COATED VICRYL

## (undated) DEVICE — GLOVE SRG BIOGEL ECLIPSE 7.5

## (undated) DEVICE — SMOKE EVACUATION TUBING WITH 8 IN INTEGRAL WAND AND SPONGE GUARD: Brand: BUFFALO FILTER

## (undated) DEVICE — ALLENTOWN DR  LUCENTE S LAP PK: Brand: CARDINAL HEALTH

## (undated) DEVICE — STRL COTTON TIP APPLCTR 6IN PK: Brand: CARDINAL HEALTH

## (undated) DEVICE — VISIGI 3D®  CALIBRATION SYSTEM  SIZE 36FR BYPASS/SHORT: Brand: BOEHRINGER® VISIGI 3D®  CALIBRATION SYSTEM  SIZE 36FR BYPASS/SHORT

## (undated) DEVICE — GLOVE SRG BIOGEL 7

## (undated) DEVICE — MONOPOLAR CURVED SCISSORS: Brand: ENDOWRIST

## (undated) DEVICE — STAPLER CANNULA SEAL: Brand: ENDOWRIST

## (undated) DEVICE — PERMANENT CAUTERY HOOK: Brand: ENDOWRIST;DAVINCI SI

## (undated) DEVICE — EXIDINE 4 PCT

## (undated) DEVICE — GLOVE INDICATOR PI UNDERGLOVE SZ 7.5 BLUE

## (undated) DEVICE — BAG DECANTER

## (undated) DEVICE — TRAVELKIT CONTAINS FIRST STEP KIT (200ML EP-4 KIT) AND SOILED SCOPE BAG - 1 KIT: Brand: TRAVELKIT CONTAINS FIRST STEP KIT AND SOILED SCOPE BAG

## (undated) DEVICE — DRAPE SHEET X-LG

## (undated) DEVICE — LIGHT GLOVE GREEN

## (undated) DEVICE — NEEDLE 18 G X 1 1/2

## (undated) DEVICE — SYRINGE 10ML LL

## (undated) DEVICE — VESSEL SEALER EXTEND: Brand: ENDOWRIST

## (undated) DEVICE — STERI DRAPE 1000 NON-STERILE ROLL

## (undated) DEVICE — ROBOT ACCESSORY KIT 4 ARM

## (undated) DEVICE — ROBOT INST CANNULA 8MM OBTURATOR BLUNT DISP

## (undated) DEVICE — CANNULA SEAL

## (undated) DEVICE — ARM SLING: Brand: DEROYAL

## (undated) DEVICE — STAPLER 60: Brand: SUREFORM

## (undated) DEVICE — UNDER BUTTOCKS DRAPE W/FLUID CONTROL POUCH: Brand: CONVERTORS

## (undated) DEVICE — GLOVE INDICATOR PI UNDERGLOVE SZ 7 BLUE

## (undated) DEVICE — BETHLEHEM UNIVERSAL MINOR VAG: Brand: CARDINAL HEALTH

## (undated) DEVICE — TOWEL SET X-RAY

## (undated) DEVICE — SKIN MARKER DUAL TIP WITH RULER CAP, FLEXIBLE RULER AND LABELS: Brand: DEVON

## (undated) DEVICE — ACE WRAP 4 IN UNSTERILE

## (undated) DEVICE — Device: Brand: DEFENDO AIR/WATER/SUCTION AND BIOPSY VALVE

## (undated) DEVICE — WET SKIN PREP TRAY: Brand: MEDLINE INDUSTRIES, INC.

## (undated) DEVICE — SUT VICRYL 0 UR-6 27 IN J603H

## (undated) DEVICE — SYRINGE BULB 2 OZ

## (undated) DEVICE — HEM-O-LOK CLIP CARTRIDGE MED/LARGE DA VINCI SI/XI

## (undated) DEVICE — SKN PRP WNG SPNGE PVP SCRB STR: Brand: MEDLINE INDUSTRIES, INC.

## (undated) DEVICE — DRAPE EQUIPMENT RF WAND